# Patient Record
Sex: MALE | Race: WHITE | NOT HISPANIC OR LATINO | Employment: UNEMPLOYED | URBAN - METROPOLITAN AREA
[De-identification: names, ages, dates, MRNs, and addresses within clinical notes are randomized per-mention and may not be internally consistent; named-entity substitution may affect disease eponyms.]

---

## 2017-03-02 ENCOUNTER — ALLSCRIPTS OFFICE VISIT (OUTPATIENT)
Dept: OTHER | Facility: OTHER | Age: 32
End: 2017-03-02

## 2017-03-02 DIAGNOSIS — A09 INFECTIOUS GASTROENTERITIS AND COLITIS: ICD-10-CM

## 2017-06-06 ENCOUNTER — ALLSCRIPTS OFFICE VISIT (OUTPATIENT)
Dept: OTHER | Facility: OTHER | Age: 32
End: 2017-06-06

## 2018-01-13 VITALS
SYSTOLIC BLOOD PRESSURE: 128 MMHG | BODY MASS INDEX: 34.67 KG/M2 | TEMPERATURE: 97.4 F | HEIGHT: 72 IN | RESPIRATION RATE: 16 BRPM | HEART RATE: 97 BPM | DIASTOLIC BLOOD PRESSURE: 80 MMHG | WEIGHT: 256 LBS

## 2018-01-14 VITALS
BODY MASS INDEX: 34.95 KG/M2 | HEIGHT: 72 IN | OXYGEN SATURATION: 97 % | SYSTOLIC BLOOD PRESSURE: 132 MMHG | WEIGHT: 258 LBS | HEART RATE: 96 BPM | DIASTOLIC BLOOD PRESSURE: 84 MMHG | RESPIRATION RATE: 16 BRPM | TEMPERATURE: 97.2 F

## 2018-01-18 NOTE — MISCELLANEOUS
Message  Return to work or school:   Ambar Magana is under my professional care  He was seen in my office on 01/25/2016        excuse due to illness        Signatures   Electronically signed by : Argenis Stern, [facility]; Jan 25 2016 11:00AM EST                       (Author)

## 2018-01-20 ENCOUNTER — OFFICE VISIT (OUTPATIENT)
Dept: URGENT CARE | Age: 33
End: 2018-01-20
Payer: COMMERCIAL

## 2018-01-20 PROCEDURE — 99283 EMERGENCY DEPT VISIT LOW MDM: CPT | Performed by: FAMILY MEDICINE

## 2018-01-20 PROCEDURE — G0382 LEV 3 HOSP TYPE B ED VISIT: HCPCS | Performed by: FAMILY MEDICINE

## 2018-01-22 NOTE — PROGRESS NOTES
Assessment   1  History of acute bronchitis (V12 69) (Z87 09)   2  Acute bronchitis (466 0) (J20 9)    Plan   Acute bronchitis    · Azithromycin 250 MG Oral Tablet; TAKE 2 TABLETS ON DAY 1 THEN TAKE 1    TABLET A DAY FOR 4 DAYS   · Medrol 4 MG Oral Tablet Therapy Pack (MethylPREDNISolone); Take as directed  Six pills today, 5 the next day, four the next day, three the next day,    two the next day, one the last day   · Ventolin  (90 Base) MCG/ACT Inhalation Aerosol Solution; INHALE 1 TO 2    PUFFS EVERY 4 TO 6 HOURS AS NEEDED   · Ipratropium-Albuterol 0 5-2 5 (3) MG/3ML Inhalation Solution; Gve breathing    treatment; To Be Done: [id number]    Discussion/Summary   Discussion Summary:    Today your symptoms are consistent with acute bronchitis  the antibiotic as directed with food inhaler every 4 hours as needed prednisonse as directed with food fluids at home can alternate tylenol/ibuprofen as needed for discomfort saline nasal spray as needed using a humidifier in your bedroom with your primary care physician for re-evaluation within 3-5 days  If cough persists, have chest x-ray performed at that time  you have worsening symptoms, shortness of breath, or any signs of distress please go to the nearest ER      Medication Side Effects Reviewed: Possible side effects of new medications were reviewed with the patient/guardian today  Understands and agrees with treatment plan: The treatment plan was reviewed with the patient/guardian  The patient/guardian understands and agrees with the treatment plan    Counseling Documentation With Imm: The patient was counseled regarding instructions for management  Follow Up Instructions: Follow Up with your Primary Care Provider in 3-5 days  If your symptoms worsen, go to the nearest [place] [address] Emergency Department  Chief Complaint   1   Cough  Chief Complaint Free Text Note Form: cough,congestion,wheezing x5 days      History of Present Illness   HPI: 72-year-old male presents for evaluation of wheezing and shortness of breath x5 days  Patient states that he is coming off at upper respiratory infection, and sore his children, have been giving back and forth 1 another  He denies any history of COPD  does have a rescue inhaler prescribed him, that has been to 2 times per day over the last 3 days  He is a current everyday smoker  Patient states had a lung nodule found on his left lung at some point, and is being monitored for it  He denies any hemoptysis  Denies fevers chills  He denies any abdominal pain, chest pain, nausea/vomiting/diarrhea  Hospital Based Practices Required Assessment:      Pain Assessment      the patient states they have pain  (on a scale of 0 to 10, the patient rates the pain at 1 )      Abuse And Domestic Violence Screen       Yes, the patient is safe at home  -- The patient states no one is hurting them  Depression And Suicide Screen  No, the patient has not had thoughts of hurting themself  No, the patient has not felt depressed in the past 7 days  Prefered Language is  [place]  Primary Language is  English  Review of Systems   Focused-Male:      Constitutional: as noted in HPI       ENT: as noted in HPI  Cardiovascular: as noted in HPI  Respiratory: as noted in HPI  ROS Reviewed:    ROS reviewed  Active Problems   1  Acute frontal sinusitis (461 1) (J01 10)   2  Infectious diarrhea (009 2) (A09)    Past Medical History   1  History of acute bronchitis (V12 69) (Z87 09)  Active Problems And Past Medical History Reviewed: The active problems and past medical history were reviewed and updated today  Family History   Mother    1  No pertinent family history  Family History Reviewed: The family history was reviewed and updated today  Social History    · Current every day smoker (305 1) (F17 200)   ·   Social History Reviewed: The social history was reviewed and updated today  The social history was reviewed and is unchanged  Surgical History   Surgical History Reviewed: The surgical history was reviewed and updated today  Current Meds    1  Ventolin  (90 Base) MCG/ACT Inhalation Aerosol Solution; Inhale 2 puffs every     4-6 hours as needed for wheeze; Therapy: [id number] to (Last [facility])  Requested for: [id number] Ordered  Medication List Reviewed: The medication list was reviewed and updated today  Allergies   1  Levaquin TABS    Vitals   Signs   Recorded: [id number] 01:49PM   Temperature: 97 7 F  Heart Rate: 104  Respiration: 18  Systolic: 063  Diastolic: 73  Height: 6 ft   Weight: 274 lb   BMI Calculated: 37 16  BSA Calculated: 2 44  O2 Saturation: 91    Physical Exam        Constitutional      General appearance: No acute distress, well appearing and well nourished  Eyes      Conjunctiva and lids: No swelling, erythema, or discharge  Pupils and irises: Equal, round and reactive to light  Ears, Nose, Mouth, and Throat      External inspection of ears and nose: Normal        Otoscopic examination: Tympanic membrance translucent with normal light reflex  Canals patent without erythema  Nasal mucosa, septum, and turbinates: Normal without edema or erythema  Oropharynx: Normal with no erythema, edema, exudate or lesions  Pulmonary      Respiratory effort: No increased work of breathing or signs of respiratory distress  Auscultation of lungs: Abnormal  -- Diffuse inspiratory and expiratory wheezing in all fields of the lungs  No rhonchi no rales  Cardiovascular      Auscultation of heart: Normal rate and rhythm, normal S1 and S2, without murmurs  Procedure        Treatment #1 included Albuterol 2 5 mg and Ipratropium 0 5 mg  After the first nebulizer treatment, examination at revealed an oxygen saturation of 94%  After treatment #1, the patient noted symptomatic improvement  Lung sounds were clear  Air exchange was improved        Signatures    Electronically signed by : MIA Miller; Jan 20 2018  2:39PM EST                       (Author)     Electronically signed by : HENNY Velasquez ; Jan 21 2018  3:47PM EST

## 2018-09-06 ENCOUNTER — OFFICE VISIT (OUTPATIENT)
Dept: FAMILY MEDICINE CLINIC | Facility: CLINIC | Age: 33
End: 2018-09-06
Payer: COMMERCIAL

## 2018-09-06 VITALS
DIASTOLIC BLOOD PRESSURE: 70 MMHG | BODY MASS INDEX: 36.98 KG/M2 | SYSTOLIC BLOOD PRESSURE: 130 MMHG | OXYGEN SATURATION: 98 % | RESPIRATION RATE: 16 BRPM | WEIGHT: 273 LBS | HEIGHT: 72 IN | HEART RATE: 93 BPM

## 2018-09-06 DIAGNOSIS — Z23 NEED FOR TDAP VACCINATION: Primary | ICD-10-CM

## 2018-09-06 DIAGNOSIS — L60.0 INGROWN NAIL OF GREAT TOE OF RIGHT FOOT: ICD-10-CM

## 2018-09-06 DIAGNOSIS — J45.20 MILD INTERMITTENT ASTHMA WITHOUT COMPLICATION: ICD-10-CM

## 2018-09-06 PROCEDURE — 90471 IMMUNIZATION ADMIN: CPT

## 2018-09-06 PROCEDURE — 90732 PPSV23 VACC 2 YRS+ SUBQ/IM: CPT

## 2018-09-06 PROCEDURE — 99214 OFFICE O/P EST MOD 30 MIN: CPT | Performed by: FAMILY MEDICINE

## 2018-09-06 PROCEDURE — 3008F BODY MASS INDEX DOCD: CPT | Performed by: FAMILY MEDICINE

## 2018-09-06 RX ORDER — ALBUTEROL SULFATE 90 UG/1
2 AEROSOL, METERED RESPIRATORY (INHALATION) EVERY 6 HOURS PRN
Qty: 1 INHALER | Refills: 0 | Status: ON HOLD
Start: 2018-09-06 | End: 2021-01-25 | Stop reason: SDUPTHER

## 2018-09-06 NOTE — ASSESSMENT & PLAN NOTE
Mild intermittent asthma, he does not need any refill he use Ventolin only as needed  Advised to come back for physical and needs labs, he says he will plan

## 2018-09-06 NOTE — PROGRESS NOTES
Assessment/Plan:    Problem List Items Addressed This Visit        Respiratory    Mild intermittent asthma without complication     Mild intermittent asthma, he does not need any refill he use Ventolin only as needed         Relevant Medications    albuterol (VENTOLIN HFA) 90 mcg/act inhaler    Other Relevant Orders    PNEUMOCOCCAL POLYSACCHARIDE VACCINE 23-VALENT =>3YO SQ IM       Musculoskeletal and Integument    Ingrown nail of great toe of right foot     There is no infection he has ingrown toenail in the right big toe, he will see podiatrist         Relevant Orders    Ambulatory referral to Podiatry      Other Visit Diagnoses     Need for Tdap vaccination    -  Primary          Chief Complaint   Patient presents with    Nail Problem     ingrown nail       Subjective:   Patient ID: Blanca Ferreira is a 35 y o  male  He says he has problem with the ingrown toenails for very long time and sometimes it gives him discomfort there is no redness swelling or discharge mostly says in the right big toe and slightly in the left big toe  Now he wants to see podiatrist so that can be taken care  He is a smoker he is trying to cut down smoking now only 2-3 cigarettes per day  And he is asthmatic which is mild intermittent and he use Ventolin rarely  Last use was about 3-4 months ago  Review of Systems   Constitutional: Negative for activity change, appetite change, chills, diaphoresis, fatigue, fever and unexpected weight change  HENT: Negative for congestion, dental problem, ear discharge, ear pain, facial swelling, hearing loss, mouth sores, nosebleeds, postnasal drip, rhinorrhea, sinus pain, sinus pressure, sneezing, sore throat, trouble swallowing and voice change  Eyes: Negative for photophobia, pain, discharge, redness and itching  Respiratory: Negative for cough, chest tightness, shortness of breath and wheezing  Cardiovascular: Negative for chest pain, palpitations and leg swelling  Gastrointestinal: Negative for abdominal distention, abdominal pain, blood in stool, constipation, diarrhea and nausea  Endocrine: Negative for cold intolerance, heat intolerance, polydipsia, polyphagia and polyuria  Genitourinary: Negative for dysuria, flank pain, frequency, hematuria and urgency  Musculoskeletal: Negative for arthralgias, back pain, myalgias and neck pain  Skin: Negative for color change, pallor, rash and wound  Right big toe nail ingrown   Allergic/Immunologic: Negative for environmental allergies and food allergies  Neurological: Negative for dizziness, weakness, light-headedness, numbness and headaches  Hematological: Negative for adenopathy  Does not bruise/bleed easily  Psychiatric/Behavioral: Negative for behavioral problems, sleep disturbance and suicidal ideas  The patient is not nervous/anxious  Objective:  Physical Exam   Constitutional: He is oriented to person, place, and time  He appears well-developed and well-nourished  HENT:   Head: Normocephalic and atraumatic  Nose: Nose normal    Mouth/Throat: Oropharynx is clear and moist  No oropharyngeal exudate  Eyes: Conjunctivae and EOM are normal  Right eye exhibits no discharge  Left eye exhibits no discharge  No scleral icterus  Neck: Normal range of motion  Neck supple  No tracheal deviation present  No thyromegaly present  Cardiovascular: Normal rate, regular rhythm and normal heart sounds  No murmur heard  Pulmonary/Chest: Effort normal and breath sounds normal  No respiratory distress  He has no wheezes  He has no rales  Occasional wheeze on the left   Abdominal: Soft  Bowel sounds are normal  He exhibits no distension and no mass  There is no tenderness  There is no rebound  Musculoskeletal: Normal range of motion  He exhibits no edema  Lymphadenopathy:     He has no cervical adenopathy  Neurological: He is alert and oriented to person, place, and time  He has normal reflexes   No cranial nerve deficit  Skin: Skin is warm  No rash noted  No erythema  No pallor  Ingrown toe nail on rt big toe   Psychiatric: He has a normal mood and affect  His behavior is normal  Judgment and thought content normal    Nursing note and vitals reviewed           Past Surgical History:   Procedure Laterality Date    LYMPH NODE BIOPSY         Family History   Problem Relation Age of Onset    No Known Problems Mother     Diabetes Father     Prostate cancer Father     Hypertension Father          Current Outpatient Prescriptions:     albuterol (VENTOLIN HFA) 90 mcg/act inhaler, Inhale 2 puffs every 6 (six) hours as needed for wheezing, Disp: 1 Inhaler, Rfl: 0    Allergies   Allergen Reactions    Levofloxacin        Vitals:    09/06/18 0805   BP: 130/70   Pulse: 93   Resp: 16   SpO2: 98%   Weight: 124 kg (273 lb)   Height: 6' (1 829 m)

## 2019-05-06 ENCOUNTER — OFFICE VISIT (OUTPATIENT)
Dept: FAMILY MEDICINE CLINIC | Facility: CLINIC | Age: 34
End: 2019-05-06
Payer: COMMERCIAL

## 2019-05-06 VITALS
HEIGHT: 72 IN | OXYGEN SATURATION: 98 % | WEIGHT: 305.6 LBS | SYSTOLIC BLOOD PRESSURE: 148 MMHG | RESPIRATION RATE: 18 BRPM | BODY MASS INDEX: 41.39 KG/M2 | HEART RATE: 82 BPM | DIASTOLIC BLOOD PRESSURE: 90 MMHG

## 2019-05-06 DIAGNOSIS — J45.20 MILD INTERMITTENT ASTHMA WITHOUT COMPLICATION: ICD-10-CM

## 2019-05-06 DIAGNOSIS — E66.01 CLASS 3 SEVERE OBESITY DUE TO EXCESS CALORIES WITH SERIOUS COMORBIDITY AND BODY MASS INDEX (BMI) OF 40.0 TO 44.9 IN ADULT (HCC): ICD-10-CM

## 2019-05-06 DIAGNOSIS — I10 ESSENTIAL HYPERTENSION: Primary | ICD-10-CM

## 2019-05-06 PROBLEM — R91.8 MASS OF UPPER LOBE OF LEFT LUNG: Status: ACTIVE | Noted: 2019-05-06

## 2019-05-06 PROCEDURE — 3008F BODY MASS INDEX DOCD: CPT | Performed by: PHYSICIAN ASSISTANT

## 2019-05-06 PROCEDURE — 99214 OFFICE O/P EST MOD 30 MIN: CPT | Performed by: PHYSICIAN ASSISTANT

## 2019-05-06 RX ORDER — HYDROCHLOROTHIAZIDE 12.5 MG/1
12.5 TABLET ORAL DAILY
Qty: 30 TABLET | Refills: 0 | Status: SHIPPED | OUTPATIENT
Start: 2019-05-06 | End: 2021-01-25 | Stop reason: HOSPADM

## 2019-05-30 DIAGNOSIS — I10 ESSENTIAL HYPERTENSION: ICD-10-CM

## 2019-05-31 RX ORDER — HYDROCHLOROTHIAZIDE 12.5 MG/1
TABLET ORAL
Qty: 30 TABLET | Refills: 0 | OUTPATIENT
Start: 2019-05-31

## 2020-07-14 ENCOUNTER — OFFICE VISIT (OUTPATIENT)
Dept: URGENT CARE | Facility: MEDICAL CENTER | Age: 35
End: 2020-07-14
Payer: COMMERCIAL

## 2020-07-14 VITALS — TEMPERATURE: 97.7 F | OXYGEN SATURATION: 94 % | HEART RATE: 90 BPM

## 2020-07-14 DIAGNOSIS — Z11.59 SPECIAL SCREENING EXAMINATION FOR UNSPECIFIED VIRAL DISEASE: Primary | ICD-10-CM

## 2020-07-14 PROCEDURE — G0381 LEV 2 HOSP TYPE B ED VISIT: HCPCS | Performed by: PHYSICIAN ASSISTANT

## 2020-07-14 PROCEDURE — U0003 INFECTIOUS AGENT DETECTION BY NUCLEIC ACID (DNA OR RNA); SEVERE ACUTE RESPIRATORY SYNDROME CORONAVIRUS 2 (SARS-COV-2) (CORONAVIRUS DISEASE [COVID-19]), AMPLIFIED PROBE TECHNIQUE, MAKING USE OF HIGH THROUGHPUT TECHNOLOGIES AS DESCRIBED BY CMS-2020-01-R: HCPCS | Performed by: PHYSICIAN ASSISTANT

## 2020-07-14 NOTE — PROGRESS NOTES
[facility]        NAME: Mayra Summers is a 28 y o  male  : 1985    MRN: [id number]  DATE: 2020  TIME: 12:25 PM    Assessment and Plan   Special screening examination for unspecified viral disease [Z11 59]  1  Special screening examination for unspecified viral disease  MISC COVID-19 TEST- Office Collection     COVID-19 swab performed for employer    Patient Instructions       Follow up with PCP in 3-5 days  Proceed to  ER if symptoms worsen  Chief Complaint   No chief complaint on file  History of Present Illness       Patient is a 66-year-old male who presents today for COVID-19 swabbing for his employer  He denies any symptoms  Review of Systems   Review of Systems   Constitutional: Negative for chills and fever  HENT: Negative for sore throat  Respiratory: Negative for cough and shortness of breath  Cardiovascular: Negative for chest pain  Musculoskeletal: Negative for myalgias           Current Medications       Current Outpatient Medications:     albuterol (VENTOLIN HFA) 90 mcg/act inhaler, Inhale 2 puffs every 6 (six) hours as needed for wheezing, Disp: 1 Inhaler, Rfl: 0    hydrochlorothiazide (HYDRODIURIL) 12 5 mg tablet, Take 1 tablet (12 5 mg total) by mouth daily, Disp: 30 tablet, Rfl: 0    Current Allergies     Allergies as of 2020 - Reviewed 2019   Allergen Reaction Noted    Levofloxacin Other (See Comments) 2016            The following portions of the patient's history were reviewed and updated as appropriate: allergies, current medications, past family history, past medical history, past social history, past surgical history and problem list      Past Medical History:   Diagnosis Date    Allergic     Asthma     Essential hypertension 2019    Obesity     Substance abuse ([facility] [address] )        Past Surgical History:   Procedure Laterality Date    LYMPH NODE BIOPSY         Family History   Problem Relation Age of Onset    No Known Problems Mother     Diabetes Father     Prostate cancer Father     Hypertension Father          Medications have been verified  Objective   Pulse 90   Temp 97 7 °F (36 5 °C) (Temporal)   SpO2 94%        Physical Exam     Physical Exam   Constitutional: He appears well-developed and well-nourished  No distress  HENT:   Head: Normocephalic and atraumatic  Nose: Nose normal    Mouth/Throat: Oropharynx is clear and moist    Eyes: Pupils are equal, round, and reactive to light  Conjunctivae are normal    Cardiovascular: Normal rate and regular rhythm  Pulmonary/Chest: Effort normal    Skin: Skin is warm and dry

## 2020-07-18 LAB — SARS-COV-2 RNA SPEC QL NAA+PROBE: NOT DETECTED

## 2020-07-19 ENCOUNTER — TELEPHONE (OUTPATIENT)
Dept: URGENT CARE | Facility: MEDICAL CENTER | Age: 35
End: 2020-07-19

## 2020-07-19 NOTE — TELEPHONE ENCOUNTER
Called patient to discuss COVID-19 results  Informed him they were negative  He was appreciative of the call

## 2020-09-24 ENCOUNTER — TELEMEDICINE (OUTPATIENT)
Dept: FAMILY MEDICINE CLINIC | Facility: CLINIC | Age: 35
End: 2020-09-24
Payer: COMMERCIAL

## 2020-09-24 DIAGNOSIS — R11.0 NAUSEA: Primary | ICD-10-CM

## 2020-09-24 DIAGNOSIS — Z11.59 ENCOUNTER FOR SCREENING FOR OTHER VIRAL DISEASES: ICD-10-CM

## 2020-09-24 PROCEDURE — U0003 INFECTIOUS AGENT DETECTION BY NUCLEIC ACID (DNA OR RNA); SEVERE ACUTE RESPIRATORY SYNDROME CORONAVIRUS 2 (SARS-COV-2) (CORONAVIRUS DISEASE [COVID-19]), AMPLIFIED PROBE TECHNIQUE, MAKING USE OF HIGH THROUGHPUT TECHNOLOGIES AS DESCRIBED BY CMS-2020-01-R: HCPCS | Performed by: PHYSICIAN ASSISTANT

## 2020-09-24 PROCEDURE — 99214 OFFICE O/P EST MOD 30 MIN: CPT | Performed by: PHYSICIAN ASSISTANT

## 2020-09-24 RX ORDER — ONDANSETRON 4 MG/1
4 TABLET, ORALLY DISINTEGRATING ORAL EVERY 6 HOURS PRN
Qty: 20 TABLET | Refills: 0 | Status: SHIPPED | OUTPATIENT
Start: 2020-09-24 | End: 2021-01-25 | Stop reason: HOSPADM

## 2020-09-24 NOTE — PROGRESS NOTES
COVID-19 Virtual Visit     Assessment/Plan:    Problem List Items Addressed This Visit     None      Visit Diagnoses     Nausea    -  Primary  Possible gastroenteritis  Directed patient to continue staying well hydrated  Ordered Zofran    Relevant Medications    ondansetron (ZOFRAN-ODT) 4 mg disintegrating tablet    Encounter for screening for other viral diseases        Relevant Orders    Novel Coronavirus (COVID-19), PCR [facility] - Collected at [facility] or [facility]        This virtual check-in was done via [employer] and patient was informed that this is not a secure, HIPAA-complaint platform  He agrees to proceed     Disposition:      I referred Jacinto Ewing to one of our centralized sites for a COVID-19 swab    I spent 5 minutes directly with the patient during this visit    Encounter provider Amarilys Leonard PA-C    Provider located at [place] [address]  [phone]    Recent Visits  No visits were found meeting these conditions  Showing recent visits within past 7 days and meeting all other requirements     Today's Visits  Date Type Provider Dept   09/24/20 Telemedicine Amarilys Leonard PA-C [facility]   Showing today's visits and meeting all other requirements     Future Appointments  No visits were found meeting these conditions  Showing future appointments within next 150 days and meeting all other requirements        Patient agrees to participate in a virtual check in via telephone or video visit instead of presenting to the office to address urgent/immediate medical needs  Patient is aware this is a billable service  After connecting through [id number], the patient was identified by name and date of birth  Miah Ramírez was informed that this was a telemedicine visit and that the exam was being conducted confidentially over secure lines  My office door was closed  No one else was in the room    Miah Ramírez acknowledged consent and understanding of privacy and security of the telemedicine visit  I informed the patient that I have reviewed his record in Epic and presented the opportunity for him to ask any questions regarding the visit today  The patient agreed to participate  Subjective  Luis Fuchs is a 28 y o  male who is concerned about COVID-19  Fatigue, vomiting, temp of fever of 99 5 for 3 days  rhinorrhea     He has been able to continue eating and drinking    He reports chills, fatigue and vomiting  He has not experienced cough, shortness of breath, anorexia, myalgias, anosmia and diarrhea He has not had contact with a person who is under investigation for or who is positive for COVID-19 within the last 14 days  He has not been hospitalized recently for fever and/or lower respiratory symptoms  Past Medical History:   Diagnosis Date    Allergic     Asthma     Essential hypertension 5/6/2019    Obesity     Substance abuse ([facility] [address] )        Past Surgical History:   Procedure Laterality Date    LYMPH NODE BIOPSY         Current Outpatient Medications   Medication Sig Dispense Refill    albuterol (VENTOLIN HFA) 90 mcg/act inhaler Inhale 2 puffs every 6 (six) hours as needed for wheezing 1 Inhaler 0    hydrochlorothiazide (HYDRODIURIL) 12 5 mg tablet Take 1 tablet (12 5 mg total) by mouth daily 30 tablet 0    ondansetron (ZOFRAN-ODT) 4 mg disintegrating tablet Take 1 tablet (4 mg total) by mouth every 6 (six) hours as needed for nausea or vomiting 20 tablet 0     No current facility-administered medications for this visit  Allergies   Allergen Reactions    Levofloxacin Other (See Comments)     BLACKED OUT       Review of Systems   Constitutional: Positive for fatigue  Negative for activity change, fever and unexpected weight change  HENT: Positive for rhinorrhea  Negative for sore throat  Respiratory: Negative for cough, shortness of breath and wheezing      Cardiovascular: Negative for chest pain, palpitations and leg swelling  Gastrointestinal: Positive for nausea and vomiting  Negative for constipation and diarrhea  Musculoskeletal: Negative for back pain, neck pain and neck stiffness  Skin: Negative for rash  Video Exam    There were no vitals filed for this visit  Jacinto Ewing appears alert, no distress, cooperative  Physical Exam     VIRTUAL VISIT DISCLAIMER    Miah Ramírez acknowledges that he has consented to an online visit or consultation  He understands that the online visit is based solely on information provided by him, and that, in the absence of a face-to-face physical evaluation by the physician, the diagnosis he receives is both limited and provisional in terms of accuracy and completeness  This is not intended to replace a full medical face-to-face evaluation by the physician  Miah Ramírez understands and accepts these terms

## 2020-09-25 LAB — SARS-COV-2 RNA SPEC QL NAA+PROBE: NOT DETECTED

## 2020-09-28 ENCOUNTER — TELEMEDICINE (OUTPATIENT)
Dept: FAMILY MEDICINE CLINIC | Facility: CLINIC | Age: 35
End: 2020-09-28
Payer: COMMERCIAL

## 2020-09-28 ENCOUNTER — APPOINTMENT (OUTPATIENT)
Dept: LAB | Facility: CLINIC | Age: 35
End: 2020-09-28
Payer: COMMERCIAL

## 2020-09-28 DIAGNOSIS — F10.10 ALCOHOL ABUSE: ICD-10-CM

## 2020-09-28 DIAGNOSIS — R11.2 NAUSEA AND VOMITING, INTRACTABILITY OF VOMITING NOT SPECIFIED, UNSPECIFIED VOMITING TYPE: ICD-10-CM

## 2020-09-28 DIAGNOSIS — Z11.4 SCREENING FOR HIV (HUMAN IMMUNODEFICIENCY VIRUS): ICD-10-CM

## 2020-09-28 DIAGNOSIS — Z11.59 NEED FOR HEPATITIS C SCREENING TEST: Primary | ICD-10-CM

## 2020-09-28 DIAGNOSIS — R79.89 ABNORMAL LFTS: ICD-10-CM

## 2020-09-28 DIAGNOSIS — Z11.59 NEED FOR HEPATITIS C SCREENING TEST: ICD-10-CM

## 2020-09-28 LAB
ALBUMIN SERPL BCP-MCNC: 4.8 G/DL (ref 3.5–5)
ALP SERPL-CCNC: 178 U/L (ref 46–116)
ALT SERPL W P-5'-P-CCNC: 206 U/L (ref 12–78)
ANION GAP SERPL CALCULATED.3IONS-SCNC: 7 MMOL/L (ref 4–13)
AST SERPL W P-5'-P-CCNC: 402 U/L (ref 5–45)
BASOPHILS # BLD AUTO: 0.03 THOUSANDS/ΜL (ref 0–0.1)
BASOPHILS NFR BLD AUTO: 0 % (ref 0–1)
BILIRUB SERPL-MCNC: 2.19 MG/DL (ref 0.2–1)
BUN SERPL-MCNC: 6 MG/DL (ref 5–25)
CALCIUM SERPL-MCNC: 10.2 MG/DL (ref 8.3–10.1)
CHLORIDE SERPL-SCNC: 92 MMOL/L (ref 100–108)
CHOLEST SERPL-MCNC: 176 MG/DL (ref 50–200)
CO2 SERPL-SCNC: 36 MMOL/L (ref 21–32)
CREAT SERPL-MCNC: 0.72 MG/DL (ref 0.6–1.3)
EOSINOPHIL # BLD AUTO: 0.02 THOUSAND/ΜL (ref 0–0.61)
EOSINOPHIL NFR BLD AUTO: 0 % (ref 0–6)
ERYTHROCYTE [DISTWIDTH] IN BLOOD BY AUTOMATED COUNT: 13.9 % (ref 11.6–15.1)
GFR SERPL CREATININE-BSD FRML MDRD: 121 ML/MIN/1.73SQ M
GLUCOSE P FAST SERPL-MCNC: 125 MG/DL (ref 65–99)
HCT VFR BLD AUTO: 48.6 % (ref 36.5–49.3)
HCV AB SER QL: NORMAL
HDLC SERPL-MCNC: 53 MG/DL
HGB BLD-MCNC: 16.3 G/DL (ref 12–17)
IMM GRANULOCYTES # BLD AUTO: 0.02 THOUSAND/UL (ref 0–0.2)
IMM GRANULOCYTES NFR BLD AUTO: 0 % (ref 0–2)
LDLC SERPL CALC-MCNC: 85 MG/DL (ref 0–100)
LYMPHOCYTES # BLD AUTO: 1.22 THOUSANDS/ΜL (ref 0.6–4.47)
LYMPHOCYTES NFR BLD AUTO: 16 % (ref 14–44)
MCH RBC QN AUTO: 34.6 PG (ref 26.8–34.3)
MCHC RBC AUTO-ENTMCNC: 33.5 G/DL (ref 31.4–37.4)
MCV RBC AUTO: 103 FL (ref 82–98)
MONOCYTES # BLD AUTO: 0.9 THOUSAND/ΜL (ref 0.17–1.22)
MONOCYTES NFR BLD AUTO: 12 % (ref 4–12)
NEUTROPHILS # BLD AUTO: 5.51 THOUSANDS/ΜL (ref 1.85–7.62)
NEUTS SEG NFR BLD AUTO: 72 % (ref 43–75)
NRBC BLD AUTO-RTO: 0 /100 WBCS
PLATELET # BLD AUTO: 201 THOUSANDS/UL (ref 149–390)
PMV BLD AUTO: 9.6 FL (ref 8.9–12.7)
POTASSIUM SERPL-SCNC: 3.3 MMOL/L (ref 3.5–5.3)
PROT SERPL-MCNC: 9.5 G/DL (ref 6.4–8.2)
RBC # BLD AUTO: 4.71 MILLION/UL (ref 3.88–5.62)
SODIUM SERPL-SCNC: 135 MMOL/L (ref 136–145)
TRIGL SERPL-MCNC: 188 MG/DL
WBC # BLD AUTO: 7.7 THOUSAND/UL (ref 4.31–10.16)

## 2020-09-28 PROCEDURE — 83550 IRON BINDING TEST: CPT

## 2020-09-28 PROCEDURE — 83036 HEMOGLOBIN GLYCOSYLATED A1C: CPT

## 2020-09-28 PROCEDURE — 80053 COMPREHEN METABOLIC PANEL: CPT

## 2020-09-28 PROCEDURE — 87340 HEPATITIS B SURFACE AG IA: CPT

## 2020-09-28 PROCEDURE — 36415 COLL VENOUS BLD VENIPUNCTURE: CPT

## 2020-09-28 PROCEDURE — 3725F SCREEN DEPRESSION PERFORMED: CPT | Performed by: PHYSICIAN ASSISTANT

## 2020-09-28 PROCEDURE — 99214 OFFICE O/P EST MOD 30 MIN: CPT | Performed by: PHYSICIAN ASSISTANT

## 2020-09-28 PROCEDURE — 86803 HEPATITIS C AB TEST: CPT

## 2020-09-28 PROCEDURE — 80061 LIPID PANEL: CPT

## 2020-09-28 PROCEDURE — 85025 COMPLETE CBC W/AUTO DIFF WBC: CPT

## 2020-09-28 PROCEDURE — 82728 ASSAY OF FERRITIN: CPT

## 2020-09-28 PROCEDURE — 82977 ASSAY OF GGT: CPT

## 2020-09-28 PROCEDURE — 87389 HIV-1 AG W/HIV-1&-2 AB AG IA: CPT

## 2020-09-28 PROCEDURE — 83540 ASSAY OF IRON: CPT

## 2020-09-28 RX ORDER — BUPRENORPHINE AND NALOXONE 8; 2 MG/1; MG/1
FILM, SOLUBLE BUCCAL; SUBLINGUAL
Status: ON HOLD | COMMUNITY
Start: 2020-09-14 | End: 2021-05-19

## 2020-09-28 RX ORDER — ZOLPIDEM TARTRATE 10 MG/1
10 TABLET ORAL
COMMUNITY
Start: 2020-09-03 | End: 2021-01-25 | Stop reason: HOSPADM

## 2020-09-29 DIAGNOSIS — R79.89 ABNORMAL LFTS: Primary | ICD-10-CM

## 2020-09-29 LAB
EST. AVERAGE GLUCOSE BLD GHB EST-MCNC: 134 MG/DL
FERRITIN SERPL-MCNC: 817 NG/ML (ref 8–388)
GGT SERPL-CCNC: 2072 U/L (ref 5–85)
HBA1C MFR BLD: 6.3 %
HBV SURFACE AG SER QL: NORMAL
IRON SATN MFR SERPL: 49 %
IRON SERPL-MCNC: 186 UG/DL (ref 65–175)
TIBC SERPL-MCNC: 381 UG/DL (ref 250–450)

## 2020-09-30 LAB — HIV 1+2 AB+HIV1 P24 AG SERPL QL IA: NORMAL

## 2021-01-19 ENCOUNTER — HOSPITAL ENCOUNTER (INPATIENT)
Facility: HOSPITAL | Age: 36
LOS: 4 days | Discharge: HOME | DRG: 432 | End: 2021-01-25
Attending: EMERGENCY MEDICINE | Admitting: INTERNAL MEDICINE

## 2021-01-19 ENCOUNTER — APPOINTMENT (OUTPATIENT)
Dept: RADIOLOGY | Facility: HOSPITAL | Age: 36
DRG: 432 | End: 2021-01-19

## 2021-01-19 DIAGNOSIS — J45.20 MILD INTERMITTENT ASTHMA WITHOUT COMPLICATION: ICD-10-CM

## 2021-01-19 DIAGNOSIS — F10.929 ALCOHOL INTOXICATION (HCC): ICD-10-CM

## 2021-01-19 DIAGNOSIS — F10.10 ALCOHOL ABUSE: ICD-10-CM

## 2021-01-19 DIAGNOSIS — L03.115 BILATERAL LOWER LEG CELLULITIS: Primary | ICD-10-CM

## 2021-01-19 DIAGNOSIS — R60.0 BILATERAL EDEMA OF LOWER EXTREMITY: ICD-10-CM

## 2021-01-19 DIAGNOSIS — D64.9 ANEMIA: ICD-10-CM

## 2021-01-19 DIAGNOSIS — R60.1 ANASARCA: ICD-10-CM

## 2021-01-19 DIAGNOSIS — Z78.9 UNABLE TO CARE FOR SELF: ICD-10-CM

## 2021-01-19 DIAGNOSIS — S81.801A LEG WOUND, RIGHT, INITIAL ENCOUNTER: ICD-10-CM

## 2021-01-19 DIAGNOSIS — L03.119 CELLULITIS OF LOWER EXTREMITY, UNSPECIFIED LATERALITY: ICD-10-CM

## 2021-01-19 DIAGNOSIS — L03.116 BILATERAL LOWER LEG CELLULITIS: Primary | ICD-10-CM

## 2021-01-19 DIAGNOSIS — F32.A DEPRESSION: ICD-10-CM

## 2021-01-19 DIAGNOSIS — K85.20 ALCOHOLIC PANCREATITIS: ICD-10-CM

## 2021-01-19 DIAGNOSIS — F10.239 ALCOHOL WITHDRAWAL (HCC): ICD-10-CM

## 2021-01-19 DIAGNOSIS — K74.60 CIRRHOSIS (HCC): ICD-10-CM

## 2021-01-19 PROBLEM — J96.01 ACUTE RESPIRATORY FAILURE WITH HYPOXIA (HCC): Status: ACTIVE | Noted: 2021-01-19

## 2021-01-19 PROBLEM — R74.8 ELEVATED LIVER ENZYMES: Status: ACTIVE | Noted: 2021-01-19

## 2021-01-19 PROBLEM — E66.9 CLASS 1 OBESITY IN ADULT: Status: ACTIVE | Noted: 2019-05-06

## 2021-01-19 PROBLEM — F17.200 TOBACCO DEPENDENCE: Status: ACTIVE | Noted: 2021-01-19

## 2021-01-19 PROBLEM — F19.10 SUBSTANCE ABUSE (HCC): Status: ACTIVE | Noted: 2021-01-19

## 2021-01-19 PROBLEM — D53.9 MACROCYTIC ANEMIA: Status: ACTIVE | Noted: 2021-01-19

## 2021-01-19 PROBLEM — D69.6 THROMBOCYTOPENIA (HCC): Status: ACTIVE | Noted: 2021-01-19

## 2021-01-19 LAB
ALBUMIN SERPL BCP-MCNC: 3 G/DL (ref 3.4–4.8)
ALP SERPL-CCNC: 370.1 U/L (ref 10–129)
ALT SERPL W P-5'-P-CCNC: 17 U/L (ref 5–63)
ANION GAP SERPL CALCULATED.3IONS-SCNC: 9 MMOL/L (ref 4–13)
AST SERPL W P-5'-P-CCNC: 174 U/L (ref 15–41)
BASE EXCESS BLDA CALC-SCNC: 5 MMOL/L (ref -2–3)
BASOPHILS # BLD AUTO: 0.02 THOUSANDS/ΜL (ref 0–0.1)
BASOPHILS NFR BLD AUTO: 0 % (ref 0–1)
BILIRUB SERPL-MCNC: 3.26 MG/DL (ref 0.3–1.2)
BUN SERPL-MCNC: 5 MG/DL (ref 6–20)
CALCIUM ALBUM COR SERPL-MCNC: 8.9 MG/DL (ref 8.3–10.1)
CALCIUM SERPL-MCNC: 8.1 MG/DL (ref 8.4–10.2)
CHLORIDE SERPL-SCNC: 96 MMOL/L (ref 96–108)
CO2 SERPL-SCNC: 30 MMOL/L (ref 22–33)
CREAT SERPL-MCNC: 0.34 MG/DL (ref 0.5–1.2)
EOSINOPHIL # BLD AUTO: 0.06 THOUSAND/ΜL (ref 0–0.61)
EOSINOPHIL NFR BLD AUTO: 1 % (ref 0–6)
ERYTHROCYTE [DISTWIDTH] IN BLOOD BY AUTOMATED COUNT: 22.2 % (ref 11.6–15.1)
ETHANOL SERPL-MCNC: 400.2 MG/DL
FIO2 GAS DIL.REBREATH: 37 L
FLUAV RNA RESP QL NAA+PROBE: NEGATIVE
FLUBV RNA RESP QL NAA+PROBE: NEGATIVE
GFR SERPL CREATININE-BSD FRML MDRD: 165 ML/MIN/1.73SQ M
GLUCOSE SERPL-MCNC: 106 MG/DL (ref 65–140)
GLUCOSE SERPL-MCNC: 94 MG/DL (ref 65–140)
HCO3 BLDA-SCNC: 29.9 MMOL/L (ref 24–30)
HCT VFR BLD AUTO: 24.7 % (ref 36.5–49.3)
HCT VFR BLD CALC: 26 % (ref 36.5–49.3)
HGB BLD-MCNC: 8.2 G/DL (ref 12–17)
HGB BLDA-MCNC: 8.8 G/DL (ref 12–17)
IMM GRANULOCYTES # BLD AUTO: 0.03 THOUSAND/UL (ref 0–0.2)
IMM GRANULOCYTES NFR BLD AUTO: 1 % (ref 0–2)
LYMPHOCYTES # BLD AUTO: 1.15 THOUSANDS/ΜL (ref 0.6–4.47)
LYMPHOCYTES NFR BLD AUTO: 22 % (ref 14–44)
MAGNESIUM SERPL-MCNC: 1.9 MG/DL (ref 1.6–2.6)
MCH RBC QN AUTO: 35.3 PG (ref 26.8–34.3)
MCHC RBC AUTO-ENTMCNC: 33.2 G/DL (ref 31.4–37.4)
MCV RBC AUTO: 107 FL (ref 82–98)
MONOCYTES # BLD AUTO: 0.67 THOUSAND/ΜL (ref 0.17–1.22)
MONOCYTES NFR BLD AUTO: 13 % (ref 4–12)
NEUTROPHILS # BLD AUTO: 3.39 THOUSANDS/ΜL (ref 1.85–7.62)
NEUTS SEG NFR BLD AUTO: 63 % (ref 43–75)
PCO2 BLD: 31 MMOL/L (ref 21–32)
PCO2 BLD: 47 MM HG (ref 42–50)
PCO2 BLD: 79 MM HG
PCO2 BLDA: 45.7 MM HG
PH BLD: 7.41 [PH] (ref 7.3–7.4)
PH BLD: 7.42 [PH]
PHOSPHATE SERPL-MCNC: 3.5 MG/DL (ref 2.5–5)
PLATELET # BLD AUTO: 100 THOUSANDS/UL (ref 149–390)
PMV BLD AUTO: 9 FL (ref 8.9–12.7)
PO2 BLD: 82 MM HG (ref 35–45)
POTASSIUM BLD-SCNC: 3.7 MMOL/L (ref 3.5–5.3)
POTASSIUM SERPL-SCNC: 3.6 MMOL/L (ref 3.5–5)
PROT SERPL-MCNC: 7 G/DL (ref 6.4–8.3)
RBC # BLD AUTO: 2.32 MILLION/UL (ref 3.88–5.62)
RSV RNA RESP QL NAA+PROBE: NEGATIVE
SAO2 % BLD FROM PO2: 96 % (ref 60–85)
SARS-COV-2 RNA RESP QL NAA+PROBE: NEGATIVE
SODIUM BLD-SCNC: 138 MMOL/L (ref 136–145)
SODIUM SERPL-SCNC: 135 MMOL/L (ref 133–145)
SPECIMEN SOURCE: ABNORMAL
TSH SERPL DL<=0.05 MIU/L-ACNC: 3.48 UIU/ML (ref 0.34–5.6)
WBC # BLD AUTO: 5.32 THOUSAND/UL (ref 4.31–10.16)

## 2021-01-19 PROCEDURE — 0241U HB NFCT DS VIR RESP RNA 4 TRGT: CPT | Performed by: PHYSICIAN ASSISTANT

## 2021-01-19 PROCEDURE — 83735 ASSAY OF MAGNESIUM: CPT | Performed by: INTERNAL MEDICINE

## 2021-01-19 PROCEDURE — 84295 ASSAY OF SERUM SODIUM: CPT

## 2021-01-19 PROCEDURE — 99285 EMERGENCY DEPT VISIT HI MDM: CPT | Performed by: PHYSICIAN ASSISTANT

## 2021-01-19 PROCEDURE — 85610 PROTHROMBIN TIME: CPT | Performed by: INTERNAL MEDICINE

## 2021-01-19 PROCEDURE — 85379 FIBRIN DEGRADATION QUANT: CPT | Performed by: INTERNAL MEDICINE

## 2021-01-19 PROCEDURE — 84100 ASSAY OF PHOSPHORUS: CPT | Performed by: INTERNAL MEDICINE

## 2021-01-19 PROCEDURE — 84132 ASSAY OF SERUM POTASSIUM: CPT

## 2021-01-19 PROCEDURE — 82077 ASSAY SPEC XCP UR&BREATH IA: CPT | Performed by: PHYSICIAN ASSISTANT

## 2021-01-19 PROCEDURE — 71045 X-RAY EXAM CHEST 1 VIEW: CPT

## 2021-01-19 PROCEDURE — 96365 THER/PROPH/DIAG IV INF INIT: CPT

## 2021-01-19 PROCEDURE — 82803 BLOOD GASES ANY COMBINATION: CPT

## 2021-01-19 PROCEDURE — 85014 HEMATOCRIT: CPT

## 2021-01-19 PROCEDURE — 85025 COMPLETE CBC W/AUTO DIFF WBC: CPT | Performed by: PHYSICIAN ASSISTANT

## 2021-01-19 PROCEDURE — 87040 BLOOD CULTURE FOR BACTERIA: CPT | Performed by: INTERNAL MEDICINE

## 2021-01-19 PROCEDURE — 80053 COMPREHEN METABOLIC PANEL: CPT | Performed by: PHYSICIAN ASSISTANT

## 2021-01-19 PROCEDURE — 36600 WITHDRAWAL OF ARTERIAL BLOOD: CPT

## 2021-01-19 PROCEDURE — 99284 EMERGENCY DEPT VISIT MOD MDM: CPT

## 2021-01-19 PROCEDURE — 84443 ASSAY THYROID STIM HORMONE: CPT | Performed by: INTERNAL MEDICINE

## 2021-01-19 PROCEDURE — 82947 ASSAY GLUCOSE BLOOD QUANT: CPT

## 2021-01-19 PROCEDURE — 12001 RPR S/N/AX/GEN/TRNK 2.5CM/<: CPT | Performed by: PHYSICIAN ASSISTANT

## 2021-01-19 PROCEDURE — 82248 BILIRUBIN DIRECT: CPT | Performed by: INTERNAL MEDICINE

## 2021-01-19 PROCEDURE — 36415 COLL VENOUS BLD VENIPUNCTURE: CPT | Performed by: PHYSICIAN ASSISTANT

## 2021-01-19 PROCEDURE — 94762 N-INVAS EAR/PLS OXIMTRY CONT: CPT

## 2021-01-19 PROCEDURE — 85049 AUTOMATED PLATELET COUNT: CPT | Performed by: INTERNAL MEDICINE

## 2021-01-19 RX ORDER — FOLIC ACID 1 MG/1
1 TABLET ORAL DAILY
Status: DISCONTINUED | OUTPATIENT
Start: 2021-01-19 | End: 2021-01-25 | Stop reason: HOSPADM

## 2021-01-19 RX ORDER — CEFAZOLIN SODIUM 2 G/50ML
2000 SOLUTION INTRAVENOUS ONCE
Status: COMPLETED | OUTPATIENT
Start: 2021-01-19 | End: 2021-01-19

## 2021-01-19 RX ORDER — BUPRENORPHINE AND NALOXONE 8; 2 MG/1; MG/1
2 FILM, SOLUBLE BUCCAL; SUBLINGUAL DAILY
Status: DISCONTINUED | OUTPATIENT
Start: 2021-01-20 | End: 2021-01-19

## 2021-01-19 RX ORDER — BUPRENORPHINE AND NALOXONE 8; 2 MG/1; MG/1
2 FILM, SOLUBLE BUCCAL; SUBLINGUAL DAILY
Status: DISCONTINUED | OUTPATIENT
Start: 2021-01-20 | End: 2021-01-20

## 2021-01-19 RX ORDER — SODIUM CHLORIDE 9 MG/ML
100 INJECTION, SOLUTION INTRAVENOUS CONTINUOUS
Status: DISCONTINUED | OUTPATIENT
Start: 2021-01-19 | End: 2021-01-22

## 2021-01-19 RX ORDER — ZOLPIDEM TARTRATE 5 MG/1
10 TABLET ORAL
Status: DISCONTINUED | OUTPATIENT
Start: 2021-01-19 | End: 2021-01-19

## 2021-01-19 RX ORDER — HYDROCHLOROTHIAZIDE 12.5 MG/1
12.5 TABLET ORAL DAILY
Status: DISCONTINUED | OUTPATIENT
Start: 2021-01-20 | End: 2021-01-19

## 2021-01-19 RX ORDER — NICOTINE 21 MG/24HR
1 PATCH, TRANSDERMAL 24 HOURS TRANSDERMAL DAILY
Status: DISCONTINUED | OUTPATIENT
Start: 2021-01-19 | End: 2021-01-20

## 2021-01-19 RX ORDER — ALBUTEROL SULFATE 2.5 MG/3ML
2.5 SOLUTION RESPIRATORY (INHALATION) EVERY 6 HOURS PRN
Status: DISCONTINUED | OUTPATIENT
Start: 2021-01-19 | End: 2021-01-25 | Stop reason: HOSPADM

## 2021-01-19 RX ORDER — CEFAZOLIN SODIUM 2 G/50ML
2000 SOLUTION INTRAVENOUS EVERY 8 HOURS
Status: DISCONTINUED | OUTPATIENT
Start: 2021-01-20 | End: 2021-01-20

## 2021-01-19 RX ORDER — NICOTINE 21 MG/24HR
1 PATCH, TRANSDERMAL 24 HOURS TRANSDERMAL DAILY
Status: DISCONTINUED | OUTPATIENT
Start: 2021-01-20 | End: 2021-01-19

## 2021-01-19 RX ORDER — THIAMINE MONONITRATE (VIT B1) 100 MG
100 TABLET ORAL DAILY
Status: DISCONTINUED | OUTPATIENT
Start: 2021-01-19 | End: 2021-01-25 | Stop reason: HOSPADM

## 2021-01-19 RX ORDER — BUPRENORPHINE AND NALOXONE 8; 2 MG/1; MG/1
2 FILM, SOLUBLE BUCCAL; SUBLINGUAL DAILY
Status: DISCONTINUED | OUTPATIENT
Start: 2021-01-19 | End: 2021-01-19

## 2021-01-19 RX ADMIN — NICOTINE 1 PATCH: 21 PATCH, EXTENDED RELEASE TRANSDERMAL at 23:48

## 2021-01-19 RX ADMIN — THIAMINE HCL TAB 100 MG 100 MG: 100 TAB at 23:34

## 2021-01-19 RX ADMIN — CEFAZOLIN SODIUM 2000 MG: 2 SOLUTION INTRAVENOUS at 19:09

## 2021-01-19 RX ADMIN — SODIUM CHLORIDE 75 ML/HR: 0.9 INJECTION, SOLUTION INTRAVENOUS at 23:34

## 2021-01-19 RX ADMIN — SODIUM CHLORIDE 1000 ML: 0.9 INJECTION, SOLUTION INTRAVENOUS at 19:09

## 2021-01-19 RX ADMIN — FOLIC ACID 1 MG: 1 TABLET ORAL at 23:34

## 2021-01-19 NOTE — ED PROVIDER NOTES
History  Chief Complaint   Patient presents with    Leg Injury     right lower leg and foot with wound x 2 months, lives with parents, parents noticed blood pooling by right foot, pt arrived to ed via ems with gauze wrapping around right foot and soiled left foot sock     Pt with Past Medical History: Asthma, HTN, Obesity, Substance abuse, PTSD, alcohol abuse, depression, no pertinent PSH, states his parents live with them, mom called 911 tonight bc parents noticed pool of blood by patients R foot, not with decreased activity, hasn't showered, not taking care of himself  Pt admits to multiple life stressors: losing his job, had some drug abuse issues, etoh abuse, admits to drinking "4 shots tonight", found his girlfriend OD on drugs next to him when he awoke one morning, having custody issues with ex-wife and his son; and states has been feeling worsening depression, not caring to take care of himself, not showering  Pt denies SI/HI  PT with dried blood, serous fluid on B/L LE which appear edematous, odorous, dried skin with bleeding wound to right skin  Prior to Admission Medications   Prescriptions Last Dose Informant Patient Reported? Taking?    albuterol (VENTOLIN HFA) 90 mcg/act inhaler   No No   Sig: Inhale 2 puffs every 6 (six) hours as needed for wheezing   buprenorphine-naloxone (SUBOXONE) 8-2 mg   Yes No   Sig: SUBLINGUAL TWO FILMS SUBLINGUALLY DAILY   hydrochlorothiazide (HYDRODIURIL) 12 5 mg tablet   No No   Sig: Take 1 tablet (12 5 mg total) by mouth daily   ondansetron (ZOFRAN-ODT) 4 mg disintegrating tablet   No No   Sig: Take 1 tablet (4 mg total) by mouth every 6 (six) hours as needed for nausea or vomiting   zolpidem (AMBIEN) 10 mg tablet   Yes No   Sig: Take 10 mg by mouth daily at bedtime      Facility-Administered Medications: None       Past Medical History:   Diagnosis Date    Allergic     Asthma     Essential hypertension 5/6/2019    Obesity     Substance abuse ([facility] [address] ) Past Surgical History:   Procedure Laterality Date    LYMPH NODE BIOPSY         Family History   Problem Relation Age of Onset    No Known Problems Mother     Diabetes Father     Prostate cancer Father     Hypertension Father      I have reviewed and agree with the history as documented  E-Cigarette/Vaping     E-Cigarette/Vaping Substances     Social History     Tobacco Use    Smoking status: Current Every Day Smoker    Smokeless tobacco: Never Used    Tobacco comment: 2/2019; PATIENT VAPES   Substance Use Topics    Alcohol use: Yes     Alcohol/week: 12 0 standard drinks     Types: 12 Cans of beer per week     Frequency: Monthly or less     Drinks per session: 1 or 2    Drug use: Never       Review of Systems   Constitutional: Positive for activity change and chills  Negative for fever  HENT: Negative for congestion, ear pain, hearing loss, mouth sores and sore throat  Eyes: Negative for visual disturbance  Respiratory: Negative for cough and shortness of breath  Cardiovascular: Positive for leg swelling  Negative for chest pain  Gastrointestinal: Negative for abdominal pain, nausea and vomiting  Genitourinary: Negative for dysuria, flank pain, frequency, penile swelling and scrotal swelling  Musculoskeletal: Negative for arthralgias and myalgias  Skin: Positive for rash and wound  Negative for color change and pallor  Neurological: Positive for weakness  Negative for dizziness and speech difficulty  Psychiatric/Behavioral: Positive for behavioral problems, dysphoric mood and sleep disturbance  Negative for agitation and suicidal ideas  All other systems reviewed and are negative  Physical Exam  Physical Exam  Vitals signs and nursing note reviewed  Constitutional:       General: He is in acute distress  Appearance: He is well-developed  He is obese  He is ill-appearing  Comments:  And kept with stain jeans of dry blood in urine   HENT:      Head: Normocephalic and atraumatic  Right Ear: External ear normal       Left Ear: External ear normal       Nose: Nose normal       Mouth/Throat:      Mouth: Mucous membranes are dry  Pharynx: Oropharynx is clear  Eyes:      Conjunctiva/sclera: Conjunctivae normal    Neck:      Musculoskeletal: Normal range of motion  Cardiovascular:      Rate and Rhythm: Normal rate and regular rhythm  Pulmonary:      Effort: Pulmonary effort is normal  No respiratory distress  Breath sounds: Normal breath sounds  Abdominal:      General: Bowel sounds are normal       Palpations: Abdomen is soft  Comments: Obese   Musculoskeletal: Normal range of motion  Right lower leg: Edema present  Left lower leg: Edema present  Comments: Dried blood, serous fluid, urine, dried skin; good distal pulses   Lymphadenopathy:      Cervical: No cervical adenopathy  Skin:     General: Skin is warm and dry  Comments: + 1cm ulcerative, lesions with bleeding vessel noted to mid, proximal shin   Neurological:      General: No focal deficit present  Mental Status: He is alert and oriented to person, place, and time  Motor: Weakness present     Psychiatric:         Behavior: Behavior normal       Comments: Depressed, apologetic, smells of alcohol         Vital Signs  ED Triage Vitals   Temperature Pulse Respirations Blood Pressure SpO2   01/19/21 1804 01/19/21 1804 01/19/21 1804 01/19/21 2011 01/19/21 1804   97 7 °F (36 5 °C) 88 20 115/55 99 %      Temp Source Heart Rate Source Patient Position - Orthostatic VS BP Location FiO2 (%)   01/19/21 1804 01/19/21 1804 01/19/21 2011 01/19/21 2011 --   Oral Monitor Lying Right arm       Pain Score       --                  Vitals:    01/19/21 1804 01/19/21 2011   BP:  115/55   Pulse: 88 89   Patient Position - Orthostatic VS:  Lying         Visual Acuity      ED Medications  Medications   sodium chloride 0 9 % bolus 1,000 mL (1,000 mL Intravenous New Bag 1/19/21 1909)   ceFAZolin (ANCEF) IVPB (premix in dextrose) 2,000 mg 50 mL (0 mg Intravenous Stopped 1/19/21 2005)       Diagnostic Studies  Results Reviewed     Procedure Component Value Units Date/Time    COVID19, Influenza A/B, RSV PCR, [facility] [[id number]]  (Normal) Collected: 01/19/21 1919    Lab Status: Final result Specimen: Nares from Nasopharyngeal Swab Updated: 01/19/21 2005     SARS-CoV-2 Negative     INFLUENZA A PCR Negative     INFLUENZA B PCR Negative     RSV PCR Negative    Narrative: This test has been authorized by FDA under an EUA (Emergency Use Assay) for use by authorized laboratories  Clinical caution and judgement should be used with the interpretation of these results with consideration of the clinical impression and other laboratory testing  Testing reported as "Positive" or "Negative" has been proven to be accurate according to standard laboratory validation requirements  All testing is performed with control materials showing appropriate reactivity at standard intervals      Comprehensive metabolic panel [[id number]]  (Abnormal) Collected: 01/19/21 1906    Lab Status: Final result Specimen: Blood from Arm, Left Updated: 01/19/21 1931     Sodium 135 mmol/L      Potassium 3 6 mmol/L      Chloride 96 mmol/L      CO2 30 mmol/L      ANION GAP 9 mmol/L      BUN 5 mg/dL      Creatinine 0 34 mg/dL      Glucose 106 mg/dL      Calcium 8 1 mg/dL      Corrected Calcium 8 9 mg/dL       U/L      ALT 17 U/L      Alkaline Phosphatase 370 1 U/L      Total Protein 7 0 g/dL      Albumin 3 0 g/dL      Total Bilirubin 3 26 mg/dL      eGFR 165 ml/min/1 73sq m     Narrative:      [place] guidelines for Chronic Kidney Disease (CKD):     Stage 1 with normal or high GFR (GFR > 90 mL/min/1 73 square meters)    Stage 2 Mild CKD (GFR = 60-89 mL/min/1 73 square meters)    Stage 3A Moderate CKD (GFR = 45-59 mL/min/1 73 square meters)    Stage 3B Moderate CKD (GFR = 30-44 mL/min/1 73 square meters)    Stage 4 Severe CKD (GFR = 15-29 mL/min/1 73 square meters)    Stage 5 End Stage CKD (GFR <15 mL/min/1 73 square meters)  Note: GFR calculation is accurate only with a steady state creatinine    Ethanol [[id number]]  (Abnormal) Collected: 01/19/21 1906    Lab Status: Final result Specimen: Blood from Arm, Left Updated: 01/19/21 1930     Ethanol Lvl 400 2 mg/dL     CBC and differential [[id number]]  (Abnormal) Collected: 01/19/21 1906    Lab Status: Final result Specimen: Blood from Arm, Left Updated: 01/19/21 1915     WBC 5 32 Thousand/uL      RBC 2 32 Million/uL      Hemoglobin 8 2 g/dL      Hematocrit 24 7 %       fL      MCH 35 3 pg      MCHC 33 2 g/dL      RDW 22 2 %      MPV 9 0 fL      Platelets 893 Thousands/uL      Neutrophils Relative 63 %      Immat GRANS % 1 %      Lymphocytes Relative 22 %      Monocytes Relative 13 %      Eosinophils Relative 1 %      Basophils Relative 0 %      Neutrophils Absolute 3 39 Thousands/µL      Immature Grans Absolute 0 03 Thousand/uL      Lymphocytes Absolute 1 15 Thousands/µL      Monocytes Absolute 0 67 Thousand/µL      Eosinophils Absolute 0 06 Thousand/µL      Basophils Absolute 0 02 Thousands/µL     Rapid drug screen, urine [[id number]]     Lab Status: No result Specimen: Urine     UA w Reflex to Microscopic w Reflex to Culture [[id number]]     Lab Status: No result Specimen: Urine                  No orders to display              Procedures  Laceration repair    Date/Time: 1/19/2021 6:34 PM  Performed by: Chad Soliman PA-C  Authorized by: Chad Soliman PA-C   Consent: Verbal consent obtained    Risks and benefits: risks, benefits and alternatives were discussed  Consent given by: patient  Patient understanding: patient states understanding of the procedure being performed  Patient identity confirmed: verbally with patient  Time out: Immediately prior to procedure a "time out" was called to verify the correct patient, procedure, equipment, support staff and site/side marked as required  Body area: lower extremity  Location details: right lower leg  Laceration length: 1 cm  Foreign bodies: no foreign bodies  Tendon involvement: none  Nerve involvement: none  Vascular damage: no  Anesthesia: local infiltration    Anesthesia:  Local Anesthetic: lidocaine 1% without epinephrine  Anesthetic total: 1 mL    Sedation:  Patient sedated: no      Wound Dehiscence:  Superficial Wound Dehiscence: simple closure      Procedure Details:  Preparation: Patient was prepped and draped in the usual sterile fashion  Irrigation solution: saline  Irrigation method: syringe  Amount of cleaning: standard  Debridement: none  Wound skin closure material used: 4-0 vicryl  Number of sutures: 2  Technique: simple (adriel cross sutures)  Approximation: close  Approximation difficulty: simple  Dressing: surgifoam, bulky gauze dressing placed  Patient tolerance: patient tolerated the procedure well with no immediate complications  Foreign body: dried blood and skin               ED Course                                           MDM    Disposition  Final diagnoses:   Bilateral lower leg cellulitis   Leg wound, right, initial encounter   Alcohol intoxication ([facility] [address] )   Anemia   Depression     Time reflects when diagnosis was documented in both MDM as applicable and the Disposition within this note     Time User Action Codes Description Comment    1/19/2021  7:48 PM Rico Calloway [W04 [id number],  A33 488] Bilateral lower leg cellulitis     1/19/2021  7:48 PM Ceil Jesus Add [I13 097Q] Leg wound, right, initial encounter     1/19/2021  7:48 PM Ceil Jesus Add [F10 929] Alcohol intoxication ([facility] [address] )     1/19/2021  7:49 PM Jose F Calloway [D64 9] Anemia     1/19/2021  7:49 PM Ceil Jesus Add [F32 9] Depression       ED Disposition     ED Disposition Condition Date/Time Comment    Admit Stable Tue Jan 19, 2021  7:52 PM Case was discussed with resident for Dr Gretta Templeton and the patient's admission status was agreed to be Admission Status: observation status to the service of Dr Dr Coty Rosa   Follow-up Information    None         Patient's Medications   Discharge Prescriptions    No medications on file     No discharge procedures on file      PDMP Review       Value Time User    PDMP Reviewed  Yes 9/28/2020  8:47 AM Carlos Escobar PA-C          ED Provider  Electronically Signed by           Milton Lanier PA-C  01/19/21 2033

## 2021-01-20 ENCOUNTER — APPOINTMENT (OUTPATIENT)
Dept: VASCULAR ULTRASOUND | Facility: HOSPITAL | Age: 36
DRG: 432 | End: 2021-01-20

## 2021-01-20 ENCOUNTER — APPOINTMENT (OUTPATIENT)
Dept: CT IMAGING | Facility: HOSPITAL | Age: 36
DRG: 432 | End: 2021-01-20

## 2021-01-20 ENCOUNTER — APPOINTMENT (OUTPATIENT)
Dept: ULTRASOUND IMAGING | Facility: HOSPITAL | Age: 36
DRG: 432 | End: 2021-01-20

## 2021-01-20 PROBLEM — F10.239 ALCOHOL WITHDRAWAL (HCC): Status: ACTIVE | Noted: 2021-01-20

## 2021-01-20 PROBLEM — K85.20 ALCOHOLIC PANCREATITIS: Status: ACTIVE | Noted: 2021-01-20

## 2021-01-20 PROBLEM — F10.939 ALCOHOL WITHDRAWAL (HCC): Status: ACTIVE | Noted: 2021-01-20

## 2021-01-20 PROBLEM — I71.60 THORACOABDOMINAL AORTIC ANEURYSM: Status: ACTIVE | Noted: 2021-01-20

## 2021-01-20 PROBLEM — I71.6 THORACOABDOMINAL AORTIC ANEURYSM (HCC): Status: ACTIVE | Noted: 2021-01-20

## 2021-01-20 LAB
ALBUMIN SERPL BCP-MCNC: 3.1 G/DL (ref 3.4–4.8)
ALP SERPL-CCNC: 394.1 U/L (ref 10–129)
ALT SERPL W P-5'-P-CCNC: 18 U/L (ref 5–63)
AMMONIA PLAS-SCNC: 125.34 UMOL/L
AMORPH URATE CRY URNS QL MICRO: NORMAL /HPF
AMPHETAMINES SERPL QL SCN: NEGATIVE
ANION GAP SERPL CALCULATED.3IONS-SCNC: 10 MMOL/L (ref 4–13)
AST SERPL W P-5'-P-CCNC: 186 U/L (ref 15–41)
BACTERIA UR QL AUTO: NORMAL /HPF
BARBITURATES UR QL: NEGATIVE
BASOPHILS # BLD AUTO: 0.02 THOUSANDS/ΜL (ref 0–0.1)
BASOPHILS NFR BLD AUTO: 0 % (ref 0–1)
BENZODIAZ UR QL: NEGATIVE
BILIRUB DIRECT SERPL-MCNC: 1.97 MG/DL (ref 0–0.3)
BILIRUB SERPL-MCNC: 3.89 MG/DL (ref 0.3–1.2)
BILIRUB UR QL STRIP: ABNORMAL
BUN SERPL-MCNC: 5 MG/DL (ref 6–20)
CALCIUM ALBUM COR SERPL-MCNC: 8.9 MG/DL (ref 8.3–10.1)
CALCIUM SERPL-MCNC: 8.2 MG/DL (ref 8.4–10.2)
CHLORIDE SERPL-SCNC: 96 MMOL/L (ref 96–108)
CLARITY UR: ABNORMAL
CO2 SERPL-SCNC: 29 MMOL/L (ref 22–33)
COCAINE UR QL: NEGATIVE
COLOR UR: ABNORMAL
CREAT SERPL-MCNC: 0.41 MG/DL (ref 0.5–1.2)
D DIMER PPP FEU-MCNC: 8.49 MG/L FEU (ref 0.19–0.49)
EOSINOPHIL # BLD AUTO: 0.03 THOUSAND/ΜL (ref 0–0.61)
EOSINOPHIL NFR BLD AUTO: 1 % (ref 0–6)
ERYTHROCYTE [DISTWIDTH] IN BLOOD BY AUTOMATED COUNT: 22.4 % (ref 11.6–15.1)
GFR SERPL CREATININE-BSD FRML MDRD: 152 ML/MIN/1.73SQ M
GLUCOSE P FAST SERPL-MCNC: 117 MG/DL (ref 70–105)
GLUCOSE SERPL-MCNC: 117 MG/DL (ref 65–140)
GLUCOSE UR STRIP-MCNC: ABNORMAL MG/DL
HCT VFR BLD AUTO: 25.5 % (ref 36.5–49.3)
HGB BLD-MCNC: 8.1 G/DL (ref 12–17)
HGB UR QL STRIP.AUTO: ABNORMAL
IMM GRANULOCYTES # BLD AUTO: 0.02 THOUSAND/UL (ref 0–0.2)
IMM GRANULOCYTES NFR BLD AUTO: 0 % (ref 0–2)
INR PPP: 1.25 (ref 0.9–1.1)
INR PPP: 1.25 (ref 0.9–1.1)
KETONES UR STRIP-MCNC: ABNORMAL MG/DL
LEUKOCYTE ESTERASE UR QL STRIP: NEGATIVE
LYMPHOCYTES # BLD AUTO: 0.82 THOUSANDS/ΜL (ref 0.6–4.47)
LYMPHOCYTES NFR BLD AUTO: 17 % (ref 14–44)
MCH RBC QN AUTO: 34.2 PG (ref 26.8–34.3)
MCHC RBC AUTO-ENTMCNC: 31.8 G/DL (ref 31.4–37.4)
MCV RBC AUTO: 108 FL (ref 82–98)
METHADONE UR QL: NEGATIVE
MONOCYTES # BLD AUTO: 0.53 THOUSAND/ΜL (ref 0.17–1.22)
MONOCYTES NFR BLD AUTO: 11 % (ref 4–12)
NEUTROPHILS # BLD AUTO: 3.33 THOUSANDS/ΜL (ref 1.85–7.62)
NEUTS SEG NFR BLD AUTO: 71 % (ref 43–75)
NITRITE UR QL STRIP: POSITIVE
NON-SQ EPI CELLS URNS QL MICRO: NORMAL /HPF
OPIATES UR QL SCN: NEGATIVE
OXYCODONE+OXYMORPHONE UR QL SCN: NEGATIVE
PCP UR QL: NEGATIVE
PH UR STRIP.AUTO: 6.5 [PH]
PLATELET # BLD AUTO: 93 THOUSANDS/UL (ref 149–390)
PLATELET # BLD AUTO: 96 THOUSANDS/UL (ref 149–390)
PMV BLD AUTO: 8.6 FL (ref 8.9–12.7)
PMV BLD AUTO: 9 FL (ref 8.9–12.7)
POTASSIUM SERPL-SCNC: 3.8 MMOL/L (ref 3.5–5)
PROT SERPL-MCNC: 7.2 G/DL (ref 6.4–8.3)
PROT UR STRIP-MCNC: ABNORMAL MG/DL
PROTHROMBIN TIME: 14 SECONDS (ref 9.5–12.1)
PROTHROMBIN TIME: 14 SECONDS (ref 9.5–12.1)
RBC # BLD AUTO: 2.37 MILLION/UL (ref 3.88–5.62)
RBC #/AREA URNS AUTO: NORMAL /HPF
SODIUM SERPL-SCNC: 135 MMOL/L (ref 133–145)
SP GR UR STRIP.AUTO: 1.02 (ref 1–1.03)
THC UR QL: NEGATIVE
UROBILINOGEN UR QL STRIP.AUTO: >=8 E.U./DL
WBC # BLD AUTO: 4.75 THOUSAND/UL (ref 4.31–10.16)
WBC #/AREA URNS AUTO: NORMAL /HPF

## 2021-01-20 PROCEDURE — 93970 EXTREMITY STUDY: CPT

## 2021-01-20 PROCEDURE — 85025 COMPLETE CBC W/AUTO DIFF WBC: CPT | Performed by: INTERNAL MEDICINE

## 2021-01-20 PROCEDURE — 97163 PT EVAL HIGH COMPLEX 45 MIN: CPT

## 2021-01-20 PROCEDURE — 94762 N-INVAS EAR/PLS OXIMTRY CONT: CPT

## 2021-01-20 PROCEDURE — 99225 PR SBSQ OBSERVATION CARE/DAY 25 MINUTES: CPT | Performed by: PHYSICIAN ASSISTANT

## 2021-01-20 PROCEDURE — 80053 COMPREHEN METABOLIC PANEL: CPT | Performed by: INTERNAL MEDICINE

## 2021-01-20 PROCEDURE — 85610 PROTHROMBIN TIME: CPT | Performed by: INTERNAL MEDICINE

## 2021-01-20 PROCEDURE — 93970 EXTREMITY STUDY: CPT | Performed by: SURGERY

## 2021-01-20 PROCEDURE — 81001 URINALYSIS AUTO W/SCOPE: CPT | Performed by: INTERNAL MEDICINE

## 2021-01-20 PROCEDURE — 76700 US EXAM ABDOM COMPLETE: CPT

## 2021-01-20 PROCEDURE — 90471 IMMUNIZATION ADMIN: CPT | Performed by: INTERNAL MEDICINE

## 2021-01-20 PROCEDURE — 82140 ASSAY OF AMMONIA: CPT | Performed by: PHYSICIAN ASSISTANT

## 2021-01-20 PROCEDURE — 80307 DRUG TEST PRSMV CHEM ANLYZR: CPT | Performed by: INTERNAL MEDICINE

## 2021-01-20 PROCEDURE — G1004 CDSM NDSC: HCPCS

## 2021-01-20 PROCEDURE — 99449 NTRPROF PH1/NTRNET/EHR 31/>: CPT | Performed by: EMERGENCY MEDICINE

## 2021-01-20 PROCEDURE — 71275 CT ANGIOGRAPHY CHEST: CPT

## 2021-01-20 PROCEDURE — 90686 IIV4 VACC NO PRSV 0.5 ML IM: CPT | Performed by: INTERNAL MEDICINE

## 2021-01-20 RX ORDER — ALPRAZOLAM 0.5 MG/1
0.5 TABLET ORAL ONCE
Status: COMPLETED | OUTPATIENT
Start: 2021-01-20 | End: 2021-01-20

## 2021-01-20 RX ORDER — PHENOBARBITAL SODIUM 65 MG/ML
65 INJECTION INTRAMUSCULAR ONCE
Status: COMPLETED | OUTPATIENT
Start: 2021-01-20 | End: 2021-01-20

## 2021-01-20 RX ORDER — CEFTRIAXONE 1 G/50ML
1000 INJECTION, SOLUTION INTRAVENOUS EVERY 24 HOURS
Status: DISCONTINUED | OUTPATIENT
Start: 2021-01-20 | End: 2021-01-25 | Stop reason: HOSPADM

## 2021-01-20 RX ORDER — NICOTINE 21 MG/24HR
21 PATCH, TRANSDERMAL 24 HOURS TRANSDERMAL DAILY
Status: DISCONTINUED | OUTPATIENT
Start: 2021-01-20 | End: 2021-01-25 | Stop reason: HOSPADM

## 2021-01-20 RX ORDER — BUPRENORPHINE AND NALOXONE 8; 2 MG/1; MG/1
1 FILM, SOLUBLE BUCCAL; SUBLINGUAL 2 TIMES DAILY
Status: DISCONTINUED | OUTPATIENT
Start: 2021-01-20 | End: 2021-01-25 | Stop reason: HOSPADM

## 2021-01-20 RX ORDER — ESCITALOPRAM OXALATE 10 MG/1
10 TABLET ORAL DAILY
Status: DISCONTINUED | OUTPATIENT
Start: 2021-01-20 | End: 2021-01-25 | Stop reason: HOSPADM

## 2021-01-20 RX ORDER — HEPARIN SODIUM 10000 [USP'U]/100ML
3-30 INJECTION, SOLUTION INTRAVENOUS
Status: DISCONTINUED | OUTPATIENT
Start: 2021-01-20 | End: 2021-01-20

## 2021-01-20 RX ORDER — LACTULOSE 20 G/30ML
20 SOLUTION ORAL 2 TIMES DAILY
Status: DISCONTINUED | OUTPATIENT
Start: 2021-01-20 | End: 2021-01-25 | Stop reason: HOSPADM

## 2021-01-20 RX ADMIN — ESCITALOPRAM OXALATE 10 MG: 10 TABLET ORAL at 12:01

## 2021-01-20 RX ADMIN — Medication 1 TABLET: at 09:15

## 2021-01-20 RX ADMIN — BUPRENORPHINE HYDROCHLORIDE, NALOXONE HYDROCHLORIDE 1 FILM: 8; 2 FILM, SOLUBLE BUCCAL; SUBLINGUAL at 09:08

## 2021-01-20 RX ADMIN — NICOTINE 1 PATCH: 21 PATCH, EXTENDED RELEASE TRANSDERMAL at 09:27

## 2021-01-20 RX ADMIN — CEFTRIAXONE 1000 MG: 1 INJECTION, SOLUTION INTRAVENOUS at 09:16

## 2021-01-20 RX ADMIN — PHENOBARBITAL SODIUM 65 MG: 65 INJECTION INTRAMUSCULAR; INTRAVENOUS at 17:15

## 2021-01-20 RX ADMIN — Medication 21 MG: at 20:45

## 2021-01-20 RX ADMIN — INFLUENZA VIRUS VACCINE 0.5 ML: 15; 15; 15; 15 SUSPENSION INTRAMUSCULAR at 09:28

## 2021-01-20 RX ADMIN — PHENOBARBITAL SODIUM 65 MG: 65 INJECTION INTRAMUSCULAR; INTRAVENOUS at 23:12

## 2021-01-20 RX ADMIN — BUPRENORPHINE HYDROCHLORIDE, NALOXONE HYDROCHLORIDE 1 FILM: 8; 2 FILM, SOLUBLE BUCCAL; SUBLINGUAL at 13:51

## 2021-01-20 RX ADMIN — IOHEXOL 100 ML: 350 INJECTION, SOLUTION INTRAVENOUS at 05:25

## 2021-01-20 RX ADMIN — LACTULOSE 20 G: 20 SOLUTION ORAL at 20:37

## 2021-01-20 RX ADMIN — PHENOBARBITAL SODIUM 65 MG: 65 INJECTION INTRAMUSCULAR; INTRAVENOUS at 14:38

## 2021-01-20 RX ADMIN — THIAMINE HCL TAB 100 MG 100 MG: 100 TAB at 09:15

## 2021-01-20 RX ADMIN — FOLIC ACID 1 MG: 1 TABLET ORAL at 09:15

## 2021-01-20 RX ADMIN — ALPRAZOLAM 0.5 MG: 0.5 TABLET ORAL at 07:21

## 2021-01-20 NOTE — H&P
[address] 1985, 28 y o  male MRN: [id number]    Unit/Bed#: -01 Encounter: [id number]    Primary Care Provider: Julius Ortiz PA-C   Date and time admitted to hospital: 1/19/2021  5:57 PM        * Lower extremity cellulitis  Assessment & Plan  · History of right lower leg and foot wound for the past 2 months, with oozing of serous discharge  · DS admission the parents noticed blood pooling by the right foot from the wound and decided to called 911  · On exam wound has been dressed with gauze, no overlying discharge noted, bilateral lower extremity swelling, erythema, oozing of serous drainage, leal yellow crust seen with poor nail hygiene  · Patient denies any tenderness, distal pulses intact bilaterally    PLAN :   · Place in observation   · S/p cefazolin in the ED, shall continue the same as patient comes from home, is not septic and has no risk factors for MRSA   · Wound care consult - input appreciated   · Pt will benefit from compression stockings after resolution of acute episode of cellulitis   · Venous Doppler b/l to r/o VTE   · Podiatry consult as patient has chronic skin changes in b/l LE, extremely dry skin, and poor nail hygiene   · RLE wound s/p laceration in the ED       Unable to care for self  Assessment & Plan  · As per history obtained in the ED, patient has been unable to care for himself, lives with his parents-has not showered, appears unkempt  · Patient has a history of depression, PTSD, anxiety, has multiple stressors including death of his girlfriend due to drug overdose 2 weeks ago, losing his job, custody issues with ex-wife etc   · Patient follows a psychiatrist as an outpatient-is not on any psychiatric medications  · Denies any suicidal / homicidal ideation     PLAN :   · Case management consult - input appreciated   · Psychiatry consult - input appreciated     Alcohol abuse  Assessment & Plan  · Patient has a history of alcohol abuse, reportedly drank 4 shots of vodka prior to presentation to the ED  · Patient reports that his drinking has increased since the death of his girlfriend 2 weeks ago  · Blood alcohol level - 400 2   · Patient has other evidence of chronic alcohol abuse including abnormal LFTs in a pattern consistent with alcohol abuse, mild thrombocytopenia, macrocytic anemia       PLAN :   · CIWA protocol   · Thiamine, multivitamins, folate   · Patient  is coherent and is alert and oriented x 3  · Order bedside swallow eval and regular diet, if passed   · Psychiatry consult     Substance abuse ([facility] [address] )  Assessment & Plan  · H/o substance abuse - reports taking   · Urine drug screen ordered -- pending    Tobacco dependence  Assessment & Plan  · Pt smokes 1 ppd, is on suboxone 8-2 mg 2 films SL daily at home   · PDMP reviewed    · Continue suboxone while inpatient   · Nicotine replacement patch     Elevated liver enzymes  Assessment & Plan  · LFT shows : AST of 174, ALT of 17, elevated ALP of 370   · Hyperbilirubinemia : T  Bilirubin - 3 6   · Patient denies nausea, vomiting, abdominal pain, no scleral icterus on exam   · Was worked up previously with Hep C antibody - negative, HbsAg - normal; elevated GGT   · Pt was advised to get liver ultrasound done which has not been performed yet and if abnormalities persist, to consider GI consult   · Trend LFT in am labs       Macrocytic anemia  Assessment & Plan  · Hemoglobin - 8 2, only prior value available is from 09/2020 - where It was 16 3   · Macrocytic - MCV - 100   · Iron profile done in 09/2020 - ferritin - 817 (elevated), iron - 186 (elevated), normal TIBC and % saturation is > 45 %    Etiology of anemia can be underlying vitamin B 12/ folate deficiency due to chronic alcohol use disorder vs alcohol-induced macrocytosis     PLAN :   · Follow up for further evaluation as an outpatient   · Iron profile is concerning for iron overload ex: hemochromatosis -- needs follow up as an outpatient   · Follow CBC in am Labs     Thrombocytopenia ([address] )  Assessment & Plan  · Likely secondary to chronic alcohol use disorder, and underlying liver dysfunction   · Hold pharmacological anticoagulation given H/o bleeding from wound   · Continue to monitor Plt count in am labs     Class 1 obesity in adult  Assessment & Plan  · Weight loss encouraged     Essential hypertension  Assessment & Plan  · H/o HTN on hydrochlorothiazide, but patient reports not taking it anymore   · BP stable on admission     Mild intermittent asthma without complication  Assessment & Plan  · Denies cough, SOB   · Has been rarely using albuterol inhaler at home, is not on any other maintenance treatment   · Continue albuterol nebs PRN     Acute respiratory failure with hypoxia (HCC)  Assessment & Plan  · Patient desaturating to mid-70s on room, improved to 93% on 4 L oxygen via nasal cannula  · VBG : acceptable, D-dimer elevated at 8 49   · Ordered CTA chest but patient was noncompliant and claustrophoic resulting in a non diagnostic study   · Will empirically initiate therapeutic anticoagulation -- closely monitor plt count and for RLE wound bleeding   · Patient also reportedly has a history of "exposure to an agent" which resulted in 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 which could contribute to hypoventilation  VTE Prophylaxis: on hold due to bleeding from Right Lower Extremity wound  / sequential compression device   Code Status: FULL CODE, as confirmed with the patient     POLST: POLST is not applicable to this patient  Discussion with family: Attempted to call the patient's father on the number available on Epic -- incorrect number  Anticipated Length of Stay:  Patient will be admitted on an Observation basis with an anticipated length of stay of 2 midnights  Justification for Hospital Stay: lower extremity cellulitis, unable to care for self, alcohol abuse     Total Time for Visit, including Counseling / Coordination of Care: 30 minutes    Greater than 50% of this total time spent on direct patient counseling and coordination of care  Chief Complaint:  Right lower extremity wound and inability to care for self     History of Present Illness: Nicolás Álvarez is a 28 y o  male with a past medical history of asthma, hypertension, obesity, substance abuse, history of alcohol abuse, PTSD, depression who presents to the emergency department after the patient's mom called 911 because she noticed a pool of blood along the patient's right foot  The patient has a history of right lower leg and foot wound for the past 2 months, which has not been cared for  History obtained in the ED also notes that the patient has not showered, has not been taking care of himself  History of multiple life stressors including losing his job, alcohol abuse, admits to drinking 4 shots on the night of admission, found his girlfriend dead after overdosing on drugs next to him when he awoke 1 morning, has custody issues with ex-wife and his son, reports feeling worsening depression  He reports that he has been drinking "more than usual" since his girlfriend's death  He denied any cough, SOB, chest pain, nausea, vomiting, abdominal pain, urinary complaints  He reports swelling of his lower extremities bilaterally which has been chronic, and he has not been seen by wound care prior to presentation  Chart review notes that the pt was on HCTZ, but denies taking it  He denies any suicidal or homicidal ideation  In the ED,  Initial vitals-afebrile/ HR - 88 /blood pressure-115/55/ saturating at 95 % on 3 L oxygen via nasal cannula   Labs : Macro septic anemia, hemoglobin-8 2, mild thrombocytopenia-platelet count 040, elevated blood alcohol level of 400 2, elevated liver enzymes-AST-174, ALT-17, ALT-370 (likely consistent with alcohol abuse)  Hypoalbuminemia-3 0, hyperbilirubinemia -3 26 (total bilirubin)      COVID-negative  In the ED patient was given 2 g IV cefazolin and 1 L normal saline bolus, urine drug screen and urinalysis are pending  Review of Systems:    Review of Systems   Constitutional: Negative for chills, diaphoresis and fever  HENT: Negative for rhinorrhea and sore throat  Respiratory: Negative for cough and shortness of breath  Cardiovascular: Positive for leg swelling  Negative for chest pain and palpitations  Gastrointestinal: Negative for abdominal pain, diarrhea, nausea and vomiting  Genitourinary: Negative for difficulty urinating  Skin: Positive for wound  Negative for rash  Neurological: Negative for dizziness, weakness, light-headedness and numbness  Psychiatric/Behavioral: Positive for behavioral problems  Negative for self-injury and suicidal ideas  Past Medical and Surgical History:     Past Medical History:   Diagnosis Date    Allergic     Asthma     Essential hypertension 5/6/2019    Obesity     Substance abuse ([facility] [address] )        Past Surgical History:   Procedure Laterality Date    LYMPH NODE BIOPSY         Meds/Allergies:    Prior to Admission medications    Medication Sig Start Date End Date Taking? Authorizing Provider   albuterol (VENTOLIN HFA) 90 mcg/act inhaler Inhale 2 puffs every 6 (six) hours as needed for wheezing 9/6/18   Daniel Head MD   buprenorphine-naloxone (SUBOXONE) 8-2 mg SUBLINGUAL TWO FILMS SUBLINGUALLY DAILY 9/14/20   Historical Provider, MD   hydrochlorothiazide (HYDRODIURIL) 12 5 mg tablet Take 1 tablet (12 5 mg total) by mouth daily 5/6/19   Carlos Escobar PA-C   ondansetron (ZOFRAN-ODT) 4 mg disintegrating tablet Take 1 tablet (4 mg total) by mouth every 6 (six) hours as needed for nausea or vomiting 9/24/20   Carlos Escobar PA-C   zolpidem (AMBIEN) 10 mg tablet Take 10 mg by mouth daily at bedtime 9/3/20   Historical Provider, MD COE have reviewed home medications with patient personally  Allergies:    Allergies   Allergen Reactions    Levofloxacin Other (See Comments)     BLACKED OUT Social History:     Marital Status: /Civil Union     Substance Use History:   Social History     Substance and Sexual Activity   Alcohol Use Yes    Alcohol/week: 12 0 standard drinks    Types: 12 Cans of beer per week    Frequency: Monthly or less    Drinks per session: 1 or 2     Social History     Tobacco Use   Smoking Status Current Every Day Smoker    Packs/day: 1 00   Smokeless Tobacco Never Used   Tobacco Comment    2/2019; PATIENT VAPES     Social History     Substance and Sexual Activity   Drug Use Never       Family History:    Family History   Problem Relation Age of Onset    No Known Problems Mother     Diabetes Father     Prostate cancer Father     Hypertension Father        Physical Exam:     Vitals:   Blood Pressure: 110/58 (01/20/21 0000)  Pulse: 101 (01/20/21 0000)  Temperature: 97 8 °F (36 6 °C) (01/20/21 0000)  Temp Source: Tympanic (01/20/21 0000)  Respirations: 18 (01/20/21 0000)  Height: 6' 1" (185 4 cm) (01/19/21 1804)  Weight - Scale: 118 kg (260 lb) (01/19/21 1804)  SpO2: 93 % (01/20/21 0219)    Physical Exam  Vitals signs and nursing note reviewed  Constitutional:       General: He is not in acute distress  Appearance: He is well-developed  He is obese  HENT:      Head: Normocephalic and atraumatic  Mouth/Throat:      Mouth: Mucous membranes are moist    Eyes:      General: No scleral icterus  Right eye: No discharge  Left eye: No discharge  Conjunctiva/sclera: Conjunctivae normal    Neck:      Musculoskeletal: Neck supple  Cardiovascular:      Rate and Rhythm: Normal rate and regular rhythm  Heart sounds: Normal heart sounds  No murmur  Pulmonary:      Effort: Pulmonary effort is normal  No respiratory distress  Breath sounds: Normal breath sounds  Abdominal:      Palpations: Abdomen is soft  Tenderness: There is no abdominal tenderness     Musculoskeletal:      Right lower leg: Edema (right lower extremity wound seen draped in gauze, no overlying drainage / bleeding noted ) present  Left lower leg: Edema present  Skin:     General: Skin is warm and dry  Capillary Refill: Capillary refill takes less than 2 seconds  Neurological:      General: No focal deficit present  Mental Status: He is alert and oriented to person, place, and time  Psychiatric:         Mood and Affect: Mood normal              Additional Data:     Lab Results: I have personally reviewed pertinent films in PACS    Results from last 7 days   Lab Units 01/19/21 2323 01/19/21 2320 01/19/21  1906   WBC Thousand/uL  --   --  5 32   HEMOGLOBIN g/dL  --   --  8 2*   I STAT HEMOGLOBIN g/dl 8 8*  --   --    HEMATOCRIT %  --   --  24 7*   HEMATOCRIT, ISTAT % 26*  --   --    PLATELETS Thousands/uL  --  93* 100*   NEUTROS PCT %  --   --  63   LYMPHS PCT %  --   --  22   MONOS PCT %  --   --  13*   EOS PCT %  --   --  1     Results from last 7 days   Lab Units 01/19/21 2323 01/19/21  1906   SODIUM mmol/L  --  135   POTASSIUM mmol/L  --  3 6   CHLORIDE mmol/L  --  96   CO2 mmol/L  --  30   CO2, I-STAT mmol/L 31  --    BUN mg/dL  --  5*   CREATININE mg/dL  --  0 34*   ANION GAP mmol/L  --  9   CALCIUM mg/dL  --  8 1*   ALBUMIN g/dL  --  3 0*   TOTAL BILIRUBIN mg/dL  --  3 26*   ALK PHOS U/L  --  370 1*   ALT U/L  --  17   AST U/L  --  174*   GLUCOSE RANDOM mg/dL  --  106                       Imaging: I have personally reviewed pertinent films in PACS    VAS lower limb venous duplex study, complete bilateral    (Results Pending)   XR chest portable    (Results Pending)   CTA chest pe study    (Results Pending)         Allscripts / Epic Records Reviewed: Yes     ** Please Note: This note has been constructed using a voice recognition system   **

## 2021-01-20 NOTE — ASSESSMENT & PLAN NOTE
· H/o HTN on hydrochlorothiazide, but patient reports not taking it anymore   · BP stable on admission

## 2021-01-20 NOTE — ASSESSMENT & PLAN NOTE
· Patient has a history of alcohol abuse, reportedly drank 4 shots of vodka prior to presentation to the ED  · Patient reports that his drinking has increased since the death of his girlfriend 2 weeks ago  · Blood alcohol level - 400 2   · Patient has other evidence of chronic alcohol abuse including abnormal LFTs in a pattern consistent with alcohol abuse, mild thrombocytopenia, macrocytic anemia       PLAN :   · CIWA protocol   · Thiamine, multivitamins, folate   · Patient  is coherent and is alert and oriented x 3  · Order bedside swallow eval and regular diet, if passed   · Psychiatry consult

## 2021-01-20 NOTE — ASSESSMENT & PLAN NOTE
· As per history obtained in the ED, patient has been unable to care for himself, lives with his parents-has not showered, appears unkempt  · Patient has a history of depression, PTSD, anxiety, has multiple stressors including death of his girlfriend due to drug overdose 2 weeks ago, losing his job, custody issues with ex-wife etc   · Patient follows a psychiatrist as an outpatient-is not on any psychiatric medications  · Denies any suicidal / homicidal ideation     PLAN :   · Case management consult - input appreciated   · Psychiatry consult - input appreciated

## 2021-01-20 NOTE — ASSESSMENT & PLAN NOTE
· Denies cough, SOB   · Has been rarely using albuterol inhaler at home, is not on any other maintenance treatment   · Continue albuterol nebs PRN

## 2021-01-20 NOTE — PHYSICAL THERAPY NOTE
PHYSICAL THERAPY EVALUATION  TIME: 8614-7614    NAME:  Марина Middleton  DATE: 01/20/21    AGE:   28 y o  Mrn:   [id number]  Length Of Stay: 0    ADMIT DX:  Alcohol intoxication ([facility] [address] ) [F10 929]  Depression [F32 9]  Anemia [D64 9]  Visit for wound check [Z51 89]  Bilateral lower leg cellulitis [X58 882, Q20 939]  Leg wound, right, initial encounter [S81 801A]  Unable to care for self [Z78 9]    Past Medical History:   Diagnosis Date    Allergic     Asthma     Essential hypertension 5/6/2019    Obesity     Substance abuse ([facility] [address] )      Past Surgical History:   Procedure Laterality Date    LYMPH NODE BIOPSY         Performed at least 2 patient identifiers during session: Name and ID bracelet        01/20/21 1005   PT Last Visit   PT Visit Date 01/20/21   Note Type   Note type Evaluation   Pain Assessment   Pain Assessment Tool 0-10   Pain Score No Pain   Effect of Pain on Daily Activities n/a   Hospital Pain Intervention(s) Ambulation/increased activity;Repositioned   Multiple Pain Sites No   Home Living   Type of [address] One level;Stairs to enter with rails  (2 RADHA with HR, then FFSU )   Home Equipment Other (Comment)  (none)   Prior Function   Level of [place] Independent with ADLs and functional mobility   Lives With Other (Comment)  (mother & father)   Nae Cabrera Help From [facility] [address] in the last 6 months 1 to 4  (pt reports 2 falls, he attributes to alcohol intoxication)   Vocational Unemployed   Comments Pt reports living at home with his parents, single level living wiht 2 RADHA  Pt ambulates with no AD  Owns no DME  Reports physically being independent with all ADLs however recently has not been caring for self due to stress  Restrictions/Precautions   Weight Bearing Precautions Per Order No   Other Precautions Impulsive;Multiple lines;Telemetry;Aspiration; Seizure  (+CIWA)   General   Additional Pertinent History Pt admitted under observation 1/19/2021 with R LE open wound x2 months  Dx: LE cellulitis  Family/Caregiver Present No   Cognition   Overall Cognitive Status WFL   Arousal/Participation Alert   Orientation Level Oriented to person;Oriented to place;Oriented to situation   Memory Within functional limits   Following Commands Follows multistep commands without difficulty   RUE Assessment   RUE Assessment WFL   LUE Assessment   LUE Assessment WFL   RLE Assessment   RLE Assessment WFL  (+ edema, errythema, weeping )   LLE Assessment   LLE Assessment WFL  (+ edema, errythema, weeping )   Coordination   Movements are Fluid and Coordinated 0   Coordination and Movement Description B UE tremors noted at rest and decreased with mobility, pt attributes to withdrawal     Sensation [facility]   Light Touch   RLE Light Touch Grossly intact   LLE Light Touch Grossly intact   Bed Mobility   Additional Comments Bed mobility not completed as pt presents standing in room and was left seated EOB    Transfers   Sit to Stand 7  Independent   Stand to Sit 7  Independent   Stand pivot 7  Independent   Additional Comments Pt slightly impulsive with all transfers, dec awareness of connection to IV lines  Ambulation/Elevation   Gait pattern Decreased foot clearance; Wide NENITA   Gait Assistance 6  Modified independent  (impulsive)   Additional items Assist x 1   Assistive Device None   Distance 200ft x1   Stair Management Assistance Not tested  (pt denies concerns about 2 RADHA his home )   Balance   Static Sitting Good   Dynamic Sitting Good   Static Standing Good   Dynamic Standing Good   Ambulatory Good   Endurance Deficit   Endurance Deficit No   Activity Tolerance   Activity Tolerance Patient tolerated treatment well   Medical Staff Made Aware Spoke with [address] with BARBARA Matta   Assessment   Prognosis Good   Problem List Impaired judgement;Decreased safety awareness; Obesity; Decreased skin integrity   Assessment Pt seen for PT evaluation, cleared by RN April Ramires, activity orders of "up and OOB as tolerated"  PT consult received for mobility assessment and discharge needs  Pt admitted 1/19/2021 with R LE wound x2 months, dx Lower extremity cellulitis  Comorbidities affecting pt's fnxl performance include: anxiety, asthma, depression, falls, hypertension, obesity, PTSD, substance and alcohol abuse  Personal factors affecting pt at time of PT IE include: lives in 1 story house, stairs to enter home, positive fall history, anxiety, impulsivity, depression, compliance and limited insight into impairments  PTA, pt was independent with functional mobility without assistive device, independent with ADLS, independent with IADLS, living with his parents in a single level home with 2 steps to enter, ambulating household distance, ambulating community distances and home with family assist  The AM-PAC objective tool in combination with use of the Barthel Index outcome tool were used to assist in determining pt safety with mobility/ADLs and appropriate dispo recommendations, see below for scores  Pt is at risk of falls d/t impulsivity, history of previous falls, impaired balance, impaired insight/safety awareness, acuity of medical illness, ongoing medical treatment of primary diagnosis and abnormal lab values  Pt's clinical presentation is currently evolving seen in pt's presentation of changing level of pain and increased fall risk  This PT IE requires high complexity clinical decision making, based on multiple medical/physiological/fnxl/social factors which affect pt's performance w/ evaluation & therapist judgement for disposition recommendations  Pt demonstrates independent with functional mobility over level surfaces, denies need for stair negotiation, remains mildly impulsive with mobility  Does not require further skilled PT services in the acute care setting   Based on pt presentation & impairments, pt would most appropriately benefit from return to home with family support upon d/c     Goals   Patient Goals "to get back home today really "   PT Treatment Day 0   Recommendation   PT Discharge Recommendation Return to previous environment with social support  (home with family care )   Equipment Recommended   (none)   PT - OK to Discharge Yes  (once medically cleared )   AM-PAC Basic Mobility Inpatient   Turning in Bed Without Bedrails 4   Lying on Back to Sitting on Edge of Flat Bed 4   Moving Bed to Chair 4   Standing Up From Chair 4   Walk in Room 4   Climb 3-5 Stairs 4   Basic Mobility Inpatient Raw Score 24   Basic Mobility Standardized Score 57 68   Modified Jami Scale   Modified Poth Scale 1   Barthel Index   Feeding 10   Bathing 5   Grooming Score 5   Dressing Score 10   Bladder Score 10   Bowels Score 10   Toilet Use Score 10   Transfers (Bed/Chair) Score 15   Mobility (Level Surface) Score 15   Stairs Score 10   Barthel Index Score 100       Olga Montes De Oca, PT, DPT   1/20/2021

## 2021-01-20 NOTE — ASSESSMENT & PLAN NOTE
· Patient D-dimer elevated on admission at 8 49  · CTA chest (1/20/2021): No evidence of pulmonary embolus  Mild and diffuse interstitial prominence, suggesting mild pulmonary vascular congestion  · Patient was on 4L of oxygen yesterday; however today patient is improving with an O2 is 94% on RA  · Continue monitoring oxygen levels

## 2021-01-20 NOTE — ASSESSMENT & PLAN NOTE
· Patient has a history of alcohol abuse and admits to drinking 1/2 liter of vodka per day  · Patients blood alcohol level on admission was 400 2  · Patient was seen by psychiatry and they started him on Lexapro 10mg for depression  · Offered patient rehab services; however patient declined at this time    · Continue folic acid, thiamine, and centrum multivitamin

## 2021-01-20 NOTE — ASSESSMENT & PLAN NOTE
[place]... · Patient has a history of HTN but admits to no longer taking HCTZ  · Patients BP stable at this time

## 2021-01-20 NOTE — ASSESSMENT & PLAN NOTE
· Patient legs still appear edematous and erythematous  · Continue Ancef and Rocephin IV to treat infection   · VAS results (1/20/2021): No evidence of acute or chronic deep vein thrombosis bilaterally  No evidence of superficial thrombophlebitis noted bilaterally

## 2021-01-20 NOTE — ASSESSMENT & PLAN NOTE
· Patients hemoglobin on 1/20/2021 is 8 1 and his MCV is 108  Patients last hemoglobin was recorded on 09/2020 and was 16 3  · Iron profile was completed on 09/2020: ferritin (817), iron (186), normal TIBC and saturation was 45%  · Etiology of anemia could be due to chronic alcohol abuse  Order B12 and folate levels

## 2021-01-20 NOTE — PROGRESS NOTES
Progress Note - Kamlesh Shaw 1985, 28 y o  male MRN: [id number]  Unit/Bed#: -01 Encounter: [id number]  Primary Care Provider: Alfonso Hardwick PA-C   Date and time admitted to hospital: 1/19/2021  5:57 PM    * Lower extremity cellulitis  Assessment & Plan  · Patient legs still appear edematous and erythematous  · Continue Ancef and Rocephin IV to treat infection   · VAS results (1/20/2021): No evidence of acute or chronic deep vein thrombosis bilaterally  No evidence of superficial thrombophlebitis noted bilaterally  Alcohol withdrawal ([facility] [address] )  Assessment & Plan  · Patients last drink was yesterday  He admits to a history of drinking 1/2 liter of vodka per day  · On exam patient was experiencing tremors and some anxiety due to his alcohol withdrawal   · Patients last CIWA was 7   · Toxicology was consulted and they recommended discontinuing CIWA protocol and starting patient on phenobarbital for his withdrawal symptoms  · Ordered phenobarbital 65mg IV for one dose  Patient can have up to 5 doses total  by 30 minute intervals until symptoms subside  Alcoholic pancreatitis  Assessment & Plan  · CTA chest (1/20/2021): Mild abnormal appearance of the pancrease, most compatible with acute pancreatitis  · Patient is currently fluid overloaded; therefore, hold off on any additional IV fluids  · Ordered lipase level     Thoracoabdominal aortic aneurysm [facility])  Assessment & Plan  · CTA chest (1/20/2021): 4 4cm fusiform thoracoabdominal aortic aneurysm that arises from the descending thoracic aorta and extends into the abdomen  · Patient was told to follow up with CT surgery as an outpatient for regular US monitoring  Acute respiratory failure with hypoxia [facility])  Assessment & Plan  · Patient D-dimer elevated on admission at 8 49  · CTA chest (1/20/2021): No evidence of pulmonary embolus  Mild and diffuse interstitial prominence, suggesting mild pulmonary vascular congestion    · Patient was on 4L of oxygen yesterday; however today patient is improving with an O2 is 94% on RA  · Continue monitoring oxygen levels  Thrombocytopenia ([address] )  Assessment & Plan  · Likely due to chronic alcohol abuse and underlying liver dysfunction  · Order AM labs to monitor platelet counts    Macrocytic anemia  Assessment & Plan  · Patients hemoglobin on 1/20/2021 is 8 1 and his MCV is 108  Patients last hemoglobin was recorded on 09/2020 and was 16 3  · Iron profile was completed on 09/2020: ferritin (817), iron (186), normal TIBC and saturation was 45%  · Etiology of anemia could be due to chronic alcohol abuse  Order B12 and folate levels  Elevated liver enzymes  Assessment & Plan  · LFT 1/20/2021: AST of 186, ALT of 18, ALP of 394  · Hyperbilirubinemia: Total bilirubin 3 89  · CTA chest (1/20/2021): Hepatomegaly, splenomegaly and ascites consistent with cirrhosis  · Ultrasound of the abdomen was ordered to evaluate if patient requires paracentesis due to distended abdomen on exam   · Trend LFTs with AM labs    Tobacco dependence  Assessment & Plan  · Patient is a 1ppd smoker  · Continue NICODERM patch as replacement  · Nicotine gum ordered for additional replacement as requested by patient  Substance abuse [facility])  Assessment & Plan  · Patient has a history of substance abuse  States that he last used one year ago  · Patients urine drug screen was negative  · Patient was taking suboxone prior to admission; therefore, it was ordered for his inpatient stay  Alcohol abuse  Assessment & Plan  · Patient has a history of alcohol abuse and admits to drinking 1/2 liter of vodka per day  · Patients blood alcohol level on admission was 400 2  · Patient was seen by psychiatry and they started him on Lexapro 10mg for depression  · Offered patient rehab services; however patient declined at this time    · Continue folic acid, thiamine, and centrum multivitamin    Class 1 obesity in adult  Assessment & Plan  · Weight loss encouraged    Essential hypertension  Assessment & Plan  · Patient has a history of HTN but admits to no longer taking HCTZ  · Patients BP stable at this time  Mild intermittent asthma without complication  Assessment & Plan  · Patient denies cough or SOB  · Give albuterol nebs PRN    VTE Pharmacologic Prophylaxis:   Pharmacologic: Heparin  Mechanical: Mechanical VTE prophylaxis in place  Patient Centered Rounds: I have performed bedside rounds with nursing staff today  Discussions with Specialists or Other Care Team Provider: Toxicology  Education and Discussions with Family / Patient: All patient questions were answered to the best of my ability  Time Spent for Care: 45 minutes  More than 50% of total time spent on counseling and coordination of care as described above  Current Length of Stay: 0 day(s)  Current Patient Status: Observation   Certification Statement: The patient will continue to require additional inpatient hospital stay due to alcohol withdrawal and bilateral LE cellulitis     Discharge Plan: Patient is not medically stable for discharge at this time  Patient will be monitored on phenobarbital for alcohol withdrawal  Patient still requires IV antibiotics for his bilateral LE cellulitis  Code Status: Level 1 - Full Code    Subjective:   Patient is drowsy on exam this morning  Patient admits to feeling anxious and is experiencing hand tremors  Patient was agreeable to being put on phenobarbital to help with his alcohol withdrawal symptoms  Patient admits to feeling better now that he was started back on his suboxone treatment  Patient denies any nausea, vomiting, diarrhea  Patient denies any pain or discomfort in his legs   Patient continues to questions when he can leave the hospital     Objective:   Vitals:   Temp (24hrs), Av 8 °F (36 6 °C), Min:97 7 °F (36 5 °C), Max:97 9 °F (36 6 °C)    Temp:  [97 7 °F (36 5 °C)-97 9 °F (36 6 °C)] 97 9 °F (36 6 °C)  HR: [] 91  Resp:  [18-20] 18  BP: (110-116)/(55-71) 114/66  SpO2:  [92 %-99 %] 94 %  Body mass index is 34 3 kg/m²  Input and Output Summary (last 24 hours):     No intake or output data in the 24 hours ending 01/20/21 1509    Physical Exam:     Physical Exam  Vitals signs reviewed  HENT:      Head: Normocephalic and atraumatic  Eyes:      General: Scleral icterus present  Cardiovascular:      Rate and Rhythm: Normal rate and regular rhythm  Heart sounds: No murmur  Pulmonary:      Breath sounds: Normal breath sounds  No wheezing or rales  Chest:      Chest wall: No tenderness  Abdominal:      General: Bowel sounds are normal  There is distension  Palpations: Abdomen is rigid  Tenderness: There is no abdominal tenderness  Musculoskeletal: Normal range of motion  Right lower leg: Edema present  Left lower leg: Edema present  Skin:     General: Skin is warm and dry  Findings: Abrasion (Right lower leg) and erythema (Bilateral LE extremities) present  No rash  Neurological:      Mental Status: He is oriented to person, place, and time  Psychiatric:         Mood and Affect: Mood is anxious  Affect is blunt  Additional Data:   Labs:  Results from last 7 days   Lab Units 01/20/21  0634   WBC Thousand/uL 4 75   HEMOGLOBIN g/dL 8 1*   HEMATOCRIT % 25 5*   PLATELETS Thousands/uL 96*   NEUTROS PCT % 71   LYMPHS PCT % 17   MONOS PCT % 11   EOS PCT % 1     Results from last 7 days   Lab Units 01/20/21  0634 01/19/21  2323   POTASSIUM mmol/L 3 8  --    CHLORIDE mmol/L 96  --    CO2 mmol/L 29  --    CO2, I-STAT mmol/L  --  31   BUN mg/dL 5*  --    CREATININE mg/dL 0 41*  --    CALCIUM mg/dL 8 2*  --    ALK PHOS U/L 394 1*  --    ALT U/L 18  --    AST U/L 186*  --    GLUCOSE, ISTAT mg/dl  --  94     Results from last 7 days   Lab Units 01/20/21  0634   INR  1 25*       * I Have Reviewed All Lab Data Listed Above  * Additional Pertinent Lab Tests Reviewed:  All Labs Within Last 24 Hours Reviewed    Imaging:    Imaging Reports Reviewed Today Include:   VAS (1/20/2021): RIGHT LOWER LIMB:  No evidence of acute or chronic deep vein thrombosis  No evidence of superficial thrombophlebitis noted  Doppler evaluation shows a normal response to augmentation maneuvers  Popliteal arterial Doppler waveforms are biphasic/hyperemic  LEFT LOWER LIMB:  No evidence of acute or chronic deep vein thrombosis  No evidence of superficial thrombophlebitis noted  Doppler evaluation shows a normal response to augmentation maneuvers  Popliteal arterial Doppler waveforms are biphasic/hyperemic  CTA chest (1/20/2021): 1   Suboptimal examination due to phase of contrast material as well as respiratory motion artifact  2   No evidence of pulmonary emboli in the 1st and 2nd order branches  Tertiary branches obscured by respiratory motion  3   Mild and diffuse interstitial prominence, suggesting mild pulmonary vascular congestion  4   Mild abnormal appearance of the pancreas, most compatible with acute pancreatitis  5   Hepatomegaly, splenomegaly and ascites, consistent with cirrhosis  Assessment of the portal vein cannot be made due to phase of contrast administration  6   4 4 cm fusiform thoracoabdominal aortic aneurysm  The aneurysm arises from the descending thoracic aorta, extending into the abdomen  7   Healing fracture of the proximal sternum        Cultures:   Blood Culture:   Lab Results   Component Value Date    BLOODCX Received in Microbiology Lab  Culture in Progress  01/19/2021    BLOODCX Received in Microbiology Lab  Culture in Progress   01/19/2021     Urine Culture: No results found for: URINECX  Sputum Culture: No components found for: SPUTUMCX  Wound Culture: No results found for: WOUNDCULT    Last 24 Hours Medication List:   Current Facility-Administered Medications   Medication Dose Route Frequency Provider Last Rate    albuterol  2 5 mg Nebulization Q6H PRN Pranathi Justyna Sequeira MD      buprenorphine-naloxone  1 Film Sublingual BID Ankit Ramirez PA-C      cefTRIAXone  1,000 mg Intravenous Q24H Jay Colon MD 1,000 mg (01/20/21 0916)    escitalopram  10 mg Oral Daily Nissa Marinelli MD      folic acid  1 mg Oral Daily Jay Colon MD      multivitamin-minerals  1 tablet Oral Daily Jay Colon MD      nicotine  1 patch Transdermal Daily Jay Colon MD      nicotine polacrilex  2 mg Oral Q2H PRN Ankit Ramirez PA-C      sodium chloride  100 mL/hr Intravenous Continuous Jay Colon  mL/hr (01/20/21 0533)    thiamine  100 mg Oral Daily Jay Colon MD          Today, Patient Was Seen By: Ankit Ramirez PA-C    ** Please Note: Dragon 360 Dictation voice to text software may have been used in the creation of this document   **

## 2021-01-20 NOTE — PLAN OF CARE
Problem: RESPIRATORY - ADULT  Goal: Achieves optimal ventilation and oxygenation  Description: INTERVENTIONS:  - Assess for changes in respiratory status  - Assess for changes in mentation and behavior  - Position to facilitate oxygenation and minimize respiratory effort  - Oxygen administered by appropriate delivery if ordered  - Initiate smoking cessation education as indicated  - Assess the need for suctioning and aspirate as needed  - Assess and instruct to report SOB or any respiratory difficulty  - Respiratory Therapy support as indicated  Outcome: Progressing     Problem: SKIN/TISSUE INTEGRITY - ADULT  Goal: Skin integrity remains intact  Description: INTERVENTIONS  - Identify patients at risk for skin breakdown  - Assess and monitor skin integrity  - Assess and monitor nutrition and hydration status  - Monitor labs (i e  albumin)  - Assess for incontinence   - Turn and reposition patient  - Assist with mobility/ambulation  - Relieve pressure over bony prominences  - Avoid friction and shearing  - Provide appropriate hygiene as needed including keeping skin clean and dry  - Evaluate need for skin moisturizer/barrier cream  - Collaborate with interdisciplinary team (i e  Nutrition, Rehabilitation, etc )   - Patient/family teaching  Outcome: Progressing     Problem: SKIN/TISSUE INTEGRITY - ADULT  Goal: Incision(s), wounds(s) or drain site(s) healing without S/S of infection  Description: INTERVENTIONS  - Assess and document risk factors for skin impairment   - Assess and document dressing, incision, wound bed, drain sites and surrounding tissue  - Consider nutrition services referral as needed  - Oral mucous membranes remain intact  Outcome: Progressing     Problem: MUSCULOSKELETAL - ADULT  Goal: Maintain or return mobility to safest level of function  Description: INTERVENTIONS:  - Assess patient's ability to carry out ADLs; assess patient's baseline for ADL function and identify physical deficits which impact ability to perform ADLs (bathing, care of mouth/teeth, toileting, grooming, dressing, etc )  - Assess/evaluate cause of self-care deficits   - Assess range of motion  - Assess patient's mobility  - Assess patient's need for assistive devices and provide as appropriate  - Encourage maximum independence but intervene and supervise when necessary  - Involve family in performance of ADLs  - Assess for home care needs following discharge   - Consider OT consult to assist with ADL evaluation and planning for discharge  - Provide patient education as appropriate  Outcome: Progressing

## 2021-01-20 NOTE — ASSESSMENT & PLAN NOTE
· CTA chest (1/20/2021): 4 4cm fusiform thoracoabdominal aortic aneurysm that arises from the descending thoracic aorta and extends into the abdomen  · Patient was told to follow up with CT surgery as an outpatient for regular US monitoring

## 2021-01-20 NOTE — ASSESSMENT & PLAN NOTE
· Hemoglobin - 8 2, only prior value available is from 09/2020 - where It was 16 3   · Macrocytic - MCV - 100   · Iron profile done in 09/2020 - ferritin - 817 (elevated), iron - 186 (elevated), normal TIBC and % saturation is > 45 %    Etiology of anemia can be underlying vitamin B 12/ folate deficiency due to chronic alcohol use disorder vs alcohol-induced macrocytosis     PLAN :   · Follow up for further evaluation as an outpatient   · Iron profile is concerning for iron overload ex: hemochromatosis -- needs follow up as an outpatient   · Follow CBC in am Labs

## 2021-01-20 NOTE — UTILIZATION REVIEW
Initial Clinical Review    Admission: Date/Time/Statement:   Admission Orders (From admission, onward)     Ordered        01/19/21 1953  Place in Observation  Once                Orders Placed This Encounter   Procedures    Place in Observation     Standing Status:   Standing     Number of Occurrences:   1     Order Specific Question:   Level of Care     Answer:   Med Surg [16]     ED Arrival Information     Expected Arrival Acuity Means of Arrival Escorted By Service Admission Type    - 1/19/2021 17:57 Urgent Ambulance [facility] Emergency Squ General Medicine Urgent    Arrival Complaint    Right leg wound     Chief Complaint   Patient presents with    Leg Injury     right lower leg and foot with wound x 2 months, lives with parents, parents noticed blood pooling by right foot, pt arrived to ed via ems with gauze wrapping around right foot and soiled left foot sock     HPI:  28year old male with PMHx Obesity, HTN not on current medication, asthma, alcohol and substance abuse, PTSD, depression  Presented via EMS to [facility] ED on 1/19/21 2nd2 month history of non-healing right foot wound  - now bleeding and inability to care for self     tt showered, has not been taking care of himself - not showering, etc  Also worsening depression with multiple recent life stressors including losing his job, alcohol abuse/ admits to drinking more than usual 4 shots on the night of admission, found his girlfriend dead next to him after overdosing on drugs, has custody issues with ex-wife and his son    In ED - Temp 97 7  HR 88  /55   SpO2 95 - 99 % with  3 L NC O2     RLE Exam  + 1cm ulcerative, lesions with bleeding vessel noted to mid, proximal shin    Labs : COVID neg   Hmglb  8 2  *alcohol 400 2   LFT's - AST-174, ALT-17, ALT-370   T Bili 3 26   UA +UTI   UDS negative   CT Chest +pulm vascular congestion, acute pancreatitis, cirrhosis, 4 4 cm thoracic abdominal aneurysm, healing sternal fracture  ED Tx: IVF NSS x 1 L, IV cefazolin    Placed in Observation 1/19/21 at 1953 2nd Lower extremity cellulitis, Alcohol Intoxication, resp failure with hypoxia - O2 prn, IVF, IV Rocephin, CIWA-Ar protocol     Assessment/Plan  * Lower extremity cellulitis  Assessment & Plan  · History of right lower leg and foot wound for the past 2 months, with oozing of serous discharge  · DS admission the parents noticed blood pooling by the right foot from the wound and decided to called 911  · On exam wound has been dressed with gauze, no overlying discharge noted, bilateral lower extremity swelling, erythema, oozing of serous drainage, leal yellow crust seen with poor nail hygiene  · Patient denies any tenderness, distal pulses intact bilaterally     PLAN :   · Place in observation   · S/p cefazolin in the ED, shall continue the same as patient comes from home, is not septic and has no risk factors for MRSA   · Wound care consult - input appreciated   · Pt will benefit from compression stockings after resolution of acute episode of cellulitis   · Venous Doppler b/l to r/o VTE   · Podiatry consult as patient has chronic skin changes in b/l LE, extremely dry skin, and poor nail hygiene   · RLE wound s/p laceration in the ED       Unable to care for self  Assessment & Plan  · As per history obtained in the ED, patient has been unable to care for himself, lives with his parents-has not showered, appears unkempt  · Patient has a history of depression, PTSD, anxiety, has multiple stressors including death of his girlfriend due to drug overdose 2 weeks ago, losing his job, custody issues with ex-wife etc   · Patient follows a psychiatrist as an outpatient-is not on any psychiatric medications  · Denies any suicidal / homicidal ideation      PLAN :   · Case management consult - input appreciated   · Psychiatry consult - input appreciated      Alcohol abuse  Assessment & Plan  · Patient has a history of alcohol abuse, reportedly drank 4 shots of eduarddka prior to presentation to the ED  · Patient reports that his drinking has increased since the death of his girlfriend 2 weeks ago  · Blood alcohol level - 400 2   · Patient has other evidence of chronic alcohol abuse including abnormal LFTs in a pattern consistent with alcohol abuse, mild thrombocytopenia, macrocytic anemia       PLAN :   · CIWA protocol   · Thiamine, multivitamins, folate   · Patient  is coherent and is alert and oriented x 3  · Order bedside swallow eval and regular diet, if passed   · Psychiatry consult       Acute respiratory failure with hypoxia ([address] )  Assessment & Plan  · Patient desaturating to mid-70s on room, improved to 93% on 4 L oxygen via nasal cannula  · VBG : acceptable, D-dimer elevated at 8 49   · Ordered CTA chest but patient was noncompliant and claustrophoic resulting in a non diagnostic study   · Will empirically initiate therapeutic anticoagulation -- closely monitor plt count and for RLE wound bleeding   · Patient also reportedly has a history of "exposure to an agent" which resulted in 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 which could contribute to hypoventilation        VTE Prophylaxis: on hold due to bleeding from Right Lower Extremity wound  / sequential compression device      Anticipated Length of Stay:  Patient will be admitted on an Observation basis with an anticipated length of stay of 2 midnights     Justification for Hospital Stay: lower extremity cellulitis, unable to care for self, alcohol abuse     ED Triage Vitals   Temperature Pulse Respirations Blood Pressure SpO2   01/19/21 1804 01/19/21 1804 01/19/21 1804 01/19/21 2011 01/19/21 1804   97 7 °F (36 5 °C) 88 20 115/55 99 %      Temp Source Heart Rate Source Patient Position - Orthostatic VS BP Location FiO2 (%)   01/19/21 1804 01/19/21 1804 01/19/21 2011 01/19/21 2011 --   Oral Monitor Lying Right arm       Wt Readings from Last 1 Encounters:   01/19/21 118 kg (260 lb)     Additional Vital Signs:   01/20/21 0700 97 9 °F (36 6 °C)  91  18  114/66  94 %  --  --  --  None (Room air)  Sitting   01/20/21 0400  97 8 °F (36 6 °C)  99  18  116/71  92 %  --  --  4 L  NC  Sitting   01/20/21 0219  --  --  --  --  93 %  36  --  4 L/min  Nasal cannula  --   01/20/21 0000  97 8 °F (36 6 °C)  101  18  110/58  --  --  --  --  --  Lying   01/19/21 2222  --  --  --  --  96 %  36  --  4 L/min  Nasal cannula  --   01/19/21 2121  --  --  --  --  97 %  --  4 L/min  --  Nasal cannula       CIWA- Ar Score  Row Name  01/20/21   0700  01/20/21   0400  01/20/21   0000  01/19/21 2011 01/19/21   1804   Vital Signs   Temp  97 9 °F (36 6  °C)  97 8 °F (36 6  °C)  97 8 °F (36 6  °C)  97 8 °F (36 6  °C)  97 7 °F (36 5  °C)   Temp src  Tympanic  Tympanic  Tympanic  Tympanic  Oral   Pulse  91  99  101  89  88   Heart Rate Source  --  Monitor  Monitor  Monitor  Monitor   Resp  18  18  18  18  20   BP  114/66  116/71  110/58  115/55  --   MAP (mmHg)  --  --  --  --  --   Girls Systolic BP Percentile  --  --  --  --  --   Girls Diastolic BP Percentile  --  --  --  --  --   Boys Systolic BP Percentile  --  --  --  --  --   Boys Diastolic BP Percentile  --  --  --  --  --   BP Location  Left arm  Left arm  Right arm  Right arm  --   BP Method  Automatic  Automatic  Automatic  Automatic  --   Patient Position - Orthostatic VS  Sitting  Sitting  Lying  Lying  --   Sys BP %ile  --  --  --  --  --   Pitts BP %ile  --  --  --  --  --   CIWA-Ar   Nausea and Vomiting  --  0  0  0  --   Tactile Disturbances  --  0  0  0  --   Tremor  --  3  0  0  --   Auditory Disturbances  --  0  0  0  --   Paroxysmal Sweats  --  0  0  0  --   Visual Disturbances  --  0  0  0  --   Anxiety  --  4  0  1  --   Headache, Fullness in Head  --  0  0  0  --   Agitation  --  0  0  0  --   Orientation and Clouding of Sensorium  --  0  0  0  --   CIWA-Ar Total  --  7  0  1  --     Pertinent Labs/Diagnostic Test Results:   Results from last 7 days   Lab Units 01/19/21 1919   SARS-COV-2 Negative     Results from last 7 days   Lab Units 01/20/21  0634 01/19/21  2323 01/19/21  2320 01/19/21  1906   WBC Thousand/uL 4 75  --   --  5 32   HEMOGLOBIN g/dL 8 1*  --   --  8 2*   I STAT HEMOGLOBIN g/dl  --  8 8*  --   --    HEMATOCRIT % 25 5*  --   --  24 7*   HEMATOCRIT, ISTAT %  --  26*  --   --    PLATELETS Thousands/uL 96*  --  93* 100*   NEUTROS ABS Thousands/µL 3 33  --   --  3 39       Results from last 7 days   Lab Units 01/20/21  0634 01/19/21  2323 01/19/21  1906   SODIUM mmol/L 135  --  135   POTASSIUM mmol/L 3 8  --  3 6   CHLORIDE mmol/L 96  --  96   CO2 mmol/L 29  --  30   CO2, I-STAT mmol/L  --  31  --    ANION GAP mmol/L 10  --  9   BUN mg/dL 5*  --  5*   CREATININE mg/dL 0 41*  --  0 34*   EGFR ml/min/1 73sq m 152  --  165   CALCIUM mg/dL 8 2*  --  8 1*   MAGNESIUM mg/dL  --   --  1 9   PHOSPHORUS mg/dL  --   --  3 5     Results from last 7 days   Lab Units 01/20/21  0634 01/19/21  1906   AST U/L 186* 174*   ALT U/L 18 17   ALK PHOS U/L 394 1* 370 1*   TOTAL PROTEIN g/dL 7 2 7 0   ALBUMIN g/dL 3 1* 3 0*   TOTAL BILIRUBIN mg/dL 3 89* 3 26*   BILIRUBIN DIRECT mg/dL  --  1 97*       Results from last 7 days   Lab Units 01/20/21  0634 01/19/21  1906   GLUCOSE RANDOM mg/dL 117 106     Results from last 7 days   Lab Units 01/19/21  2323   PH, REEMA I-STAT  7 411*   PCO2, REEMA ISTAT mm HG 47 0   PO2, REEMA ISTAT mm HG 82 0*   HCO3, REEMA ISTAT mmol/L 29 9   I STAT BASE EXC mmol/L 5*   I STAT O2 SAT % 96*     Results from last 7 days   Lab Units 01/19/21  2320   D-DIMER QUANTITATIVE mg/L FEU 8 49*     Results from last 7 days   Lab Units 01/20/21  0634 01/19/21  2320   PROTIME seconds 14 0* 14 0*   INR  1 25* 1 25*     Results from last 7 days   Lab Units 01/19/21  1906   TSH 3RD GENERATON uIU/mL 3 478     Results from last 7 days   Lab Units 01/20/21   CLARITY UA  Cloudy*   COLOR UA  Beena*   SPEC GRAV UA  1 025   PH UA  6 5   GLUCOSE UA mg/dl Trace*   KETONES UA mg/dl Trace*   BLOOD UA  Trace-Intact* PROTEIN UA mg/dl 1+*   NITRITE UA  Positive*   BILIRUBIN UA  3+*   UROBILINOGEN UA E U /dl >=8 0*   LEUKOCYTES UA  Negative   WBC UA /hpf None Seen   RBC UA /hpf None Seen   BACTERIA UA /hpf None Seen   EPITHELIAL CELLS WET PREP /hpf Occasional     Results from last 7 days   Lab Units 01/19/21  1919   INFLUENZA A PCR  Negative   INFLUENZA B PCR  Negative   RSV PCR  Negative       Results from last 7 days   Lab Units 01/20/21   AMPH/METH  Negative   BARBITURATE UR  Negative   BENZODIAZEPINE UR  Negative   COCAINE UR  Negative   METHADONE URINE  Negative   OPIATE UR  Negative   PCP UR  Negative   THC UR  Negative     Results from last 7 days   Lab Units 01/19/21  1906   ETHANOL LVL mg/dL 400 2*     CTA - CHEST WITH IV CONTRAST - PULMONARY ANGIOGRAM   1   Suboptimal examination due to phase of contrast material as well as respiratory motion artifact  2   No evidence of pulmonary emboli in the 1st and 2nd order branches   Tertiary branches obscured by respiratory motion  3   Mild and diffuse interstitial prominence, suggesting mild pulmonary vascular congestion  4   Mild abnormal appearance of the pancreas, most compatible with acute pancreatitis  5   Hepatomegaly, splenomegaly and ascites, consistent with cirrhosis   Assessment of the portal vein cannot be made due to phase of contrast administration  6   4 4 cm fusiform thoracoabdominal aortic aneurysm   The aneurysm arises from the descending thoracic aorta, extending into the abdomen  7   Healing fracture of the proximal sternum       ED Treatment:   Medication Administration from 01/19/2021 1757 to 01/19/2021 2049       Date/Time Order Dose Route Action     01/19/2021 1909 sodium chloride 0 9 % bolus 1,000 mL 1,000 mL Intravenous New Bag     01/19/2021 2005 ceFAZolin (ANCEF) IVPB (premix in dextrose) 2,000 mg 50 mL 0 mg Intravenous Stopped     01/19/2021 1909 ceFAZolin (ANCEF) IVPB (premix in dextrose) 2,000 mg 50 mL 2,000 mg Intravenous New Bag Past Medical History:   Diagnosis Date    Allergic     Asthma     Essential hypertension 5/6/2019    Obesity     [facility]      Present on Admission:   Essential hypertension   Mild intermittent asthma without complication    Admitting Diagnosis: Alcohol intoxication ([facility] [address] ) [F10 929]  Depression [F32 9]  Anemia [D64 9]  Visit for wound check [Z51 89]  Bilateral lower leg cellulitis [L03 116, Q41 587]  Leg wound, right, initial encounter [S81 801A]  Unable to care for self [Z78 9]    Age/Sex: 28 y o  male    Admission Orders:  VS q4hrs  CIWA-Ar Score q4hrs  Nasal O2 at 4L/min - Titrate SpO2 > 92 %  Continuous Pulse Oximetry  Up + OOB as tolerated  RLE wound care  Diet Regular: House  Daily weight  I+O q shift  PT + OT Evals    Scheduled Medications:  buprenorphine-naloxone, 2 Film, Sublingual, Daily  IV cefTRIAXone, 1,000 mg, Intravenous, F87R  folic acid, 1 mg, Oral, Daily  influenza vaccine, 0 5 mL, Intramuscular, Once  multivitamin-minerals, 1 tablet, Oral, Daily  nicotine, 1 patch, Transdermal, Daily  thiamine, 100 mg, Oral, Daily    Continuous IV Infusions:  IVF sodium chloride, 100 mL/hr, Intravenous, Continuous    PRN Meds:  albuterol, 2 5 mg, Nebulization, Q6H PRN    IP CONSULT TO PSYCHIATRY  IP CONSULT TO CASE MANAGEMENT  IP CONSULT TO PODIATRY  [facility]    Network Utilization Review Department  ATTENTION: Please call with any questions or concerns to [phone] and carefully listen to the prompts so that you are directed to the right person  All voicemails are confidential   Carmen Yoon all requests for admission clinical reviews, approved or denied determinations and any other requests to dedicated fax number below belonging to the campus where the patient is receiving treatment   List of dedicated fax numbers for the Facilities:  [address] DENIALS (Administrative/Medical Necessity) [phone]   [address] [place] [address] (Maternity/NICU/Pediatrics) 261 [facility] [address] [phone]   Sabra Jones 5087 (  [address] "Cynthia[address] 103) [address] [place] [address] [address] [phone]   [place] [id number] [phone]

## 2021-01-20 NOTE — ASSESSMENT & PLAN NOTE
· Likely due to chronic alcohol abuse and underlying liver dysfunction  · Order AM labs to monitor platelet counts

## 2021-01-20 NOTE — ASSESSMENT & PLAN NOTE
· Pt smokes 1 ppd, is on suboxone 8-2 mg 2 films SL daily at home   · PDMP reviewed    · Continue suboxone while inpatient   · Nicotine replacement patch

## 2021-01-20 NOTE — CONSULTS
Consultation - Kamlesh Shaw 28 y o  male MRN: [id number]    Unit/Bed#: -01 Encounter: [id number]      Identifying Data:  28years old white male is admitted at [facility] on January 19, 2021 after mom called 911 she found that patient was bleeding from the right foot patient was admitted for the same  Psychiatric consultation is asked for depression insomnia drugs and alcohol abuse patient examined spoke with the nurse history physical medications labs reviewed and noted discussed with the medical attending patient's alcohol level was 400 2 and drug screen was negative  Patient is a poor historian but he was able to give out the basic background information  Patient admitted drinking alcohol heavily but he has no DUI and no rehabs in the past he denies any treatment for alcohol in the past patient used to abuse opioid in the past but no IV drug abuse and currently he is on Suboxone since 1 year before that he was on methadone for 7 years and currently he denies abusing any opioid  Patient admits being depressed and he has trouble in sleeping anxious and he is aware of his substance abuse problem  Social history patient lives with the family he smokes cigarettes and drinks alcohol on a daily basis as described above patient denies abusing any drugs at this time but he has a history of abusing opioid but no IV drug abuse also he has some experience of abusing cocaine but his drug of choice was opioid currently he is on Suboxone patient has 2 years of college education and he was working until month ago but he got laid off  Patient is recommended for psychiatric follow-up go to [place] [id number] and NA meetings and stop drinking alcohol but patient refused recommended treatments at this time he wants to go home  His planning to join the school    Patient reports that he is a  he was in [employer] for 9 years    Diagnosis depression insomnia drugs and alcohol abuse asthma hypertension obesity history of lymph node biopsy    Allergy Levaquin    Chief Complaints:  Alcohol abuse insomnia and depression    Family History:  Patient denies  Family History   Problem Relation Age of Onset    No Known Problems Mother     Diabetes Father     Prostate cancer Father     Hypertension Father        Legal History:  Patient reports that he was arrested at age 16 for cocaine possession and he was on probation for 1 year but not in longterm  Mental Status Exam:  28years old white male is alert awake oriented x3 overweight affect flat withdrawn looks anxious at times speech slow and low in tone depressed anxious trouble in sleeping  Memory intact  Patient denies auditory or visual hallucinations  Patient denies suicidal or homicidal ideation  No paranoia no delusion elucidated  Poor concentration  Insight and judgments are adequate  History of Present Illness     HPI: Abraham Carson is a 28y o  year old male who presents with right foot bleeding    Consults      Historical Information   Past Psychiatric History:  Patient gives a long history of alcohol abuse and drug abuse as stated above he denies IV drug abuse he had no DUI and he never been in the rehab he is not under any kind of drug alcohol treatment and he is still drinking alcohol but he denies abusing drugs at this time he is on Suboxone  Patient also admits being depressed and anxious but he denies seen a psychiatrist no psychiatric admissions no suicide attempts in the past and he was arrested and he was on probation for 1 year for cocaine possession at age 16  Currently patient is not under psychiatric care he is getting his medication Ambien from his family physician    Past Medical History:   Diagnosis Date    Allergic     Asthma     Essential hypertension 5/6/2019    Obesity     Substance abuse ([facility] [address] )      Past Surgical History:   Procedure Laterality Date    LYMPH NODE BIOPSY       Social History   Social History     Substance and Sexual Activity   Alcohol Use Yes    Alcohol/week: 12 0 standard drinks    Types: 12 Cans of beer per week    Frequency: Monthly or less    Drinks per session: 1 or 2     Social History     Substance and Sexual Activity   Drug Use Never     Social History     Tobacco Use   Smoking Status Current Every Day Smoker    Packs/day: 1 00   Smokeless Tobacco Never Used   Tobacco Comment    2/2019; PATIENT VAPES       Meds/Allergies   current meds:   Current Facility-Administered Medications   Medication Dose Route Frequency    albuterol inhalation solution 2 5 mg  2 5 mg Nebulization Q6H PRN    buprenorphine-naloxone (SUBOXONE) 8-2 mg per SL film 2 Film  2 Film Sublingual Daily    cefTRIAXone (ROCEPHIN) IVPB (premix in dextrose) 1,000 mg 50 mL  1,000 mg Intravenous D20Q    folic acid (FOLVITE) tablet 1 mg  1 mg Oral Daily    multivitamin-minerals (CENTRUM) tablet 1 tablet  1 tablet Oral Daily    nicotine (NICODERM CQ) 21 mg/24 hr TD 24 hr patch 1 patch  1 patch Transdermal Daily    sodium chloride 0 9 % infusion  100 mL/hr Intravenous Continuous    thiamine (VITAMIN B1) tablet 100 mg  100 mg Oral Daily    and PTA meds:    Medications Prior to Admission   Medication    albuterol (VENTOLIN HFA) 90 mcg/act inhaler    buprenorphine-naloxone (SUBOXONE) 8-2 mg    hydrochlorothiazide (HYDRODIURIL) 12 5 mg tablet    ondansetron (ZOFRAN-ODT) 4 mg disintegrating tablet    zolpidem (AMBIEN) 10 mg tablet     Allergies   Allergen Reactions    Levofloxacin Other (See Comments)     BLACKED OUT       Objective   Vitals: Blood pressure 114/66, pulse 91, temperature 97 9 °F (36 6 °C), temperature source Tympanic, resp  rate 18, height 6' 1" (1 854 m), weight 118 kg (260 lb), SpO2 94 %        Routine Lab Results:   Admission on 01/19/2021   Component Date Value Ref Range Status    WBC 01/19/2021 5 32  4 31 - 10 16 Thousand/uL Final    RBC 01/19/2021 2 32* 3 88 - 5 62 Million/uL Final    Hemoglobin 01/19/2021 8 2* 12 0 - 17 0 g/dL Final    Hematocrit 01/19/2021 24 7* 36 5 - 49 3 % Final    MCV 01/19/2021 107* 82 - 98 fL Final    MCH 01/19/2021 35 3* 26 8 - 34 3 pg Final    MCHC 01/19/2021 33 2  31 4 - 37 4 g/dL Final    RDW 01/19/2021 22 2* 11 6 - 15 1 % Final    MPV 01/19/2021 9 0  8 9 - 12 7 fL Final    Platelets 06/26/4423 100* 149 - 390 Thousands/uL Final    Neutrophils Relative 01/19/2021 63  43 - 75 % Final    Immat GRANS % 01/19/2021 1  0 - 2 % Final    Lymphocytes Relative 01/19/2021 22  14 - 44 % Final    Monocytes Relative 01/19/2021 13* 4 - 12 % Final    Eosinophils Relative 01/19/2021 1  0 - 6 % Final    Basophils Relative 01/19/2021 0  0 - 1 % Final    Neutrophils Absolute 01/19/2021 3 39  1 85 - 7 62 Thousands/µL Final    Immature Grans Absolute 01/19/2021 0 03  0 00 - 0 20 Thousand/uL Final    Lymphocytes Absolute 01/19/2021 1 15  0 60 - 4 47 Thousands/µL Final    Monocytes Absolute 01/19/2021 0 67  0 17 - 1 22 Thousand/µL Final    Eosinophils Absolute 01/19/2021 0 06  0 00 - 0 61 Thousand/µL Final    Basophils Absolute 01/19/2021 0 02  0 00 - 0 10 Thousands/µL Final    Sodium 01/19/2021 135  133 - 145 mmol/L Final    Potassium 01/19/2021 3 6  3 5 - 5 0 mmol/L Final    Chloride 01/19/2021 96  96 - 108 mmol/L Final    CO2 01/19/2021 30  22 - 33 mmol/L Final    ANION GAP 01/19/2021 9  4 - 13 mmol/L Final    BUN 01/19/2021 5* 6 - 20 mg/dL Final    Creatinine 01/19/2021 0 34* 0 50 - 1 20 mg/dL Final    Standardized to IDMS reference method    Glucose 01/19/2021 106  65 - 140 mg/dL Final    If the patient is fasting, the ADA then defines impaired fasting glucose as > 100 mg/dL and diabetes as > or equal to 123 mg/dL  Specimen collection should occur prior to Sulfasalazine administration due to the potential for falsely depressed results  Specimen collection should occur prior to Sulfapyridine administration due to the potential for falsely elevated results      Calcium 01/19/2021 8 1* 8 4 - 10 2 mg/dL Final    Corrected Calcium 01/19/2021 8 9  8 3 - 10 1 mg/dL Final    AST 01/19/2021 174* 15 - 41 U/L Final    Specimen collection should occur prior to Sulfasalazine administration due to the potential for falsely depressed results   ALT 01/19/2021 17  5 - 63 U/L Final    Specimen collection should occur prior to Sulfasalazine administration due to the potential for falsely depressed results       Alkaline Phosphatase 01/19/2021 370 1* 10 - 129 U/L Final    Total Protein 01/19/2021 7 0  6 4 - 8 3 g/dL Final    Albumin 01/19/2021 3 0* 3 4 - 4 8 g/dL Final    Total Bilirubin 01/19/2021 3 26* 0 30 - 1 20 mg/dL Final    eGFR 01/19/2021 165  ml/min/1 73sq m Final    Color, UA 01/20/2021 Beena* Yellow Final    Clarity, UA 01/20/2021 Cloudy* Clear Final    Specific Gravity, UA 01/20/2021 1 025  1 001 - 1 030 Final    pH, UA 01/20/2021 6 5  5 0, 5 5, 6 0, 6 5, 7 0, 7 5, 8 0 Final    Leukocytes, UA 01/20/2021 Negative  Negative Final    Nitrite, UA 01/20/2021 Positive* Negative Final    Protein, UA 01/20/2021 1+* Negative, Interference- unable to analyze mg/dl Final    Glucose, UA 01/20/2021 Trace* Negative mg/dl Final    Ketones, UA 01/20/2021 Trace* Negative mg/dl Final    Urobilinogen, UA 01/20/2021 >=8 0* 0 2, 1 0 E U /dl E U /dl Final    Bilirubin, UA 01/20/2021 3+* Negative Final    Blood, UA 01/20/2021 Trace-Intact* Negative Final    Ethanol Lvl 01/19/2021 400 2* <10 mg/dL Final    Amph/Meth UR 01/20/2021 Negative  Negative Final    Barbiturate Ur 01/20/2021 Negative  Negative Final    Benzodiazepine Urine 01/20/2021 Negative  Negative Final    Cocaine Urine 01/20/2021 Negative  Negative Final    Methadone Urine 01/20/2021 Negative  Negative Final    Opiate Urine 01/20/2021 Negative  Negative Final    PCP Ur 01/20/2021 Negative  Negative Final    THC Urine 01/20/2021 Negative  Negative Final    Oxycodone Urine 01/20/2021 Negative  Negative Final    SARS-CoV-2 01/19/2021 Negative Negative Final    INFLUENZA A PCR 01/19/2021 Negative  Negative Final    INFLUENZA B PCR 01/19/2021 Negative  Negative Final    RSV PCR 01/19/2021 Negative  Negative Final    Magnesium 01/19/2021 1 9  1 6 - 2 6 mg/dL Final    Phosphorus 01/19/2021 3 5  2 5 - 5 0 mg/dL Final    TSH 3RD GENERATON 01/19/2021 3 478  0 340 - 5 600 uIU/mL Final    Platelets 65/40/5318 93* 149 - 390 Thousands/uL Final    MPV 01/19/2021 9 0  8 9 - 12 7 fL Final    D-Dimer, Quant  01/19/2021 8 49* 0 19 - 0 49 mg/L FEU Final    Reference and upper limits to exclude DVT and PE are the same  Do not use to exclude if clinical symptoms are present      ph, Bin ISTAT 01/19/2021 7 411* 7 300 - 7 400 Final    pH, i-STAT Temp Corrected 01/19/2021 7 421   Final    pCO2, Bin i-STAT 01/19/2021 47 0  42 0 - 50 0 mm HG Final    pCO2, i-STAT TC 01/19/2021 45 7  mm HG Final    pO2, Bin i-STAT 01/19/2021 82 0* 35 0 - 45 0 mm HG Final    pO2, i-STAT TC 01/19/2021 79  mm HG Final    BE, i-STAT 01/19/2021 5* -2 - 3 mmol/L Final    HCO3, Bin i-STAT 01/19/2021 29 9  24 0 - 30 0 mmol/L Final    CO2, i-STAT 01/19/2021 31  21 - 32 mmol/L Final    O2 Sat, i-STAT 01/19/2021 96* 60 - 85 % Final    SODIUM, I-STAT 01/19/2021 138  136 - 145 mmol/l Final    Potassium, i-STAT 01/19/2021 3 7  3 5 - 5 3 mmol/L Final    Hct, i-STAT 01/19/2021 26* 36 5 - 49 3 % Final    Hgb, i-STAT 01/19/2021 8 8* 12 0 - 17 0 g/dl Final    Glucose, i-STAT 01/19/2021 94  65 - 140 mg/dl Final    POC FIO2 01/19/2021 37  L Final    Specimen Type 01/19/2021 VENOUS   Final    Bilirubin, Direct 01/19/2021 1 97* 0 00 - 0 30 mg/dL Final    Sodium 01/20/2021 135  133 - 145 mmol/L Final    Potassium 01/20/2021 3 8  3 5 - 5 0 mmol/L Final    Chloride 01/20/2021 96  96 - 108 mmol/L Final    CO2 01/20/2021 29  22 - 33 mmol/L Final    ANION GAP 01/20/2021 10  4 - 13 mmol/L Final    BUN 01/20/2021 5* 6 - 20 mg/dL Final    Creatinine 01/20/2021 0 41* 0 50 - 1 20 mg/dL Final Standardized to IDMS reference method    Glucose 01/20/2021 117  65 - 140 mg/dL Final    If the patient is fasting, the ADA then defines impaired fasting glucose as > 100 mg/dL and diabetes as > or equal to 123 mg/dL  Specimen collection should occur prior to Sulfasalazine administration due to the potential for falsely depressed results  Specimen collection should occur prior to Sulfapyridine administration due to the potential for falsely elevated results   Glucose, Fasting 01/20/2021 117* 70 - 105 mg/dL Final    Specimen collection should occur prior to Sulfasalazine administration due to the potential for falsely depressed results  Specimen collection should occur prior to Sulfapyridine administration due to the potential for falsely elevated results   Calcium 01/20/2021 8 2* 8 4 - 10 2 mg/dL Final    Corrected Calcium 01/20/2021 8 9  8 3 - 10 1 mg/dL Final    AST 01/20/2021 186* 15 - 41 U/L Final    Specimen collection should occur prior to Sulfasalazine administration due to the potential for falsely depressed results   ALT 01/20/2021 18  5 - 63 U/L Final    Specimen collection should occur prior to Sulfasalazine administration due to the potential for falsely depressed results       Alkaline Phosphatase 01/20/2021 394 1* 10 - 129 U/L Final    Total Protein 01/20/2021 7 2  6 4 - 8 3 g/dL Final    Albumin 01/20/2021 3 1* 3 4 - 4 8 g/dL Final    Total Bilirubin 01/20/2021 3 89* 0 30 - 1 20 mg/dL Final    eGFR 01/20/2021 152  ml/min/1 73sq m Final    WBC 01/20/2021 4 75  4 31 - 10 16 Thousand/uL Final    RBC 01/20/2021 2 37* 3 88 - 5 62 Million/uL Final    Hemoglobin 01/20/2021 8 1* 12 0 - 17 0 g/dL Final    Hematocrit 01/20/2021 25 5* 36 5 - 49 3 % Final    MCV 01/20/2021 108* 82 - 98 fL Final    MCH 01/20/2021 34 2  26 8 - 34 3 pg Final    MCHC 01/20/2021 31 8  31 4 - 37 4 g/dL Final    RDW 01/20/2021 22 4* 11 6 - 15 1 % Final    MPV 01/20/2021 8 6* 8 9 - 12 7 fL Final    Platelets 01/20/2021 96* 149 - 390 Thousands/uL Final    previous plt <100 no smear review needed    Neutrophils Relative 01/20/2021 71  43 - 75 % Final    Immat GRANS % 01/20/2021 0  0 - 2 % Final    Lymphocytes Relative 01/20/2021 17  14 - 44 % Final    Monocytes Relative 01/20/2021 11  4 - 12 % Final    Eosinophils Relative 01/20/2021 1  0 - 6 % Final    Basophils Relative 01/20/2021 0  0 - 1 % Final    Neutrophils Absolute 01/20/2021 3 33  1 85 - 7 62 Thousands/µL Final    Immature Grans Absolute 01/20/2021 0 02  0 00 - 0 20 Thousand/uL Final    Lymphocytes Absolute 01/20/2021 0 82  0 60 - 4 47 Thousands/µL Final    Monocytes Absolute 01/20/2021 0 53  0 17 - 1 22 Thousand/µL Final    Eosinophils Absolute 01/20/2021 0 03  0 00 - 0 61 Thousand/µL Final    Basophils Absolute 01/20/2021 0 02  0 00 - 0 10 Thousands/µL Final    Protime 01/20/2021 14 0* 9 5 - 12 1 seconds Final    INR 01/20/2021 1 25* 0 90 - 1 10 Final    RBC, UA 01/20/2021 None Seen  None Seen, 0-1, 1-2, 2-4, 0-5 /hpf Final    WBC, UA 01/20/2021 None Seen  None Seen, 0-1, 1-2, 0-5, 2-4 /hpf Final    Epithelial Cells 01/20/2021 Occasional  None Seen, Occasional /hpf Final    Bacteria, UA 01/20/2021 None Seen  None Seen, Occasional /hpf Final    AMORPH URATES 01/20/2021 Innumerable  /hpf Final    Protime 01/19/2021 14 0* 9 5 - 12 1 seconds Final    INR 01/19/2021 1 25* 0 90 - 1 10 Final         Diagnosis:  Major depression recurrent  Anxiety  Insomnia  Alcohol abuse  History of drug abuse  Noncompliance of the recommended treatment    Plan:  Recommended inpatient alcohol drug rehab but patient refused  Patient is recommended for outpatient psychiatric follow-up go to [place] [id number] and NA meetings and alcohol drug counseling do not abuse drugs or alcohol anymore after the discharge  Patient seems resistive to my recommendation  Lexapro 10 mg daily  Psychotherapy  Discussed with the medical attending and resident physician and the nurse    I will follow up during the hospital stay        Rushie Boast, MD

## 2021-01-20 NOTE — ASSESSMENT & PLAN NOTE
· History of right lower leg and foot wound for the past 2 months, with oozing of serous discharge  · DS admission the parents noticed blood pooling by the right foot from the wound and decided to called 911  · On exam wound has been dressed with gauze, no overlying discharge noted, bilateral lower extremity swelling, erythema, oozing of serous drainage, leal yellow crust seen with poor nail hygiene  · Patient denies any tenderness, distal pulses intact bilaterally    PLAN :   · Place in observation   · S/p cefazolin in the ED, shall continue the same as patient comes from home, is not septic and has no risk factors for MRSA   · Wound care consult - input appreciated   · Pt will benefit from compression stockings after resolution of acute episode of cellulitis   · Venous Doppler b/l to r/o VTE   · Podiatry consult as patient has chronic skin changes in b/l LE, extremely dry skin, and poor nail hygiene   · RLE wound s/p laceration in the ED

## 2021-01-20 NOTE — QUICK NOTE
CTA was ordered as the patient is hypoxic and saturating at 93% on 4 L oxygen on nasal cannula  D-dimer was elevated at 8 49  Radiologist from the [address] [place] [address] called and reported that the study is nondiagnostic mainly because the patient was claustrophobic and non compliant with instructions  Given that he was given contrast, would empirically initiate therapeutic anticoagulation due to the suspicion of PE  Patient can be re-evaluated with CTA after adequate hydration and sedation (if necessary)  Venous Duplex has already been ordered  Shall repeat D-dimer in AM      Also, as reported by the nurse, patient notes that he had some "exposure" in which led to 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

## 2021-01-20 NOTE — ASSESSMENT & PLAN NOTE
· Patients last drink was yesterday  He admits to a history of drinking 1/2 liter of vodka per day  · On exam patient was experiencing tremors and some anxiety due to his alcohol withdrawal   · Patients last CIWA was 7   · Toxicology was consulted and they recommended discontinuing CIWA protocol and starting patient on phenobarbital for his withdrawal symptoms  · Ordered phenobarbital 65mg IV for one dose  Patient can have up to 5 doses total  by 30 minute intervals until symptoms subside

## 2021-01-20 NOTE — ASSESSMENT & PLAN NOTE
· Patient desaturating to mid-70s on room, improved to 93% on 4 L oxygen via nasal cannula  · VBG : acceptable, D-dimer elevated at 8 49   · Ordered CTA chest but patient was noncompliant and claustrophoic resulting in a non diagnostic study   · Will empirically initiate therapeutic anticoagulation -- closely monitor plt count and for RLE wound bleeding   · Patient also reportedly has a history of "exposure to an agent" which resulted in 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 which could contribute to hypoventilation

## 2021-01-20 NOTE — ASSESSMENT & PLAN NOTE
· Patient has a history of substance abuse  States that he last used one year ago  · Patients urine drug screen was negative  · Patient was taking suboxone prior to admission; therefore, it was ordered for his inpatient stay

## 2021-01-20 NOTE — ASSESSMENT & PLAN NOTE
· CTA chest (1/20/2021): Mild abnormal appearance of the pancrease, most compatible with acute pancreatitis  · Patient is currently fluid overloaded; therefore, hold off on any additional IV fluids    · Ordered lipase level

## 2021-01-20 NOTE — ASSESSMENT & PLAN NOTE
· LFT 1/20/2021: AST of 186, ALT of 18, ALP of 394  · Hyperbilirubinemia: Total bilirubin 3 89  · CTA chest (1/20/2021): Hepatomegaly, splenomegaly and ascites consistent with cirrhosis     · Ultrasound of the abdomen was ordered to evaluate if patient requires paracentesis due to distended abdomen on exam   · Trend LFTs with AM labs

## 2021-01-20 NOTE — ASSESSMENT & PLAN NOTE
· Likely secondary to chronic alcohol use disorder, and underlying liver dysfunction   · Hold pharmacological anticoagulation given H/o bleeding from wound   · Continue to monitor Plt count in am labs

## 2021-01-20 NOTE — ASSESSMENT & PLAN NOTE
· LFT shows : AST of 174, ALT of 17, elevated ALP of 370   · Hyperbilirubinemia : T  Bilirubin - 3 6   · Patient denies nausea, vomiting, abdominal pain, no scleral icterus on exam   · Was worked up previously with Hep C antibody - negative, HbsAg - normal; elevated GGT   · Pt was advised to get liver ultrasound done which has not been performed yet and if abnormalities persist, to consider GI consult   · Trend LFT in am labs

## 2021-01-20 NOTE — ASSESSMENT & PLAN NOTE
· Patient is a 1ppd smoker  · Continue NICODERM patch as replacement  · Nicotine gum ordered for additional replacement as requested by patient

## 2021-01-21 PROBLEM — R93.89 ABNORMAL CT SCAN: Status: ACTIVE | Noted: 2021-01-20

## 2021-01-21 PROBLEM — J96.01 ACUTE RESPIRATORY FAILURE WITH HYPOXIA (HCC): Status: RESOLVED | Noted: 2021-01-19 | Resolved: 2021-01-21

## 2021-01-21 PROBLEM — R60.1 ANASARCA: Status: ACTIVE | Noted: 2021-01-21

## 2021-01-21 PROBLEM — K72.90 DECOMPENSATED HEPATIC CIRRHOSIS (HCC): Status: ACTIVE | Noted: 2021-01-19

## 2021-01-21 PROBLEM — Z78.9 UNABLE TO CARE FOR SELF: Status: RESOLVED | Noted: 2021-01-19 | Resolved: 2021-01-21

## 2021-01-21 PROBLEM — K74.60 DECOMPENSATED HEPATIC CIRRHOSIS (HCC): Status: ACTIVE | Noted: 2021-01-19

## 2021-01-21 LAB
AFP-TM SERPL-MCNC: 4.5 NG/ML (ref 0.5–8)
ALBUMIN SERPL BCP-MCNC: 3 G/DL (ref 3.4–4.8)
ALP SERPL-CCNC: 380.3 U/L (ref 10–129)
ALT SERPL W P-5'-P-CCNC: 17 U/L (ref 5–63)
AMMONIA PLAS-SCNC: 112.29 UMOL/L
ANION GAP SERPL CALCULATED.3IONS-SCNC: 9 MMOL/L (ref 4–13)
APAP SERPL-MCNC: <10 UG/ML (ref 10–20)
AST SERPL W P-5'-P-CCNC: 154 U/L (ref 15–41)
BASOPHILS # BLD AUTO: 0.01 THOUSANDS/ΜL (ref 0–0.1)
BASOPHILS NFR BLD AUTO: 0 % (ref 0–1)
BILIRUB DIRECT SERPL-MCNC: 3.18 MG/DL (ref 0–0.3)
BILIRUB SERPL-MCNC: 5.9 MG/DL (ref 0.3–1.2)
BUN SERPL-MCNC: 6 MG/DL (ref 6–20)
CALCIUM SERPL-MCNC: 8.4 MG/DL (ref 8.4–10.2)
CHLORIDE SERPL-SCNC: 95 MMOL/L (ref 96–108)
CO2 SERPL-SCNC: 29 MMOL/L (ref 22–33)
CREAT SERPL-MCNC: 0.43 MG/DL (ref 0.5–1.2)
EOSINOPHIL # BLD AUTO: 0.02 THOUSAND/ΜL (ref 0–0.61)
EOSINOPHIL NFR BLD AUTO: 1 % (ref 0–6)
ERYTHROCYTE [DISTWIDTH] IN BLOOD BY AUTOMATED COUNT: 22 % (ref 11.6–15.1)
FERRITIN SERPL-MCNC: 753 NG/ML (ref 8–388)
FOLATE SERPL-MCNC: 2.5 NG/ML (ref 3.1–17.5)
GFR SERPL CREATININE-BSD FRML MDRD: 149 ML/MIN/1.73SQ M
GLUCOSE P FAST SERPL-MCNC: 91 MG/DL (ref 70–105)
GLUCOSE SERPL-MCNC: 91 MG/DL (ref 65–140)
HCT VFR BLD AUTO: 23.5 % (ref 36.5–49.3)
HGB BLD-MCNC: 7.6 G/DL (ref 12–17)
IMM GRANULOCYTES # BLD AUTO: 0.02 THOUSAND/UL (ref 0–0.2)
IMM GRANULOCYTES NFR BLD AUTO: 1 % (ref 0–2)
INR PPP: 1.5 (ref 0.9–1.1)
IRON SATN MFR SERPL: 71 %
IRON SERPL-MCNC: 125 UG/DL (ref 65–175)
LIPASE SERPL-CCNC: 49 U/L (ref 13–60)
LYMPHOCYTES # BLD AUTO: 0.72 THOUSANDS/ΜL (ref 0.6–4.47)
LYMPHOCYTES NFR BLD AUTO: 19 % (ref 14–44)
MCH RBC QN AUTO: 34.9 PG (ref 26.8–34.3)
MCHC RBC AUTO-ENTMCNC: 32.3 G/DL (ref 31.4–37.4)
MCV RBC AUTO: 108 FL (ref 82–98)
MONOCYTES # BLD AUTO: 0.61 THOUSAND/ΜL (ref 0.17–1.22)
MONOCYTES NFR BLD AUTO: 16 % (ref 4–12)
NEUTROPHILS # BLD AUTO: 2.46 THOUSANDS/ΜL (ref 1.85–7.62)
NEUTS SEG NFR BLD AUTO: 63 % (ref 43–75)
PLATELET # BLD AUTO: 99 THOUSANDS/UL (ref 149–390)
PMV BLD AUTO: 9.4 FL (ref 8.9–12.7)
POTASSIUM SERPL-SCNC: 3.7 MMOL/L (ref 3.5–5)
PROT SERPL-MCNC: 7.2 G/DL (ref 6.4–8.3)
PROTHROMBIN TIME: 16.6 SECONDS (ref 9.5–12.1)
RBC # BLD AUTO: 2.18 MILLION/UL (ref 3.88–5.62)
SODIUM SERPL-SCNC: 133 MMOL/L (ref 133–145)
TIBC SERPL-MCNC: 175 UG/DL (ref 250–450)
VIT B12 SERPL-MCNC: 871 PG/ML (ref 100–900)
WBC # BLD AUTO: 3.84 THOUSAND/UL (ref 4.31–10.16)

## 2021-01-21 PROCEDURE — 99233 SBSQ HOSP IP/OBS HIGH 50: CPT | Performed by: PHYSICIAN ASSISTANT

## 2021-01-21 PROCEDURE — 80076 HEPATIC FUNCTION PANEL: CPT | Performed by: PHYSICIAN ASSISTANT

## 2021-01-21 PROCEDURE — 83540 ASSAY OF IRON: CPT | Performed by: NURSE PRACTITIONER

## 2021-01-21 PROCEDURE — 82140 ASSAY OF AMMONIA: CPT | Performed by: INTERNAL MEDICINE

## 2021-01-21 PROCEDURE — 83690 ASSAY OF LIPASE: CPT | Performed by: PHYSICIAN ASSISTANT

## 2021-01-21 PROCEDURE — 94664 DEMO&/EVAL PT USE INHALER: CPT

## 2021-01-21 PROCEDURE — 85025 COMPLETE CBC W/AUTO DIFF WBC: CPT | Performed by: PHYSICIAN ASSISTANT

## 2021-01-21 PROCEDURE — 94640 AIRWAY INHALATION TREATMENT: CPT

## 2021-01-21 PROCEDURE — 82746 ASSAY OF FOLIC ACID SERUM: CPT | Performed by: PHYSICIAN ASSISTANT

## 2021-01-21 PROCEDURE — 83550 IRON BINDING TEST: CPT | Performed by: NURSE PRACTITIONER

## 2021-01-21 PROCEDURE — 82607 VITAMIN B-12: CPT | Performed by: PHYSICIAN ASSISTANT

## 2021-01-21 PROCEDURE — 82728 ASSAY OF FERRITIN: CPT | Performed by: NURSE PRACTITIONER

## 2021-01-21 PROCEDURE — 86803 HEPATITIS C AB TEST: CPT | Performed by: NURSE PRACTITIONER

## 2021-01-21 PROCEDURE — 86038 ANTINUCLEAR ANTIBODIES: CPT | Performed by: NURSE PRACTITIONER

## 2021-01-21 PROCEDURE — 82105 ALPHA-FETOPROTEIN SERUM: CPT | Performed by: NURSE PRACTITIONER

## 2021-01-21 PROCEDURE — 80048 BASIC METABOLIC PNL TOTAL CA: CPT | Performed by: PHYSICIAN ASSISTANT

## 2021-01-21 PROCEDURE — 86235 NUCLEAR ANTIGEN ANTIBODY: CPT | Performed by: NURSE PRACTITIONER

## 2021-01-21 PROCEDURE — 80143 DRUG ASSAY ACETAMINOPHEN: CPT | Performed by: NURSE PRACTITIONER

## 2021-01-21 PROCEDURE — 87340 HEPATITIS B SURFACE AG IA: CPT | Performed by: NURSE PRACTITIONER

## 2021-01-21 PROCEDURE — 99255 IP/OBS CONSLTJ NEW/EST HI 80: CPT | Performed by: INTERNAL MEDICINE

## 2021-01-21 PROCEDURE — 85610 PROTHROMBIN TIME: CPT | Performed by: NURSE PRACTITIONER

## 2021-01-21 PROCEDURE — 86256 FLUORESCENT ANTIBODY TITER: CPT | Performed by: NURSE PRACTITIONER

## 2021-01-21 PROCEDURE — 94760 N-INVAS EAR/PLS OXIMETRY 1: CPT

## 2021-01-21 RX ORDER — FUROSEMIDE 10 MG/ML
40 INJECTION INTRAMUSCULAR; INTRAVENOUS DAILY
Status: DISCONTINUED | OUTPATIENT
Start: 2021-01-21 | End: 2021-01-24

## 2021-01-21 RX ORDER — SPIRONOLACTONE 25 MG/1
50 TABLET ORAL DAILY
Status: DISCONTINUED | OUTPATIENT
Start: 2021-01-21 | End: 2021-01-22

## 2021-01-21 RX ORDER — PHENOBARBITAL SODIUM 65 MG/ML
65 INJECTION INTRAMUSCULAR ONCE
Status: COMPLETED | OUTPATIENT
Start: 2021-01-21 | End: 2021-01-21

## 2021-01-21 RX ADMIN — BUPRENORPHINE HYDROCHLORIDE, NALOXONE HYDROCHLORIDE 1 FILM: 8; 2 FILM, SOLUBLE BUCCAL; SUBLINGUAL at 08:19

## 2021-01-21 RX ADMIN — NICOTINE POLACRILEX 2 MG: 2 GUM, CHEWING ORAL at 08:21

## 2021-01-21 RX ADMIN — NICOTINE POLACRILEX 2 MG: 2 GUM, CHEWING ORAL at 11:48

## 2021-01-21 RX ADMIN — BUPRENORPHINE HYDROCHLORIDE, NALOXONE HYDROCHLORIDE 1 FILM: 8; 2 FILM, SOLUBLE BUCCAL; SUBLINGUAL at 11:43

## 2021-01-21 RX ADMIN — LACTULOSE 20 G: 20 SOLUTION ORAL at 08:19

## 2021-01-21 RX ADMIN — PHENOBARBITAL SODIUM 65 MG: 65 INJECTION INTRAMUSCULAR; INTRAVENOUS at 08:21

## 2021-01-21 RX ADMIN — NICOTINE POLACRILEX 2 MG: 2 GUM, CHEWING ORAL at 17:53

## 2021-01-21 RX ADMIN — NICOTINE POLACRILEX 2 MG: 2 GUM, CHEWING ORAL at 22:15

## 2021-01-21 RX ADMIN — CEFTRIAXONE 1000 MG: 1 INJECTION, SOLUTION INTRAVENOUS at 04:45

## 2021-01-21 RX ADMIN — Medication 1 TABLET: at 08:21

## 2021-01-21 RX ADMIN — SPIRONOLACTONE 50 MG: 25 TABLET, FILM COATED ORAL at 11:43

## 2021-01-21 RX ADMIN — ESCITALOPRAM OXALATE 10 MG: 10 TABLET ORAL at 08:21

## 2021-01-21 RX ADMIN — ALBUTEROL SULFATE 2.5 MG: 2.5 SOLUTION RESPIRATORY (INHALATION) at 01:09

## 2021-01-21 RX ADMIN — FUROSEMIDE 40 MG: 10 INJECTION, SOLUTION INTRAMUSCULAR; INTRAVENOUS at 11:43

## 2021-01-21 RX ADMIN — LACTULOSE 20 G: 20 SOLUTION ORAL at 17:53

## 2021-01-21 RX ADMIN — THIAMINE HCL TAB 100 MG 100 MG: 100 TAB at 08:21

## 2021-01-21 RX ADMIN — SODIUM CHLORIDE 100 ML/HR: 0.9 INJECTION, SOLUTION INTRAVENOUS at 04:45

## 2021-01-21 RX ADMIN — Medication 21 MG: at 08:20

## 2021-01-21 RX ADMIN — FOLIC ACID 1 MG: 1 TABLET ORAL at 08:21

## 2021-01-21 NOTE — PROGRESS NOTES
Progress Note - Brittney Congress 1985, 28 y o  male MRN: [id number]  Unit/Bed#: -01 Encounter: [id number]  Primary Care Provider: Lisa Simons PA-C   Date and time admitted to hospital: 1/19/2021  5:57 PM    * Lower extremity cellulitis  Assessment & Plan  · Patient legs remain edematous and erythematous on 1/21/2021  · Continue Rocephin IV to treat infection       Alcohol withdrawal ([facility] [address] )  Assessment & Plan  · Patients last drink was on 1/19/2021  He admits to a history of drinking 1/2 liter of vodka per day  · Patient was seen by toxicology and they recommended giving patient phenobarbital 65mg IV up to 5 doses Q 30 minute checks  Patient can be placed back on CIWA protocol after phenobarbital has ceased  Once patient is asymptomatic for 8 hours, he can be cleared from a withdrawal standpoint  · Patient can be placed back on CIWA protocol  Anasarca  Assessment & Plan  · Patient is currently fluid overloaded due to his liver disease  · US abdomen 1/21/2021:  Small amount of ascites  Gallbladder wall thickening without stones or sonographic Desouza sign  Enlarged echogenic liver is most likely related to steatosis  The spleen is borderline enlarged  · Patient requires diuresis  Ordered 40mg IV Lasix QD  Abnormal CT scan  Assessment & Plan  · CTA chest (1/20/2021): Mild abnormal appearance of the pancrease, most compatible with acute pancreatitis  · Patient remains stable at this time give lack of symptoms and normal lipase of 49 on 1/21/2021  Thoracoabdominal aortic aneurysm [facility])  Assessment & Plan  · CTA chest (1/20/2021): 4 4cm fusiform thoracoabdominal aortic aneurysm that arises from the descending thoracic aorta and extends into the abdomen  · Informed patient of this incidental finding yesterday and explained that he will need to follow up with CT surgery as an outpatient for regular US monitoring      Acute respiratory failure with hypoxia (HCC)  Assessment & Plan  · Patient was put on 4L O2 on admission  Patient was doing well yesterday on RA and remains stable today with O2 sat of 94% on RA  · Continue monitoring oxygen levels      Thrombocytopenia (HCC)  Assessment & Plan  · Likely due to chronic alcohol abuse and underlying liver dysfunction  · Platelet counts on 1/54/8022 are 99  · Order AM labs to continue monitoring    Macrocytic anemia  Assessment & Plan  · Patients hemoglobin on 1/21/2021 is 7 6, which is down from 8 1 yesterday  Patients HCT is 23 5  · Patients MCV on 1/21/2021 is 108  · Consider transfusing if hemoglobin drops below 7 or if patient becomes symptomatic  · B12 and folate labs pending    Elevated liver enzymes  Assessment & Plan  · LFT 1/21/2021: AST of 154, ALT of 17, ALP of 380 3  Patients LFTs are trending in the right direction  Continue to monitor in AM labs  · Hyperbilirubinemia: Total bilirubin 5 90 on 1/21/2021, which is up from 3 89 yesterday  · CTA chest (1/20/2021): Hepatomegaly, splenomegaly and ascites consistent with cirrhosis  · GI was consulted and they ordered a hepatitis panel, autoimmune serologies (AMA, MONTSE, ASMA), iron panel to rule out other etiologies of cirrhosis  Tobacco dependence  Assessment & Plan  · Patient is a 1ppd smoker  · Continue NICODERM patch and nicotine gum as replacement      Substance abuse ([facility] [address] )  Assessment & Plan  · Patient has a history of substance abuse  States that he last used one year ago  · Continue suboxone as prescribed  Alcohol abuse  Assessment & Plan  · Patient has a history of alcohol abuse and admits to drinking 1/2 liter of vodka per day  · Continue Lexapro 10mg for depression per psychiatry  · Patient currently declines going to rehab; however, after speaking with his sister I would encourage patient to consider going to an inpatient rehab facility     · Continue folic acid, thiamine, and centrum multivitamin    Class 1 obesity in adult  Assessment & Plan  · Weight loss encouraged    Essential hypertension  Assessment & Plan  · Patient has a history of HTN but admits to no longer taking HCTZ  · Patients BP stable at 115/68 on 2021  Mild intermittent asthma without complication  Assessment & Plan  · Patient denies cough or SOB  · Give albuterol nebs PRN    VTE Pharmacologic Prophylaxis:   Pharmacologic: Enoxaparin (Lovenox)  Mechanical: Mechanical VTE prophylaxis in place  Patient Centered Rounds: I have performed bedside rounds with nursing staff today  Discussions with Specialists or Other Care Team Provider: No  Education and Discussions with Family / Patient: Called patients sister Venkatesh Cartwright with an update and answered all questions to the best of my ability  Time Spent for Care: 45 minutes  More than 50% of total time spent on counseling and coordination of care as described above  Current Length of Stay: 0 day(s)  Current Patient Status: Inpatient   Certification Statement: The patient will continue to require additional inpatient hospital stay due to bilateral LE cellulitis, acute alcohol withdrawal, ascites    Discharge Plan: Patient is not medically stable for discharge at this time  Patient is fluid overloaded due to his liver dysfunction and requires diuresis  Patients bilateral LE cellulitis still requires IV antibiotics  Code Status: Level 1 - Full Code    Subjective:   Patient is awake and alert on exam today  Admits to feeling anxious this morning, but states that he just received his Lexapro and hopes that it will help  Patient states that his hand tremors are still present but have improved since treatment with the phenobarbital  Admits that his appetite is good and he is not experiencing any abdominal pain, nausea, or vomiting      Objective:   Vitals:   Temp (24hrs), Av 9 °F (37 2 °C), Min:98 1 °F (36 7 °C), Max:100 1 °F (37 8 °C)    Temp:  [98 1 °F (36 7 °C)-100 1 °F (37 8 °C)] 98 1 °F (36 7 °C)  HR:  [89-99] 89  Resp:  [18-20] 20  BP: (101-121)/(58-68) 115/68  SpO2:  [93 %-96 %] 94 %  Body mass index is 34 3 kg/m²  Input and Output Summary (last 24 hours):     No intake or output data in the 24 hours ending 01/21/21 1035    Physical Exam:     Physical Exam  Vitals signs reviewed  HENT:      Head: Normocephalic and atraumatic  Eyes:      General: No scleral icterus  Cardiovascular:      Rate and Rhythm: Normal rate and regular rhythm  Heart sounds: No murmur  Pulmonary:      Breath sounds: Normal breath sounds  No wheezing or rales  Chest:      Chest wall: No tenderness  Abdominal:      General: Bowel sounds are normal  There is distension  Palpations: Abdomen is rigid  Tenderness: There is no abdominal tenderness  Musculoskeletal: Normal range of motion  Skin:     General: Skin is warm and dry  Findings: No rash  Comments: Erythema and edema of bilateral LE   Psychiatric:         Mood and Affect: Mood is anxious  Additional Data:   Labs:  Results from last 7 days   Lab Units 01/21/21  0633   WBC Thousand/uL 3 84*   HEMOGLOBIN g/dL 7 6*   HEMATOCRIT % 23 5*   PLATELETS Thousands/uL 99*   NEUTROS PCT % 63   LYMPHS PCT % 19   MONOS PCT % 16*   EOS PCT % 1     Results from last 7 days   Lab Units 01/21/21  0633  01/19/21  2323   POTASSIUM mmol/L 3 7   < >  --    CHLORIDE mmol/L 95*   < >  --    CO2 mmol/L 29   < >  --    CO2, I-STAT mmol/L  --   --  31   BUN mg/dL 6   < >  --    CREATININE mg/dL 0 43*   < >  --    CALCIUM mg/dL 8 4   < >  --    ALK PHOS U/L 380 3*   < >  --    ALT U/L 17   < >  --    AST U/L 154*   < >  --    GLUCOSE, ISTAT mg/dl  --   --  94    < > = values in this interval not displayed  Results from last 7 days   Lab Units 01/20/21  0634   INR  1 25*       * I Have Reviewed All Lab Data Listed Above  * Additional Pertinent Lab Tests Reviewed:  All Labs Within Last 24 Hours Reviewed    Imaging:    Imaging Reports Reviewed Today Include:   US abdomen (1/21/2021):    Small amount of ascites      Gallbladder wall thickening without stones or sonographic Desouza sign      Enlarged echogenic liver is most likely related to steatosis      The spleen is borderline enlarged  Cultures:   Blood Culture:   Lab Results   Component Value Date    BLOODCX Received in Microbiology Lab  Culture in Progress  01/19/2021    BLOODCX Received in Microbiology Lab  Culture in Progress  01/19/2021     Urine Culture: No results found for: URINECX  Sputum Culture: No components found for: SPUTUMCX  Wound Culture: No results found for: WOUNDCULT    Last 24 Hours Medication List:   Current Facility-Administered Medications   Medication Dose Route Frequency Provider Last Rate    albuterol  2 5 mg Nebulization Q6H PRN Ro Martinez MD      buprenorphine-naloxone  1 Film Sublingual BID Jax Corrales PA-C      cefTRIAXone  1,000 mg Intravenous Q24H Ro Martinez MD 1,000 mg (01/21/21 0445)    enoxaparin  40 mg Subcutaneous Q24H [address] Cristhian Hunt MD      escitalopram  10 mg Oral Daily Martin Chino MD      folic acid  1 mg Oral Daily Ro Martinez MD      furosemide  40 mg Intravenous Daily Jax Corrales PA-C      lactulose  20 g Oral BID Cristhian Hunt MD      multivitamin-minerals  1 tablet Oral Daily Ro Martinez MD      nicotine  21 mg Transdermal Daily Elva Bhatti MD      nicotine polacrilex  2 mg Oral Q2H PRN Jax Corrales PA-C      sodium chloride  100 mL/hr Intravenous Continuous Ro Martinez  mL/hr (01/21/21 0445)    thiamine  100 mg Oral Daily Ro Martinez MD          Today, Patient Was Seen By: Jax Corrales PA-C    ** Please Note: Dragon 360 Dictation voice to text software may have been used in the creation of this document   **

## 2021-01-21 NOTE — ASSESSMENT & PLAN NOTE
· LFT 1/21/2021: AST of 154, ALT of 17, ALP of 380 3  Patients LFTs are trending in the right direction  Continue to monitor in AM labs  · Hyperbilirubinemia: Total bilirubin 5 90 on 1/21/2021, which is up from 3 89 yesterday  · CTA chest (1/20/2021): Hepatomegaly, splenomegaly and ascites consistent with cirrhosis  · GI was consulted and they ordered a hepatitis panel, autoimmune serologies (AMA, MONTSE, ASMA), iron panel to rule out other etiologies of cirrhosis

## 2021-01-21 NOTE — ASSESSMENT & PLAN NOTE
· Patients last drink was on 1/19/2021  He admits to a history of drinking 1/2 liter of vodka per day  · Patient was seen by toxicology and they recommended giving patient phenobarbital 65mg IV up to 5 doses Q 30 minute checks  Patient can be placed back on CIWA protocol after phenobarbital has ceased  Once patient is asymptomatic for 8 hours, he can be cleared from a withdrawal standpoint  · Patient can be placed back on CIWA protocol

## 2021-01-21 NOTE — ASSESSMENT & PLAN NOTE
· CTA chest (1/20/2021): Mild abnormal appearance of the pancrease, most compatible with acute pancreatitis  · Patient remains stable at this time give lack of symptoms and normal lipase of 49 on 1/21/2021

## 2021-01-21 NOTE — ASSESSMENT & PLAN NOTE
· Patient has a history of substance abuse  States that he last used one year ago  · Continue suboxone as prescribed

## 2021-01-21 NOTE — CONSULTS
INTERPROFESSIONAL (PHONE) [facility] 1980 Toxicology  Leroyrickey Mcdaniels 28 y o  male MRN: [id number]  Unit/Bed#: -01 Encounter: [id number]      Reason for Consult / Principal Problem: ETOH withdrawal  Inpatient consult to Toxicology  Consult performed by: Robby Mujica MD  Consult ordered by: Danii Nolasco PA-C        01/20/21      ASSESSMENT:  1  ETOH withdrawal  2  Elevated transaminases    RECOMMENDATIONS:    At the time of my evaluation, the patient had a SEWS score of 5  Overall, the patient's symptoms were mild  We suspended CIWA and recommended giving 65 mg IV phenobarbital to help control his symptoms with Q 30 minutes checks  Up to additional 4 doses of 65 IV phenobarb may be administered within this time frame to control the patient's symptoms  Phenobarbital has a long half-life and self tapers, which makes work well for controlling it patient's alcohol withdrawal   The patient can be placed on CIWA protocol after phenobarbital has been ceased, however patient is generally do not require additional benzodiazepines after phenobarbital has been given  After an 8 hour asymptomatic period after receiving phenobarbital, the patient can be cleared from a withdrawal standpoint  For further questions, please contact the medical  on call via [employer] Text or throughl the [facility] or Patient [employer]  Please see additional teaching note below:    Ethanol Withdrawal  Department of Medical Toxicology  [address]    Updated June 2019    Ethanol withdrawal can be precipitated by sudden discontinuation of chronic ethanol use  Symptoms of ethanol withdrawal syndrome include tremor, anxiety, headache, palpitations, insomnia, nausea and vomiting, diaphoresis, tachycardia and hypertension  In severe cases, seizures may also occur   Seizures are usually brief and generalized, and often occur within 6-12 hours after cessation of ethanol use  Each time someone goes through ethanol withdrawal, it is generally worse secondary to the Kindling Effect  Sympathetic nervous system overactivity may progress to delirium tremens  Delirium tremens is a life-threatening condition that is characterized by tachycardia, diaphoresis, hyperthermia, delirium and hallucinations  It usually occurs about 48-72 hours after discontinuation of heavy ethanol use  If not treated appropriately, significant morbidity and mortality can be associated  The pathophysiology in ethanol dependence is associated with downregulation of GABAa receptors and upregulation of NMDA receptors  This is secondary to ethanol's continuous JESSY agonism and [address] antagonism  When ethanol use is discontinued, this resulting state leads to the hyperexcitation associated with the withdrawal syndrome  Treatment is primarily targeted at decreasing the excitatory state with sedatives, such as benzodiazepines and phenobarbital   Adjunctive treatments may include dexmedetomidine or ketamine  Propofol may also be utilized in cases where the airway is protected  Other complications to consider in the setting of ethanol dependence include hypoglycemia, alcoholic ketoacidosis, Wernicke's Encephalopahty and Wernicke-Korsakoff Syndrome  It is important to routinely provide nutrition, hydration and often thiamine  Treatment regimen utilized by Department of Medical Toxicology involves application of the Severity of Ethanol Withdrawal Scale: PHENOBARBITAL FOR ALCOHOL WITHDRAWAL:    1) MILD SEWS SCORE (1-6)  a  Administer diazepam 10 mg PO X1 (unless unable to take PO)  b  Administer phenobarbital 65 mg IV x1 and then another 65 mg IV q60 min PRN (max 5 doses total)  * Document SEWS with each dose and/or minimum of q2h  HOLD any further phenobarbital dosing for RASS -4 or -5    2) MODERATE SEWS SCORE (7-12)  a  Administer diazepam 10 mg PO X1 (unless unable to take PO)  b   Administer phenobarbital 260 mg IV x1 dose  c  Administer phenobarbital 130 mg IV q30 min PRN (max 5 doses)  * Document SEWS with each dose and/or minimum of q1h  HOLD any further phenobarbital dosing for RASS -4 or -5    3) SEVERE SEWS SCORE (13 or higher)  a  Administer diazepam 10 mg PO X1 (unless unable to take PO)  b  If this is the INITIAL SEWS SCORE administer phenobarbital 650 mg IVPB over 15 minutes x1  If this is NOT INITIAL SEWS SCORE administer phenobarbital 260 mg IV, and then another 260 mg IV q15 min PRN (max 3 doses total)  c  Administer phenobarbital 130 mg IV q30 min PRN (max 5 doses)  * Document SEWS with each dose and/or minimum of q30 min  HOLD any further phenobarbital for RASS -4 or -5  Hx and PE limited by the dynamics of a phone consultation  I have not personally interviewed or evaluated the patient, but only advised based on the information provided to me  Primary provider is responsible for all clinical decisions  Pertinent history, physical exam and clinical findings and course discussed: Sameera Benton is a 28y o  year old male who was admitted for leg cellulitis, found to be intoxicated and also with alcohol dependency  This afternoon,  patient developed anxiety and tremors  BAL initially was 400 2  Vital signs within normal limits  Toxicology was consulted for alcohol withdrawal     Review of systems and physical exam not performed by me      Historical Information   Past Medical History:   Diagnosis Date    Allergic     Asthma     Essential hypertension 5/6/2019    Obesity     Substance abuse ([facility] [address] )      Past Surgical History:   Procedure Laterality Date    LYMPH NODE BIOPSY       Social History   Social History     Substance and Sexual Activity   Alcohol Use Yes    Alcohol/week: 12 0 standard drinks    Types: 12 Cans of beer per week    Frequency: Monthly or less    Drinks per session: 1 or 2     Social History     Substance and Sexual Activity   Drug Use Never Social History     Tobacco Use   Smoking Status Current Every Day Smoker    Packs/day: 1 00   Smokeless Tobacco Never Used   Tobacco Comment    2/2019; PATIENT VAPES     Family History   Problem Relation Age of Onset    No Known Problems Mother     Diabetes Father     Prostate cancer Father     Hypertension Father         Prior to Admission medications    Medication Sig Start Date End Date Taking?  Authorizing Provider   albuterol (VENTOLIN HFA) 90 mcg/act inhaler Inhale 2 puffs every 6 (six) hours as needed for wheezing 9/6/18   Jennifer Elias MD   buprenorphine-naloxone (SUBOXONE) 8-2 mg SUBLINGUAL TWO FILMS SUBLINGUALLY DAILY 9/14/20   Historical Provider, MD   hydrochlorothiazide (HYDRODIURIL) 12 5 mg tablet Take 1 tablet (12 5 mg total) by mouth daily 5/6/19   Mati Norman PA-C   ondansetron (ZOFRAN-ODT) 4 mg disintegrating tablet Take 1 tablet (4 mg total) by mouth every 6 (six) hours as needed for nausea or vomiting 9/24/20   Mati Norman PA-C   zolpidem (AMBIEN) 10 mg tablet Take 10 mg by mouth daily at bedtime 9/3/20   Historical Provider, MD       Current Facility-Administered Medications   Medication Dose Route Frequency    albuterol inhalation solution 2 5 mg  2 5 mg Nebulization Q6H PRN    buprenorphine-naloxone (SUBOXONE) 8-2 mg per SL film 1 Film  1 Film Sublingual BID    cefTRIAXone (ROCEPHIN) IVPB (premix in dextrose) 1,000 mg 50 mL  1,000 mg Intravenous Q24H    [START ON 1/21/2021] enoxaparin (LOVENOX) subcutaneous injection 40 mg  40 mg Subcutaneous Q24H [address]    escitalopram (LEXAPRO) tablet 10 mg  10 mg Oral Daily    folic acid (FOLVITE) tablet 1 mg  1 mg Oral Daily    lactulose oral solution 20 g  20 g Oral BID    multivitamin-minerals (CENTRUM) tablet 1 tablet  1 tablet Oral Daily    nicotine (NICODERM CQ) 21 mg/24 hr TD 24 hr patch 21 mg  21 mg Transdermal Daily    nicotine polacrilex (NICORETTE) gum 2 mg  2 mg Oral Q2H PRN    sodium chloride 0 9 % infusion  100 mL/hr Intravenous Continuous    thiamine (VITAMIN B1) tablet 100 mg  100 mg Oral Daily       Allergies   Allergen Reactions    Levofloxacin Other (See Comments)     BLACKED OUT       Objective     No intake or output data in the 24 hours ending 01/20/21 2330    Invasive Devices:   Peripheral IV 01/19/21 Left Antecubital (Active)   Site Assessment Clean; Intact;Dry 01/19/21 2100   Dressing Type Transparent 01/19/21 2100   Line Status Blood return noted 01/19/21 2100   Dressing Status Clean 01/19/21 2100   Dressing Intervention Dressing changed 01/19/21 2100       Vitals   Vitals:    01/20/21 0400 01/20/21 0700 01/20/21 1500 01/20/21 2100   BP: 116/71 114/66 117/58 117/64   TempSrc: Tympanic Tympanic Tympanic Tympanic   Pulse: 99 91 98 99   Resp: 18 18 18 18   Patient Position - Orthostatic VS: Sitting Sitting Sitting    Temp: 97 8 °F (36 6 °C) 97 9 °F (36 6 °C) 98 2 °F (36 8 °C) 99 3 °F (37 4 °C)         EKG, Pathology, and/or Other Studies: I have personally reviewed pertinent reports  Lab Results: I have personally reviewed pertinent reports        Labs:    Results from last 7 days   Lab Units 01/20/21  0634   WBC Thousand/uL 4 75   HEMOGLOBIN g/dL 8 1*   HEMATOCRIT % 25 5*   PLATELETS Thousands/uL 96*   NEUTROS PCT % 71   LYMPHS PCT % 17   MONOS PCT % 11      Results from last 7 days   Lab Units 01/20/21  0634 01/19/21  2323 01/19/21  1906   SODIUM mmol/L 135  --  135   POTASSIUM mmol/L 3 8  --  3 6   CHLORIDE mmol/L 96  --  96   CO2 mmol/L 29  --  30   CO2, I-STAT mmol/L  --  31  --    BUN mg/dL 5*  --  5*   CREATININE mg/dL 0 41*  --  0 34*   CALCIUM mg/dL 8 2*  --  8 1*   ALK PHOS U/L 394 1*  --  370 1*   ALT U/L 18  --  17   AST U/L 186*  --  174*   GLUCOSE, ISTAT mg/dl  --  94  --    MAGNESIUM mg/dL  --   --  1 9   PHOSPHORUS mg/dL  --   --  3 5      Results from last 7 days   Lab Units 01/20/21  0634 01/19/21  2320   INR  1 25* 1 25*         No results found for: TROPONINI      Results from last 7 days Lab Units 01/19/21  1906   ETHANOL LVL mg/dL 400 2*     Invalid input(s): EXTPREGUR      Imaging Studies: I have personally reviewed pertinent reports  Counseling / Coordination of Care  Total time spent today 45 minutes  This was a phone consultation

## 2021-01-21 NOTE — OCCUPATIONAL THERAPY NOTE
OT EVALUATION       01/21/21 0807   Note Type   Note type Screen   Assessment   Assessment Pt is independent, no skilled OT needs at this time      Licensure   [place] License Number  [place] [address] OTR/L [id number]

## 2021-01-21 NOTE — WOUND OSTOMY CARE
Consult Note - Wound   Kamlesh Shaw 28 y o  male MRN: [id number]  Unit/Bed#: -[facility] Encounter: [id number]      History and Present Illness: Patient is seen for wound care consult today   The patient is a 28year old male admitted with lower leg cellulitis   Patient reports they have been swollen and then opened up   He does not go to a wound center and suggested to follow after discharge at the [facility] wound center for long term management   Assessment Findings:   1  Right lower leg with scattered partial thickness wounds on the medial aspect of the lower leg with maceration of the taylor wound related to the drainage   Moderate drainage   Serous tan yellow   2  Left lower leg partial thickness circumferential wound   Maceration of the taylor wound related to the drainage   Wound bed is pink and drainage is moderate serous tan yellow     Dicussed the plan of care with the MD and RN     Plan:   Right and left lower leg wounds cleanse with soap and water then pat dry apply adaptic then apply maxorb ag then top with a ABD then lauren wrap change every other day         Vitals: Blood pressure 118/71, pulse 86, temperature 98 2 °F (36 8 °C), temperature source Tympanic, resp  rate 16, height 6' 1" (1 854 m), weight 118 kg (260 lb), SpO2 94 %  ,Body mass index is 34 3 kg/m²  Wound 01/19/21 Cellulitis Leg Right; Anterior (Active)   Wound Image   01/21/21 1407   Wound Description Fragile;Pink; White 01/21/21 1407   Taylor-wound Assessment Fragile; Hyperpigmented; Maceration 01/21/21 1407   Wound Length (cm) 15 cm 01/21/21 1407   Wound Width (cm) 12 cm 01/21/21 1407   Wound Surface Area (cm^2) 180 cm^2 01/21/21 1407   Drainage Amount Moderate 01/21/21 1407   Drainage Description Serous; Tan 01/21/21 1407   Non-staged Wound Description Partial thickness 01/21/21 1407   Treatments Cleansed;Site care;Elevated 01/21/21 1407   Dressing ABD;Calcium Alginate with Silver;Vaseline gauze 01/21/21 1407   Dressing Changed Changed 01/21/21 1407   Patient Tolerance Tolerated well 01/21/21 1407       Wound 01/21/21 Pretibial Left (Active)   Wound Image     01/21/21 1409   Wound Description Fragile;Pink;Swelling; White 01/21/21 1409   Taylor-wound Assessment Edema; Erythema;Fragile 01/21/21 1409   Wound Length (cm) 15 cm 01/21/21 1409   Wound Width (cm) 25 cm 01/21/21 1409   Wound Surface Area (cm^2) 375 cm^2 01/21/21 1409   Drainage Amount Moderate 01/21/21 1409   Drainage Description Serosanguineous; Serous; Tan 01/21/21 1409   Non-staged Wound Description Partial thickness 01/21/21 1409   Treatments Cleansed;Site care 01/21/21 1409   Dressing ABD;Calcium Alginate with Silver;Vaseline gauze 01/21/21 1409   Dressing Changed Changed 01/21/21 1409   Patient Tolerance Tolerated well 01/21/21 1409         Wound care will follow weekly call or tiger text with questions or concerns     Umesh Bobo RN BSN CWOCN

## 2021-01-21 NOTE — UTILIZATION REVIEW
Continued Stay Review    Date: 1/21/2021                        Current Patient Class: OBS [email or web address] UPGRADED TO INPT [email or web address] D/T BILATERAL LE CELLULITIS, ACUTE ALCOHOL WITHDRAWAL, AND ASCITES  Current Level of Care: [facility]    01/21/21 4830  Inpatient Admission Once     Question Answer Comment   Level of Care Med Surg    Estimated length of stay More than 2 Midnights    Certification I certify that inpatient services are medically necessary for this patient for a duration of greater than two midnights  See H&P and MD Progress Notes for additional information about the patient's course of treatment  01/21/21 0993       HPI:35 y o  male initially admitted OBS on [email or web address] upgraded to [email or web address] D/T  non-healing right foot wound and inability to care for self  Assessment/Plan: PMHx Obesity, HTN not on current medication, asthma, alcohol and substance abuse, PTSD, depression  not been taking care of himself - not showering, WORSENING DEPRESSION  multiple recent life stressors including losing his job, alcohol abuse/ admits to drinking more than usual 4 shots on the night of admission, found his girlfriend dead next to him after overdosing on drugs, has custody issues with ex-wife and his son  IV antibiotic, IVF  CIWA      1/20 Behavioral Health: Major depression recurrent  Anxiety  Insomnia  Alcohol abuse  History of drug abuse  Noncompliance of the recommended treatment  Recommended inpatient alcohol drug rehab but patient refused  recommended for outpatient psychiatric follow-up go to [facility]  and NA meetings and alcohol drug counseling  Lexapro  Psychotherapy  1/20 Med toxicology:ETOH withdrawal Elevated transaminases  SEWS score 5  IV phenobarbital     1/21:continue to require additional inpatient hospital stay due to bilateral LE cellulitis, acute alcohol withdrawal, ascites      Pertinent Labs/Diagnostic Results:   Results from last 7 days   Lab Units 01/19/21  1919   SARS-COV-2  Negative Results from last 7 days   Lab Units 01/21/21  0633 01/20/21  0634 01/19/21 2323 01/19/21 2320 01/19/21  1906   WBC Thousand/uL 3 84* 4 75  --   --  5 32   HEMOGLOBIN g/dL 7 6* 8 1*  --   --  8 2*   I STAT HEMOGLOBIN g/dl  --   --  8 8*  --   --    HEMATOCRIT % 23 5* 25 5*  --   --  24 7*   HEMATOCRIT, ISTAT %  --   --  26*  --   --    PLATELETS Thousands/uL 99* 96*  --  93* 100*   NEUTROS ABS Thousands/µL 2 46 3 33  --   --  3 39         Results from last 7 days   Lab Units 01/21/21  0633 01/20/21  0634 01/19/21 2323 01/19/21  1906   SODIUM mmol/L 133 135  --  135   POTASSIUM mmol/L 3 7 3 8  --  3 6   CHLORIDE mmol/L 95* 96  --  96   CO2 mmol/L 29 29  --  30   CO2, I-STAT mmol/L  --   --  31  --    ANION GAP mmol/L 9 10  --  9   BUN mg/dL 6 5*  --  5*   CREATININE mg/dL 0 43* 0 41*  --  0 34*   EGFR ml/min/1 73sq m 149 152  --  165   CALCIUM mg/dL 8 4 8 2*  --  8 1*   MAGNESIUM mg/dL  --   --   --  1 9   PHOSPHORUS mg/dL  --   --   --  3 5     Results from last 7 days   Lab Units 01/21/21  0633 01/20/21  1345 01/20/21  0634 01/19/21  1906   AST U/L 154*  --  186* 174*   ALT U/L 17  --  18 17   ALK PHOS U/L 380 3*  --  394 1* 370 1*   TOTAL PROTEIN g/dL 7 2  --  7 2 7 0   ALBUMIN g/dL 3 0*  --  3 1* 3 0*   TOTAL BILIRUBIN mg/dL 5 90*  --  3 89* 3 26*   BILIRUBIN DIRECT mg/dL 3 18*  --   --  1 97*   AMMONIA umol/L 112 29* 125 34*  --   --          Results from last 7 days   Lab Units 01/21/21  0633 01/20/21  0634 01/19/21  1906   GLUCOSE RANDOM mg/dL 91 117 106       Results from last 7 days   Lab Units 01/19/21  2323   PH, REEMA I-STAT  7 411*   PCO2, REEMA ISTAT mm HG 47 0   PO2, REEMA ISTAT mm HG 82 0*   HCO3, REEMA ISTAT mmol/L 29 9   I STAT BASE EXC mmol/L 5*   I STAT O2 SAT % 96*       Results from last 7 days   Lab Units 01/19/21  2320   D-DIMER QUANTITATIVE mg/L FEU 8 49*     Results from last 7 days   Lab Units 01/20/21  0634 01/19/21  2320   PROTIME seconds 14 0* 14 0*   INR  1 25* 1 25*     Results from last 7 days   Lab Units 01/19/21  1906   TSH 3RD GENERATON uIU/mL 3 478       Results from last 7 days   Lab Units 01/21/21  0633   LIPASE u/L 49       Results from last 7 days   Lab Units 01/20/21   CLARITY UA  Cloudy*   COLOR UA  Beena*   SPEC GRAV UA  1 025   PH UA  6 5   GLUCOSE UA mg/dl Trace*   KETONES UA mg/dl Trace*   BLOOD UA  Trace-Intact*   PROTEIN UA mg/dl 1+*   NITRITE UA  Positive*   BILIRUBIN UA  3+*   UROBILINOGEN UA E U /dl >=8 0*   LEUKOCYTES UA  Negative   WBC UA /hpf None Seen   RBC UA /hpf None Seen   BACTERIA UA /hpf None Seen   EPITHELIAL CELLS WET PREP /hpf Occasional     Results from last 7 days   Lab Units 01/19/21  1919   INFLUENZA A PCR  Negative   INFLUENZA B PCR  Negative   RSV PCR  Negative         Results from last 7 days   Lab Units 01/20/21   AMPH/METH  Negative   BARBITURATE UR  Negative   BENZODIAZEPINE UR  Negative   COCAINE UR  Negative   METHADONE URINE  Negative   OPIATE UR  Negative   PCP UR  Negative   THC UR  Negative     Results from last 7 days   Lab Units 01/19/21  1906   ETHANOL LVL mg/dL 400 2*       Results from last 7 days   Lab Units 01/19/21  2320   BLOOD CULTURE  Received in Microbiology Lab  Culture in Progress  Received in Microbiology Lab  Culture in Progress  1/20 VAS Bilat lower limb venous duplex:  RIGHT LOWER LIMB:  No evidence of acute or chronic deep vein thrombosis  No evidence of superficial thrombophlebitis noted  Doppler evaluation shows a normal response to augmentation maneuvers  Popliteal arterial Doppler waveforms are biphasic/hyperemic  LEFT LOWER LIMB:  No evidence of acute or chronic deep vein thrombosis  No evidence of superficial thrombophlebitis noted  Doppler evaluation shows a normal response to augmentation maneuvers  Popliteal arterial Doppler waveforms are biphasic/hyperemic  1/20 CTA chest:  1  Suboptimal examination due to phase of contrast material as well as respiratory motion artifact    2   No evidence of pulmonary emboli in the 1st and 2nd order branches  Tertiary branches obscured by respiratory motion  3   Mild and diffuse interstitial prominence, suggesting mild pulmonary vascular congestion  4   Mild abnormal appearance of the pancreas, most compatible with acute pancreatitis  5   Hepatomegaly, splenomegaly and ascites, consistent with cirrhosis  Assessment of the portal vein cannot be made due to phase of contrast administration  6   4 4 cm fusiform thoracoabdominal aortic aneurysm  The aneurysm arises from the descending thoracic aorta, extending into the abdomen    7   Healing fracture of the proximal sternum      1/20 CXR:No acute cardiopulmonary disease          Vital Signs:   01/21/21 0736  98 1 °F (36 7 °C)  89  20  115/68  94 %  --  --  --  --  Sitting   01/21/21 0452  100 1 °F (37 8 °C)  98  18  121/61  94 %  --  --  --  None (Room air)  --   01/21/21 0109  --  --  --  --  96 %  --  --  --  None (Room air)  --   01/21/21 0014  98 6 °F (37 °C)  99  18  101/62  --  --  --  --  --  --   01/20/21 2100  99 3 °F (37 4 °C)  99  18  117/64  94 %  --  --  --  None (Room air)  --   01/20/21 2058  --  --  --  --  95 %  --  --  --  None (Room air)  --   01/20/21 1500  98 2 °F (36 8 °C)  98  18  117/58  93 %  --  --  --  None (Room air)  Sitting   01/20/21 0700  97 9 °F (36 6 °C)  91  18  114/66  94 %  --  --  --  None (Room air)  Sitting   01/20/21 0437  --  --  --  --  --  36  --  4 L/min  Nasal cannula  --   01/20/21 0400  97 8 °F (36 6 °C)  99  18  116/71  92 %  --  --  --  None (Room air)  Sitting   01/20/21 0219  --  --  --  --  93 %  36  --  4 L/min  Nasal cannula  --   01/20/21 0000  97 8 °F (36 6 °C)  101  18  110/58  --  --  --  --  --  Lying   01/19/21 2222  --  --  --  --  96 %  36  --  4 L/min  Nasal cannula       CIWA:  1/19:1    1/20:0,7,10          Medications:   Scheduled Medications:  buprenorphine-naloxone, 1 Film, Sublingual, BID  cefTRIAXone, 1,000 mg, Intravenous, Q24H  enoxaparin, 40 mg, Subcutaneous, Q24H RICHARDSON  escitalopram, 10 mg, Oral, Daily  folic acid, 1 mg, Oral, Daily  lactulose, 20 g, Oral, BID  multivitamin-minerals, 1 tablet, Oral, Daily  nicotine, 21 mg, Transdermal, Daily  thiamine, 100 mg, Oral, Daily      Continuous IV Infusions:  sodium chloride, 100 mL/hr, Intravenous, Continuous      PRN Meds:  albuterol, 2 5 mg, Nebulization, Q6H PRN 1/21 x 1  nicotine polacrilex, 2 mg, Oral, Q2H PRN 1/21 x 1    Cardiac na 2 gm diet  Wound care      Discharge Plan: D    Network Utilization Review Department  ATTENTION: Please call with any questions or concerns to [phone] and carefully listen to the prompts so that you are directed to the right person  All voicemails are confidential   Corie Ingreinier all requests for admission clinical reviews, approved or denied determinations and any other requests to dedicated fax number below belonging to the campus where the patient is receiving treatment   List of dedicated fax numbers for the Facilities:  [address] DENIALS (Administrative/Medical Necessity) [phone]   [address] (Maternity/NICU/Pediatrics) [phone] [address] [place] [address] Dr Jason Jones [phone] (  Bolivar Thompson "Cynthia" 103) [address] [place] [address] [address] Mar[address] [place] [address] [phone]

## 2021-01-21 NOTE — ASSESSMENT & PLAN NOTE
· CTA chest (1/20/2021): 4 4cm fusiform thoracoabdominal aortic aneurysm that arises from the descending thoracic aorta and extends into the abdomen  · Informed patient of this incidental finding yesterday and explained that he will need to follow up with CT surgery as an outpatient for regular US monitoring

## 2021-01-21 NOTE — CONSULTS
Consultation - [facility]enter[facility]   Leroy Mcdaniels 28 y o  male MRN: [id number]  Unit/Bed#: -01 Encounter: [id number]        Inpatient consult to gastroenterology  Consult performed by: MARILEE Cramer  Consult ordered by: Morales Culver MD          Reason for Consult / Principal Problem:   Alcohol Intoxication, Alcoholic Pancreatitis, Cirrhosis    ASSESSMENT AND PLAN:    1  Decompensated cirrhosis with ascites and hepatic encephalopathy  Etiology likely alcohol ,AST>ALT  Pt also with evidence of synthetic liver dysfunction, PT/INR elevated and ammonia 125 34  Pt does not appear encephalopathic today  He is alert and answering questions appropriately  MELD 15, 6% estimated 3 month mortality  Child Leblanc Class C  Maddrey's discriminant function 12 on admission, now 26 6  · Check hepatitis panel, autoimmune serologies; AMA, MONTSE, ASMA, Iron panel to rule out other etiologies of cirrhosis  · Monitor MELD labs  Will check baseline AFP  · Continue Lactulose 20g BID, titrate as needed to maintain 2-3 bowel movements daily  · Continue 2gm sodium diet, pt will likely require diuretics on discharge  He is currently on IV Lasix 40mg daily  Will add Aldactone 50mg daily  · Avoid hepatotoxic medications  Patient needs complete alcohol cessation and this was discussed with him in detail and he was seen by psych for underlying depression/anxiety  · Continue [facility] alcohol withdrawal protocol per primary team  · Educated patient on the complications of advanced liver disease including death  · Pt will need close outpatient GI follow up and EGD for variceal screening  2  Pancreatitis, as seen on CTA imaging  Pt may have had inflammation in recent past  Lipase is normal  Pt having no pain  · Etiology likely alcohol, check RUQ US to rule out obstructive cause  · Okay for diet  · Alcohol cessation     3   Macrocytic anemia   · B12, folate pending  · No evidence of GI  blood loss  · Monitor  · Continue folic acid and thiamine supplementation    4  Thrombocytopenia, likely secondary to #1  · No evidence of active bleeding  · Monitor    Thank you for the consultation  Case discussed with Dr Sarah Ellis     ______________________________________________________________________    HPI:  Carmen Gruber is a 51-year-old male with past medical history of hypertension, obesity, substance abuse on suboxone, alcohol abuse, PTSD, and depression who presented to the hospital after his mother called 911 because she noticed a pool of blood along the patient's right foot  Patient has a history of RLE wound x2 months that has not been cared for  In the ED, WBC normal and he was afebrile, blood cultures and wound culture were done  UDS negative  Ethanol level was noted to be significantly elevated 400 2, and he was noted to have a macrocytic anemia, , HGB 8 2, thrombocytopenia with platelet count of 391, and had elevated LFTs; , Alkaline Phosphatase 370 1, total bilirubin 3 26, direct 1 97  PT 14, INR 1 25  Ammonia 125 34  Patient was in the [facility] and he has a history of multiple life stressors including losing his job, alcohol abuse, admits to drinking 4 shots on the night of admission, found his girlfriend dead after overdosing on drugs next to him when he awoke 1 morning, has custody issues with ex-wife and his son, reports feeling worsening depression  He reported that he has been drinking "more than usual" since his girlfriend's death  He is currently being treated for cellulitis RLE and alcohol withdrawal with IV phenobarbital  He was seen by psych and toxicology and will be evaluated by podiatry as well  He was noted to have elevated D-dimer, COVID-19 negative, and lower extremity Doppler and CTA chest were done which were negative for DVT and PE   However, CTA did show sequelae of advanced liver disease, moderate amount of ascites, and mild hyperemia surrounding the pancreas, suggesting acute pancreatitis  Lipase was normal   Abdominal US is pending  Pt has a history of alcohol abuse x 10 years off and on  He has been drinking heavily, about 1/2 bottle of vodka a day recently because he was laid off due to [employer]  Denies previous history of liver disease  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  He denies any blood transfusions  He has 2 tattoos that he reports were done in a sterile environment  He states he went to rehab for opioid use about 12 years ago, is now on suboxone  Has never been to rehab for alcohol  He currently lives with his parents who he says is a good support system  [facility]  He is visibly anxious, he wants to leave the hospital as soon as possible  Denies any dysphagia, heartburn, abdominal pain, nausea/vomiting, confusion, mental fogginess, poor appetite, melena, hematochezia, or rectal bleeding  He has noticed increased edema in his legs and abdominal bloating over the last few weeks  He also reports some shortness of breath  Denies any chest pain or palpitations  REVIEW OF SYSTEMS:    CONSTITUTIONAL: Denies any fever, chills, rigors, and weight loss  HEENT: No earache or tinnitus  Denies hearing loss or visual disturbances  CARDIOVASCULAR: No chest pain or palpitations  RESPIRATORY: Denies any cough, hemoptysis, + dyspnea on exertion  GASTROINTESTINAL: As noted in the History of Present Illness  GENITOURINARY: No problems with urination  Denies any hematuria or dysuria  NEUROLOGIC: No dizziness or vertigo, denies headaches  MUSCULOSKELETAL: Denies any muscle or joint pain  SKIN: Denies skin rashes or itching  ENDOCRINE: Denies excessive thirst  Denies intolerance to heat or cold  PSYCHOSOCIAL:+depression/anxiety  Denies any recent memory loss         Historical Information   Past Medical History:   Diagnosis Date    Allergic     Asthma     Essential hypertension 5/6/2019    Obesity     Substance abuse [facility])      Past Surgical History:   Procedure Laterality Date    LYMPH NODE BIOPSY       Social History   Social History     Substance and Sexual Activity   Alcohol Use Yes    Alcohol/week: 12 0 standard drinks    Types: 12 Cans of beer per week    Frequency: Monthly or less    Drinks per session: 1 or 2     Social History     Substance and Sexual Activity   Drug Use Never     Social History     Tobacco Use   Smoking Status Current Every Day Smoker    Packs/day: 1 00   Smokeless Tobacco Never Used   Tobacco Comment    2/2019; PATIENT VAPES     Family History   Problem Relation Age of Onset    No Known Problems Mother     Diabetes Father     Prostate cancer Father     Hypertension Father        Meds/Allergies     Medications Prior to Admission   Medication    albuterol (VENTOLIN HFA) 90 mcg/act inhaler    buprenorphine-naloxone (SUBOXONE) 8-2 mg    hydrochlorothiazide (HYDRODIURIL) 12 5 mg tablet    ondansetron (ZOFRAN-ODT) 4 mg disintegrating tablet    zolpidem (AMBIEN) 10 mg tablet     Current Facility-Administered Medications   Medication Dose Route Frequency    albuterol inhalation solution 2 5 mg  2 5 mg Nebulization Q6H PRN    buprenorphine-naloxone (SUBOXONE) 8-2 mg per SL film 1 Film  1 Film Sublingual BID    cefTRIAXone (ROCEPHIN) IVPB (premix in dextrose) 1,000 mg 50 mL  1,000 mg Intravenous Q24H    enoxaparin (LOVENOX) subcutaneous injection 40 mg  40 mg Subcutaneous Q24H RIHCARDSON    escitalopram (LEXAPRO) tablet 10 mg  10 mg Oral Daily    folic acid (FOLVITE) tablet 1 mg  1 mg Oral Daily    lactulose oral solution 20 g  20 g Oral BID    multivitamin-minerals (CENTRUM) tablet 1 tablet  1 tablet Oral Daily    nicotine (NICODERM CQ) 21 mg/24 hr TD 24 hr patch 21 mg  21 mg Transdermal Daily    nicotine polacrilex (NICORETTE) gum 2 mg  2 mg Oral Q2H PRN    sodium chloride 0 9 % infusion  100 mL/hr Intravenous Continuous    thiamine (VITAMIN B1) tablet 100 mg  100 mg Oral Daily Allergies   Allergen Reactions    Levofloxacin Other (See Comments)     BLACKED OUT           Objective     Blood pressure 115/68, pulse 89, temperature 98 1 °F (36 7 °C), temperature source Tympanic, resp  rate 20, height 6' 1" (1 854 m), weight 118 kg (260 lb), SpO2 94 %  Body mass index is 34 3 kg/m²  No intake or output data in the 24 hours ending 01/21/21 0847      PHYSICAL EXAM:      General Appearance:   Alert, cooperative, +anxiety   HEENT:   Normocephalic, atraumatic, +scleral icterus  Neck:  Supple, symmetrical, trachea midline   Lungs:   Clear &decreased bilaterally; no rales, rhonchi or wheezing; respirations unlabored    Heart[de-identified]   Regular rate and rhythm; no murmur, rub, or gallop     Abdomen:   Obese, round & distended, non-tender normal bowel sounds; no masses   Genitalia:   Deferred    Rectal:   Deferred    Extremities:  No cyanosis, clubbing, +2 BLE edema/erythema/weeping, Legs wrapped with gauze   Pulses:  2+ and symmetric all extremities    Skin:  No jaundice, rashes, or lesions    Lymph nodes:  No palpable cervical lymphadenopathy        Lab Results:   Admission on 01/19/2021   Component Date Value    WBC 01/19/2021 5 32     RBC 01/19/2021 2 32*    Hemoglobin 01/19/2021 8 2*    Hematocrit 01/19/2021 24 7*    MCV 01/19/2021 107*    MCH 01/19/2021 35 3*    MCHC 01/19/2021 33 2     RDW 01/19/2021 22 2*    MPV 01/19/2021 9 0     Platelets 63/61/3682 100*    Neutrophils Relative 01/19/2021 63     Immat GRANS % 01/19/2021 1     Lymphocytes Relative 01/19/2021 22     Monocytes Relative 01/19/2021 13*    Eosinophils Relative 01/19/2021 1     Basophils Relative 01/19/2021 0     Neutrophils Absolute 01/19/2021 3 39     Immature Grans Absolute 01/19/2021 0 03     Lymphocytes Absolute 01/19/2021 1 15     Monocytes Absolute 01/19/2021 0 67     Eosinophils Absolute 01/19/2021 0 06     Basophils Absolute 01/19/2021 0 02     Sodium 01/19/2021 135     Potassium 01/19/2021 3 6  Chloride 01/19/2021 96     CO2 01/19/2021 30     ANION GAP 01/19/2021 9     BUN 01/19/2021 5*    Creatinine 01/19/2021 0 34*    Glucose 01/19/2021 106     Calcium 01/19/2021 8 1*    Corrected Calcium 01/19/2021 8 9     AST 01/19/2021 174*    ALT 01/19/2021 17     Alkaline Phosphatase 01/19/2021 370 1*    Total Protein 01/19/2021 7 0     Albumin 01/19/2021 3 0*    Total Bilirubin 01/19/2021 3 26*    eGFR 01/19/2021 165     Color, UA 01/20/2021 Beena*    Clarity, UA 01/20/2021 Cloudy*    Specific Gravity, UA 01/20/2021 1 025     pH, UA 01/20/2021 6 5     Leukocytes, UA 01/20/2021 Negative     Nitrite, UA 01/20/2021 Positive*    Protein, UA 01/20/2021 1+*    Glucose, UA 01/20/2021 Trace*    Ketones, UA 01/20/2021 Trace*    Urobilinogen, UA 01/20/2021 >=8 0*    Bilirubin, UA 01/20/2021 3+*    Blood, UA 01/20/2021 Trace-Intact*    Ethanol Lvl 01/19/2021 400 2*    Amph/Meth UR 01/20/2021 Negative     Barbiturate Ur 01/20/2021 Negative     Benzodiazepine Urine 01/20/2021 Negative     Cocaine Urine 01/20/2021 Negative     Methadone Urine 01/20/2021 Negative     Opiate Urine 01/20/2021 Negative     PCP Ur 01/20/2021 Negative     THC Urine 01/20/2021 Negative     Oxycodone Urine 01/20/2021 Negative     SARS-CoV-2 01/19/2021 Negative     INFLUENZA A PCR 01/19/2021 Negative     INFLUENZA B PCR 01/19/2021 Negative     RSV PCR 01/19/2021 Negative     Magnesium 01/19/2021 1 9     Phosphorus 01/19/2021 3 5     TSH 3RD GENERATON 01/19/2021 3 478     Blood Culture 01/19/2021 Received in Microbiology Lab  Culture in Progress   Blood Culture 01/19/2021 Received in Microbiology Lab  Culture in Progress   Platelets 48/99/3261 93*    MPV 01/19/2021 9 0     D-Dimer, Quant  01/19/2021 8 49*    ph, Bin ISTAT 01/19/2021 7 411*    pH, i-STAT Temp Corrected 01/19/2021 7  421     pCO2, Bin i-STAT 01/19/2021 47 0     pCO2, i-STAT TC 01/19/2021 45 7     pO2, Bin i-STAT 01/19/2021 82 0*  pO2, i-STAT TC 01/19/2021 79     BE, i-STAT 01/19/2021 5*    HCO3, Bin i-STAT 01/19/2021 29 9     CO2, i-STAT 01/19/2021 31     O2 Sat, i-STAT 01/19/2021 96*    SODIUM, I-STAT 01/19/2021 138     Potassium, i-STAT 01/19/2021 3 7     Hct, i-STAT 01/19/2021 26*    Hgb, i-STAT 01/19/2021 8 8*    Glucose, i-STAT 01/19/2021 94     POC FIO2 01/19/2021 37     Specimen Type 01/19/2021 VENOUS     Bilirubin, Direct 01/19/2021 1 97*    Sodium 01/20/2021 135     Potassium 01/20/2021 3 8     Chloride 01/20/2021 96     CO2 01/20/2021 29     ANION GAP 01/20/2021 10     BUN 01/20/2021 5*    Creatinine 01/20/2021 0 41*    Glucose 01/20/2021 117     Glucose, Fasting 01/20/2021 117*    Calcium 01/20/2021 8 2*    Corrected Calcium 01/20/2021 8 9     AST 01/20/2021 186*    ALT 01/20/2021 18     Alkaline Phosphatase 01/20/2021 394 1*    Total Protein 01/20/2021 7 2     Albumin 01/20/2021 3 1*    Total Bilirubin 01/20/2021 3 89*    eGFR 01/20/2021 152     WBC 01/20/2021 4 75     RBC 01/20/2021 2 37*    Hemoglobin 01/20/2021 8 1*    Hematocrit 01/20/2021 25 5*    MCV 01/20/2021 108*    MCH 01/20/2021 34 2     MCHC 01/20/2021 31 8     RDW 01/20/2021 22 4*    MPV 01/20/2021 8 6*    Platelets 50/42/6167 96*    Neutrophils Relative 01/20/2021 71     Immat GRANS % 01/20/2021 0     Lymphocytes Relative 01/20/2021 17     Monocytes Relative 01/20/2021 11     Eosinophils Relative 01/20/2021 1     Basophils Relative 01/20/2021 0     Neutrophils Absolute 01/20/2021 3 33     Immature Grans Absolute 01/20/2021 0 02     Lymphocytes Absolute 01/20/2021 0 82     Monocytes Absolute 01/20/2021 0 53     Eosinophils Absolute 01/20/2021 0 03     Basophils Absolute 01/20/2021 0 02     Protime 01/20/2021 14 0*    INR 01/20/2021 1 25*    RBC, UA 01/20/2021 None Seen     WBC, UA 01/20/2021 None Seen     Epithelial Cells 01/20/2021 Occasional     Bacteria, UA 01/20/2021 None Seen     AMORPH URATES 01/20/2021 Innumerable     Protime 01/19/2021 14 0*    INR 01/19/2021 1 25*    Ammonia 01/20/2021 125 34*    WBC 01/21/2021 3 84*    RBC 01/21/2021 2 18*    Hemoglobin 01/21/2021 7 6*    Hematocrit 01/21/2021 23 5*    MCV 01/21/2021 108*    MCH 01/21/2021 34 9*    MCHC 01/21/2021 32 3     RDW 01/21/2021 22 0*    MPV 01/21/2021 9 4     Platelets 75/74/4568 99*    Neutrophils Relative 01/21/2021 63     Immat GRANS % 01/21/2021 1     Lymphocytes Relative 01/21/2021 19     Monocytes Relative 01/21/2021 16*    Eosinophils Relative 01/21/2021 1     Basophils Relative 01/21/2021 0     Neutrophils Absolute 01/21/2021 2 46     Immature Grans Absolute 01/21/2021 0 02     Lymphocytes Absolute 01/21/2021 0 72     Monocytes Absolute 01/21/2021 0 61     Eosinophils Absolute 01/21/2021 0 02     Basophils Absolute 01/21/2021 0 01     Sodium 01/21/2021 133     Potassium 01/21/2021 3 7     Chloride 01/21/2021 95*    CO2 01/21/2021 29     ANION GAP 01/21/2021 9     BUN 01/21/2021 6     Creatinine 01/21/2021 0 43*    Glucose 01/21/2021 91     Glucose, Fasting 01/21/2021 91     Calcium 01/21/2021 8 4     eGFR 01/21/2021 149     Total Bilirubin 01/21/2021 5 90*    Bilirubin, Direct 01/21/2021 3 18*    Alkaline Phosphatase 01/21/2021 380 3*    AST 01/21/2021 154*    ALT 01/21/2021 17     Total Protein 01/21/2021 7 2     Albumin 01/21/2021 3 0*    Lipase 01/21/2021 49     Ammonia 01/21/2021 112 29*       Imaging Studies: I have personally reviewed pertinent imaging studies

## 2021-01-21 NOTE — DISCHARGE INSTR - OTHER ORDERS
Plan:   Right and left lower leg wounds cleanse with soap and water then pat dry apply adaptic then apply maxorb ag then top with a ABD then lauren wrap change every other day    Upon discharge follow up at the [address] [place] [address] wound center [phone] option 1

## 2021-01-21 NOTE — ASSESSMENT & PLAN NOTE
· Patient is currently fluid overloaded due to his liver disease  · US abdomen 1/21/2021:  Small amount of ascites  Gallbladder wall thickening without stones or sonographic Desouza sign  Enlarged echogenic liver is most likely related to steatosis  The spleen is borderline enlarged  · Patient requires diuresis  Ordered 40mg IV Lasix QD

## 2021-01-21 NOTE — ASSESSMENT & PLAN NOTE
· Patients hemoglobin on 1/21/2021 is 7 6, which is down from 8 1 yesterday  Patients HCT is 23 5  · Patients MCV on 1/21/2021 is 108  · Consider transfusing if hemoglobin drops below 7 or if patient becomes symptomatic    · B12 and folate labs pending

## 2021-01-21 NOTE — ASSESSMENT & PLAN NOTE
· Likely due to chronic alcohol abuse and underlying liver dysfunction  · Platelet counts on 0/33/8344 are 99    · Order AM labs to continue monitoring

## 2021-01-21 NOTE — ASSESSMENT & PLAN NOTE
· Patient has a history of alcohol abuse and admits to drinking 1/2 liter of vodka per day  · Continue Lexapro 10mg for depression per psychiatry  · Patient currently declines going to rehab; however, after speaking with his sister I would encourage patient to consider going to an inpatient rehab facility     · Continue folic acid, thiamine, and centrum multivitamin

## 2021-01-21 NOTE — PROGRESS NOTES
Patient examined spoke with the nurse patient is alert awake cooperative resident physician reports that he is doing all right no behavior problem reported or noted  Patient is taking Lexapro patient is encouraged to continue antidepressant medication and follow-up with the psychiatrist and therapist patient is resistive for psychiatric follow-up but he agreed that he will not drink or do any drugs patient is also resistive to go for outpatient alcohol drug counseling and AA or NA meetings patient is able to understand the consequences of relapsing it will be detrimental to his health  Patient reports that he is planning to go to school and not looking for a job and he will not drink or do any drugs after the discharge I showed my reservation about his prognosis without any outpatient help  Nurse reports that he is doing all right  No behavior problem  No psychosis no shakes no alcohol withdrawal noted patient offers no new complaints  Therapy is done with good response  I will follow up

## 2021-01-21 NOTE — ASSESSMENT & PLAN NOTE
· Patient was put on 4L O2 on admission  Patient was doing well yesterday on RA and remains stable today with O2 sat of 94% on RA    · Continue monitoring oxygen levels

## 2021-01-21 NOTE — PLAN OF CARE
Problem: RESPIRATORY - ADULT  Goal: Achieves optimal ventilation and oxygenation  Description: INTERVENTIONS:  - Assess for changes in respiratory status  - Assess for changes in mentation and behavior  - Position to facilitate oxygenation and minimize respiratory effort  - Oxygen administered by appropriate delivery if ordered  - Initiate smoking cessation education as indicated  - Encourage broncho-pulmonary hygiene including cough, deep breathe, Incentive Spirometry  - Assess the need for suctioning and aspirate as needed  - Assess and instruct to report SOB or any respiratory difficulty  - Respiratory Therapy support as indicated  Outcome: Progressing     Problem: SKIN/TISSUE INTEGRITY - ADULT  Goal: Skin integrity remains intact  Description: INTERVENTIONS  - Identify patients at risk for skin breakdown  - Assess and monitor skin integrity  - Assess and monitor nutrition and hydration status  - Monitor labs (i e  albumin)  - Assess for incontinence   - Turn and reposition patient  - Assist with mobility/ambulation  - Relieve pressure over bony prominences  - Avoid friction and shearing  - Provide appropriate hygiene as needed including keeping skin clean and dry  - Evaluate need for skin moisturizer/barrier cream  - Collaborate with interdisciplinary team (i e  Nutrition, Rehabilitation, etc )   - Patient/family teaching  Outcome: Progressing  Goal: Incision(s), wounds(s) or drain site(s) healing without S/S of infection  Description: INTERVENTIONS  - Assess and document risk factors for skin impairment   - Assess and document dressing, incision, wound bed, drain sites and surrounding tissue  - Consider nutrition services referral as needed  - Oral mucous membranes remain intact  - Provide patient/ family education  Outcome: Progressing  Goal: Oral mucous membranes remain intact  Description: INTERVENTIONS  - Assess oral mucosa and hygiene practices  - Implement preventative oral hygiene regimen  - Implement oral medicated treatments as ordered  - Initiate Nutrition services referral as needed  Outcome: Progressing     Problem: MUSCULOSKELETAL - ADULT  Goal: Maintain or return mobility to safest level of function  Description: INTERVENTIONS:  - Assess patient's ability to carry out ADLs; assess patient's baseline for ADL function and identify physical deficits which impact ability to perform ADLs (bathing, care of mouth/teeth, toileting, grooming, dressing, etc )  - Assess/evaluate cause of self-care deficits   - Assess range of motion  - Assess patient's mobility  - Assess patient's need for assistive devices and provide as appropriate  - Encourage maximum independence but intervene and supervise when necessary  - Involve family in performance of ADLs  - Assess for home care needs following discharge   - Consider OT consult to assist with ADL evaluation and planning for discharge  - Provide patient education as appropriate  Outcome: Progressing

## 2021-01-21 NOTE — ASSESSMENT & PLAN NOTE
· Patient legs remain edematous and erythematous on 1/21/2021    · Continue Rocephin IV to treat infection

## 2021-01-21 NOTE — ASSESSMENT & PLAN NOTE
· Patient has a history of HTN but admits to no longer taking HCTZ  · Patients BP stable at 115/68 on 1/21/2021

## 2021-01-22 PROBLEM — E87.1 HYPONATREMIA: Status: ACTIVE | Noted: 2021-01-22

## 2021-01-22 LAB
ACTIN IGG SERPL-ACNC: 32 UNITS (ref 0–19)
ALBUMIN SERPL BCP-MCNC: 3.1 G/DL (ref 3.4–4.8)
ALP SERPL-CCNC: 345.6 U/L (ref 10–129)
ALT SERPL W P-5'-P-CCNC: 17 U/L (ref 5–63)
ANION GAP SERPL CALCULATED.3IONS-SCNC: 8 MMOL/L (ref 4–13)
AST SERPL W P-5'-P-CCNC: 129 U/L (ref 15–41)
BASOPHILS # BLD AUTO: 0.02 THOUSANDS/ΜL (ref 0–0.1)
BASOPHILS NFR BLD AUTO: 0 % (ref 0–1)
BILIRUB DIRECT SERPL-MCNC: 3.49 MG/DL (ref 0–0.3)
BILIRUB SERPL-MCNC: 6.16 MG/DL (ref 0.3–1.2)
BUN SERPL-MCNC: 8 MG/DL (ref 6–20)
CALCIUM SERPL-MCNC: 8.6 MG/DL (ref 8.4–10.2)
CHLORIDE SERPL-SCNC: 93 MMOL/L (ref 96–108)
CO2 SERPL-SCNC: 29 MMOL/L (ref 22–33)
CREAT SERPL-MCNC: 0.43 MG/DL (ref 0.5–1.2)
EOSINOPHIL # BLD AUTO: 0.06 THOUSAND/ΜL (ref 0–0.61)
EOSINOPHIL NFR BLD AUTO: 1 % (ref 0–6)
ERYTHROCYTE [DISTWIDTH] IN BLOOD BY AUTOMATED COUNT: 20.8 % (ref 11.6–15.1)
GFR SERPL CREATININE-BSD FRML MDRD: 149 ML/MIN/1.73SQ M
GLUCOSE SERPL-MCNC: 82 MG/DL (ref 65–140)
HBV SURFACE AG SER QL: NORMAL
HCT VFR BLD AUTO: 23.3 % (ref 36.5–49.3)
HCV AB SER QL: NORMAL
HGB BLD-MCNC: 7.7 G/DL (ref 12–17)
IMM GRANULOCYTES # BLD AUTO: 0.02 THOUSAND/UL (ref 0–0.2)
IMM GRANULOCYTES NFR BLD AUTO: 0 % (ref 0–2)
INR PPP: 1.61 (ref 0.9–1.1)
LYMPHOCYTES # BLD AUTO: 0.82 THOUSANDS/ΜL (ref 0.6–4.47)
LYMPHOCYTES NFR BLD AUTO: 18 % (ref 14–44)
MCH RBC QN AUTO: 35.6 PG (ref 26.8–34.3)
MCHC RBC AUTO-ENTMCNC: 33 G/DL (ref 31.4–37.4)
MCV RBC AUTO: 108 FL (ref 82–98)
MITOCHONDRIA M2 IGG SER-ACNC: <20 UNITS (ref 0–20)
MONOCYTES # BLD AUTO: 0.68 THOUSAND/ΜL (ref 0.17–1.22)
MONOCYTES NFR BLD AUTO: 15 % (ref 4–12)
NEUTROPHILS # BLD AUTO: 3.08 THOUSANDS/ΜL (ref 1.85–7.62)
NEUTS SEG NFR BLD AUTO: 66 % (ref 43–75)
PLATELET # BLD AUTO: 121 THOUSANDS/UL (ref 149–390)
PMV BLD AUTO: 9.4 FL (ref 8.9–12.7)
POTASSIUM SERPL-SCNC: 3.6 MMOL/L (ref 3.5–5)
PROT SERPL-MCNC: 7.2 G/DL (ref 6.4–8.3)
PROTHROMBIN TIME: 17.8 SECONDS (ref 9.5–12.1)
RBC # BLD AUTO: 2.16 MILLION/UL (ref 3.88–5.62)
RYE IGE QN: NEGATIVE
SODIUM SERPL-SCNC: 130 MMOL/L (ref 133–145)
WBC # BLD AUTO: 4.68 THOUSAND/UL (ref 4.31–10.16)

## 2021-01-22 PROCEDURE — 85025 COMPLETE CBC W/AUTO DIFF WBC: CPT | Performed by: PHYSICIAN ASSISTANT

## 2021-01-22 PROCEDURE — 99232 SBSQ HOSP IP/OBS MODERATE 35: CPT | Performed by: NURSE PRACTITIONER

## 2021-01-22 PROCEDURE — 80076 HEPATIC FUNCTION PANEL: CPT | Performed by: PHYSICIAN ASSISTANT

## 2021-01-22 PROCEDURE — 80048 BASIC METABOLIC PNL TOTAL CA: CPT | Performed by: PHYSICIAN ASSISTANT

## 2021-01-22 PROCEDURE — 85610 PROTHROMBIN TIME: CPT | Performed by: NURSE PRACTITIONER

## 2021-01-22 PROCEDURE — 99232 SBSQ HOSP IP/OBS MODERATE 35: CPT | Performed by: GENERAL PRACTICE

## 2021-01-22 RX ORDER — SPIRONOLACTONE 25 MG/1
100 TABLET ORAL DAILY
Status: DISCONTINUED | OUTPATIENT
Start: 2021-01-23 | End: 2021-01-24

## 2021-01-22 RX ORDER — POTASSIUM CHLORIDE 20 MEQ/1
40 TABLET, EXTENDED RELEASE ORAL ONCE
Status: COMPLETED | OUTPATIENT
Start: 2021-01-22 | End: 2021-01-22

## 2021-01-22 RX ADMIN — Medication 1 TABLET: at 08:37

## 2021-01-22 RX ADMIN — FOLIC ACID 1 MG: 1 TABLET ORAL at 08:37

## 2021-01-22 RX ADMIN — SPIRONOLACTONE 50 MG: 25 TABLET, FILM COATED ORAL at 08:36

## 2021-01-22 RX ADMIN — THIAMINE HCL TAB 100 MG 100 MG: 100 TAB at 08:37

## 2021-01-22 RX ADMIN — NICOTINE POLACRILEX 2 MG: 2 GUM, CHEWING ORAL at 22:42

## 2021-01-22 RX ADMIN — BUPRENORPHINE HYDROCHLORIDE, NALOXONE HYDROCHLORIDE 1 FILM: 8; 2 FILM, SOLUBLE BUCCAL; SUBLINGUAL at 08:36

## 2021-01-22 RX ADMIN — Medication 21 MG: at 08:36

## 2021-01-22 RX ADMIN — ENOXAPARIN SODIUM 40 MG: 40 INJECTION SUBCUTANEOUS at 08:35

## 2021-01-22 RX ADMIN — LACTULOSE 20 G: 20 SOLUTION ORAL at 08:36

## 2021-01-22 RX ADMIN — POTASSIUM CHLORIDE 40 MEQ: 1500 TABLET, EXTENDED RELEASE ORAL at 18:28

## 2021-01-22 RX ADMIN — NICOTINE POLACRILEX 2 MG: 2 GUM, CHEWING ORAL at 01:02

## 2021-01-22 RX ADMIN — BUPRENORPHINE HYDROCHLORIDE, NALOXONE HYDROCHLORIDE 1 FILM: 8; 2 FILM, SOLUBLE BUCCAL; SUBLINGUAL at 14:09

## 2021-01-22 RX ADMIN — CEFTRIAXONE 1000 MG: 1 INJECTION, SOLUTION INTRAVENOUS at 06:32

## 2021-01-22 RX ADMIN — FUROSEMIDE 40 MG: 10 INJECTION, SOLUTION INTRAMUSCULAR; INTRAVENOUS at 08:36

## 2021-01-22 RX ADMIN — NICOTINE POLACRILEX 2 MG: 2 GUM, CHEWING ORAL at 18:28

## 2021-01-22 RX ADMIN — ESCITALOPRAM OXALATE 10 MG: 10 TABLET ORAL at 08:37

## 2021-01-22 RX ADMIN — NICOTINE POLACRILEX 2 MG: 2 GUM, CHEWING ORAL at 13:57

## 2021-01-22 NOTE — ASSESSMENT & PLAN NOTE
· Patients last drink was on 1/19/2021  He admits to a history of drinking 1/2 liter of vodka per day  · Discussed case with Toxicology  Patient received several doses of IV phenobarbital 65mg  Patient appears to be much improved from a withdrawal standpoint  · Patient would benefit from alcohol rehab, either in-patient or out-patient  Discussed this with her sister 1/21  She will discuss this further with the patient and their family to try and encourage him as he currently is not interested  · As phenobarb given, no need for CIWA    Phenobarb long acting

## 2021-01-22 NOTE — PROGRESS NOTES
Progress Note - Samanta Fu 1985, 28 y o  male MRN: [id number]    Unit/Bed#: -01 Encounter: [id number]    Primary Care Provider: Rj Perdue PA-C   Date and time admitted to hospital: 1/19/2021  5:57 PM        * Lower extremity cellulitis  Assessment & Plan  · Erythema of bilateral lower extremities likely secondary to severe edema  Possible infection but thought less likely  · Will continue IV antibiotics for now  · Can complete 5 days of Rocephin which will cover SBP  This hospital dose not have IR so cannot have para  Para unlikely to be accurate as pt had Rocephin    Decompensated hepatic cirrhosis (HCC)  Assessment & Plan  · With associated ascites, splenomegaly, elevated INR, anemia, thrombocytopenia and hyperammonemia likely related to alcohol abuse  · CTA chest (1/20/2021): Hepatomegaly, splenomegaly and ascites consistent with cirrhosis  · Started on lactulose to maintain 2-3 BMs per day given elevated ammonia level  Patient's mental status is improving  · GI was consulted and they ordered a hepatitis panel, autoimmune serologies (AMA, MONTSE, ASMA), iron panel to rule out other etiologies of cirrhosis  · Hepatitis panel: neg  · MONTSE: neg  · Iron panel (iron: 125, ferritin: 753, 71% sat, TIBC: 175)  · AFP: 4 5      Hyponatremia  Assessment & Plan  Add 1 5L FR  C/w Aldactone and Lasix  Give Samina Jakes for borderline hypoK    Anasarca  Assessment & Plan  · Patient is currently fluid overloaded due to his liver disease  · CT chest was suggestive of moderate ascites  · US abdomen 1/21/2021:  Small amount of ascites  Gallbladder wall thickening without stones or sonographic Desouza sign  Enlarged echogenic liver is most likely related to steatosis  The spleen is borderline enlarged  · C/w IV Lasix 40 mg daily (started 01/21/2021)  · Aldactone being uptitrated  · Low Na 1 5L FR diet  · Mild ascites noted on ultrasound, although limited study    Hold off on para as unlikely to be of benefit    Abnormal CT scan  Assessment & Plan  · CTA chest (1/20/2021): Mild abnormal appearance of the pancrease, most compatible with acute pancreatitis  · Currently, patient denies any symptoms suggestive of acute pancreatitis and lipase is normal     Thoracoabdominal aortic aneurysm [facility])  Assessment & Plan  · CTA chest (1/20/2021): 4 4cm fusiform thoracoabdominal aortic aneurysm that arises from the descending thoracic aorta and extends into the abdomen  · Informed patient of this incidental finding and explained that he will need to follow up with CT surgery as an outpatient for regular US monitoring  Will place ambulatory referral to CT surgery at discharge  Alcohol withdrawal ([facility] [address] )  Assessment & Plan  · Patients last drink was on 1/19/2021  He admits to a history of drinking 1/2 liter of vodka per day  · Discussed case with Toxicology  Patient received several doses of IV phenobarbital 65mg  Patient appears to be much improved from a withdrawal standpoint  · Patient would benefit from alcohol rehab, either in-patient or out-patient  Discussed this with her sister 1/21  She will discuss this further with the patient and their family to try and encourage him as he currently is not interested  · As phenobarb given, no need for CIWA  Phenobarb long acting    Thrombocytopenia (HCC)  Assessment & Plan  · Likely due to chronic alcohol abuse and underlying liver dysfunction  · Platelet counts on 2/81/0577 are 99  Currently, no evidence of bleeding  · Platelets improving    Macrocytic anemia  Assessment & Plan  · Patient's hemoglobin slowly trending downward  Likely multifactorial including anemia of chronic disease (liver dz) as well as acute blood loss anemia secondary to leg wound  · Consider transfusing if hemoglobin drops below 7 or if patient becomes symptomatic  · Vitamin B12 normal @ 879  · Folic acid is low @ 2 5  Continue folic acid supplement      Tobacco dependence  Assessment & Plan  · Patient is a 1ppd smoker  · Continue nicotine patch and nicotine gum  Substance abuse [facility])  Assessment & Plan  · Patient has a history of substance abuse  States that he last used one year ago  · Continue suboxone as prescribed  Dosing confirmed via PDMP  · Continue Suboxone 8 mg @ 8AM and 12 noon  Alcohol abuse  Assessment & Plan  · Patient has a history of alcohol abuse and admits to drinking 1/2 liter of vodka per day  · Continue Lexapro 10mg for depression per psychiatry  · Continue folic acid, thiamine, and centrum multivitamin  · Strongly encourage patient to attend rehab  Discussed with patient's sister 1/21  She will also encourage patient to participate in rehab  Class 1 obesity in adult  Assessment & Plan  · Weight loss encouraged    Essential hypertension  Assessment & Plan  · Patient has a history of HTN but admits to no longer taking HCTZ  · BPs are acceptable for now  Monitor closely while receiving IV Lasix  Mild intermittent asthma without complication  Assessment & Plan  · No acute exacerbation  · Continue PRN albuterol  VTE Pharmacologic Prophylaxis:   Pharmacologic: Enoxaparin (Lovenox)  Mechanical VTE Prophylaxis in Place: Yes    Patient Centered Rounds: I have performed bedside rounds with nursing staff today  Discussions with Specialists or Other Care Team Provider: no    Education and Discussions with Family / Patient: Pt  Declined call to family    Time Spent for Care: 30 minutes  More than 50% of total time spent on counseling and coordination of care as described above  Current Length of Stay: 1 day(s)    Current Patient Status: Inpatient   Certification Statement: The patient will continue to require additional inpatient hospital stay due to need for IV Lasix and Rocephin    Discharge Plan: Possibly Sunday or Monday    Hopefully will consider rehab    Code Status: Level 1 - Full Code      Subjective:   No acute complaints    Objective:     Vitals: Temp (24hrs), Av 7 °F (36 5 °C), Min:97 1 °F (36 2 °C), Max:98 2 °F (36 8 °C)    Temp:  [97 1 °F (36 2 °C)-98 2 °F (36 8 °C)] 97 1 °F (36 2 °C)  HR:  [92-98] 96  Resp:  [16-18] 18  BP: (109-132)/(68-72) 109/68  SpO2:  [94 %] 94 %  Body mass index is 34 3 kg/m²  Input and Output Summary (last 24 hours):     No intake or output data in the 24 hours ending 21 1642    Physical Exam:     Physical Exam  HENT:      Head: Normocephalic and atraumatic  Nose: Nose normal       Mouth/Throat:      Mouth: Mucous membranes are moist    Eyes:      Extraocular Movements: Extraocular movements intact  Conjunctiva/sclera: Conjunctivae normal    Neck:      Musculoskeletal: Normal range of motion and neck supple  Cardiovascular:      Rate and Rhythm: Normal rate and regular rhythm  Pulmonary:      Effort: Pulmonary effort is normal       Breath sounds: Normal breath sounds  No wheezing or rales  Abdominal:      General: Bowel sounds are normal  There is distension  Tenderness: There is no abdominal tenderness  Musculoskeletal: Normal range of motion  Right lower leg: Edema present  Left lower leg: Edema present  Comments: Dressing LEs   Skin:     Comments: Dressing LEs c/d/i   Neurological:      Mental Status: He is alert and oriented to person, place, and time           Additional Data:     Labs:    Results from last 7 days   Lab Units 21  0623   WBC Thousand/uL 4 68   HEMOGLOBIN g/dL 7 7*   HEMATOCRIT % 23 3*   PLATELETS Thousands/uL 121*   NEUTROS PCT % 66   LYMPHS PCT % 18   MONOS PCT % 15*   EOS PCT % 1     Results from last 7 days   Lab Units 21  0623  21  2323   POTASSIUM mmol/L 3 6   < >  --    CHLORIDE mmol/L 93*   < >  --    CO2 mmol/L 29   < >  --    CO2, I-STAT mmol/L  --   --  31   BUN mg/dL 8   < >  --    CREATININE mg/dL 0 43*   < >  --    CALCIUM mg/dL 8 6   < >  --    ALK PHOS U/L 345 6*   < >  --    ALT U/L 17   < >  --    AST U/L 129*   < >  -- GLUCOSE, ISTAT mg/dl  --   --  94    < > = values in this interval not displayed  Results from last 7 days   Lab Units 01/22/21  1018   INR  1 61*       * I Have Reviewed All Lab Data Listed Above  * Additional Pertinent Lab Tests Reviewed: Ale Carty Admission Reviewed        Recent Cultures (last 7 days):     Results from last 7 days   Lab Units 01/19/21  2320   BLOOD CULTURE  No Growth at 48 hrs  No Growth at 48 hrs  Last 24 Hours Medication List:   Current Facility-Administered Medications   Medication Dose Route Frequency Provider Last Rate    albuterol  2 5 mg Nebulization Q6H PRN Alannah Bruce MD      buprenorphine-naloxone  1 Film Sublingual BID REGI Carson-C      cefTRIAXone  1,000 mg Intravenous Q24H Alannah Bruce MD 1,000 mg (01/22/21 3615)    enoxaparin  40 mg Subcutaneous Q24H [facility] Blanca Almazan MD      escitalopram  10 mg Oral Daily Awais Sal MD      folic acid  1 mg Oral Daily Alannah Bruce MD      furosemide  40 mg Intravenous Daily REGI Carson-C      lactulose  20 g Oral BID Blanca Almazan MD      multivitamin-minerals  1 tablet Oral Daily Alannah Bruce MD      nicotine  21 mg Transdermal Daily Gina Landrum MD      nicotine polacrilex  2 mg Oral Q2H PRN REGI Carson-C      potassium chloride  40 mEq Oral Once Irving Mills DO      [START ON 1/23/2021] spironolactone  100 mg Oral Daily [employer], CRNP      thiamine  100 mg Oral Daily Alannah Bruce MD          Today, Patient Was Seen By: Irving Mills DO    ** Please Note: Dictation voice to text software may have been used in the creation of this document   **

## 2021-01-22 NOTE — PROGRESS NOTES
Patient examined spoke with the nurse patient is alert awake cooperative medical treatment is in progress patient is fully aware of his drinking problem and drug problem he smokes pot and drinks alcohol heavily he is able to understand the problem and consequences of chronic alcoholism and drug abuse  Patient also agreed for outpatient psychiatric help continue antidepressant medication go to [place] [id number] meeting and alcohol drug counseling  Nurse reports that he is doing all right  No alcohol withdrawal no shakes noted  It seems to me that there is no need for Librium or Ativan at this time since patient is not going through the withdrawal   Discussed with the medical attending and resident physician spoke with the nurse  Patient is cooperative and pleasant he offers no new complaints  Therapy done with good response  I will follow up  Gustavo Snow

## 2021-01-22 NOTE — ASSESSMENT & PLAN NOTE
· Patient has a history of HTN but admits to no longer taking HCTZ  · BPs are acceptable for now  Monitor closely while receiving IV Lasix

## 2021-01-22 NOTE — ASSESSMENT & PLAN NOTE
· CTA chest (1/20/2021): Mild abnormal appearance of the pancrease, most compatible with acute pancreatitis     · Currently, patient denies any symptoms suggestive of acute pancreatitis and lipase is normal

## 2021-01-22 NOTE — ASSESSMENT & PLAN NOTE
· CTA chest (1/20/2021): 4 4cm fusiform thoracoabdominal aortic aneurysm that arises from the descending thoracic aorta and extends into the abdomen  · Informed patient of this incidental finding and explained that he will need to follow up with CT surgery as an outpatient for regular US monitoring  Will place ambulatory referral to CT surgery at discharge

## 2021-01-22 NOTE — ASSESSMENT & PLAN NOTE
· Patient has a history of alcohol abuse and admits to drinking 1/2 liter of vodka per day  · Continue Lexapro 10mg for depression per psychiatry  · Continue folic acid, thiamine, and centrum multivitamin  · Strongly encourage patient to attend rehab  Discussed with patient's sister 1/21  She will also encourage patient to participate in rehab

## 2021-01-22 NOTE — ASSESSMENT & PLAN NOTE
· Patient's hemoglobin slowly trending downward  Likely multifactorial including anemia of chronic disease (liver dz) as well as acute blood loss anemia secondary to leg wound  · Consider transfusing if hemoglobin drops below 7 or if patient becomes symptomatic  · Vitamin B12 normal @ 724  · Folic acid is low @ 2 5  Continue folic acid supplement

## 2021-01-22 NOTE — ASSESSMENT & PLAN NOTE
· Likely due to chronic alcohol abuse and underlying liver dysfunction  · Platelet counts on 1/48/7127 are 99  Currently, no evidence of bleeding     · Platelets improving

## 2021-01-22 NOTE — ASSESSMENT & PLAN NOTE
· Patient has a history of substance abuse  States that he last used one year ago  · Continue suboxone as prescribed  Dosing confirmed via PDMP  · Continue Suboxone 8 mg @ 8AM and 12 noon

## 2021-01-22 NOTE — ASSESSMENT & PLAN NOTE
· With associated ascites, splenomegaly, elevated INR, anemia, thrombocytopenia and hyperammonemia likely related to alcohol abuse  · CTA chest (1/20/2021): Hepatomegaly, splenomegaly and ascites consistent with cirrhosis  · Started on lactulose to maintain 2-3 BMs per day given elevated ammonia level  Patient's mental status is improving  · GI was consulted and they ordered a hepatitis panel, autoimmune serologies (AMA, MONTSE, ASMA), iron panel to rule out other etiologies of cirrhosis    · Hepatitis panel: neg  · MONTSE: neg  · Iron panel (iron: 125, ferritin: 753, 71% sat, TIBC: 175)  · AFP: 4 5

## 2021-01-22 NOTE — ASSESSMENT & PLAN NOTE
· Patient is currently fluid overloaded due to his liver disease  · CT chest was suggestive of moderate ascites  · US abdomen 1/21/2021:  Small amount of ascites  Gallbladder wall thickening without stones or sonographic Desouza sign  Enlarged echogenic liver is most likely related to steatosis  The spleen is borderline enlarged  · C/w IV Lasix 40 mg daily (started 01/21/2021)  · Aldactone being uptitrated  · Low Na 1 5L FR diet  · Mild ascites noted on ultrasound, although limited study    Hold off on para as unlikely to be of benefit

## 2021-01-22 NOTE — ASSESSMENT & PLAN NOTE
· Erythema of bilateral lower extremities likely secondary to severe edema  Possible infection but thought less likely  · Will continue IV antibiotics for now  · Can complete 5 days of Rocephin which will cover SBP  This hospital dose not have IR so cannot have para    Para unlikely to be accurate as pt had Rocephin

## 2021-01-22 NOTE — PROGRESS NOTES
Progress Note- Authur Killer 28 y o  male MRN: [id number]    Unit/Bed#: -01 Encounter: [id number]      Assessment and Plan:    1  Decompensated cirrhosis with anasarca, hepatic encephalopathy, and worsening synthetic liver dysfunction as evidenced by elevated PT/INR  MELD 15, 6% estimated 3 month mortality  Child Leblanc Class C  Etiology of cirrhosis likely alcohol  Decompensation may be secondary to underlying infection as pt is currently being treated for cellulitis, but cannot rule out SBP  Unfortunately, this campus does not have IR diagnostic paracentesis capabilities  He is currently on Ceftriaxone which would cover SBP  Pt may also have decompensation due to an underlying alcoholic hepatitis, ETOH level was 400 on admission & he admits to drinking heavily since he lost his job  Total bilirubin previously 2 19 in September, now 6  16    · Juan's discriminant function 32 4  Spoke to Dr Campos, will hold off on treatment & observe closely for now  If signs of worsening decompensation, may need transfer to another facility  However, pt not a likely candidate for liver transplant at this point due to continued alcohol use  · AFP WNL  No evidence of liver mass on US  · Hepatitis panel, autoimmune serologies; AMA, MNOTSE, ASMA are pending to  rule out other etiologies of cirrhosis  Iron panel not indicative of hemochromatosis  · Continue Lactulose 20g BID, titrate as needed to maintain 2-3 bowel movements daily for HE  · Continue 2gm sodium diet, pt will likely require diuretics on discharge  He is currently on IV Lasix 40mg daily  Added 50mg Aldactone daily yesterday and he is tolerating it well  Will  increase to 100mg today  Pt should be discharged on Lasix 40 daily, and Aldactone 100 mg daily  Monitor electrolytes and renal function  · Avoid hepatotoxic medications   Patient needs complete alcohol cessation and this was discussed with him in detail and he was seen by psych for underlying depression/anxiety  · Continue WA alcohol withdrawal protocol per primary team/toxicology  · Monitor MELD labs-CBC, CMP, PT/INR  · Educated patient on the complications of advanced liver disease including death  · Pt will need close outpatient GI follow up and EGD for variceal screening       2  Pancreatitis, as seen on CTA imaging  Pt may have had inflammation in recent past, etiology likely alcohol  Lipase is normal  Pt having no pain  No evidence of clinical pancreatitis currently  · Okay for diet  · Alcohol cessation   · US done showing gallbladder wall thickening and small amount of pericholecystic/perihepatic fluid likely secondary to portal htn and hypoalbuminemia  There is no choledolithiasis seen       3  Macrocytic anemia   · B12  WNL, folate low at 2 5  · No evidence of GI  blood loss  Pt will require EGD in the future for variceal screening  · Monitor  · Continue folic acid and thiamine supplementation     4  Thrombocytopenia, likely secondary to #1  · No evidence of active bleeding  · Monitor    ______________________________________________________________________    Subjective:     Pt seen sitting on the edge of his bed  He reports he is doing well this morning  He had 3-4 bowel movements yesterday, no bloody or black discoloration  He is urinating frequently due to his medications  Denies any lightheadedness or dizziness  BP has remained stable  Still has slight tremor to BL hands and he reports his coordination is a little off due to his lower extremity edema  Appears less anxious today  Still with edematous, lower extremities with weeping  Denies any heartburn, nausea/vomiting, abdominal pain, confusion  He is still agreeable to complete alcohol cessation      Medication Administration - last 24 hours from 01/21/2021 0848 to 01/22/2021 0848       Date/Time Order Dose Route Action Action by     01/22/2021 0837 thiamine (VITAMIN B1) tablet 100 mg 100 mg Oral Given Frank Campos RN     01/22/2021 4370 folic acid (FOLVITE) tablet 1 mg 1 mg Oral Given Anthony Muro RN     01/22/2021 9650 multivitamin-minerals (CENTRUM) tablet 1 tablet 1 tablet Oral Given Anthony Muro RN     01/22/2021 2175 cefTRIAXone (ROCEPHIN) IVPB (premix in dextrose) 1,000 mg 50 mL 1,000 mg Intravenous [facility] BAR[facility]A     01/22/2021 1609 escitalopram (LEXAPRO) tablet 10 mg 10 mg Oral Given Anthony Muro RN     01/22/2021 0836 buprenorphine-naloxone (SUBOXONE) 8-2 mg per SL film 1 Film 1 Film Sublingual Given Anthony Muro RN     01/21/2021 1143 buprenorphine-naloxone (SUBOXONE) 8-2 mg per SL film 1 Film 1 Film Sublingual Given Joey Adler RN     01/22/2021 0102 nicotine polacrilex (NICORETTE) gum 2 mg 2 mg Oral Given Lesly Venereza, BARBARA     01/21/2021 2215 nicotine polacrilex (NICORETTE) gum 2 mg 2 mg Oral Given Lesly Sahu, BARBARA     01/21/2021 1753 nicotine polacrilex (NICORETTE) gum 2 mg 2 mg Oral Given Joey Adler RN     01/21/2021 1148 nicotine polacrilex (NICORETTE) gum 2 mg 2 mg Oral Given Joey Adler RN     01/22/2021 0835 enoxaparin (LOVENOX) subcutaneous injection 40 mg 40 mg Subcutaneous Given Anthony Muro RN     01/22/2021 0836 lactulose oral solution 20 g 20 g Oral Given Anthony Muro RN     01/21/2021 1753 lactulose oral solution 20 g 20 g Oral Given Joey Adler RN     01/22/2021 3468 nicotine (NICODERM CQ) 21 mg/24 hr TD 24 hr patch 21 mg 21 mg Transdermal Medication Applied Anthony Muro RN     01/22/2021 0836 furosemide (LASIX) injection 40 mg 40 mg Intravenous Given Anthony Muro RN     01/21/2021 1143 furosemide (LASIX) injection 40 mg 40 mg Intravenous Given Joey Adler RN     01/22/2021 5782 spironolactone (ALDACTONE) tablet 50 mg 50 mg Oral Given Anthony Muro RN     01/21/2021 1143 spironolactone (ALDACTONE) tablet 50 mg 50 mg Oral Given Joey Adler RN          Objective:     Vitals: Blood pressure 115/72, pulse 92, temperature 98 2 °F (36 8 °C), temperature source Tympanic, resp  rate 16, height 6' 1" (1 854 m), weight 118 kg (260 lb), SpO2 94 %  ,Body mass index is 34 3 kg/m²  Intake/Output Summary (Last 24 hours) at 1/22/2021 0848  Last data filed at 1/21/2021 1300  Gross per 24 hour   Intake --   Output 800 ml   Net -800 ml       Physical Exam:   General Appearance: Awake and alert, in no acute distress  Abdomen: Obese, round, firm, non-tende; bowel sounds normal; no masses or no organomegaly  Lower extremity edema +2, wrapped with gauze    Invasive Devices     Peripheral Intravenous Line            Peripheral IV 01/19/21 Left Antecubital 2 days                Lab Results:  No results displayed because visit has over 200 results  Imaging Studies: I have personally reviewed pertinent imaging studies

## 2021-01-23 LAB
ABO GROUP BLD: NORMAL
ALBUMIN SERPL BCP-MCNC: 2.9 G/DL (ref 3.4–4.8)
ALP SERPL-CCNC: 291.4 U/L (ref 10–129)
ALT SERPL W P-5'-P-CCNC: 16 U/L (ref 5–63)
ANION GAP SERPL CALCULATED.3IONS-SCNC: 9 MMOL/L (ref 4–13)
AST SERPL W P-5'-P-CCNC: 120 U/L (ref 15–41)
BILIRUB SERPL-MCNC: 5.63 MG/DL (ref 0.3–1.2)
BLD GP AB SCN SERPL QL: NEGATIVE
BUN SERPL-MCNC: 10 MG/DL (ref 6–20)
CALCIUM ALBUM COR SERPL-MCNC: 9.2 MG/DL (ref 8.3–10.1)
CALCIUM SERPL-MCNC: 8.3 MG/DL (ref 8.4–10.2)
CHLORIDE SERPL-SCNC: 93 MMOL/L (ref 96–108)
CO2 SERPL-SCNC: 28 MMOL/L (ref 22–33)
CREAT SERPL-MCNC: 0.47 MG/DL (ref 0.5–1.2)
ERYTHROCYTE [DISTWIDTH] IN BLOOD BY AUTOMATED COUNT: 20.5 % (ref 11.6–15.1)
GFR SERPL CREATININE-BSD FRML MDRD: 144 ML/MIN/1.73SQ M
GLUCOSE SERPL-MCNC: 75 MG/DL (ref 65–140)
HCT VFR BLD AUTO: 21.6 % (ref 36.5–49.3)
HGB BLD-MCNC: 7 G/DL (ref 12–17)
INR PPP: 1.55 (ref 0.9–1.1)
MAGNESIUM SERPL-MCNC: 1.6 MG/DL (ref 1.6–2.6)
MCH RBC QN AUTO: 35.4 PG (ref 26.8–34.3)
MCHC RBC AUTO-ENTMCNC: 32.4 G/DL (ref 31.4–37.4)
MCV RBC AUTO: 109 FL (ref 82–98)
PLATELET # BLD AUTO: 145 THOUSANDS/UL (ref 149–390)
PMV BLD AUTO: 9.5 FL (ref 8.9–12.7)
POTASSIUM SERPL-SCNC: 3.3 MMOL/L (ref 3.5–5)
PROT SERPL-MCNC: 6.5 G/DL (ref 6.4–8.3)
PROTHROMBIN TIME: 17.2 SECONDS (ref 9.5–12.1)
RBC # BLD AUTO: 1.98 MILLION/UL (ref 3.88–5.62)
RH BLD: POSITIVE
SODIUM SERPL-SCNC: 130 MMOL/L (ref 133–145)
SPECIMEN EXPIRATION DATE: NORMAL
WBC # BLD AUTO: 4.9 THOUSAND/UL (ref 4.31–10.16)

## 2021-01-23 PROCEDURE — P9016 RBC LEUKOCYTES REDUCED: HCPCS

## 2021-01-23 PROCEDURE — 85610 PROTHROMBIN TIME: CPT | Performed by: NURSE PRACTITIONER

## 2021-01-23 PROCEDURE — 86901 BLOOD TYPING SEROLOGIC RH(D): CPT | Performed by: FAMILY MEDICINE

## 2021-01-23 PROCEDURE — 86850 RBC ANTIBODY SCREEN: CPT | Performed by: FAMILY MEDICINE

## 2021-01-23 PROCEDURE — 86920 COMPATIBILITY TEST SPIN: CPT

## 2021-01-23 PROCEDURE — 85027 COMPLETE CBC AUTOMATED: CPT | Performed by: NURSE PRACTITIONER

## 2021-01-23 PROCEDURE — 30233N1 TRANSFUSION OF NONAUTOLOGOUS RED BLOOD CELLS INTO PERIPHERAL VEIN, PERCUTANEOUS APPROACH: ICD-10-PCS | Performed by: FAMILY MEDICINE

## 2021-01-23 PROCEDURE — 86900 BLOOD TYPING SEROLOGIC ABO: CPT | Performed by: FAMILY MEDICINE

## 2021-01-23 PROCEDURE — 83735 ASSAY OF MAGNESIUM: CPT | Performed by: GENERAL PRACTICE

## 2021-01-23 PROCEDURE — 80053 COMPREHEN METABOLIC PANEL: CPT | Performed by: NURSE PRACTITIONER

## 2021-01-23 PROCEDURE — 99233 SBSQ HOSP IP/OBS HIGH 50: CPT | Performed by: FAMILY MEDICINE

## 2021-01-23 RX ORDER — LORAZEPAM 2 MG/ML
2 INJECTION INTRAMUSCULAR ONCE
Status: COMPLETED | OUTPATIENT
Start: 2021-01-23 | End: 2021-01-23

## 2021-01-23 RX ORDER — HALOPERIDOL 5 MG/ML
5 INJECTION INTRAMUSCULAR ONCE
Status: COMPLETED | OUTPATIENT
Start: 2021-01-23 | End: 2021-01-23

## 2021-01-23 RX ADMIN — NICOTINE POLACRILEX 2 MG: 2 GUM, CHEWING ORAL at 00:57

## 2021-01-23 RX ADMIN — BUPRENORPHINE HYDROCHLORIDE, NALOXONE HYDROCHLORIDE 1 FILM: 8; 2 FILM, SOLUBLE BUCCAL; SUBLINGUAL at 11:18

## 2021-01-23 RX ADMIN — LACTULOSE 20 G: 20 SOLUTION ORAL at 17:58

## 2021-01-23 RX ADMIN — Medication 1 TABLET: at 11:18

## 2021-01-23 RX ADMIN — CEFTRIAXONE 1000 MG: 1 INJECTION, SOLUTION INTRAVENOUS at 07:03

## 2021-01-23 RX ADMIN — FOLIC ACID 1 MG: 1 TABLET ORAL at 11:18

## 2021-01-23 RX ADMIN — NICOTINE POLACRILEX 2 MG: 2 GUM, CHEWING ORAL at 17:58

## 2021-01-23 RX ADMIN — SPIRONOLACTONE 100 MG: 25 TABLET, FILM COATED ORAL at 11:17

## 2021-01-23 RX ADMIN — Medication 21 MG: at 11:26

## 2021-01-23 RX ADMIN — THIAMINE HCL TAB 100 MG 100 MG: 100 TAB at 11:17

## 2021-01-23 RX ADMIN — ENOXAPARIN SODIUM 40 MG: 40 INJECTION SUBCUTANEOUS at 11:17

## 2021-01-23 RX ADMIN — LORAZEPAM 2 MG: 2 INJECTION INTRAMUSCULAR; INTRAVENOUS at 03:23

## 2021-01-23 RX ADMIN — LACTULOSE 20 G: 20 SOLUTION ORAL at 11:17

## 2021-01-23 RX ADMIN — HALOPERIDOL LACTATE 5 MG: 5 INJECTION, SOLUTION INTRAMUSCULAR at 04:30

## 2021-01-23 RX ADMIN — BUPRENORPHINE HYDROCHLORIDE, NALOXONE HYDROCHLORIDE 1 FILM: 8; 2 FILM, SOLUBLE BUCCAL; SUBLINGUAL at 17:57

## 2021-01-23 RX ADMIN — ESCITALOPRAM OXALATE 10 MG: 10 TABLET ORAL at 11:19

## 2021-01-23 NOTE — PROGRESS NOTES
Progress Note - [facility] 1985, 28 y o  male MRN: [id number]    Unit/Bed#: -01 Encounter: [id number]    Primary Care Provider: Candy Perez PA-C   Date and time admitted to hospital: 1/19/2021  5:57 PM        * Lower extremity cellulitis  Assessment & Plan  · Erythema of bilateral lower extremities likely secondary to severe edema  Possible infection but thought less likely  · Continue with Rocephin, plan to complete 7 total days antibiotics    Macrocytic anemia  Assessment & Plan  · Patient's hemoglobin slowly trending downward  Likely multifactorial including anemia of chronic disease (liver dz) as well as acute blood loss anemia secondary to leg wound  · Consider transfusing if hemoglobin drops below 7 or if patient becomes symptomatic  · Vitamin B12 normal @ 660  · Folic acid is low @ 2 5  Continue folic acid supplement  · Transfuse 1 unit PRBCs, check stool for occult blood 01/23/2020    Decompensated hepatic cirrhosis (HCC)  Assessment & Plan  · With associated ascites, splenomegaly, elevated INR, anemia, thrombocytopenia and hyperammonemia likely related to alcohol abuse  · CTA chest (1/20/2021): Hepatomegaly, splenomegaly and ascites consistent with cirrhosis  · Started on lactulose to maintain 2-3 BMs per day given elevated ammonia level  Patient's mental status is improving  · GI was consulted and they ordered a hepatitis panel, autoimmune serologies (AMA, MOTNSE, ASMA), iron panel to rule out other etiologies of cirrhosis  · Hepatitis panel: neg  · MONTSE: neg  · Iron panel (iron: 125, ferritin: 753, 71% sat, TIBC: 175)  · AFP: 4 5      Essential hypertension  Assessment & Plan  · Patient has a history of HTN but admits to no longer taking HCTZ  · BPs are acceptable for now  Monitor closely while receiving IV Lasix          Progress Note - [facility] 28 y o  male MRN: [id number]    Unit/Bed#: -01 Encounter: [id number]        Subjective:   Patient seen and examined at bedside, he is sleeping comfortably, offers no acute complaints    Objective:     Vitals:   Vitals:    01/23/21 0746   BP: (!) 117/40   Pulse: 104   Resp:    Temp: 98 8 °F (37 1 °C)   SpO2: 90%     Body mass index is 34 3 kg/m²  No intake or output data in the 24 hours ending 01/23/21 0853    Physical Exam:   BP (!) 117/40 (BP Location: Right arm)   Pulse 104   Temp 98 8 °F (37 1 °C) (Tympanic)   Resp 18   Ht 6' 1" (1 854 m)   Wt 118 kg (260 lb)   SpO2 90%   BMI 34 30 kg/m²   General appearance: alert and oriented, in no acute distress  Head: Normocephalic, without obvious abnormality, atraumatic  Lungs:  Diminished bilaterally  Heart: regular rate and rhythm, S1, S2 normal, no murmur, click, rub or gallop  Abdomen:  Soft, distended, no rebound or guarding or tenderness  Extremities:  Right lower extremity wound noted, it appears improving, moderate edema  Pulses: 2+ and symmetric  Neurologic: Grossly normal     Invasive Devices     Peripheral Intravenous Line            Peripheral IV 01/19/21 Left Antecubital 3 days                Results from last 7 days   Lab Units 01/23/21  0645 01/22/21  0623 01/21/21  0633   WBC Thousand/uL 4 90 4 68 3 84*   HEMOGLOBIN g/dL 7 0* 7 7* 7 6*   HEMATOCRIT % 21 6* 23 3* 23 5*   PLATELETS Thousands/uL 145* 121* 99*       Results from last 7 days   Lab Units 01/23/21  0645 01/22/21  0623 01/21/21  0633  01/19/21  2323   POTASSIUM mmol/L 3 3* 3 6 3 7   < >  --    CHLORIDE mmol/L 93* 93* 95*   < >  --    CO2 mmol/L 28 29 29   < >  --    CO2, I-STAT mmol/L  --   --   --   --  31   BUN mg/dL 10 8 6   < >  --    CREATININE mg/dL 0 47* 0 43* 0 43*   < >  --    CALCIUM mg/dL 8 3* 8 6 8 4   < >  --    ALK PHOS U/L 291 4* 345 6* 380 3*   < >  --    ALT U/L 16 17 17   < >  --    AST U/L 120* 129* 154*   < >  --    GLUCOSE, ISTAT mg/dl  --   --   --   --  94    < > = values in this interval not displayed         Medication Administration - last 24 hours from 01/22/2021 0853 to 01/23/2021 0853       Date/Time Order Dose Route Action Action by     01/23/2021 0703 cefTRIAXone (ROCEPHIN) IVPB (premix in dextrose) 1,000 mg 50 mL 1,000 mg Intravenous [address] Caity Cowan, BARBARA     01/22/2021 1409 buprenorphine-naloxone (SUBOXONE) 8-2 mg per SL film 1 Film 1 Film Sublingual Given Spencer Feliciano RN     01/23/2021 0057 nicotine polacrilex (NICORETTE) gum 2 mg 2 mg Oral Given Caity Cowan, BARBARA     01/22/2021 2242 nicotine polacrilex (NICORETTE) gum 2 mg 2 mg Oral Given Caity Cowan RN     01/22/2021 1828 nicotine polacrilex (NICORETTE) gum 2 mg 2 mg Oral Given Spencer Feliciano RN     01/22/2021 1357 nicotine polacrilex (NICORETTE) gum 2 mg 2 mg Oral Given Spencer Feliciano RN     01/22/2021 1958 lactulose oral solution 20 g 20 g Oral Not Given Caity Cowan, BARBARA     01/22/2021 1828 potassium chloride (K-DUR,KLOR-CON) CR tablet 40 mEq 40 mEq Oral Given Spencer Feliciano RN     01/23/2021 0323 LORazepam (ATIVAN) injection 2 mg 2 mg Intravenous Given Caity Cowan RN     01/23/2021 0430 haloperidol lactate (HALDOL) injection 5 mg 5 mg Intramuscular Given Caity Cowan RN            Lab, Imaging and other studies: I have personally reviewed pertinent reports      VTE Pharmacologic Prophylaxis:  Lovenox  VTE Mechanical Prophylaxis: sequential compression device     Jo-Ann Juarez MD  1/23/2021,8:53 AM

## 2021-01-23 NOTE — PROGRESS NOTES
Patient examined spoke with the nurse patient is alert awake cooperative resting in the bed he was anxious restless probably he was going through alcohol withdrawal he needed to give and Ativan  Currently patient is resting and he is sleepy but he is able to communicate his feelings well he feels all right no new complaints  Patient is suffering from substance abuse alcoholism and drug abuse he is suffering from depression with medical problems  Patient denies auditory or visual hallucinations  Patient denies suicidal or homicidal ideation  Nurse reports that he is doing all right  No behavior problem  No side effects of Lexapro noted  Therapy done with good response  Patient is not in distress  Patient is not shaky at this time  I will follow up

## 2021-01-23 NOTE — ASSESSMENT & PLAN NOTE
· Patient's hemoglobin slowly trending downward  Likely multifactorial including anemia of chronic disease (liver dz) as well as acute blood loss anemia secondary to leg wound  · Consider transfusing if hemoglobin drops below 7 or if patient becomes symptomatic  · Vitamin B12 normal @ 360  · Folic acid is low @ 2 5  Continue folic acid supplement    · Transfuse 1 unit PRBCs, check stool for occult blood 01/23/2020

## 2021-01-23 NOTE — NURSING NOTE
At 3111 patient found by PCA and RN on the floor  No loss of consciousness, per patient did not hit his head  Patient was becoming increasenly more confused prior to fall and ativan one time was given almost two hours prior  Non compliant with fall precautions and would constantly get up out of the bed  Medical resident [facility] aware  Will continue to monitor patient   Vitals WNL

## 2021-01-23 NOTE — ASSESSMENT & PLAN NOTE
· Erythema of bilateral lower extremities likely secondary to severe edema  Possible infection but thought less likely    · Continue with Rocephin, plan to complete 7 total days antibiotics

## 2021-01-24 LAB
ABO GROUP BLD BPU: NORMAL
ALBUMIN SERPL BCP-MCNC: 3 G/DL (ref 3.4–4.8)
ALP SERPL-CCNC: 301.2 U/L (ref 10–129)
ALT SERPL W P-5'-P-CCNC: 19 U/L (ref 5–63)
ANION GAP SERPL CALCULATED.3IONS-SCNC: 8 MMOL/L (ref 4–13)
AST SERPL W P-5'-P-CCNC: 134 U/L (ref 15–41)
BASOPHILS # BLD AUTO: 0.01 THOUSANDS/ΜL (ref 0–0.1)
BASOPHILS NFR BLD AUTO: 0 % (ref 0–1)
BILIRUB SERPL-MCNC: 5.35 MG/DL (ref 0.3–1.2)
BPU ID: NORMAL
BUN SERPL-MCNC: 10 MG/DL (ref 6–20)
CALCIUM ALBUM COR SERPL-MCNC: 9.4 MG/DL (ref 8.3–10.1)
CALCIUM SERPL-MCNC: 8.6 MG/DL (ref 8.4–10.2)
CHLORIDE SERPL-SCNC: 93 MMOL/L (ref 96–108)
CO2 SERPL-SCNC: 29 MMOL/L (ref 22–33)
CREAT SERPL-MCNC: 0.47 MG/DL (ref 0.5–1.2)
CROSSMATCH: NORMAL
EOSINOPHIL # BLD AUTO: 0.08 THOUSAND/ΜL (ref 0–0.61)
EOSINOPHIL NFR BLD AUTO: 2 % (ref 0–6)
ERYTHROCYTE [DISTWIDTH] IN BLOOD BY AUTOMATED COUNT: 22.8 % (ref 11.6–15.1)
GFR SERPL CREATININE-BSD FRML MDRD: 144 ML/MIN/1.73SQ M
GLUCOSE SERPL-MCNC: 86 MG/DL (ref 65–140)
HCT VFR BLD AUTO: 24.2 % (ref 36.5–49.3)
HGB BLD-MCNC: 7.9 G/DL (ref 12–17)
IMM GRANULOCYTES # BLD AUTO: 0.02 THOUSAND/UL (ref 0–0.2)
IMM GRANULOCYTES NFR BLD AUTO: 0 % (ref 0–2)
LYMPHOCYTES # BLD AUTO: 0.9 THOUSANDS/ΜL (ref 0.6–4.47)
LYMPHOCYTES NFR BLD AUTO: 19 % (ref 14–44)
MCH RBC QN AUTO: 35.1 PG (ref 26.8–34.3)
MCHC RBC AUTO-ENTMCNC: 32.6 G/DL (ref 31.4–37.4)
MCV RBC AUTO: 108 FL (ref 82–98)
MONOCYTES # BLD AUTO: 0.93 THOUSAND/ΜL (ref 0.17–1.22)
MONOCYTES NFR BLD AUTO: 20 % (ref 4–12)
NEUTROPHILS # BLD AUTO: 2.76 THOUSANDS/ΜL (ref 1.85–7.62)
NEUTS SEG NFR BLD AUTO: 59 % (ref 43–75)
PLATELET # BLD AUTO: 167 THOUSANDS/UL (ref 149–390)
PMV BLD AUTO: 8.9 FL (ref 8.9–12.7)
POTASSIUM SERPL-SCNC: 3.4 MMOL/L (ref 3.5–5)
PROT SERPL-MCNC: 7 G/DL (ref 6.4–8.3)
RBC # BLD AUTO: 2.25 MILLION/UL (ref 3.88–5.62)
SODIUM SERPL-SCNC: 130 MMOL/L (ref 133–145)
UNIT DISPENSE STATUS: NORMAL
UNIT PRODUCT CODE: NORMAL
UNIT RH: NORMAL
WBC # BLD AUTO: 4.7 THOUSAND/UL (ref 4.31–10.16)

## 2021-01-24 PROCEDURE — 99232 SBSQ HOSP IP/OBS MODERATE 35: CPT | Performed by: PHYSICIAN ASSISTANT

## 2021-01-24 PROCEDURE — 80053 COMPREHEN METABOLIC PANEL: CPT | Performed by: FAMILY MEDICINE

## 2021-01-24 PROCEDURE — 0HQKXZZ REPAIR RIGHT LOWER LEG SKIN, EXTERNAL APPROACH: ICD-10-PCS | Performed by: EMERGENCY MEDICINE

## 2021-01-24 PROCEDURE — 85025 COMPLETE CBC W/AUTO DIFF WBC: CPT | Performed by: FAMILY MEDICINE

## 2021-01-24 RX ORDER — LORAZEPAM 0.5 MG/1
0.5 TABLET ORAL EVERY 8 HOURS PRN
Status: DISCONTINUED | OUTPATIENT
Start: 2021-01-24 | End: 2021-01-25 | Stop reason: HOSPADM

## 2021-01-24 RX ORDER — SPIRONOLACTONE 25 MG/1
50 TABLET ORAL DAILY
Status: DISCONTINUED | OUTPATIENT
Start: 2021-01-25 | End: 2021-01-25 | Stop reason: HOSPADM

## 2021-01-24 RX ORDER — POTASSIUM CHLORIDE 20 MEQ/1
20 TABLET, EXTENDED RELEASE ORAL 2 TIMES DAILY
Status: COMPLETED | OUTPATIENT
Start: 2021-01-24 | End: 2021-01-24

## 2021-01-24 RX ORDER — FUROSEMIDE 40 MG/1
40 TABLET ORAL DAILY
Status: DISCONTINUED | OUTPATIENT
Start: 2021-01-24 | End: 2021-01-25 | Stop reason: HOSPADM

## 2021-01-24 RX ADMIN — NICOTINE POLACRILEX 2 MG: 2 GUM, CHEWING ORAL at 23:45

## 2021-01-24 RX ADMIN — FOLIC ACID 1 MG: 1 TABLET ORAL at 08:15

## 2021-01-24 RX ADMIN — LACTULOSE 20 G: 20 SOLUTION ORAL at 08:14

## 2021-01-24 RX ADMIN — BUPRENORPHINE HYDROCHLORIDE, NALOXONE HYDROCHLORIDE 1 FILM: 8; 2 FILM, SOLUBLE BUCCAL; SUBLINGUAL at 13:24

## 2021-01-24 RX ADMIN — THIAMINE HCL TAB 100 MG 100 MG: 100 TAB at 08:15

## 2021-01-24 RX ADMIN — POTASSIUM CHLORIDE 20 MEQ: 1500 TABLET, EXTENDED RELEASE ORAL at 13:24

## 2021-01-24 RX ADMIN — POTASSIUM CHLORIDE 20 MEQ: 1500 TABLET, EXTENDED RELEASE ORAL at 17:25

## 2021-01-24 RX ADMIN — ENOXAPARIN SODIUM 40 MG: 40 INJECTION SUBCUTANEOUS at 08:15

## 2021-01-24 RX ADMIN — NICOTINE POLACRILEX 2 MG: 2 GUM, CHEWING ORAL at 04:52

## 2021-01-24 RX ADMIN — NICOTINE POLACRILEX 2 MG: 2 GUM, CHEWING ORAL at 21:26

## 2021-01-24 RX ADMIN — NICOTINE POLACRILEX 2 MG: 2 GUM, CHEWING ORAL at 16:32

## 2021-01-24 RX ADMIN — NICOTINE POLACRILEX 2 MG: 2 GUM, CHEWING ORAL at 01:55

## 2021-01-24 RX ADMIN — Medication 21 MG: at 08:16

## 2021-01-24 RX ADMIN — NICOTINE POLACRILEX 2 MG: 2 GUM, CHEWING ORAL at 13:24

## 2021-01-24 RX ADMIN — CEFTRIAXONE 1000 MG: 1 INJECTION, SOLUTION INTRAVENOUS at 04:52

## 2021-01-24 RX ADMIN — NICOTINE POLACRILEX 2 MG: 2 GUM, CHEWING ORAL at 07:03

## 2021-01-24 RX ADMIN — ESCITALOPRAM OXALATE 10 MG: 10 TABLET ORAL at 08:15

## 2021-01-24 RX ADMIN — BUPRENORPHINE HYDROCHLORIDE, NALOXONE HYDROCHLORIDE 1 FILM: 8; 2 FILM, SOLUBLE BUCCAL; SUBLINGUAL at 08:24

## 2021-01-24 RX ADMIN — NICOTINE POLACRILEX 2 MG: 2 GUM, CHEWING ORAL at 19:33

## 2021-01-24 RX ADMIN — LACTULOSE 20 G: 20 SOLUTION ORAL at 17:25

## 2021-01-24 RX ADMIN — FUROSEMIDE 40 MG: 40 TABLET ORAL at 17:25

## 2021-01-24 RX ADMIN — Medication 1 TABLET: at 08:15

## 2021-01-24 NOTE — PROGRESS NOTES
Patient examined spoke with the nurse patient is alert awake cooperative he reports that he was out of it yesterday he went through a little withdrawal and he needed the stat dose of Ativan he looks little better but still little jittery inside he is aware that he probably gone through the alcohol withdrawal and he is fully aware that he has a drinking problem and he is not going to drink anymore  Patient is encouraged to consider outpatient alcohol drug counseling AA meeting and psychiatric follow-up after the discharge  Patient is seems resistive to recommendation but he agreed that he is not going to drink alcohol anymore  Nurse reports that he is doing all right no behavior problem  Patient is able to express his feelings well he is not confused no shakes noted  Patient is depressed  No psychosis no hallucinations  Patient denies feeling suicidal   No side effects of Lexapro noted  I will order Ativan 0 5 mg p o  Q 8 hours p r n  For anxiety alcohol withdrawal   Discussed with the nurse  Continue follow-up

## 2021-01-24 NOTE — ASSESSMENT & PLAN NOTE
Currently on 1 5L FR  C/w Aldactone and Lasix  Give additional dose of potassium supplement for mild hypokalemia

## 2021-01-24 NOTE — ASSESSMENT & PLAN NOTE
· Patient has a history of alcohol abuse and admits to drinking 1/2 liter of vodka per day  · Continue Lexapro 10mg for depression per psychiatry  · Continue folic acid, thiamine, and centrum multivitamin  · Patient is refusing inpatient drug and alcohol rehab  · Strongly encourage patient to attend outpatient rehab  Discussed with patient's sister 1/21  She will also encourage patient to participate in rehab

## 2021-01-24 NOTE — ASSESSMENT & PLAN NOTE
· Patient has a history of HTN but admits to no longer taking HCTZ  · BPs are running low - continue to monitor  · IV lasix held the past 2 days based on hold parameters  · Will decrease dose of spironolactone to 50mg daily and will discontinue IV lasix, attempt to transition to PO lasix 40mg daily

## 2021-01-24 NOTE — ASSESSMENT & PLAN NOTE
· Erythema of bilateral lower extremities likely secondary to severe edema  Possible infection but thought less likely  · Continue with IV Rocephin for now day 6/7, plan to complete 7 total days antibiotics  · Will need specific wound care recommendations from podiatry prior to discharge  Question need for home nursing for dressing changes?

## 2021-01-24 NOTE — ASSESSMENT & PLAN NOTE
· Patients last drink was on 1/19/2021  He admits to a history of drinking 1/2 liter of vodka per day  · Discussed case with Toxicology  Patient received several doses of IV phenobarbital 65mg  Patient appears to be much improved from a withdrawal standpoint  · Patient would benefit from alcohol rehab, either in-patient or out-patient  Discussed this with her sister 1/21  She will discuss this further with the patient and their family to try and encourage him as he currently is not interested  · As phenobarb given, no need for CIWA  Phenobarb long acting  · P o   Ativan 0 5 mg is available every 8 hours PRN

## 2021-01-24 NOTE — ASSESSMENT & PLAN NOTE
· Patient is currently fluid overloaded due to his liver disease  · CT chest was suggestive of moderate ascites  · US abdomen 1/21/2021:  Small amount of ascites  Gallbladder wall thickening without stones or sonographic Desouza sign  Enlarged echogenic liver is most likely related to steatosis  The spleen is borderline enlarged  · IV Lasix 40 mg daily (started 01/21/2021) has not been given the past 2 mornings due to meeting low blood pressure parameters  Will change to PO lasix 40mg daily in preparation for discharge  · Aldactone was being uptitrated, however, due to ongoing low BP, will decrease back to 50mg daily  · Low Na 1 5L FR diet  · Mild ascites noted on ultrasound, although limited study    Hold off on paracentesis as unlikely to be of benefit

## 2021-01-24 NOTE — ASSESSMENT & PLAN NOTE
· Likely multifactorial including anemia of chronic disease (liver dz) as well as acute blood loss anemia secondary to leg wound  · Vitamin B12 normal @ 386  · Folic acid is low @ 2 5  Continue folic acid supplement  · Transfused 1 unit PRBCs on 1/23/21  Consider additional transfusion if hemoglobin drops below 7 or if patient becomes symptomatic    · check stool for occult blood

## 2021-01-24 NOTE — ASSESSMENT & PLAN NOTE
· Likely due to chronic alcohol abuse and underlying liver dysfunction  · Platelet counts on 7/62/6820 are 99  Currently, no evidence of bleeding     · Platelets improving

## 2021-01-24 NOTE — ASSESSMENT & PLAN NOTE
· With associated ascites, splenomegaly, elevated INR, anemia, thrombocytopenia and hyperammonemia likely related to alcohol abuse  · CTA chest (1/20/2021): Hepatomegaly, splenomegaly and ascites consistent with cirrhosis  · Started on lactulose to maintain 2-3 BMs per day given elevated ammonia level  Patient's mental status is improving  · GI was consulted and they ordered a hepatitis panel, autoimmune serologies (AMA, MONTSE, ASMA), iron panel to rule out other etiologies of cirrhosis  · Hepatitis panel: neg  · MONTSE: neg  · Iron panel (iron: 125, ferritin: 753, 71% sat, TIBC: 175)  · AFP: 4 5    Recommended spironolactone, lasix for discharge  Dosing adjusted based on hypotension this morning

## 2021-01-24 NOTE — PROGRESS NOTES
Progress Note - Nicolás [facility]lvarez 1985, [address] y o  male MRN: [id number]    Unit/Bed#: -01 Encounter: [id number]    Primary Care Provider: Beth Huntley PA-C   Date and time admitted to hospital: 1/19/2021  5:57 PM        Decompensated hepatic cirrhosis ([facility] )  Assessment & Plan  · With associated ascites, splenomegaly, elevated INR, anemia, thrombocytopenia and hyperammonemia likely related to alcohol abuse  · CTA chest (1/20/2021): Hepatomegaly, splenomegaly and ascites consistent with cirrhosis  · Started on lactulose to maintain 2-3 BMs per day given elevated ammonia level  Patient's mental status is improving  · GI was consulted and they ordered a hepatitis panel, autoimmune serologies (AMA, MONTSE, ASMA), iron panel to rule out other etiologies of cirrhosis  · Hepatitis panel: neg  · MONTSE: neg  · Iron panel (iron: 125, ferritin: 753, 71% sat, TIBC: 175)  · AFP: 4 5    Recommended spironolactone, lasix for discharge  Dosing adjusted based on hypotension this morning  Anasarca  Assessment & Plan  · Patient is currently fluid overloaded due to his liver disease  · CT chest was suggestive of moderate ascites  · US abdomen 1/21/2021:  Small amount of ascites  Gallbladder wall thickening without stones or sonographic Desouza sign  Enlarged echogenic liver is most likely related to steatosis  The spleen is borderline enlarged  · IV Lasix 40 mg daily (started 01/21/2021) has not been given the past 2 mornings due to meeting low blood pressure parameters  Will change to PO lasix 40mg daily in preparation for discharge  · Aldactone was being uptitrated, however, due to ongoing low BP, will decrease back to 50mg daily  · Low Na 1 5L FR diet  · Mild ascites noted on ultrasound, although limited study  Hold off on paracentesis as unlikely to be of benefit    Alcohol withdrawal ([facility] )  Assessment & Plan  · Patients last drink was on 1/19/2021   He admits to a history of drinking 1/2 liter of vodka per day   · Discussed case with Toxicology  Patient received several doses of IV phenobarbital 65mg  Patient appears to be much improved from a withdrawal standpoint  · Patient would benefit from alcohol rehab, either in-patient or out-patient  Discussed this with her sister 1/21  She will discuss this further with the patient and their family to try and encourage him as he currently is not interested  · As phenobarb given, no need for CIWA  Phenobarb long acting  · P o  Ativan 0 5 mg is available every 8 hours PRN    Macrocytic anemia  Assessment & Plan  · Likely multifactorial including anemia of chronic disease (liver dz) as well as acute blood loss anemia secondary to leg wound  · Vitamin B12 normal @ 877  · Folic acid is low @ 2 5  Continue folic acid supplement  · Transfused 1 unit PRBCs on 1/23/21  Consider additional transfusion if hemoglobin drops below 7 or if patient becomes symptomatic  · check stool for occult blood     * Lower extremity cellulitis  Assessment & Plan  · Erythema of bilateral lower extremities likely secondary to severe edema  Possible infection but thought less likely  · Continue with IV Rocephin for now day 6/7, plan to complete 7 total days antibiotics  · Will need specific wound care recommendations from podiatry prior to discharge  Question need for home nursing for dressing changes? Hyponatremia  Assessment & Plan  Currently on 1 5L FR  C/w Aldactone and Lasix  Give additional dose of potassium supplement for mild hypokalemia    Abnormal CT scan  Assessment & Plan  · CTA chest (1/20/2021): Mild abnormal appearance of the pancrease, most compatible with acute pancreatitis     · Currently, patient denies any symptoms suggestive of acute pancreatitis and lipase is normal     Thoracoabdominal aortic aneurysm [facility])  Assessment & Plan  · CTA chest (1/20/2021): 4 4cm fusiform thoracoabdominal aortic aneurysm that arises from the descending thoracic aorta and extends into the abdomen  · Informed patient of this incidental finding and explained that he will need to follow up with CT surgery as an outpatient for regular US monitoring  Will place ambulatory referral to CT surgery at discharge  Thrombocytopenia ([address] )  Assessment & Plan  · Likely due to chronic alcohol abuse and underlying liver dysfunction  · Platelet counts on 5/29/1284 are 99  Currently, no evidence of bleeding  · Platelets improving    Tobacco dependence  Assessment & Plan  · Patient is a 1ppd smoker  · Continue nicotine patch and nicotine gum  Substance abuse [facility])  Assessment & Plan  · Patient has a history of substance abuse  States that he last used one year ago  · Continue suboxone as prescribed  Dosing confirmed via PDMP  · Continue Suboxone 8 mg @ 8AM and 12 noon  Alcohol abuse  Assessment & Plan  · Patient has a history of alcohol abuse and admits to drinking 1/2 liter of vodka per day  · Continue Lexapro 10mg for depression per psychiatry  · Continue folic acid, thiamine, and centrum multivitamin  · Patient is refusing inpatient drug and alcohol rehab  · Strongly encourage patient to attend outpatient rehab  Discussed with patient's sister 1/21  She will also encourage patient to participate in rehab  Class 1 obesity in adult  Assessment & Plan  · Weight loss encouraged    Essential hypertension  Assessment & Plan  · Patient has a history of HTN but admits to no longer taking HCTZ  · BPs are running low - continue to monitor  · IV lasix held the past 2 days based on hold parameters  · Will decrease dose of spironolactone to 50mg daily and will discontinue IV lasix, attempt to transition to PO lasix 40mg daily  Mild intermittent asthma without complication  Assessment & Plan  · No acute exacerbation  · Continue PRN albuterol          VTE Pharmacologic Prophylaxis:   Pharmacologic: contraindicated due to anemia  Mechanical VTE Prophylaxis in Place: No        Time Spent for Care: 30 minutes  More than 50% of total time spent on counseling and coordination of care as described above  Current Length of Stay: 3 day(s)    Current Patient Status: Inpatient   Certification Statement: The patient will continue to require additional inpatient hospital stay due to need for close monitoring of labs and blood pressure, GI, podiatry evaluation  Discharge Plan / Estimated Discharge Date: TBD - possibly tomorrow if labs stable and BP improves  Code Status: Level 1 - Full Code      Subjective:   Patient is feeling tired and weak today, but ambulating around the room without difficulty  Notes not taking full dose of lactulose due to fear of excessive diarrhea / urgency  Objective:     Vitals:   Temp (24hrs), Av °F (36 7 °C), Min:97 9 °F (36 6 °C), Max:98 2 °F (36 8 °C)    Temp:  [97 9 °F (36 6 °C)-98 2 °F (36 8 °C)] 97 9 °F (36 6 °C)  HR:  [90-99] 99  Resp:  [18-20] 20  BP: ()/(46-75) 101/61  SpO2:  [91 %-97 %] 96 %  Body mass index is 34 3 kg/m²  Input and Output Summary (last 24 hours): Intake/Output Summary (Last 24 hours) at 2021 1430  Last data filed at 2021 2304  Gross per 24 hour   Intake 535 ml   Output --   Net 535 ml       Physical Exam:     Physical Exam  Vitals signs and nursing note reviewed  Constitutional:       General: He is not in acute distress  Appearance: He is ill-appearing (chronically ill appearing )  HENT:      Head: Normocephalic  Mouth/Throat:      Mouth: Mucous membranes are dry  Cardiovascular:      Rate and Rhythm: Normal rate and regular rhythm  Heart sounds: No murmur  Pulmonary:      Effort: Pulmonary effort is normal       Breath sounds: Normal breath sounds  No wheezing  Abdominal:      General: Abdomen is flat  There is no distension  Palpations: Abdomen is soft  Tenderness: There is no abdominal tenderness        Comments: ascities and diastasis rectii   Musculoskeletal:      Right lower leg: Edema (pitting and non pitting edema present  LE wrapped with dressing showing some signs of serous drainage ) present  Left lower leg: Edema present  Skin:     General: Skin is warm and dry  Neurological:      General: No focal deficit present  Mental Status: He is alert and oriented to person, place, and time  Psychiatric:         Mood and Affect: Mood normal            Additional Data:   Labs:    Results from last 7 days   Lab Units 01/24/21  0459   WBC Thousand/uL 4 70   HEMOGLOBIN g/dL 7 9*   HEMATOCRIT % 24 2*   PLATELETS Thousands/uL 167   NEUTROS PCT % 59   LYMPHS PCT % 19   MONOS PCT % 20*   EOS PCT % 2     Results from last 7 days   Lab Units 01/24/21  0459  01/19/21  2323   POTASSIUM mmol/L 3 4*   < >  --    CHLORIDE mmol/L 93*   < >  --    CO2 mmol/L 29   < >  --    CO2, I-STAT mmol/L  --   --  31   BUN mg/dL 10   < >  --    CREATININE mg/dL 0 47*   < >  --    CALCIUM mg/dL 8 6   < >  --    ALK PHOS U/L 301 2*   < >  --    ALT U/L 19   < >  --    AST U/L 134*   < >  --    GLUCOSE, ISTAT mg/dl  --   --  94    < > = values in this interval not displayed  Results from last 7 days   Lab Units 01/23/21  0645   INR  1 55*       * I Have Reviewed All Lab Data Listed Above  * Additional Pertinent Lab Tests Reviewed: All Labs Within Last 24 Hours Reviewed    Imaging:  Imaging Reports Reviewed Today Include: no new imaging  Recent Cultures (last 7 days):     Results from last 7 days   Lab Units 01/19/21  2320   BLOOD CULTURE  No Growth After 4 Days  No Growth After 4 Days         Last 24 Hours Medication List:   Current Facility-Administered Medications   Medication Dose Route Frequency Provider Last Rate    albuterol  2 5 mg Nebulization Q6H PRN Lizandro Osorio MD      buprenorphine-naloxone  1 Film Sublingual BID Sandra Howard PA-C      cefTRIAXone  1,000 mg Intravenous Q24H Lizandro Osorio MD 1,000 mg (01/24/21 0452)    enoxaparin  40 mg Subcutaneous Q24H [facility] Haley Randhawa MD      escitalopram  10 mg Oral Daily Moses Brock MD      folic acid  1 mg Oral Daily Gay Gonzalez MD      furosemide  40 mg Intravenous Daily Geetha Almazan PA-C      lactulose  20 g Oral BID Haley Randhawa MD      LORazepam  0 5 mg Oral Q8H PRN Moses Brock MD      multivitamin-minerals  1 tablet Oral Daily Gay Gonzalez MD      nicotine  21 mg Transdermal Daily Idalmis Garcia MD      nicotine polacrilex  2 mg Oral Q2H PRN Geetha Almazan PA-C      potassium chloride  20 mEq Oral BID Irineo Estrada PA-C      spironolactone  100 mg Oral Daily [facility] MARILEE Siddiqui      thiamine  100 mg Oral Daily Gay Gonzalez MD          Today, Patient Was Seen By: Irineo Estrada PA-C    ** Please Note: Dragon 360 Dictation voice to text software may have been used in the creation of this document   **

## 2021-01-25 ENCOUNTER — DOCUMENTATION (OUTPATIENT)
Dept: CARDIOLOGY UNIT | Facility: HOSPITAL | Age: 36
End: 2021-01-25

## 2021-01-25 VITALS
BODY MASS INDEX: 36.52 KG/M2 | DIASTOLIC BLOOD PRESSURE: 72 MMHG | OXYGEN SATURATION: 95 % | RESPIRATION RATE: 20 BRPM | HEIGHT: 73 IN | TEMPERATURE: 97.7 F | SYSTOLIC BLOOD PRESSURE: 104 MMHG | HEART RATE: 93 BPM | WEIGHT: 275.57 LBS

## 2021-01-25 LAB
ALBUMIN SERPL BCP-MCNC: 3.1 G/DL (ref 3.4–4.8)
ALP SERPL-CCNC: 323.3 U/L (ref 10–129)
ALT SERPL W P-5'-P-CCNC: 25 U/L (ref 5–63)
AMMONIA PLAS-SCNC: 94.3 UMOL/L
ANION GAP SERPL CALCULATED.3IONS-SCNC: 8 MMOL/L (ref 4–13)
AST SERPL W P-5'-P-CCNC: 159 U/L (ref 15–41)
BACTERIA BLD CULT: NORMAL
BACTERIA BLD CULT: NORMAL
BASOPHILS # BLD AUTO: 0.03 THOUSANDS/ΜL (ref 0–0.1)
BASOPHILS NFR BLD AUTO: 1 % (ref 0–1)
BILIRUB SERPL-MCNC: 5.63 MG/DL (ref 0.3–1.2)
BUN SERPL-MCNC: 9 MG/DL (ref 6–20)
CALCIUM ALBUM COR SERPL-MCNC: 9.4 MG/DL (ref 8.3–10.1)
CALCIUM SERPL-MCNC: 8.7 MG/DL (ref 8.4–10.2)
CHLORIDE SERPL-SCNC: 94 MMOL/L (ref 96–108)
CO2 SERPL-SCNC: 29 MMOL/L (ref 22–33)
CREAT SERPL-MCNC: 0.5 MG/DL (ref 0.5–1.2)
EOSINOPHIL # BLD AUTO: 0.06 THOUSAND/ΜL (ref 0–0.61)
EOSINOPHIL NFR BLD AUTO: 1 % (ref 0–6)
ERYTHROCYTE [DISTWIDTH] IN BLOOD BY AUTOMATED COUNT: 22.2 % (ref 11.6–15.1)
GFR SERPL CREATININE-BSD FRML MDRD: 140 ML/MIN/1.73SQ M
GLUCOSE SERPL-MCNC: 84 MG/DL (ref 65–140)
HCT VFR BLD AUTO: 25.3 % (ref 36.5–49.3)
HEMOCCULT STL QL IA: NEGATIVE
HGB BLD-MCNC: 8.2 G/DL (ref 12–17)
IMM GRANULOCYTES # BLD AUTO: 0.01 THOUSAND/UL (ref 0–0.2)
IMM GRANULOCYTES NFR BLD AUTO: 0 % (ref 0–2)
LYMPHOCYTES # BLD AUTO: 0.91 THOUSANDS/ΜL (ref 0.6–4.47)
LYMPHOCYTES NFR BLD AUTO: 19 % (ref 14–44)
MCH RBC QN AUTO: 35 PG (ref 26.8–34.3)
MCHC RBC AUTO-ENTMCNC: 32.4 G/DL (ref 31.4–37.4)
MCV RBC AUTO: 108 FL (ref 82–98)
MONOCYTES # BLD AUTO: 1 THOUSAND/ΜL (ref 0.17–1.22)
MONOCYTES NFR BLD AUTO: 21 % (ref 4–12)
NEUTROPHILS # BLD AUTO: 2.83 THOUSANDS/ΜL (ref 1.85–7.62)
NEUTS SEG NFR BLD AUTO: 58 % (ref 43–75)
PLATELET # BLD AUTO: 195 THOUSANDS/UL (ref 149–390)
PMV BLD AUTO: 8.8 FL (ref 8.9–12.7)
POTASSIUM SERPL-SCNC: 3.6 MMOL/L (ref 3.5–5)
PROT SERPL-MCNC: 7.3 G/DL (ref 6.4–8.3)
RBC # BLD AUTO: 2.34 MILLION/UL (ref 3.88–5.62)
SODIUM SERPL-SCNC: 131 MMOL/L (ref 133–145)
WBC # BLD AUTO: 4.84 THOUSAND/UL (ref 4.31–10.16)

## 2021-01-25 PROCEDURE — 85025 COMPLETE CBC W/AUTO DIFF WBC: CPT | Performed by: PHYSICIAN ASSISTANT

## 2021-01-25 PROCEDURE — 80053 COMPREHEN METABOLIC PANEL: CPT | Performed by: PHYSICIAN ASSISTANT

## 2021-01-25 PROCEDURE — 99239 HOSP IP/OBS DSCHRG MGMT >30: CPT | Performed by: INTERNAL MEDICINE

## 2021-01-25 PROCEDURE — 82140 ASSAY OF AMMONIA: CPT | Performed by: NURSE PRACTITIONER

## 2021-01-25 PROCEDURE — 99232 SBSQ HOSP IP/OBS MODERATE 35: CPT | Performed by: INTERNAL MEDICINE

## 2021-01-25 PROCEDURE — 82784 ASSAY IGA/IGD/IGG/IGM EACH: CPT | Performed by: NURSE PRACTITIONER

## 2021-01-25 PROCEDURE — G0328 FECAL BLOOD SCRN IMMUNOASSAY: HCPCS | Performed by: INTERNAL MEDICINE

## 2021-01-25 RX ORDER — ALBUTEROL SULFATE 90 UG/1
2 AEROSOL, METERED RESPIRATORY (INHALATION) EVERY 6 HOURS PRN
Qty: 1 INHALER | Refills: 0 | Status: ON HOLD | OUTPATIENT
Start: 2021-01-25

## 2021-01-25 RX ORDER — LANOLIN ALCOHOL/MO/W.PET/CERES
100 CREAM (GRAM) TOPICAL DAILY
Qty: 30 TABLET | Refills: 0 | Status: SHIPPED | OUTPATIENT
Start: 2021-01-26 | End: 2021-05-18

## 2021-01-25 RX ORDER — LACTULOSE 20 G/30ML
20 SOLUTION ORAL 2 TIMES DAILY
Qty: 1800 ML | Refills: 1 | Status: SHIPPED | OUTPATIENT
Start: 2021-01-25 | End: 2021-05-18

## 2021-01-25 RX ORDER — ESCITALOPRAM OXALATE 10 MG/1
10 TABLET ORAL DAILY
Qty: 30 TABLET | Refills: 1 | Status: ON HOLD | OUTPATIENT
Start: 2021-01-26 | End: 2021-05-26 | Stop reason: SDUPTHER

## 2021-01-25 RX ORDER — FOLIC ACID 1 MG/1
1 TABLET ORAL DAILY
Qty: 30 TABLET | Refills: 1 | Status: SHIPPED | OUTPATIENT
Start: 2021-01-26 | End: 2022-08-02

## 2021-01-25 RX ORDER — SPIRONOLACTONE 50 MG/1
50 TABLET, FILM COATED ORAL DAILY
Qty: 30 TABLET | Refills: 1 | Status: SHIPPED | OUTPATIENT
Start: 2021-01-26 | End: 2021-05-18

## 2021-01-25 RX ORDER — FUROSEMIDE 40 MG/1
40 TABLET ORAL DAILY
Qty: 30 TABLET | Refills: 0 | Status: SHIPPED | OUTPATIENT
Start: 2021-01-26 | End: 2021-06-04 | Stop reason: SDUPTHER

## 2021-01-25 RX ADMIN — NICOTINE POLACRILEX 2 MG: 2 GUM, CHEWING ORAL at 01:37

## 2021-01-25 RX ADMIN — ESCITALOPRAM OXALATE 10 MG: 10 TABLET ORAL at 08:19

## 2021-01-25 RX ADMIN — NICOTINE POLACRILEX 2 MG: 2 GUM, CHEWING ORAL at 08:19

## 2021-01-25 RX ADMIN — FUROSEMIDE 40 MG: 40 TABLET ORAL at 08:20

## 2021-01-25 RX ADMIN — NICOTINE POLACRILEX 2 MG: 2 GUM, CHEWING ORAL at 10:05

## 2021-01-25 RX ADMIN — FOLIC ACID 1 MG: 1 TABLET ORAL at 08:19

## 2021-01-25 RX ADMIN — THIAMINE HCL TAB 100 MG 100 MG: 100 TAB at 08:20

## 2021-01-25 RX ADMIN — ENOXAPARIN SODIUM 40 MG: 40 INJECTION SUBCUTANEOUS at 08:20

## 2021-01-25 RX ADMIN — BUPRENORPHINE HYDROCHLORIDE, NALOXONE HYDROCHLORIDE 1 FILM: 8; 2 FILM, SOLUBLE BUCCAL; SUBLINGUAL at 08:20

## 2021-01-25 RX ADMIN — NICOTINE POLACRILEX 2 MG: 2 GUM, CHEWING ORAL at 04:17

## 2021-01-25 RX ADMIN — LACTULOSE 20 G: 20 SOLUTION ORAL at 08:19

## 2021-01-25 RX ADMIN — CEFTRIAXONE 1000 MG: 1 INJECTION, SOLUTION INTRAVENOUS at 05:27

## 2021-01-25 RX ADMIN — BUPRENORPHINE HYDROCHLORIDE, NALOXONE HYDROCHLORIDE 1 FILM: 8; 2 FILM, SOLUBLE BUCCAL; SUBLINGUAL at 12:58

## 2021-01-25 RX ADMIN — Medication 1 TABLET: at 08:20

## 2021-01-25 RX ADMIN — SPIRONOLACTONE 50 MG: 25 TABLET, FILM COATED ORAL at 08:20

## 2021-01-25 RX ADMIN — Medication 21 MG: at 08:19

## 2021-01-25 NOTE — ASSESSMENT & PLAN NOTE
· With associated ascites, splenomegaly, elevated INR, anemia, thrombocytopenia and hyperammonemia likely related to alcohol abuse  · CTA chest (1/20/2021): Hepatomegaly, splenomegaly and ascites consistent with cirrhosis  · Started on lactulose to maintain 2-3 BMs per day given elevated ammonia level  Patient's mental status is improving  · GI was consulted and they ordered a hepatitis panel, autoimmune serologies (AMA, MONTSE, ASMA), iron panel to rule out other etiologies of cirrhosis    · Hepatitis panel: neg  · MONTSE: neg  · Iron panel (iron: 125, ferritin: 753, 71% sat, TIBC: 175)  · AFP: 4 5    Recommended spironolactone, lasix for discharge
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
## 2021-01-25 NOTE — ASSESSMENT & PLAN NOTE
· Erythema of bilateral lower extremities likely secondary to severe edema  Possible infection but thought less likely  · Continue with IV Rocephin for now day 7/7, plan to complete 7 total days antibiotics     Outpatient wound care follow-up

## 2021-01-25 NOTE — DISCHARGE INSTRUCTIONS
Cirrhosis   WHAT YOU NEED TO KNOW:   Cirrhosis is long-term scarring of the liver  The liver makes enzymes and bile that help digest food and gives your body energy  It also removes harmful material from your body, such as alcohol and other chemicals  Cirrhosis is caused by repeated damage to your liver over time  Scar tissue starts to replace healthy liver tissue  The scar tissue prevents the liver from working properly  DISCHARGE INSTRUCTIONS:   Seek care immediately if:   · You have pain during a bowel movement and it is black or contains blood  · You have a fast heart rate and fast breathing  · You are dizzy or confused  · You have severe pain in your abdomen  · You have trouble breathing  · Your vomit looks like it has coffee grinds or blood in it  Contact your healthcare provider if:   · You have a fever  · You have red or itchy skin  · You are in pain and feel weak  · You have questions or concerns about your condition or care  Medicines: You may need any of the following:  · Antiviral medicine  may be needed if your cirrhosis is caused by hepatitis  Antiviral medicine may prevent or decrease swelling and damage to your liver  · Blood pressure medicine  is used to treat high blood pressure in the portal vein (the vein that goes to your liver)  · Diuretics  decrease extra fluid that collects in a part of your body, such as your legs and abdomen  Diuretics can also decrease your blood pressure  You will urinate more often when you take this medicine  · Antibiotics  help prevent or treat a bacterial infection  · Take your medicine as directed  Contact your healthcare provider if you think your medicine is not helping or if you have side effects  Tell him or her if you are allergic to any medicine  Keep a list of the medicines, vitamins, and herbs you take  Include the amounts, and when and why you take them   Bring the list or the pill bottles to follow-up visits  Carry your medicine list with you in case of an emergency  Do not drink alcohol:  Alcohol will cause more damage to your liver  Manage cirrhosis:   · Do not smoke  Nicotine and other chemicals in cigarettes and cigars can cause blood vessel and lung damage  Ask your healthcare provider for information if you currently smoke and need help to quit  E-cigarettes or smokeless tobacco still contain nicotine  Talk to your healthcare provider before you use these products  · Eat a variety of healthy foods  Healthy foods include fruits, vegetables, whole-grain breads, low-fat dairy products, beans, lean meat, and fish  Ask if you need to be on a special diet  · Reach or maintain a healthy weight  You may develop fatty liver disease if you are overweight  Ask your healthcare provider for a healthy weight for you  He can help you create a safe weight loss plan if you are overweight  · Limit sodium (salt)  You may need to decrease the amount of sodium you eat if you have swelling caused by fluid buildup  Sodium is found in table salt and salty foods such as canned foods, frozen foods, and potato chips  · Drink liquids as directed  Ask how much liquid to drink each day and which liquids are best for you  For most people, good liquids to drink are water, juice, and milk  Liquids can help your liver work better  · Ask about vaccines  You may have a hard time fighting infection because of cirrhosis  Vaccines help protect you against viruses that can cause diseases such as the flu or hepatitis  Viral hepatitis is caused by a virus that leads to inflammation of the liver  You may need a hepatitis A or B vaccine  You may also need a pneumonia vaccine  Always get a flu vaccine each year as soon as it becomes available  · Ask about medicines  Some medicines can harm your liver  Acetaminophen is an example  Talk to your healthcare provider about all your medicines   Do not take any over-the-counter medicine or herbal supplements until your healthcare provider says it is okay  Follow up with your healthcare provider as directed:  Write down your questions so you remember to ask them during your visits  © Copyright [address] Information is for End User's use only and may not be sold, redistributed or otherwise used for commercial purposes  All illustrations and images included in CareNotes® are the copyrighted property of [employer]  or [facility] [employer]asc[employer]  The above information is an  only  It is not intended as medical advice for individual conditions or treatments  Talk to your doctor, nurse or pharmacist before following any medical regimen to see if it is safe and effective for you

## 2021-01-25 NOTE — ASSESSMENT & PLAN NOTE
· Patients last drink was on 1/19/2021  He admits to a history of drinking 1/2 liter of vodka per day  · Discussed case with Toxicology  Patient received several doses of IV phenobarbital 65mg  Patient appears to be much improved from a withdrawal standpoint  · Patient would benefit from alcohol rehab, either in-patient or out-patient  Discussed this with her sister 1/21  She will discuss this further with the patient and their family to try and encourage him as he currently is not interested  · As phenobarb given, no need for CIWA  Phenobarb long acting  · P o  Ativan 0 5 mg is available every 8 hours PRN    Currently doing much better, no signs of withdrawal, alert awake and oriented and ambulating in the hallway without any difficulty

## 2021-01-25 NOTE — ASSESSMENT & PLAN NOTE
· Patient has a history of HTN but admits to no longer taking HCTZ  · BPs are running low - continue to monitor     Oral Lasix and Aldactone, blood pressure better

## 2021-01-25 NOTE — PROGRESS NOTES
Patient examined spoke with the nurse patient is alert awake cooperative and pleasant he is able to communicate his feelings well and admits having a problem with the drinking alcohol  He reports that he is planning to go back to [address] and also his brother was an alcoholic who is sober since 20 years and he can use his support and talk to him even though he does not live locally but he can talk to him on the phone patient is encouraged to go for psychiatric follow-up patient seems to be doing better no shakes and he slept okay  Medical treatment is in progress  Nurse reports that he is doing all right  No side effects of Lexapro noted  Patient offers no new complaints  No psychosis no hallucination patient denies feeling suicidal   Patient is fully aware that if he relapse with the drinking his prognosis will be poor it affects him physically and mentally  I will continue medications and therapy patient seems to be motivated with the recommended treatment for the outpatient treatment patient is fully aware that if he does not follow the recommendation and relapse with the drinking he will be more depressed and affect him physically 2  I will follow up

## 2021-01-25 NOTE — ASSESSMENT & PLAN NOTE
· Likely due to chronic alcohol abuse and underlying liver dysfunction  · Platelet counts on 3/78/4708 are 99  Currently, no evidence of bleeding     · Platelets improving

## 2021-01-25 NOTE — ASSESSMENT & PLAN NOTE
· Patient has a history of alcohol abuse and admits to drinking 1/2 liter of vodka per day  · Continue Lexapro 10mg for depression per psychiatry  · Continue folic acid, thiamine, and centrum multivitamin  · Patient is refusing inpatient drug and alcohol rehab  · Strongly encourage patient to attend outpatient rehab

## 2021-01-25 NOTE — CASE MANAGEMENT
BASIM met with pt to discuss discharge plan  Pt states he earns approx  $3600/m in unemployment and [employer] pension  He confirmed he can afford the OOP cost of his medications  SW advised pt to go to [facility] for cheapest prices and provided [employer]  Pt states he used to see Apryl Gagnon PA-C for primary care, but stopped last September after he lost his job  Pt now plans on going to the [facility] for primary care  Pt confirmed that he drives and can go to the [facility] in [facility] if the [facility] is still closed  Pt also plans on going to the [address] [place] [address] as well  Pt's sister to provide transportation home

## 2021-01-25 NOTE — DISCHARGE INSTR - AVS FIRST PAGE
Follow up with [facility]  Fluid restriction 1200 cc per day  Follow up with GI regarding liver

## 2021-01-25 NOTE — DISCHARGE SUMMARY
Discharge- Caremn Powers 1985, 28 y o  male MRN: [id number]    Unit/Bed#: -01 Encounter: [id number]    Primary Care Provider: Vitor Lechuga PA-C   Date and time admitted to hospital: 1/19/2021  5:57 PM        * Lower extremity cellulitis  Assessment & Plan  · Erythema of bilateral lower extremities likely secondary to severe edema  Possible infection but thought less likely  · Continue with IV Rocephin for now day 7/7, plan to complete 7 total days antibiotics  Outpatient wound care follow-up    Decompensated hepatic cirrhosis ([facility] )  Assessment & Plan  · With associated ascites, splenomegaly, elevated INR, anemia, thrombocytopenia and hyperammonemia likely related to alcohol abuse  · CTA chest (1/20/2021): Hepatomegaly, splenomegaly and ascites consistent with cirrhosis  · Started on lactulose to maintain 2-3 BMs per day given elevated ammonia level  Patient's mental status is improving  · GI was consulted and they ordered a hepatitis panel, autoimmune serologies (AMA, MONTSE, ASMA), iron panel to rule out other etiologies of cirrhosis  · Hepatitis panel: neg  · MONTSE: neg  · Iron panel (iron: 125, ferritin: 753, 71% sat, TIBC: 175)  · AFP: 4 5    Recommended spironolactone, lasix for discharge      Hyponatremia  Assessment & Plan    C/w Aldactone and Lasix  Give additional dose of potassium supplement for mild hypokalemia    Abnormal CT scan  Assessment & Plan  · CTA chest (1/20/2021): Mild abnormal appearance of the pancrease, most compatible with acute pancreatitis  · Currently, patient denies any symptoms suggestive of acute pancreatitis and lipase is normal     Alcohol withdrawal ([facility] )  Assessment & Plan  · Patients last drink was on 1/19/2021  He admits to a history of drinking 1/2 liter of vodka per day  · Discussed case with Toxicology  Patient received several doses of IV phenobarbital 65mg  Patient appears to be much improved from a withdrawal standpoint     · Patient would benefit from alcohol rehab, either in-patient or out-patient  Discussed this with her sister 1/21  She will discuss this further with the patient and their family to try and encourage him as he currently is not interested  · As phenobarb given, no need for CIWA  Phenobarb long acting  · P o  Ativan 0 5 mg is available every 8 hours PRN    Currently doing much better, no signs of withdrawal, alert awake and oriented and ambulating in the hallway without any difficulty  Thrombocytopenia ([address] )  Assessment & Plan  · Likely due to chronic alcohol abuse and underlying liver dysfunction  · Platelet counts on 0/18/6660 are 99  Currently, no evidence of bleeding  · Platelets improving    Tobacco dependence  Assessment & Plan  · Patient is a 1ppd smoker  · Continue nicotine patch and nicotine gum  Substance abuse [facility])  Assessment & Plan  · Patient has a history of substance abuse  States that he last used one year ago  · Continue suboxone as prescribed  Dosing confirmed via PDMP  · Continue Suboxone 8 mg @ 8AM and 12 noon  Alcohol abuse  Assessment & Plan  · Patient has a history of alcohol abuse and admits to drinking 1/2 liter of vodka per day  · Continue Lexapro 10mg for depression per psychiatry  · Continue folic acid, thiamine, and centrum multivitamin  · Patient is refusing inpatient drug and alcohol rehab  · Strongly encourage patient to attend outpatient rehab  Class 1 obesity in adult  Assessment & Plan  · Weight loss encouraged    Essential hypertension  Assessment & Plan  · Patient has a history of HTN but admits to no longer taking HCTZ  · BPs are running low - continue to monitor  Oral Lasix and Aldactone, blood pressure better    Mild intermittent asthma without complication  Assessment & Plan  · No acute exacerbation  · Continue PRN albuterol             General appearance:  Patient not in acute distress  Eyes:  Pupils equal reacting to light, icterus present  ENT:  Moist oral mucous membranes  CVS:  S1-S2 heard, regular rate and rhythm, generalized anasarca  Chest:  Bilateral air entry present, clear to auscultation  Abdomen:  Soft, nontender, bowel sounds present  CNS:  No focal neurological deficits  Genitourinary: deferred  Skin:  Bilateral lower extremity cellulitis improving  psychiatric:  No psychosis  Musculoskeletal:  No joint deformities      Resolved Problems  Date Reviewed: 1/25/2021          Resolved    Unable to care for self 1/21/2021     Resolved by  Shai Guzman PA-C    Alcohol intoxication ([facility] ) 1/19/2021     Resolved by  Dinora Ngo MD    Acute respiratory failure with hypoxia ([facility] ) 1/21/2021     Resolved by  Shai Guzman PA-C          Admission Date:   Admission Orders (From admission, onward)     Ordered        01/21/21 0924  Inpatient Admission  Once         01/19/21 1953  Place in Observation  Once                     Admitting Diagnosis: Alcohol intoxication ([facility] ) [F10 929]  Depression [F32 9]  Anemia [D64 9]  Visit for wound check [Z51 89]  Bilateral lower leg cellulitis [L03 116, T56 782]  Leg wound, right, initial encounter [S81 801A]  Unable to care for self [Z78 9]    HPI: Carmen Gruber is a 28 y o  male with a past medical history of asthma, hypertension, obesity, substance abuse, history of alcohol abuse, PTSD, depression who presents to the emergency department after the patient's mom called 911 because she noticed a pool of blood along the patient's right foot  The patient has a history of right lower leg and foot wound for the past 2 months, which has not been cared for    History obtained in the ED also notes that the patient has not showered, has not been taking care of himself      History of multiple life stressors including losing his job, alcohol abuse, admits to drinking 4 shots on the night of admission, found his girlfriend dead after overdosing on drugs next to him when he awoke 1 morning, has custody issues with ex-wife and his son, reports feeling worsening depression  He reports that he has been drinking "more than usual" since his girlfriend's death       He denied any cough, SOB, chest pain, nausea, vomiting, abdominal pain, urinary complaints  He reports swelling of his lower extremities bilaterally which has been chronic, and he has not been seen by wound care prior to presentation  Chart review notes that the pt was on HCTZ, but denies taking it  He denies any suicidal or homicidal ideation       In the ED,  Initial vitals-afebrile/ HR - 88 /blood pressure-115/55/ saturating at 95 % on 3 L oxygen via nasal cannula   Labs : Macro septic anemia, hemoglobin-8 2, mild thrombocytopenia-platelet count 383, elevated blood alcohol level of 400 2, elevated liver enzymes-AST-174, ALT-17, ALT-370 (likely consistent with alcohol abuse)  Hypoalbuminemia-3 0, hyperbilirubinemia -3 26 (total bilirubin)      COVID-negative  In the ED patient was given 2 g IV cefazolin and 1 L normal saline bolus, urine drug screen and urinalysis are pending  Procedures Performed:   Orders Placed This Encounter   Procedures    Laceration repair       Summary of Hospital Course:  Patient was admitted for bilateral lower extremity cellulitis, exacerbation of cirrhosis with fluid overload, received some Lasix, antibiotics for bilateral lower extremity cellulitis, seen by Gastroenterology as well  Patient's symptoms improved, he was also having alcohol abuse and withdrawal, counseled patient  Patient completed course of antibiotics  Recommended outpatient wound care follow-up for his bilateral lower extremity cellulitis  Currently he is afebrile, erythema has improved significantly  Patient does not want to pursue inpatient alcohol rehab, he would like to pursue this as an outpatient  Counseled, explained risks and benefits      Significant Findings, Care, Treatment and Services Provided:  As above    Complications:  None    Condition at Discharge: good Discharge instructions/Information to patient and family:   See after visit summary for information provided to patient and family  Provisions for Follow-Up Care:  See after visit summary for information related to follow-up care and any pertinent home health orders  PCP: Will Cameron PA-C    Disposition: Home    Planned Readmission:  No  Discharge Statement   I spent 35 minutes discharging the patient  This time was spent on the day of discharge  I had direct contact with the patient on the day of discharge  Additional documentation is required if more than 30 minutes were spent on discharge  Discharge Medications:  See after visit summary for reconciled discharge medications provided to patient and family

## 2021-01-26 LAB — IGG SERPL-MCNC: 2040 MG/DL (ref 700–1600)

## 2021-01-29 ENCOUNTER — TELEPHONE (OUTPATIENT)
Dept: FAMILY MEDICINE CLINIC | Facility: CLINIC | Age: 36
End: 2021-01-29

## 2021-01-29 NOTE — TELEPHONE ENCOUNTER
Left message on mobile phone to call the office to schedule TCM appointment   [facility] 01/19/2021-01/25/2021 Lower extremity cellulitis

## 2021-01-29 NOTE — TELEPHONE ENCOUNTER
Left message for patients emergency contact, sister Hanny Welch to call the office  I have no Contact numbers for this patient  Unable to call him  Please send call back to me  01/19/2021-01/25/2021 [address]                                                                                                       Lower extremity cellulitis  Principal danae cleaning

## 2021-02-03 ENCOUNTER — TRANSITIONAL CARE MANAGEMENT (OUTPATIENT)
Dept: FAMILY MEDICINE CLINIC | Facility: CLINIC | Age: 36
End: 2021-02-03

## 2021-05-18 ENCOUNTER — HOSPITAL ENCOUNTER (INPATIENT)
Facility: HOSPITAL | Age: 36
LOS: 8 days | Discharge: HOME | DRG: 383 | End: 2021-05-26
Attending: EMERGENCY MEDICINE | Admitting: FAMILY MEDICINE
Payer: SUBSIDIZED

## 2021-05-18 ENCOUNTER — APPOINTMENT (EMERGENCY)
Dept: RADIOLOGY | Facility: HOSPITAL | Age: 36
DRG: 383 | End: 2021-05-18
Payer: SUBSIDIZED

## 2021-05-18 DIAGNOSIS — E87.1 HYPONATREMIA: ICD-10-CM

## 2021-05-18 DIAGNOSIS — F32.A DEPRESSION, UNSPECIFIED DEPRESSION TYPE: ICD-10-CM

## 2021-05-18 DIAGNOSIS — R60.0 LEG EDEMA: ICD-10-CM

## 2021-05-18 DIAGNOSIS — F10.10 ALCOHOL ABUSE: ICD-10-CM

## 2021-05-18 DIAGNOSIS — R01.1 HEART MURMUR: ICD-10-CM

## 2021-05-18 DIAGNOSIS — L03.90 CELLULITIS: Primary | ICD-10-CM

## 2021-05-18 DIAGNOSIS — D64.9 ANEMIA, UNSPECIFIED TYPE: ICD-10-CM

## 2021-05-18 DIAGNOSIS — F41.9 ANXIETY: ICD-10-CM

## 2021-05-18 DIAGNOSIS — F32.A DEPRESSION: ICD-10-CM

## 2021-05-18 DIAGNOSIS — N39.0 UTI (URINARY TRACT INFECTION): ICD-10-CM

## 2021-05-18 DIAGNOSIS — R04.0 EPISTAXIS: ICD-10-CM

## 2021-05-18 DIAGNOSIS — F17.200 TOBACCO DEPENDENCE: ICD-10-CM

## 2021-05-18 LAB
ALBUMIN SERPL BCP-MCNC: 2.1 G/DL (ref 3.5–5)
ALP SERPL-CCNC: 145 U/L (ref 46–116)
ALT SERPL W P-5'-P-CCNC: 32 U/L (ref 12–78)
AMMONIA PLAS-SCNC: 35 UMOL/L (ref 11–35)
AMPHETAMINES SERPL QL SCN: NEGATIVE
ANION GAP SERPL CALCULATED.3IONS-SCNC: 2 MMOL/L (ref 4–13)
APAP SERPL-MCNC: <2 UG/ML (ref 10–20)
APTT PPP: 53 SECONDS (ref 23–37)
AST SERPL W P-5'-P-CCNC: 80 U/L (ref 5–45)
BACTERIA UR QL AUTO: NORMAL /HPF
BARBITURATES UR QL: NEGATIVE
BASOPHILS # BLD AUTO: 0.07 THOUSANDS/ΜL (ref 0–0.1)
BASOPHILS NFR BLD AUTO: 1 % (ref 0–1)
BENZODIAZ UR QL: NEGATIVE
BILIRUB SERPL-MCNC: 2.68 MG/DL (ref 0.2–1)
BILIRUB UR QL STRIP: ABNORMAL
BUN SERPL-MCNC: 8 MG/DL (ref 5–25)
CALCIUM ALBUM COR SERPL-MCNC: 9.1 MG/DL (ref 8.3–10.1)
CALCIUM SERPL-MCNC: 7.6 MG/DL (ref 8.3–10.1)
CHLORIDE SERPL-SCNC: 96 MMOL/L (ref 100–108)
CLARITY UR: CLEAR
CO2 SERPL-SCNC: 28 MMOL/L (ref 21–32)
COCAINE UR QL: NEGATIVE
COLOR UR: ABNORMAL
CREAT SERPL-MCNC: 0.48 MG/DL (ref 0.6–1.3)
EOSINOPHIL # BLD AUTO: 0.02 THOUSAND/ΜL (ref 0–0.61)
EOSINOPHIL NFR BLD AUTO: 0 % (ref 0–6)
ERYTHROCYTE [DISTWIDTH] IN BLOOD BY AUTOMATED COUNT: 18.8 % (ref 11.6–15.1)
ETHANOL SERPL-MCNC: 462 MG/DL (ref 0–3)
GFR SERPL CREATININE-BSD FRML MDRD: 142 ML/MIN/1.73SQ M
GLUCOSE SERPL-MCNC: 119 MG/DL (ref 65–140)
GLUCOSE UR STRIP-MCNC: NEGATIVE MG/DL
HCT VFR BLD AUTO: 23.1 % (ref 36.5–49.3)
HGB BLD-MCNC: 7.4 G/DL (ref 12–17)
HGB UR QL STRIP.AUTO: ABNORMAL
IMM GRANULOCYTES # BLD AUTO: 0.13 THOUSAND/UL (ref 0–0.2)
IMM GRANULOCYTES NFR BLD AUTO: 2 % (ref 0–2)
INR PPP: 1.53 (ref 0.84–1.19)
KETONES UR STRIP-MCNC: NEGATIVE MG/DL
LACTATE SERPL-SCNC: 3 MMOL/L (ref 0.5–2)
LACTATE SERPL-SCNC: 3 MMOL/L (ref 0.5–2)
LEUKOCYTE ESTERASE UR QL STRIP: NEGATIVE
LYMPHOCYTES # BLD AUTO: 1.9 THOUSANDS/ΜL (ref 0.6–4.47)
LYMPHOCYTES NFR BLD AUTO: 28 % (ref 14–44)
MAGNESIUM SERPL-MCNC: 1.8 MG/DL (ref 1.6–2.6)
MCH RBC QN AUTO: 29.4 PG (ref 26.8–34.3)
MCHC RBC AUTO-ENTMCNC: 32 G/DL (ref 31.4–37.4)
MCV RBC AUTO: 92 FL (ref 82–98)
METHADONE UR QL: NEGATIVE
MONOCYTES # BLD AUTO: 0.63 THOUSAND/ΜL (ref 0.17–1.22)
MONOCYTES NFR BLD AUTO: 9 % (ref 4–12)
NEUTROPHILS # BLD AUTO: 4.02 THOUSANDS/ΜL (ref 1.85–7.62)
NEUTS SEG NFR BLD AUTO: 60 % (ref 43–75)
NITRITE UR QL STRIP: POSITIVE
NON-SQ EPI CELLS URNS QL MICRO: NORMAL /HPF
NRBC BLD AUTO-RTO: 0 /100 WBCS
NT-PROBNP SERPL-MCNC: 339 PG/ML
OPIATES UR QL SCN: NEGATIVE
OXYCODONE+OXYMORPHONE UR QL SCN: NEGATIVE
PCP UR QL: NEGATIVE
PH UR STRIP.AUTO: 6 [PH]
PHOSPHATE SERPL-MCNC: 3.7 MG/DL (ref 2.7–4.5)
PLATELET # BLD AUTO: 118 THOUSANDS/UL (ref 149–390)
PMV BLD AUTO: 8 FL (ref 8.9–12.7)
POTASSIUM SERPL-SCNC: 3.8 MMOL/L (ref 3.5–5.3)
PROT SERPL-MCNC: 7.6 G/DL (ref 6.4–8.2)
PROT UR STRIP-MCNC: ABNORMAL MG/DL
PROTHROMBIN TIME: 18.2 SECONDS (ref 11.6–14.5)
RBC # BLD AUTO: 2.52 MILLION/UL (ref 3.88–5.62)
RBC #/AREA URNS AUTO: NORMAL /HPF
SALICYLATES SERPL-MCNC: <3 MG/DL (ref 3–20)
SODIUM SERPL-SCNC: 126 MMOL/L (ref 136–145)
SP GR UR STRIP.AUTO: >=1.03 (ref 1–1.03)
THC UR QL: NEGATIVE
TROPONIN I SERPL-MCNC: 0.02 NG/ML
UROBILINOGEN UR QL STRIP.AUTO: >=8 E.U./DL
WBC # BLD AUTO: 6.77 THOUSAND/UL (ref 4.31–10.16)
WBC #/AREA URNS AUTO: NORMAL /HPF

## 2021-05-18 PROCEDURE — 84484 ASSAY OF TROPONIN QUANT: CPT | Performed by: EMERGENCY MEDICINE

## 2021-05-18 PROCEDURE — 80179 DRUG ASSAY SALICYLATE: CPT | Performed by: EMERGENCY MEDICINE

## 2021-05-18 PROCEDURE — 82140 ASSAY OF AMMONIA: CPT | Performed by: EMERGENCY MEDICINE

## 2021-05-18 PROCEDURE — 83735 ASSAY OF MAGNESIUM: CPT | Performed by: EMERGENCY MEDICINE

## 2021-05-18 PROCEDURE — 99285 EMERGENCY DEPT VISIT HI MDM: CPT

## 2021-05-18 PROCEDURE — 85025 COMPLETE CBC W/AUTO DIFF WBC: CPT | Performed by: EMERGENCY MEDICINE

## 2021-05-18 PROCEDURE — 93005 ELECTROCARDIOGRAM TRACING: CPT

## 2021-05-18 PROCEDURE — 87040 BLOOD CULTURE FOR BACTERIA: CPT | Performed by: EMERGENCY MEDICINE

## 2021-05-18 PROCEDURE — 83880 ASSAY OF NATRIURETIC PEPTIDE: CPT | Performed by: EMERGENCY MEDICINE

## 2021-05-18 PROCEDURE — 96361 HYDRATE IV INFUSION ADD-ON: CPT

## 2021-05-18 PROCEDURE — 80053 COMPREHEN METABOLIC PANEL: CPT | Performed by: EMERGENCY MEDICINE

## 2021-05-18 PROCEDURE — 71045 X-RAY EXAM CHEST 1 VIEW: CPT

## 2021-05-18 PROCEDURE — 87086 URINE CULTURE/COLONY COUNT: CPT | Performed by: EMERGENCY MEDICINE

## 2021-05-18 PROCEDURE — 80143 DRUG ASSAY ACETAMINOPHEN: CPT | Performed by: EMERGENCY MEDICINE

## 2021-05-18 PROCEDURE — 36415 COLL VENOUS BLD VENIPUNCTURE: CPT | Performed by: EMERGENCY MEDICINE

## 2021-05-18 PROCEDURE — 99285 EMERGENCY DEPT VISIT HI MDM: CPT | Performed by: EMERGENCY MEDICINE

## 2021-05-18 PROCEDURE — 81001 URINALYSIS AUTO W/SCOPE: CPT | Performed by: EMERGENCY MEDICINE

## 2021-05-18 PROCEDURE — 85730 THROMBOPLASTIN TIME PARTIAL: CPT | Performed by: EMERGENCY MEDICINE

## 2021-05-18 PROCEDURE — 84100 ASSAY OF PHOSPHORUS: CPT | Performed by: EMERGENCY MEDICINE

## 2021-05-18 PROCEDURE — 96365 THER/PROPH/DIAG IV INF INIT: CPT

## 2021-05-18 PROCEDURE — 80307 DRUG TEST PRSMV CHEM ANLYZR: CPT | Performed by: PHYSICIAN ASSISTANT

## 2021-05-18 PROCEDURE — 83605 ASSAY OF LACTIC ACID: CPT | Performed by: EMERGENCY MEDICINE

## 2021-05-18 PROCEDURE — 85610 PROTHROMBIN TIME: CPT | Performed by: EMERGENCY MEDICINE

## 2021-05-18 PROCEDURE — 82077 ASSAY SPEC XCP UR&BREATH IA: CPT | Performed by: EMERGENCY MEDICINE

## 2021-05-18 PROCEDURE — 99222 1ST HOSP IP/OBS MODERATE 55: CPT | Performed by: PHYSICIAN ASSISTANT

## 2021-05-18 PROCEDURE — NC001 PR NO CHARGE: Performed by: PHYSICIAN ASSISTANT

## 2021-05-18 RX ORDER — CALCIUM CARBONATE 200(500)MG
1000 TABLET,CHEWABLE ORAL DAILY PRN
Status: DISCONTINUED | OUTPATIENT
Start: 2021-05-18 | End: 2021-05-26 | Stop reason: HOSPADM

## 2021-05-18 RX ORDER — SODIUM CHLORIDE 9 MG/ML
50 INJECTION, SOLUTION INTRAVENOUS CONTINUOUS
Status: DISCONTINUED | OUTPATIENT
Start: 2021-05-18 | End: 2021-05-19

## 2021-05-18 RX ORDER — CEFTRIAXONE 1 G/50ML
1000 INJECTION, SOLUTION INTRAVENOUS EVERY 24 HOURS
Status: DISCONTINUED | OUTPATIENT
Start: 2021-05-19 | End: 2021-05-19

## 2021-05-18 RX ORDER — POLYETHYLENE GLYCOL 3350 17 G/17G
17 POWDER, FOR SOLUTION ORAL DAILY PRN
Status: DISCONTINUED | OUTPATIENT
Start: 2021-05-18 | End: 2021-05-26 | Stop reason: HOSPADM

## 2021-05-18 RX ORDER — ONDANSETRON 2 MG/ML
4 INJECTION INTRAMUSCULAR; INTRAVENOUS EVERY 6 HOURS PRN
Status: DISCONTINUED | OUTPATIENT
Start: 2021-05-18 | End: 2021-05-26 | Stop reason: HOSPADM

## 2021-05-18 RX ORDER — FUROSEMIDE 40 MG/1
40 TABLET ORAL DAILY
Status: DISCONTINUED | OUTPATIENT
Start: 2021-05-19 | End: 2021-05-19

## 2021-05-18 RX ORDER — KETOROLAC TROMETHAMINE 30 MG/ML
15 INJECTION, SOLUTION INTRAMUSCULAR; INTRAVENOUS EVERY 6 HOURS PRN
Status: DISCONTINUED | OUTPATIENT
Start: 2021-05-18 | End: 2021-05-19

## 2021-05-18 RX ORDER — NICOTINE 21 MG/24HR
14 PATCH, TRANSDERMAL 24 HOURS TRANSDERMAL ONCE
Status: COMPLETED | OUTPATIENT
Start: 2021-05-18 | End: 2021-05-19

## 2021-05-18 RX ORDER — CEFTRIAXONE 1 G/50ML
1000 INJECTION, SOLUTION INTRAVENOUS ONCE
Status: COMPLETED | OUTPATIENT
Start: 2021-05-18 | End: 2021-05-18

## 2021-05-18 RX ORDER — LORAZEPAM 1 MG/1
2 TABLET ORAL ONCE
Status: COMPLETED | OUTPATIENT
Start: 2021-05-18 | End: 2021-05-18

## 2021-05-18 RX ADMIN — SODIUM CHLORIDE 50 ML/HR: 0.9 INJECTION, SOLUTION INTRAVENOUS at 22:15

## 2021-05-18 RX ADMIN — LORAZEPAM 2 MG: 1 TABLET ORAL at 22:18

## 2021-05-18 RX ADMIN — CEFTRIAXONE 1000 MG: 1 INJECTION, SOLUTION INTRAVENOUS at 19:11

## 2021-05-18 RX ADMIN — NICOTINE 14 MG: 14 PATCH, EXTENDED RELEASE TRANSDERMAL at 20:01

## 2021-05-18 RX ADMIN — SODIUM CHLORIDE 500 ML: 0.9 INJECTION, SOLUTION INTRAVENOUS at 22:21

## 2021-05-18 RX ADMIN — KETOROLAC TROMETHAMINE 15 MG: 30 INJECTION, SOLUTION INTRAMUSCULAR at 22:02

## 2021-05-18 RX ADMIN — SODIUM CHLORIDE 1000 ML: 0.9 INJECTION, SOLUTION INTRAVENOUS at 17:43

## 2021-05-18 RX ADMIN — SODIUM CHLORIDE 1000 ML: 0.9 INJECTION, SOLUTION INTRAVENOUS at 18:35

## 2021-05-18 RX ADMIN — SODIUM CHLORIDE 50 ML/HR: 0.9 INJECTION, SOLUTION INTRAVENOUS at 22:14

## 2021-05-18 NOTE — CONSULTS
Progress Note - Triage Assessment   Orvel Lab 39 y o  male MRN: [id number]    Time Called: 9452  Date Called: 05/18/21  Room#: ED 5   Time Evaluated: 1920  Person requesting evaluation: Dr Prasad Chávez to ED to evaluate patient for possible admission to Critical Care Service, chart reviewed and patient evaluated  Case discussed with Critical Care attending Dr Chantal Morris and after review it was felt the patient is appropriate for admission to a general medical floor  Recommendations discussed with ED attending Dr Moon Bey, he will discuss with SLIM for admission  Triage Assessment:      38 yo PMH alcohol abuse - recently cut back, opioid use on suboxone, tobacco use, chronic hyponatremia and worsening lower extremity edema over past 5-6 months now with wounds  Lives with parents today, found by mother covered in urine on couch and feces on lower extremities  Ethanol level 462, awake, alert and oriented currently  Denies history of withdrawal or seizures in past  Recommend CIWA protocol  Started on Ceftriaxone for possible cellulitis  Notable cardiac murmur on exam, recommend ECHO for further evaluation         Patient appropriate to be admitted to med-surg level of care  If any questions or concerns please call the critical care team at ext [phone]

## 2021-05-18 NOTE — ED PROVIDER NOTES
History  Chief Complaint   Patient presents with    Cellulitis     Arrives BLS  Per EMT patient lives with parents and was found sitting in a sofa type chair in his body fluids  Patient's pants wet with urine, very malodorous  Lower legs with extreme edema, dry and areas sloughing and bleeding   Patient states he has cut back on his drinking and smoking and is on suboxone  68-year-old male presents the ED with complaints that are multiple  Patient states that he lives with his parents was found sitting on the so for his pants were soaked wet with urine for a over wrist   States his lower legs have been extremely swollen and edematous since the beginning of the year  His mother found that they were covered with stool and has not clean her showered for quite some time so started to describe his legs which cause him to bleed  States he has cut back on his drinking and smoking no other complaints  Patient has extensive medical history including drug abuse for which she is on Suboxone also psychiatric illness which he states he is very depressed right now  As well as lung mass on the left side which was documented in his chart  History provided by:  Patient   used: No        Prior to Admission Medications   Prescriptions Last Dose Informant Patient Reported? Taking?    albuterol (Ventolin HFA) 90 mcg/act inhaler More than a month at Unknown time  No No   Sig: Inhale 2 puffs every 6 (six) hours as needed for wheezing   buprenorphine-naloxone (SUBOXONE) 8-2 mg 5/18/2021 at Unknown time  Yes Yes   Sig: SUBLINGUAL TWO FILMS SUBLINGUALLY DAILY   escitalopram (LEXAPRO) 10 mg tablet Not Taking at Unknown time  No No   Sig: Take 1 tablet (10 mg total) by mouth daily   Patient not taking: Reported on 2/21/8178   folic acid (FOLVITE) 1 mg tablet Past Week at Unknown time  No Yes   Sig: Take 1 tablet (1 mg total) by mouth daily   furosemide (LASIX) 40 mg tablet Past Week at Unknown time  No Yes Sig: Take 1 tablet (40 mg total) by mouth daily      Facility-Administered Medications: None       Past Medical History:   Diagnosis Date    Allergic     Anemia     Asthma     Essential hypertension 5/6/2019    Obesity     [facility]ate dependence [facility] [address] [facility]     [facility]stance abuse ([facility] )        Past Surgical History:   Procedure Laterality Date    LYMPH NODE BIOPSY         Family History   Problem Relation Age of Onset    No Known Problems Mother     Diabetes Father     Prostate cancer Father     Hypertension Father      I have reviewed and agree with the history as documented  E-Cigarette/Vaping    E-Cigarette Use Current Some Day User      E-Cigarette/Vaping Substances     Social History     Tobacco Use    Smoking status: Current Every Day Smoker     Packs/day: 1 00    Smokeless tobacco: Never Used    Tobacco comment: 2/2019; PATIENT VAPES   Substance Use Topics    Alcohol use: Yes     Alcohol/week: 12 0 standard drinks     Types: 12 Cans of beer per week     Frequency: 4 or more times a week     Drinks per session: 1 or 2    Drug use: Not Currently     Types: Heroin     Comment: on suboxone for opiate abuse; used heroin 1 week ago       Review of Systems   Constitutional: Negative for activity change, chills, diaphoresis and fever  HENT: Negative for congestion, ear pain, nosebleeds, sore throat, trouble swallowing and voice change  Eyes: Negative for pain, discharge and redness  Respiratory: Positive for cough  Negative for apnea, choking, shortness of breath, wheezing and stridor  Cardiovascular: Positive for leg swelling  Negative for chest pain and palpitations  Gastrointestinal: Positive for nausea  Negative for abdominal distention, abdominal pain, constipation, diarrhea and vomiting  Endocrine: Negative for polydipsia  Genitourinary: Positive for dysuria and frequency  Negative for difficulty urinating, flank pain, hematuria and urgency  Musculoskeletal: Positive for gait problem  Negative for back pain, joint swelling, myalgias, neck pain and neck stiffness  Skin: Negative for pallor and rash  Neurological: Negative for dizziness, tremors, syncope, speech difficulty, weakness, numbness and headaches  Hematological: Negative for adenopathy  Psychiatric/Behavioral: Negative for confusion, hallucinations, self-injury and suicidal ideas  The patient is not nervous/anxious  Physical Exam  Physical Exam  Vitals signs and nursing note reviewed  Constitutional:       General: He is not in acute distress  Appearance: He is well-developed  He is not diaphoretic  HENT:      Head: Normocephalic and atraumatic  Right Ear: External ear normal       Left Ear: External ear normal       Nose: Nose normal    Eyes:      Conjunctiva/sclera: Conjunctivae normal       Pupils: Pupils are equal, round, and reactive to light  Neck:      Musculoskeletal: Normal range of motion and neck supple  Cardiovascular:      Rate and Rhythm: Regular rhythm  Tachycardia present  Heart sounds: Murmur present  Pulmonary:      Effort: Pulmonary effort is normal       Breath sounds: Normal breath sounds  Abdominal:      General: Bowel sounds are normal       Palpations: Abdomen is soft  Musculoskeletal: Normal range of motion  Skin:     General: Skin is warm and dry  Neurological:      Mental Status: He is alert and oriented to person, place, and time  Deep Tendon Reflexes: Reflexes are normal and symmetric  Psychiatric:         Behavior: Behavior is cooperative           Vital Signs  ED Triage Vitals   Temperature Pulse Respirations Blood Pressure SpO2   05/18/21 1707 05/18/21 1707 05/18/21 1707 05/18/21 1707 05/18/21 1707   (!) 96 9 °F (36 1 °C) (!) 114 18 135/60 95 %      Temp Source Heart Rate Source Patient Position - Orthostatic VS BP Location FiO2 (%)   05/18/21 1707 05/18/21 1707 05/18/21 1707 05/18/21 1707 --   Tympanic Monitor Sitting Right arm       Pain Score 05/18/21 1733       Worst Possible Pain           Vitals:    05/18/21 1707 05/18/21 1733 05/18/21 1836 05/18/21 1915   BP: 135/60 123/61 112/67 153/74   Pulse: (!) 114 105 104 102   Patient Position - Orthostatic VS: Sitting Sitting Sitting          Visual Acuity      ED Medications  Medications   nicotine (NICODERM CQ) 14 mg/24hr TD 24 hr patch 14 mg (has no administration in time range)   sodium chloride 0 9 % bolus 1,000 mL (0 mL Intravenous Stopped 5/18/21 1858)   cefTRIAXone (ROCEPHIN) IVPB (premix in dextrose) 1,000 mg 50 mL (0 mg Intravenous Stopped 5/18/21 1941)   sodium chloride 0 9 % bolus 1,000 mL (0 mL Intravenous Stopped 5/18/21 1859)       Diagnostic Studies  Results Reviewed     Procedure Component Value Units Date/Time    Lactic acid 2 Hours [[id number]] Collected: 05/18/21 1940    Lab Status:  In process Specimen: Blood from Arm, Right Updated: 05/18/21 1947    NT-BNP PRO [[id number]]  (Abnormal) Collected: 05/18/21 1746    Lab Status: Final result Specimen: Blood from Arm, Left Updated: 05/18/21 1843     NT-proBNP 339 pg/mL     Comprehensive metabolic panel [[id number]]  (Abnormal) Collected: 05/18/21 1746    Lab Status: Final result Specimen: Blood from Arm, Left Updated: 05/18/21 1843     Sodium 126 mmol/L      Potassium 3 8 mmol/L      Chloride 96 mmol/L      CO2 28 mmol/L      ANION GAP 2 mmol/L      BUN 8 mg/dL      Creatinine 0 48 mg/dL      Glucose 119 mg/dL      Calcium 7 6 mg/dL      Corrected Calcium 9 1 mg/dL      AST 80 U/L      ALT 32 U/L      Alkaline Phosphatase 145 U/L      Total Protein 7 6 g/dL      Albumin 2 1 g/dL      Total Bilirubin 2 68 mg/dL      eGFR 142 ml/min/1 73sq m     Narrative:      Luis guidelines for Chronic Kidney Disease (CKD):     Stage 1 with normal or high GFR (GFR > 90 mL/min/1 73 square meters)    Stage 2 Mild CKD (GFR = 60-89 mL/min/1 73 square meters)    Stage 3A Moderate CKD (GFR = 45-59 mL/min/1 73 square meters)    Stage 3B Moderate CKD (GFR = 30-44 mL/min/1 73 square meters)    Stage 4 Severe CKD (GFR = 15-29 mL/min/1 73 square meters)    Stage 5 End Stage CKD (GFR <15 mL/min/1 73 square meters)  Note: GFR calculation is accurate only with a steady state creatinine    Salicylate level [[id number]]  (Abnormal) Collected: 05/18/21 1746    Lab Status: Final result Specimen: Blood from Arm, Left Updated: 79/46/40 6295     Salicylate Lvl <3 mg/dL     Acetaminophen level-If concentration is detectable, please discuss with medical  on call   [[id number]]  (Abnormal) Collected: 05/18/21 1746    Lab Status: Final result Specimen: Blood from Arm, Left Updated: 05/18/21 1843     Acetaminophen Level <2 ug/mL     Phosphorus [[id number]]  (Normal) Collected: 05/18/21 1746    Lab Status: Final result Specimen: Blood from Arm, Left Updated: 05/18/21 1843     Phosphorus 3 7 mg/dL     Magnesium [[id number]]  (Normal) Collected: 05/18/21 1746    Lab Status: Final result Specimen: Blood from Arm, Left Updated: 05/18/21 1843     Magnesium 1 8 mg/dL     Urine Microscopic [[id number]]  (Normal) Collected: 05/18/21 1752    Lab Status: Final result Specimen: Urine, Clean Catch Updated: 05/18/21 1816     RBC, UA 0-1 /hpf      WBC, UA 1-2 /hpf      Epithelial Cells Occasional /hpf      Bacteria, UA Occasional /hpf     Ethanol [[id number]]  (Abnormal) Collected: 05/18/21 1729    Lab Status: Final result Specimen: Blood from Arm, Right Updated: 05/18/21 1811     Ethanol Lvl 462 mg/dL     UA (URINE) with reflex to Scope [[id number]]  (Abnormal) Collected: 05/18/21 1752    Lab Status: Final result Specimen: Urine, Clean Catch Updated: 05/18/21 1805     Color, UA Orange     Clarity, UA Clear     Specific Gravity, UA >=1 030     pH, UA 6 0     Leukocytes, UA Negative     Nitrite, UA Positive     Protein, UA 30 (1+) mg/dl      Glucose, UA Negative mg/dl      Ketones, UA Negative mg/dl      Urobilinogen, UA >=8 0 E U /dl      Bilirubin, UA Interference- unable to analyze     Blood, UA Trace-lysed    Lactic acid [[id number]]  (Abnormal) Collected: 05/18/21 1729    Lab Status: Final result Specimen: Blood from Arm, Right Updated: 05/18/21 1802     LACTIC ACID 3 0 mmol/L     Narrative:      Result may be elevated if tourniquet was used during collection  Urine culture [[id number]] Collected: 05/18/21 1753    Lab Status: In process Specimen: Urine, Clean Catch Updated: 05/18/21 1802    Troponin I [[id number]]  (Normal) Collected: 05/18/21 1731    Lab Status: Final result Specimen: Blood from Arm, Right Updated: 05/18/21 1757     Troponin I 0 02 ng/mL     Blood culture #2 [[id number]] Collected: 05/18/21 1746    Lab Status: In process Specimen: Blood from Arm, Left Updated: 05/18/21 1752    Ammonia [[id number]]  (Normal) Collected: 05/18/21 1729    Lab Status: Final result Specimen: Blood from Arm, Right Updated: 05/18/21 1751     Ammonia 35 umol/L     APTT [[id number]]  (Abnormal) Collected: 05/18/21 1729    Lab Status: Final result Specimen: Blood from Arm, Right Updated: 05/18/21 1750     PTT 53 seconds     Protime-INR [[id number]]  (Abnormal) Collected: 05/18/21 1729    Lab Status: Final result Specimen: Blood from Arm, Right Updated: 05/18/21 1750     Protime 18 2 seconds      INR 1 53    Blood culture #1 [[id number]] Collected: 05/18/21 1729    Lab Status:  In process Specimen: Blood from Arm, Left Updated: 05/18/21 1738    CBC and differential [[id number]]  (Abnormal) Collected: 05/18/21 1729    Lab Status: Final result Specimen: Blood from Arm, Right Updated: 05/18/21 1738     WBC 6 77 Thousand/uL      RBC 2 52 Million/uL      Hemoglobin 7 4 g/dL      Hematocrit 23 1 %      MCV 92 fL      MCH 29 4 pg      MCHC 32 0 g/dL      RDW 18 8 %      MPV 8 0 fL      Platelets 030 Thousands/uL      nRBC 0 /100 WBCs      Neutrophils Relative 60 %      Immat GRANS % 2 %      Lymphocytes Relative 28 %      Monocytes Relative 9 %      Eosinophils Relative 0 %      Basophils Relative 1 % Neutrophils Absolute 4 02 Thousands/µL      Immature Grans Absolute 0 13 Thousand/uL      Lymphocytes Absolute 1 90 Thousands/µL      Monocytes Absolute 0 63 Thousand/µL      Eosinophils Absolute 0 02 Thousand/µL      Basophils Absolute 0 07 Thousands/µL                  XR chest 1 view portable    (Results Pending)              Procedures  Procedures         ED Course                         Initial Sepsis Screening     Row Name 05/18/21 1832                Is the patient's history suggestive of a new or worsening infection? (!) Yes (Proceed)  -IFEANYI        Suspected source of infection  urinary tract infection;soft tissue  -IFEANYI        Are two or more of the following signs & symptoms of infection both present and new to the patient? No  -IFEANYI        Indicate SIRS criteria  Tachycardia > 90 bpm  -IFEANYI        If the answer is yes to both questions, suspicion of sepsis is present  --        If severe sepsis is present AND tissue hypoperfusion perists in the hour after fluid resuscitation or lactate > 4, the patient meets criteria for SEPTIC SHOCK  --        Are any of the following organ dysfunction criteria present within 6 hours of suspected infection and SIRS criteria that are NOT considered to be chronic conditions? (!) Yes  -IFEANYI        Organ dysfunction  Lactate > 2 0 mmol/L;INR > 1 5 or aPTT > 60 secs  -IFEANYI        Date of presentation of severe sepsis  --        Time of presentation of severe sepsis  --        Tissue hypoperfusion persists in the hour after crystalloid fluid administration, evidenced, by either:  --        Was hypotension present within one hour of the conclusion of crystalloid fluid administration?   No  -IFEANYI        Date of presentation of septic shock  --        Time of presentation of septic shock  --          User Key  (r) = Recorded By, (t) = Taken By, (c) = Cosigned By    [address] Name Provider Type    [facility] Michelle Gaytan DO Physician                        MDM    Disposition  Final diagnoses: Cellulitis   Hyponatremia   UTI (urinary tract infection)   Heart murmur   Alcohol abuse     Time reflects when diagnosis was documented in both MDM as applicable and the Disposition within this note     Time User Action Codes Description Comment    5/18/2021  7:49 PM Lavaun Shad Add [L03 90] Cellulitis     5/18/2021  7:49 PM Rupert Giovanny E Add [E87 1] Hyponatremia     5/18/2021  7:49 PM Rupert Giovanny E Add [N39 0] UTI (urinary tract infection)     5/18/2021  7:50 PM Lavaun Shad Add [R01 1] Heart murmur     5/18/2021  7:50 PM Lavaun Shad Add [F10 10] Alcohol abuse       ED Disposition     ED Disposition Condition Date/Time Comment    Admit Stable Tue May 18, 2021  7:49 PM Case was discussed with Neelam Parker and the patient's admission status was agreed to be Admission Status: inpatient status to the service of Dr Corey Meier   Follow-up Information    None         Patient's Medications   Discharge Prescriptions    No medications on file     No discharge procedures on file      PDMP Review       Value Time User    PDMP Reviewed  Yes 9/28/2020  8:47 AM Janelle Sy PA-C          ED Provider  Electronically Signed by           Isaac Chaudhry DO  05/18/21 2000

## 2021-05-19 ENCOUNTER — APPOINTMENT (INPATIENT)
Dept: RADIOLOGY | Facility: HOSPITAL | Age: 36
DRG: 383 | End: 2021-05-19
Payer: SUBSIDIZED

## 2021-05-19 ENCOUNTER — APPOINTMENT (INPATIENT)
Dept: NON INVASIVE DIAGNOSTICS | Facility: HOSPITAL | Age: 36
DRG: 383 | End: 2021-05-19
Payer: SUBSIDIZED

## 2021-05-19 PROBLEM — R82.90 ABNORMAL URINALYSIS: Status: ACTIVE | Noted: 2021-05-18

## 2021-05-19 PROBLEM — E87.2 LACTIC ACIDOSIS: Status: ACTIVE | Noted: 2021-05-19

## 2021-05-19 PROBLEM — E87.20 LACTIC ACIDOSIS: Status: ACTIVE | Noted: 2021-05-19

## 2021-05-19 PROBLEM — R00.0 TACHYCARDIA: Status: ACTIVE | Noted: 2021-05-19

## 2021-05-19 LAB
ABO GROUP BLD: NORMAL
ALBUMIN SERPL BCP-MCNC: 1.9 G/DL (ref 3.5–5)
ALP SERPL-CCNC: 135 U/L (ref 46–116)
ALT SERPL W P-5'-P-CCNC: 31 U/L (ref 12–78)
ANION GAP SERPL CALCULATED.3IONS-SCNC: 8 MMOL/L (ref 4–13)
ANION GAP SERPL CALCULATED.3IONS-SCNC: 9 MMOL/L (ref 4–13)
ANION GAP SERPL CALCULATED.3IONS-SCNC: 9 MMOL/L (ref 4–13)
AST SERPL W P-5'-P-CCNC: 79 U/L (ref 5–45)
BACTERIA UR CULT: NORMAL
BASOPHILS # BLD AUTO: 0.07 THOUSANDS/ΜL (ref 0–0.1)
BASOPHILS NFR BLD AUTO: 1 % (ref 0–1)
BILIRUB SERPL-MCNC: 2.33 MG/DL (ref 0.2–1)
BLD GP AB SCN SERPL QL: NEGATIVE
BUN SERPL-MCNC: 10 MG/DL (ref 5–25)
BUN SERPL-MCNC: 8 MG/DL (ref 5–25)
BUN SERPL-MCNC: 8 MG/DL (ref 5–25)
CALCIUM ALBUM COR SERPL-MCNC: 9.5 MG/DL (ref 8.3–10.1)
CALCIUM SERPL-MCNC: 7.4 MG/DL (ref 8.3–10.1)
CALCIUM SERPL-MCNC: 7.8 MG/DL (ref 8.3–10.1)
CALCIUM SERPL-MCNC: 8 MG/DL (ref 8.3–10.1)
CHLORIDE SERPL-SCNC: 97 MMOL/L (ref 100–108)
CHLORIDE SERPL-SCNC: 98 MMOL/L (ref 100–108)
CHLORIDE SERPL-SCNC: 99 MMOL/L (ref 100–108)
CO2 SERPL-SCNC: 26 MMOL/L (ref 21–32)
CO2 SERPL-SCNC: 27 MMOL/L (ref 21–32)
CO2 SERPL-SCNC: 27 MMOL/L (ref 21–32)
CREAT SERPL-MCNC: 0.44 MG/DL (ref 0.6–1.3)
CREAT SERPL-MCNC: 0.51 MG/DL (ref 0.6–1.3)
CREAT SERPL-MCNC: 0.6 MG/DL (ref 0.6–1.3)
EOSINOPHIL # BLD AUTO: 0.06 THOUSAND/ΜL (ref 0–0.61)
EOSINOPHIL NFR BLD AUTO: 1 % (ref 0–6)
ERYTHROCYTE [DISTWIDTH] IN BLOOD BY AUTOMATED COUNT: 18.9 % (ref 11.6–15.1)
FERRITIN SERPL-MCNC: 77 NG/ML (ref 8–388)
FOLATE SERPL-MCNC: 4.5 NG/ML (ref 3.1–17.5)
GFR SERPL CREATININE-BSD FRML MDRD: 129 ML/MIN/1.73SQ M
GFR SERPL CREATININE-BSD FRML MDRD: 138 ML/MIN/1.73SQ M
GFR SERPL CREATININE-BSD FRML MDRD: 147 ML/MIN/1.73SQ M
GLUCOSE SERPL-MCNC: 118 MG/DL (ref 65–140)
GLUCOSE SERPL-MCNC: 123 MG/DL (ref 65–140)
GLUCOSE SERPL-MCNC: 129 MG/DL (ref 65–140)
HCT VFR BLD AUTO: 20.3 % (ref 36.5–49.3)
HCT VFR BLD AUTO: 22 % (ref 36.5–49.3)
HGB BLD-MCNC: 6.5 G/DL (ref 12–17)
HGB BLD-MCNC: 7 G/DL (ref 12–17)
IMM GRANULOCYTES # BLD AUTO: 0.05 THOUSAND/UL (ref 0–0.2)
IMM GRANULOCYTES NFR BLD AUTO: 1 % (ref 0–2)
IRON SATN MFR SERPL: 11 %
IRON SERPL-MCNC: 25 UG/DL (ref 65–175)
LACTATE SERPL-SCNC: 2.4 MMOL/L (ref 0.5–2)
LACTATE SERPL-SCNC: 2.9 MMOL/L (ref 0.5–2)
LYMPHOCYTES # BLD AUTO: 1.4 THOUSANDS/ΜL (ref 0.6–4.47)
LYMPHOCYTES NFR BLD AUTO: 24 % (ref 14–44)
MAGNESIUM SERPL-MCNC: 1.6 MG/DL (ref 1.6–2.6)
MCH RBC QN AUTO: 29 PG (ref 26.8–34.3)
MCHC RBC AUTO-ENTMCNC: 31.4 G/DL (ref 31.4–37.4)
MCV RBC AUTO: 92 FL (ref 82–98)
MONOCYTES # BLD AUTO: 0.51 THOUSAND/ΜL (ref 0.17–1.22)
MONOCYTES NFR BLD AUTO: 9 % (ref 4–12)
NEUTROPHILS # BLD AUTO: 3.85 THOUSANDS/ΜL (ref 1.85–7.62)
NEUTS SEG NFR BLD AUTO: 64 % (ref 43–75)
NRBC BLD AUTO-RTO: 0 /100 WBCS
PLATELET # BLD AUTO: 95 THOUSANDS/UL (ref 149–390)
PMV BLD AUTO: 7.9 FL (ref 8.9–12.7)
POTASSIUM SERPL-SCNC: 3.5 MMOL/L (ref 3.5–5.3)
POTASSIUM SERPL-SCNC: 3.5 MMOL/L (ref 3.5–5.3)
POTASSIUM SERPL-SCNC: 3.7 MMOL/L (ref 3.5–5.3)
PROT SERPL-MCNC: 7.5 G/DL (ref 6.4–8.2)
RBC # BLD AUTO: 2.24 MILLION/UL (ref 3.88–5.62)
RH BLD: POSITIVE
SODIUM SERPL-SCNC: 132 MMOL/L (ref 136–145)
SODIUM SERPL-SCNC: 134 MMOL/L (ref 136–145)
SODIUM SERPL-SCNC: 134 MMOL/L (ref 136–145)
SPECIMEN EXPIRATION DATE: NORMAL
TIBC SERPL-MCNC: 226 UG/DL (ref 250–450)
VIT B12 SERPL-MCNC: 1482 PG/ML (ref 100–900)
WBC # BLD AUTO: 5.94 THOUSAND/UL (ref 4.31–10.16)

## 2021-05-19 PROCEDURE — 85025 COMPLETE CBC W/AUTO DIFF WBC: CPT | Performed by: PHYSICIAN ASSISTANT

## 2021-05-19 PROCEDURE — 93306 TTE W/DOPPLER COMPLETE: CPT

## 2021-05-19 PROCEDURE — 83605 ASSAY OF LACTIC ACID: CPT | Performed by: PHYSICIAN ASSISTANT

## 2021-05-19 PROCEDURE — 82746 ASSAY OF FOLIC ACID SERUM: CPT | Performed by: PHYSICIAN ASSISTANT

## 2021-05-19 PROCEDURE — 86901 BLOOD TYPING SEROLOGIC RH(D): CPT | Performed by: INTERNAL MEDICINE

## 2021-05-19 PROCEDURE — 80053 COMPREHEN METABOLIC PANEL: CPT | Performed by: PHYSICIAN ASSISTANT

## 2021-05-19 PROCEDURE — P9016 RBC LEUKOCYTES REDUCED: HCPCS

## 2021-05-19 PROCEDURE — 99254 IP/OBS CNSLTJ NEW/EST MOD 60: CPT | Performed by: INTERNAL MEDICINE

## 2021-05-19 PROCEDURE — 99253 IP/OBS CNSLTJ NEW/EST LOW 45: CPT | Performed by: OTOLARYNGOLOGY

## 2021-05-19 PROCEDURE — 83540 ASSAY OF IRON: CPT | Performed by: PHYSICIAN ASSISTANT

## 2021-05-19 PROCEDURE — 30233N0 TRANSFUSION OF AUTOLOGOUS RED BLOOD CELLS INTO PERIPHERAL VEIN, PERCUTANEOUS APPROACH: ICD-10-PCS | Performed by: INTERNAL MEDICINE

## 2021-05-19 PROCEDURE — 80048 BASIC METABOLIC PNL TOTAL CA: CPT | Performed by: PHYSICIAN ASSISTANT

## 2021-05-19 PROCEDURE — 86900 BLOOD TYPING SEROLOGIC ABO: CPT | Performed by: INTERNAL MEDICINE

## 2021-05-19 PROCEDURE — 86850 RBC ANTIBODY SCREEN: CPT | Performed by: INTERNAL MEDICINE

## 2021-05-19 PROCEDURE — 85014 HEMATOCRIT: CPT | Performed by: INTERNAL MEDICINE

## 2021-05-19 PROCEDURE — 85018 HEMOGLOBIN: CPT | Performed by: INTERNAL MEDICINE

## 2021-05-19 PROCEDURE — 80048 BASIC METABOLIC PNL TOTAL CA: CPT | Performed by: INTERNAL MEDICINE

## 2021-05-19 PROCEDURE — 99233 SBSQ HOSP IP/OBS HIGH 50: CPT | Performed by: INTERNAL MEDICINE

## 2021-05-19 PROCEDURE — 82728 ASSAY OF FERRITIN: CPT | Performed by: PHYSICIAN ASSISTANT

## 2021-05-19 PROCEDURE — 83550 IRON BINDING TEST: CPT | Performed by: PHYSICIAN ASSISTANT

## 2021-05-19 PROCEDURE — 93306 TTE W/DOPPLER COMPLETE: CPT | Performed by: INTERNAL MEDICINE

## 2021-05-19 PROCEDURE — 76882 US LMTD JT/FCL EVL NVASC XTR: CPT

## 2021-05-19 PROCEDURE — 99222 1ST HOSP IP/OBS MODERATE 55: CPT | Performed by: PODIATRIST

## 2021-05-19 PROCEDURE — 86920 COMPATIBILITY TEST SPIN: CPT

## 2021-05-19 PROCEDURE — 83735 ASSAY OF MAGNESIUM: CPT | Performed by: PHYSICIAN ASSISTANT

## 2021-05-19 PROCEDURE — 82607 VITAMIN B-12: CPT | Performed by: PHYSICIAN ASSISTANT

## 2021-05-19 RX ORDER — AMMONIUM LACTATE 12 G/100G
LOTION TOPICAL 2 TIMES DAILY
Status: DISCONTINUED | OUTPATIENT
Start: 2021-05-19 | End: 2021-05-26 | Stop reason: HOSPADM

## 2021-05-19 RX ORDER — DIPHENHYDRAMINE HCL 25 MG
25 TABLET ORAL ONCE
Status: COMPLETED | OUTPATIENT
Start: 2021-05-19 | End: 2021-05-19

## 2021-05-19 RX ORDER — BUPRENORPHINE HYDROCHLORIDE AND NALOXONE HYDROCHLORIDE DIHYDRATE 8; 2 MG/1; MG/1
2 TABLET SUBLINGUAL DAILY
Status: ON HOLD | COMMUNITY

## 2021-05-19 RX ORDER — POTASSIUM CHLORIDE 20 MEQ/1
40 TABLET, EXTENDED RELEASE ORAL ONCE
Status: COMPLETED | OUTPATIENT
Start: 2021-05-19 | End: 2021-05-19

## 2021-05-19 RX ORDER — OXYMETAZOLINE HYDROCHLORIDE 0.05 G/100ML
2 SPRAY NASAL EVERY 12 HOURS SCHEDULED
Status: COMPLETED | OUTPATIENT
Start: 2021-05-19 | End: 2021-05-21

## 2021-05-19 RX ORDER — ALBUMIN (HUMAN) 12.5 G/50ML
12.5 SOLUTION INTRAVENOUS ONCE
Status: COMPLETED | OUTPATIENT
Start: 2021-05-19 | End: 2021-05-19

## 2021-05-19 RX ORDER — ALBUMIN (HUMAN) 12.5 G/50ML
12.5 SOLUTION INTRAVENOUS EVERY 8 HOURS
Status: DISCONTINUED | OUTPATIENT
Start: 2021-05-19 | End: 2021-05-19

## 2021-05-19 RX ORDER — CLOTRIMAZOLE 1 %
CREAM (GRAM) TOPICAL 2 TIMES DAILY
Status: COMPLETED | OUTPATIENT
Start: 2021-05-19 | End: 2021-05-25

## 2021-05-19 RX ORDER — DIPHENHYDRAMINE HCL 25 MG
12.5 TABLET ORAL ONCE
Status: COMPLETED | OUTPATIENT
Start: 2021-05-19 | End: 2021-05-19

## 2021-05-19 RX ORDER — LORAZEPAM 1 MG/1
2 TABLET ORAL ONCE
Status: COMPLETED | OUTPATIENT
Start: 2021-05-19 | End: 2021-05-19

## 2021-05-19 RX ORDER — ACETAMINOPHEN 325 MG/1
650 TABLET ORAL ONCE
Status: COMPLETED | OUTPATIENT
Start: 2021-05-19 | End: 2021-05-19

## 2021-05-19 RX ORDER — NICOTINE 21 MG/24HR
14 PATCH, TRANSDERMAL 24 HOURS TRANSDERMAL DAILY
Status: DISCONTINUED | OUTPATIENT
Start: 2021-05-19 | End: 2021-05-26 | Stop reason: HOSPADM

## 2021-05-19 RX ORDER — LORAZEPAM 2 MG/ML
2 INJECTION INTRAMUSCULAR ONCE
Status: COMPLETED | OUTPATIENT
Start: 2021-05-19 | End: 2021-05-19

## 2021-05-19 RX ORDER — ECHINACEA PURPUREA EXTRACT 125 MG
1 TABLET ORAL
Status: DISCONTINUED | OUTPATIENT
Start: 2021-05-19 | End: 2021-05-26 | Stop reason: HOSPADM

## 2021-05-19 RX ORDER — PANTOPRAZOLE SODIUM 40 MG/1
40 TABLET, DELAYED RELEASE ORAL
Status: DISCONTINUED | OUTPATIENT
Start: 2021-05-19 | End: 2021-05-26 | Stop reason: HOSPADM

## 2021-05-19 RX ORDER — SODIUM CHLORIDE 450 MG/100ML
100 INJECTION, SOLUTION INTRAVENOUS CONTINUOUS
Status: DISCONTINUED | OUTPATIENT
Start: 2021-05-19 | End: 2021-05-19

## 2021-05-19 RX ORDER — BUPRENORPHINE AND NALOXONE 8; 2 MG/1; MG/1
16 FILM, SOLUBLE BUCCAL; SUBLINGUAL DAILY
Status: DISCONTINUED | OUTPATIENT
Start: 2021-05-19 | End: 2021-05-26 | Stop reason: HOSPADM

## 2021-05-19 RX ADMIN — POTASSIUM CHLORIDE 40 MEQ: 1500 TABLET, EXTENDED RELEASE ORAL at 08:58

## 2021-05-19 RX ADMIN — LORAZEPAM 2 MG: 2 INJECTION INTRAMUSCULAR; INTRAVENOUS at 13:29

## 2021-05-19 RX ADMIN — OXYMETAZOLINE HYDROCHLORIDE 2 SPRAY: 0.05 SPRAY NASAL at 13:25

## 2021-05-19 RX ADMIN — DIPHENHYDRAMINE HYDROCHLORIDE 25 MG: 25 TABLET ORAL at 22:31

## 2021-05-19 RX ADMIN — NICOTINE 14 MG: 14 PATCH, EXTENDED RELEASE TRANSDERMAL at 11:21

## 2021-05-19 RX ADMIN — CEFEPIME HYDROCHLORIDE 2000 MG: 2 INJECTION, POWDER, FOR SOLUTION INTRAVENOUS at 11:03

## 2021-05-19 RX ADMIN — VANCOMYCIN HYDROCHLORIDE 1250 MG: 10 INJECTION, POWDER, LYOPHILIZED, FOR SOLUTION INTRAVENOUS at 18:15

## 2021-05-19 RX ADMIN — KETOROLAC TROMETHAMINE 15 MG: 30 INJECTION, SOLUTION INTRAMUSCULAR at 08:58

## 2021-05-19 RX ADMIN — Medication: at 18:20

## 2021-05-19 RX ADMIN — CLOTRIMAZOLE: 10 CREAM TOPICAL at 21:53

## 2021-05-19 RX ADMIN — ACETAMINOPHEN 650 MG: 325 TABLET, FILM COATED ORAL at 22:31

## 2021-05-19 RX ADMIN — SODIUM CHLORIDE 500 ML: 0.9 INJECTION, SOLUTION INTRAVENOUS at 01:33

## 2021-05-19 RX ADMIN — LORAZEPAM 2 MG: 1 TABLET ORAL at 04:42

## 2021-05-19 RX ADMIN — ALBUMIN (HUMAN) 12.5 G: 0.25 INJECTION, SOLUTION INTRAVENOUS at 08:59

## 2021-05-19 RX ADMIN — BUPRENORPHINE AND NALOXONE 16 MG: 8; 2 FILM BUCCAL; SUBLINGUAL at 11:20

## 2021-05-19 RX ADMIN — OXYMETAZOLINE HYDROCHLORIDE 2 SPRAY: 0.05 SPRAY NASAL at 21:52

## 2021-05-19 RX ADMIN — SODIUM CHLORIDE 75 ML/HR: 0.45 INJECTION, SOLUTION INTRAVENOUS at 09:03

## 2021-05-19 RX ADMIN — VANCOMYCIN HYDROCHLORIDE 1250 MG: 10 INJECTION, POWDER, LYOPHILIZED, FOR SOLUTION INTRAVENOUS at 11:32

## 2021-05-19 RX ADMIN — ACETAMINOPHEN 650 MG: 325 TABLET, FILM COATED ORAL at 12:45

## 2021-05-19 RX ADMIN — LORAZEPAM 2 MG: 1 TABLET ORAL at 21:51

## 2021-05-19 RX ADMIN — PANTOPRAZOLE SODIUM 40 MG: 40 TABLET, DELAYED RELEASE ORAL at 11:21

## 2021-05-19 RX ADMIN — DIPHENHYDRAMINE HYDROCHLORIDE 12.5 MG: 25 TABLET ORAL at 12:44

## 2021-05-19 RX ADMIN — CEFEPIME HYDROCHLORIDE 2000 MG: 2 INJECTION, POWDER, FOR SOLUTION INTRAVENOUS at 17:24

## 2021-05-19 RX ADMIN — FOLIC ACID: 5 INJECTION, SOLUTION INTRAMUSCULAR; INTRAVENOUS; SUBCUTANEOUS at 09:03

## 2021-05-19 NOTE — ASSESSMENT & PLAN NOTE
As noted on exam, no previous documentation noted of a cardiac murmur  EKG-sinus tachycardia,   Denies history of IVDU  2D echo-poor visualization but no definitive significant valvular disease  EF 55-60%    Moderate pulmonary hypertension  · Blood culture likely contaminant

## 2021-05-19 NOTE — PHYSICAL THERAPY NOTE
PT evaluation attempted but held as pt getting a blood transfusion  Will re-attempt 5/20/21      Shyann Person PT  [id number]     05/19/21 1300   Note Type   Note type Evaluation   Cancel Reasons Medical status

## 2021-05-19 NOTE — ASSESSMENT & PLAN NOTE
Hx of ETOH abuse, in the past 3-4 weeks has cut down from 1-2 bottles of vodka/day to 2-3 shots per day  ETOH 462 on admission with last drink at approxiamtely 3-4pm according to the patient  He wishes to stop drinking and would like resources to help   No Hx of w/d seizures/DT    - Maintain CIWA protocol  - Encourage ETOH cessation   - No longer taking Lexapro, patient states that he would like to speak to psychiatry about the same   - Folic acid, thiamine, multivitamin

## 2021-05-19 NOTE — ASSESSMENT & PLAN NOTE
As noted on exam, no previous documentation noted of a cardiac murmur  Given ongoing LE edema, echo in AM  Cardiology consult appreciated

## 2021-05-19 NOTE — ASSESSMENT & PLAN NOTE
Chronic, Na 126 on admission, typically 130-131  In the setting of ETOH abuse  Improved with IV NS indicating prerenal component  Sodium remains mildly low normal at 132  ·  fluid restriction  · Patient was initially on Lasix and albumin  · Sodium level continues to remain stable

## 2021-05-19 NOTE — CONSULTS
Consultation - Tha Olsen 39 y o  male MRN: [id number]  Unit/Bed#: [address] [place] [address] Encounter: [id number]        Assessment:  Recurrent epistaxis:   We discussed the nature of ongoing epistaxis  Pressure and oxymetazoline appears to have addressed the likely bleeding site at this time  For any repeat bleeding spray oxymetazoline onto tissue, cotton ball, or nasal tampon and place into the side where bleeding is coming from  Pinch and hold the nostrils completely closed for 10 minutes without releasing pressure  If bleeding continues repeat 1 time  If bleeding persists after second attempt please contact [facility] on call emergency department for packing  Thrombocytopenia: Consider transfusing platelets if under 50 and actively bleeding or under 25 and asymptomatic  Hepatic cirrhosis: likely contributing to his anticoagulation status    History of Present Illness   Physician Requesting Consult: Amberly Arce MD  Reason for Consult / Principal Problem: epistaxis  HPI: Arabella Jauregui is a 39y o  year old male who presents with history of cirrhosis and thrombocytopenia  He has noted several brief  The epistaxis mostly from the right side over the past few weeks  No previous nasal evaluation or surgery  No recent bleeding events that have caused transfusion or admission  His most recent platelet count was 95  Has very low albumin at 1 9 though total protein is normal    Review of systems:  10 Point ROS was performed and negative except as above or otherwise noted in the medical record      Historical Information   Past Medical History:   Diagnosis Date    Allergic     Anemia     Asthma     Depression 5/18/2021    Essential hypertension 5/6/2019    Obesity     [facility]piate dependence [facility] [address] [facility]     Substance abuse ([facility] [address] )      Past Surgical History:   Procedure Laterality Date    LYMPH NODE BIOPSY       Social History   Social History     Substance and Sexual Activity   Alcohol Use Yes    Alcohol/week: 12 0 standard drinks    Types: 12 Cans of beer per week    Frequency: 4 or more times a week    Drinks per session: 1 or 2    Binge frequency: Monthly     Social History     Substance and Sexual Activity   Drug Use Not Currently    Types: Heroin    Comment: on suboxone for opiate abuse; used heroin 1 week ago     Social History     Tobacco Use   Smoking Status Current Every Day Smoker    Packs/day: 1 00   Smokeless Tobacco Never Used   Tobacco Comment    2/2019; PATIENT VAPES     Family History:   Family History   Problem Relation Age of Onset    No Known Problems Mother     Diabetes Father     Prostate cancer Father     Hypertension Father        Meds/Allergies   all current active meds have been reviewed, current meds:   Current Facility-Administered Medications   Medication Dose Route Frequency    ammonium lactate (LAC-HYDRIN) 12 % lotion   Topical BID    buprenorphine-naloxone (Suboxone) film 16 mg  16 mg Sublingual Daily    calcium carbonate (TUMS) chewable tablet 1,000 mg  1,000 mg Oral Daily PRN    cefepime (MAXIPIME) 2,000 mg in dextrose 5 % 50 mL IVPB  2,000 mg Intravenous Q8H    clotrimazole (LOTRIMIN) 1 % cream   Topical BID    folic acid 1 mg, thiamine (VITAMIN B1) 100 mg in sodium chloride 0 9 % 100 mL IV piggyback   Intravenous Daily    nicotine (NICODERM CQ) 14 mg/24hr TD 24 hr patch 14 mg  14 mg Transdermal Once    nicotine (NICODERM CQ) 14 mg/24hr TD 24 hr patch 14 mg  14 mg Transdermal Daily    ondansetron (ZOFRAN) injection 4 mg  4 mg Intravenous Q6H PRN    oxymetazoline (AFRIN) 0 05 % nasal spray 2 spray  2 spray Each Nare Q12H RICHARDSON    pantoprazole (PROTONIX) EC tablet 40 mg  40 mg Oral Early Morning    polyethylene glycol (MIRALAX) packet 17 g  17 g Oral Daily PRN    sodium chloride (OCEAN) 0 65 % nasal spray 1 spray  1 spray Each Nare Q1H PRN    vancomycin (VANCOCIN) 1,250 mg in sodium chloride 0 9 % 250 mL IVPB  15 mg/kg (Adjusted) Intravenous Q8H    and PTA meds:   Prior to Admission Medications   Prescriptions Last Dose Informant Patient Reported? Taking? albuterol (Ventolin HFA) 90 mcg/act inhaler More than a month at Unknown time  No No   Sig: Inhale 2 puffs every 6 (six) hours as needed for wheezing   buprenorphine-naloxone (SUBOXONE) 8-2 mg per SL tablet   Yes Yes   Sig: Place 2 tablets under the tongue daily Verified from [facility],   Precribed by Dr Dmitri Han, Last Prescirbed 5/17/2021   escitalopram (LEXAPRO) 10 mg tablet Not Taking at Unknown time  No No   Sig: Take 1 tablet (10 mg total) by mouth daily   Patient not taking: Reported on 2/08/8433   folic acid (FOLVITE) 1 mg tablet Past Week at Unknown time  No Yes   Sig: Take 1 tablet (1 mg total) by mouth daily   furosemide (LASIX) 40 mg tablet Past Week at Unknown time  No Yes   Sig: Take 1 tablet (40 mg total) by mouth daily      Facility-Administered Medications: None       Allergies   Allergen Reactions    Levofloxacin Other (See Comments)     BLACKED OUT       Objective     Vitals:    05/19/21 1907   BP: 103/57   Pulse: (!) 111   Resp: 19   Temp: 98 7 °F (37 1 °C)   SpO2: 95%         Physical Exam   Constitutional: Oriented to person, place, and time  Well-developed and well-nourished, no apparent distress, non-toxic appearance  Cooperative, able to hear and answer questions without difficulty  Voice: Normal voice quality  Head: Normocephalic, atraumatic  No scars, masses or lesions  Face: Symmetric, no edema, no sinus tenderness  Eyes: Vision grossly intact, extra-ocular movement intact  Ears: External ears normal   No post-auricular erythema or tenderness  Nose:  Mild crusting in the anterior nasal cavity and crusted blood in the posterior oropharynx without active bleeding or clot  Oral cavity: Dentition intact  Mucosa moist, lips without lesions or masses  Tongue mobile, floor of mouth soft and flat  Hard palate intact  No masses or lesions  Oropharynx: Uvula is midline, soft palate intact without lesion or mass  Oropharyngeal inlet without obstruction  Tonsils unremarkable  Posterior pharyngeal wall clear  No masses or lesions  Salivary glands:  Parotid glands and submandibular glands symmetric, no enlargement or tenderness  Neck: Normal laryngeal elevation with swallow  Trachea midline  No masses or lesions  No palpable adenopathy  Thyroid: Without tenderness or palpable nodules  Pulmonary/Chest: Normal effort and rate  No respiratory distress  No stertor or stridor  Musculoskeletal: Normal range of motion  Neurological: Cranial nerves 2-12 intact  Skin: Skin is warm and dry  Psychiatric: Normal mood and affect  Scant blood in the anterior nasal cavity without active bleeding anteriorly or posteriorly  Intake/Output Summary (Last 24 hours) at 5/19/2021 1915  Last data filed at 5/19/2021 1545  Gross per 24 hour   Intake 3648 ml   Output 150 ml   Net 3498 ml       Invasive Devices     Peripheral Intravenous Line            Peripheral IV 05/18/21 Right Antecubital 1 day    Peripheral IV 05/19/21 Left;Ventral (anterior) Forearm less than 1 day                Lab Results:   I have personally reviewed pertinent lab results  , CBC:   Lab Results   Component Value Date    WBC 5 94 05/19/2021    HGB 7 0 (L) 05/19/2021    HCT 22 0 (L) 05/19/2021    MCV 92 05/19/2021    PLT 95 (L) 05/19/2021    MCH 29 0 05/19/2021    MCHC 31 4 05/19/2021    RDW 18 9 (H) 05/19/2021    MPV 7 9 (L) 05/19/2021    NRBC 0 05/19/2021   , CMP:   Lab Results   Component Value Date    SODIUM 134 (L) 05/19/2021    K 3 7 05/19/2021    CL 99 (L) 05/19/2021    CO2 26 05/19/2021    BUN 10 05/19/2021    CREATININE 0 51 (L) 05/19/2021    CALCIUM 8 0 (L) 05/19/2021    AST 79 (H) 05/19/2021    ALT 31 05/19/2021    ALKPHOS 135 (H) 05/19/2021    EGFR 138 05/19/2021   , Coags: No results found for: PT, PTT, INR  Imaging Studies: I have personally reviewed pertinent reports  EKG, Pathology, and Other Studies: I have personally reviewed pertinent reports        Code Status: Level 1 - Full Code  Advance Directive and Living Will:      Power of :    POLST:      None

## 2021-05-19 NOTE — PROGRESS NOTES
Vancomycin Assessment    Gurjit Fleming is a 39 y o  male who is currently receiving vancomycin Vancomycin 1250 mg IV q8h for skin-soft tissue infection     Relevant clinical data and objective history reviewed:  Creatinine   Date Value Ref Range Status   05/19/2021 0 60 0 60 - 1 30 mg/dL Final     Comment:     Standardized to IDMS reference method   05/18/2021 0 48 (L) 0 60 - 1 30 mg/dL Final     Comment:     Standardized to IDMS reference method   01/25/2021 0 50 0 50 - 1 20 mg/dL Final     Comment:     Standardized to IDMS reference method     BP 96/57   Pulse (!) 119   Temp 99 1 °F (37 3 °C)   Resp 20   Ht 6' 1" (1 854 m)   Wt 106 kg (233 lb 11 oz)   SpO2 92%   BMI 30 83 kg/m²   I/O last 3 completed shifts: In: 5298 [P O :240; I V :2008; IV Piggyback:3050]  Out: 150 [Urine:150]  Lab Results   Component Value Date/Time    BUN 8 05/19/2021 04:02 AM    WBC 6 77 05/18/2021 05:29 PM    HGB 7 4 (L) 05/18/2021 05:29 PM    HCT 23 1 (L) 05/18/2021 05:29 PM    MCV 92 05/18/2021 05:29 PM     (L) 05/18/2021 05:29 PM     Temp Readings from Last 3 Encounters:   05/19/21 99 1 °F (37 3 °C)   01/25/21 97 7 °F (36 5 °C) (Tympanic)   07/14/20 97 7 °F (36 5 °C) (Temporal)     Vancomycin Days of Therapy: 1  Assessment/Plan  The patient is currently on vancomycin utilizing scheduled dosing based on adjusted body weight (due to obesity)  Baseline risks associated with therapy include: concomitant nephrotoxic medications  The patient is currently receiving Vancomycin 1250 mg IV q8h and is clinically appropriate and dose will be continued  Pharmacy will also follow closely for s/sx of nephrotoxicity, infusion reactions, and appropriateness of therapy  BMP and CBC will be ordered per protocol  Plan for trough as patient approaches steady state, prior to the 4th  dose at approximately 1000 on 05/20/2021  Due to infection severity, will target a trough of 15-20 (appropriate for most indications)     Pharmacy will continue to follow the patients culture results and clinical progress daily      Pedro Mayorga, Pharmacist

## 2021-05-19 NOTE — PROGRESS NOTES
[address] Internal Medicine Progress Note  Patient: Dk Cheek 39 y o  male   MRN: [id number]  PCP: Melchor Larson PA-C  Unit/Bed#: [address] [place] [address] Encounter: [id number]  Date Of Visit: 05/19/21    Problem List:    Principal Problem:    Cellulitis  Active Problems:    Alcohol abuse    Substance abuse ([facility] )    Hepatic cirrhosis (HCC)    Anemia    Hyponatremia    Cardiac murmur    Lactic acidosis    Tachycardia    Essential hypertension    Tobacco dependence    Thrombocytopenia ([facility] )    Thoracoabdominal aortic aneurysm (HCC)    Abnormal urinalysis    Depression      Assessment & Plan:    * Cellulitis  Assessment & Plan  Patient was severe lower extremity edema with erythema, history of the same and treated inpatient with IV Abx 2 times in 2021      received ceftriaxone in ED for suspected bilateral extremity cellulitis as well as UTI  Afebrile, normal white count on presentation  Resume reveals bilateral lower extremity cellulitis, rule out deeper infection  · Will transition to vancomycin and cefepime pending culture results  · Follow-up blood culture  · Ultrasound the left lower extremity to evaluate collection/abscess  · Venous Doppler bilateral lower extremity to rule out DVT  · Wound Care and Podiatry evaluation    Tachycardia  Assessment & Plan  EKG-sinus tachycardia, multifactorial  Setting of infection, dehydration/intravascular depletion, withdrawal  Telemetry- sinus tachycardia  · Continue IV fluid, will also add albumin  · Monitor H&H and transfuse as needed  · Continue antibiotics, follow-up blood culture  · Continue CIWA protocol, Suboxone resumed    Lactic acidosis  Assessment & Plan  Persistent despite aggressive hydration likely secondary to hepatic cirrhosis and alcohol intoxication  · Will not treat further  · Monitor hemodynamics  · Continue thiamine    Cardiac murmur  Assessment & Plan  As noted on exam, no previous documentation noted of a cardiac murmur  EKG-sinus tachycardia, QTC 12  Denies history of IVDU  · Follow-up 2D echo  · Follow-up blood culture    Hyponatremia  Assessment & Plan  Chronic, Na 126 on admission, typically 130-131  In the setting of ETOH abuse  Improved with IV NS indicating prerenal component  Sodium corrected to 134  · Will change to hypotonic saline to avoid rapid correction  · Monitor BMP  · Hold diuretics for now      Anemia  Assessment & Plan  Hx of anemia, in the past documented as likely multifactorial 2/2 anemia of chronic liver disease, alcohol abuse and acute blood loss in January 2/2 leg wound  Hg 7 4, lower than baseline on presentation  Denies any overt bleeding  - No evidence of acute blood loss  - Continue to trend and transfuse as needed for Hg < 7  - Continue folic acid supplement  - Check stool for occult blood   - follow-up iron studies, T61, folic acid    Hepatic cirrhosis (HCC)  Assessment & Plan  Hx of hepatic cirrhosis likely 2/2 ETOH use  - thrombocytopenia, elevated T bili, appears stable compared to past  - Recent hepatitis panel in January negative   - MELD-Na score 24 on admission  - previously on lactulose, but patient states he is no longer taking this  - ammonia level 35 on admission, INR 1 5  -continue to monitor  -alcohol abstinence    Substance abuse [facility])  Assessment & Plan  Patient reports Hx of multi-substance abuse, patient takes suboxone   Retracted negative  Patient denies any history of IVDU  - confirmed suboxone dosing with patient's pharmacy  - resume Suboxone    Alcohol abuse  Assessment & Plan  Hx of ETOH abuse, in the past 3-4 weeks has cut down from 1-2 bottles of vodka/day to 2-3 shots per day  ETOH 462 on admission with last drink at approxiamtely 3-4pm the day of admission according to the patient  He wishes to stop drinking and would like resources to help   No Hx of w/d seizures/DT    - Maintain CIWA protocol  - Encourage ETOH cessation   - Folic acid, thiamine, multivitamin       Depression  Assessment & Plan  Hx of depression, patient was previously on Lexapro but is no longer taking this  Discussed regarding psychiatric evaluation, he wants to defer at present    Abnormal urinalysis  Assessment & Plan  UA abnormal, patient denies any urinary symptoms at present  - follow-up urine culture    Thoracoabdominal aortic aneurysm [facility])  Assessment & Plan  Noted on previous CTA chest from Jan 2021  - Has not since had f/u with CT surgery for regular outpatient monitoring, discussed with patient the importance of regular f/u the time of admission    Thrombocytopenia ([address] )  Assessment & Plan  In the setting of ETOH abuse and hepatic cirrhosis   Continue to monitor, currently no evidence of bleeding   Hold AC given anemia and thrombocytopenia in underlying liver dysfunction, if stool occult negative and no other clear source of acute blood loss, consider resuming if platelets and Hg stabilize    Tobacco dependence  Assessment & Plan  Continues to smoke daily, requested nicotine patch  Smoking cessation discussed, continue to encourage cessation     Essential hypertension  Assessment & Plan  Has a history of HTN but admits that he is no longer taking his home medications, but on Lasix and Aldactone  Blood pressure is soft, will hold diuretics  Monitor  Consider midodrine  Addendum 10:30 a m  Patient was noted to have epistaxis from left nostril after he picked his nose  Remained persistently despite pressure and dressing  Will order Afrin and nasal saline spray  Consult ENT if persistent  VTE Pharmacologic Prophylaxis:   Pharmacologic: Pharmacologic VTE Prophylaxis contraindicated due to Severe anemia under evaluation will resume if no evidence of active bleed  Mechanical VTE Prophylaxis in Place: No    Patient Centered Rounds: I have performed bedside rounds with nursing staff today      Discussions with Specialists or Other Care Team Provider:  Pharmacy    Education and Discussions with Family / Patient:  Discussed with patient's sister at length over the phone updated regarding current medical treatment plan and progress    Time Spent for Care: 45 minutes  More than 50% of total time spent on counseling and coordination of care as described above  Current Length of Stay: 1 day(s)    Current Patient Status: Inpatient   Certification Statement: The patient will continue to require additional inpatient hospital stay due to Cellulitis requiring IV antibiotics    Discharge Plan:  Pending at present    Code Status: Level 1 - Full Code      Subjective:   HPI reviewed with patient, brought in by family after he was found sitting in recliner , covered in urine and stool    Reports that he has been having lower extremity swelling or weight since January with recurrent lower extremity infection  Most recently he had increasing pain on the left leg and foot started about a week ago, he reports that he had a tele visit with PCP and was advised to come to ER at that time     Reports that has history of significant alcohol abuse but currently trying to cut down, noted to alcohol level of 462 on presentation  Also history of opiate dependence currently on Suboxone  History of anxiety/depression currently not on medication  Reports history of lung mass, continues to smoke about 1 pack a day    Denies history of heart murmur, denies IVDU    Denies any chest pain, shortness of breath, nausea vomiting abdominal pain, denies any hematemesis or melena, reports bowel movement with the brown tan formed stool  Denies any dysuria    Remains afebrile but tachycardic blood pressure remains soft  Complains of lower extremity swelling get left leg pain    Currently he is requesting his Suboxone as he feels like he is withdrawing from opiates      Objective:     Vitals:   Temp (24hrs), Av 6 °F (36 4 °C), Min:96 4 °F (35 8 °C), Max:99 4 °F (37 4 °C)    Temp:  [96 4 °F (35 8 °C)-99 4 °F (37 4 °C)] 99 1 °F (37 3 °C)  HR:  [102-120] 119  Resp:  [14-20] 20  BP: ()/(51-74) 96/57  SpO2:  [86 %-95 %] 92 % on room air  Body mass index is 30 83 kg/m²  Input and Output Summary (last 24 hours): Intake/Output Summary (Last 24 hours) at 5/19/2021 0930  Last data filed at 5/19/2021 0201  Gross per 24 hour   Intake 5298 ml   Output 150 ml   Net 5148 ml       Physical Exam:     Physical Exam  Constitutional:       General: He is not in acute distress  Appearance: He is obese  He is ill-appearing  Comments: Unkempt   HENT:      Head: Normocephalic and atraumatic  Mouth/Throat:      Mouth: Mucous membranes are dry  Eyes:      Pupils: Pupils are equal, round, and reactive to light  Neck:      Musculoskeletal: Neck supple  Cardiovascular:      Rate and Rhythm: Tachycardia present  Pulmonary:      Effort: Pulmonary effort is normal  No respiratory distress  Breath sounds: No wheezing, rhonchi or rales  Comments: Diminished bilaterally with faint wheezing  Chest:      Chest wall: No tenderness  Abdominal:      General: Bowel sounds are normal  There is no distension  Palpations: Abdomen is soft  Tenderness: There is no abdominal tenderness  There is no guarding or rebound  Musculoskeletal:      Right lower leg: Edema present  Left lower leg: Edema present  Comments: Bilateral lower extremity chronic lymphedema with high dry flaky skin  Associated erythema tenderness noted, area of tenderness induration posteriorly over left calf   Skin:     General: Skin is warm and dry  Coloration: Skin is pale  Findings: No rash  Neurological:      General: No focal deficit present  Mental Status: He is alert and oriented to person, place, and time  Mental status is at baseline  GCS: GCS eye subscore is 4  GCS verbal subscore is 5  GCS motor subscore is 6  Cranial Nerves: No cranial nerve deficit  Motor: Tremor present  Psychiatric:         Mood and Affect: Affect is blunt           Cognition and Memory: Cognition normal          Additional Data:     Labs:    Results from last 7 days   Lab Units 05/18/21  1729   WBC Thousand/uL 6 77   HEMOGLOBIN g/dL 7 4*   HEMATOCRIT % 23 1*   PLATELETS Thousands/uL 118*   NEUTROS PCT % 60   LYMPHS PCT % 28   MONOS PCT % 9   EOS PCT % 0     Results from last 7 days   Lab Units 05/19/21  0402 05/18/21  1746   POTASSIUM mmol/L 3 5 3 8   CHLORIDE mmol/L 98* 96*   CO2 mmol/L 27 28   BUN mg/dL 8 8   CREATININE mg/dL 0 60 0 48*   CALCIUM mg/dL 7 4* 7 6*   ALK PHOS U/L  --  145*   ALT U/L  --  32   AST U/L  --  80*     Results from last 7 days   Lab Units 05/18/21  1729   INR  1 53*       * I Have Reviewed All Lab Data Listed Above  * Additional Pertinent Lab Tests Reviewed: All Labs Within Last 24 Hours Reviewed      Imaging:  Imaging Reports Reviewed Today Include:  Chest x-ray  Recent Cultures (last 7 days):     Results from last 7 days   Lab Units 05/18/21  1746 05/18/21  1729   BLOOD CULTURE  Received in Microbiology Lab  Culture in Progress  Received in Microbiology Lab  Culture in Progress         Last 24 Hours Medication List:   Current Facility-Administered Medications   Medication Dose Route Frequency Provider Last Rate    albumin human  12 5 g Intravenous Q8H Carolyn Pimentel MD      buprenorphine-naloxone  16 mg Sublingual Daily Carolyn Pimentel MD      calcium carbonate  1,000 mg Oral Daily PRN Kennedy Boyer PA-C      cefepime  2,000 mg Intravenous Q8H Carolyn Pimentel MD      folic acid 1 mg, thiamine 100 mg in 0 9% sodium chloride 100 mL IVPB   Intravenous Daily Kennedy Boyer PA-C 200 mL/hr at 05/19/21 1476    nicotine  14 mg Transdermal Once Pasmelissa Drew DO      nicotine  14 mg Transdermal Daily Carolyn Pimentel MD      ondansetron  4 mg Intravenous Q6H PRN Kennedy Boyer PA-C      pantoprazole  40 mg Oral Early Morning Carolyn Pimentel MD      polyethylene glycol  17 g Oral Daily PRN Kennedy Boyer PA-C      sodium chloride  100 mL/hr Intravenous Continuous Amberly Arce MD 75 mL/hr (05/19/21 0903)    vancomycin  15 mg/kg (Adjusted) Intravenous Nidhi Trujillo MD            Today, Patient Was Seen By: Amberly Arce MD    ** Please Note: "This note has been constructed using a voice recognition system  Therefore there may be syntax, spelling, and/or grammatical errors   Please call if you have any questions  "**

## 2021-05-19 NOTE — ASSESSMENT & PLAN NOTE
Hx of depression, patient was previously on Lexapro but is no longer taking this  Seen by Psychiatry during hospitalization  Input appreciated  · Started on Lexapro, increase to 20 mg after 7 days  · Recommended long-term alcohol rehab, discussed with patient but declines inpatient rehab    Agrees to follow-up as outpatient

## 2021-05-19 NOTE — ASSESSMENT & PLAN NOTE
Hx of depression, patient was previously on Lexapro but is no longer taking this  Patient states he would like to speak to psych about restarting medications   Consult psych when medically cleared

## 2021-05-19 NOTE — ASSESSMENT & PLAN NOTE
Noted on previous CTA chest from Jan 2021  - Has not since had f/u with CT surgery for regular outpatient monitoring, discussed with patient the importance of regular f/u the time of admission

## 2021-05-19 NOTE — ASSESSMENT & PLAN NOTE
Persistent despite aggressive hydration likely secondary to hepatic cirrhosis and alcohol intoxication  · Will not treat further  · Monitor hemodynamics-appears to be improving  · Continue thiamine

## 2021-05-19 NOTE — ASSESSMENT & PLAN NOTE
Chronic, Na 126 on admission, typically 130-131  In the setting of ETOH abuse  No longer taking Lasix/aldactone   Patient would benefit from gentle IVF at this time, reassess Na with repeat BMP tonight and adjust as needed

## 2021-05-19 NOTE — CONSULTS
Consult - Podiatry   Cris Cortés 39 y o  male MRN: [id number]  Unit/Bed#: [address] [place] [address] Encounter: [id number]    Assessment/Plan     Assessment:  Stasis dermatitis bilateral with secondary cellulitis  Superficial wounds of legs  Chronic edema  Poor hygiene  Plan:  Continue antibiosis  Start wound care as per wound care nurse  Recommend full workup for chronic edema via vascular surgery  Rule out DVT  Probable deep venous insufficiency  Control edema  Patient will need long-term care including workup treatment at the [address] [place] [address] Road  No need to follow at this time  History of Present Illness     HPI:  Cris Cortés is a 39 y o  male who presents with bilateral leg swelling  Patient is poor historian  He is seen at bedside but appears intoxicated  Carlito Preston PLEASE COMMENT ON THE FOLLOWING IN THE HPI: how long the wound has been present, significant co-morbidities, presence of peripheral arterial disease    Consults  Review of Systems   Constitutional: Negative  HENT: Negative  Eyes: Negative  Respiratory: Negative  Cardiovascular: Negative  Gastrointestinal: Negative  Musculoskeletal:  Pes planus bilateral   Skin:  Patient demonstrates decreased turgor of skin of feet and legs  This is secondary to lichenification  This is secondary to venous insufficiency/chronic edema  Patient has multiple excoriations and superficial wounds  He has cellulitis of the proximal aspect of the legs  Rule out DVT  Probable deep venous insufficiency  Neurological: Negative          Historical Information   Past Medical History:   Diagnosis Date    Allergic     Anemia     Asthma     Depression 5/18/2021    Essential hypertension 5/6/2019    Obesity     Opiate dependence ([facility] [facility] )     Substance abuse ([facility] [address] )      Past Surgical History:   Procedure Laterality Date    LYMPH NODE BIOPSY       Social History   Social History     Substance and Sexual Activity   Alcohol Use Yes    Alcohol/week: 12 0 standard drinks    Types: 12 Cans of beer per week    Frequency: 4 or more times a week    Drinks per session: 1 or 2    Binge frequency: Monthly     Social History     Substance and Sexual Activity   Drug Use Not Currently    Types: Heroin    Comment: on suboxone for opiate abuse; used heroin 1 week ago     Social History     Tobacco Use   Smoking Status Current Every Day Smoker    Packs/day: 1 00   Smokeless Tobacco Never Used   Tobacco Comment    2/2019; PATIENT VAPES     Family History:   Family History   Problem Relation Age of Onset    No Known Problems Mother     Diabetes Father     Prostate cancer Father     Hypertension Father        Meds/Allergies   Medications Prior to Admission   Medication    buprenorphine-naloxone (SUBOXONE) 8-2 mg per SL tablet    folic acid (FOLVITE) 1 mg tablet    furosemide (LASIX) 40 mg tablet    albuterol (Ventolin HFA) 90 mcg/act inhaler    escitalopram (LEXAPRO) 10 mg tablet     Allergies   Allergen Reactions    Levofloxacin Other (See Comments)     BLACKED OUT       Objective   First Vitals:   Blood Pressure: 135/60 (05/18/21 1707)  Pulse: (!) 114 (05/18/21 1707)  Temperature: (!) 96 9 °F (36 1 °C) (05/18/21 1707)  Temp Source: Tympanic (05/18/21 1707)  Respirations: 18 (05/18/21 1707)  Height: 6' 1" (185 4 cm) (05/18/21 2134)  Weight - Scale: 106 kg (233 lb 14 5 oz) (05/18/21 1707)  SpO2: 95 % (05/18/21 1707)    Current Vitals:   Blood Pressure: 103/56 (05/19/21 1609)  Pulse: (!) 118 (05/19/21 1609)  Temperature: 99 3 °F (37 4 °C) (05/19/21 1609)  Temp Source: Oral (05/19/21 1609)  Respirations: 20 (05/19/21 1609)  Height: 6' 1" (185 4 cm) (05/18/21 2134)  Weight - Scale: 106 kg (233 lb 11 oz) (05/18/21 2134)  SpO2: (!) 88 % (05/19/21 1609)        /56 (BP Location: Left arm)   Pulse (!) 118   Temp 99 3 °F (37 4 °C) (Oral)   Resp 20   Ht 6' 1" (1 854 m)   Wt 106 kg (233 lb 11 oz)   SpO2 (!) 88%   BMI 30 83 kg/m²     General Appearance: Alert, cooperative, no distress   Head:    Normocephalic, without obvious abnormality, atraumatic   Eyes:    PERRL, conjunctiva/corneas clear, EOM's intact            Nose:   Moist mucous membranes, no drainage or sinus tenderness   Throat:   No tenderness, no exudates   Neck:   Supple, symmetrical, trachea midline, no JVD   Back:     Symmetric, no CVA tenderness   Lungs:     Clear to auscultation bilaterally, respirations unlabored   Chest wall:    No tenderness or deformity   Heart:    Regular rate and rhythm, S1 and S2 normal, no murmur, rub   or gallop   Abdomen:     Soft, non-tender, bowel sounds active all four quadrants,     no masses, no organomegaly           Extremities:   Present bilateral   Profound edema noted bilateral    Vascular:   Nonpalpable pulses due to profound edema/lymphedema  Positive digital hair  2 2nd capillary fill time  Derm:   Wound measurements:  Numerous superficial wounds of legs  Presence of purulence:  Serous exudate noted from all wounds of bilateral leg  Probe to bone:  Not applicable   Wound located under metatarsal:  Not applicable          Ortho:        Neurologic:   CNII-XII intact   Normal strength, sensation and reflexes       throughout           Lab Results:   Admission on 05/18/2021   Component Date Value    WBC 05/18/2021 6 77     RBC 05/18/2021 2 52*    Hemoglobin 05/18/2021 7 4*    Hematocrit 05/18/2021 23 1*    MCV 05/18/2021 92     MCH 05/18/2021 29 4     MCHC 05/18/2021 32 0     RDW 05/18/2021 18 8*    MPV 05/18/2021 8 0*    Platelets 68/86/6572 118*    nRBC 05/18/2021 0     Neutrophils Relative 05/18/2021 60     Immat GRANS % 05/18/2021 2     Lymphocytes Relative 05/18/2021 28     Monocytes Relative 05/18/2021 9     Eosinophils Relative 05/18/2021 0     Basophils Relative 05/18/2021 1     Neutrophils Absolute 05/18/2021 4 02     Immature Grans Absolute 05/18/2021 0 13     Lymphocytes Absolute 05/18/2021 1 90     Monocytes Absolute 05/18/2021 0 63     Eosinophils Absolute 05/18/2021 0 02     Basophils Absolute 05/18/2021 0 07     Protime 05/18/2021 18 2*    INR 05/18/2021 1 53*    PTT 05/18/2021 53*    Sodium 05/18/2021 126*    Potassium 05/18/2021 3 8     Chloride 05/18/2021 96*    CO2 05/18/2021 28     ANION GAP 05/18/2021 2*    BUN 05/18/2021 8     Creatinine 05/18/2021 0 48*    Glucose 05/18/2021 119     Calcium 05/18/2021 7 6*    Corrected Calcium 05/18/2021 9 1     AST 05/18/2021 80*    ALT 05/18/2021 32     Alkaline Phosphatase 05/18/2021 145*    Total Protein 05/18/2021 7 6     Albumin 05/18/2021 2 1*    Total Bilirubin 05/18/2021 2 68*    eGFR 05/18/2021 142     Ammonia 05/18/2021 35     Phosphorus 05/18/2021 3 7     Magnesium 05/18/2021 1 8     LACTIC ACID 05/18/2021 3 0*    Troponin I 05/18/2021 0 02     NT-proBNP 05/18/2021 339*    Color, UA 05/18/2021 Orange     Clarity, UA 05/18/2021 Clear     Specific Gravity, UA 05/18/2021 >=1 030     pH, UA 05/18/2021 6 0     Leukocytes, UA 05/18/2021 Negative     Nitrite, UA 05/18/2021 Positive*    Protein, UA 05/18/2021 30 (1+)*    Glucose, UA 05/18/2021 Negative     Ketones, UA 05/18/2021 Negative     Urobilinogen, UA 05/18/2021 >=8 0*    Bilirubin, UA 05/18/2021 Interference- unable to analyze*    Blood, UA 05/18/2021 Trace-lysed*    Urine Culture 05/18/2021 No Growth <1000 cfu/mL     Gram Stain Result 05/18/2021 Gram positive cocci in clusters*    Blood Culture 05/18/2021 Received in Microbiology Lab  Culture in Progress       Ethanol Lvl 76/13/5209 710*    Salicylate Lvl 10/02/2327 <3*    Acetaminophen Level 05/18/2021 <2*    LACTIC ACID 05/18/2021 3 0*    RBC, UA 05/18/2021 0-1     WBC, UA 05/18/2021 1-2     Epithelial Cells 05/18/2021 Occasional     Bacteria, UA 05/18/2021 Occasional     Amph/Meth UR 05/18/2021 Negative     Barbiturate Ur 05/18/2021 Negative     Benzodiazepine Urine 05/18/2021 Negative     Cocaine Urine 05/18/2021 Negative     Methadone Urine 05/18/2021 Negative     Opiate Urine 05/18/2021 Negative     PCP Ur 05/18/2021 Negative     THC Urine 05/18/2021 Negative     Oxycodone Urine 05/18/2021 Negative     LACTIC ACID 05/19/2021 2 4*    LACTIC ACID 05/19/2021 2 9*    Sodium 05/19/2021 134*    Potassium 05/19/2021 3 5     Chloride 05/19/2021 98*    CO2 05/19/2021 27     ANION GAP 05/19/2021 9     BUN 05/19/2021 8     Creatinine 05/19/2021 0 60     Glucose 05/19/2021 129     Calcium 05/19/2021 7 4*    eGFR 05/19/2021 129     Sodium 05/19/2021 132*    Potassium 05/19/2021 3 5     Chloride 05/19/2021 97*    CO2 05/19/2021 27     ANION GAP 05/19/2021 8     BUN 05/19/2021 8     Creatinine 05/19/2021 0 44*    Glucose 05/19/2021 118     Calcium 05/19/2021 7 8*    Corrected Calcium 05/19/2021 9 5     AST 05/19/2021 79*    ALT 05/19/2021 31     Alkaline Phosphatase 05/19/2021 135*    Total Protein 05/19/2021 7 5     Albumin 05/19/2021 1 9*    Total Bilirubin 05/19/2021 2 33*    eGFR 05/19/2021 147     Magnesium 05/19/2021 1 6     WBC 05/19/2021 5 94     RBC 05/19/2021 2 24*    Hemoglobin 05/19/2021 6 5*    Hematocrit 05/19/2021 20 3*    MCV 05/19/2021 92     MCH 05/19/2021 29 0     MCHC 05/19/2021 31 4     RDW 05/19/2021 18 9*    MPV 05/19/2021 7 9*    Platelets 03/12/1699 95*    nRBC 05/19/2021 0     Neutrophils Relative 05/19/2021 64     Immat GRANS % 05/19/2021 1     Lymphocytes Relative 05/19/2021 24     Monocytes Relative 05/19/2021 9     Eosinophils Relative 05/19/2021 1     Basophils Relative 05/19/2021 1     Neutrophils Absolute 05/19/2021 3 85     Immature Grans Absolute 05/19/2021 0 05     Lymphocytes Absolute 05/19/2021 1 40     Monocytes Absolute 05/19/2021 0 51     Eosinophils Absolute 05/19/2021 0 06     Basophils Absolute 05/19/2021 0 07     Vitamin B-12 05/19/2021 1,482*    Folate 05/19/2021 4 5     Iron Saturation 05/19/2021 11     TIBC 05/19/2021 226*    Iron 05/19/2021 25*    Ferritin 05/19/2021 77     ABO Grouping 05/19/2021 A     Rh Factor 05/19/2021 Positive     Antibody Screen 05/19/2021 Negative     Specimen Expiration Date 05/19/2021 13181894     Unit Product Code 05/19/2021 [id number]     Unit Number 05/19/2021 [id number]     Unit ABO 05/19/2021 A     Unit RH 05/19/2021 POS     Crossmatch 05/19/2021 Compatible     Unit Dispense Status 05/19/2021 Issued     Hemoglobin 05/19/2021 7 0*    Hematocrit 05/19/2021 22 0*    Sodium 05/19/2021 134*    Potassium 05/19/2021 3 7     Chloride 05/19/2021 99*    CO2 05/19/2021 26     ANION GAP 05/19/2021 9     BUN 05/19/2021 10     Creatinine 05/19/2021 0 51*    Glucose 05/19/2021 123     Calcium 05/19/2021 8 0*    eGFR 05/19/2021 138      Imaging: I have personally reviewed pertinent films in PACS  EKG, Pathology, and Other Studies: I have personally reviewed pertinent reports        Code Status: Level 1 - Full Code  Advance Directive and Living Will:      Power of :    POLST:

## 2021-05-19 NOTE — ASSESSMENT & PLAN NOTE
Has a history of HTN but admits that he is no longer taking his home medications, but on Lasix and Aldactone  Blood pressure was running on the soft side but has improved    · Patient was on Lasix with albumin initially  · Monitor BMP and electrolytes

## 2021-05-19 NOTE — PLAN OF CARE
Problem: Potential for Falls  Goal: Patient will remain free of falls  Description: INTERVENTIONS:  - Assess patient frequently for physical needs  -  Identify cognitive and physical deficits and behaviors that affect risk of falls    -  [place] fall precautions as indicated by assessment   - Educate patient/family on patient safety including physical limitations  - Instruct patient to call for assistance with activity based on assessment  - Modify environment to reduce risk of injury  - Consider OT/PT consult to assist with strengthening/mobility  Outcome: Progressing     Problem: Prexisting or High Potential for Compromised Skin Integrity  Goal: Skin integrity is maintained or improved  Description: INTERVENTIONS:  - Identify patients at risk for skin breakdown  - Assess and monitor skin integrity  - Assess and monitor nutrition and hydration status  - Monitor labs   - Assess for incontinence   - Turn and reposition patient  - Assist with mobility/ambulation  - Relieve pressure over bony prominences  - Avoid friction and shearing  - Provide appropriate hygiene as needed including keeping skin clean and dry  - Evaluate need for skin moisturizer/barrier cream  - Collaborate with interdisciplinary team   - Patient/family teaching  - Consider wound care consult   Outcome: Progressing     Problem: INFECTION - ADULT  Goal: Absence or prevention of progression during hospitalization  Description: INTERVENTIONS:  - Assess and monitor for signs and symptoms of infection  - Monitor lab/diagnostic results  - Monitor all insertion sites, i e  indwelling lines, tubes, and drains  - Monitor endotracheal if appropriate and nasal secretions for changes in amount and color  - [place] appropriate cooling/warming therapies per order  - Administer medications as ordered  - Instruct and encourage patient and family to use good hand hygiene technique  - Identify and instruct in appropriate isolation precautions for identified infection/condition  Outcome: Progressing  Goal: Absence of fever/infection during neutropenic period  Description: INTERVENTIONS:  - Monitor WBC    Outcome: Progressing     Problem: DISCHARGE PLANNING  Goal: Discharge to home or other facility with appropriate resources  Description: INTERVENTIONS:  - Identify barriers to discharge w/patient and caregiver  - Arrange for needed discharge resources and transportation as appropriate  - Identify discharge learning needs (meds, wound care, etc )  - Arrange for interpretive services to assist at discharge as needed  - Refer to Case Management Department for coordinating discharge planning if the patient needs post-hospital services based on physician/advanced practitioner order or complex needs related to functional status, cognitive ability, or social support system  Outcome: Progressing

## 2021-05-19 NOTE — ASSESSMENT & PLAN NOTE
Patient reports Hx of multi-substance abuse, patient takes suboxone   - confirm suboxone dosing, PDMP reviewed but doesn't appear to be up to date as previous notes state dosing was able to be confirmed from [address]; need to confirm in AM  - UDS pending

## 2021-05-19 NOTE — CONSULTS
Consult Note - Wound   [place] 39 y o  male MRN: [id number]  Unit/Bed#: [address] [place] [address] Encounter: [id number]      Assessment:   Patient currently has a nose bleed  Patient says he has not followed up with medical treatment due to lack of insurance  Wound care nurse consult for bilateral lower leg lymphedema  Left lower leg posterior aspect oozing scant yellow purulent drainage  Xerosis present on bilateral lower legs/feet  Reddened bilateral lower legs-particularly below the knees but does not extend all the way down the lower legs  Superficial numerous openings in lower extremities  Treatment Provided:  Bernabe Cannon, student nurse, cleansed bilateral lower legs with diluted chlorhexadine  Allowed to air dry on  When available, staff RN to apply Lac Hydrin lotion to bilateral lower legs, top and bottom of feet- but not between the toes  Wound/Skin Care Plan:   1  On a daily basis, cleanse bilateral lower legs/feet/between the toes with diluted chlorhexadine  Allow to air dry  Then apply Lac Hydrin lotion to bilateral lower legs/feet but not between the toes once a day  2  Wound care nurse follow-up  3  Ideally, patient will be able to follow up at [facility]  Patient also can follow up at [address]  Discussed with patient; Bernabe Cannon, student nurse  Unable to locate BARBARA [facility]

## 2021-05-19 NOTE — ASSESSMENT & PLAN NOTE
Hx of anemia, in the past documented as likely multifactorial 2/2 anemia of chronic liver disease, alcohol abuse and acute blood loss in January 2/2 leg wound  Hg 7 4, lower than baseline on presentation  Denies any overt GI bleeding    Noted to episode of epistaxis  Status post 2 PRBC transfusion on 05/19  Iron sat 11%, B12, folate normal  Hemoglobin slightly lower but overall stable  - No further evidence of acute blood loss  - Continue to trend and transfuse as needed for Hg < 7  - Continue folic acid supplement, consider iron supplementation on discharge

## 2021-05-19 NOTE — ASSESSMENT & PLAN NOTE
EKG-sinus tachycardia, multifactorial  Setting of infection, dehydration/intravascular depletion, withdrawal  Telemetry- sinus tachycardia  Cardiology input appreciated-appears to be improving  · Monitor H&H and transfuse as needed  · Continue antibiotics as above  · Continue CIWA protocol, Suboxone resumed

## 2021-05-19 NOTE — ASSESSMENT & PLAN NOTE
In the setting of ETOH abuse and hepatic cirrhosis   Continue to monitor, currently no evidence of bleeding   Hold AC given anemia and thrombocytopenia in underlying liver dysfunction, if stool occult negative and no other clear source of acute blood loss, consider resuming if platelets and Hg stabilize

## 2021-05-19 NOTE — ASSESSMENT & PLAN NOTE
UA abnormal, patient denies any urinary symptoms at present  - Continue ceftriaxone  - Monitor I/Os  - urine culture, blood cultures pending

## 2021-05-19 NOTE — ASSESSMENT & PLAN NOTE
Noted on previous CTA chest from Jan 2021  - Has not since had f/u with CT surgery for regular outpatient monitoring, discuss with patient the importance of regular f/u

## 2021-05-19 NOTE — ASSESSMENT & PLAN NOTE
Patient was severe lower extremity edema with erythema, history of the same and treated inpatient with IV Abx 2 times in 2021     - received ceftriaxone in ED for suspected bilateral extremity cellulitis as well as UTI, continue ABx  - wound care consult appreciated  - continue daily wound care  - podiatry consult also appreciated as patient has chronic skin changes in addition to above findings

## 2021-05-19 NOTE — CONSULTS
Consultation - Cardiology   Callie Del Rio 39 y o  male MRN: [id number]  Unit/Bed#: [address] [place] [address] Encounter: [id number]    Assessment/Plan     Assessment:  1  Cellulitis with wounds of both lower extremity concerning for possible sepsis  2  Murmur  3  Polysubstance abuse including alcohol   4  Sinus tachycardia most likely reactive to infection  5  Anemia    Plan:  Patient has been admitted to the hospitalist service  1  Will obtain 2D echocardiogram to evaluate for murmur  2  Continue IV antibiotics and other therapy for primary team  3  Further orders as patient condition and testing warranted  History of Present Illness   Physician Requesting Consult: Azeem Martinez MD  Reason for Consult / Principal Problem:  Murmur      HPI: Callie Del Rio is a 39y o  year old male who presented to the emergency room after he was found by his family sitting on the sofa, covered in excrement  Patient also is noted to have chronic lower extremity edema which he has been battling with for some time and has had multiple hospital admissions  Patient has a history for PTSD, alcohol abuse for which he can drink 1-2 bottles of vodka per day, alcoholic cirrhosis, anemia, polysubstance abuse for which he now takes Suboxone, depression and the chronic lower extremity edema/lymphedema  Consult is for evaluation murmur    Inpatient consult to Cardiology  Consult performed by: MARILEE Alexander  Consult ordered by: Kimberly Harrell PA-C          Review of Systems   Constitutional: Positive for activity change and fatigue  HENT: Negative  Negative for congestion, facial swelling, postnasal drip, sinus pressure and trouble swallowing  Eyes: Negative  Negative for photophobia and visual disturbance  Respiratory: Negative for chest tightness and shortness of breath  Cardiovascular: Positive for leg swelling  Negative for chest pain and palpitations  Gastrointestinal: Positive for abdominal distention   Negative for diarrhea, nausea and rectal pain  Endocrine: Negative  Negative for polydipsia, polyphagia and polyuria  Genitourinary: Negative  Negative for difficulty urinating  Musculoskeletal: Positive for gait problem  Skin: Positive for wound  Neurological: Negative for dizziness, syncope, speech difficulty, weakness and light-headedness  Hematological: Negative  Psychiatric/Behavioral: Negative          Historical Information   Past Medical History:   Diagnosis Date    Allergic     Anemia     Asthma     Depression 5/18/2021    Essential hypertension 5/6/2019    Obesity     [facility]ate dependence ([facility] )     Substance abuse ([facility] )      Past Surgical History:   Procedure Laterality Date    LYMPH NODE BIOPSY       Social History     Substance and Sexual Activity   Alcohol Use Yes    Alcohol/week: 12 0 standard drinks    Types: 12 Cans of beer per week    Frequency: 4 or more times a week    Drinks per session: 1 or 2    Binge frequency: Monthly     Social History     Substance and Sexual Activity   Drug Use Not Currently    Types: Heroin    Comment: on suboxone for opiate abuse; used heroin 1 week ago     E-Cigarette/Vaping    E-Cigarette Use Current Some Day User      E-Cigarette/Vaping Substances     Social History     Tobacco Use   Smoking Status Current Every Day Smoker    Packs/day: 1 00   Smokeless Tobacco Never Used   Tobacco Comment    2/2019; PATIENT VAPES     Family History:   Family History   Problem Relation Age of Onset    No Known Problems Mother     Diabetes Father     Prostate cancer Father     Hypertension Father        Meds/Allergies   all current active meds have been reviewed, current meds:   Current Facility-Administered Medications   Medication Dose Route Frequency    albumin human (FLEXBUMIN) 25 % injection 12 5 g  12 5 g Intravenous Q8H    buprenorphine-naloxone (Suboxone) film 16 mg  16 mg Sublingual Daily    calcium carbonate (TUMS) chewable tablet 1,000 mg  1,000 mg Oral Daily PRN    cefepime (MAXIPIME) 2,000 mg in dextrose 5 % 50 mL IVPB  2,000 mg Intravenous U7F    folic acid 1 mg, thiamine (VITAMIN B1) 100 mg in sodium chloride 0 9 % 100 mL IV piggyback   Intravenous Daily    nicotine (NICODERM CQ) 14 mg/24hr TD 24 hr patch 14 mg  14 mg Transdermal Once    nicotine (NICODERM CQ) 14 mg/24hr TD 24 hr patch 14 mg  14 mg Transdermal Daily    ondansetron (ZOFRAN) injection 4 mg  4 mg Intravenous Q6H PRN    pantoprazole (PROTONIX) EC tablet 40 mg  40 mg Oral Early Morning    polyethylene glycol (MIRALAX) packet 17 g  17 g Oral Daily PRN    sodium chloride infusion 0 45 %  100 mL/hr Intravenous Continuous    vancomycin (VANCOCIN) 1,250 mg in sodium chloride 0 9 % 250 mL IVPB  15 mg/kg (Adjusted) Intravenous Q8H    and PTA meds:   Prior to Admission Medications   Prescriptions Last Dose Informant Patient Reported? Taking? albuterol (Ventolin HFA) 90 mcg/act inhaler More than a month at Unknown time  No No   Sig: Inhale 2 puffs every 6 (six) hours as needed for wheezing   buprenorphine-naloxone (SUBOXONE) 8-2 mg per SL tablet   Yes Yes   Sig: Place 2 tablets under the tongue daily Verified from [facility], [phone]  Precribed by Dr Eugenio Browning, Last Prescirbed 5/17/2021   escitalopram (LEXAPRO) 10 mg tablet Not Taking at Unknown time  No No   Sig: Take 1 tablet (10 mg total) by mouth daily   Patient not taking: Reported on 1/44/3226   folic acid (FOLVITE) 1 mg tablet Past Week at Unknown time  No Yes   Sig: Take 1 tablet (1 mg total) by mouth daily   furosemide (LASIX) 40 mg tablet Past Week at Unknown time  No Yes   Sig: Take 1 tablet (40 mg total) by mouth daily      Facility-Administered Medications: None     Allergies   Allergen Reactions    Levofloxacin Other (See Comments)     BLACKED OUT       Objective   Vitals: Blood pressure 96/57, pulse (!) 119, temperature 99 1 °F (37 3 °C), resp   rate 20, height 6' 1" (1 854 m), weight 106 kg (233 lb 11 oz), SpO2 92 %  Orthostatic Blood Pressures      Most Recent Value   Blood Pressure  96/57 filed at 2021 9806   Patient Position - Orthostatic VS  Lying filed at 2021 2313            Intake/Output Summary (Last 24 hours) at 2021 0927  Last data filed at 2021 0201  Gross per 24 hour   Intake 5298 ml   Output 150 ml   Net 5148 ml       Invasive Devices     Peripheral Intravenous Line            Peripheral IV 21 Right Antecubital less than 1 day                Physical Exam  Vitals signs and nursing note reviewed  Constitutional:       Appearance: He is obese  He is ill-appearing  HENT:      Head: Normocephalic  Right Ear: External ear normal       Left Ear: External ear normal       Nose: Nose normal    Eyes:      General: No scleral icterus  Right eye: No discharge  Left eye: No discharge  Conjunctiva/sclera: Conjunctivae normal    Neck:      Musculoskeletal: Normal range of motion and neck supple  Cardiovascular:      Rate and Rhythm: Regular rhythm  Tachycardia present  Pulses: Normal pulses  Heart sounds: Murmur present  Comments: Rubbing murmur heard at left upper sternal border  Pulmonary:      Effort: Pulmonary effort is normal       Breath sounds: Normal breath sounds  Musculoskeletal:      Right lower le+ Edema present  Left lower le+ Edema present  Comments: Brawny lower extremity edema was wounds appears to be chronic venous stasis with lymphedema   Skin:     Capillary Refill: Capillary refill takes less than 2 seconds  Findings: Erythema and rash present  Comments: Chronic venous changes both lower extremities   Neurological:      Mental Status: He is alert  Psychiatric:         Attention and Perception: Attention normal          Speech: Speech normal          Behavior: Behavior is cooperative  Comments: Child-like         Lab Results:   I have personally reviewed pertinent lab results      CBC with diff:   Results from last 7 days   Lab Units 05/18/21  1729   WBC Thousand/uL 6 77   RBC Million/uL 2 52*   HEMOGLOBIN g/dL 7 4*   HEMATOCRIT % 23 1*   MCV fL 92   MCH pg 29 4   MCHC g/dL 32 0   RDW % 18 8*   MPV fL 8 0*   PLATELETS Thousands/uL 118*     CMP:   Results from last 7 days   Lab Units 05/19/21  0402 05/18/21  1746   SODIUM mmol/L 134* 126*   POTASSIUM mmol/L 3 5 3 8   CHLORIDE mmol/L 98* 96*   CO2 mmol/L 27 28   BUN mg/dL 8 8   CREATININE mg/dL 0 60 0 48*   CALCIUM mg/dL 7 4* 7 6*   AST U/L  --  80*   ALT U/L  --  32   ALK PHOS U/L  --  145*   EGFR ml/min/1 73sq m 129 142     Troponin:   0   Lab Value Date/Time    TROPONINI 0 02 05/18/2021 1731     BNP:   Results from last 7 days   Lab Units 05/19/21  0402   POTASSIUM mmol/L 3 5   CHLORIDE mmol/L 98*   CO2 mmol/L 27   BUN mg/dL 8   CREATININE mg/dL 0 60   CALCIUM mg/dL 7 4*   EGFR ml/min/1 73sq m 129     Coags:   Results from last 7 days   Lab Units 05/18/21  1729   PTT seconds 53*   INR  1 53*     Magnesium:   Results from last 7 days   Lab Units 05/18/21  1746   MAGNESIUM mg/dL 1 8     Imaging: I have personally reviewed pertinent reports      EKG:  Sinus tachycardia  VTE Prophylaxis: Venodyne contraindicated due to Lower extremity wounds    Code Status: Level 1 - Full Code  Advance Directive and Living Will:      Power of :    POLST:      Que [place], [address]  Cardiology

## 2021-05-19 NOTE — H&P
M[facility] 45  H&P- Merissa Escalona 1985, 39 y o  male MRN: [id number]  Unit/Bed#: [address] [place] [address] Encounter: [id number]  Primary Care Provider: Carrie Ruiz PA-C   Date and time admitted to hospital: 5/18/2021  5:06 PM    * Cellulitis  Assessment & Plan  Patient was severe lower extremity edema with erythema, history of the same and treated inpatient with IV Abx 2 times in 2021     - received ceftriaxone in ED for suspected bilateral extremity cellulitis as well as UTI, continue ABx  - wound care consult appreciated  - continue daily wound care  - podiatry consult also appreciated as patient has chronic skin changes in addition to above findings    Alcohol abuse  Assessment & Plan  Hx of ETOH abuse, in the past 3-4 weeks has cut down from 1-2 bottles of vodka/day to 2-3 shots per day  ETOH 462 on admission with last drink at approxiamtely 3-4pm according to the patient  He wishes to stop drinking and would like resources to help   No Hx of w/d seizures/DT    - Maintain CIWA protocol  - Encourage ETOH cessation   - No longer taking Lexapro, patient states that he would like to speak to psychiatry about the same   - Folic acid, thiamine, multivitamin       Hepatic cirrhosis ([facility] )  Assessment & Plan  Hx of hepatic cirrhosis likely 2/2 ETOH use  - thrombocytopenia, elevated T bili  - Recent hepatitis panel in January negative   - Consider GI consult  - MELD-Na score 24 on admission  - previously on lactulose, but patient states he is no longer taking this  - ammonia level 35 on admission    Hyponatremia  Assessment & Plan  Chronic, Na 126 on admission, typically 130-131  In the setting of ETOH abuse  No longer taking Lasix/aldactone   Patient would benefit from gentle IVF at this time, reassess Na with repeat BMP tonight and adjust as needed       Anemia  Assessment & Plan  Hx of anemia, in the past documented as likely multifactorial 2/2 anemia of chronic liver disease and acute blood loss in January 2/2 leg wound     - Today Hg 7 4, lower than baseline   - No evidence of acute blood loss  - Continue to trend and transfuse as needed for Hg < 7  - Continue folic acid supplement  - Check stool for occult blood   - Repeat iron studies, G40, folic acid    Thrombocytopenia (HCC)  Assessment & Plan  In the setting of ETOH abuse and hepatic cirrhosis   Continue to monitor, currently no evidence of bleeding   Hold AC given anemia and thrombocytopenia in underlying liver dysfunction, if stool occult negative and no other clear source of acute blood loss, consider resuming if platelets and Hg stabilize    Substance abuse ([facility] )  Assessment & Plan  Patient reports Hx of multi-substance abuse, patient takes suboxone   - confirm suboxone dosing, PDMP reviewed but doesn't appear to be up to date as previous notes state dosing was able to be confirmed from [address]; need to confirm in AM  - UDS pending    UTI (urinary tract infection)  Assessment & Plan  UA abnormal, patient denies any urinary symptoms at present  - Continue ceftriaxone  - Monitor I/Os  - urine culture, blood cultures pending     Cardiac murmur  Assessment & Plan  As noted on exam, no previous documentation noted of a cardiac murmur  Given ongoing LE edema, echo in AM  Cardiology consult appreciated     Depression  Assessment & Plan  Hx of depression, patient was previously on Lexapro but is no longer taking this  Patient states he would like to speak to psych about restarting medications   Consult psych when medically cleared    Thoracoabdominal aortic aneurysm [facility])  Assessment & Plan  Noted on previous CTA chest from Jan 2021  - Has not since had f/u with CT surgery for regular outpatient monitoring, discuss with patient the importance of regular f/u    Tobacco dependence  Assessment & Plan  Continues to smoke daily, requested nicotine patch  Smoking cessation discussed, continue to encourage cessation     Essential hypertension  Assessment & Plan  Has a history of HTN but admits that he is no longer taking his home medications, previously on Lasix and Aldactone  BP acceptable here, continue to monitor    VTE Prophylaxis: Pharmacologic VTE Prophylaxis contraindicated due to anemia  / reason for no mechanical VTE prophylaxis b/l LE edema/wounds   Code Status: Level 1  POLST: POLST is not applicable to this patient  Discussion with family: No, offered but patient declined     Anticipated Length of Stay:  Patient will be admitted on an Inpatient basis with an anticipated length of stay of  > 2 midnights  Justification for Hospital Stay: cellulitis, ETOH/substance abuse, UTI, hyponatremia    Total Time for Visit, including Counseling / Coordination of Care: 45 minutes  Greater than 50% of this total time spent on direct patient counseling and coordination of care  Chief Complaint:   LE edema, wounds    History of Present Illness: Brenton Garcia is a 39 y o  male who presents with PMH of HTN, EtOH abuse, substance abuse, asthma, depression and PTSD  He presented to the ED today via EMS after his mother found him alone at home sitting in a recliner covered in urine  He has had lower extremity edema since the beginning of the year with significant skin changes and his mother found that they were also covered in stool and not cleaned  She tried helping him clean his legs today and scrubbed the skin, which cause the legs to bleed  He states that he has had pain in both legs and is been more difficult to get around the house lately  He states that his legs are more red today after they were cleaned than they were yesterday, and they are always this swollen   He denies any fevers, chills, chest pain, SOB  He continues to drink, but states he has been cutting down significantly  In the past 3-4 weeks he cut down from drinking 1-2 full bottles of vodka per day to now drinking 2-3 shots per day  His last drink was approximately 3-4 p m  today    He states that he wishes to stop drinking entirely, but he states it has been very difficult and feels depressed lately  He has had significant changes in his life over the past year between the death of his girlfriend and job loss, he was initially drinking more to cope  Since cutting down, he feels more confident that he can cut out drinking altogether, but lately he states he has mostly been drinking to help ease the pain of his legs  He is a  and was previously receiving care through the [place]  He states that lately he has not been taking most of his medications, and cannot tell me will which, if any, he has recently taken  He takes Suboxone for history of drug abuse  Review of Systems:    Review of Systems   Constitutional: Positive for fatigue  Negative for chills and fever  HENT: Negative for congestion, rhinorrhea and sore throat  Eyes: Negative for visual disturbance  Respiratory: Negative for cough, shortness of breath and wheezing  Cardiovascular: Positive for leg swelling  Negative for chest pain and palpitations  Gastrointestinal: Negative for abdominal distention, abdominal pain, blood in stool, diarrhea, nausea and vomiting  Genitourinary: Negative for dysuria, frequency, hematuria and urgency  Musculoskeletal: Positive for arthralgias  Negative for gait problem and myalgias  Skin: Positive for color change, rash and wound  Neurological: Negative for dizziness, seizures, weakness, light-headedness, numbness and headaches  Psychiatric/Behavioral: Positive for dysphoric mood  Negative for hallucinations, self-injury and suicidal ideas  The patient is nervous/anxious           Past Medical and Surgical History:     Past Medical History:   Diagnosis Date    Allergic     Anemia     Asthma     Depression 5/18/2021    Essential hypertension 5/6/2019    Obesity     [facility]     [facility]        Past Surgical History:   Procedure Laterality Date    LYMPH NODE BIOPSY         Meds/Allergies:    Prior to Admission medications    Medication Sig Start Date End Date Taking? Authorizing Provider   buprenorphine-naloxone (SUBOXONE) 8-2 mg SUBLINGUAL TWO FILMS SUBLINGUALLY DAILY 9/14/20  Yes Historical Provider, MD   folic acid (FOLVITE) 1 mg tablet Take 1 tablet (1 mg total) by mouth daily 1/26/21  Yes Sunny Alexander MD   furosemide (LASIX) 40 mg tablet Take 1 tablet (40 mg total) by mouth daily 1/26/21  Yes Sunny Alexander MD   albuterol (Ventolin HFA) 90 mcg/act inhaler Inhale 2 puffs every 6 (six) hours as needed for wheezing 1/25/21   Sunny Alexander MD   escitalopram (LEXAPRO) 10 mg tablet Take 1 tablet (10 mg total) by mouth daily  Patient not taking: Reported on 5/18/2021 1/26/21   Sunny Alexander MD   lactulose 20 g/30 mL Take 30 mL (20 g total) by mouth 2 (two) times a day 1/25/21 5/18/21  Sunny Alexander MD   spironolactone (ALDACTONE) 50 mg tablet Take 1 tablet (50 mg total) by mouth daily 1/26/21 5/18/21  Sunny Alexander MD   thiamine 100 MG tablet Take 1 tablet (100 mg total) by mouth daily 1/26/21 5/18/21  Sunny Alexander MD       Allergies:    Allergies   Allergen Reactions    Levofloxacin Other (See Comments)     BLACKED OUT       Social History:    Social History     Substance and Sexual Activity   Alcohol Use Yes    Alcohol/week: 12 0 standard drinks    Types: 12 Cans of beer per week    Frequency: 4 or more times a week    Drinks per session: 1 or 2     Social History     Tobacco Use   Smoking Status Current Every Day Smoker    Packs/day: 1 00   Smokeless Tobacco Never Used   Tobacco Comment    2/2019; PATIENT VAPES     Social History     Substance and Sexual Activity   Drug Use Not Currently    Types: Heroin    Comment: on suboxone for opiate abuse; used heroin 1 week ago       Family History:    Family History   Problem Relation Age of Onset    No Known Problems Mother     Diabetes Father     Prostate cancer Father     Hypertension Father        Physical Exam:     Vitals:   Blood Pressure: 108/57 (05/18/21 2120)  Pulse: (!) 107 (05/18/21 2120)  Temperature: (!) 96 9 °F (36 1 °C) (05/18/21 1707)  Temp Source: Tympanic (05/18/21 1707)  Respirations: 20 (05/18/21 2120)  Weight - Scale: 106 kg (233 lb 14 5 oz) (05/18/21 1707)  SpO2: 92 % (05/18/21 2120)    Physical Exam  Vitals signs reviewed  Constitutional:       General: He is not in acute distress  Appearance: He is well-developed  He is not ill-appearing  Comments: Appears intoxicated, AAOX3, pleasant, communicative and cooperative with H+P   HENT:      Head: Normocephalic and atraumatic  Cardiovascular:      Rate and Rhythm: Regular rhythm  Tachycardia present  Heart sounds: Murmur present  No gallop  Pulmonary:      Effort: Pulmonary effort is normal  No respiratory distress  Breath sounds: Normal breath sounds  No wheezing, rhonchi or rales  Abdominal:      General: Bowel sounds are normal  There is no distension  Palpations: Abdomen is soft  Tenderness: There is no abdominal tenderness  There is no guarding  Musculoskeletal:         General: Tenderness present  Right lower leg: Edema present  Left lower leg: Edema present  Skin:     General: Skin is warm and dry  Coloration: Skin is not jaundiced  Findings: Erythema present  Comments: Significant bilateral LE edema and chronic skin changes with dry skin, areas of superficial abrasions with weeping/bleeding, TTP   RLE = LLE     Neurological:      Mental Status: He is alert and oriented to person, place, and time  Psychiatric:         Mood and Affect: Mood normal          Behavior: Behavior normal            Additional Data:     Lab Results: I have personally reviewed pertinent reports        Results from last 7 days   Lab Units 05/18/21  1729   WBC Thousand/uL 6 77   HEMOGLOBIN g/dL 7 4*   HEMATOCRIT % 23 1*   PLATELETS Thousands/uL 118*   NEUTROS PCT % 60   LYMPHS PCT % 28   MONOS PCT % 9   EOS PCT % 0     Results from last 7 days   Lab Units 05/18/21  1746   SODIUM mmol/L 126*   POTASSIUM mmol/L 3 8   CHLORIDE mmol/L 96*   CO2 mmol/L 28   BUN mg/dL 8   CREATININE mg/dL 0 48*   ANION GAP mmol/L 2*   CALCIUM mg/dL 7 6*   ALBUMIN g/dL 2 1*   TOTAL BILIRUBIN mg/dL 2 68*   ALK PHOS U/L 145*   ALT U/L 32   AST U/L 80*   GLUCOSE RANDOM mg/dL 119     Results from last 7 days   Lab Units 05/18/21  1729   INR  1 53*             Results from last 7 days   Lab Units 05/18/21  1940 05/18/21  1729   LACTIC ACID mmol/L 3 0* 3 0*       Imaging: I have personally reviewed pertinent reports  XR chest 1 view portable    (Results Pending)       EKG, Pathology, and Other Studies Reviewed on Admission:   · EKG: sinus tachycardia, 106 bpm    Allscripts / Epic Records Reviewed: Yes     ** Please Note: This note has been constructed using a voice recognition system   **

## 2021-05-19 NOTE — ASSESSMENT & PLAN NOTE
Hx of hepatic cirrhosis likely 2/2 ETOH use  - Recent hepatitis panel in January negative   - MELD-Na score 24 on admission  - ammonia level 35 on admission, INR 1 5  Bilirubin stable  -continue to monitor LFT  -continue lactulose  Patient initially was in albumin with Lasix  Patient started on Aldactone  -alcohol abstinence

## 2021-05-19 NOTE — ASSESSMENT & PLAN NOTE
In the setting of ETOH abuse and hepatic cirrhosis   Continue to monitor, currently no evidence of bleeding   Appears to be improving  Restarted DVT prophylaxis

## 2021-05-19 NOTE — ASSESSMENT & PLAN NOTE
Hx of hepatic cirrhosis likely 2/2 ETOH use  - thrombocytopenia, elevated T bili  - Recent hepatitis panel in January negative   - Consider GI consult  - MELD-Na score 24 on admission  - previously on lactulose, but patient states he is no longer taking this  - ammonia level 35 on admission

## 2021-05-19 NOTE — ASSESSMENT & PLAN NOTE
Continues to smoke daily, requested nicotine patch  Smoking cessation discussed, continue to encourage cessation

## 2021-05-19 NOTE — ASSESSMENT & PLAN NOTE
Hx of ETOH abuse, in the past 3-4 weeks has cut down from 1-2 bottles of vodka/day to 2-3 shots per day  ETOH 462 on admission with last drink at approxiamtely 3-4pm the day of admission according to the patient  He wishes to stop drinking and would like resources to help  No Hx of w/d seizures/DT  Improving   - Maintain CIWA protocol  - Encourage ETOH cessation   - Folic acid, thiamine, multivitamin   -alcohol rehab discussed but patient declines  Agrees to follow-up extensively as outpatient

## 2021-05-19 NOTE — UTILIZATION REVIEW
Initial Clinical Review    Admission: Date/Time/Statement:   Admission Orders (From admission, onward)     Ordered        05/18/21 1951  Inpatient Admission  Once                   Orders Placed This Encounter   Procedures    Inpatient Admission     Standing Status:   Standing     Number of Occurrences:   1     Order Specific Question:   Level of Care     Answer:   Med Surg [16]     Order Specific Question:   Estimated length of stay     Answer:   More than 2 Midnights     Order Specific Question:   Certification     Answer:   I certify that inpatient services are medically necessary for this patient for a duration of greater than two midnights  See H&P and MD Progress Notes for additional information about the patient's course of treatment  ED Arrival Information     Expected Arrival Acuity Means of Arrival Escorted By Service Admission Type    - 5/18/2021 16:55 [facility] [place] Hospitalist Urgent    Arrival Complaint    bleeding        Chief Complaint   Patient presents with    Cellulitis     Arrives BLS  Per EMT patient lives with parents and was found sitting in a sofa type chair in his body fluids  Patient's pants wet with urine, very malodorous  Lower legs with extreme edema, dry and areas sloughing and bleeding   Patient states he has cut back on his drinking and smoking and is on suboxone  Initial Presentation:   39 y o  male who presents with PMH of HTN, EtOH abuse, substance abuse, asthma, depression and PTSD  He presented to the ED today via EMS after his mother found him alone at home sitting in a recliner covered in urine  He has had lower extremity edema since the beginning of the year with significant skin changes and his mother found that they were also covered in stool and not cleaned  She tried helping him clean his legs today and scrubbed the skin, which cause the legs to bleed  He is a , death of girlfriend and loss of job this past year   States cut back on drinking from drinking 1-2 full bottles of vodka to drinking 2-3 shots qd  He takes Suboxone for hx drug abuse  In ED he is intoxicated, tachycardia ,heart murmur   B/l lower extremity edema ,erythema Significant bilateral LE edema and chronic skin changes with dry skin, areas of superficial abrasions with weeping/bleeding, TTP   RLE = LLE   bnp 339,Na 126 cl 96 cr 0 48 ,ca 7 6, alk phos 145, alb 2 1 t bili 2 68 ,Ethanol 462, Lactic acid 3 0, h/h 7 4/23 1, plt 118   Admitted Inpatient Cellulitis b/l lower extremities/UTI  conitnue iv ceftriaxone   Alcohol abuse CIWA  Monitor s/s withdrawal  Hepatic cirrhosis MELD 24previously on lactulose, but patient states he is no longer taking this  ammonia level 35 on admission  Hyponatremia IVF ,repeat labs  Anemia continue trend transfuse if hgb <7    Date:5/19     Day 2:   Changed abx t o IV Vanco and Cefepime   ED Triage Vitals   Temperature Pulse Respirations Blood Pressure SpO2   05/18/21 1707 05/18/21 1707 05/18/21 1707 05/18/21 1707 05/18/21 1707   (!) 96 9 °F (36 1 °C) (!) 114 18 135/60 95 %      Temp Source Heart Rate Source Patient Position - Orthostatic VS BP Location FiO2 (%)   05/18/21 1707 05/18/21 1707 05/18/21 1707 05/18/21 1707 --   Tympanic Monitor Sitting Right arm       Pain Score       05/18/21 1733       Worst Possible Pain          Wt Readings from Last 1 Encounters:   05/18/21 106 kg (233 lb 11 oz)     Additional Vital Signs:   /19/21 07:41:57  99 1 °F (37 3 °C)  120Abnormal   20  97/54  68  92 %  --  --  --   05/19/21 04:01:33  99 4 °F (37 4 °C)  120Abnormal   20  103/64  77  94 %  --  --  --   05/19/21 01:07:21  --  115Abnormal   18  97/51  66  92 %  --  --  --   05/19/21 0100  --  103  --  97/51  --  --  --  --  --   05/18/21 23:13:20  96 4 °F (35 8 °C)Abnormal   106Abnormal   --  108/59  75  86 %Abnormal   --  --  Lying   05/18/21 23:11:40  96 4 °F (35 8 °C)Abnormal   --  18  108/59  75  --  --  --  --   05/18/21 2133  --  --  --  --  --  92 %  2 L/min  Nasal cannula  --   05/18/21 2130  --  --  --  --  --  --  2 L/min  Nasal cannula  --   05/18/21 21:20:05  --  107Abnormal   20  108/57  74  92 %  --  --  --   05/18/21 1915  --  102  14  153/74  104  93 %             Pertinent Labs/Diagnostic Test Results:   cxr   No acute cardiopulmonary disease     Results from last 7 days   Lab Units 05/19/21  0845 05/18/21  1729   WBC Thousand/uL 5 94 6 77   HEMOGLOBIN g/dL 6 5* 7 4*   HEMATOCRIT % 20 3* 23 1*   PLATELETS Thousands/uL 95* 118*   NEUTROS ABS Thousands/µL 3 85 4 02         Results from last 7 days   Lab Units 05/19/21  0845 05/19/21  0402 05/18/21  1746   SODIUM mmol/L 132* 134* 126*   POTASSIUM mmol/L 3 5 3 5 3 8   CHLORIDE mmol/L 97* 98* 96*   CO2 mmol/L 27 27 28   ANION GAP mmol/L 8 9 2*   BUN mg/dL 8 8 8   CREATININE mg/dL 0 44* 0 60 0 48*   EGFR ml/min/1 73sq m 147 129 142   CALCIUM mg/dL 7 8* 7 4* 7 6*   MAGNESIUM mg/dL 1 6  --  1 8   PHOSPHORUS mg/dL  --   --  3 7     Results from last 7 days   Lab Units 05/19/21  0845 05/18/21  1746 05/18/21  1729   AST U/L 79* 80*  --    ALT U/L 31 32  --    ALK PHOS U/L 135* 145*  --    TOTAL PROTEIN g/dL 7 5 7 6  --    ALBUMIN g/dL 1 9* 2 1*  --    TOTAL BILIRUBIN mg/dL 2 33* 2 68*  --    AMMONIA umol/L  --   --  35         Results from last 7 days   Lab Units 05/19/21  0845 05/19/21  0402 05/18/21  1746   GLUCOSE RANDOM mg/dL 118 129 119             No results found for: BETA-HYDROXYBUTYRATE                   Results from last 7 days   Lab Units 05/18/21  1731   TROPONIN I ng/mL 0 02         Results from last 7 days   Lab Units 05/18/21  1729   PROTIME seconds 18 2*   INR  1 53*   PTT seconds 53*             Results from last 7 days   Lab Units 05/19/21  0402 05/19/21  0046 05/18/21  1940 05/18/21  1729   LACTIC ACID mmol/L 2 9* 2 4* 3 0* 3 0*             Results from last 7 days   Lab Units 05/18/21  1746   NT-PRO BNP pg/mL 339*                         Results from last 7 days   Lab Units 05/18/21  1752 CLARITY UA  Clear   COLOR UA  Orange   SPEC GRAV UA  >=1 030   PH UA  6 0   GLUCOSE UA mg/dl Negative   KETONES UA mg/dl Negative   BLOOD UA  Trace-lysed*   PROTEIN UA mg/dl 30 (1+)*   NITRITE UA  Positive*   BILIRUBIN UA  Interference- unable to analyze*   UROBILINOGEN UA E U /dl >=8 0*   LEUKOCYTES UA  Negative   WBC UA /hpf 1-2   RBC UA /hpf 0-1   BACTERIA UA /hpf Occasional   EPITHELIAL CELLS WET PREP /hpf Occasional             Results from last 7 days   Lab Units 05/18/21  1752   AMPH/METH  Negative   BARBITURATE UR  Negative   BENZODIAZEPINE UR  Negative   COCAINE UR  Negative   METHADONE URINE  Negative   OPIATE UR  Negative   PCP UR  Negative   THC UR  Negative     Results from last 7 days   Lab Units 05/18/21  1746 05/18/21  1729   ETHANOL LVL mg/dL  --  462*   ACETAMINOPHEN LVL ug/mL <2*  --    SALICYLATE LVL mg/dL <3*  --                  Results from last 7 days   Lab Units 05/18/21  1746 05/18/21  1729   BLOOD CULTURE  Received in Microbiology Lab  Culture in Progress  Received in Microbiology Lab  Culture in Progress                 ED Treatment:   Medication Administration from 05/18/2021 1655 to 05/18/2021 2110       Date/Time Order Dose Route Action Action by Comments     05/18/2021 1858 sodium chloride 0 9 % bolus 1,000 mL 0 mL Intravenous Stopped Isabel Toledo RN      05/18/2021 1743 sodium chloride 0 9 % bolus 1,000 mL 1,000 mL Intravenous New Bag [facility]      05/18/2021 1941 cefTRIAXone (ROCEPHIN) IVPB (premix in dextrose) 1,000 mg 50 mL 0 mg Intravenous Stopped Edis Mcintyre RN      05/18/2021 1911 cefTRIAXone (ROCEPHIN) IVPB (premix in dextrose) 1,000 mg 50 mL 1,000 mg Intravenous New Bag Daniella Hightower RN      05/18/2021 1859 sodium chloride 0 9 % bolus 1,000 mL 0 mL Intravenous Stopped Isabel Toledo RN      05/18/2021 1835 sodium chloride 0 9 % bolus 1,000 mL 1,000 mL Intravenous New Bag Isabel Toledo [facility]      05/18/2021 2001 nicotine (Duwaine Pool) 14 mg/24hr TD 24 hr patch 14 mg 14 mg Transdermal Medication Applied Rene White RN         Past Medical History:   Diagnosis Date    Allergic     Anemia     Asthma     Depression 5/18/2021    Essential hypertension 5/6/2019    Obesity     Opiate dependence ([facility] )     Substance abuse ([facility] )      Present on Admission:   Cellulitis   Alcohol abuse   Substance abuse ([facility] )   Essential hypertension   Thrombocytopenia (HCC)   Hyponatremia   Thoracoabdominal aortic aneurysm (HCC)   Tobacco dependence   Anemia   Hepatic cirrhosis (HCC)   Abnormal urinalysis   Cardiac murmur   Depression      Admitting Diagnosis: Alcohol abuse [F10 10]  Cellulitis [L03 90]  Heart murmur [R01 1]  Hyponatremia [E87 1]  UTI (urinary tract infection) [N39 0]  Leg edema [R60 0]  Age/Sex: 39 y o  male  Admission Orders:  gmf  Oxygen 2l  vanco trough   Us extrmity soft tissue  ciwa   Vit b12 iron panel folate  Iron sat %  Stool occult blood  urinec cx  Scheduled Medications:  albumin human, 12 5 g, Intravenous, Q8H  buprenorphine-naloxone, 16 mg, Sublingual, Daily  cefepime, 2,000 mg, Intravenous, L2Z  folic acid 1 mg, thiamine 100 mg in 0 9% sodium chloride 100 mL IVPB, , Intravenous, Daily  nicotine, 14 mg, Transdermal, Once  nicotine, 14 mg, Transdermal, Daily  pantoprazole, 40 mg, Oral, Early Morning  vancomycin, 15 mg/kg (Adjusted), Intravenous, Q8H      Continuous IV Infusions:  sodium chloride, 100 mL/hr, Intravenous, Continuous      PRN Meds:  calcium carbonate, 1,000 mg, Oral, Daily PRN  ondansetron, 4 mg, Intravenous, Q6H PRN  polyethylene glycol, 17 g, Oral, Daily PRN        IP CONSULT TO MEDICAL CRITICAL CARE  IP CONSULT TO CASE MANAGEMENT  IP CONSULT TO CARDIOLOGY  IP CONSULT TO PODIATRY  IP CONSULT TO PHARMACY    Network Utilization Review Department  ATTENTION: Please call with any questions or concerns to [phone] and carefully listen to the prompts so that you are directed to the right person   All voicemails are confidential   [facility] all requests for admission clinical reviews, approved or denied determinations and any other requests to dedicated fax number below belonging to the campus where the patient is receiving treatment   List of dedicated fax numbers for the Facilities:  [address] DENIALS (Administrative/Medical Necessity) [phone]   [address] (Maternity/NICU/Pediatrics) [phone]   [address] [place] [address] [facility] [address] [id number] [address] Lisette Marquez 1481 [address] [facility] [phone]

## 2021-05-19 NOTE — PLAN OF CARE
Problem: Potential for Falls  Goal: Patient will remain free of falls  Description: INTERVENTIONS:  - Assess patient frequently for physical needs  -  Identify cognitive and physical deficits and behaviors that affect risk of falls    -  [place] fall precautions as indicated by assessment   - Educate patient/family on patient safety including physical limitations  - Instruct patient to call for assistance with activity based on assessment  - Modify environment to reduce risk of injury  - Consider OT/PT consult to assist with strengthening/mobility  Outcome: Progressing

## 2021-05-19 NOTE — ASSESSMENT & PLAN NOTE
Hx of anemia, in the past documented as likely multifactorial 2/2 anemia of chronic liver disease and acute blood loss in January 2/2 leg wound     - Today Hg 7 4, lower than baseline   - No evidence of acute blood loss  - Continue to trend and transfuse as needed for Hg < 7  - Continue folic acid supplement  - Check stool for occult blood   - Repeat iron studies, E79, folic acid

## 2021-05-19 NOTE — ASSESSMENT & PLAN NOTE
Patient reports Hx of multi-substance abuse, patient takes suboxone   Denies history of IV drug abuse  - confirmed suboxone dosing with patient's pharmacy  - continue Suboxone

## 2021-05-19 NOTE — ASSESSMENT & PLAN NOTE
Patient was severe lower extremity edema with erythema, history of the same and treated inpatient with IV Abx 2 times in 2021  received ceftriaxone in ED for suspected bilateral extremity cellulitis as well as UTI  Afebrile, normal white count on presentation  On exam, bilateral lower extremity cellulitis  Ultrasound of the left lower extremity with evidence of cellulitis no evidence of deeper collection  Appears to be slowly improving now afebrile tachycardia is improving  · Continue ceftriaxone  Possible transition to p o  Antibiotics in a m  · MRSA surveillance culture-negative  Discontinued vancomycin  · Wound cultures growing stenotrophomonas and achromobacter  · Blood culture-1/2 coagulase-negative Staph  Likely contaminant    · Venous Doppler bilateral lower extremity -no evidence of DVT  · Follow wound care recommendations

## 2021-05-19 NOTE — ASSESSMENT & PLAN NOTE
Has a history of HTN but admits that he is no longer taking his home medications, previously on Lasix and Aldactone  BP acceptable here, continue to monitor

## 2021-05-20 ENCOUNTER — APPOINTMENT (INPATIENT)
Dept: RADIOLOGY | Facility: HOSPITAL | Age: 36
DRG: 383 | End: 2021-05-20
Attending: INTERNAL MEDICINE
Payer: SUBSIDIZED

## 2021-05-20 LAB
ABO GROUP BLD BPU: NORMAL
ABO GROUP BLD BPU: NORMAL
ANION GAP SERPL CALCULATED.3IONS-SCNC: 5 MMOL/L (ref 4–13)
BPU ID: NORMAL
BPU ID: NORMAL
BUN SERPL-MCNC: 10 MG/DL (ref 5–25)
CALCIUM SERPL-MCNC: 8.5 MG/DL (ref 8.3–10.1)
CHLORIDE SERPL-SCNC: 100 MMOL/L (ref 100–108)
CO2 SERPL-SCNC: 27 MMOL/L (ref 21–32)
CREAT SERPL-MCNC: 0.52 MG/DL (ref 0.6–1.3)
CROSSMATCH: NORMAL
CROSSMATCH: NORMAL
ERYTHROCYTE [DISTWIDTH] IN BLOOD BY AUTOMATED COUNT: 18.6 % (ref 11.6–15.1)
GFR SERPL CREATININE-BSD FRML MDRD: 137 ML/MIN/1.73SQ M
GLUCOSE SERPL-MCNC: 85 MG/DL (ref 65–140)
HCT VFR BLD AUTO: 24.1 % (ref 36.5–49.3)
HGB BLD-MCNC: 7.6 G/DL (ref 12–17)
MAGNESIUM SERPL-MCNC: 1.4 MG/DL (ref 1.6–2.6)
MCH RBC QN AUTO: 29.1 PG (ref 26.8–34.3)
MCHC RBC AUTO-ENTMCNC: 31.5 G/DL (ref 31.4–37.4)
MCV RBC AUTO: 92 FL (ref 82–98)
PLATELET # BLD AUTO: 87 THOUSANDS/UL (ref 149–390)
PMV BLD AUTO: 8.7 FL (ref 8.9–12.7)
POTASSIUM SERPL-SCNC: 3.8 MMOL/L (ref 3.5–5.3)
RBC # BLD AUTO: 2.61 MILLION/UL (ref 3.88–5.62)
SODIUM SERPL-SCNC: 132 MMOL/L (ref 136–145)
UNIT DISPENSE STATUS: NORMAL
UNIT DISPENSE STATUS: NORMAL
UNIT PRODUCT CODE: NORMAL
UNIT PRODUCT CODE: NORMAL
UNIT RH: NORMAL
UNIT RH: NORMAL
VANCOMYCIN TROUGH SERPL-MCNC: 11.1 UG/ML (ref 10–20)
WBC # BLD AUTO: 5.25 THOUSAND/UL (ref 4.31–10.16)

## 2021-05-20 PROCEDURE — 97163 PT EVAL HIGH COMPLEX 45 MIN: CPT

## 2021-05-20 PROCEDURE — 99232 SBSQ HOSP IP/OBS MODERATE 35: CPT | Performed by: INTERNAL MEDICINE

## 2021-05-20 PROCEDURE — 85027 COMPLETE CBC AUTOMATED: CPT | Performed by: NURSE PRACTITIONER

## 2021-05-20 PROCEDURE — 97167 OT EVAL HIGH COMPLEX 60 MIN: CPT

## 2021-05-20 PROCEDURE — 80202 ASSAY OF VANCOMYCIN: CPT | Performed by: INTERNAL MEDICINE

## 2021-05-20 PROCEDURE — 83735 ASSAY OF MAGNESIUM: CPT | Performed by: NURSE PRACTITIONER

## 2021-05-20 PROCEDURE — 97530 THERAPEUTIC ACTIVITIES: CPT

## 2021-05-20 PROCEDURE — 80048 BASIC METABOLIC PNL TOTAL CA: CPT | Performed by: NURSE PRACTITIONER

## 2021-05-20 PROCEDURE — 93970 EXTREMITY STUDY: CPT

## 2021-05-20 PROCEDURE — 94762 N-INVAS EAR/PLS OXIMTRY CONT: CPT

## 2021-05-20 PROCEDURE — 93970 EXTREMITY STUDY: CPT | Performed by: SURGERY

## 2021-05-20 RX ORDER — MAGNESIUM SULFATE HEPTAHYDRATE 40 MG/ML
2 INJECTION, SOLUTION INTRAVENOUS ONCE
Status: COMPLETED | OUTPATIENT
Start: 2021-05-20 | End: 2021-05-20

## 2021-05-20 RX ORDER — LANOLIN ALCOHOL/MO/W.PET/CERES
6 CREAM (GRAM) TOPICAL
Status: DISCONTINUED | OUTPATIENT
Start: 2021-05-20 | End: 2021-05-26 | Stop reason: HOSPADM

## 2021-05-20 RX ORDER — LORAZEPAM 1 MG/1
2 TABLET ORAL ONCE
Status: COMPLETED | OUTPATIENT
Start: 2021-05-20 | End: 2021-05-20

## 2021-05-20 RX ADMIN — BUPRENORPHINE AND NALOXONE 16 MG: 8; 2 FILM BUCCAL; SUBLINGUAL at 09:10

## 2021-05-20 RX ADMIN — VANCOMYCIN HYDROCHLORIDE 1250 MG: 10 INJECTION, POWDER, LYOPHILIZED, FOR SOLUTION INTRAVENOUS at 12:10

## 2021-05-20 RX ADMIN — CEFEPIME HYDROCHLORIDE 2000 MG: 2 INJECTION, POWDER, FOR SOLUTION INTRAVENOUS at 01:13

## 2021-05-20 RX ADMIN — Medication: at 17:43

## 2021-05-20 RX ADMIN — MAGNESIUM SULFATE HEPTAHYDRATE 2 G: 40 INJECTION, SOLUTION INTRAVENOUS at 09:17

## 2021-05-20 RX ADMIN — Medication: at 08:15

## 2021-05-20 RX ADMIN — CEFEPIME HYDROCHLORIDE 2000 MG: 2 INJECTION, POWDER, FOR SOLUTION INTRAVENOUS at 09:18

## 2021-05-20 RX ADMIN — OXYMETAZOLINE HYDROCHLORIDE 2 SPRAY: 0.05 SPRAY NASAL at 20:20

## 2021-05-20 RX ADMIN — PANTOPRAZOLE SODIUM 40 MG: 40 TABLET, DELAYED RELEASE ORAL at 06:21

## 2021-05-20 RX ADMIN — VANCOMYCIN HYDROCHLORIDE 1250 MG: 10 INJECTION, POWDER, LYOPHILIZED, FOR SOLUTION INTRAVENOUS at 02:30

## 2021-05-20 RX ADMIN — CLOTRIMAZOLE: 10 CREAM TOPICAL at 08:16

## 2021-05-20 RX ADMIN — LORAZEPAM 2 MG: 1 TABLET ORAL at 07:07

## 2021-05-20 RX ADMIN — CEFEPIME HYDROCHLORIDE 2000 MG: 2 INJECTION, POWDER, FOR SOLUTION INTRAVENOUS at 17:44

## 2021-05-20 RX ADMIN — FOLIC ACID: 5 INJECTION, SOLUTION INTRAMUSCULAR; INTRAVENOUS; SUBCUTANEOUS at 08:23

## 2021-05-20 RX ADMIN — CLOTRIMAZOLE: 10 CREAM TOPICAL at 17:43

## 2021-05-20 RX ADMIN — NICOTINE 14 MG: 14 PATCH, EXTENDED RELEASE TRANSDERMAL at 08:15

## 2021-05-20 RX ADMIN — Medication 6 MG: at 22:51

## 2021-05-20 RX ADMIN — OXYMETAZOLINE HYDROCHLORIDE 2 SPRAY: 0.05 SPRAY NASAL at 08:15

## 2021-05-20 RX ADMIN — VANCOMYCIN HYDROCHLORIDE 1750 MG: 10 INJECTION, POWDER, LYOPHILIZED, FOR SOLUTION INTRAVENOUS at 20:15

## 2021-05-20 NOTE — PHYSICAL THERAPY NOTE
PT EVALUATION       05/20/21 0901   Note Type   Note type Evaluation   Pain Assessment   Pain Assessment Tool Pain Assessment not indicated - pt denies pain   Home Living   Type of [address] One level  (5 RADHA)   Home Equipment   (none)   Prior Function   Level of [place] Independent with ADLs and functional mobility   Lives With   (parents)   Vocational Unemployed   Restrictions/Precautions   Other Precautions Bed Alarm; Chair Alarm;Pain; Fall Risk  (LE draining wounds)   General   Additional Pertinent History Pt admitted with cellulitis LEs  Pt has a history of ETOH abuse  Pt was found on his couch covered in urine and feces  Pt now with significant LE edema and wounds  Pt is also s/p blood transfusion for anemia  Cognition   Overall Cognitive Status WFL   RLE Assessment   RLE Assessment   (foot ROM limited by edema, MMT hip/knee 3+/5, ankle 2/5)   LLE Assessment   LLE Assessment   (foot ROM limited by edema, MMT hip/knee 3+/5, ankle 2/5)   Transfers   Sit to Stand 4  Minimal assistance   Stand to Sit 4  Minimal assistance   Stand pivot 4  Minimal assistance   Additional items   (with walker)   Ambulation/Elevation   Gait pattern Wide NENITA  (small step length)   Gait Assistance 4  Minimal assist   Assistive Device Rolling walker   Distance 5 feet   Balance   Static Sitting Fair +   Dynamic Sitting Fair -   Static Standing Fair   Dynamic Standing Poor   Ambulatory Poor   Activity Tolerance   Activity Tolerance Patient limited by fatigue;Patient tolerated treatment well   Assessment   Prognosis Good   Problem List Decreased strength;Decreased endurance; Impaired balance;Decreased mobility; Decreased range of motion;Obesity; Decreased skin integrity   Assessment Patient seen for Physical Therapy evaluation  Patient admitted with Cellulitis  Comorbidities affecting patient's physical performance include: htn, ETOH abuse, substance abuse, depression , anemia    Personal factors affecting patient at time of initial evaluation include: stairs to enter home, inability to ambulate household distances, inability to navigate level surfaces without external assistance, depression and inability to perform ADLS  Prior to admission, patient was independent with functional mobility without assistive device and independent with ADLS  Please find objective findings from Physical Therapy assessment regarding body systems outlined above with impairments and limitations including weakness, decreased ROM, impaired balance, gait deviations, decreased activity tolerance, decreased functional mobility tolerance, fall risk and decreased skin integrity  The Barthel Index was used as a functional outcome tool presenting with a score of 50 today indicating marked limitations of functional mobility and ADLS  Patient's clinical presentation is currently unstable/unpredictable as seen in patient's presentation of increased fall risk, new onset of impairment of functional mobility, decreased endurance and new onset of weakness  Pt would benefit from continued Physical Therapy treatment to address deficits as defined above and maximize level of functional mobility  As demonstrated by objective findings, the assigned level of complexity for this evaluation is high  The patient's [facility] Basic Mobility Inpatient Short Form Raw Score is 15, Standardized Score is 36 97  A standardized score less than 42 9 suggests the patient may benefit from discharge to post-acute rehabilitation services, however anticipate pt will be able to go home with family support and home PT     Goals   Patient Goals "get better"   STG Expiration Date 05/27/21   Short Term Goal #1 improve bed mobility to independent, improve transfers bed to chair to independent , pt will ambulate with a walker 50 feet with supervision, patient will go up and down 5 steps with minimal assist   LTG Expiration Date 06/03/21   Long Term Goal #1 Independent ambulation indoor level surfaces with least restrictive device 100 ft, independent up and down 5 steps the patient can enter and exit his home, improve standing dynamic balance to at least fair plus to decrease fall risk   Plan   Treatment/Interventions ADL retraining;Functional transfer training;LE strengthening/ROM; Elevations; Therapeutic exercise; Endurance training;Gait training;Bed mobility; Equipment eval/education;Patient/family training   PT Frequency 5x/wk   Recommendation   PT Discharge Recommendation Home with home health rehabilitation   Romaine gardner   Change/add to [employer]? No   AM-PAC Basic Mobility Inpatient   Turning in Bed Without Bedrails 3   Lying on Back to Sitting on Edge of Flat Bed 3   Moving Bed to Chair 3   Standing Up From Chair 3   Walk in Room 2   Climb 3-5 Stairs 1   Basic Mobility Inpatient Raw Score 15   Basic Mobility Standardized Score 36 97   Barthel Index   Feeding 10   Bathing 0   Grooming Score 0   Dressing Score 5   Bladder Score 10   Bowels Score 10   Toilet Use Score 5   Transfers (Bed/Chair) Score 10   Mobility (Level Surface) Score 0   Stairs Score 0   Barthel Index Score 48   Licensure   [place] License Number  Dione Titus PT [id number]       Time KH:1609  Time Out:0900  Total Time: 15      S:  "It feels good to get up "  O:  Min assist to ambulate in the room x 15 feet with several changes in direction with a rolling walker  Pt stood with UE assist on walker x 5 minutes with supervision  Pt had one balance loss requiring PT assist  Pt left OOB in chair with LEs elevated  A:  Pt tolerated initial activity well     P:  Continue PT  Memory Lisbeth, PT

## 2021-05-20 NOTE — CASE MANAGEMENT
LOS: 1 DAY  UNPLANNED RA RISK SCORE: 22  RA: NO  BUNDLE: NO    CM met with patient and discussed dc planning, available services and the role of CM  Patient lives with his mother and father They live in a one floor apartment  There are 5 steps to enter the home and then it is all one floor  He is independent of all ADL's and ambulates without assistance  He has a Cpap machine but does not use it  He has no other DME  Patient has never had any VNA or home PT in the past  He has never had any [facility] stays for rehab either  PT is recommending home PT but patient does not have any insurance  Patient lost his insurance in October and is on Disability with the [facility] patients VA card and e-mailed it to Lisa Sanders in [place] to check if it would have any benefits  Patient states he has a h/o PTSD and anxiety and is not following with psychiatry and is not on any medication  Patient states he does have a h/o drug and ETOH problems  He states he was taking Opioids years ago but has not taken anything in 3 years or more  Patient states he has not had a drink for 2 months  CM discussed D&A rehab/services  Patient declines IP information  CM spoke with Yolande Kumar in Crisis and he will f/u and provide OP information  Patient does not have any health insurance  CM spoke with Lisa Sanders in Finance and he already spoke with patient  He does not qualify for MA with his income  Ruslan Simon is working on qualifying him for [facility]  Confirmed with patient he needs to provide all paper work that Ruslan Simon requests  He verbally states understanding  Patient does not have a POA or a LW and he is not interested in any information at this time  Patient requests his father Sharita Valle be designated as the one to make any medical decisions if he were unable to do so himself  CM offered to call patients family and he declines  Patient will have a ride home with his father Ruslan Simon at [employer]  CM to follow

## 2021-05-20 NOTE — PROGRESS NOTES
Vancomycin Assessment    Balaji Sharp is a 39 y o  male who is currently receiving vancomycin 1250 mg IV q8h for skin-soft tissue infection     Relevant clinical data and objective history reviewed:  Creatinine   Date Value Ref Range Status   05/20/2021 0 52 (L) 0 60 - 1 30 mg/dL Final     Comment:     Standardized to IDMS reference method   05/19/2021 0 51 (L) 0 60 - 1 30 mg/dL Final     Comment:     Standardized to IDMS reference method   05/19/2021 0 44 (L) 0 60 - 1 30 mg/dL Final     Comment:     Standardized to IDMS reference method     /63 (BP Location: Left arm)   Pulse (!) 107   Temp 98 1 °F (36 7 °C) (Oral)   Resp 18   Ht 6' 1" (1 854 m)   Wt 106 kg (233 lb 11 oz)   SpO2 97%   BMI 30 83 kg/m²   I/O last 3 completed shifts: In: 4907 [P O :240; I V :2008; Blood:700; IV Piggyback:1050]  Out: 150 [Urine:150]  Lab Results   Component Value Date/Time    BUN 10 05/20/2021 06:28 AM    WBC 5 25 05/20/2021 06:28 AM    HGB 7 6 (L) 05/20/2021 06:28 AM    HCT 24 1 (L) 05/20/2021 06:28 AM    MCV 92 05/20/2021 06:28 AM    PLT 87 (L) 05/20/2021 06:28 AM     Temp Readings from Last 3 Encounters:   05/20/21 98 1 °F (36 7 °C) (Oral)   01/25/21 97 7 °F (36 5 °C) (Tympanic)   07/14/20 97 7 °F (36 5 °C) (Temporal)     Vancomycin Days of Therapy: 2    Assessment/Plan  The patient is currently on vancomycin utilizing scheduled dosing  Baseline risks associated with therapy include: concomitant nephrotoxic medications  The patient is receiving 1250 mg IV q8h with the most recent vancomycin level being at steady-state and sub-therapeutic(11 1) based on a goal of 15-20 (appropriate for most indications) ; therefore, after clinical evaluation will be changed to 1750 mg IV q8h   Pharmacy will continue to follow closely for s/sx of nephrotoxicity, infusion reactions, and appropriateness of therapy  BMP and CBC will be ordered per protocol    Plan for trough as patient approaches steady state, prior to the 4th dose at approximately 1900 on 5/21/21  Pharmacy will continue to follow the patients culture results and clinical progress daily      Vick Mahoney, Pharmacist

## 2021-05-20 NOTE — PROGRESS NOTES
[facility] Progress Note  Patient: Leanor Mcburney 39 y o  male   MRN: [id number]  PCP: Moreno Talley PA-C  Unit/Bed#: [address] [place] [address] Encounter: [id number]  Date Of Visit: 05/20/21    Problem List:    Principal Problem:    Cellulitis  Active Problems:    Alcohol abuse    Substance abuse ([facility] )    Hepatic cirrhosis (HCC)    Anemia    Hyponatremia    Cardiac murmur    Lactic acidosis    Tachycardia    Essential hypertension    Tobacco dependence    Thrombocytopenia ([facility] )    Thoracoabdominal aortic aneurysm ([place] [id number] )    Depression      Assessment & Plan:    * Cellulitis  Assessment & Plan  Patient was severe lower extremity edema with erythema, history of the same and treated inpatient with IV Abx 2 times in 2021  received ceftriaxone in ED for suspected bilateral extremity cellulitis as well as UTI  Afebrile, normal white count on presentation  On exam, bilateral lower extremity cellulitis  Ultrasound of the left lower extremity with evidence of cellulitis no evidence of deeper collection  Reveals afebrile  Normal white count    Appears to be improving clinically  · Continue vancomycin and cefepime pending culture results  · Follow-up blood culture  · Venous Doppler bilateral lower extremity -no evidence of DVT  · Follow wound care recommendations    Tachycardia  Assessment & Plan  EKG-sinus tachycardia, multifactorial  Setting of infection, dehydration/intravascular depletion, withdrawal  Telemetry- sinus tachycardia  Cardiology input appreciated  · Monitor H&H and transfuse as needed  · Continue antibiotics, follow-up blood culture  · Continue CIWA protocol, Suboxone resumed    Lactic acidosis  Assessment & Plan  Persistent despite aggressive hydration likely secondary to hepatic cirrhosis and alcohol intoxication  · Will not treat further  · Monitor hemodynamics-appears to be improving  · Continue thiamine    Cardiac murmur  Assessment & Plan  As noted on exam, no previous documentation noted of a cardiac murmur  EKG-sinus tachycardia,   Denies history of IVDU  2D echo-poor visualization but no definitive significant valvular disease  EF 55-60%  Moderate pulmonary hypertension  · Follow-up blood culture    Hyponatremia  Assessment & Plan  Chronic, Na 126 on admission, typically 130-131  In the setting of ETOH abuse  Improved with IV NS indicating prerenal component  Sodium remains mildly low normal at 132  · Discontinue IV fluid, fluid restriction  · Monitor BMP  · Hold diuretics for now      Anemia  Assessment & Plan  Hx of anemia, in the past documented as likely multifactorial 2/2 anemia of chronic liver disease, alcohol abuse and acute blood loss in January 2/2 leg wound  Hg 7 4, lower than baseline on presentation  Denies any overt GI bleeding  Noted to episode of epistaxis  Status post 2 PRBC transfusion on 05/19  Iron sat 11%, B12, folate normal  - No further evidence of acute blood loss  - Continue to trend and transfuse as needed for Hg < 7  - Continue folic acid supplement, consider iron supplementation  - Check stool for occult blood    Hepatic cirrhosis (HCC)  Assessment & Plan  Hx of hepatic cirrhosis likely 2/2 ETOH use  - thrombocytopenia, elevated T bili, appears stable compared to past  - Recent hepatitis panel in January negative   - MELD-Na score 24 on admission  - previously on lactulose, but patient states he is no longer taking this  - ammonia level 35 on admission, INR 1 5  -continue to monitor  -alcohol abstinence    Substance abuse [facility])  Assessment & Plan  Patient reports Hx of multi-substance abuse, patient takes suboxone   Retracted negative  Patient denies any history of IVDU  - confirmed suboxone dosing with patient's pharmacy  - resume Suboxone    Alcohol abuse  Assessment & Plan  Hx of ETOH abuse, in the past 3-4 weeks has cut down from 1-2 bottles of vodka/day to 2-3 shots per day   ETOH 462 on admission with last drink at approxiamtely 3-4pm the day of admission according to the patient  He wishes to stop drinking and would like resources to help  No Hx of w/d seizures/DT     Still tremulous, tachycardic  - Maintain CIWA protocol  - Encourage ETOH cessation   - Folic acid, thiamine, multivitamin       Depression  Assessment & Plan  Hx of depression, patient was previously on Lexapro but is no longer taking this  Discussed regarding psychiatric evaluation, he wants to defer at present    Thoracoabdominal aortic aneurysm [facility])  Assessment & Plan  Noted on previous CTA chest from Jan 2021  - Has not since had f/u with CT surgery for regular outpatient monitoring, discussed with patient the importance of regular f/u the time of admission    Thrombocytopenia ([facility] )  Assessment & Plan  In the setting of ETOH abuse and hepatic cirrhosis   Continue to monitor, currently no evidence of bleeding   Hold AC given anemia and thrombocytopenia in underlying liver dysfunction, if stool occult negative and no other clear source of acute blood loss, consider resuming if platelets and Hg stabilize    Tobacco dependence  Assessment & Plan  Continues to smoke daily, requested nicotine patch  Smoking cessation discussed, continue to encourage cessation     Essential hypertension  Assessment & Plan  Has a history of HTN but admits that he is no longer taking his home medications, but on Lasix and Aldactone  Blood pressure is soft but overall improving  , will hold diuretics  Monitor  Consider midodrine    Abnormal urinalysis-resolved as of 5/21/2021  Assessment & Plan  UA abnormal, patient denies any urinary symptoms at present  - urine culture negative    Epistaxis-resolved as of 5/21/2021  Assessment & Plan  Noted on 05/19  Now resolved  ENT input appreciated      VTE Pharmacologic Prophylaxis:   Pharmacologic: Pharmacologic VTE Prophylaxis contraindicated due to Severe anemia under evaluation will resume if no evidence of active bleed  Mechanical VTE Prophylaxis in Place: No    Patient Centered Rounds: I have performed bedside rounds with nursing staff today  Discussions with Specialists or Other Care Team Provider:  Yes    Education and Discussions with Family / Patient:  Discussed with patient's sister at length over the phone    Time Spent for Care: 45 minutes  More than 50% of total time spent on counseling and coordination of care as described above  Current Length of Stay: 2 day(s)    Current Patient Status: Inpatient   Certification Statement: The patient will continue to require additional inpatient hospital stay due to Cellulitis requiring IV antibiotics    Discharge Plan:  Pending at present    Code Status: Level 1 - Full Code      Subjective:   Episode of low-grade fever overnight   Noted to be sitting in bed  Reports that leg discomfort is better  Denies chest pain or shortness of breath   No further epistaxis      Objective:     Vitals:   Temp (24hrs), Av 2 °F (37 3 °C), Min:98 °F (36 7 °C), Max:100 7 °F (38 2 °C)    Temp:  [98 °F (36 7 °C)-100 7 °F (38 2 °C)] 98 °F (36 7 °C)  HR:  [107-130] 128  Resp:  [17-20] 18  BP: ()/(51-67) 112/60  SpO2:  [88 %-97 %] 92 % on room air  Body mass index is 30 83 kg/m²  Input and Output Summary (last 24 hours): Intake/Output Summary (Last 24 hours) at 2021 1516  Last data filed at 2021 1229  Gross per 24 hour   Intake 700 ml   Output 550 ml   Net 150 ml       Physical Exam:     Physical Exam  Constitutional:       General: He is not in acute distress  Appearance: He is obese  Comments: Chronically ill, unkempt   HENT:      Head: Normocephalic and atraumatic  Neck:      Musculoskeletal: Neck supple  Cardiovascular:      Rate and Rhythm: Normal rate  Pulmonary:      Effort: Pulmonary effort is normal  No respiratory distress  Breath sounds: No wheezing, rhonchi or rales  Comments: Diminished bilaterally  Chest:      Chest wall: No tenderness     Abdominal:      General: Bowel sounds are normal  There is no distension  Palpations: Abdomen is soft  Tenderness: There is no abdominal tenderness  There is no guarding or rebound  Musculoskeletal:      Right lower leg: Edema present  Left lower leg: Edema present  Comments: Bilateral lower extremity chronic lymphedema with high dry flaky skin  Associated erythema tenderness appears to be improving  Also area of tenderness induration posteriorly over left calf appears better   Skin:     General: Skin is warm and dry  Findings: No rash  Neurological:      Mental Status: He is alert  Mental status is at baseline  GCS: GCS eye subscore is 4  GCS verbal subscore is 5  GCS motor subscore is 6  Cranial Nerves: No cranial nerve deficit  Motor: Tremor present  Psychiatric:         Behavior: Behavior is cooperative  Additional Data:     Labs:    Results from last 7 days   Lab Units 05/20/21 0628  05/19/21  0845   WBC Thousand/uL 5 25  --  5 94   HEMOGLOBIN g/dL 7 6*   < > 6 5*   HEMATOCRIT % 24 1*   < > 20 3*   PLATELETS Thousands/uL 87*  --  95*   NEUTROS PCT %  --   --  64   LYMPHS PCT %  --   --  24   MONOS PCT %  --   --  9   EOS PCT %  --   --  1    < > = values in this interval not displayed  Results from last 7 days   Lab Units 05/20/21 0628  05/19/21  0845   POTASSIUM mmol/L 3 8   < > 3 5   CHLORIDE mmol/L 100   < > 97*   CO2 mmol/L 27   < > 27   BUN mg/dL 10   < > 8   CREATININE mg/dL 0 52*   < > 0 44*   CALCIUM mg/dL 8 5   < > 7 8*   ALK PHOS U/L  --   --  135*   ALT U/L  --   --  31   AST U/L  --   --  79*    < > = values in this interval not displayed  Results from last 7 days   Lab Units 05/18/21  1729   INR  1 53*       * I Have Reviewed All Lab Data Listed Above  * Additional Pertinent Lab Tests Reviewed:  All Labs Within Last 24 Hours Reviewed      Imaging:  Imaging Reports Reviewed Today Include:  Chest x-ray  Recent Cultures (last 7 days):     Results from last 7 days   Lab Units 05/18/21  8744 05/18/21  1746 05/18/21  1729   BLOOD CULTURE   --  No Growth at 24 hrs   --    GRAM STAIN RESULT   --   --  Gram positive cocci in clusters*   URINE CULTURE  No Growth <1000 cfu/mL  --   --        Last 24 Hours Medication List:   Current Facility-Administered Medications   Medication Dose Route Frequency Provider Last Rate    ammonium lactate   Topical BID Vaughn Meng MD      buprenorphine-naloxone  16 mg Sublingual Daily Vaughn Meng MD      calcium carbonate  1,000 mg Oral Daily PRN Maci Garcia PA-C      cefepime  2,000 mg Intravenous Q8H Vaughn Meng MD 2,000 mg (05/20/21 0941)    clotrimazole   Topical BID Pasquale Luciano DPM      folic acid 1 mg, thiamine 100 mg in 0 9% sodium chloride 100 mL IVPB   Intravenous Daily Maci Garcia PA-C 200 mL/hr at 05/19/21 0903    nicotine  14 mg Transdermal Daily Vaughn Meng MD      ondansetron  4 mg Intravenous Q6H PRN Maci Garcia PA-C      oxymetazoline  2 spray Each Nare Q12H [facility] Vaughn Meng MD      pantoprazole  40 mg Oral Early Morning Vaughn Meng MD      polyethylene glycol  17 g Oral Daily PRN Maci Garcia PA-C      sodium chloride  1 spray Each Nare Q1H PRN Vaughn Meng MD      vancomycin  1,750 mg Intravenous Adrian Parker MD            Today, Patient Was Seen By: Vaughn Meng MD    ** Please Note: "This note has been constructed using a voice recognition system  Therefore there may be syntax, spelling, and/or grammatical errors   Please call if you have any questions  "**

## 2021-05-20 NOTE — PLAN OF CARE
Problem: Potential for Falls  Goal: Patient will remain free of falls  Description: INTERVENTIONS:  - Assess patient frequently for physical needs  -  Identify cognitive and physical deficits and behaviors that affect risk of falls    -  [place] fall precautions as indicated by assessment   - Educate patient/family on patient safety including physical limitations  - Instruct patient to call for assistance with activity based on assessment  - Modify environment to reduce risk of injury  - Consider OT/PT consult to assist with strengthening/mobility  Outcome: Progressing     Problem: Prexisting or High Potential for Compromised Skin Integrity  Goal: Skin integrity is maintained or improved  Description: INTERVENTIONS:  - Identify patients at risk for skin breakdown  - Assess and monitor skin integrity  - Assess and monitor nutrition and hydration status  - Monitor labs   - Assess for incontinence   - Turn and reposition patient  - Assist with mobility/ambulation  - Relieve pressure over bony prominences  - Avoid friction and shearing  - Provide appropriate hygiene as needed including keeping skin clean and dry  - Evaluate need for skin moisturizer/barrier cream  - Collaborate with interdisciplinary team   - Patient/family teaching  - Consider wound care consult   Outcome: Progressing     Problem: INFECTION - ADULT  Goal: Absence or prevention of progression during hospitalization  Description: INTERVENTIONS:  - Assess and monitor for signs and symptoms of infection  - Monitor lab/diagnostic results  - Monitor all insertion sites, i e  indwelling lines, tubes, and drains  - Monitor endotracheal if appropriate and nasal secretions for changes in amount and color  - [place] appropriate cooling/warming therapies per order  - Administer medications as ordered  - Instruct and encourage patient and family to use good hand hygiene technique  - Identify and instruct in appropriate isolation precautions for identified infection/condition  Outcome: Progressing  Goal: Absence of fever/infection during neutropenic period  Description: INTERVENTIONS:  - Monitor WBC    Outcome: Progressing     Problem: DISCHARGE PLANNING  Goal: Discharge to home or other facility with appropriate resources  Description: INTERVENTIONS:  - Identify barriers to discharge w/patient and caregiver  - Arrange for needed discharge resources and transportation as appropriate  - Identify discharge learning needs (meds, wound care, etc )  - Arrange for interpretive services to assist at discharge as needed  - Refer to Case Management Department for coordinating discharge planning if the patient needs post-hospital services based on physician/advanced practitioner order or complex needs related to functional status, cognitive ability, or social support system  Outcome: Progressing

## 2021-05-20 NOTE — PROGRESS NOTES
Progress Note - Cardiology   [facility]     Addie Joshua 39 y o  male MRN: [id number]  : 1985  Unit/Bed#: [address] [place] [address] Encounter: [id number]    Assessment and Plan:   1  Cellulitis with wounds of both lower extremity with chronic lymphedema:  Continue IV antibiotics per primary team   Wound care per wound nurse  Agree with wound nurse consult that patient could benefit with follow-up with lymphedema clinic in the outpatient setting  2  Moderate pulmonary hypertension:  Concern for component of obstructive sleep apnea  Would recommend sleep study as outpatient  3  Moderate tricuspid valve regurgitation    4  Sinus tachycardia:  Most likely reactive to sepsis  Slowly improving  5  Chronic anemia:  Hemoglobin was 7 on admission, patient had 1 unit packed red blood cells yesterday and hemoglobin elevated to 7 6  Managed per primary team    6  Hypo magnesium:  Secondary to alcohol abuse  IV magnesium replacement ordered, continue monitor labs  7  Polysubstance abuse including alcohol     Subjective / Objective:   Patient seen and examined  Was agitated overnight, unable to do overnight pulse oximetry due to confusion per respiratory staff  Echocardiogram performed yesterday demonstrated EF of 55-60% with normal diastolic filling  There was mild-to-moderate tricuspid valve regurgitation with moderate pulmonary hypertension with peak PA pressure 59 mm Hg    Vitals: Blood pressure 110/63, pulse (!) 107, temperature 99 °F (37 2 °C), resp  rate 17, height 6' 1" (1 854 m), weight 106 kg (233 lb 11 oz), SpO2 97 %  Vitals:    21 1707 21 2134   Weight: 106 kg (233 lb 14 5 oz) 106 kg (233 lb 11 oz)     Body mass index is 30 83 kg/m²    BP Readings from Last 3 Encounters:   21 110/63   21 104/72   19 148/90     Orthostatic Blood Pressures      Most Recent Value   Blood Pressure  110/63 filed at 2021 0801   Patient Position - Orthostatic VS Sitting filed at 05/19/2021 1609        I/O       05/18 0701 - 05/19 0700 05/19 0701 - 05/20 0700 05/20 0701 - 05/21 0700    P  O  240      I V  (mL/kg) 2008 (18 9)      Blood  700     IV Piggyback 3050      Total Intake(mL/kg) 5298 (50) 700 (6 6)     Urine (mL/kg/hr) 150      Total Output 150      Net +5148 +700                Invasive Devices     Peripheral Intravenous Line            Peripheral IV 05/18/21 Right Antecubital 1 day    Peripheral IV 05/19/21 Left;Ventral (anterior) Forearm less than 1 day                  Intake/Output Summary (Last 24 hours) at 5/20/2021 0817  Last data filed at 5/20/2021 0200  Gross per 24 hour   Intake 700 ml   Output --   Net 700 ml         Physical Exam:   Physical Exam  Vitals signs and nursing note reviewed  Constitutional:       Appearance: Normal appearance  He is well-developed  He is obese  He is ill-appearing  HENT:      Head: Normocephalic  Right Ear: External ear normal       Left Ear: External ear normal       Nose:      Comments: Epitaxis yesterday, seen by ENT  Eyes:      General: No scleral icterus  Right eye: No discharge  Left eye: No discharge  Neck:      Musculoskeletal: Normal range of motion and neck supple  Thyroid: No thyromegaly  Cardiovascular:      Rate and Rhythm: Regular rhythm  Tachycardia present  Pulses: Normal pulses  Heart sounds: Murmur present  Pulmonary:      Effort: Pulmonary effort is normal  No accessory muscle usage or respiratory distress  Breath sounds: Examination of the right-lower field reveals decreased breath sounds  Examination of the left-lower field reveals decreased breath sounds  Decreased breath sounds present  Abdominal:      General: Bowel sounds are normal  There is distension  Palpations: Abdomen is soft  Musculoskeletal:      Right lower leg: Edema present  Left lower leg: Edema present        Comments: Chronic venous stasis changes with component of lymphedema Skin:     General: Skin is warm and dry  Capillary Refill: Capillary refill takes less than 2 seconds  Findings: Rash present  Comments: Lower extremity wounds   Neurological:      Mental Status: He is alert  Psychiatric:         Mood and Affect: Mood is anxious                     Medications/ Allergies:     Current Facility-Administered Medications   Medication Dose Route Frequency Provider Last Rate    ammonium lactate   Topical BID Azeem Martinez MD      buprenorphine-naloxone  16 mg Sublingual Daily Azeem Martinez MD      calcium carbonate  1,000 mg Oral Daily PRN Kimberly Harrell PA-C      cefepime  2,000 mg Intravenous Q8H Azeem Martinez MD 2,000 mg (05/20/21 0113)    clotrimazole   Topical BID Verdis Freddy, DPM      folic acid 1 mg, thiamine 100 mg in 0 9% sodium chloride 100 mL IVPB   Intravenous Daily Kimberly Harrell PA-C 200 mL/hr at 05/19/21 0903    nicotine  14 mg Transdermal Daily Azeem Martinez MD      ondansetron  4 mg Intravenous Q6H PRN Kimberly Harrell PA-C      oxymetazoline  2 spray Each Nare Q12H [address] Azeem Martinez MD      pantoprazole  40 mg Oral Early Morning Azeem Martinez MD      polyethylene glycol  17 g Oral Daily PRN Kimberly Harrell PA-C      sodium chloride  1 spray Each Nare Q1H PRN Azeem Martinez MD      vancomycin  15 mg/kg (Adjusted) Intravenous Q8H Azeem Martinez MD 1,250 mg (05/20/21 0230)     calcium carbonate, 1,000 mg, Daily PRN  ondansetron, 4 mg, Q6H PRN  polyethylene glycol, 17 g, Daily PRN  sodium chloride, 1 spray, Q1H PRN      Allergies   Allergen Reactions    Levofloxacin Other (See Comments)     BLACKED OUT       VTE Pharmacologic Prophylaxis:   Venodyne contraindicated due to Wounds to lower extremity    Labs:   Troponins:  Results from last 7 days   Lab Units 05/18/21  1731   TROPONIN I ng/mL 0 02       CBC with diff:  Results from last 7 days   Lab Units 05/20/21  0628 05/19/21  1737 05/19/21  0845 05/18/21  1729   WBC Thousand/uL 5 25  --  5 94 6 77   HEMOGLOBIN g/dL 7 6* 7 0* 6 5* 7 4*   HEMATOCRIT % 24 1* 22 0* 20 3* 23 1*   MCV fL 92  --  92 92   PLATELETS Thousands/uL 87*  --  95* 118*   MCH pg 29 1  --  29 0 29 4   MCHC g/dL 31 5  --  31 4 32 0   RDW % 18 6*  --  18 9* 18 8*   MPV fL 8 7*  --  7 9* 8 0*   NRBC AUTO /100 WBCs  --   --  0 0       CMP:  Results from last 7 days   Lab Units 05/20/21  0628 05/19/21  1737 05/19/21  0845 05/19/21  0402 05/18/21  1746   SODIUM mmol/L 132* 134* 132* 134* 126*   POTASSIUM mmol/L 3 8 3 7 3 5 3 5 3 8   CHLORIDE mmol/L 100 99* 97* 98* 96*   CO2 mmol/L 27 26 27 27 28   ANION GAP mmol/L 5 9 8 9 2*   BUN mg/dL 10 10 8 8 8   CREATININE mg/dL 0 52* 0 51* 0 44* 0 60 0 48*   CALCIUM mg/dL 8 5 8 0* 7 8* 7 4* 7 6*   AST U/L  --   --  79*  --  80*   ALT U/L  --   --  31  --  32   ALK PHOS U/L  --   --  135*  --  145*   TOTAL PROTEIN g/dL  --   --  7 5  --  7 6   ALBUMIN g/dL  --   --  1 9*  --  2 1*   TOTAL BILIRUBIN mg/dL  --   --  2 33*  --  2 68*   EGFR ml/min/1 73sq m 137 138 147 129 142     Magnesium:  Results from last 7 days   Lab Units 05/20/21  0628 05/19/21  0845 05/18/21  1746   MAGNESIUM mg/dL 1 4* 1 6 1 8     Coags:  Results from last 7 days   Lab Units 05/18/21  1729   PTT seconds 53*   INR  1 53*     NT-proBNP:   Recent Labs     05/18/21  1746   NTBNP 339*        Imaging & Testing   I have personally reviewed pertinent reports  Xr Chest 1 View Portable    Result Date: 5/19/2021  Narrative: CHEST INDICATION:   leg swelling/heart murmur  COMPARISON:  Chest x-ray dated January 19, 2021  EXAM PERFORMED/VIEWS:  XR CHEST PORTABLE FINDINGS: Heart shadow is enlarged but unchanged from prior exam  The lungs are clear  No pneumothorax or pleural effusion  Osseous structures appear within normal limits for patient age  Impression: No acute cardiopulmonary disease   Workstation performed: [id number]     Us Extremity Soft Tissue    Result Date: 5/19/2021  Narrative: SUPERFICIAL SOFT TISSUE ULTRASOUND INDICATION:   Left lower extremity cellulitis, induration and tenderness posteriorly in calf region  evaluate for collection  COMPARISON:  None TECHNIQUE:   Real-time ultrasound of the left posterior calf subcutaneous soft tissues was performed with a linear transducer with both volumetric sweeps and still imaging techniques  Impression: FINDINGS/IMPRESSION:  Diffuse subcutaneous soft tissue edema in the left posterior calf with hyperemia and skin thickening in keeping with provided clinical history of cellulitis  No focal fluid collection identified to suggest abscess  Workstation performed: [id number]        Cardiac testing:   Results for orders placed during the hospital encounter of 21   Echo complete with contrast if indicated    Narrative [address]  [address] [address]  [phone]    Transthoracic Echocardiogram  2D, M-mode, Doppler, and Color Doppler    Study date:  19-May-2021    Patient: Sajan Cee  MR number: [id number]  Account number: [de-identified]  : 1985  Age: 39 years  Gender: Male  Status: Inpatient  Location: Bedside  Height: 73 in  Weight: 232 5 lb  BP: 97/ 57 mmHg    Indications: Murmur    Diagnoses: 785 2 - CARDIAC MURMURS NEC    Sonographer:  TREMAYNE Moralez  Primary Physician:  Trey Winters PA-C  Referring Physician:  Luís Lewis PA-C  Group:  [facility]  Interpreting Physician:  Addis Ramirez MD    SUMMARY    LEFT VENTRICLE:  Left ventricular systolic function appears preserved with ejection fraction of about 55-60%  No definite regional wall motion abnormality seen although it cannot be absolutely excluded on the basis of this study  Normal diastolic filling pattern    RIGHT VENTRICLE:  Right ventricle free wall was not well visualized    Right ventricle is possibly mildly dilated  Normal right ventricular systolic function with TAPSE of 2 9 cm    MITRAL VALVE:  Mildly increased thickness of anterior mitral valve leaflet with no obvious vegetation  There is trace mitral regurgitation  No evidence of mitral stenosis    AORTIC VALVE:  Aortic valve was not well visualized  There is no significant aortic stenosis or regurgitation    TRICUSPID VALVE:  There is mild to moderate tricuspid regurgitation  There is moderate pulmonary hypertension with peak PA pressure of 59 mm Hg    AORTA:  Normal aortic root size    IVC, HEPATIC VEINS:  IVC is dilated and shows decreased respiratory variation    HISTORY: PRIOR HISTORY: HTN, Cellulitis, Substance Abuse,Cirrhosis, Aortic Aneurysm, Asthma, Obesity    PROCEDURE: The procedure was performed at the bedside  This was a routine study  Suboptimal study because of poor acoustic windows and patient being tachycardic throughout the study The transthoracic approach was used  The study included  complete 2D imaging, M-mode, complete spectral Doppler, and color Doppler  The heart rate was 124 bpm, at the start of the study  Echocardiographic views were limited due to restricted patient mobility, poor patient compliance, and poor  acoustic window availability  This was a technically difficult study  LEFT VENTRICLE: Left ventricular systolic function appears preserved with ejection fraction of about 55-60%  No definite regional wall motion abnormality seen although it cannot be absolutely excluded on the basis of this study  Normal diastolic filling pattern    RIGHT VENTRICLE: Right ventricle free wall was not well visualized    Right ventricle is possibly mildly dilated  Normal right ventricular systolic function with TAPSE of 2 9 cm    MITRAL VALVE: Mildly increased thickness of anterior mitral valve leaflet with no obvious vegetation  There is trace mitral regurgitation  No evidence of mitral stenosis    AORTIC VALVE: Aortic valve was not well visualized  There is no significant aortic stenosis or regurgitation    TRICUSPID VALVE: There is mild to moderate tricuspid regurgitation  There is moderate pulmonary hypertension with peak PA pressure of 59 mm Hg    PULMONIC VALVE: No significant pulmonary regurgitation seen    PERICARDIUM: No pericardial effusion seen    AORTA: Normal aortic root size    SYSTEMIC VEINS: IVC: IVC is dilated and shows decreased respiratory variation    SYSTEM MEASUREMENT TABLES    2D  EF (Teich): 48 95 %  %FS: 25 26 %  Ao Diam: 3 42 cm  EDV(Teich): 195 16 ml  ESV(Teich): 99 63 ml  HR_2Ch_Q: 124 15 bpm  HR_4Ch_Q: 122 79 bpm  IVSd: 0 72 cm  LA Area: 27 66 cm2  LA Diam: 5 01 cm  LVCO_2Ch_Q: 13 77 L/min  LVCO_4Ch_Q: 14 33 L/min  LVCO_BiP_Q: 14 05 L/min  LVEF_2Ch_Q: 53 44 %  LVEF_4Ch_Q: 55 31 %  LVEF_BiP_Q: 56 29 %  LVIDd: 6 22 cm  LVIDs: 4 65 cm  LVLd_2Ch_Q: 10 18 cm  LVLd_4Ch_Q: 9 45 cm  LVLs_2Ch_Q: 8 17 cm  LVLs_4Ch_Q: 8 13 cm  LVPWd: 0 91 cm  LVSV_2Ch_Q: 110 91 ml  LVSV_4Ch_Q: 116 68 ml  LVSV_BiP_Q: 124 07 ml  LVVED_2Ch_Q: 207 53 ml  LVVED_4Ch_Q: 210 95 ml  LVVED_BiP_Q: 220 41 ml  LVVES_2Ch_Q: 96 62 ml  LVVES_4Ch_Q: 94 26 ml  LVVES_BiP_Q: 96 34 ml  RA Area: 23 46 cm2  RVIDd: 4 8 cm  SV (Teich): 95 53 ml    CW  TR Vmax: 3 31 m/s  TR maxP 84 mmHg    PW  MV E/A Ratio: 1 03  E' Sept: 0 15 m/s  E/E' Sept: 10 02  MV A Long: 1 48 m/s  MV Dec [place]: 8 54 m/s2  MV DecT: 178 88 ms  MV E Long: 1 53 m/s  MV PHT: 51 87 ms  MVA By PHT: 4 24 cm2    [facility] Accredited Echocardiography Laboratory    Prepared and electronically signed by    Marlon Rose MD  Signed 19-May-2021 13:26:78         Stephanie Neighbor        "This note has been constructed using a voice recognition system  Therefore there may be syntax, spelling, and/or grammatical errors   Please call if you have any questions  "

## 2021-05-20 NOTE — OCCUPATIONAL THERAPY NOTE
OT EVALUATION       05/20/21 1403   Note Type   Note type Evaluation   Restrictions/Precautions   Other Precautions Chair Alarm; Bed Alarm; Fall Risk  (BLE wounds )   Pain Assessment   Pain Assessment Tool Pain Assessment not indicated - pt denies pain   Home Living   Type of [address] One level  (3 steps to enter + 5 steps once inside building to apartment)   Bathroom Shower/Tub Tub/shower unit   Bathroom Toilet Standard   Bathroom Equipment Shower chair   Prior Function   Level of [place] Independent with ADLs and functional mobility   Lives With Family  (parents )   ADL Assistance Independent   IADLs Independent   Vocational Unemployed   ADL   Eating Assistance 5  Supervision/Setup   Grooming Assistance 5  Supervision/Setup   UB Bathing Assistance 4  Minimal Assistance   LB [address] 3  Moderate Assistance    [address] 4  Minimal Assistance    [facility] [address] 3  Moderate [address]  3  Moderate Assistance   Bed Mobility   Supine to Sit 5  Supervision   Transfers   Sit to Stand 4  Minimal assistance   Additional items Verbal cues   Stand to Sit 4  Minimal assistance   Additional items Verbal cues   Functional Mobility   Functional Mobility 4  Minimal assistance   Additional Comments 20 feet   Additional items Rolling walker   Balance   Static Sitting Fair +   Dynamic Sitting Fair   Static Standing Fair -   Dynamic Standing Poor   Activity Tolerance   Activity Tolerance Patient limited by fatigue   Nurse Made Aware yes, Ángel AVILA Assessment   RUE Assessment WFL   LUE Assessment   LUE Assessment WFL   Cognition   Overall Cognitive Status WFL   Arousal/Participation Cooperative   Attention Within functional limits   Orientation Level Oriented X4   Following Commands Follows multistep commands with increased time or repetition   Comments pt lethrargic at start of session but improved, pt reports he takes a while to wake up    Assessment Limitation Decreased ADL status; Decreased UE strength;Decreased Safe judgement during ADL;Decreased endurance;Decreased self-care trans;Decreased high-level ADLs  (decreased balance and mobility )   Prognosis Good   Assessment Patient evaluated by Occupational Therapy  Patient admitted with Cellulitis  The patients occupational profile, medical and therapy history includes a extensive additional review of physical, cognitive, or psychosocial history related to current functional performance  Comorbidities affecting functional mobility and ADLS include:substance abuse, anemia, asthma, depression and hypertension  Prior to admission, patient was independent with functional mobility without assistive device, independent with ADLS and independent with IADLS  The evaluation identifies the following performance deficits: weakness, impaired balance, decreased endurance, increased fall risk, new onset of impairment of functional mobility, decreased ADLS, decreased IADLS, decreased activity tolerance, decreased safety awareness, impaired judgement and decreased strength, that result in activity limitations and/or participation restrictions  This evaluation requires clinical decision making of high complexity, because the patient presents with comorbidites that affect occupational performance and required significant modification of tasks or assistance with consideration of multiple treatment options  The Barthel Index was used as a functional outcome tool presenting with a score of 50, indicating marked limitations of functional mobility and ADLS  The patient's raw score on the -PAC Daily Activity inpatient short form is 16, standardized score is 35 96, less than 39 4   Patients at this level are likely to benefit from DC to post-acute rehabilitation services, however pt has a supportive family and as his wounds improve on his legs it will increase his independence with ADLS and pending progress pt can return home with family with home health rehabilitation  Please refer to the recommendation of the Occupational Therapist for safe DC planning  Patient will benefit from skilled Occupational Therapy services to address above deficits and facilitate a safe return to prior level of function  Goals   Patient Goals get better, go home    STG Time Frame   (1-7 days)   Short Term Goal  Goals established to promote patient goal of get better and go home:  Patient will increase standing tolerance to 3 minutes during ADL task to decrease assistance level and decrease fall risk; Patient will increase bed mobility to independent in preparation for ADLS and transfers; Patient will increase functional mobility to and from bathroom with rolling walker with supervision to increase performance with ADLS and to use a toilet; Patient will tolerate 10 minutes of UE ROM/strengthening to increase general activity tolerance and performance in ADLS/IADLS; Patient will improve functional activity tolerance to 10 minutes of sustained functional tasks to increase participation in basic self-care and decrease assistance level;  Patient will increase dynamic sitting balance to fair+ to improve the ability to sit at edge of bed or on a chair for ADLS;  Patient will increase dynamic standing balance to poor+ to improve postural stability and decrease fall risk during standing ADLS and transfers       LTG Time Frame   (8-14 days)   Long Term Goal Goals established to promote patient goal of get better and go home:  Patient will increase standing tolerance to 6 minutes during ADL task to decrease assistance level and decrease fall risk; Patient will increase functional mobility to and from bathroom with rolling walker independently to increase performance with ADLS and to use a toilet; Patient will tolerate 20 minutes of UE ROM/strengthening to increase general activity tolerance and performance in ADLS/IADLS; Patient will improve functional activity tolerance to 20 minutes of sustained functional tasks to increase participation in basic self-care and decrease assistance level;  Patient will increase dynamic sitting balance to good to improve the ability to sit at edge of bed or on a chair for ADLS;  Patient will increase dynamic standing balance to fair- to improve postural stability and decrease fall risk during standing ADLS and transfers  Pt will score >/= 19/24 on AM-PAC Daily Activity Inpatient scale to promote safe independence with ADLs and functional mobility; Pt will score >/= 75/100 on Barthel Index in order to decrease caregiver assistance needed and increase ability to perform ADLs and functional mobility  Functional Transfer Goals   Pt Will Perform All Functional Transfers   (STG supervision LTG independent )   ADL Goals   Pt Will Perform Grooming Standing at sink  (STG supervision LTG independent )   Pt Will Perform Bathing   (STG min assist LTG supervision )   Pt Will Perform UE Dressing   (STG supervision LTG independent )   Pt Will Perform LE Dressing   (STG min assist LTG supervision )   Pt Will Perform Toileting   (STG supervision LTG independent )   Plan   Treatment Interventions ADL retraining;Functional transfer training;UE strengthening/ROM; Endurance training;Patient/family training;Equipment evaluation/education; Activityengagement; Compensatory technique education   Goal Expiration Date 06/03/21   OT Frequency 3-5x/wk   Recommendation   OT Discharge Recommendation Home with home health rehabilitation   Equipment Recommended   (rolling walker)   AM-PAC Daily Activity Inpatient   Lower Body Dressing 2   Bathing 2   Toileting 2   Upper Body Dressing 3   Grooming 3   Eating 4   Daily Activity Raw Score 16   Daily Activity Standardized Score (Calc for Raw Score >=11) 35 96   AM-PAC Applied Cognition Inpatient   Following a Speech/Presentation 4   Understanding Ordinary Conversation 4   Taking Medications 4   Remembering Where Things Are Placed or Put Away 4   Remembering List of 4-5 Errands 4   Taking Care of Complicated Tasks 4   Applied Cognition Raw Score 24   Applied Cognition Standardized Score 62 21   Barthel Index   Feeding 10   Bathing 0   Grooming Score 0   Dressing Score 5   Bladder Score 10   Bowels Score 10   Toilet Use Score 5   Transfers (Bed/Chair) Score 10   Mobility (Level Surface) Score 0   Stairs Score 0   Barthel Index Score 50   Licensure   [place] License Number  Laurence Brown Jorge Carroll 87 OTR/L [id number]

## 2021-05-21 PROBLEM — R04.0 EPISTAXIS: Status: RESOLVED | Noted: 2021-05-21 | Resolved: 2021-05-21

## 2021-05-21 PROBLEM — R82.90 ABNORMAL URINALYSIS: Status: RESOLVED | Noted: 2021-05-18 | Resolved: 2021-05-21

## 2021-05-21 PROBLEM — R04.0 EPISTAXIS: Status: ACTIVE | Noted: 2021-05-21

## 2021-05-21 LAB
ALBUMIN SERPL BCP-MCNC: 2.1 G/DL (ref 3.5–5)
ALP SERPL-CCNC: 141 U/L (ref 46–116)
ALT SERPL W P-5'-P-CCNC: 24 U/L (ref 12–78)
AMMONIA PLAS-SCNC: 40 UMOL/L (ref 11–35)
ANION GAP SERPL CALCULATED.3IONS-SCNC: 5 MMOL/L (ref 4–13)
AST SERPL W P-5'-P-CCNC: 74 U/L (ref 5–45)
BACTERIA BLD CULT: ABNORMAL
BILIRUB SERPL-MCNC: 4.62 MG/DL (ref 0.2–1)
BUN SERPL-MCNC: 8 MG/DL (ref 5–25)
CALCIUM ALBUM COR SERPL-MCNC: 10.2 MG/DL (ref 8.3–10.1)
CALCIUM SERPL-MCNC: 8.7 MG/DL (ref 8.3–10.1)
CHLORIDE SERPL-SCNC: 99 MMOL/L (ref 100–108)
CO2 SERPL-SCNC: 28 MMOL/L (ref 21–32)
CREAT SERPL-MCNC: 0.55 MG/DL (ref 0.6–1.3)
ERYTHROCYTE [DISTWIDTH] IN BLOOD BY AUTOMATED COUNT: 18.6 % (ref 11.6–15.1)
GFR SERPL CREATININE-BSD FRML MDRD: 134 ML/MIN/1.73SQ M
GLUCOSE SERPL-MCNC: 96 MG/DL (ref 65–140)
GRAM STN SPEC: ABNORMAL
HCT VFR BLD AUTO: 25.6 % (ref 36.5–49.3)
HGB BLD-MCNC: 7.9 G/DL (ref 12–17)
MAGNESIUM SERPL-MCNC: 1.7 MG/DL (ref 1.6–2.6)
MCH RBC QN AUTO: 28.9 PG (ref 26.8–34.3)
MCHC RBC AUTO-ENTMCNC: 30.9 G/DL (ref 31.4–37.4)
MCV RBC AUTO: 94 FL (ref 82–98)
PHOSPHATE SERPL-MCNC: 3.1 MG/DL (ref 2.7–4.5)
PLATELET # BLD AUTO: 85 THOUSANDS/UL (ref 149–390)
PMV BLD AUTO: 9.3 FL (ref 8.9–12.7)
POTASSIUM SERPL-SCNC: 3.8 MMOL/L (ref 3.5–5.3)
PROT SERPL-MCNC: 7.9 G/DL (ref 6.4–8.2)
RBC # BLD AUTO: 2.73 MILLION/UL (ref 3.88–5.62)
SODIUM SERPL-SCNC: 132 MMOL/L (ref 136–145)
VANCOMYCIN TROUGH SERPL-MCNC: 21.6 UG/ML (ref 10–20)
WBC # BLD AUTO: 6.85 THOUSAND/UL (ref 4.31–10.16)

## 2021-05-21 PROCEDURE — 87081 CULTURE SCREEN ONLY: CPT | Performed by: INTERNAL MEDICINE

## 2021-05-21 PROCEDURE — 97530 THERAPEUTIC ACTIVITIES: CPT

## 2021-05-21 PROCEDURE — 80202 ASSAY OF VANCOMYCIN: CPT | Performed by: INTERNAL MEDICINE

## 2021-05-21 PROCEDURE — 99232 SBSQ HOSP IP/OBS MODERATE 35: CPT | Performed by: INTERNAL MEDICINE

## 2021-05-21 PROCEDURE — 85027 COMPLETE CBC AUTOMATED: CPT | Performed by: INTERNAL MEDICINE

## 2021-05-21 PROCEDURE — 84100 ASSAY OF PHOSPHORUS: CPT | Performed by: INTERNAL MEDICINE

## 2021-05-21 PROCEDURE — 82140 ASSAY OF AMMONIA: CPT | Performed by: INTERNAL MEDICINE

## 2021-05-21 PROCEDURE — 80053 COMPREHEN METABOLIC PANEL: CPT | Performed by: INTERNAL MEDICINE

## 2021-05-21 PROCEDURE — 83735 ASSAY OF MAGNESIUM: CPT | Performed by: INTERNAL MEDICINE

## 2021-05-21 RX ORDER — LANOLIN ALCOHOL/MO/W.PET/CERES
100 CREAM (GRAM) TOPICAL DAILY
Status: DISCONTINUED | OUTPATIENT
Start: 2021-05-21 | End: 2021-05-26 | Stop reason: HOSPADM

## 2021-05-21 RX ORDER — LACTULOSE 20 G/30ML
20 SOLUTION ORAL 2 TIMES DAILY
Status: DISCONTINUED | OUTPATIENT
Start: 2021-05-22 | End: 2021-05-24

## 2021-05-21 RX ORDER — FOLIC ACID 1 MG/1
1 TABLET ORAL DAILY
Status: DISCONTINUED | OUTPATIENT
Start: 2021-05-21 | End: 2021-05-26 | Stop reason: HOSPADM

## 2021-05-21 RX ORDER — LORAZEPAM 1 MG/1
2 TABLET ORAL ONCE
Status: COMPLETED | OUTPATIENT
Start: 2021-05-21 | End: 2021-05-21

## 2021-05-21 RX ORDER — CEFAZOLIN SODIUM 2 G/50ML
2000 SOLUTION INTRAVENOUS EVERY 8 HOURS
Status: DISCONTINUED | OUTPATIENT
Start: 2021-05-22 | End: 2021-05-23

## 2021-05-21 RX ADMIN — LORAZEPAM 2 MG: 1 TABLET ORAL at 14:43

## 2021-05-21 RX ADMIN — Medication: at 17:03

## 2021-05-21 RX ADMIN — CEFEPIME HYDROCHLORIDE 2000 MG: 2 INJECTION, POWDER, FOR SOLUTION INTRAVENOUS at 01:31

## 2021-05-21 RX ADMIN — OXYMETAZOLINE HYDROCHLORIDE 2 SPRAY: 0.05 SPRAY NASAL at 21:42

## 2021-05-21 RX ADMIN — VANCOMYCIN HYDROCHLORIDE 1750 MG: 10 INJECTION, POWDER, LYOPHILIZED, FOR SOLUTION INTRAVENOUS at 03:52

## 2021-05-21 RX ADMIN — CLOTRIMAZOLE: 10 CREAM TOPICAL at 08:09

## 2021-05-21 RX ADMIN — LORAZEPAM 2 MG: 1 TABLET ORAL at 08:09

## 2021-05-21 RX ADMIN — CEFEPIME HYDROCHLORIDE 2000 MG: 2 INJECTION, POWDER, FOR SOLUTION INTRAVENOUS at 17:05

## 2021-05-21 RX ADMIN — B-COMPLEX W/ C & FOLIC ACID TAB 1 TABLET: TAB at 08:09

## 2021-05-21 RX ADMIN — CEFEPIME HYDROCHLORIDE 2000 MG: 2 INJECTION, POWDER, FOR SOLUTION INTRAVENOUS at 09:59

## 2021-05-21 RX ADMIN — VANCOMYCIN HYDROCHLORIDE 1750 MG: 10 INJECTION, POWDER, LYOPHILIZED, FOR SOLUTION INTRAVENOUS at 13:01

## 2021-05-21 RX ADMIN — PANTOPRAZOLE SODIUM 40 MG: 40 TABLET, DELAYED RELEASE ORAL at 05:52

## 2021-05-21 RX ADMIN — VANCOMYCIN HYDROCHLORIDE 1250 MG: 10 INJECTION, POWDER, LYOPHILIZED, FOR SOLUTION INTRAVENOUS at 21:48

## 2021-05-21 RX ADMIN — BUPRENORPHINE AND NALOXONE 16 MG: 8; 2 FILM BUCCAL; SUBLINGUAL at 08:11

## 2021-05-21 RX ADMIN — FOLIC ACID 1 MG: 1 TABLET ORAL at 08:09

## 2021-05-21 RX ADMIN — NICOTINE 14 MG: 14 PATCH, EXTENDED RELEASE TRANSDERMAL at 08:08

## 2021-05-21 RX ADMIN — Medication: at 08:09

## 2021-05-21 RX ADMIN — OXYMETAZOLINE HYDROCHLORIDE 2 SPRAY: 0.05 SPRAY NASAL at 08:06

## 2021-05-21 RX ADMIN — THIAMINE HCL TAB 100 MG 100 MG: 100 TAB at 08:09

## 2021-05-21 RX ADMIN — CLOTRIMAZOLE: 10 CREAM TOPICAL at 17:03

## 2021-05-21 RX ADMIN — Medication 6 MG: at 21:44

## 2021-05-21 NOTE — PHYSICAL THERAPY NOTE
PT TREATMENT     05/21/21 8735   Note Type   Note Type Treatment   Pain Assessment   Pain Assessment Tool Pain Assessment not indicated - pt denies pain   Restrictions/Precautions   Other Precautions Fall Risk  (LE draining wounds)   General   Chart Reviewed Yes   Cognition   Overall Cognitive Status WFL   Subjective   Subjective "doing better"   Transfers   Sit to Stand 4  Minimal assistance   Stand to Sit 4  Minimal assistance   Stand pivot 4  Minimal assistance   Additional items   (with walker)   Ambulation/Elevation   Gait Assistance 4  Minimal assist   Assistive Device Rolling walker   Distance 75 feet, 50 feet   Balance   Static Sitting Fair +   Dynamic Sitting Fair   Static Standing Fair   Dynamic Standing Fair -   Ambulatory Fair -   Activity Tolerance   Activity Tolerance Patient tolerated treatment well;Patient limited by fatigue   Assessment   Prognosis Good   Problem List Decreased strength;Decreased endurance; Impaired balance;Decreased mobility;Obesity; Decreased skin integrity   Assessment Pt demonstrates improving endurance, balance, strength and function  Encouraged pt to sit OOB however pt prefers to sit at the edge of the bed  Encouraged pt to ambulate with nursing staff and walker this weekend  The patient's AM-PAC Basic Mobility Inpatient Short Form Raw Score is 17, Standardized Score is 39 67  A standardized score less than 42 9 suggests the patient may benefit from discharge to post-acute rehabilitation services, however anticipate pt will be able to go home with family support as pt continues to improve daily  Plan   Treatment/Interventions ADL retraining;Functional transfer training;LE strengthening/ROM; Elevations; Therapeutic exercise; Endurance training;Gait training;Bed mobility; Equipment eval/education;Patient/family training   Progress Progressing toward goals   PT Frequency 5x/wk   Recommendation   PT Discharge Recommendation Home with home health rehabilitation   Equipment Recommended Rojean [employer] Package Recommended Wheeled walker   Change/add to [employer]?  No   AM-PAC Basic Mobility Inpatient   Turning in Bed Without Bedrails 3   Lying on Back to Sitting on Edge of Flat Bed 3   Moving Bed to Chair 3   Standing Up From Chair 3   Walk in Room 3   Climb 3-5 Stairs 2   Basic Mobility Inpatient Raw Score 17   Basic Mobility Standardized Score 61 69   Licensure   [place] License Number  [facility] [id number]

## 2021-05-21 NOTE — PLAN OF CARE
Problem: Potential for Falls  Goal: Patient will remain free of falls  Description: INTERVENTIONS:  - Assess patient frequently for physical needs  -  Identify cognitive and physical deficits and behaviors that affect risk of falls    -  [place] fall precautions as indicated by assessment   - Educate patient/family on patient safety including physical limitations  - Instruct patient to call for assistance with activity based on assessment  - Modify environment to reduce risk of injury  - Consider OT/PT consult to assist with strengthening/mobility  Outcome: Progressing     Problem: Prexisting or High Potential for Compromised Skin Integrity  Goal: Skin integrity is maintained or improved  Description: INTERVENTIONS:  - Identify patients at risk for skin breakdown  - Assess and monitor skin integrity  - Assess and monitor nutrition and hydration status  - Monitor labs   - Assess for incontinence   - Turn and reposition patient  - Assist with mobility/ambulation  - Relieve pressure over bony prominences  - Avoid friction and shearing  - Provide appropriate hygiene as needed including keeping skin clean and dry  - Evaluate need for skin moisturizer/barrier cream  - Collaborate with interdisciplinary team   - Patient/family teaching  - Consider wound care consult   Outcome: Progressing     Problem: INFECTION - ADULT  Goal: Absence or prevention of progression during hospitalization  Description: INTERVENTIONS:  - Assess and monitor for signs and symptoms of infection  - Monitor lab/diagnostic results  - Monitor all insertion sites, i e  indwelling lines, tubes, and drains  - Monitor endotracheal if appropriate and nasal secretions for changes in amount and color  - [place] appropriate cooling/warming therapies per order  - Administer medications as ordered  - Instruct and encourage patient and family to use good hand hygiene technique  - Identify and instruct in appropriate isolation precautions for identified infection/condition  Outcome: Progressing  Goal: Absence of fever/infection during neutropenic period  Description: INTERVENTIONS:  - Monitor WBC    Outcome: Progressing     Problem: DISCHARGE PLANNING  Goal: Discharge to home or other facility with appropriate resources  Description: INTERVENTIONS:  - Identify barriers to discharge w/patient and caregiver  - Arrange for needed discharge resources and transportation as appropriate  - Identify discharge learning needs (meds, wound care, etc )  - Arrange for interpretive services to assist at discharge as needed  - Refer to Case Management Department for coordinating discharge planning if the patient needs post-hospital services based on physician/advanced practitioner order or complex needs related to functional status, cognitive ability, or social support system  Outcome: Progressing

## 2021-05-22 LAB
ALBUMIN SERPL BCP-MCNC: 2 G/DL (ref 3.5–5)
ALP SERPL-CCNC: 139 U/L (ref 46–116)
ALT SERPL W P-5'-P-CCNC: 21 U/L (ref 12–78)
ANION GAP SERPL CALCULATED.3IONS-SCNC: 9 MMOL/L (ref 4–13)
AST SERPL W P-5'-P-CCNC: 61 U/L (ref 5–45)
BASOPHILS # BLD AUTO: 0.04 THOUSANDS/ΜL (ref 0–0.1)
BASOPHILS NFR BLD AUTO: 1 % (ref 0–1)
BILIRUB SERPL-MCNC: 3.23 MG/DL (ref 0.2–1)
BUN SERPL-MCNC: 9 MG/DL (ref 5–25)
CALCIUM ALBUM COR SERPL-MCNC: 10 MG/DL (ref 8.3–10.1)
CALCIUM SERPL-MCNC: 8.4 MG/DL (ref 8.3–10.1)
CHLORIDE SERPL-SCNC: 99 MMOL/L (ref 100–108)
CO2 SERPL-SCNC: 24 MMOL/L (ref 21–32)
CREAT SERPL-MCNC: 0.66 MG/DL (ref 0.6–1.3)
EOSINOPHIL # BLD AUTO: 0.12 THOUSAND/ΜL (ref 0–0.61)
EOSINOPHIL NFR BLD AUTO: 2 % (ref 0–6)
ERYTHROCYTE [DISTWIDTH] IN BLOOD BY AUTOMATED COUNT: 18.7 % (ref 11.6–15.1)
GFR SERPL CREATININE-BSD FRML MDRD: 124 ML/MIN/1.73SQ M
GLUCOSE SERPL-MCNC: 107 MG/DL (ref 65–140)
HCT VFR BLD AUTO: 26.5 % (ref 36.5–49.3)
HGB BLD-MCNC: 8.3 G/DL (ref 12–17)
IMM GRANULOCYTES # BLD AUTO: 0.03 THOUSAND/UL (ref 0–0.2)
IMM GRANULOCYTES NFR BLD AUTO: 0 % (ref 0–2)
LYMPHOCYTES # BLD AUTO: 1.09 THOUSANDS/ΜL (ref 0.6–4.47)
LYMPHOCYTES NFR BLD AUTO: 15 % (ref 14–44)
MCH RBC QN AUTO: 29.6 PG (ref 26.8–34.3)
MCHC RBC AUTO-ENTMCNC: 31.3 G/DL (ref 31.4–37.4)
MCV RBC AUTO: 95 FL (ref 82–98)
MONOCYTES # BLD AUTO: 0.53 THOUSAND/ΜL (ref 0.17–1.22)
MONOCYTES NFR BLD AUTO: 8 % (ref 4–12)
NEUTROPHILS # BLD AUTO: 5.27 THOUSANDS/ΜL (ref 1.85–7.62)
NEUTS SEG NFR BLD AUTO: 74 % (ref 43–75)
NRBC BLD AUTO-RTO: 0 /100 WBCS
PLATELET # BLD AUTO: 99 THOUSANDS/UL (ref 149–390)
PMV BLD AUTO: 8.9 FL (ref 8.9–12.7)
POTASSIUM SERPL-SCNC: 3.5 MMOL/L (ref 3.5–5.3)
PROT SERPL-MCNC: 7.8 G/DL (ref 6.4–8.2)
RBC # BLD AUTO: 2.8 MILLION/UL (ref 3.88–5.62)
SODIUM SERPL-SCNC: 132 MMOL/L (ref 136–145)
WBC # BLD AUTO: 7.08 THOUSAND/UL (ref 4.31–10.16)

## 2021-05-22 PROCEDURE — 87186 SC STD MICRODIL/AGAR DIL: CPT | Performed by: INTERNAL MEDICINE

## 2021-05-22 PROCEDURE — 87205 SMEAR GRAM STAIN: CPT | Performed by: INTERNAL MEDICINE

## 2021-05-22 PROCEDURE — 80053 COMPREHEN METABOLIC PANEL: CPT | Performed by: INTERNAL MEDICINE

## 2021-05-22 PROCEDURE — 85025 COMPLETE CBC W/AUTO DIFF WBC: CPT | Performed by: INTERNAL MEDICINE

## 2021-05-22 PROCEDURE — 99253 IP/OBS CNSLTJ NEW/EST LOW 45: CPT | Performed by: PSYCHIATRY & NEUROLOGY

## 2021-05-22 PROCEDURE — 99232 SBSQ HOSP IP/OBS MODERATE 35: CPT | Performed by: INTERNAL MEDICINE

## 2021-05-22 PROCEDURE — 87070 CULTURE OTHR SPECIMN AEROBIC: CPT | Performed by: INTERNAL MEDICINE

## 2021-05-22 PROCEDURE — 87077 CULTURE AEROBIC IDENTIFY: CPT | Performed by: INTERNAL MEDICINE

## 2021-05-22 RX ORDER — ESCITALOPRAM OXALATE 10 MG/1
10 TABLET ORAL DAILY
Status: DISCONTINUED | OUTPATIENT
Start: 2021-05-23 | End: 2021-05-26 | Stop reason: HOSPADM

## 2021-05-22 RX ORDER — POTASSIUM CHLORIDE 20 MEQ/1
20 TABLET, EXTENDED RELEASE ORAL 2 TIMES DAILY
Status: COMPLETED | OUTPATIENT
Start: 2021-05-22 | End: 2021-05-22

## 2021-05-22 RX ORDER — ALBUMIN (HUMAN) 12.5 G/50ML
12.5 SOLUTION INTRAVENOUS
Status: DISCONTINUED | OUTPATIENT
Start: 2021-05-22 | End: 2021-05-24

## 2021-05-22 RX ORDER — FUROSEMIDE 10 MG/ML
20 INJECTION INTRAMUSCULAR; INTRAVENOUS
Status: DISCONTINUED | OUTPATIENT
Start: 2021-05-22 | End: 2021-05-24

## 2021-05-22 RX ADMIN — Medication: at 08:59

## 2021-05-22 RX ADMIN — VANCOMYCIN HYDROCHLORIDE 1250 MG: 10 INJECTION, POWDER, LYOPHILIZED, FOR SOLUTION INTRAVENOUS at 04:59

## 2021-05-22 RX ADMIN — LACTULOSE 20 G: 10 SOLUTION ORAL at 17:06

## 2021-05-22 RX ADMIN — CLOTRIMAZOLE: 10 CREAM TOPICAL at 08:59

## 2021-05-22 RX ADMIN — PANTOPRAZOLE SODIUM 40 MG: 40 TABLET, DELAYED RELEASE ORAL at 05:00

## 2021-05-22 RX ADMIN — CLOTRIMAZOLE: 10 CREAM TOPICAL at 17:06

## 2021-05-22 RX ADMIN — CEFAZOLIN SODIUM 2000 MG: 2 SOLUTION INTRAVENOUS at 00:23

## 2021-05-22 RX ADMIN — CEFAZOLIN SODIUM 2000 MG: 2 SOLUTION INTRAVENOUS at 09:00

## 2021-05-22 RX ADMIN — B-COMPLEX W/ C & FOLIC ACID TAB 1 TABLET: TAB at 08:59

## 2021-05-22 RX ADMIN — LACTULOSE 20 G: 10 SOLUTION ORAL at 09:00

## 2021-05-22 RX ADMIN — NICOTINE 14 MG: 14 PATCH, EXTENDED RELEASE TRANSDERMAL at 08:59

## 2021-05-22 RX ADMIN — THIAMINE HCL TAB 100 MG 100 MG: 100 TAB at 08:59

## 2021-05-22 RX ADMIN — VANCOMYCIN HYDROCHLORIDE 1250 MG: 10 INJECTION, POWDER, LYOPHILIZED, FOR SOLUTION INTRAVENOUS at 20:41

## 2021-05-22 RX ADMIN — ALBUMIN (HUMAN) 12.5 G: 0.25 INJECTION, SOLUTION INTRAVENOUS at 12:54

## 2021-05-22 RX ADMIN — CEFAZOLIN SODIUM 2000 MG: 2 SOLUTION INTRAVENOUS at 17:06

## 2021-05-22 RX ADMIN — Medication 6 MG: at 21:50

## 2021-05-22 RX ADMIN — Medication: at 17:06

## 2021-05-22 RX ADMIN — POTASSIUM CHLORIDE 20 MEQ: 1500 TABLET, EXTENDED RELEASE ORAL at 12:55

## 2021-05-22 RX ADMIN — BUPRENORPHINE AND NALOXONE 16 MG: 8; 2 FILM BUCCAL; SUBLINGUAL at 08:59

## 2021-05-22 RX ADMIN — FOLIC ACID 1 MG: 1 TABLET ORAL at 08:59

## 2021-05-22 RX ADMIN — POTASSIUM CHLORIDE 20 MEQ: 1500 TABLET, EXTENDED RELEASE ORAL at 17:06

## 2021-05-22 RX ADMIN — FUROSEMIDE 20 MG: 10 INJECTION, SOLUTION INTRAMUSCULAR; INTRAVENOUS at 12:55

## 2021-05-22 RX ADMIN — VANCOMYCIN HYDROCHLORIDE 1250 MG: 10 INJECTION, POWDER, LYOPHILIZED, FOR SOLUTION INTRAVENOUS at 12:58

## 2021-05-22 NOTE — PROGRESS NOTES
Vancomycin Assessment    Jannette Ludwig is a 39 y o  male who is currently receiving vancomycin 1250 mg IV q8h for skin-soft tissue infection     Relevant clinical data and objective history reviewed:  Creatinine   Date Value Ref Range Status   05/21/2021 0 55 (L) 0 60 - 1 30 mg/dL Final     Comment:     Specimen Icteric; Results May be Affected   05/20/2021 0 52 (L) 0 60 - 1 30 mg/dL Final     Comment:     Standardized to IDMS reference method   05/19/2021 0 51 (L) 0 60 - 1 30 mg/dL Final     Comment:     Standardized to IDMS reference method     /59   Pulse (!) 109   Temp 99 5 °F (37 5 °C)   Resp 18   Ht 6' 1" (1 854 m)   Wt 113 kg (249 lb 1 9 oz)   SpO2 99%   BMI 32 87 kg/m²   I/O last 3 completed shifts:  In: -   Out: 950 [Urine:950]  Lab Results   Component Value Date/Time    BUN 8 05/21/2021 07:02 AM    WBC 6 85 05/21/2021 07:02 AM    HGB 7 9 (L) 05/21/2021 07:02 AM    HCT 25 6 (L) 05/21/2021 07:02 AM    MCV 94 05/21/2021 07:02 AM    PLT 85 (L) 05/21/2021 07:02 AM     Temp Readings from Last 3 Encounters:   05/21/21 99 5 °F (37 5 °C)   01/25/21 97 7 °F (36 5 °C) (Tympanic)   07/14/20 97 7 °F (36 5 °C) (Temporal)     Vancomycin Days of Therapy: 3    Assessment/Plan  The patient is currently on vancomycin utilizing scheduled dosing  Baseline risks associated with therapy include: concomitant nephrotoxic medications  The patient is receiving 1250 mg IV q8h with the most recent vancomycin level being not at steady-state and supratherapeutic based on a goal of 15-20 (appropriate for most indications) ; therefore, after clinical evaluation will be changed to 1750 mg IV q8h   Pharmacy will continue to follow closely for s/sx of nephrotoxicity, infusion reactions, and appropriateness of therapy  BMP and CBC will be ordered per protocol  Plan for trough as patient approaches steady state, prior to the 4th  dose at approximately 2000 on 5/22/21   Pharmacy will continue to follow the patients culture results and clinical progress daily      Vini Simms, Pharmacist

## 2021-05-22 NOTE — PROGRESS NOTES
[facility] Internal Medicine Progress Note  Patient: Izabela Champagne 39 y o  male   MRN: [id number]  PCP: Gurdeep Rivera PA-C  Unit/Bed#: [address] [place] [address] Encounter: [id number]  Date Of Visit: 05/22/21    Problem List:    Principal Problem:    Cellulitis  Active Problems:    Alcohol abuse    Substance abuse ([facility] )    Hepatic cirrhosis (HCC)    Anemia    Hyponatremia    Cardiac murmur    Lactic acidosis    Tachycardia    Essential hypertension    Tobacco dependence    Thrombocytopenia ([facility] )    Thoracoabdominal aortic aneurysm ([facility] )    Depression      Assessment & Plan:    * Cellulitis  Assessment & Plan  Patient was severe lower extremity edema with erythema, history of the same and treated inpatient with IV Abx 2 times in 2021  received ceftriaxone in ED for suspected bilateral extremity cellulitis as well as UTI  Afebrile, normal white count on presentation  On exam, bilateral lower extremity cellulitis  Ultrasound of the left lower extremity with evidence of cellulitis no evidence of deeper collection  Noted to have low-grade intermittent fever  Normal white count  Overall surrounding erythema is improving still with increase drainage noted from left leg posteriorly  · Continue vancomycin, and cefazolin  · MRSA surveillance culture  · Obtain wound culture  · Blood culture-1/2 coagulase-negative Staph  Likely contaminant    · Venous Doppler bilateral lower extremity -no evidence of DVT  · Follow wound care recommendations    Tachycardia  Assessment & Plan  EKG-sinus tachycardia, multifactorial  Setting of infection, dehydration/intravascular depletion, withdrawal  Telemetry- sinus tachycardia  Cardiology input appreciated  · Monitor H&H and transfuse as needed  · Continue antibiotics, follow-up blood culture  · Continue CIWA protocol, Suboxone resumed    Lactic acidosis  Assessment & Plan  Persistent despite aggressive hydration likely secondary to hepatic cirrhosis and alcohol intoxication  · Will not treat further  · Monitor hemodynamics-appears to be improving  · Continue thiamine    Cardiac murmur  Assessment & Plan  As noted on exam, no previous documentation noted of a cardiac murmur  EKG-sinus tachycardia,   Denies history of IVDU  2D echo-poor visualization but no definitive significant valvular disease  EF 55-60%  Moderate pulmonary hypertension  · Blood culture likely contaminant    Hyponatremia  Assessment & Plan  Chronic, Na 126 on admission, typically 130-131  In the setting of ETOH abuse  Improved with IV NS indicating prerenal component  Sodium remains mildly low normal at 132  ·  fluid restriction  · Monitor BMP  · Resuming Lasix with albumin      Anemia  Assessment & Plan  Hx of anemia, in the past documented as likely multifactorial 2/2 anemia of chronic liver disease, alcohol abuse and acute blood loss in January 2/2 leg wound  Hg 7 4, lower than baseline on presentation  Denies any overt GI bleeding  Noted to episode of epistaxis  Status post 2 PRBC transfusion on 05/19  Iron sat 11%, B12, folate normal  Hemoglobin remains stable  - No further evidence of acute blood loss  - Continue to trend and transfuse as needed for Hg < 7  - Continue folic acid supplement, consider iron supplementation on discharge    Hepatic cirrhosis (HCC)  Assessment & Plan  Hx of hepatic cirrhosis likely 2/2 ETOH use  - thrombocytopenia, elevated T bili, appears stable compared to past  - Recent hepatitis panel in January negative   - MELD-Na score 24 on admission  - ammonia level 35 on admission, INR 1 5  Bilirubin appears better today  -continue to monitor LFT  -continue lactulose  -initiate albumin with Lasix  -alcohol abstinence    Substance abuse ([address] )  Assessment & Plan  Patient reports Hx of multi-substance abuse, patient takes suboxone   Retracted negative    Patient denies any history of IVDU  - confirmed suboxone dosing with patient's pharmacy  - continue Suboxone    Alcohol abuse  Assessment & Plan  Hx of ETOH abuse, in the past 3-4 weeks has cut down from 1-2 bottles of vodka/day to 2-3 shots per day  ETOH 462 on admission with last drink at approxiamtely 3-4pm the day of admission according to the patient  He wishes to stop drinking and would like resources to help  No Hx of w/d seizures/DT     Still tremulous, tachycardic but overall CIWA score is improving  - Maintain CIWA protocol  - Encourage ETOH cessation   - Folic acid, thiamine, multivitamin       Depression  Assessment & Plan  Hx of depression, patient was previously on Lexapro but is no longer taking this  Reports that his feeling least present anxious, now requesting psychiatric evaluation  Agreeable to resume Lexapro      Thoracoabdominal aortic aneurysm [facility])  Assessment & Plan  Noted on previous CTA chest from Jan 2021  - Has not since had f/u with CT surgery for regular outpatient monitoring, discussed with patient the importance of regular f/u the time of admission    Thrombocytopenia ([address] )  Assessment & Plan  In the setting of ETOH abuse and hepatic cirrhosis   Continue to monitor, currently no evidence of bleeding   Appears to be improving  Resume DVT prophylaxis if continues to improve    Tobacco dependence  Assessment & Plan  Continues to smoke daily, requested nicotine patch  Smoking cessation discussed, continue to encourage cessation     Essential hypertension  Assessment & Plan  Has a history of HTN but admits that he is no longer taking his home medications, but on Lasix and Aldactone  Blood pressure is soft but overall improving  Resume albumin with Lasix  Monitor  Consider midodrine    Abnormal urinalysis-resolved as of 5/21/2021  Assessment & Plan  UA abnormal, patient denies any urinary symptoms at present  - urine culture negative    Epistaxis-resolved as of 5/21/2021  Assessment & Plan  Noted on 05/19  Now resolved  ENT input appreciated      VTE Pharmacologic Prophylaxis:   Pharmacologic: Pharmacologic VTE Prophylaxis contraindicated due to Thrombocytopenia   Mechanical VTE Prophylaxis in Place: No    Patient Centered Rounds: I have performed bedside rounds with nursing staff today  Discussions with Specialists or Other Care Team Provider:  Yes    Education and Discussions with Family / Patient:  Discussed with patient's sister at length over the phone yesterday    Time Spent for Care: 45 minutes  More than 50% of total time spent on counseling and coordination of care as described above  Current Length of Stay: 4 day(s)    Current Patient Status: Inpatient   Certification Statement: The patient will continue to require additional inpatient hospital stay due to Cellulitis requiring IV antibiotics    Discharge Plan:  Pending at present    Code Status: Level 1 - Full Code      Subjective:   Remains with intermittent low-grade fever  Tachycardia appears to be improving  Tolerating diet well  Feels anxious at times    Denies chest pain or shortness of breath    Objective:     Vitals:   Temp (24hrs), Av 6 °F (37 6 °C), Min:98 9 °F (37 2 °C), Max:100 3 °F (37 9 °C)    Temp:  [98 9 °F (37 2 °C)-100 3 °F (37 9 °C)] 98 9 °F (37 2 °C)  HR:  [109-121] 109  Resp:  [18-23] 23  BP: (108-134)/(59-69) 134/69  SpO2:  [96 %-100 %] 100 % on room air  Body mass index is 32 87 kg/m²  Input and Output Summary (last 24 hours): Intake/Output Summary (Last 24 hours) at 2021 1213  Last data filed at 2021 0401  Gross per 24 hour   Intake --   Output 450 ml   Net -450 ml       Physical Exam:     Physical Exam  Constitutional:       General: He is not in acute distress  Comments: Chronically ill, disheveled   HENT:      Head: Normocephalic and atraumatic  Cardiovascular:      Rate and Rhythm: Tachycardia present  Pulmonary:      Effort: Pulmonary effort is normal  No respiratory distress  Breath sounds: No wheezing, rhonchi or rales  Comments: Diminished at bases  Chest:      Chest wall: No tenderness  Abdominal:      General: Bowel sounds are normal  There is no distension  Palpations: Abdomen is soft  Tenderness: There is no abdominal tenderness  There is no guarding or rebound  Musculoskeletal:      Right lower leg: Edema present  Left lower leg: Edema present  Comments: Bilateral lower extremity chronic lymphedema with  dry flaky skin  Associated erythema tenderness appears to be improving  Also area of tenderness induration posteriorly over left calf continues to improve but remains with persistent mucopurulent drainage  Worsening of lower extremity edema extending up to thigh   Skin:     General: Skin is warm and dry  Neurological:      Mental Status: He is alert  Mental status is at baseline  Cranial Nerves: No cranial nerve deficit  Comments: Tremulous   Psychiatric:      Comments: Anxious         Additional Data:     Labs:    Results from last 7 days   Lab Units 05/22/21  0617   WBC Thousand/uL 7 08   HEMOGLOBIN g/dL 8 3*   HEMATOCRIT % 26 5*   PLATELETS Thousands/uL 99*   NEUTROS PCT % 74   LYMPHS PCT % 15   MONOS PCT % 8   EOS PCT % 2     Results from last 7 days   Lab Units 05/22/21  0617   POTASSIUM mmol/L 3 5   CHLORIDE mmol/L 99*   CO2 mmol/L 24   BUN mg/dL 9   CREATININE mg/dL 0 66   CALCIUM mg/dL 8 4   ALK PHOS U/L 139*   ALT U/L 21   AST U/L 61*     Results from last 7 days   Lab Units 05/18/21  1729   INR  1 53*       * I Have Reviewed All Lab Data Listed Above  * Additional Pertinent Lab Tests Reviewed: All Labs Within Last 24 Hours Reviewed      Imaging:  Imaging Reports Reviewed Today Include:  Chest x-ray  Recent Cultures (last 7 days):     Results from last 7 days   Lab Units 05/18/21  1753 05/18/21  1746 05/18/21  1729   BLOOD CULTURE   --  No Growth at 72 hrs   Staphylococcus coagulase negative*   GRAM STAIN RESULT   --   --  Gram positive cocci in clusters*   URINE CULTURE  No Growth <1000 cfu/mL  --   --        Last 24 Hours Medication List: Current Facility-Administered Medications   Medication Dose Route Frequency Provider Last Rate    albumin human  12 5 g Intravenous BID (diuretic) Sofiya Bowman MD      And    furosemide  20 mg Intravenous BID (diuretic) Sofiya Bowman MD      ammonium lactate   Topical BID Sofiya Bowman MD      buprenorphine-naloxone  16 mg Sublingual Daily Sofiya Bowman MD      calcium carbonate  1,000 mg Oral Daily PRN Cade Lea PA-C      cefazolin  2,000 mg Intravenous Q8H Sofiya Bowman MD 2,000 mg (05/22/21 0900)    clotrimazole   Topical BID Sofiya Bowman MD      folic acid  1 mg Oral Daily Sofiya Bowman MD      lactulose  20 g Oral BID Sofiya Bowman MD      melatonin  6 mg Oral HS MARILEE Pelletier      multivitamin stress formula  1 tablet Oral Daily Sofiya Bowman MD      nicotine  14 mg Transdermal Daily Sofiya Bowman MD      ondansetron  4 mg Intravenous Q6H PRN Cade Lea PA-C      pantoprazole  40 mg Oral Early Morning Sofiya Bowman MD      polyethylene glycol  17 g Oral Daily PRN Cade Lea PA-C      potassium chloride  20 mEq Oral BID Sofiya Bowman MD      sodium chloride  1 spray Each Nare Q1H PRN Sofiya Bowman MD      thiamine  100 mg Oral Daily Sofiya Bowman MD      vancomycin  12 5 mg/kg (Adjusted) Intravenous Q8H Sofiya Bowman MD 1,250 mg (05/22/21 0459)          Today, Patient Was Seen By: Sofiya Bowman MD    ** Please Note: "This note has been constructed using a voice recognition system  Therefore there may be syntax, spelling, and/or grammatical errors   Please call if you have any questions  "**

## 2021-05-22 NOTE — UTILIZATION REVIEW
Continued Stay Review    Date:  5/22/21                         Current Patient Class: inpatient    Current Level of Care:acute     Assessment/Plan:   Reports improvement leg pain ,tolerating diet still anxious at time requiring lorazepam deferred psych eval   Hepatic cirrhosis bilirubin noted to be uptrending start lactulose alcohol abstinence  Ultrasound of the left lower extremity with evidence of cellulitis no evidence of deeper collection  Reveals afebrile  Normal white count  Appears to be improving clinically  · Continue vancomycin, will deescalate to cefazolin  · MRSA surveillance culture  · Blood culture-1/2 coagulase-negative Staph  Likely contaminant    · Venous Doppler bilateral lower extremity -no evidence of DVT  · Follow wound care recommendations  · Started on IV Lasix 20mg bid along with IV albumin  Bid  · Wound care cx and gs  Vital Signs:   05/22/21 15:18:51  99 2 °F (37 3 °C)  109Abnormal   18  121/71  88  100 %  --  --  --   05/22/21 08:39:19  98 9 °F (37 2 °C)  109Abnormal   --  134/69  91  100 %  --  --  --   05/22/21 0800  --  --  --  --  --  --  2 L/min           Pertinent Labs/Diagnostic Results:       Results from last 7 days   Lab Units 05/22/21  0617 05/21/21  0702 05/20/21  0628 05/19/21  1737 05/19/21  0845 05/18/21  1729   WBC Thousand/uL 7 08 6 85 5 25  --  5 94 6 77   HEMOGLOBIN g/dL 8 3* 7 9* 7 6* 7 0* 6 5* 7 4*   HEMATOCRIT % 26 5* 25 6* 24 1* 22 0* 20 3* 23 1*   PLATELETS Thousands/uL 99* 85* 87*  --  95* 118*   NEUTROS ABS Thousands/µL 5 27  --   --   --  3 85 4 02     Results from last 7 days   Lab Units 05/22/21  0617 05/21/21  0702 05/20/21  0628 05/19/21  1737 05/19/21  0845  05/18/21  1746   SODIUM mmol/L 132* 132* 132* 134* 132*   < > 126*   POTASSIUM mmol/L 3 5 3 8 3 8 3 7 3 5   < > 3 8   CHLORIDE mmol/L 99* 99* 100 99* 97*   < > 96*   CO2 mmol/L 24 28 27 26 27   < > 28   ANION GAP mmol/L 9 5 5 9 8   < > 2*   BUN mg/dL 9 8 10 10 8   < > 8   CREATININE mg/dL 0 66 0 55* 0 52* 0 51* 0 44*   < > 0 48*   EGFR ml/min/1 73sq m 124 134 137 138 147   < > 142   CALCIUM mg/dL 8 4 8 7 8 5 8 0* 7 8*   < > 7 6*   MAGNESIUM mg/dL  --  1 7 1 4*  --  1 6  --  1 8   PHOSPHORUS mg/dL  --  3 1  --   --   --   --  3 7    < > = values in this interval not displayed       Results from last 7 days   Lab Units 05/22/21  0617 05/21/21  0702 05/19/21  0845 05/18/21  1746 05/18/21  1729   AST U/L 61* 74* 79* 80*  --    ALT U/L 21 24 31 32  --    ALK PHOS U/L 139* 141* 135* 145*  --    TOTAL PROTEIN g/dL 7 8 7 9 7 5 7 6  --    ALBUMIN g/dL 2 0* 2 1* 1 9* 2 1*  --    TOTAL BILIRUBIN mg/dL 3 23* 4 62* 2 33* 2 68*  --    AMMONIA umol/L  --  40*  --   --  35     Results from last 7 days   Lab Units 05/22/21  0617 05/21/21  0702 05/20/21  0628 05/19/21  1737 05/19/21  0845 05/19/21  0402 05/18/21  1746   GLUCOSE RANDOM mg/dL 107 96 85 123 118 129 119     Results from last 7 days   Lab Units 05/18/21  1731   TROPONIN I ng/mL 0 02     Results from last 7 days   Lab Units 05/18/21  1729   PROTIME seconds 18 2*   INR  1 53*   PTT seconds 53*     Results from last 7 days   Lab Units 05/19/21  0402 05/19/21  0046 05/18/21  1940 05/18/21  1729   LACTIC ACID mmol/L 2 9* 2 4* 3 0* 3 0*     Results from last 7 days   Lab Units 05/18/21  1746   NT-PRO BNP pg/mL 339*     Results from last 7 days   Lab Units 05/19/21  0845   FERRITIN ng/mL 77     Results from last 7 days   Lab Units 05/18/21  1752   CLARITY UA  Clear   COLOR UA  Orange   SPEC GRAV UA  >=1 030   PH UA  6 0   GLUCOSE UA mg/dl Negative   KETONES UA mg/dl Negative   BLOOD UA  Trace-lysed*   PROTEIN UA mg/dl 30 (1+)*   NITRITE UA  Positive*   BILIRUBIN UA  Interference- unable to analyze*   UROBILINOGEN UA E U /dl >=8 0*   LEUKOCYTES UA  Negative   WBC UA /hpf 1-2   RBC UA /hpf 0-1   BACTERIA UA /hpf Occasional   EPITHELIAL CELLS WET PREP /hpf Occasional     Results from last 7 days   Lab Units 05/18/21  1752   AMPH/METH  Negative   BARBITURATE UR  Negative BENZODIAZEPINE UR  Negative   COCAINE UR  Negative   METHADONE URINE  Negative   OPIATE UR  Negative   PCP UR  Negative   THC UR  Negative     Results from last 7 days   Lab Units 05/18/21  1746 05/18/21  1729   ETHANOL LVL mg/dL  --  462*   ACETAMINOPHEN LVL ug/mL <2*  --    SALICYLATE LVL mg/dL <3*  --      Results from last 7 days   Lab Units 05/18/21  1753 05/18/21  1746 05/18/21  1729   BLOOD CULTURE   --  No Growth at 72 hrs  Staphylococcus coagulase negative*   GRAM STAIN RESULT   --   --  Gram positive cocci in clusters*   URINE CULTURE  No Growth <1000 cfu/mL  --   --        Medications:   Scheduled Medications:  albumin human, 12 5 g, Intravenous, BID (diuretic)    And  furosemide, 20 mg, Intravenous, BID (diuretic)  ammonium lactate, , Topical, BID  buprenorphine-naloxone, 16 mg, Sublingual, Daily  cefazolin, 2,000 mg, Intravenous, Q8H  clotrimazole, , Topical, BID  folic acid, 1 mg, Oral, Daily  lactulose, 20 g, Oral, BID  melatonin, 6 mg, Oral, HS  multivitamin stress formula, 1 tablet, Oral, Daily  nicotine, 14 mg, Transdermal, Daily  pantoprazole, 40 mg, Oral, Early Morning  potassium chloride, 20 mEq, Oral, BID  thiamine, 100 mg, Oral, Daily  vancomycin, 12 5 mg/kg (Adjusted), Intravenous, Q8H      Continuous IV Infusions:     PRN Meds:  calcium carbonate, 1,000 mg, Oral, Daily PRN  ondansetron, 4 mg, Intravenous, Q6H PRN  polyethylene glycol, 17 g, Oral, Daily PRN  sodium chloride, 1 spray, Each Nare, Q1H PRN        Discharge Plan: d    Network Utilization Review Department  ATTENTION: Please call with any questions or concerns to [phone] and carefully listen to the prompts so that you are directed to the right person  All voicemails are confidential   [place] all requests for admission clinical reviews, approved or denied determinations and any other requests to dedicated fax number below belonging to the campus where the patient is receiving treatment   List of dedicated fax numbers for the Facilities:  FACILITY NAME UR FAX NUMBER   ADMISSION DENIALS (Administrative/Medical Necessity) [phone]   [address] (Maternity/NICU/Pediatrics) [facility],7Th Floor [facility] [address] [place] [address] Dr Jason Jones [phone] [address] Lisette Ray 1481 [address] [facility] [address] [facility] [phone]

## 2021-05-22 NOTE — PROGRESS NOTES
[facility] Progress Note  Patient: Crow Mccarty 39 y o  male   MRN: [id number]  PCP: Mello Elaine PA-C  Unit/Bed#: [address] [place] [address] Encounter: [id number]  Date Of Visit: 05/21/21    Problem List:    Principal Problem:    Cellulitis  Active Problems:    Alcohol abuse    Substance abuse ([facility] )    Hepatic cirrhosis (HCC)    Anemia    Hyponatremia    Cardiac murmur    Lactic acidosis    Tachycardia    Essential hypertension    Tobacco dependence    Thrombocytopenia ([facility] )    Thoracoabdominal aortic aneurysm ([facility] )    Depression      Assessment & Plan:    * Cellulitis  Assessment & Plan  Patient was severe lower extremity edema with erythema, history of the same and treated inpatient with IV Abx 2 times in 2021  received ceftriaxone in ED for suspected bilateral extremity cellulitis as well as UTI  Afebrile, normal white count on presentation  On exam, bilateral lower extremity cellulitis  Ultrasound of the left lower extremity with evidence of cellulitis no evidence of deeper collection  Reveals afebrile  Normal white count  Appears to be improving clinically  · Continue vancomycin, will deescalate to cefazolin  · MRSA surveillance culture  · Blood culture-1/2 coagulase-negative Staph  Likely contaminant    · Venous Doppler bilateral lower extremity -no evidence of DVT  · Follow wound care recommendations    Tachycardia  Assessment & Plan  EKG-sinus tachycardia, multifactorial  Setting of infection, dehydration/intravascular depletion, withdrawal  Telemetry- sinus tachycardia  Cardiology input appreciated  · Monitor H&H and transfuse as needed  · Continue antibiotics, follow-up blood culture  · Continue CIWA protocol, Suboxone resumed    Lactic acidosis  Assessment & Plan  Persistent despite aggressive hydration likely secondary to hepatic cirrhosis and alcohol intoxication  · Will not treat further  · Monitor hemodynamics-appears to be improving  · Continue thiamine    Cardiac murmur  Assessment & Plan  As noted on exam, no previous documentation noted of a cardiac murmur  EKG-sinus tachycardia,   Denies history of IVDU  2D echo-poor visualization but no definitive significant valvular disease  EF 55-60%  Moderate pulmonary hypertension  · Blood culture likely contaminant    Hyponatremia  Assessment & Plan  Chronic, Na 126 on admission, typically 130-131  In the setting of ETOH abuse  Improved with IV NS indicating prerenal component  Sodium remains mildly low normal at 132  · Discontinue IV fluid, fluid restriction  · Monitor BMP  · Hold diuretics for now      Anemia  Assessment & Plan  Hx of anemia, in the past documented as likely multifactorial 2/2 anemia of chronic liver disease, alcohol abuse and acute blood loss in January 2/2 leg wound  Hg 7 4, lower than baseline on presentation  Denies any overt GI bleeding  Noted to episode of epistaxis  Status post 2 PRBC transfusion on 05/19  Iron sat 11%, B12, folate normal  Hemoglobin remains stable  - No further evidence of acute blood loss  - Continue to trend and transfuse as needed for Hg < 7  - Continue folic acid supplement, consider iron supplementation on discharge  - Check stool for occult blood    Hepatic cirrhosis ([facility] )  Assessment & Plan  Hx of hepatic cirrhosis likely 2/2 ETOH use  - thrombocytopenia, elevated T bili, appears stable compared to past  - Recent hepatitis panel in January negative   - MELD-Na score 24 on admission  - ammonia level 35 on admission, INR 1 5  Bilirubin noted to be uptrending  -continue to monitor LFT  -will start lactulose  -alcohol abstinence    Substance abuse [facility])  Assessment & Plan  Patient reports Hx of multi-substance abuse, patient takes suboxone   Retracted negative    Patient denies any history of IVDU  - confirmed suboxone dosing with patient's pharmacy  - continue Suboxone    Alcohol abuse  Assessment & Plan  Hx of ETOH abuse, in the past 3-4 weeks has cut down from 1-2 bottles of vodka/day to 2-3 shots per day  ETOH 462 on admission with last drink at approxiamtely 3-4pm the day of admission according to the patient  He wishes to stop drinking and would like resources to help  No Hx of w/d seizures/DT     Still tremulous, tachycardic  - Maintain [facility] protocol  - Encourage ETOH cessation   - Folic acid, thiamine, multivitamin       Depression  Assessment & Plan  Hx of depression, patient was previously on Lexapro but is no longer taking this  Discussed regarding psychiatric evaluation, he wants to defer at present    Thoracoabdominal aortic aneurysm [facility])  Assessment & Plan  Noted on previous CTA chest from Jan 2021  - Has not since had f/u with CT surgery for regular outpatient monitoring, discussed with patient the importance of regular f/u the time of admission    Thrombocytopenia ([address] )  Assessment & Plan  In the setting of ETOH abuse and hepatic cirrhosis   Continue to monitor, currently no evidence of bleeding   Hold AC given anemia and thrombocytopenia in underlying liver dysfunction, if stool occult negative and no other clear source of acute blood loss, consider resuming if platelets and Hg stabilize    Tobacco dependence  Assessment & Plan  Continues to smoke daily, requested nicotine patch  Smoking cessation discussed, continue to encourage cessation     Essential hypertension  Assessment & Plan  Has a history of HTN but admits that he is no longer taking his home medications, but on Lasix and Aldactone  Blood pressure is soft but overall improving  , will hold diuretics  Monitor  Consider midodrine    Abnormal urinalysis-resolved as of 5/21/2021  Assessment & Plan  UA abnormal, patient denies any urinary symptoms at present  - urine culture negative    Epistaxis-resolved as of 5/21/2021  Assessment & Plan  Noted on 05/19  Now resolved  ENT input appreciated      VTE Pharmacologic Prophylaxis:   Pharmacologic: Pharmacologic VTE Prophylaxis contraindicated due to Thrombocytopenia Mechanical VTE Prophylaxis in Place: No    Patient Centered Rounds: I have performed bedside rounds with nursing staff today  Discussions with Specialists or Other Care Team Provider:  Yes    Education and Discussions with Family / Patient:  Discussed with patient's sister at length over the phone    Time Spent for Care: 45 minutes  More than 50% of total time spent on counseling and coordination of care as described above  Current Length of Stay: 3 day(s)    Current Patient Status: Inpatient   Certification Statement: The patient will continue to require additional inpatient hospital stay due to Cellulitis requiring IV antibiotics    Discharge Plan:  Pending at present    Code Status: Level 1 - Full Code      Subjective:   Reports that leg pain is improving  Tolerating diet well  Denies any chest pain shortness of breath or abdominal pain  No further epistaxis  Still anxious at times requiring lorazepam    Objective:     Vitals:   Temp (24hrs), Av 5 °F (37 5 °C), Min:98 9 °F (37 2 °C), Max:100 3 °F (37 9 °C)    Temp:  [98 9 °F (37 2 °C)-100 3 °F (37 9 °C)] 100 3 °F (37 9 °C)  HR:  [107-121] 121  Resp:  [18-23] 23  BP: (108-126)/(59-68) 108/65  SpO2:  [96 %-100 %] 96 % on room air  Body mass index is 32 87 kg/m²  Input and Output Summary (last 24 hours): Intake/Output Summary (Last 24 hours) at 2021 2244  Last data filed at 2021 0401  Gross per 24 hour   Intake --   Output 300 ml   Net -300 ml       Physical Exam:     Physical Exam  Constitutional:       General: He is not in acute distress  Appearance: He is obese  Comments: Chronically ill   HENT:      Head: Normocephalic and atraumatic  Cardiovascular:      Rate and Rhythm: Normal rate  Pulmonary:      Effort: Pulmonary effort is normal  No respiratory distress  Breath sounds: No wheezing, rhonchi or rales  Comments: Diminished but clear bilaterally  Chest:      Chest wall: No tenderness     Abdominal: General: Bowel sounds are normal  There is no distension  Palpations: Abdomen is soft  Tenderness: There is no abdominal tenderness  There is no guarding or rebound  Musculoskeletal:      Right lower leg: Edema present  Left lower leg: Edema present  Comments: Bilateral lower extremity chronic lymphedema with  dry flaky skin  Associated erythema tenderness appears to be improving  Also area of tenderness induration posteriorly over left calf continues to improve   Skin:     General: Skin is warm and dry  Findings: No rash  Neurological:      Mental Status: He is alert  Mental status is at baseline  GCS: GCS eye subscore is 4  GCS verbal subscore is 5  GCS motor subscore is 6  Cranial Nerves: No cranial nerve deficit  Comments: Tremulous         Additional Data:     Labs:    Results from last 7 days   Lab Units 05/21/21  0702  05/19/21  0845   WBC Thousand/uL 6 85   < > 5 94   HEMOGLOBIN g/dL 7 9*   < > 6 5*   HEMATOCRIT % 25 6*   < > 20 3*   PLATELETS Thousands/uL 85*   < > 95*   NEUTROS PCT %  --   --  64   LYMPHS PCT %  --   --  24   MONOS PCT %  --   --  9   EOS PCT %  --   --  1    < > = values in this interval not displayed  Results from last 7 days   Lab Units 05/21/21  0702   POTASSIUM mmol/L 3 8   CHLORIDE mmol/L 99*   CO2 mmol/L 28   BUN mg/dL 8   CREATININE mg/dL 0 55*   CALCIUM mg/dL 8 7   ALK PHOS U/L 141*   ALT U/L 24   AST U/L 74*     Results from last 7 days   Lab Units 05/18/21  1729   INR  1 53*       * I Have Reviewed All Lab Data Listed Above  * Additional Pertinent Lab Tests Reviewed: All Labs Within Last 24 Hours Reviewed      Imaging:  Imaging Reports Reviewed Today Include:  Chest x-ray  Recent Cultures (last 7 days):     Results from last 7 days   Lab Units 05/18/21  1753 05/18/21  1746 05/18/21  1729   BLOOD CULTURE   --  No Growth at 48 hrs   Staphylococcus coagulase negative*   GRAM STAIN RESULT   --   --  Gram positive cocci in clusters* URINE CULTURE  No Growth <1000 cfu/mL  --   --        Last 24 Hours Medication List:   Current Facility-Administered Medications   Medication Dose Route Frequency Provider Last Rate    ammonium lactate   Topical BID Rashi White MD      buprenorphine-naloxone  16 mg Sublingual Daily Rashi White MD      calcium carbonate  1,000 mg Oral Daily PRN Chasity Prim, PA-C      [START ON 5/22/2021] cefazolin  2,000 mg Intravenous Q8H Rashi White MD      clotrimazole   Topical BID Laurie Gipson DPM      folic acid  1 mg Oral Daily Rashi White MD      melatonin  6 mg Oral HS MARILEE Pelletier      multivitamin stress formula  1 tablet Oral Daily Rashi White MD      nicotine  14 mg Transdermal Daily Rashi White MD      ondansetron  4 mg Intravenous Q6H PRN Chasity Prim, PA-C      pantoprazole  40 mg Oral Early Morning Rashi White MD      polyethylene glycol  17 g Oral Daily PRN Chasity Prim, PA-C      sodium chloride  1 spray Each Nare Q1H PRN Rashi White MD      thiamine  100 mg Oral Daily Rashi White MD      vancomycin  12 5 mg/kg (Adjusted) Intravenous Q8H Rashi White MD 1,250 mg (05/21/21 2148)          Today, Patient Was Seen By: Rashi White MD    ** Please Note: "This note has been constructed using a voice recognition system  Therefore there may be syntax, spelling, and/or grammatical errors   Please call if you have any questions  "**

## 2021-05-22 NOTE — PLAN OF CARE
Problem: Potential for Falls  Goal: Patient will remain free of falls  Description: INTERVENTIONS:  - Assess patient frequently for physical needs  -  Identify cognitive and physical deficits and behaviors that affect risk of falls    -  [place] fall precautions as indicated by assessment   - Educate patient/family on patient safety including physical limitations  - Instruct patient to call for assistance with activity based on assessment  - Modify environment to reduce risk of injury  - Consider OT/PT consult to assist with strengthening/mobility  Outcome: Progressing     Problem: Prexisting or High Potential for Compromised Skin Integrity  Goal: Skin integrity is maintained or improved  Description: INTERVENTIONS:  - Identify patients at risk for skin breakdown  - Assess and monitor skin integrity  - Assess and monitor nutrition and hydration status  - Monitor labs   - Assess for incontinence   - Turn and reposition patient  - Assist with mobility/ambulation  - Relieve pressure over bony prominences  - Avoid friction and shearing  - Provide appropriate hygiene as needed including keeping skin clean and dry  - Evaluate need for skin moisturizer/barrier cream  - Collaborate with interdisciplinary team   - Patient/family teaching  - Consider wound care consult   Outcome: Progressing     Problem: INFECTION - ADULT  Goal: Absence or prevention of progression during hospitalization  Description: INTERVENTIONS:  - Assess and monitor for signs and symptoms of infection  - Monitor lab/diagnostic results  - Monitor all insertion sites, i e  indwelling lines, tubes, and drains  - Monitor endotracheal if appropriate and nasal secretions for changes in amount and color  - [place] appropriate cooling/warming therapies per order  - Administer medications as ordered  - Instruct and encourage patient and family to use good hand hygiene technique  - Identify and instruct in appropriate isolation precautions for identified infection/condition  Outcome: Progressing  Goal: Absence of fever/infection during neutropenic period  Description: INTERVENTIONS:  - Monitor WBC    Outcome: Progressing     Problem: DISCHARGE PLANNING  Goal: Discharge to home or other facility with appropriate resources  Description: INTERVENTIONS:  - Identify barriers to discharge w/patient and caregiver  - Arrange for needed discharge resources and transportation as appropriate  - Identify discharge learning needs (meds, wound care, etc )  - Arrange for interpretive services to assist at discharge as needed  - Refer to Case Management Department for coordinating discharge planning if the patient needs post-hospital services based on physician/advanced practitioner order or complex needs related to functional status, cognitive ability, or social support system  Outcome: Progressing

## 2021-05-23 PROBLEM — E87.20 LACTIC ACIDOSIS: Status: RESOLVED | Noted: 2021-05-19 | Resolved: 2021-05-23

## 2021-05-23 PROBLEM — E87.2 LACTIC ACIDOSIS: Status: RESOLVED | Noted: 2021-05-19 | Resolved: 2021-05-23

## 2021-05-23 LAB
ALBUMIN SERPL BCP-MCNC: 2.2 G/DL (ref 3.5–5)
ALP SERPL-CCNC: 150 U/L (ref 46–116)
ALT SERPL W P-5'-P-CCNC: 25 U/L (ref 12–78)
ANION GAP SERPL CALCULATED.3IONS-SCNC: 6 MMOL/L (ref 4–13)
AST SERPL W P-5'-P-CCNC: 54 U/L (ref 5–45)
ATRIAL RATE: 106 BPM
BACTERIA BLD CULT: NORMAL
BILIRUB SERPL-MCNC: 2.87 MG/DL (ref 0.2–1)
BUN SERPL-MCNC: 8 MG/DL (ref 5–25)
CALCIUM ALBUM COR SERPL-MCNC: 9.8 MG/DL (ref 8.3–10.1)
CALCIUM SERPL-MCNC: 8.4 MG/DL (ref 8.3–10.1)
CHLORIDE SERPL-SCNC: 99 MMOL/L (ref 100–108)
CO2 SERPL-SCNC: 27 MMOL/L (ref 21–32)
CREAT SERPL-MCNC: 0.68 MG/DL (ref 0.6–1.3)
ERYTHROCYTE [DISTWIDTH] IN BLOOD BY AUTOMATED COUNT: 18.9 % (ref 11.6–15.1)
GFR SERPL CREATININE-BSD FRML MDRD: 123 ML/MIN/1.73SQ M
GLUCOSE SERPL-MCNC: 110 MG/DL (ref 65–140)
HCT VFR BLD AUTO: 25.9 % (ref 36.5–49.3)
HGB BLD-MCNC: 8.1 G/DL (ref 12–17)
MCH RBC QN AUTO: 29.9 PG (ref 26.8–34.3)
MCHC RBC AUTO-ENTMCNC: 31.3 G/DL (ref 31.4–37.4)
MCV RBC AUTO: 96 FL (ref 82–98)
MRSA NOSE QL CULT: NORMAL
P AXIS: 57 DEGREES
PLATELET # BLD AUTO: 129 THOUSANDS/UL (ref 149–390)
PMV BLD AUTO: 8.5 FL (ref 8.9–12.7)
POTASSIUM SERPL-SCNC: 3.4 MMOL/L (ref 3.5–5.3)
PR INTERVAL: 156 MS
PROT SERPL-MCNC: 8.1 G/DL (ref 6.4–8.2)
QRS AXIS: 15 DEGREES
QRSD INTERVAL: 90 MS
QT INTERVAL: 348 MS
QTC INTERVAL: 462 MS
RBC # BLD AUTO: 2.71 MILLION/UL (ref 3.88–5.62)
SODIUM SERPL-SCNC: 132 MMOL/L (ref 136–145)
T WAVE AXIS: 53 DEGREES
VANCOMYCIN TROUGH SERPL-MCNC: 16.2 UG/ML (ref 10–20)
VENTRICULAR RATE: 106 BPM
WBC # BLD AUTO: 7.11 THOUSAND/UL (ref 4.31–10.16)

## 2021-05-23 PROCEDURE — 93010 ELECTROCARDIOGRAM REPORT: CPT | Performed by: INTERNAL MEDICINE

## 2021-05-23 PROCEDURE — 80202 ASSAY OF VANCOMYCIN: CPT | Performed by: INTERNAL MEDICINE

## 2021-05-23 PROCEDURE — 85027 COMPLETE CBC AUTOMATED: CPT | Performed by: INTERNAL MEDICINE

## 2021-05-23 PROCEDURE — 80053 COMPREHEN METABOLIC PANEL: CPT | Performed by: INTERNAL MEDICINE

## 2021-05-23 PROCEDURE — 99213 OFFICE O/P EST LOW 20 MIN: CPT | Performed by: PSYCHIATRY & NEUROLOGY

## 2021-05-23 PROCEDURE — 99232 SBSQ HOSP IP/OBS MODERATE 35: CPT | Performed by: INTERNAL MEDICINE

## 2021-05-23 RX ORDER — CEFTRIAXONE 2 G/50ML
2000 INJECTION, SOLUTION INTRAVENOUS EVERY 24 HOURS
Status: DISCONTINUED | OUTPATIENT
Start: 2021-05-24 | End: 2021-05-26 | Stop reason: HOSPADM

## 2021-05-23 RX ORDER — POTASSIUM CHLORIDE 20 MEQ/1
20 TABLET, EXTENDED RELEASE ORAL 2 TIMES DAILY
Status: COMPLETED | OUTPATIENT
Start: 2021-05-23 | End: 2021-05-25

## 2021-05-23 RX ADMIN — POTASSIUM CHLORIDE 20 MEQ: 1500 TABLET, EXTENDED RELEASE ORAL at 09:40

## 2021-05-23 RX ADMIN — CEFAZOLIN SODIUM 2000 MG: 2 SOLUTION INTRAVENOUS at 00:23

## 2021-05-23 RX ADMIN — Medication: at 08:54

## 2021-05-23 RX ADMIN — VANCOMYCIN HYDROCHLORIDE 1250 MG: 10 INJECTION, POWDER, LYOPHILIZED, FOR SOLUTION INTRAVENOUS at 13:43

## 2021-05-23 RX ADMIN — POTASSIUM CHLORIDE 20 MEQ: 1500 TABLET, EXTENDED RELEASE ORAL at 17:50

## 2021-05-23 RX ADMIN — FUROSEMIDE 20 MG: 10 INJECTION, SOLUTION INTRAMUSCULAR; INTRAVENOUS at 09:40

## 2021-05-23 RX ADMIN — LACTULOSE 20 G: 10 SOLUTION ORAL at 17:50

## 2021-05-23 RX ADMIN — CEFTRIAXONE 2000 MG: 2 INJECTION, SOLUTION INTRAVENOUS at 23:43

## 2021-05-23 RX ADMIN — ESCITALOPRAM OXALATE 10 MG: 10 TABLET ORAL at 08:39

## 2021-05-23 RX ADMIN — FUROSEMIDE 20 MG: 10 INJECTION, SOLUTION INTRAMUSCULAR; INTRAVENOUS at 16:32

## 2021-05-23 RX ADMIN — LACTULOSE 20 G: 10 SOLUTION ORAL at 08:39

## 2021-05-23 RX ADMIN — ALBUMIN (HUMAN) 12.5 G: 0.25 INJECTION, SOLUTION INTRAVENOUS at 08:41

## 2021-05-23 RX ADMIN — CLOTRIMAZOLE: 10 CREAM TOPICAL at 17:50

## 2021-05-23 RX ADMIN — Medication 6 MG: at 21:15

## 2021-05-23 RX ADMIN — VANCOMYCIN HYDROCHLORIDE 1250 MG: 10 INJECTION, POWDER, LYOPHILIZED, FOR SOLUTION INTRAVENOUS at 04:20

## 2021-05-23 RX ADMIN — Medication: at 17:50

## 2021-05-23 RX ADMIN — CEFAZOLIN SODIUM 2000 MG: 2 SOLUTION INTRAVENOUS at 16:32

## 2021-05-23 RX ADMIN — CLOTRIMAZOLE: 10 CREAM TOPICAL at 09:42

## 2021-05-23 RX ADMIN — VANCOMYCIN HYDROCHLORIDE 1250 MG: 10 INJECTION, POWDER, LYOPHILIZED, FOR SOLUTION INTRAVENOUS at 21:15

## 2021-05-23 RX ADMIN — BUPRENORPHINE AND NALOXONE 16 MG: 8; 2 FILM BUCCAL; SUBLINGUAL at 08:41

## 2021-05-23 RX ADMIN — CEFAZOLIN SODIUM 2000 MG: 2 SOLUTION INTRAVENOUS at 08:55

## 2021-05-23 RX ADMIN — PANTOPRAZOLE SODIUM 40 MG: 40 TABLET, DELAYED RELEASE ORAL at 05:57

## 2021-05-23 RX ADMIN — THIAMINE HCL TAB 100 MG 100 MG: 100 TAB at 08:39

## 2021-05-23 RX ADMIN — NICOTINE 14 MG: 14 PATCH, EXTENDED RELEASE TRANSDERMAL at 08:37

## 2021-05-23 RX ADMIN — ALBUMIN (HUMAN) 12.5 G: 0.25 INJECTION, SOLUTION INTRAVENOUS at 16:11

## 2021-05-23 RX ADMIN — B-COMPLEX W/ C & FOLIC ACID TAB 1 TABLET: TAB at 08:39

## 2021-05-23 RX ADMIN — FOLIC ACID 1 MG: 1 TABLET ORAL at 08:39

## 2021-05-23 NOTE — PROGRESS NOTES
[facility] Internal Medicine Progress Note  Patient: Sajan Rogers 39 y o  male   MRN: [id number]  PCP: Ronita Severs, PA-C  Unit/Bed#: [address] [place] [address] Encounter: [id number]  Date Of Visit: 05/23/21    Problem List:    Principal Problem:    Cellulitis  Active Problems:    Alcohol abuse    Substance abuse ([facility] )    Hepatic cirrhosis (HCC)    Anemia    Hyponatremia    Cardiac murmur    Tachycardia    Essential hypertension    Tobacco dependence    Thrombocytopenia ([facility] )    Thoracoabdominal aortic aneurysm ([facility] )    Depression      Assessment & Plan:    * Cellulitis  Assessment & Plan  Patient was severe lower extremity edema with erythema, history of the same and treated inpatient with IV Abx 2 times in 2021  received ceftriaxone in ED for suspected bilateral extremity cellulitis as well as UTI  Afebrile, normal white count on presentation  On exam, bilateral lower extremity cellulitis  Ultrasound of the left lower extremity with evidence of cellulitis no evidence of deeper collection  Noted to have low-grade intermittent fever  Normal white count  Overall tenderness and warmth has improved but noted to have increased standing erythema approximately on left thigh after deescalation  · Continue vancomycin  · Change cefazolin to ceftriaxone  · MRSA surveillance culture  · Follow-up wound culture-currently growing Gram-negative  · Blood culture-1/2 coagulase-negative Staph  Likely contaminant    · Venous Doppler bilateral lower extremity -no evidence of DVT  · Follow wound care recommendations    Tachycardia  Assessment & Plan  EKG-sinus tachycardia, multifactorial  Setting of infection, dehydration/intravascular depletion, withdrawal  Telemetry- sinus tachycardia  Cardiology input appreciated  · Monitor H&H and transfuse as needed  · Continue antibiotics as above  · Continue CIWA protocol, Suboxone resumed    Cardiac murmur  Assessment & Plan  As noted on exam, no previous documentation noted of a cardiac murmur  EKG-sinus tachycardia,   Denies history of IVDU  2D echo-poor visualization but no definitive significant valvular disease  EF 55-60%  Moderate pulmonary hypertension  · Blood culture likely contaminant    Hyponatremia  Assessment & Plan  Chronic, Na 126 on admission, typically 130-131  In the setting of ETOH abuse  Improved with IV NS indicating prerenal component  Sodium remains mildly low normal at 132  ·  fluid restriction  · Monitor BMP  · Monitor on Lasix with albumin      Anemia  Assessment & Plan  Hx of anemia, in the past documented as likely multifactorial 2/2 anemia of chronic liver disease, alcohol abuse and acute blood loss in January 2/2 leg wound  Hg 7 4, lower than baseline on presentation  Denies any overt GI bleeding  Noted to episode of epistaxis  Status post 2 PRBC transfusion on 05/19  Iron sat 11%, B12, folate normal  Hemoglobin remains stable  - No further evidence of acute blood loss  - Continue to trend and transfuse as needed for Hg < 7  - Continue folic acid supplement, consider iron supplementation on discharge    Hepatic cirrhosis (HCC)  Assessment & Plan  Hx of hepatic cirrhosis likely 2/2 ETOH use  - Recent hepatitis panel in January negative   - MELD-Na score 24 on admission  - ammonia level 35 on admission, INR 1 5  Bilirubin improving  -continue to monitor LFT  -continue lactulose  -initiated albumin with Lasix  -alcohol abstinence    Substance abuse [facility])  Assessment & Plan  Patient reports Hx of multi-substance abuse, patient takes suboxone   Retracted negative  Patient denies any history of IVDU  - confirmed suboxone dosing with patient's pharmacy  - continue Suboxone    Alcohol abuse  Assessment & Plan  Hx of ETOH abuse, in the past 3-4 weeks has cut down from 1-2 bottles of vodka/day to 2-3 shots per day  ETOH 462 on admission with last drink at approxiamtely 3-4pm the day of admission according to the patient   He wishes to stop drinking and would like resources to help  No Hx of w/d seizures/DT  Improving   - Maintain CIWA protocol  - Encourage ETOH cessation   - Folic acid, thiamine, multivitamin   -alcohol rehab discussed but patient declines  Agrees to follow-up extensively as outpatient  Lactic acidosis-resolved as of 5/23/2021  Assessment & Plan  Persistent despite aggressive hydration likely secondary to hepatic cirrhosis and alcohol intoxication  · Will not treat further  · Monitor hemodynamics-appears to be improving  · Continue thiamine    Depression  Assessment & Plan  Hx of depression, patient was previously on Lexapro but is no longer taking this  Seen by Psychiatry during hospitalization  Input appreciated  · Started on Lexapro, increase to 20 mg after 7 days  · Recommended long-term alcohol rehab, discussed with patient but declines inpatient rehab    Agrees to follow-up as outpatient      Thoracoabdominal aortic aneurysm [facility])  Assessment & Plan  Noted on previous CTA chest from Jan 2021  - Has not since had f/u with CT surgery for regular outpatient monitoring, discussed with patient the importance of regular f/u the time of admission    Thrombocytopenia ([facility] )  Assessment & Plan  In the setting of ETOH abuse and hepatic cirrhosis   Continue to monitor, currently no evidence of bleeding   Appears to be improving  Resume DVT prophylaxis     Tobacco dependence  Assessment & Plan  Continues to smoke daily, requested nicotine patch  Smoking cessation discussed, continue to encourage cessation     Essential hypertension  Assessment & Plan  Has a history of HTN but admits that he is no longer taking his home medications, but on Lasix and Aldactone  Blood pressure is soft but overall improving  · Continue albumin with Lasix, supplemental potassium while on Lasix  · Monitor BMP and electrolytes  · Consider midodrine    Abnormal urinalysis-resolved as of 5/21/2021  Assessment & Plan  UA abnormal, patient denies any urinary symptoms at present  - urine culture negative    Epistaxis-resolved as of 2021  Assessment & Plan  Noted on   Now resolved  ENT input appreciated      VTE Pharmacologic Prophylaxis:   Pharmacologic: Pharmacologic VTE Prophylaxis contraindicated due to Thrombocytopenia   Mechanical VTE Prophylaxis in Place: No    Patient Centered Rounds: I have performed bedside rounds with nursing staff today  Discussions with Specialists or Other Care Team Provider:  Yes    Education and Discussions with Family / Patient:  Attempted to call patient's sister, unable to reach at present  Time Spent for Care: 45 minutes  More than 50% of total time spent on counseling and coordination of care as described above  Current Length of Stay: 5 day(s)    Current Patient Status: Inpatient   Certification Statement: The patient will continue to require additional inpatient hospital stay due to Cellulitis requiring IV antibiotics    Discharge Plan:  Pending at present    Code Status: Level 1 - Full Code      Subjective: Intermittent low-grade fever and tachycardia  Reports good urine output on Lasix  Denies leg pain  Left leg erythema somewhat extending proximally  Denies chest pain shortness of breath abdominal pain  Denies lower extremity pain        Objective:     Vitals:   Temp (24hrs), Av 2 °F (36 8 °C), Min:97 9 °F (36 6 °C), Max:99 °F (37 2 °C)    Temp:  [97 9 °F (36 6 °C)-99 °F (37 2 °C)] 97 9 °F (36 6 °C)  HR:  [106-114] 113  Resp:  [18-19] 19  BP: (106-135)/(58-88) 106/58  SpO2:  [98 %-100 %] 99 % on room air  Body mass index is 32 75 kg/m²  Input and Output Summary (last 24 hours): Intake/Output Summary (Last 24 hours) at 2021 1830  Last data filed at 2021 1749  Gross per 24 hour   Intake 10 ml   Output 1700 ml   Net -1690 ml       Physical Exam:     Physical Exam  Constitutional:       General: He is not in acute distress  Comments: Chronically ill   HENT:      Head: Normocephalic and atraumatic     Cardiovascular: Rate and Rhythm: Tachycardia present  Pulmonary:      Effort: Pulmonary effort is normal  No respiratory distress  Breath sounds: No wheezing, rhonchi or rales  Comments: Diminished bilaterally  Chest:      Chest wall: No tenderness  Abdominal:      General: Bowel sounds are normal  There is no distension  Palpations: Abdomen is soft  Tenderness: There is no abdominal tenderness  There is no guarding or rebound  Musculoskeletal:      Right lower leg: Edema present  Left lower leg: Edema present  Comments: Bilateral lower extremity chronic lymphedema with  dry flaky skin  Erythema appears to be extending proximally on left thigh  Also area of tenderness induration posteriorly over left calf continues to improve  Drainage is overall better   Worsening of lower extremity edema extending up to thigh   Skin:     General: Skin is warm and dry  Findings: No rash  Neurological:      Mental Status: He is alert  Mental status is at baseline  Cranial Nerves: No cranial nerve deficit  Additional Data:     Labs:    Results from last 7 days   Lab Units 05/23/21  0430 05/22/21  0617   WBC Thousand/uL 7 11 7 08   HEMOGLOBIN g/dL 8 1* 8 3*   HEMATOCRIT % 25 9* 26 5*   PLATELETS Thousands/uL 129* 99*   NEUTROS PCT %  --  74   LYMPHS PCT %  --  15   MONOS PCT %  --  8   EOS PCT %  --  2     Results from last 7 days   Lab Units 05/23/21  0430   POTASSIUM mmol/L 3 4*   CHLORIDE mmol/L 99*   CO2 mmol/L 27   BUN mg/dL 8   CREATININE mg/dL 0 68   CALCIUM mg/dL 8 4   ALK PHOS U/L 150*   ALT U/L 25   AST U/L 54*     Results from last 7 days   Lab Units 05/18/21  1729   INR  1 53*       * I Have Reviewed All Lab Data Listed Above  * Additional Pertinent Lab Tests Reviewed:  All Labs Within Last 24 Hours Reviewed      Imaging:  Imaging Reports Reviewed Today Include:  Chest x-ray  Recent Cultures (last 7 days):     Results from last 7 days   Lab Units 05/22/21  1250 05/18/21  0270 05/18/21  1746 05/18/21  1729   BLOOD CULTURE   --   --  No Growth After 4 Days  Staphylococcus coagulase negative*   GRAM STAIN RESULT  No Polys*  1+ Gram negative rods*  --   --  Gram positive cocci in clusters*   URINE CULTURE   --  No Growth <1000 cfu/mL  --   --    WOUND CULTURE  Culture too young- will reincubate  --   --   --        Last 24 Hours Medication List:   Current Facility-Administered Medications   Medication Dose Route Frequency Provider Last Rate    albumin human  12 5 g Intravenous BID (diuretic) Rashi White MD      And    furosemide  20 mg Intravenous BID (diuretic) Rashi White MD      ammonium lactate   Topical BID Rashi White MD      buprenorphine-naloxone  16 mg Sublingual Daily Rashi White MD      calcium carbonate  1,000 mg Oral Daily PRN Chasity Inessa PA-C      cefazolin  2,000 mg Intravenous Q8H Rashi White MD 2,000 mg (05/23/21 1632)    clotrimazole   Topical BID Rashi White MD      escitalopram  10 mg Oral Daily Rashi White MD      folic acid  1 mg Oral Daily Rashi White MD      lactulose  20 g Oral BID Rashi White MD      melatonin  6 mg Oral HS MARILEE Pelletier      multivitamin stress formula  1 tablet Oral Daily Rashi White MD      nicotine  14 mg Transdermal Daily Rashi White MD      ondansetron  4 mg Intravenous Q6H PRN Chasity Prim PA-C      pantoprazole  40 mg Oral Early Morning Rashi White MD      polyethylene glycol  17 g Oral Daily PRN Chasity Prim, PA-C      potassium chloride  20 mEq Oral BID Rashi White MD      sodium chloride  1 spray Each Nare Q1H PRN Rashi White MD      thiamine  100 mg Oral Daily Rashi White MD      vancomycin  12 5 mg/kg (Adjusted) Intravenous Angel Luis Henry MD 1,250 mg (05/23/21 1343)          Today, Patient Was Seen By: Rashi White MD    ** Please Note: "This note has been constructed using a voice recognition system  Therefore there may be syntax, spelling, and/or grammatical errors   Please call if you have any questions  "**

## 2021-05-23 NOTE — PROGRESS NOTES
Vancomycin IV Pharmacy-to-Dose Consultation    Brenton Garcia is a 39 y o  male who is currently receiving Vancomycin IV with management by the Pharmacy Consult service  Assessment/Plan:  The patient was reviewed  Renal function is stable and no signs or symptoms of nephrotoxicity and/or infusion reactions were documented in the chart  Based on todays assessment, continue current vancomycin (day # 5) dosing of 1250 mg IV q8h, with a plan for trough to be drawn at 1230 on 05/30/2021   We will continue to follow the patients culture results and clinical progress daily      Aaron Barahona, Pharmacist

## 2021-05-23 NOTE — PLAN OF CARE
Problem: Potential for Falls  Goal: Patient will remain free of falls  Description: INTERVENTIONS:  - Assess patient frequently for physical needs  -  Identify cognitive and physical deficits and behaviors that affect risk of falls    -  [place] fall precautions as indicated by assessment   - Educate patient/family on patient safety including physical limitations  - Instruct patient to call for assistance with activity based on assessment  - Modify environment to reduce risk of injury  - Consider OT/PT consult to assist with strengthening/mobility  Outcome: Progressing     Problem: Prexisting or High Potential for Compromised Skin Integrity  Goal: Skin integrity is maintained or improved  Description: INTERVENTIONS:  - Identify patients at risk for skin breakdown  - Assess and monitor skin integrity  - Assess and monitor nutrition and hydration status  - Monitor labs   - Assess for incontinence   - Turn and reposition patient  - Assist with mobility/ambulation  - Relieve pressure over bony prominences  - Avoid friction and shearing  - Provide appropriate hygiene as needed including keeping skin clean and dry  - Evaluate need for skin moisturizer/barrier cream  - Collaborate with interdisciplinary team   - Patient/family teaching  - Consider wound care consult   Outcome: Progressing     Problem: INFECTION - ADULT  Goal: Absence or prevention of progression during hospitalization  Description: INTERVENTIONS:  - Assess and monitor for signs and symptoms of infection  - Monitor lab/diagnostic results  - Monitor all insertion sites, i e  indwelling lines, tubes, and drains  - Monitor endotracheal if appropriate and nasal secretions for changes in amount and color  - [place] appropriate cooling/warming therapies per order  - Administer medications as ordered  - Instruct and encourage patient and family to use good hand hygiene technique  - Identify and instruct in appropriate isolation precautions for identified infection/condition  Outcome: Progressing  Goal: Absence of fever/infection during neutropenic period  Description: INTERVENTIONS:  - Monitor WBC    Outcome: Progressing     Problem: DISCHARGE PLANNING  Goal: Discharge to home or other facility with appropriate resources  Description: INTERVENTIONS:  - Identify barriers to discharge w/patient and caregiver  - Arrange for needed discharge resources and transportation as appropriate  - Identify discharge learning needs (meds, wound care, etc )  - Arrange for interpretive services to assist at discharge as needed  - Refer to Case Management Department for coordinating discharge planning if the patient needs post-hospital services based on physician/advanced practitioner order or complex needs related to functional status, cognitive ability, or social support system  Outcome: Progressing

## 2021-05-23 NOTE — TELEMEDICINE
TeleConsultation - [facility] 39 y o  male MRN: [id number]  Unit/Bed#: [address] [place] [address]- Encounter: [id number]      REQUIRED DOCUMENTATION:     1  This service was provided via Telemedicine  2  Provider located at [place], [place]  3  TeleMed provider: Colt Oneill MD   4  Identify all parties in room with patient during tele consult:  Patient alone  5  After connecting through televideo, patient was identified by name and date of birth and assistant checked wristband  Patient was then informed that this was a Telemedicine visit and that the exam was being conducted confidentially over secure lines  Patient acknowledged consent and understanding of privacy and security of the Telemedicine visit, and gave us permission to have the assistant stay in the room in order to assist with the history and to conduct the exam   I informed the patient that I have reviewed their record in Epic and presented the opportunity for them to ask any questions regarding the visit today  The patient agreed to participate  Reason for the consult   evaluation for depression    History of present illness: This is a 80-year-old  white who was admitted did to [facility] because he developed bilateral cellulitis in both extremities  While he was there it was quite clear the patient was drinking and had to be detox at this seemed  And while he is talking to his care provider he told his doctor that he is depressed and he wishes he would get on antidepressant to address his depression  His understanding is that the reason he drinks so much is because of his depression  Patient stated at this time his life is complicated he lives with his parents and he can see his son was 15years old because of his drinking at the same time he is unemployed    Patient was agreeable on been on antidepressant and actually very enthusiastic to that  Patient reported lack of drive lack of ambition hopelessness despair that of energy as signs that he believed made him believe that he is depressed  He was recommended to have Lexapro in the past but never really went ahead and took    Patient stated that he is determined to stop drinking and he will do whatever necessary to cure himself so he has a solid father to his 15year-old son and at the same time be able to take care of his own life as well    Family history:  Negative    Substance abuse history:  Patient reported that alcoholism has been with him since the age of 15 it became a serious problem he came back from his service and noted  Patient stated that he drinks vodka and he drinks up to 750 mL a day has been doing this in long stretches of time since his late 25s  Patient reported that he stop using opiates about 10 years ago but he used to snort heroin  Patient stated he is currently on Suboxone but used to be on methadone    Social history:  Patient is  and has was son with whom he has distant relationship but is supposed to have pressured custody  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  Patient was  currently  and lives with his  Patient served 10 years in the [employer] 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    Past psychiatric history:  Patient denied any previous suicidal attempts or admissions but reported on and off depression most of his life for which he was never serious to seek he denied any symptoms of psychosis or laurence      Meds/Allergies     Allergies   Allergen Reactions    Levofloxacin Other (See Comments)     BLACKED OUT       Objective   Vital signs in last 24 hours:  Temp:  [98 2 °F (36 8 °C)-99 2 °F (37 3 °C)] 98 2 °F (36 8 °C)  HR:  [106-109] 106  Resp:  [18] 18  BP: (119-135)/(69-88) 135/88      Intake/Output Summary (Last 24 hours) at 5/23/2021 0021  Last data filed at 5/22/2021 2345  Gross per 24 hour   Intake 10 ml   Output 1900 ml   Net -1890 ml     Mental status exam:  Patient is alert  Patient is cooperative  Patient was oriented to time place and people  Mood was depressed patient was very tearful and affect was very intense patient denied any suicidal or homicidal ideas  Seven judgment were fair   Patient showed no delusions or hallucinations   Speech was coherent a point showed no flight of ideas or circumstantiality     Diagnosis:   Major depressive disorder recurrent and severe   Generalized anxiety disorder   Alcohol dependence   Bilateral lower limb cellulitis    Recommendations:  I recommend for patient to start on Lexapro 10 mg in the morning to be increased after 1 week to 20 mg after that  I recommended for the patient to seek a long-term rehab for his alcoholism and I will be entertained that before leaving the facility  Counseling / Coordination of Care  Total floor / unit time spent today 44 minutes  Greater than 50% of total time was spent with the patient and / or family counseling and / or coordination of care   A description of the counseling / coordination of care: 22

## 2021-05-24 LAB
ALBUMIN SERPL BCP-MCNC: 2.2 G/DL (ref 3.5–5)
ALBUMIN SERPL BCP-MCNC: 2.3 G/DL (ref 3.5–5)
ALP SERPL-CCNC: 129 U/L (ref 46–116)
ALP SERPL-CCNC: 130 U/L (ref 46–116)
ALT SERPL W P-5'-P-CCNC: 14 U/L (ref 12–78)
ALT SERPL W P-5'-P-CCNC: 18 U/L (ref 12–78)
ANION GAP SERPL CALCULATED.3IONS-SCNC: 8 MMOL/L (ref 4–13)
ANION GAP SERPL CALCULATED.3IONS-SCNC: 8 MMOL/L (ref 4–13)
AST SERPL W P-5'-P-CCNC: 44 U/L (ref 5–45)
AST SERPL W P-5'-P-CCNC: 49 U/L (ref 5–45)
BASOPHILS # BLD AUTO: 0.03 THOUSANDS/ΜL (ref 0–0.1)
BASOPHILS # BLD AUTO: 0.05 THOUSANDS/ΜL (ref 0–0.1)
BASOPHILS NFR BLD AUTO: 1 % (ref 0–1)
BASOPHILS NFR BLD AUTO: 1 % (ref 0–1)
BILIRUB SERPL-MCNC: 2.85 MG/DL (ref 0.2–1)
BILIRUB SERPL-MCNC: 3.02 MG/DL (ref 0.2–1)
BUN SERPL-MCNC: 6 MG/DL (ref 5–25)
BUN SERPL-MCNC: 6 MG/DL (ref 5–25)
CALCIUM ALBUM COR SERPL-MCNC: 9.7 MG/DL (ref 8.3–10.1)
CALCIUM ALBUM COR SERPL-MCNC: 9.9 MG/DL (ref 8.3–10.1)
CALCIUM SERPL-MCNC: 8.3 MG/DL (ref 8.3–10.1)
CALCIUM SERPL-MCNC: 8.5 MG/DL (ref 8.3–10.1)
CHLORIDE SERPL-SCNC: 99 MMOL/L (ref 100–108)
CHLORIDE SERPL-SCNC: 99 MMOL/L (ref 100–108)
CO2 SERPL-SCNC: 25 MMOL/L (ref 21–32)
CO2 SERPL-SCNC: 26 MMOL/L (ref 21–32)
CREAT SERPL-MCNC: 0.6 MG/DL (ref 0.6–1.3)
CREAT SERPL-MCNC: 0.73 MG/DL (ref 0.6–1.3)
EOSINOPHIL # BLD AUTO: 0.09 THOUSAND/ΜL (ref 0–0.61)
EOSINOPHIL # BLD AUTO: 0.15 THOUSAND/ΜL (ref 0–0.61)
EOSINOPHIL NFR BLD AUTO: 2 % (ref 0–6)
EOSINOPHIL NFR BLD AUTO: 3 % (ref 0–6)
ERYTHROCYTE [DISTWIDTH] IN BLOOD BY AUTOMATED COUNT: 19 % (ref 11.6–15.1)
ERYTHROCYTE [DISTWIDTH] IN BLOOD BY AUTOMATED COUNT: 19 % (ref 11.6–15.1)
ETHANOL SERPL-MCNC: <3 MG/DL (ref 0–3)
GFR SERPL CREATININE-BSD FRML MDRD: 119 ML/MIN/1.73SQ M
GFR SERPL CREATININE-BSD FRML MDRD: 129 ML/MIN/1.73SQ M
GLUCOSE SERPL-MCNC: 103 MG/DL (ref 65–140)
GLUCOSE SERPL-MCNC: 103 MG/DL (ref 65–140)
GLUCOSE SERPL-MCNC: 27 MG/DL (ref 65–140)
GLUCOSE SERPL-MCNC: 36 MG/DL (ref 65–140)
GLUCOSE SERPL-MCNC: 66 MG/DL (ref 65–140)
GLUCOSE SERPL-MCNC: 94 MG/DL (ref 65–140)
GLUCOSE SERPL-MCNC: <20 MG/DL (ref 65–140)
HCT VFR BLD AUTO: 24 % (ref 36.5–49.3)
HCT VFR BLD AUTO: 24.4 % (ref 36.5–49.3)
HGB BLD-MCNC: 7.5 G/DL (ref 12–17)
HGB BLD-MCNC: 7.5 G/DL (ref 12–17)
IMM GRANULOCYTES # BLD AUTO: 0.02 THOUSAND/UL (ref 0–0.2)
IMM GRANULOCYTES # BLD AUTO: 0.04 THOUSAND/UL (ref 0–0.2)
IMM GRANULOCYTES NFR BLD AUTO: 0 % (ref 0–2)
IMM GRANULOCYTES NFR BLD AUTO: 1 % (ref 0–2)
LYMPHOCYTES # BLD AUTO: 0.72 THOUSANDS/ΜL (ref 0.6–4.47)
LYMPHOCYTES # BLD AUTO: 1.23 THOUSANDS/ΜL (ref 0.6–4.47)
LYMPHOCYTES NFR BLD AUTO: 13 % (ref 14–44)
LYMPHOCYTES NFR BLD AUTO: 21 % (ref 14–44)
MAGNESIUM SERPL-MCNC: 1.5 MG/DL (ref 1.6–2.6)
MAGNESIUM SERPL-MCNC: 1.6 MG/DL (ref 1.6–2.6)
MCH RBC QN AUTO: 29.2 PG (ref 26.8–34.3)
MCH RBC QN AUTO: 29.6 PG (ref 26.8–34.3)
MCHC RBC AUTO-ENTMCNC: 30.7 G/DL (ref 31.4–37.4)
MCHC RBC AUTO-ENTMCNC: 31.3 G/DL (ref 31.4–37.4)
MCV RBC AUTO: 95 FL (ref 82–98)
MCV RBC AUTO: 95 FL (ref 82–98)
MONOCYTES # BLD AUTO: 1.1 THOUSAND/ΜL (ref 0.17–1.22)
MONOCYTES # BLD AUTO: 1.59 THOUSAND/ΜL (ref 0.17–1.22)
MONOCYTES NFR BLD AUTO: 19 % (ref 4–12)
MONOCYTES NFR BLD AUTO: 29 % (ref 4–12)
NEUTROPHILS # BLD AUTO: 3.01 THOUSANDS/ΜL (ref 1.85–7.62)
NEUTROPHILS # BLD AUTO: 3.23 THOUSANDS/ΜL (ref 1.85–7.62)
NEUTS SEG NFR BLD AUTO: 54 % (ref 43–75)
NEUTS SEG NFR BLD AUTO: 56 % (ref 43–75)
NRBC BLD AUTO-RTO: 0 /100 WBCS
NRBC BLD AUTO-RTO: 0 /100 WBCS
PHOSPHATE SERPL-MCNC: 2 MG/DL (ref 2.7–4.5)
PHOSPHATE SERPL-MCNC: 2.9 MG/DL (ref 2.7–4.5)
PLATELET # BLD AUTO: 123 THOUSANDS/UL (ref 149–390)
PLATELET # BLD AUTO: 150 THOUSANDS/UL (ref 149–390)
PMV BLD AUTO: 7.8 FL (ref 8.9–12.7)
PMV BLD AUTO: 8.5 FL (ref 8.9–12.7)
POTASSIUM SERPL-SCNC: 2.7 MMOL/L (ref 3.5–5.3)
POTASSIUM SERPL-SCNC: 3.3 MMOL/L (ref 3.5–5.3)
PROT SERPL-MCNC: 7.8 G/DL (ref 6.4–8.2)
PROT SERPL-MCNC: 7.9 G/DL (ref 6.4–8.2)
RBC # BLD AUTO: 2.53 MILLION/UL (ref 3.88–5.62)
RBC # BLD AUTO: 2.57 MILLION/UL (ref 3.88–5.62)
SODIUM SERPL-SCNC: 132 MMOL/L (ref 136–145)
SODIUM SERPL-SCNC: 133 MMOL/L (ref 136–145)
WBC # BLD AUTO: 5.48 THOUSAND/UL (ref 4.31–10.16)
WBC # BLD AUTO: 5.78 THOUSAND/UL (ref 4.31–10.16)

## 2021-05-24 PROCEDURE — 82077 ASSAY SPEC XCP UR&BREATH IA: CPT | Performed by: INTERNAL MEDICINE

## 2021-05-24 PROCEDURE — 83735 ASSAY OF MAGNESIUM: CPT | Performed by: INTERNAL MEDICINE

## 2021-05-24 PROCEDURE — 99232 SBSQ HOSP IP/OBS MODERATE 35: CPT | Performed by: PHYSICIAN ASSISTANT

## 2021-05-24 PROCEDURE — 80053 COMPREHEN METABOLIC PANEL: CPT | Performed by: INTERNAL MEDICINE

## 2021-05-24 PROCEDURE — 82948 REAGENT STRIP/BLOOD GLUCOSE: CPT

## 2021-05-24 PROCEDURE — 85025 COMPLETE CBC W/AUTO DIFF WBC: CPT | Performed by: INTERNAL MEDICINE

## 2021-05-24 PROCEDURE — 87040 BLOOD CULTURE FOR BACTERIA: CPT | Performed by: INTERNAL MEDICINE

## 2021-05-24 PROCEDURE — 84100 ASSAY OF PHOSPHORUS: CPT | Performed by: INTERNAL MEDICINE

## 2021-05-24 PROCEDURE — 99233 SBSQ HOSP IP/OBS HIGH 50: CPT | Performed by: INTERNAL MEDICINE

## 2021-05-24 RX ORDER — POTASSIUM CHLORIDE 29.8 MG/ML
40 INJECTION INTRAVENOUS ONCE
Status: DISCONTINUED | OUTPATIENT
Start: 2021-05-24 | End: 2021-05-24

## 2021-05-24 RX ORDER — SPIRONOLACTONE 25 MG/1
50 TABLET ORAL DAILY
Status: DISCONTINUED | OUTPATIENT
Start: 2021-05-24 | End: 2021-05-26 | Stop reason: HOSPADM

## 2021-05-24 RX ORDER — LACTULOSE 20 G/30ML
20 SOLUTION ORAL 2 TIMES DAILY
Status: DISCONTINUED | OUTPATIENT
Start: 2021-05-25 | End: 2021-05-26 | Stop reason: HOSPADM

## 2021-05-24 RX ORDER — DEXTROSE MONOHYDRATE 25 G/50ML
50 INJECTION, SOLUTION INTRAVENOUS ONCE
Status: COMPLETED | OUTPATIENT
Start: 2021-05-24 | End: 2021-05-24

## 2021-05-24 RX ORDER — HEPARIN SODIUM 5000 [USP'U]/ML
5000 INJECTION, SOLUTION INTRAVENOUS; SUBCUTANEOUS EVERY 8 HOURS SCHEDULED
Status: DISCONTINUED | OUTPATIENT
Start: 2021-05-24 | End: 2021-05-26 | Stop reason: HOSPADM

## 2021-05-24 RX ORDER — POTASSIUM CHLORIDE 14.9 MG/ML
20 INJECTION INTRAVENOUS ONCE
Status: COMPLETED | OUTPATIENT
Start: 2021-05-24 | End: 2021-05-24

## 2021-05-24 RX ORDER — MAGNESIUM SULFATE HEPTAHYDRATE 40 MG/ML
2 INJECTION, SOLUTION INTRAVENOUS ONCE
Status: COMPLETED | OUTPATIENT
Start: 2021-05-24 | End: 2021-05-24

## 2021-05-24 RX ORDER — POTASSIUM CHLORIDE 14.9 MG/ML
20 INJECTION INTRAVENOUS ONCE
Status: COMPLETED | OUTPATIENT
Start: 2021-05-24 | End: 2021-05-25

## 2021-05-24 RX ADMIN — HEPARIN SODIUM 5000 UNITS: 5000 INJECTION INTRAVENOUS; SUBCUTANEOUS at 14:42

## 2021-05-24 RX ADMIN — ALBUMIN (HUMAN) 12.5 G: 0.25 INJECTION, SOLUTION INTRAVENOUS at 08:29

## 2021-05-24 RX ADMIN — FUROSEMIDE 20 MG: 10 INJECTION, SOLUTION INTRAMUSCULAR; INTRAVENOUS at 08:30

## 2021-05-24 RX ADMIN — POTASSIUM CHLORIDE 20 MEQ: 1500 TABLET, EXTENDED RELEASE ORAL at 08:27

## 2021-05-24 RX ADMIN — Medication: at 08:31

## 2021-05-24 RX ADMIN — HEPARIN SODIUM 5000 UNITS: 5000 INJECTION INTRAVENOUS; SUBCUTANEOUS at 21:28

## 2021-05-24 RX ADMIN — CLOTRIMAZOLE: 10 CREAM TOPICAL at 17:29

## 2021-05-24 RX ADMIN — HEPARIN SODIUM 5000 UNITS: 5000 INJECTION INTRAVENOUS; SUBCUTANEOUS at 08:46

## 2021-05-24 RX ADMIN — THIAMINE HCL TAB 100 MG 100 MG: 100 TAB at 08:27

## 2021-05-24 RX ADMIN — POTASSIUM CHLORIDE 20 MEQ: 14.9 INJECTION, SOLUTION INTRAVENOUS at 20:50

## 2021-05-24 RX ADMIN — CLOTRIMAZOLE: 10 CREAM TOPICAL at 08:29

## 2021-05-24 RX ADMIN — FUROSEMIDE 20 MG: 10 INJECTION, SOLUTION INTRAMUSCULAR; INTRAVENOUS at 15:45

## 2021-05-24 RX ADMIN — B-COMPLEX W/ C & FOLIC ACID TAB 1 TABLET: TAB at 08:27

## 2021-05-24 RX ADMIN — POTASSIUM CHLORIDE 20 MEQ: 1500 TABLET, EXTENDED RELEASE ORAL at 17:29

## 2021-05-24 RX ADMIN — DEXTROSE MONOHYDRATE 50 ML: 25 INJECTION, SOLUTION INTRAVENOUS at 18:55

## 2021-05-24 RX ADMIN — DEXTROSE MONOHYDRATE 50 ML: 500 INJECTION PARENTERAL at 20:45

## 2021-05-24 RX ADMIN — Medication 6 MG: at 22:23

## 2021-05-24 RX ADMIN — GLYCERIN 2 DROP: .002; .002; .01 SOLUTION/ DROPS OPHTHALMIC at 17:00

## 2021-05-24 RX ADMIN — FOLIC ACID 1 MG: 1 TABLET ORAL at 08:27

## 2021-05-24 RX ADMIN — SPIRONOLACTONE 50 MG: 25 TABLET, FILM COATED ORAL at 08:27

## 2021-05-24 RX ADMIN — MAGNESIUM SULFATE HEPTAHYDRATE 2 G: 40 INJECTION, SOLUTION INTRAVENOUS at 08:29

## 2021-05-24 RX ADMIN — ESCITALOPRAM OXALATE 10 MG: 10 TABLET ORAL at 08:28

## 2021-05-24 RX ADMIN — Medication: at 17:29

## 2021-05-24 RX ADMIN — POTASSIUM PHOSPHATE, MONOBASIC AND POTASSIUM PHOSPHATE, DIBASIC 12 MMOL: 224; 236 INJECTION, SOLUTION, CONCENTRATE INTRAVENOUS at 21:23

## 2021-05-24 RX ADMIN — POTASSIUM CHLORIDE 20 MEQ: 14.9 INJECTION, SOLUTION INTRAVENOUS at 22:46

## 2021-05-24 RX ADMIN — LACTULOSE 20 G: 10 SOLUTION ORAL at 08:27

## 2021-05-24 RX ADMIN — NICOTINE 14 MG: 14 PATCH, EXTENDED RELEASE TRANSDERMAL at 08:29

## 2021-05-24 RX ADMIN — BUPRENORPHINE AND NALOXONE 16 MG: 8; 2 FILM BUCCAL; SUBLINGUAL at 08:44

## 2021-05-24 RX ADMIN — ALBUMIN (HUMAN) 12.5 G: 0.25 INJECTION, SOLUTION INTRAVENOUS at 15:39

## 2021-05-24 RX ADMIN — PANTOPRAZOLE SODIUM 40 MG: 40 TABLET, DELAYED RELEASE ORAL at 06:16

## 2021-05-24 NOTE — PROGRESS NOTES
Around 1830  Deangelo Holbrook heard a sound like heavy breathing  Instantly observed the patient sitting on the bed in dangling position  The patient was moving both arms up and down , keep looking to the sides and was not cooperative  The patient did not answer any question appropriately  Asked for the help to assist the patient to the bed  Called the security first to prevent the fall and called the rapid response for further assistance  The patient ate 100% breakfast and lunch  The patient ambulated to the bathroom with walker twice and had 2 bowel movements earlier morning and afternoon time  VSS ,   the blood sugar less than 20  The patient did not eat dinner and he is not diabetic  The patient told the writer around [phone] that he will finish his dinner later  IV dextrose administered and BS 94  The patient A and O x 4   "I know you,  you are my nurse , I am sorry"   The labs were done as ordered  Assisted the patient with dinner , drank 3 juices and ate extra sandwich  Monitoring closely

## 2021-05-24 NOTE — PROGRESS NOTES
The patient's vancomycin therapy has been discontinued  Pharmacy will sign off now, thank you for this consult       Quiana BeeD

## 2021-05-24 NOTE — PROGRESS NOTES
[address] Internal Medicine Progress Note  Patient: Radha Weems 39 y o  male   MRN: [id number]  PCP: Timothy Randhawa PA-C  Unit/Bed#: [address] [place] [address] Encounter: [id number]  Date Of Visit: 05/24/21    Problem List:    Principal Problem:    Cellulitis  Active Problems:    Alcohol abuse    Substance abuse ([facility] )    Hepatic cirrhosis (HCC)    Anemia    Hyponatremia    Cardiac murmur    Tachycardia    Essential hypertension    Tobacco dependence    Thrombocytopenia ([facility] )    Thoracoabdominal aortic aneurysm ([facility] )    Depression      Assessment & Plan:    * Cellulitis  Assessment & Plan  Patient was severe lower extremity edema with erythema, history of the same and treated inpatient with IV Abx 2 times in 2021  received ceftriaxone in ED for suspected bilateral extremity cellulitis as well as UTI  Afebrile, normal white count on presentation  On exam, bilateral lower extremity cellulitis  Ultrasound of the left lower extremity with evidence of cellulitis no evidence of deeper collection  Appears to be slowly improving now afebrile tachycardia is improving  · Continue ceftriaxone  · MRSA surveillance culture-negative  Discontinue vancomycin  · Follow-up wound culture-currently growing Gram-negative  · Blood culture-1/2 coagulase-negative Staph  Likely contaminant    · Venous Doppler bilateral lower extremity -no evidence of DVT  · Follow wound care recommendations    Tachycardia  Assessment & Plan  EKG-sinus tachycardia, multifactorial  Setting of infection, dehydration/intravascular depletion, withdrawal  Telemetry- sinus tachycardia  Cardiology input appreciated-appears to be improving  · Monitor H&H and transfuse as needed  · Continue antibiotics as above  · Continue CIWA protocol, Suboxone resumed    Cardiac murmur  Assessment & Plan  As noted on exam, no previous documentation noted of a cardiac murmur  EKG-sinus tachycardia,   Denies history of IVDU  2D echo-poor visualization but no definitive significant valvular disease  EF 55-60%  Moderate pulmonary hypertension  · Blood culture likely contaminant    Hyponatremia  Assessment & Plan  Chronic, Na 126 on admission, typically 130-131  In the setting of ETOH abuse  Improved with IV NS indicating prerenal component  Sodium remains mildly low normal at 132  ·  fluid restriction  · Monitor BMP  · Monitor on Lasix with albumin      Anemia  Assessment & Plan  Hx of anemia, in the past documented as likely multifactorial 2/2 anemia of chronic liver disease, alcohol abuse and acute blood loss in January 2/2 leg wound  Hg 7 4, lower than baseline on presentation  Denies any overt GI bleeding  Noted to episode of epistaxis  Status post 2 PRBC transfusion on 05/19  Iron sat 11%, B12, folate normal  Hemoglobin marginally lower today but no evidence of overt bleeding  - No further evidence of acute blood loss  - Continue to trend and transfuse as needed for Hg < 7  - Continue folic acid supplement, consider iron supplementation on discharge    Hepatic cirrhosis (HCC)  Assessment & Plan  Hx of hepatic cirrhosis likely 2/2 ETOH use  - Recent hepatitis panel in January negative   - MELD-Na score 24 on admission  - ammonia level 35 on admission, INR 1 5  Bilirubin stable  -continue to monitor LFT  -continue lactulose  -initiated albumin with Lasix  -will start spironolactone  -alcohol abstinence    Substance abuse ([facility] )  Assessment & Plan  Patient reports Hx of multi-substance abuse, patient takes suboxone   Retracted negative  Patient denies any history of IVDU  - confirmed suboxone dosing with patient's pharmacy  - continue Suboxone    Alcohol abuse  Assessment & Plan  Hx of ETOH abuse, in the past 3-4 weeks has cut down from 1-2 bottles of vodka/day to 2-3 shots per day  ETOH 462 on admission with last drink at [facility] 3-4pm the day of admission according to the patient  He wishes to stop drinking and would like resources to help  No Hx of w/d seizures/DT  Improving   - Maintain [facility] protocol  - Encourage ETOH cessation   - Folic acid, thiamine, multivitamin   -alcohol rehab discussed but patient declines  Agrees to follow-up extensively as outpatient  Lactic acidosis-resolved as of 5/23/2021  Assessment & Plan  Persistent despite aggressive hydration likely secondary to hepatic cirrhosis and alcohol intoxication  · Will not treat further  · Monitor hemodynamics-appears to be improving  · Continue thiamine    Depression  Assessment & Plan  Hx of depression, patient was previously on Lexapro but is no longer taking this  Seen by Psychiatry during hospitalization  Input appreciated  · Started on Lexapro, increase to 20 mg after 7 days  · Recommended long-term alcohol rehab, discussed with patient but declines inpatient rehab    Agrees to follow-up as outpatient      Thoracoabdominal aortic aneurysm [facility])  Assessment & Plan  Noted on previous CTA chest from Jan 2021  - Has not since had f/u with CT surgery for regular outpatient monitoring, discussed with patient the importance of regular f/u the time of admission    Thrombocytopenia ([address] )  Assessment & Plan  In the setting of ETOH abuse and hepatic cirrhosis   Continue to monitor, currently no evidence of bleeding   Appears to be improving  Resume DVT prophylaxis     Tobacco dependence  Assessment & Plan  Continues to smoke daily, requested nicotine patch  Smoking cessation discussed, continue to encourage cessation     Essential hypertension  Assessment & Plan  Has a history of HTN but admits that he is no longer taking his home medications, but on Lasix and Aldactone  Blood pressure is soft but overall improving  · Continue albumin with Lasix, supplemental potassium while on Lasix  · Monitor BMP and electrolytes  · Consider midodrine    Abnormal urinalysis-resolved as of 5/21/2021  Assessment & Plan  UA abnormal, patient denies any urinary symptoms at present  - urine culture negative    Epistaxis-resolved as of 5/21/2021  Assessment & Plan  Noted on 05/19  Now resolved  ENT input appreciated    Hypo magnesemia-replete    Addendum 6:45 p m  Patient was suddenly noted to be agitated and combative  Controlled team followed by rapid response was called  Vital signs were stable  Blood sugar noted to be less than 20, patient improved back to baseline after receiving IV dextrose  Patient has been afebrile and lower extremity erythema is improving  No leukocytosis on morning labs and tachycardia is improving  No events during the day today as per nursing staff patient with good appetite consuming 100% of meal at breakfast and lunch time  Patient had no visitors today  Unclear etiology of hypoglycemia suspect related to hepatic cirrhosis  · Will place patient on blood glucose monitoring  · Hypoglycemia protocol  · Monitor p o  Intake  · Will get routine labs CBC plus diff, CMP  Check TSH and cortisol in a m  · Alcohol level, urine drug screen  · Will check blood cultures if hypoglycemia is persistent  · Hold IV furosemide with IV albumin for now  · SisterAriel was updated      VTE Pharmacologic Prophylaxis:   Pharmacologic: Pharmacologic VTE Prophylaxis contraindicated due to Thrombocytopenia   Mechanical VTE Prophylaxis in Place: No    Patient Centered Rounds: I have performed bedside rounds with nursing staff today  Discussions with Specialists or Other Care Team Provider:  Yes    Education and Discussions with Family / Patient:  Sister  Time Spent for Care: 45 minutes  More than 50% of total time spent on counseling and coordination of care as described above      Current Length of Stay: 6 day(s)    Current Patient Status: Inpatient   Certification Statement: The patient will continue to require additional inpatient hospital stay due to Cellulitis requiring IV antibiotics    Discharge Plan:  Pending at present    Code Status: Level 1 - Full Code      Subjective:   No remains afebrile, tachycardia is improving  Responding back with IV Lasix  Leg swelling is improving  Reports mild discomfort posteriorly under left leg  Tolerating diet well        Objective:     Vitals:   Temp (24hrs), Av 9 °F (36 6 °C), Min:97 8 °F (36 6 °C), Max:98 3 °F (36 8 °C)    Temp:  [97 8 °F (36 6 °C)-98 3 °F (36 8 °C)] 98 3 °F (36 8 °C)  HR:  [100-113] 100  Resp:  [18-19] 18  BP: (106-130)/(58-75) 112/61  SpO2:  [95 %-99 %] 98 % on room air  Body mass index is 32 75 kg/m²  Input and Output Summary (last 24 hours): Intake/Output Summary (Last 24 hours) at 2021 1308  Last data filed at 2021 0930  Gross per 24 hour   Intake 1000 ml   Output 2100 ml   Net -1100 ml       Physical Exam:   Physical Exam  Constitutional:       General: He is not in acute distress  Appearance: He is obese  Comments: Chronically ill, deconditioned   HENT:      Head: Normocephalic and atraumatic  Cardiovascular:      Rate and Rhythm: Tachycardia improving  Pulmonary:      Effort: Pulmonary effort is normal  No respiratory distress  Breath sounds: No wheezing, rhonchi or rales  Comments: Diminished at bases  Chest:      Chest wall: No tenderness  Abdominal:      General: Bowel sounds are normal  There is no distension  Palpations: Abdomen is soft  Tenderness: There is no abdominal tenderness  There is no guarding or rebound  Musculoskeletal:      Right lower leg: Edema present  Left lower leg: Edema present  Comments: Bilateral lower extremity chronic lymphedema with  dry flaky skin  Erythema appears to stable Also area of tenderness induration posteriorly over left calf continues to improve  Drainage is overall better  Lower extremity edema appears to be improving   Skin:     General: Skin is warm and dry  Findings: No rash  Neurological:      General: No focal deficit present  Mental Status: He is alert and oriented to person, place, and time  Mental status is at baseline  Cranial Nerves: No cranial nerve deficit  Additional Data:     Labs:    Results from last 7 days   Lab Units 05/24/21  0612   WBC Thousand/uL 5 78   HEMOGLOBIN g/dL 7 5*   HEMATOCRIT % 24 0*   PLATELETS Thousands/uL 150   NEUTROS PCT % 56   LYMPHS PCT % 21   MONOS PCT % 19*   EOS PCT % 3     Results from last 7 days   Lab Units 05/24/21  0612   POTASSIUM mmol/L 3 3*   CHLORIDE mmol/L 99*   CO2 mmol/L 25   BUN mg/dL 6   CREATININE mg/dL 0 60   CALCIUM mg/dL 8 3   ALK PHOS U/L 130*   ALT U/L 14   AST U/L 44     Results from last 7 days   Lab Units 05/18/21  1729   INR  1 53*       * I Have Reviewed All Lab Data Listed Above  * Additional Pertinent Lab Tests Reviewed: All Labs Within Last 24 Hours Reviewed      Imaging:  Imaging Reports Reviewed Today Include:  Chest x-ray  Recent Cultures (last 7 days):     Results from last 7 days   Lab Units 05/22/21  1250 05/18/21  1753 05/18/21  1746 05/18/21  1729   BLOOD CULTURE   --   --  No Growth After 5 Days   Staphylococcus coagulase negative*   GRAM STAIN RESULT  No Polys*  1+ Gram negative rods*  --   --  Gram positive cocci in clusters*   URINE CULTURE   --  No Growth <1000 cfu/mL  --   --    WOUND CULTURE  4+ Growth of Non lactose fermenting gram negative mariely*  4+ Growth of Oxidase Positive gram negative mariley*  --   --   --        Last 24 Hours Medication List:   Current Facility-Administered Medications   Medication Dose Route Frequency Provider Last Rate    albumin human  12 5 g Intravenous BID (diuretic) Maryjane Romeo MD      And    furosemide  20 mg Intravenous BID (diuretic) Maryjane Romeo MD      ammonium lactate   Topical BID Maryjane Romeo MD      buprenorphine-naloxone  16 mg Sublingual Daily Maryjane Romeo MD      calcium carbonate  1,000 mg Oral Daily PRN Blue Denton PA-C      cefTRIAXone  2,000 mg Intravenous Q24H Maryjane Romeo MD 2,000 mg (05/23/21 3911)    clotrimazole   Topical BID Maryjane Romeo MD      escitalopram  10 mg Oral Daily Neyda Murguia MD      folic acid  1 mg Oral Daily Neyda Murguia MD      heparin (porcine)  5,000 Units Subcutaneous [facility] Neyda Murguia MD      lactulose  20 g Oral BID Neyda Murguia MD      melatonin  6 mg Oral HS MARILEE Pelletier      multivitamin stress formula  1 tablet Oral Daily Neyda Murguia MD      nicotine  14 mg Transdermal Daily Neyda Murguia MD      ondansetron  4 mg Intravenous Q6H PRN David Deng PA-C      pantoprazole  40 mg Oral Early Morning Neyda Murguia MD      polyethylene glycol  17 g Oral Daily PRN David Deng PA-C      potassium chloride  20 mEq Oral BID Neyda Murguia MD      sodium chloride  1 spray Each Nare Q1H PRN Neyda Murguia MD      spironolactone  50 mg Oral Daily Neyda Murguia MD      thiamine  100 mg Oral Daily Neyda Murguia MD            Today, Patient Was Seen By: Neyda Murguia MD    ** Please Note: "This note has been constructed using a voice recognition system  Therefore there may be syntax, spelling, and/or grammatical errors   Please call if you have any questions  "**

## 2021-05-24 NOTE — RAPID RESPONSE
Rapid Response Note  Dk Cheek 39 y o  male MRN: [id number]  Unit/Bed#: [address] [place] [address] Encounter: [id number]    Rapid Response Notification(s):   Response called date/time:  5/24/2021 6:40 PM  Response team arrival date/time:  5/24/2021 6:42 PM  Response end date/time:  5/24/2021 6:57 PM  Rapid response location:  [facility] unit  Primary reason for rapid response call:  Acute change in neuro status    Rapid Response Intervention(s):   Airway:  None  Breathing:  Oxygen (NC already in place)  Circulation:  None  Fluids administered:  None  Medications administered:  D50       Background/Situation:   Dk Cheek is a 39 y o  male who acutely became agitated and a control team was called  Upon arrival a fingerstick revealed his BGL was <20, he was given 1amp of D50 with resolution of symptoms  He was alert and oriented to person/place/time with his only complaint about wanting to get out of bed  Dr Jyothi Bustos was at bedside and was comfortable continuing work up for etiology of hypoglycemia and caring for patient  Review of Systems   Psychiatric/Behavioral: Positive for agitation, behavioral problems and confusion  All other systems reviewed and are negative  Objective:   Vitals:    05/24/21 0003 05/24/21 0819 05/24/21 1000 05/24/21 1525   BP: 130/75 112/61  107/59   BP Location:  Left arm     Pulse: 102 105 100 101   Resp: 18 18  18   Temp: 97 8 °F (36 6 °C) 98 3 °F (36 8 °C)  97 7 °F (36 5 °C)   TempSrc:  Oral     SpO2: 97% 98%  99%   Weight:       Height:         Physical Exam  Constitutional:       Appearance: He is obese  He is ill-appearing  He is not diaphoretic  HENT:      Head: Normocephalic and atraumatic  Mouth/Throat:      Mouth: Mucous membranes are moist    Eyes:      Pupils: Pupils are equal, round, and reactive to light  Cardiovascular:      Rate and Rhythm: Normal rate and regular rhythm  Pulmonary:      Effort: Pulmonary effort is normal  No respiratory distress  Abdominal:      General: There is no distension  Palpations: Abdomen is soft  Tenderness: There is no abdominal tenderness  Musculoskeletal:      Right lower leg: Edema present  Left lower leg: Edema present  Skin:     Comments: Crusting healing lesions in b/l lower extremities    Neurological:      General: No focal deficit present  Mental Status: He is alert and oriented to person, place, and time  Comments: Moving all extremities vigorously          Assessment:   · Hypoglycemia    Plan:   · D50  · STAT labs  · [address] fingerstick's  · Eat dinner     Rapid Response Outcome:   Condition:  In stable condition  Progression:  Improving  Transfer:  Remain on floor    Family notified of transfer: n/a  Family member contacted: SLIM to contact family if patient agreeable, currently patient does not want family notified  Portions of the record may have been created with voice recognition software  Occasional wrong word or "sound a like" substitutions may have occurred due to the inherent limitations of voice recognition software  Read the chart carefully and recognize, using context, where substitutions have occurred      Parth Estrella PA-C

## 2021-05-25 PROBLEM — E16.2 HYPOGLYCEMIA: Status: ACTIVE | Noted: 2021-05-25

## 2021-05-25 LAB
ALBUMIN SERPL BCP-MCNC: 2.1 G/DL (ref 3.5–5)
ALP SERPL-CCNC: 118 U/L (ref 46–116)
ALT SERPL W P-5'-P-CCNC: 20 U/L (ref 12–78)
AMMONIA PLAS-SCNC: 46 UMOL/L (ref 11–35)
AMPHETAMINES SERPL QL SCN: NEGATIVE
ANION GAP SERPL CALCULATED.3IONS-SCNC: 7 MMOL/L (ref 4–13)
AST SERPL W P-5'-P-CCNC: 47 U/L (ref 5–45)
BACTERIA WND AEROBE CULT: ABNORMAL
BARBITURATES UR QL: NEGATIVE
BENZODIAZ UR QL: NEGATIVE
BILIRUB SERPL-MCNC: 2.83 MG/DL (ref 0.2–1)
BUN SERPL-MCNC: 7 MG/DL (ref 5–25)
CALCIUM ALBUM COR SERPL-MCNC: 10 MG/DL (ref 8.3–10.1)
CALCIUM SERPL-MCNC: 8.5 MG/DL (ref 8.3–10.1)
CHLORIDE SERPL-SCNC: 99 MMOL/L (ref 100–108)
CO2 SERPL-SCNC: 26 MMOL/L (ref 21–32)
COCAINE UR QL: NEGATIVE
CORTIS SERPL-MCNC: 7.1 UG/DL
CREAT SERPL-MCNC: 0.64 MG/DL (ref 0.6–1.3)
ERYTHROCYTE [DISTWIDTH] IN BLOOD BY AUTOMATED COUNT: 19 % (ref 11.6–15.1)
GFR SERPL CREATININE-BSD FRML MDRD: 126 ML/MIN/1.73SQ M
GLUCOSE SERPL-MCNC: 112 MG/DL (ref 65–140)
GLUCOSE SERPL-MCNC: 112 MG/DL (ref 65–140)
GLUCOSE SERPL-MCNC: 114 MG/DL (ref 65–140)
GLUCOSE SERPL-MCNC: 121 MG/DL (ref 65–140)
GLUCOSE SERPL-MCNC: 134 MG/DL (ref 65–140)
GLUCOSE SERPL-MCNC: 140 MG/DL (ref 65–140)
GLUCOSE SERPL-MCNC: 90 MG/DL (ref 65–140)
GRAM STN SPEC: ABNORMAL
GRAM STN SPEC: ABNORMAL
HCT VFR BLD AUTO: 23.8 % (ref 36.5–49.3)
HGB BLD-MCNC: 7.3 G/DL (ref 12–17)
MAGNESIUM SERPL-MCNC: 1.7 MG/DL (ref 1.6–2.6)
MCH RBC QN AUTO: 29.1 PG (ref 26.8–34.3)
MCHC RBC AUTO-ENTMCNC: 30.7 G/DL (ref 31.4–37.4)
MCV RBC AUTO: 95 FL (ref 82–98)
METHADONE UR QL: NEGATIVE
OPIATES UR QL SCN: NEGATIVE
OXYCODONE+OXYMORPHONE UR QL SCN: NEGATIVE
PCP UR QL: NEGATIVE
PHOSPHATE SERPL-MCNC: 3 MG/DL (ref 2.7–4.5)
PLATELET # BLD AUTO: 158 THOUSANDS/UL (ref 149–390)
PMV BLD AUTO: 8.3 FL (ref 8.9–12.7)
POTASSIUM SERPL-SCNC: 3.7 MMOL/L (ref 3.5–5.3)
PROT SERPL-MCNC: 7.4 G/DL (ref 6.4–8.2)
RBC # BLD AUTO: 2.51 MILLION/UL (ref 3.88–5.62)
SODIUM SERPL-SCNC: 132 MMOL/L (ref 136–145)
THC UR QL: NEGATIVE
TSH SERPL DL<=0.05 MIU/L-ACNC: 2.51 UIU/ML (ref 0.36–3.74)
WBC # BLD AUTO: 5.02 THOUSAND/UL (ref 4.31–10.16)

## 2021-05-25 PROCEDURE — 82533 TOTAL CORTISOL: CPT | Performed by: INTERNAL MEDICINE

## 2021-05-25 PROCEDURE — 80053 COMPREHEN METABOLIC PANEL: CPT | Performed by: INTERNAL MEDICINE

## 2021-05-25 PROCEDURE — 82948 REAGENT STRIP/BLOOD GLUCOSE: CPT

## 2021-05-25 PROCEDURE — 84443 ASSAY THYROID STIM HORMONE: CPT | Performed by: INTERNAL MEDICINE

## 2021-05-25 PROCEDURE — 80307 DRUG TEST PRSMV CHEM ANLYZR: CPT | Performed by: INTERNAL MEDICINE

## 2021-05-25 PROCEDURE — 83735 ASSAY OF MAGNESIUM: CPT | Performed by: INTERNAL MEDICINE

## 2021-05-25 PROCEDURE — 82140 ASSAY OF AMMONIA: CPT | Performed by: INTERNAL MEDICINE

## 2021-05-25 PROCEDURE — 99232 SBSQ HOSP IP/OBS MODERATE 35: CPT | Performed by: INTERNAL MEDICINE

## 2021-05-25 PROCEDURE — 85027 COMPLETE CBC AUTOMATED: CPT | Performed by: INTERNAL MEDICINE

## 2021-05-25 PROCEDURE — 84100 ASSAY OF PHOSPHORUS: CPT | Performed by: INTERNAL MEDICINE

## 2021-05-25 RX ADMIN — SPIRONOLACTONE 50 MG: 25 TABLET, FILM COATED ORAL at 09:54

## 2021-05-25 RX ADMIN — POTASSIUM CHLORIDE 20 MEQ: 1500 TABLET, EXTENDED RELEASE ORAL at 17:29

## 2021-05-25 RX ADMIN — HEPARIN SODIUM 5000 UNITS: 5000 INJECTION INTRAVENOUS; SUBCUTANEOUS at 06:56

## 2021-05-25 RX ADMIN — HEPARIN SODIUM 5000 UNITS: 5000 INJECTION INTRAVENOUS; SUBCUTANEOUS at 21:31

## 2021-05-25 RX ADMIN — B-COMPLEX W/ C & FOLIC ACID TAB 1 TABLET: TAB at 09:54

## 2021-05-25 RX ADMIN — CLOTRIMAZOLE: 10 CREAM TOPICAL at 09:51

## 2021-05-25 RX ADMIN — NICOTINE 14 MG: 14 PATCH, EXTENDED RELEASE TRANSDERMAL at 08:54

## 2021-05-25 RX ADMIN — LACTULOSE 20 G: 10 SOLUTION ORAL at 17:29

## 2021-05-25 RX ADMIN — Medication: at 09:51

## 2021-05-25 RX ADMIN — ESCITALOPRAM OXALATE 10 MG: 10 TABLET ORAL at 09:54

## 2021-05-25 RX ADMIN — POTASSIUM CHLORIDE 20 MEQ: 1500 TABLET, EXTENDED RELEASE ORAL at 09:54

## 2021-05-25 RX ADMIN — CEFTRIAXONE 2000 MG: 2 INJECTION, SOLUTION INTRAVENOUS at 01:27

## 2021-05-25 RX ADMIN — Medication: at 17:29

## 2021-05-25 RX ADMIN — FOLIC ACID 1 MG: 1 TABLET ORAL at 09:54

## 2021-05-25 RX ADMIN — HEPARIN SODIUM 5000 UNITS: 5000 INJECTION INTRAVENOUS; SUBCUTANEOUS at 14:44

## 2021-05-25 RX ADMIN — Medication 6 MG: at 21:31

## 2021-05-25 RX ADMIN — PANTOPRAZOLE SODIUM 40 MG: 40 TABLET, DELAYED RELEASE ORAL at 06:56

## 2021-05-25 RX ADMIN — THIAMINE HCL TAB 100 MG 100 MG: 100 TAB at 09:54

## 2021-05-25 RX ADMIN — BUPRENORPHINE AND NALOXONE 16 MG: 8; 2 FILM BUCCAL; SUBLINGUAL at 09:56

## 2021-05-25 NOTE — ASSESSMENT & PLAN NOTE
Patient had hypoglycemia with blood sugar of 20 with some change in mental status yesterday which has since resolved  Blood sugars have been stable today

## 2021-05-25 NOTE — PROGRESS NOTES
Mauricio 45  Progress Note - Nick Rockwell 1985, 39 y o  male MRN: [id number]  Unit/Bed#: [address] [place] [address] Encounter: [id number]  Primary Care Provider: Jeny Murray PA-C   Date and time admitted to hospital: 5/18/2021  5:06 PM    * Cellulitis  Assessment & Plan  Patient was severe lower extremity edema with erythema, history of the same and treated inpatient with IV Abx 2 times in 2021  received ceftriaxone in ED for suspected bilateral extremity cellulitis as well as UTI  Afebrile, normal white count on presentation  On exam, bilateral lower extremity cellulitis  Ultrasound of the left lower extremity with evidence of cellulitis no evidence of deeper collection  Appears to be slowly improving now afebrile tachycardia is improving  · Continue ceftriaxone  Possible transition to p o  Antibiotics in a m  · MRSA surveillance culture-negative  Discontinued vancomycin  · Wound cultures growing stenotrophomonas and achromobacter  · Blood culture-1/2 coagulase-negative Staph  Likely contaminant  · Venous Doppler bilateral lower extremity -no evidence of DVT  · Follow wound care recommendations    Anemia  Assessment & Plan  Hx of anemia, in the past documented as likely multifactorial 2/2 anemia of chronic liver disease, alcohol abuse and acute blood loss in January 2/2 leg wound  Hg 7 4, lower than baseline on presentation  Denies any overt GI bleeding    Noted to episode of epistaxis  Status post 2 PRBC transfusion on 05/19  Iron sat 11%, B12, folate normal  Hemoglobin slightly lower but overall stable  - No further evidence of acute blood loss  - Continue to trend and transfuse as needed for Hg < 7  - Continue folic acid supplement, consider iron supplementation on discharge    Hyponatremia  Assessment & Plan  Chronic, Na 126 on admission, typically 130-131  In the setting of ETOH abuse  Improved with IV NS indicating prerenal component  Sodium remains mildly low normal at 132  ·  fluid restriction  · Patient was initially on Lasix and albumin  · Sodium level continues to remain stable      Hypoglycemia  Assessment & Plan  Patient had hypoglycemia with blood sugar of 20 with some change in mental status yesterday which has since resolved  Blood sugars have been stable today    Tachycardia  Assessment & Plan  EKG-sinus tachycardia, multifactorial  Setting of infection, dehydration/intravascular depletion, withdrawal  Telemetry- sinus tachycardia  Cardiology input appreciated-appears to be improving  · Monitor H&H and transfuse as needed  · Continue antibiotics as above  · Continue CIWA protocol, Suboxone resumed    Depression  Assessment & Plan  Hx of depression, patient was previously on Lexapro but is no longer taking this  Seen by Psychiatry during hospitalization  Input appreciated  · Started on Lexapro, increase to 20 mg after 7 days  · Recommended long-term alcohol rehab, discussed with patient but declines inpatient rehab  Agrees to follow-up as outpatient      Cardiac murmur  Assessment & Plan  As noted on exam, no previous documentation noted of a cardiac murmur  EKG-sinus tachycardia,   Denies history of IVDU  2D echo-poor visualization but no definitive significant valvular disease  EF 55-60%    Moderate pulmonary hypertension  · Blood culture likely contaminant    Thoracoabdominal aortic aneurysm [facility])  Assessment & Plan  Noted on previous CTA chest from Jan 2021  - Has not since had f/u with CT surgery for regular outpatient monitoring, discussed with patient the importance of regular f/u the time of admission    Thrombocytopenia ([address] )  Assessment & Plan  In the setting of ETOH abuse and hepatic cirrhosis   Continue to monitor, currently no evidence of bleeding   Appears to be improving  Restarted DVT prophylaxis    Hepatic cirrhosis ([address] )  Assessment & Plan  Hx of hepatic cirrhosis likely 2/2 ETOH use  - Recent hepatitis panel in January negative   - MELD-Na score 24 on admission  - ammonia level 35 on admission, INR 1 5  Bilirubin stable  -continue to monitor LFT  -continue lactulose  Patient initially was in albumin with Lasix  Patient started on Aldactone  -alcohol abstinence    Tobacco dependence  Assessment & Plan  Continues to smoke daily, requested nicotine patch  Smoking cessation discussed, continue to encourage cessation     Substance abuse [facility])  Assessment & Plan  Patient reports Hx of multi-substance abuse, patient takes suboxone   Denies history of IV drug abuse  - confirmed suboxone dosing with patient's pharmacy  - continue Suboxone    Alcohol abuse  Assessment & Plan  Hx of ETOH abuse, in the past 3-4 weeks has cut down from 1-2 bottles of vodka/day to 2-3 shots per day  ETOH 462 on admission with last drink at approxiamtely 3-4pm the day of admission according to the patient  He wishes to stop drinking and would like resources to help  No Hx of w/d seizures/DT  Improving   - Maintain CIWA protocol  - Encourage ETOH cessation   - Folic acid, thiamine, multivitamin   -alcohol rehab discussed but patient declines  Agrees to follow-up extensively as outpatient  Essential hypertension  Assessment & Plan  Has a history of HTN but admits that he is no longer taking his home medications, but on Lasix and Aldactone  Blood pressure was running on the soft side but has improved    · Patient was on Lasix with albumin initially  · Monitor BMP and electrolytes      Epistaxis-resolved as of 5/21/2021  Assessment & Plan  Noted on 05/19  Now resolved  ENT input appreciated    Lactic acidosis-resolved as of 5/23/2021  Assessment & Plan  Persistent despite aggressive hydration likely secondary to hepatic cirrhosis and alcohol intoxication  · Will not treat further  · Monitor hemodynamics-appears to be improving  · Continue thiamine    Abnormal urinalysis-resolved as of 5/21/2021  Assessment & Plan  UA abnormal, patient denies any urinary symptoms at present  - urine culture negative      VTE Pharmacologic Prophylaxis:   Pharmacologic: Heparin  Mechanical VTE Prophylaxis in Place: No    Patient Centered Rounds: I have performed bedside rounds with nursing staff today  Discussions with Specialists or Other Care Team Provider: yes    Education and Discussions with Family / Patient: yes    Time Spent for Care: 45 minutes  More than 50% of total time spent on counseling and coordination of care as described above  Current Length of Stay: 7 day(s)    Current Patient Status: Inpatient   Certification Statement: The patient will continue to require additional inpatient hospital stay due to Cellulitis and anemia    Discharge Plan:  Home    Code Status: Level 1 - Full Code      Subjective:   Patient is feeling much better  Improved redness and swelling in the legs  Objective:     Vitals:   Temp (24hrs), Av 9 °F (36 6 °C), Min:97 3 °F (36 3 °C), Max:98 7 °F (37 1 °C)    Temp:  [97 3 °F (36 3 °C)-98 7 °F (37 1 °C)] 97 9 °F (36 6 °C)  HR:  [] 101  Resp:  [18-19] 19  BP: (104-145)/(54-94) 145/75  SpO2:  [94 %-100 %] 100 %  Body mass index is 32 75 kg/m²  Input and Output Summary (last 24 hours): Intake/Output Summary (Last 24 hours) at 2021  Last data filed at 2021 1800  Gross per 24 hour   Intake 1120 ml   Output 1350 ml   Net -230 ml       Physical Exam:     Physical Exam  Constitutional:       General: He is not in acute distress  HENT:      Head: Normocephalic and atraumatic  Eyes:      Conjunctiva/sclera: Conjunctivae normal       Pupils: Pupils are equal, round, and reactive to light  Neck:      Musculoskeletal: Normal range of motion and neck supple  Cardiovascular:      Rate and Rhythm: Normal rate and regular rhythm  Heart sounds: Murmur present  Pulmonary:      Effort: Pulmonary effort is normal  No respiratory distress  Breath sounds: No wheezing, rhonchi or rales  Chest:      Chest wall: No tenderness  Abdominal:      General: Bowel sounds are normal  There is no distension  Palpations: Abdomen is soft  Tenderness: There is no abdominal tenderness  There is no guarding or rebound  Musculoskeletal:      Right lower leg: Edema present  Left lower leg: Edema present  Skin:     General: Skin is warm and dry  Findings: No rash  Comments: Bilateral leg redness with mild warmth  Patient also noted to have scaly skin with some superficial wounds   Neurological:      Mental Status: He is alert  Cranial Nerves: No cranial nerve deficit  Additional Data:     Labs:    Results from last 7 days   Lab Units 05/25/21  0703 05/24/21  1942   WBC Thousand/uL 5 02 5 48   HEMOGLOBIN g/dL 7 3* 7 5*   HEMATOCRIT % 23 8* 24 4*   PLATELETS Thousands/uL 158 123*   NEUTROS PCT %  --  54   LYMPHS PCT %  --  13*   MONOS PCT %  --  29*   EOS PCT %  --  2     Results from last 7 days   Lab Units 05/25/21  0703   POTASSIUM mmol/L 3 7   CHLORIDE mmol/L 99*   CO2 mmol/L 26   BUN mg/dL 7   CREATININE mg/dL 0 64   CALCIUM mg/dL 8 5   ALK PHOS U/L 118*   ALT U/L 20   AST U/L 47*           * I Have Reviewed All Lab Data Listed Above  * Additional Pertinent Lab Tests Reviewed: All [facility] Admission Reviewed        Recent Cultures (last 7 days):     Results from last 7 days   Lab Units 05/24/21 2123 05/22/21  1250   BLOOD CULTURE  Received in Microbiology Lab  Culture in Progress  Received in Microbiology Lab  Culture in Progress    --    GRAM STAIN RESULT   --  No Polys*  1+ Gram negative rods*   WOUND CULTURE   --  4+ Growth of Stenotrophomonas maltophilia*  4+ Growth of Achromobacter xylosoxidans*  Few Colonies of        Last 24 Hours Medication List:   Current Facility-Administered Medications   Medication Dose Route Frequency Provider Last Rate    ammonium lactate   Topical BID Martha Wong MD      buprenorphine-naloxone  16 mg Sublingual Daily MD Praveena Villa calcium carbonate  1,000 mg Oral Daily PRN Rome Mike PA-C      cefTRIAXone  2,000 mg Intravenous Q24H Karine Bell MD 2,000 mg (05/25/21 0127)    escitalopram  10 mg Oral Daily Karine Bell MD      folic acid  1 mg Oral Daily Karine Bell MD      glycerin-hypromellose-  2 drop Both Eyes Q4H PRN Karine Bell MD      heparin (porcine)  5,000 Units Subcutaneous [facility] Karine Bell MD      lactulose  20 g Oral BID Karine Bell MD      melatonin  6 mg Oral HS MARILEE Pelletier      multivitamin stress formula  1 tablet Oral Daily Karine Bell MD      nicotine  14 mg Transdermal Daily Karine Bell MD      ondansetron  4 mg Intravenous Q6H PRN Rome Mike PA-C      pantoprazole  40 mg Oral Early Morning Karine Bell MD      polyethylene glycol  17 g Oral Daily PRN Rome Mike PA-C      sodium chloride  1 spray Each Nare Q1H PRN Karine Bell MD      spironolactone  50 mg Oral Daily Karine Bell MD      thiamine  100 mg Oral Daily Karine Bell MD          Today, Patient Was Seen By: Gaby Tucker MD    ** Please Note: Dictation voice to text software may have been used in the creation of this document   **

## 2021-05-25 NOTE — WOUND OSTOMY CARE
Progress Note - Wound   [place] 39 y o  male MRN: [id number]  Unit/Bed#: [address] [place] [address] Encounter: [id number]      Assessment:   Wound care nurse follow-up of lymphedema/ lower leg cellulitis/ Xerosis of lower extremities  Staff currently washing bilateral lower legs with diluted Hibiclens each day, allowing to air dry, and then applying LacHydrin to intact skin on lower legs and feet but not between the toes  Patient, staff, MD, and I all agree lower legs improving  Legs are more moisturized, xerosis improving  Still some redness  Scant serosang oozing from posterior left lower leg  Denuded area approx 1 5 x 1 x 0 1 cm  No other open, draining areas are observed  Wound/Skin Care Plan:   1  For denuded area on left lower leg, posterior aspect, apply small bordered foam dressing after area has been cleansed with diluted Hibiclens  Otherwise, continue same regimen for bilateral lower leg care  Discussed with Dinh Best RN; Dr Nader Viveros; and patient

## 2021-05-26 ENCOUNTER — TELEPHONE (OUTPATIENT)
Dept: FAMILY MEDICINE CLINIC | Facility: CLINIC | Age: 36
End: 2021-05-26

## 2021-05-26 ENCOUNTER — TRANSITIONAL CARE MANAGEMENT (OUTPATIENT)
Dept: FAMILY MEDICINE CLINIC | Facility: CLINIC | Age: 36
End: 2021-05-26

## 2021-05-26 VITALS
OXYGEN SATURATION: 95 % | SYSTOLIC BLOOD PRESSURE: 125 MMHG | TEMPERATURE: 97.7 F | RESPIRATION RATE: 18 BRPM | HEIGHT: 73 IN | HEART RATE: 128 BPM | DIASTOLIC BLOOD PRESSURE: 90 MMHG | BODY MASS INDEX: 32.9 KG/M2 | WEIGHT: 248.24 LBS

## 2021-05-26 LAB
ANION GAP SERPL CALCULATED.3IONS-SCNC: 6 MMOL/L (ref 4–13)
BUN SERPL-MCNC: 8 MG/DL (ref 5–25)
CALCIUM SERPL-MCNC: 8.6 MG/DL (ref 8.3–10.1)
CHLORIDE SERPL-SCNC: 101 MMOL/L (ref 100–108)
CO2 SERPL-SCNC: 27 MMOL/L (ref 21–32)
CREAT SERPL-MCNC: 0.6 MG/DL (ref 0.6–1.3)
ERYTHROCYTE [DISTWIDTH] IN BLOOD BY AUTOMATED COUNT: 19 % (ref 11.6–15.1)
GFR SERPL CREATININE-BSD FRML MDRD: 129 ML/MIN/1.73SQ M
GLUCOSE SERPL-MCNC: 102 MG/DL (ref 65–140)
HCT VFR BLD AUTO: 24.2 % (ref 36.5–49.3)
HGB BLD-MCNC: 7.4 G/DL (ref 12–17)
MCH RBC QN AUTO: 29.4 PG (ref 26.8–34.3)
MCHC RBC AUTO-ENTMCNC: 30.6 G/DL (ref 31.4–37.4)
MCV RBC AUTO: 96 FL (ref 82–98)
PLATELET # BLD AUTO: 193 THOUSANDS/UL (ref 149–390)
PMV BLD AUTO: 8.3 FL (ref 8.9–12.7)
POTASSIUM SERPL-SCNC: 3.9 MMOL/L (ref 3.5–5.3)
RBC # BLD AUTO: 2.52 MILLION/UL (ref 3.88–5.62)
SODIUM SERPL-SCNC: 134 MMOL/L (ref 136–145)
WBC # BLD AUTO: 4.21 THOUSAND/UL (ref 4.31–10.16)

## 2021-05-26 PROCEDURE — 97530 THERAPEUTIC ACTIVITIES: CPT

## 2021-05-26 PROCEDURE — 99239 HOSP IP/OBS DSCHRG MGMT >30: CPT | Performed by: INTERNAL MEDICINE

## 2021-05-26 PROCEDURE — 80048 BASIC METABOLIC PNL TOTAL CA: CPT | Performed by: INTERNAL MEDICINE

## 2021-05-26 PROCEDURE — 85027 COMPLETE CBC AUTOMATED: CPT | Performed by: INTERNAL MEDICINE

## 2021-05-26 RX ORDER — FERROUS SULFATE TAB EC 324 MG (65 MG FE EQUIVALENT) 324 (65 FE) MG
324 TABLET DELAYED RESPONSE ORAL
Qty: 60 TABLET | Refills: 0 | Status: SHIPPED | OUTPATIENT
Start: 2021-05-26 | End: 2022-08-02

## 2021-05-26 RX ORDER — SPIRONOLACTONE 50 MG/1
50 TABLET, FILM COATED ORAL DAILY
Qty: 30 TABLET | Refills: 0 | Status: SHIPPED | OUTPATIENT
Start: 2021-05-27 | End: 2021-07-07 | Stop reason: SDUPTHER

## 2021-05-26 RX ORDER — AMMONIUM LACTATE 12 G/100G
LOTION TOPICAL 2 TIMES DAILY
Qty: 400 G | Refills: 0 | Status: SHIPPED | OUTPATIENT
Start: 2021-05-26 | End: 2022-08-02

## 2021-05-26 RX ORDER — PANTOPRAZOLE SODIUM 40 MG/1
40 TABLET, DELAYED RELEASE ORAL
Qty: 30 TABLET | Refills: 0 | Status: SHIPPED | OUTPATIENT
Start: 2021-05-27 | End: 2021-06-24 | Stop reason: SDUPTHER

## 2021-05-26 RX ORDER — SULFAMETHOXAZOLE AND TRIMETHOPRIM 800; 160 MG/1; MG/1
1 TABLET ORAL EVERY 12 HOURS SCHEDULED
Qty: 12 TABLET | Refills: 0 | Status: SHIPPED | OUTPATIENT
Start: 2021-05-26 | End: 2021-06-01

## 2021-05-26 RX ORDER — NICOTINE 21 MG/24HR
1 PATCH, TRANSDERMAL 24 HOURS TRANSDERMAL DAILY
Qty: 28 PATCH | Refills: 0 | Status: ON HOLD | OUTPATIENT
Start: 2021-05-27

## 2021-05-26 RX ORDER — ESCITALOPRAM OXALATE 10 MG/1
10 TABLET ORAL DAILY
Qty: 30 TABLET | Refills: 0 | Status: SHIPPED | OUTPATIENT
Start: 2021-05-26 | End: 2021-06-01 | Stop reason: SDUPTHER

## 2021-05-26 RX ORDER — LACTULOSE 20 G/30ML
20 SOLUTION ORAL 2 TIMES DAILY
Qty: 300 ML | Refills: 0 | Status: SHIPPED | OUTPATIENT
Start: 2021-05-26 | End: 2021-07-29 | Stop reason: SDUPTHER

## 2021-05-26 RX ADMIN — PANTOPRAZOLE SODIUM 40 MG: 40 TABLET, DELAYED RELEASE ORAL at 05:57

## 2021-05-26 RX ADMIN — Medication 2 APPLICATION: at 09:15

## 2021-05-26 RX ADMIN — FOLIC ACID 1 MG: 1 TABLET ORAL at 09:13

## 2021-05-26 RX ADMIN — LACTULOSE 20 G: 10 SOLUTION ORAL at 09:12

## 2021-05-26 RX ADMIN — ESCITALOPRAM OXALATE 10 MG: 10 TABLET ORAL at 09:13

## 2021-05-26 RX ADMIN — THIAMINE HCL TAB 100 MG 100 MG: 100 TAB at 09:13

## 2021-05-26 RX ADMIN — B-COMPLEX W/ C & FOLIC ACID TAB 1 TABLET: TAB at 09:13

## 2021-05-26 RX ADMIN — CEFTRIAXONE 2000 MG: 2 INJECTION, SOLUTION INTRAVENOUS at 00:03

## 2021-05-26 RX ADMIN — BUPRENORPHINE AND NALOXONE 16 MG: 8; 2 FILM BUCCAL; SUBLINGUAL at 09:22

## 2021-05-26 RX ADMIN — SPIRONOLACTONE 50 MG: 25 TABLET, FILM COATED ORAL at 09:12

## 2021-05-26 RX ADMIN — NICOTINE 14 MG: 14 PATCH, EXTENDED RELEASE TRANSDERMAL at 09:15

## 2021-05-26 RX ADMIN — HEPARIN SODIUM 5000 UNITS: 5000 INJECTION INTRAVENOUS; SUBCUTANEOUS at 05:57

## 2021-05-26 NOTE — DISCHARGE SUMMARY
Mauricio 45  Discharge- Leslie Hodge 1985, 39 y o  male MRN: [id number]  Unit/Bed#: [address] [place] [address] Encounter: [id number]  Primary Care Provider: Mely Garcia PA-C   Date and time admitted to hospital: 5/18/2021  5:06 PM    * Cellulitis  Assessment & Plan  Patient was severe lower extremity edema with erythema, history of the same and treated inpatient with IV Abx 2 times in 2021  received ceftriaxone in ED for suspected bilateral extremity cellulitis as well as UTI  Afebrile, normal white count on presentation  On exam, bilateral lower extremity cellulitis  Ultrasound of the left lower extremity with evidence of cellulitis no evidence of deeper collection  Appears to be slowly improving now afebrile tachycardia is improving  · Patient was treated with IV ceftriaxone during inpatient stay and be discharged on Bactrim  · MRSA surveillance culture-negative  Discontinued vancomycin  · Wound cultures growing stenotrophomonas and achromobacter  · Blood culture-1/2 coagulase-negative Staph  Likely contaminant  · Venous Doppler bilateral lower extremity -no evidence of DVT  · Follow wound care recommendations    Anemia  Assessment & Plan  Hx of anemia, in the past documented as likely multifactorial 2/2 anemia of chronic liver disease, alcohol abuse and acute blood loss in January 2/2 leg wound  Hg 7 5, lower than baseline on presentation  Denies any overt GI bleeding    Noted to episode of epistaxis  Status post 2 PRBC transfusion on 05/19  Iron sat 11%, B12, folate normal  Hemoglobin slightly lower but overall stable  - No further evidence of acute blood loss  Continue iron supplementation upon discharge  Repeat CBC in 1 week    Hyponatremia  Assessment & Plan  Chronic, Na 126 on admission, typically 130-131-134  In the setting of ETOH abuse  Improved with IV NS indicating prerenal component  Sodium remains mildly low normal at 132  ·  fluid restriction to 1000 liters per day  · Patient was initially on Lasix and albumin  · Sodium level continues to remain stable      Hypoglycemia  Assessment & Plan  Patient had hypoglycemia with blood sugar of 20 with some change in mental status on May 24 which has since resolved  Blood sugars have been stable over the past 24 hours    Tachycardia  Assessment & Plan  EKG-sinus tachycardia, multifactorial  Setting of infection, dehydration/intravascular depletion, withdrawal  Telemetry- sinus tachycardia  Cardiology input appreciated-appears to be improving  · Monitor H&H and transfuse as needed  · Continue antibiotics as above  · Continue CIWA protocol, Suboxone resumed    Depression  Assessment & Plan  Hx of depression, patient was previously on Lexapro but is no longer taking this  Seen by Psychiatry during hospitalization  Input appreciated  · Continue Lexapro  · Recommended long-term alcohol rehab, discussed with patient but declines inpatient rehab  Agrees to follow-up as outpatient      Cardiac murmur  Assessment & Plan  As noted on exam, no previous documentation noted of a cardiac murmur  EKG-sinus tachycardia,   Denies history of IVDU  2D echo-poor visualization but no definitive significant valvular disease  EF 55-60%  Moderate pulmonary hypertension  · Blood culture likely contaminant    Thoracoabdominal aortic aneurysm [facility])  Assessment & Plan  Noted on previous CTA chest from Jan 2021  - Has not since had f/u with CT surgery for regular outpatient monitoring, discussed with patient the importance of regular f/u the time of admission    Thrombocytopenia ([address] )  Assessment & Plan  In the setting of ETOH abuse and hepatic cirrhosis   Platelet count has improved      Hepatic cirrhosis (HCC)  Assessment & Plan  Hx of hepatic cirrhosis likely 2/2 ETOH use  - Recent hepatitis panel in January negative   - MELD-Na score 24 on admission  - ammonia level 35 on admission, INR 1 5  Bilirubin stable  -continue to monitor LFT  -continue lactulose upon discharge  Patient initially was in albumin with Lasix  Continue Aldactone and Lasix  -alcohol abstinence  Outpatient follow-up with GI    Tobacco dependence  Assessment & Plan  Continues to smoke daily, requested nicotine patch  Smoking cessation discussed, continue to encourage cessation     Substance abuse [facility])  Assessment & Plan  Patient reports Hx of multi-substance abuse, patient takes suboxone   Denies history of IV drug abuse  - confirmed suboxone dosing with patient's pharmacy  - continue Suboxone    Alcohol abuse  Assessment & Plan  Hx of ETOH abuse, in the past 3-4 weeks has cut down from 1-2 bottles of vodka/day to 2-3 shots per day  ETOH 462 on admission with last drink at approxiamtely 3-4pm the day of admission according to the patient  He wishes to stop drinking and would like resources to help  No Hx of w/d seizures/DT  Improving   - Maintain CIWA protocol  - Encourage ETOH cessation   Patient was also given folic acid, thiamine multivitamin during the hospital stay  -alcohol rehab discussed but patient declines  Agrees to follow-up extensively as outpatient  Essential hypertension  Assessment & Plan  Has a history of HTN but admits that he is no longer taking his home medications, but on Lasix and Aldactone  Blood pressure was running on the soft side but has improved    · Continue Lasix and Aldactone upon discharge with follow-up BMP in 1 week        Discharging Physician / Practitioner: Drake Cordova MD  PCP: Alida Mead PA-C  Admission Date:   Admission Orders (From admission, onward)     Ordered        05/18/21 1951  Inpatient Admission  Once                   Discharge Date: 05/26/21    Resolved Problems  Date Reviewed: 5/26/2021          Resolved    Abnormal urinalysis 5/21/2021     Resolved by  Geovanni Schumacher MD    Lactic acidosis 5/23/2021     Resolved by  Geovanni Schumacher MD    Epistaxis 5/21/2021     Resolved by  Geovanni Schumacher MD          Consultations During Hospital Stay:  · Wound care       Outpatient Tests Requested:  · CBC, BMP and ammonia level in 1 week  Outpatient follow-up at [address] [place] [address], PCP and GI    Complications:  None    Reason for Admission:     Hospital Course: Dk Cheek is a 39 y o  male patient with past medical history hypertension alcohol abuse, substance abuse, asthma, depression, PTSD who originally presented to the hospital on 5/18/2021 due to unkempt conditions at home  Patient was covered covered in urine and stool  Patient has history of alcohol abuse  In the ED patient was noted to have bilateral lower extremity cellulitis with hyponatremia  Patient was started on IV ceftriaxone  Patient was also placed on CIWA protocol  Patient also received IV Lasix with albumin to help with lower extremity swelling  Patient was seen in consultation with wound care during the hospital stay  Patient's wound cultures were followed up and blood cultures remained negative  Patient continued remained to stable  Patient also noted to have some anemia without any evidence of active bleeding for which he received 2 units of PRBC with improved hemoglobin  Patient will be discharged home on p o  Duct in with outpatient follow-up  Please see above list of diagnoses and related plan for additional information  Condition at Discharge: stable     Discharge Day Visit / Exam:     Subjective:  Patient is feeling well  Anxious to go home  Vitals: Blood Pressure: 122/62 (05/26/21 0912)  Pulse: 95 (05/26/21 0912)  Temperature: 97 7 °F (36 5 °C) (05/26/21 0912)  Temp Source: Oral (05/25/21 1000)  Respirations: 18 (05/26/21 0912)  Height: 6' 1" (185 4 cm) (05/18/21 2134)  Weight - Scale: 113 kg (248 lb 3 8 oz) (05/23/21 0600)  SpO2: 96 % (05/26/21 0912)  Exam:   Physical Exam  Constitutional:       Appearance: Normal appearance  HENT:      Head: Normocephalic and atraumatic     Eyes:      Extraocular Movements: Extraocular movements intact  Pupils: Pupils are equal, round, and reactive to light  Neck:      Musculoskeletal: Normal range of motion and neck supple  Cardiovascular:      Rate and Rhythm: Normal rate and regular rhythm  Heart sounds: No murmur  No gallop  Pulmonary:      Effort: Pulmonary effort is normal       Breath sounds: Normal breath sounds  Abdominal:      General: Bowel sounds are normal       Palpations: Abdomen is soft  Tenderness: There is no abdominal tenderness  Musculoskeletal: Normal range of motion  General: No swelling or deformity  Right lower leg: Edema present  Left lower leg: Edema present  Skin:     General: Skin is warm and dry  Comments: Bilateral lower extremity superficial ulcers and dry skin  Posterior leg wound on the left leg with some drainage   Neurological:      General: No focal deficit present  Mental Status: He is alert  Discharge instructions/Information to patient and family:   See after visit summary for information provided to patient and family  Provisions for Follow-Up Care:  See after visit summary for information related to follow-up care and any pertinent home health orders  Disposition:     Home      Planned Readmission: No    Discharge Statement:  I spent 35 minutes discharging the patient  This time was spent on the day of discharge  I had direct contact with the patient on the day of discharge  Greater than 50% of the total time was spent examining patient, answering all patient questions, arranging and discussing plan of care with patient as well as directly providing post-discharge instructions  Additional time then spent on discharge activities  Discharge Medications:  See after visit summary for reconciled discharge medications provided to patient and family        ** Please Note: This note has been constructed using a voice recognition system **

## 2021-05-26 NOTE — PLAN OF CARE
Problem: Potential for Falls  Goal: Patient will remain free of falls  Description: INTERVENTIONS:  - Assess patient frequently for physical needs  -  Identify cognitive and physical deficits and behaviors that affect risk of falls    -  [place] fall precautions as indicated by assessment   - Educate patient/family on patient safety including physical limitations  - Instruct patient to call for assistance with activity based on assessment  - Modify environment to reduce risk of injury  - Consider OT/PT consult to assist with strengthening/mobility  5/26/2021 1400 by Lina Hurley RN  Outcome: Completed  5/26/2021 1359 by Lina Hurley RN  Outcome: Progressing     Problem: Prexisting or High Potential for Compromised Skin Integrity  Goal: Skin integrity is maintained or improved  Description: INTERVENTIONS:  - Identify patients at risk for skin breakdown  - Assess and monitor skin integrity  - Assess and monitor nutrition and hydration status  - Monitor labs   - Assess for incontinence   - Turn and reposition patient  - Assist with mobility/ambulation  - Relieve pressure over bony prominences  - Avoid friction and shearing  - Provide appropriate hygiene as needed including keeping skin clean and dry  - Evaluate need for skin moisturizer/barrier cream  - Collaborate with interdisciplinary team   - Patient/family teaching  - Consider wound care consult   5/26/2021 1400 by Lina Hurley RN  Outcome: Completed  5/26/2021 1359 by Lina Hurley RN  Outcome: Progressing     Problem: INFECTION - ADULT  Goal: Absence or prevention of progression during hospitalization  Description: INTERVENTIONS:  - Assess and monitor for signs and symptoms of infection  - Monitor lab/diagnostic results  - Monitor all insertion sites, i e  indwelling lines, tubes, and drains  - Monitor endotracheal if appropriate and nasal secretions for changes in amount and color  - [place] appropriate cooling/warming therapies per order  - Administer medications as ordered  - Instruct and encourage patient and family to use good hand hygiene technique  - Identify and instruct in appropriate isolation precautions for identified infection/condition  5/26/2021 1400 by Lina Garcia RN  Outcome: Completed  5/26/2021 1359 by Lina Garcia RN  Outcome: Progressing  Goal: Absence of fever/infection during neutropenic period  Description: INTERVENTIONS:  - Monitor WBC    5/26/2021 1400 by Lina Garcia RN  Outcome: Completed  5/26/2021 1359 by Lina Garcia RN  Outcome: Progressing     Problem: DISCHARGE PLANNING  Goal: Discharge to home or other facility with appropriate resources  Description: INTERVENTIONS:  - Identify barriers to discharge w/patient and caregiver  - Arrange for needed discharge resources and transportation as appropriate  - Identify discharge learning needs (meds, wound care, etc )  - Arrange for interpretive services to assist at discharge as needed  - Refer to Case Management Department for coordinating discharge planning if the patient needs post-hospital services based on physician/advanced practitioner order or complex needs related to functional status, cognitive ability, or social support system  5/26/2021 1400 by Lina Garcia RN  Outcome: Completed  5/26/2021 1359 by Lina Garcia RN  Outcome: Progressing

## 2021-05-26 NOTE — PLAN OF CARE
Problem: PHYSICAL THERAPY ADULT  Goal: Performs mobility at highest level of function for planned discharge setting  See evaluation for individualized goals  Description: Treatment/Interventions: ADL retraining, Functional transfer training, LE strengthening/ROM, Therapeutic exercise, Endurance training, Gait training, Bed mobility, Elevations  Equipment Recommended: (none)       See flowsheet documentation for full assessment, interventions and recommendations  Outcome: Adequate for Discharge  Note: Prognosis: Good  Problem List: Decreased strength, Decreased range of motion, Decreased endurance, Impaired balance, Decreased mobility, Obesity, Decreased skin integrity  Assessment: Pt demonstrates improving endurance, balance, quality of gait and function  Pt was able to ambulate in the halls today without a walker for the first time with a generally steady gait  Anticipate pt's mobility and function should continue to improve with continued progressive activity  PT Discharge Recommendation: Home with home health rehabilitation          See flowsheet documentation for full assessment

## 2021-05-26 NOTE — ASSESSMENT & PLAN NOTE
Hx of ETOH abuse, in the past 3-4 weeks has cut down from 1-2 bottles of vodka/day to 2-3 shots per day  ETOH 462 on admission with last drink at approxiamtely 3-4pm the day of admission according to the patient  He wishes to stop drinking and would like resources to help  No Hx of w/d seizures/DT  Improving   - Maintain CIWA protocol  - Encourage ETOH cessation   Patient was also given folic acid, thiamine multivitamin during the hospital stay  -alcohol rehab discussed but patient declines  Agrees to follow-up extensively as outpatient

## 2021-05-26 NOTE — PLAN OF CARE
Problem: Potential for Falls  Goal: Patient will remain free of falls  Description: INTERVENTIONS:  - Assess patient frequently for physical needs  -  Identify cognitive and physical deficits and behaviors that affect risk of falls    -  [place] fall precautions as indicated by assessment   - Educate patient/family on patient safety including physical limitations  - Instruct patient to call for assistance with activity based on assessment  - Modify environment to reduce risk of injury  - Consider OT/PT consult to assist with strengthening/mobility  Outcome: Progressing     Problem: Prexisting or High Potential for Compromised Skin Integrity  Goal: Skin integrity is maintained or improved  Description: INTERVENTIONS:  - Identify patients at risk for skin breakdown  - Assess and monitor skin integrity  - Assess and monitor nutrition and hydration status  - Monitor labs   - Assess for incontinence   - Turn and reposition patient  - Assist with mobility/ambulation  - Relieve pressure over bony prominences  - Avoid friction and shearing  - Provide appropriate hygiene as needed including keeping skin clean and dry  - Evaluate need for skin moisturizer/barrier cream  - Collaborate with interdisciplinary team   - Patient/family teaching  - Consider wound care consult   Outcome: Progressing     Problem: INFECTION - ADULT  Goal: Absence or prevention of progression during hospitalization  Description: INTERVENTIONS:  - Assess and monitor for signs and symptoms of infection  - Monitor lab/diagnostic results  - Monitor all insertion sites, i e  indwelling lines, tubes, and drains  - Monitor endotracheal if appropriate and nasal secretions for changes in amount and color  - [place] appropriate cooling/warming therapies per order  - Administer medications as ordered  - Instruct and encourage patient and family to use good hand hygiene technique  - Identify and instruct in appropriate isolation precautions for identified infection/condition  Outcome: Progressing  Goal: Absence of fever/infection during neutropenic period  Description: INTERVENTIONS:  - Monitor WBC    Outcome: Progressing     Problem: DISCHARGE PLANNING  Goal: Discharge to home or other facility with appropriate resources  Description: INTERVENTIONS:  - Identify barriers to discharge w/patient and caregiver  - Arrange for needed discharge resources and transportation as appropriate  - Identify discharge learning needs (meds, wound care, etc )  - Arrange for interpretive services to assist at discharge as needed  - Refer to Case Management Department for coordinating discharge planning if the patient needs post-hospital services based on physician/advanced practitioner order or complex needs related to functional status, cognitive ability, or social support system  Outcome: Progressing

## 2021-05-26 NOTE — ASSESSMENT & PLAN NOTE
Hx of depression, patient was previously on Lexapro but is no longer taking this  Seen by Psychiatry during hospitalization  Input appreciated  · Continue Lexapro  · Recommended long-term alcohol rehab, discussed with patient but declines inpatient rehab    Agrees to follow-up as outpatient

## 2021-05-26 NOTE — ASSESSMENT & PLAN NOTE
Patient had hypoglycemia with blood sugar of 20 with some change in mental status on May 24 which has since resolved  Blood sugars have been stable over the past 24 hours

## 2021-05-26 NOTE — TELEPHONE ENCOUNTER
Left message for patient to call office to schedule TCM with Olga Okeefe PA-C   Please schedule appointment then send phone call back to clinical to complete TCM encounter

## 2021-05-26 NOTE — ASSESSMENT & PLAN NOTE
Hx of anemia, in the past documented as likely multifactorial 2/2 anemia of chronic liver disease, alcohol abuse and acute blood loss in January 2/2 leg wound  Hg 7 5, lower than baseline on presentation  Denies any overt GI bleeding    Noted to episode of epistaxis  Status post 2 PRBC transfusion on 05/19  Iron sat 11%, B12, folate normal  Hemoglobin slightly lower but overall stable  - No further evidence of acute blood loss  Continue iron supplementation upon discharge  Repeat CBC in 1 week

## 2021-05-26 NOTE — NURSING NOTE
Discharge, medication and wound care instructions reviewed with patient prior to discharge  He understood the information

## 2021-05-26 NOTE — ASSESSMENT & PLAN NOTE
Chronic, Na 126 on admission, typically 130-131-134  In the setting of ETOH abuse  Improved with IV NS indicating prerenal component  Sodium remains mildly low normal at 132  ·  fluid restriction to 1000 liters per day  · Patient was initially on Lasix and albumin  · Sodium level continues to remain stable

## 2021-05-26 NOTE — ASSESSMENT & PLAN NOTE
Patient was severe lower extremity edema with erythema, history of the same and treated inpatient with IV Abx 2 times in 2021  received ceftriaxone in ED for suspected bilateral extremity cellulitis as well as UTI  Afebrile, normal white count on presentation  On exam, bilateral lower extremity cellulitis  Ultrasound of the left lower extremity with evidence of cellulitis no evidence of deeper collection  Appears to be slowly improving now afebrile tachycardia is improving  · Patient was treated with IV ceftriaxone during inpatient stay and be discharged on Bactrim  · MRSA surveillance culture-negative  Discontinued vancomycin  · Wound cultures growing stenotrophomonas and achromobacter  · Blood culture-1/2 coagulase-negative Staph  Likely contaminant    · Venous Doppler bilateral lower extremity -no evidence of DVT  · Follow wound care recommendations

## 2021-05-26 NOTE — ASSESSMENT & PLAN NOTE
Has a history of HTN but admits that he is no longer taking his home medications, but on Lasix and Aldactone  Blood pressure was running on the soft side but has improved    · Continue Lasix and Aldactone upon discharge with follow-up BMP in 1 week

## 2021-05-26 NOTE — PHYSICAL THERAPY NOTE
PT TREATMENT     05/26/21 1015   Note Type   Note Type Treatment   Pain Assessment   Pain Assessment Tool Pain Assessment not indicated - pt denies pain   Restrictions/Precautions   Other Precautions Fall Risk  (draining wound L LE)   General   Chart Reviewed Yes   Cognition   Overall Cognitive Status WFL   Subjective   Subjective "going home today"   Transfers   Sit to Stand 5  Supervision   Stand to Sit 5  Supervision   Stand pivot 5  Supervision   Ambulation/Elevation   Gait pattern Wide NENITA  (slow mireya)   Gait Assistance 5  Supervision   Assistive Device   (none)   Distance 2x150 feet;  Seated rest break in between walks   Stair Management Assistance 5  Supervision   Stair Management Technique Two rails   Number of Stairs 3   Balance   Static Sitting Fair +   Dynamic Sitting Fair +   Static Standing Fair +   Dynamic Standing Fair   Ambulatory Fair   Activity Tolerance   Activity Tolerance Patient tolerated treatment well   Assessment   Prognosis Good   Problem List Decreased strength;Decreased range of motion;Decreased endurance; Impaired balance;Decreased mobility;Obesity; Decreased skin integrity   Assessment Pt demonstrates improving endurance, balance, quality of gait and function  Pt was able to ambulate in the halls today without a walker for the first time with a generally steady gait  Anticipate pt's mobility and function should continue to improve with continued progressive activity  The patient's AM-PAC Basic Mobility Inpatient Short Form Raw Score is 23, Standardized Score is 50  88  A standardized score greater than 42 9 suggests the patient may benefit from discharge to home  Plan   Treatment/Interventions ADL retraining;Functional transfer training;LE strengthening/ROM; Therapeutic exercise; Endurance training;Gait training;Bed mobility;Elevations   Progress Progressing toward goals   PT Frequency 5x/wk   Recommendation   PT Discharge Recommendation Home with home health rehabilitation Equipment Recommended   (none)   AM-PAC Basic Mobility Inpatient   Turning in Bed Without Bedrails 4   Lying on Back to Sitting on Edge of Flat Bed 4   Moving Bed to Chair 4   Standing Up From Chair 4   Walk in Room 4   Climb 3-5 Stairs 3   Basic Mobility Inpatient Raw Score 23   Basic Mobility Standardized Score 88 56   Licensure   [place] License Number  Vearl Rough PT [id number]

## 2021-05-26 NOTE — CASE MANAGEMENT
Patient is for discharge home today  His sister will provide transport  CM discussed f/u with patient regarding PCP appointment and [address]  He states he has no preference on times and will have transport to all appointments  He states he is working on getting into the [facility] since he is unemployed  He states he has all the paper work for [facility] care and will complete it at home and f/u with Jazmine Parks  CM called the [facility] at [phone] and ext [phone] to set up aptmt but there was no answer at either number and messages were left  CM reached out to [facility] the wound care nurse  Awaiting cb  He is aware CM is waiting to hear back from [address] [facility] with appointment and will set up f/u with PCP

## 2021-05-26 NOTE — ASSESSMENT & PLAN NOTE
Hx of hepatic cirrhosis likely 2/2 ETOH use  - Recent hepatitis panel in January negative   - MELD-Na score 24 on admission  - ammonia level 35 on admission, INR 1 5  Bilirubin stable  -continue to monitor LFT  -continue lactulose upon discharge  Patient initially was in albumin with Lasix  Continue Aldactone and Lasix  -alcohol abstinence  Outpatient follow-up with GI

## 2021-05-27 NOTE — CASE MANAGEMENT
CM received a cb from Duane Rodriguez at [facility] and confirmed they received referral  She states that Kendrick Christensen at the [facility] already reached out to patient to schedule an appointment

## 2021-05-30 LAB
BACTERIA BLD CULT: NORMAL
BACTERIA BLD CULT: NORMAL

## 2021-06-01 ENCOUNTER — OFFICE VISIT (OUTPATIENT)
Dept: FAMILY MEDICINE CLINIC | Facility: CLINIC | Age: 36
End: 2021-06-01

## 2021-06-01 VITALS
OXYGEN SATURATION: 96 % | WEIGHT: 241 LBS | HEIGHT: 73 IN | DIASTOLIC BLOOD PRESSURE: 66 MMHG | RESPIRATION RATE: 14 BRPM | HEART RATE: 93 BPM | TEMPERATURE: 97.3 F | SYSTOLIC BLOOD PRESSURE: 110 MMHG | BODY MASS INDEX: 31.94 KG/M2

## 2021-06-01 DIAGNOSIS — L03.90 CELLULITIS: ICD-10-CM

## 2021-06-01 DIAGNOSIS — F19.10 SUBSTANCE ABUSE (HCC): ICD-10-CM

## 2021-06-01 DIAGNOSIS — I71.6 THORACOABDOMINAL AORTIC ANEURYSM (TAAA) WITHOUT RUPTURE (HCC): ICD-10-CM

## 2021-06-01 DIAGNOSIS — D64.9 ANEMIA, UNSPECIFIED TYPE: ICD-10-CM

## 2021-06-01 DIAGNOSIS — F17.200 TOBACCO DEPENDENCE: ICD-10-CM

## 2021-06-01 DIAGNOSIS — F10.230 ALCOHOL WITHDRAWAL SYNDROME WITHOUT COMPLICATION (HCC): Primary | ICD-10-CM

## 2021-06-01 DIAGNOSIS — F32.A DEPRESSION: ICD-10-CM

## 2021-06-01 DIAGNOSIS — K70.30 ALCOHOLIC CIRRHOSIS OF LIVER WITHOUT ASCITES (HCC): ICD-10-CM

## 2021-06-01 PROBLEM — K74.60 DECOMPENSATED HEPATIC CIRRHOSIS (HCC): Status: ACTIVE | Noted: 2021-02-25

## 2021-06-01 PROBLEM — K74.60 DECOMPENSATED HEPATIC CIRRHOSIS (HCC): Status: RESOLVED | Noted: 2021-02-25 | Resolved: 2021-06-01

## 2021-06-01 PROBLEM — K72.90 DECOMPENSATED HEPATIC CIRRHOSIS (HCC): Status: RESOLVED | Noted: 2021-02-25 | Resolved: 2021-06-01

## 2021-06-01 PROBLEM — K72.90 DECOMPENSATED HEPATIC CIRRHOSIS (HCC): Status: ACTIVE | Noted: 2021-02-25

## 2021-06-01 PROBLEM — J42 CHRONIC BRONCHITIS (HCC): Status: ACTIVE | Noted: 2021-06-01

## 2021-06-01 PROBLEM — F10.10 ALCOHOL ABUSE: Status: RESOLVED | Noted: 2021-01-19 | Resolved: 2021-06-01

## 2021-06-01 PROCEDURE — 99496 TRANSJ CARE MGMT HIGH F2F 7D: CPT | Performed by: PHYSICIAN ASSISTANT

## 2021-06-01 RX ORDER — ESCITALOPRAM OXALATE 20 MG/1
20 TABLET ORAL DAILY
Qty: 90 TABLET | Refills: 0 | Status: SHIPPED | OUTPATIENT
Start: 2021-06-01 | End: 2021-10-07 | Stop reason: SDUPTHER

## 2021-06-01 RX ORDER — SULFAMETHOXAZOLE AND TRIMETHOPRIM 800; 160 MG/1; MG/1
1 TABLET ORAL EVERY 12 HOURS SCHEDULED
Qty: 12 TABLET | Refills: 0 | Status: CANCELLED | OUTPATIENT
Start: 2021-06-01 | End: 2021-06-07

## 2021-06-01 NOTE — PROGRESS NOTES
Assessment/Plan:    Anemia  Likely 2nd to anemia of chronic liver disease, alcohol ause  Last HgB of 7 4 (5/26)  2 PRBC transfusion 5/19  - Repeat CBC  - Taking Iron and Vit C supplementation    Depression  Restarted on Lexapro 20 mg  - refilled    Alcohol withdrawal ([facility] )  Denies any current withdrawal symptoms  Participating in online AA  Declines Alcohol rehab program    Thoracoabdominal aortic aneurysm ([facility] )  "4 4 cm fusiform thoracoabdominal aortic aneurysm  The aneurysm arises from the descending thoracic aorta, extending into the abdomen " 1/2021  - Ordered repeat CTA watching for progression    Alcoholic cirrhosis of liver without ascites ([facility] )  Abstaining for 2 weeks  Currently on Lasix and Aldactone  Taking Lactulose  Directed to FU with GI    Directed to FU with Wound Management   Discussed may go to [place] if needing further workup and has coverage  FU in 2-4 weeks to review multiple complaints         TCM Call (since 5/1/2021)     Date and time call was made  5/26/2021  3:57 PM    Hospital care reviewed  Records reviewed    Patient was hospitialized at  [facility]        Date of Admission  05/18/21    Date of discharge  05/26/21    Diagnosis  cellulitis    Disposition  Home    Were the patients medications reviewed and updated  Yes    Current Symptoms  None      TCM Call (since 5/1/2021)     Post hospital issues  None    Should patient be enrolled in anticoag monitoring? No    Scheduled for follow up?   Yes    Did you obtain your prescribed medications  Yes    Do you need help managing your prescriptions or medications  No    Is transportation to your appointment needed  No    I have advised the patient to call PCP with any new or worsening symptoms  Dana montgomery [facility]  Family    The type of support provided  Financial    Do you have social support  Yes, as much as I need    Are you recieving any outpatient services  No    Are you recieving home care services  Yes    Types of home care services  Nurse visit    Are you using any community resources  No    Current waiver services  No    Have you fallen in the last 12 months  No    Interperter language line needed  No    Counseling  Patient    Counseling topics  Prognosis; Importance of RX compliance            Subjective:    Patient ID: Muriel Roe is a 39 y o  male  Pt is presenting today for TCM from hospitalization from 5/18-5/26 for leg cellulitis, anemia,   Living with his parents  Quit drinking 2 weeks ago  Currently taking Lexapro 20 mg and feels he is doing better since starting taking  [address] group for PTSD  Going to online AA meetings        No appointment scheduled for     Using clorhexadine wash and applying lotion on legs once daily    Directed to go to wound care  Is currently taking with Cheryle Fore to get on [place] care due to no insurance coverage  hyponatremia, hypoglycemia, tachycardia, depression, new cardiac murmur, thrombocytopenia, hepatic cirrhosis, alcohol abuse and HTN  The following portions of the patient's history were reviewed and updated as appropriate: allergies, current medications and problem list     Review of Systems   Constitutional: Positive for fatigue  Negative for activity change, fever and unexpected weight change  HENT: Negative for rhinorrhea and sore throat  Respiratory: Negative for cough, shortness of breath and wheezing  Cardiovascular: Positive for leg swelling  Negative for chest pain and palpitations  Gastrointestinal: Negative for constipation, diarrhea, nausea and vomiting  Musculoskeletal: Negative for back pain, neck pain and neck stiffness  Skin: Positive for wound (left leg)  Negative for rash  Psychiatric/Behavioral: Positive for dysphoric mood  The patient is nervous/anxious            Objective:  /66   Pulse 93   Temp (!) 97 3 °F (36 3 °C)   Resp 14   Ht 6' 1" (1 854 m)   Wt 109 kg (241 lb)   SpO2 96%   BMI 31 80 kg/m²      Physical Exam  Vitals signs reviewed  Constitutional:       General: He is not in acute distress  Appearance: He is well-developed  He is not diaphoretic  HENT:      Head: Normocephalic and atraumatic  Eyes:      Pupils: Pupils are equal, round, and reactive to light  Cardiovascular:      Rate and Rhythm: Normal rate and regular rhythm  Heart sounds: Normal heart sounds  No murmur  No friction rub  No gallop  Pulmonary:      Effort: Pulmonary effort is normal  No respiratory distress  Breath sounds: Normal breath sounds  No wheezing  Musculoskeletal:      Right lower leg: Edema (non-pitting with significant dry/thickened skin) present  Left lower leg: Edema present  Skin:     General: Skin is warm and dry  Neurological:      Mental Status: He is alert and oriented to person, place, and time  Psychiatric:         Behavior: Behavior normal          Thought Content: Thought content normal          BMI Counseling: Body mass index is 31 8 kg/m²  The BMI is above normal  Nutrition recommendations include decreasing portion sizes, encouraging healthy choices of fruits and vegetables, limiting drinks that contain sugar and reducing intake of cholesterol  Exercise recommendations include moderate physical activity 150 minutes/week  No pharmacotherapy was ordered

## 2021-06-01 NOTE — ASSESSMENT & PLAN NOTE
"4 4 cm fusiform thoracoabdominal aortic aneurysm    The aneurysm arises from the descending thoracic aorta, extending into the abdomen " 1/2021  - Ordered repeat CTA watching for progression

## 2021-06-01 NOTE — ASSESSMENT & PLAN NOTE
Likely 2nd to anemia of chronic liver disease, alcohol ause  Last HgB of 7 4 (5/26)  2 PRBC transfusion 5/19  - Repeat CBC  - Taking Iron and Vit C supplementation

## 2021-06-04 DIAGNOSIS — R60.1 ANASARCA: ICD-10-CM

## 2021-06-04 RX ORDER — FUROSEMIDE 40 MG/1
40 TABLET ORAL DAILY
Qty: 30 TABLET | Refills: 0 | Status: SHIPPED | OUTPATIENT
Start: 2021-06-04 | End: 2021-07-07 | Stop reason: SDUPTHER

## 2021-06-04 RX ORDER — FUROSEMIDE 40 MG/1
40 TABLET ORAL DAILY
Qty: 30 TABLET | Refills: 0 | Status: SHIPPED | OUTPATIENT
Start: 2021-06-04 | End: 2021-06-04 | Stop reason: SDUPTHER

## 2021-06-04 NOTE — TELEPHONE ENCOUNTER
----- Message from Sanju Vann sent at 6/4/2021  3:40 PM EDT -----  Regarding: Prescription Question  Contact: [phone]  Fazal Posada, can you refill lasix 40 mg? I have 3 left  Everything else is looking ok  I'm continuing to clean daily and take everything as prescribed      Thank you and have a good day  T J

## 2021-06-24 DIAGNOSIS — D64.9 ANEMIA, UNSPECIFIED TYPE: ICD-10-CM

## 2021-06-24 RX ORDER — PANTOPRAZOLE SODIUM 40 MG/1
40 TABLET, DELAYED RELEASE ORAL
Qty: 30 TABLET | Refills: 0 | Status: SHIPPED | OUTPATIENT
Start: 2021-06-24 | End: 2021-07-19

## 2021-07-07 DIAGNOSIS — K70.30 ALCOHOLIC CIRRHOSIS OF LIVER WITHOUT ASCITES (HCC): Primary | ICD-10-CM

## 2021-07-07 DIAGNOSIS — F10.10 ALCOHOL ABUSE: ICD-10-CM

## 2021-07-07 DIAGNOSIS — R60.1 ANASARCA: ICD-10-CM

## 2021-07-07 RX ORDER — FUROSEMIDE 40 MG/1
40 TABLET ORAL DAILY
Qty: 30 TABLET | Refills: 0 | Status: SHIPPED | OUTPATIENT
Start: 2021-07-07 | End: 2021-08-13 | Stop reason: SDUPTHER

## 2021-07-07 RX ORDER — SPIRONOLACTONE 50 MG/1
50 TABLET, FILM COATED ORAL DAILY
Qty: 30 TABLET | Refills: 0 | Status: SHIPPED | OUTPATIENT
Start: 2021-07-07 | End: 2021-08-13 | Stop reason: SDUPTHER

## 2021-07-07 NOTE — TELEPHONE ENCOUNTER
Refills sent  It looks like Lynda Gasca ordered lab work and wanted patient to follow-up at some point, if you could please remind patient of this plan that would be great   Thanks

## 2021-07-17 DIAGNOSIS — D64.9 ANEMIA, UNSPECIFIED TYPE: ICD-10-CM

## 2021-07-19 RX ORDER — PANTOPRAZOLE SODIUM 40 MG/1
40 TABLET, DELAYED RELEASE ORAL
Qty: 30 TABLET | Refills: 2 | Status: SHIPPED | OUTPATIENT
Start: 2021-07-19 | End: 2021-07-29 | Stop reason: SDUPTHER

## 2021-07-29 DIAGNOSIS — D64.9 ANEMIA, UNSPECIFIED TYPE: ICD-10-CM

## 2021-07-29 DIAGNOSIS — F10.10 ALCOHOL ABUSE: ICD-10-CM

## 2021-07-29 RX ORDER — LACTULOSE 20 G/30ML
20 SOLUTION ORAL 2 TIMES DAILY
Qty: 300 ML | Refills: 0 | Status: SHIPPED | OUTPATIENT
Start: 2021-07-29 | End: 2021-08-02

## 2021-07-29 RX ORDER — PANTOPRAZOLE SODIUM 40 MG/1
40 TABLET, DELAYED RELEASE ORAL
Qty: 30 TABLET | Refills: 2 | Status: SHIPPED | OUTPATIENT
Start: 2021-07-29 | End: 2022-08-02

## 2021-07-31 DIAGNOSIS — F10.10 ALCOHOL ABUSE: ICD-10-CM

## 2021-08-02 RX ORDER — LACTULOSE 10 G/15ML
SOLUTION ORAL
Qty: 1892 ML | Refills: 1 | Status: SHIPPED | OUTPATIENT
Start: 2021-08-02 | End: 2022-08-02

## 2021-08-17 ENCOUNTER — TELEPHONE (OUTPATIENT)
Dept: FAMILY MEDICINE CLINIC | Facility: CLINIC | Age: 36
End: 2021-08-17

## 2021-08-17 NOTE — TELEPHONE ENCOUNTER
----- Message from Tamika Young sent at 8/13/2021  1:03 PM EDT -----  Regarding: RE: Non-Urgent Medical Question  Contact: [phone]    By the way, there is not much redness just draining     ----- Message -----  From: Sami Palafox PA-C  Sent: 8/13/21 12:48 PM  To: Tamika Young  Subject: RE: Non-Urgent Medical Question    Thank you for sending the pictures which are very useful to understand how your feet look  How much redness do you see around the open wounds  I don't spot any on the pictures but it may be the lighting  When I saw you last June 1st, I was hoping to have you see wound care and see you after a few weeks  You had also talked about getting some care through the [place]  I am hoping     I am hoping to get your feet improved for you to start wearing shoes again but we will need to make sure and address any other health conditions which are leading to the leg swelling  What has the [place] has been doing? If you haven't seen them I would like to see you in the office and see how you are doing overall       ----- Message -----       From:Ramin Neal       Sent:8/13/2021 11:14 AM EDT         To:Julius Mcbride PA-C    Subject:Non-Urgent Medical Question      Good morning Slava Guaman  You said we can discuss another round of antibiotics for any open wounds  I think these are getting there    I been using topical antibiotics and cleaning regularly but now I think I need something stronger so I can wear shoes again and get around  I'm also almost out of Lasix and Aldactone  Let me know what you would like me to do  Thank you

## 2021-10-07 DIAGNOSIS — F32.A DEPRESSION: ICD-10-CM

## 2021-10-07 DIAGNOSIS — K70.30 ALCOHOLIC CIRRHOSIS OF LIVER WITHOUT ASCITES (HCC): ICD-10-CM

## 2021-10-07 DIAGNOSIS — R60.1 ANASARCA: ICD-10-CM

## 2021-10-07 RX ORDER — ESCITALOPRAM OXALATE 20 MG/1
20 TABLET ORAL DAILY
Qty: 90 TABLET | Refills: 0 | Status: SHIPPED | OUTPATIENT
Start: 2021-10-07 | End: 2022-08-02

## 2021-10-07 RX ORDER — FUROSEMIDE 40 MG/1
40 TABLET ORAL DAILY
Qty: 30 TABLET | Refills: 0 | Status: SHIPPED | OUTPATIENT
Start: 2021-10-07 | End: 2022-08-02

## 2022-06-19 ENCOUNTER — AMB VIDEO VISIT (OUTPATIENT)
Dept: OTHER | Facility: HOSPITAL | Age: 37
End: 2022-06-19

## 2022-06-20 NOTE — CARE ANYWHERE EVISITS
Visit Summary for XIAO AMADOR - Gender: Male - Date of Birth: [id number]  Date: [id number] - Duration: 3 minutes  Patient: Zandra AMADOR  Provider: Sage Springer    Patient Contact Information  Address  [address] [place] [address] [address]; [place]   [phone]    Visit Topics    Triage Questions   What is your current physical address in the event of a medical emergency? Answer []  Are you allergic to any medications? Answer []  Are you now or could you be pregnant? Answer []  Do you have any immune system compromise or chronic lung   disease? Answer []  Do you have any vulnerable family members in the home (infant, pregnant, cancer, elderly)? Answer []     Conversation Transcripts  [0A][0A] [Notification] You are connected with Thad Sarah, [facility] MAR[place]IE is located in [place]  [0A][Notification] XIAO AMADOR has shared health history  Sanket Sanchez  [0A][Notification] Thad Alvarez has added a   prescription  [0A]    Diagnosis  Adjustment disorder with anxiety    Procedures  Value: [id number] Code: CPT-4 UNLISTED E&M SERVICE    Medications Prescribed    Lexapro  Dose : 1 tablet  Route : oral  Frequency : every day  Until directed to stop  Refills : 2  Instructions to the Pharmacist : Substitutions allowed      Provider Notes  [0A][0A] [0A]We strongly encourage you to share the following record of today's visit with your primary care physician  [0A][0A][0A][0A]Contact phone number: [phone][4V][2F][9J][0S]LMID of Communication: video[0A][0A][0A][0A]HPI: This patient needs   a refill for his Lexapro  He is unable to secure an appointment with his primary care doctor [0A][0A][0A][0A][0A][0A]PMH: mood disorders[0A][0A]PSH: at home[0A][0A]Meds: Lexapro 10 mg[0A][0A]Allergies: none[0A][0A][0A][0A]Exam: [0A][0A]Gen: Alert,   normal mental status and interaction, no visible distress, non- toxic appearance  [0A][0A][0A][0A]Assessment: anxiety  depression[0A][0A][0A][0A]Plan: [0A][0A]1  I am sending you a prescription as described below  [0A][0A]2  Discussed precautions  [0A][0A][0A][0A]Follow up:[0A][0A]1  If there are any questions or problems with the prescription, call [phone] anytime for assistance  [0A][0A]2  Please re-connect for another online visit or see an in-person provider should your symptoms worsen   or persist   [0A][0A]3  Taking a probiotic (either in pill form or by eating yogurt that contains probiotics) while using antibiotics can help prevent some of the troublesome side effects that antibiotics can sometimes cause [0A][0A]4  Please print a   copy of this note and send it to your regular doctor, or take it to your next visit so it may be included in your medical record  [0A][0A][0A][0A]Patient voiced understanding and agrees to plan [0A][0A][0A][0A]Please see your PCP on an annual basis  [0A]    Electronically signed by: Broderick Torrez(NPI [id number])

## 2022-07-16 ENCOUNTER — HOSPITAL ENCOUNTER (INPATIENT)
Facility: HOSPITAL | Age: 37
LOS: 2 days | DRG: 432 | End: 2022-07-18
Attending: EMERGENCY MEDICINE | Admitting: STUDENT IN AN ORGANIZED HEALTH CARE EDUCATION/TRAINING PROGRAM

## 2022-07-16 DIAGNOSIS — D64.9 ANEMIA: Primary | ICD-10-CM

## 2022-07-16 DIAGNOSIS — R62.7 FAILURE TO THRIVE IN ADULT: ICD-10-CM

## 2022-07-16 DIAGNOSIS — K72.90 LIVER FAILURE (HCC): ICD-10-CM

## 2022-07-16 PROBLEM — R06.2 WHEEZING: Status: ACTIVE | Noted: 2022-07-16

## 2022-07-16 LAB
2HR DELTA HS TROPONIN: 0 NG/L
ABO GROUP BLD: NORMAL
ALBUMIN SERPL BCP-MCNC: 3.3 G/DL (ref 3.5–5)
ALP SERPL-CCNC: 221 U/L (ref 34–104)
ALT SERPL W P-5'-P-CCNC: 29 U/L (ref 7–52)
AMMONIA PLAS-SCNC: 98 UMOL/L (ref 18–72)
AMPHETAMINES SERPL QL SCN: NEGATIVE
ANION GAP SERPL CALCULATED.3IONS-SCNC: 11 MMOL/L (ref 4–13)
APTT PPP: 45 SECONDS (ref 23–37)
AST SERPL W P-5'-P-CCNC: 128 U/L (ref 13–39)
BARBITURATES UR QL: NEGATIVE
BASOPHILS # BLD AUTO: 0.02 THOUSANDS/ΜL (ref 0–0.1)
BASOPHILS NFR BLD AUTO: 0 % (ref 0–1)
BENZODIAZ UR QL: NEGATIVE
BILIRUB DIRECT SERPL-MCNC: 2.58 MG/DL (ref 0–0.2)
BILIRUB SERPL-MCNC: 4.61 MG/DL (ref 0.2–1)
BLD GP AB SCN SERPL QL: NEGATIVE
BUN SERPL-MCNC: 8 MG/DL (ref 5–25)
CALCIUM ALBUM COR SERPL-MCNC: 8.7 MG/DL (ref 8.3–10.1)
CALCIUM SERPL-MCNC: 8.1 MG/DL (ref 8.4–10.2)
CARDIAC TROPONIN I PNL SERPL HS: 6 NG/L
CARDIAC TROPONIN I PNL SERPL HS: 6 NG/L
CHLORIDE SERPL-SCNC: 94 MMOL/L (ref 96–108)
CK SERPL-CCNC: 40 U/L (ref 39–308)
CO2 SERPL-SCNC: 28 MMOL/L (ref 21–32)
COCAINE UR QL: NEGATIVE
CREAT SERPL-MCNC: 0.32 MG/DL (ref 0.6–1.3)
EOSINOPHIL # BLD AUTO: 0.01 THOUSAND/ΜL (ref 0–0.61)
EOSINOPHIL NFR BLD AUTO: 0 % (ref 0–6)
ERYTHROCYTE [DISTWIDTH] IN BLOOD BY AUTOMATED COUNT: 30.8 % (ref 11.6–15.1)
ETHANOL SERPL-MCNC: 448 MG/DL
GFR SERPL CREATININE-BSD FRML MDRD: 166 ML/MIN/1.73SQ M
GLUCOSE SERPL-MCNC: 118 MG/DL (ref 65–140)
HCT VFR BLD AUTO: 13.7 % (ref 36.5–49.3)
HEMOCCULT STL QL IA: NEGATIVE
HGB BLD-MCNC: 3.6 G/DL (ref 12–17)
IMM GRANULOCYTES # BLD AUTO: 0.12 THOUSAND/UL (ref 0–0.2)
IMM GRANULOCYTES NFR BLD AUTO: 2 % (ref 0–2)
INR PPP: 1.87 (ref 0.84–1.19)
LIPASE SERPL-CCNC: 50 U/L (ref 11–82)
LYMPHOCYTES # BLD AUTO: 1.42 THOUSANDS/ΜL (ref 0.6–4.47)
LYMPHOCYTES NFR BLD AUTO: 23 % (ref 14–44)
MAGNESIUM SERPL-MCNC: 2 MG/DL (ref 1.9–2.7)
MCH RBC QN AUTO: 16.7 PG (ref 26.8–34.3)
MCHC RBC AUTO-ENTMCNC: 26.3 G/DL (ref 31.4–37.4)
MCV RBC AUTO: 64 FL (ref 82–98)
METHADONE UR QL: NEGATIVE
MONOCYTES # BLD AUTO: 0.53 THOUSAND/ΜL (ref 0.17–1.22)
MONOCYTES NFR BLD AUTO: 9 % (ref 4–12)
NEUTROPHILS # BLD AUTO: 3.99 THOUSANDS/ΜL (ref 1.85–7.62)
NEUTS SEG NFR BLD AUTO: 66 % (ref 43–75)
NRBC BLD AUTO-RTO: 3 /100 WBCS
OPIATES UR QL SCN: NEGATIVE
OXYCODONE+OXYMORPHONE UR QL SCN: NEGATIVE
PCP UR QL: NEGATIVE
PHOSPHATE SERPL-MCNC: 2.6 MG/DL (ref 2.7–4.5)
PLATELET # BLD AUTO: 140 THOUSANDS/UL (ref 149–390)
PMV BLD AUTO: 8.5 FL (ref 8.9–12.7)
POTASSIUM SERPL-SCNC: 4 MMOL/L (ref 3.5–5.3)
PROT SERPL-MCNC: 7.6 G/DL (ref 6.4–8.4)
PROTHROMBIN TIME: 21.1 SECONDS (ref 11.6–14.5)
RBC # BLD AUTO: 2.15 MILLION/UL (ref 3.88–5.62)
RH BLD: POSITIVE
SODIUM SERPL-SCNC: 133 MMOL/L (ref 135–147)
SPECIMEN EXPIRATION DATE: NORMAL
THC UR QL: NEGATIVE
WBC # BLD AUTO: 6.09 THOUSAND/UL (ref 4.31–10.16)

## 2022-07-16 PROCEDURE — 86923 COMPATIBILITY TEST ELECTRIC: CPT

## 2022-07-16 PROCEDURE — 82140 ASSAY OF AMMONIA: CPT | Performed by: EMERGENCY MEDICINE

## 2022-07-16 PROCEDURE — 85730 THROMBOPLASTIN TIME PARTIAL: CPT | Performed by: EMERGENCY MEDICINE

## 2022-07-16 PROCEDURE — 36415 COLL VENOUS BLD VENIPUNCTURE: CPT | Performed by: EMERGENCY MEDICINE

## 2022-07-16 PROCEDURE — 86920 COMPATIBILITY TEST SPIN: CPT

## 2022-07-16 PROCEDURE — 86850 RBC ANTIBODY SCREEN: CPT | Performed by: EMERGENCY MEDICINE

## 2022-07-16 PROCEDURE — 82607 VITAMIN B-12: CPT | Performed by: STUDENT IN AN ORGANIZED HEALTH CARE EDUCATION/TRAINING PROGRAM

## 2022-07-16 PROCEDURE — 83550 IRON BINDING TEST: CPT | Performed by: STUDENT IN AN ORGANIZED HEALTH CARE EDUCATION/TRAINING PROGRAM

## 2022-07-16 PROCEDURE — 85610 PROTHROMBIN TIME: CPT | Performed by: EMERGENCY MEDICINE

## 2022-07-16 PROCEDURE — 80307 DRUG TEST PRSMV CHEM ANLYZR: CPT | Performed by: EMERGENCY MEDICINE

## 2022-07-16 PROCEDURE — 99291 CRITICAL CARE FIRST HOUR: CPT | Performed by: EMERGENCY MEDICINE

## 2022-07-16 PROCEDURE — P9016 RBC LEUKOCYTES REDUCED: HCPCS

## 2022-07-16 PROCEDURE — 83735 ASSAY OF MAGNESIUM: CPT | Performed by: EMERGENCY MEDICINE

## 2022-07-16 PROCEDURE — 86900 BLOOD TYPING SEROLOGIC ABO: CPT | Performed by: EMERGENCY MEDICINE

## 2022-07-16 PROCEDURE — 82746 ASSAY OF FOLIC ACID SERUM: CPT | Performed by: STUDENT IN AN ORGANIZED HEALTH CARE EDUCATION/TRAINING PROGRAM

## 2022-07-16 PROCEDURE — 82077 ASSAY SPEC XCP UR&BREATH IA: CPT | Performed by: EMERGENCY MEDICINE

## 2022-07-16 PROCEDURE — 93005 ELECTROCARDIOGRAM TRACING: CPT

## 2022-07-16 PROCEDURE — 83540 ASSAY OF IRON: CPT | Performed by: STUDENT IN AN ORGANIZED HEALTH CARE EDUCATION/TRAINING PROGRAM

## 2022-07-16 PROCEDURE — 30233N1 TRANSFUSION OF NONAUTOLOGOUS RED BLOOD CELLS INTO PERIPHERAL VEIN, PERCUTANEOUS APPROACH: ICD-10-PCS | Performed by: EMERGENCY MEDICINE

## 2022-07-16 PROCEDURE — 83690 ASSAY OF LIPASE: CPT | Performed by: EMERGENCY MEDICINE

## 2022-07-16 PROCEDURE — 82105 ALPHA-FETOPROTEIN SERUM: CPT | Performed by: STUDENT IN AN ORGANIZED HEALTH CARE EDUCATION/TRAINING PROGRAM

## 2022-07-16 PROCEDURE — 80053 COMPREHEN METABOLIC PANEL: CPT | Performed by: EMERGENCY MEDICINE

## 2022-07-16 PROCEDURE — 86901 BLOOD TYPING SEROLOGIC RH(D): CPT | Performed by: EMERGENCY MEDICINE

## 2022-07-16 PROCEDURE — 84484 ASSAY OF TROPONIN QUANT: CPT | Performed by: EMERGENCY MEDICINE

## 2022-07-16 PROCEDURE — 82248 BILIRUBIN DIRECT: CPT | Performed by: STUDENT IN AN ORGANIZED HEALTH CARE EDUCATION/TRAINING PROGRAM

## 2022-07-16 PROCEDURE — 85025 COMPLETE CBC W/AUTO DIFF WBC: CPT | Performed by: EMERGENCY MEDICINE

## 2022-07-16 PROCEDURE — 84100 ASSAY OF PHOSPHORUS: CPT | Performed by: EMERGENCY MEDICINE

## 2022-07-16 PROCEDURE — 99223 1ST HOSP IP/OBS HIGH 75: CPT | Performed by: STUDENT IN AN ORGANIZED HEALTH CARE EDUCATION/TRAINING PROGRAM

## 2022-07-16 PROCEDURE — 82728 ASSAY OF FERRITIN: CPT | Performed by: STUDENT IN AN ORGANIZED HEALTH CARE EDUCATION/TRAINING PROGRAM

## 2022-07-16 PROCEDURE — 82550 ASSAY OF CK (CPK): CPT | Performed by: EMERGENCY MEDICINE

## 2022-07-16 PROCEDURE — 96360 HYDRATION IV INFUSION INIT: CPT

## 2022-07-16 PROCEDURE — 99285 EMERGENCY DEPT VISIT HI MDM: CPT

## 2022-07-16 PROCEDURE — G0328 FECAL BLOOD SCRN IMMUNOASSAY: HCPCS | Performed by: EMERGENCY MEDICINE

## 2022-07-16 RX ORDER — ESCITALOPRAM OXALATE 20 MG/1
20 TABLET ORAL DAILY
Status: DISCONTINUED | OUTPATIENT
Start: 2022-07-17 | End: 2022-07-18 | Stop reason: HOSPADM

## 2022-07-16 RX ORDER — FUROSEMIDE 40 MG/1
40 TABLET ORAL DAILY
Status: DISCONTINUED | OUTPATIENT
Start: 2022-07-17 | End: 2022-07-16

## 2022-07-16 RX ORDER — ALBUTEROL SULFATE 90 UG/1
2 AEROSOL, METERED RESPIRATORY (INHALATION) EVERY 6 HOURS PRN
Status: DISCONTINUED | OUTPATIENT
Start: 2022-07-16 | End: 2022-07-18 | Stop reason: HOSPADM

## 2022-07-16 RX ORDER — LANOLIN ALCOHOL/MO/W.PET/CERES
100 CREAM (GRAM) TOPICAL DAILY
Status: DISCONTINUED | OUTPATIENT
Start: 2022-07-17 | End: 2022-07-18 | Stop reason: HOSPADM

## 2022-07-16 RX ORDER — FUROSEMIDE 10 MG/ML
20 INJECTION INTRAMUSCULAR; INTRAVENOUS ONCE
Status: COMPLETED | OUTPATIENT
Start: 2022-07-16 | End: 2022-07-16

## 2022-07-16 RX ORDER — PANTOPRAZOLE SODIUM 40 MG/1
40 TABLET, DELAYED RELEASE ORAL
Status: DISCONTINUED | OUTPATIENT
Start: 2022-07-17 | End: 2022-07-18 | Stop reason: HOSPADM

## 2022-07-16 RX ORDER — LACTULOSE 20 G/30ML
20 SOLUTION ORAL 2 TIMES DAILY
Status: DISCONTINUED | OUTPATIENT
Start: 2022-07-16 | End: 2022-07-18 | Stop reason: HOSPADM

## 2022-07-16 RX ORDER — SPIRONOLACTONE 25 MG/1
50 TABLET ORAL DAILY
Status: DISCONTINUED | OUTPATIENT
Start: 2022-07-16 | End: 2022-07-17

## 2022-07-16 RX ORDER — ONDANSETRON 2 MG/ML
4 INJECTION INTRAMUSCULAR; INTRAVENOUS EVERY 4 HOURS PRN
Status: DISCONTINUED | OUTPATIENT
Start: 2022-07-16 | End: 2022-07-16

## 2022-07-16 RX ORDER — FOLIC ACID 1 MG/1
1 TABLET ORAL DAILY
Status: DISCONTINUED | OUTPATIENT
Start: 2022-07-17 | End: 2022-07-18 | Stop reason: HOSPADM

## 2022-07-16 RX ORDER — FUROSEMIDE 10 MG/ML
40 INJECTION INTRAMUSCULAR; INTRAVENOUS DAILY
Status: DISCONTINUED | OUTPATIENT
Start: 2022-07-17 | End: 2022-07-16

## 2022-07-16 RX ORDER — NICOTINE 21 MG/24HR
1 PATCH, TRANSDERMAL 24 HOURS TRANSDERMAL DAILY
Status: DISCONTINUED | OUTPATIENT
Start: 2022-07-17 | End: 2022-07-16

## 2022-07-16 RX ORDER — BUPRENORPHINE AND NALOXONE 8; 2 MG/1; MG/1
8 FILM, SOLUBLE BUCCAL; SUBLINGUAL 2 TIMES DAILY
Status: DISCONTINUED | OUTPATIENT
Start: 2022-07-16 | End: 2022-07-18 | Stop reason: HOSPADM

## 2022-07-16 RX ORDER — NICOTINE 21 MG/24HR
1 PATCH, TRANSDERMAL 24 HOURS TRANSDERMAL DAILY
Status: DISCONTINUED | OUTPATIENT
Start: 2022-07-16 | End: 2022-07-18 | Stop reason: HOSPADM

## 2022-07-16 RX ORDER — ZOLPIDEM TARTRATE 6.25 MG/1
6.25 TABLET, FILM COATED, EXTENDED RELEASE ORAL
COMMUNITY
End: 2022-08-02

## 2022-07-16 RX ORDER — SPIRONOLACTONE 25 MG/1
50 TABLET ORAL DAILY
Status: DISCONTINUED | OUTPATIENT
Start: 2022-07-17 | End: 2022-07-16

## 2022-07-16 RX ORDER — LORAZEPAM 2 MG/ML
2 INJECTION INTRAMUSCULAR ONCE
Status: COMPLETED | OUTPATIENT
Start: 2022-07-16 | End: 2022-07-16

## 2022-07-16 RX ORDER — FUROSEMIDE 10 MG/ML
40 INJECTION INTRAMUSCULAR; INTRAVENOUS DAILY
Status: DISCONTINUED | OUTPATIENT
Start: 2022-07-16 | End: 2022-07-17

## 2022-07-16 RX ORDER — LACTULOSE 20 G/30ML
10 SOLUTION ORAL 3 TIMES DAILY
Status: DISCONTINUED | OUTPATIENT
Start: 2022-07-16 | End: 2022-07-16

## 2022-07-16 RX ADMIN — TRIMETHOBENZAMIDE HYDROCHLORIDE 200 MG: 100 INJECTION INTRAMUSCULAR at 20:43

## 2022-07-16 RX ADMIN — BUPRENORPHINE AND NALOXONE 8 MG: 8; 2 FILM BUCCAL; SUBLINGUAL at 22:39

## 2022-07-16 RX ADMIN — FUROSEMIDE 20 MG: 10 INJECTION, SOLUTION INTRAMUSCULAR; INTRAVENOUS at 23:13

## 2022-07-16 RX ADMIN — LACTULOSE 20 G: 20 SOLUTION ORAL at 19:39

## 2022-07-16 RX ADMIN — SODIUM CHLORIDE 1000 ML: 0.9 INJECTION, SOLUTION INTRAVENOUS at 16:00

## 2022-07-16 RX ADMIN — NICOTINE 1 PATCH: 21 PATCH, EXTENDED RELEASE TRANSDERMAL at 23:13

## 2022-07-16 RX ADMIN — SPIRONOLACTONE 50 MG: 25 TABLET ORAL at 19:39

## 2022-07-16 RX ADMIN — FUROSEMIDE 40 MG: 10 INJECTION, SOLUTION INTRAMUSCULAR; INTRAVENOUS at 19:39

## 2022-07-16 RX ADMIN — LORAZEPAM 2 MG: 2 INJECTION INTRAMUSCULAR; INTRAVENOUS at 20:51

## 2022-07-16 NOTE — ED NOTES
Specimen Note    Pt unable to provide urine sample; will continue to encourage        Madeleine Bernardo  07/16/22 7506

## 2022-07-16 NOTE — ED PROVIDER NOTES
History  Chief Complaint   Patient presents with    Altered Mental Status     Parents called EMS due to patient acting "not himself" and patient has not been taking medications  Pt has extensive mental health history         This is a 40year old male who presents to the ED with EMS  As per EMS the patient was found lying in his own urine and feces  He states he has not gotten out of bed for two weeks  He states he has not wanted to get out of bed  He states he has not had a drink in two weeks  He denies fevers  He denies pain  He denies nausea or vomiting  He does feel that he is a danger to himself, but does admit that he has not taken care of himself  Prior to Admission Medications   Prescriptions Last Dose Informant Patient Reported? Taking? albuterol (Ventolin HFA) 90 mcg/act inhaler 7/15/2022 at Unknown time Self No Yes   Sig: Inhale 2 puffs every 6 (six) hours as needed for wheezing   ammonium lactate (LAC-HYDRIN) 12 % lotion More than a month at Unknown time Self No No   Sig: Apply topically 2 (two) times a day   buprenorphine-naloxone (SUBOXONE) 8-2 mg per SL tablet 7/15/2022 at Unknown time Self Yes Yes   Sig: Place 2 tablets under the tongue daily Verified from [facility], [phone]   Precribed by Dr Philip Beck, Last Prescirbed 5/17/2021   escitalopram (LEXAPRO) 20 mg tablet 7/15/2022 at Unknown time  No Yes   Sig: Take 1 tablet (20 mg total) by mouth daily   ferrous sulfate 324 (65 Fe) mg Not Taking at Unknown time Self No No   Sig: Take 1 tablet (324 mg total) by mouth 2 (two) times a day before meals   Patient not taking: No sig reported   folic acid (FOLVITE) 1 mg tablet Not Taking at Unknown time Self No No   Sig: Take 1 tablet (1 mg total) by mouth daily   Patient not taking: No sig reported   furosemide (LASIX) 40 mg tablet More than a month at Unknown time  No No   Sig: Take 1 tablet (40 mg total) by mouth daily   lactulose (CHRONULAC) 10 g/15 mL solution More than a month at Unknown time  No No   Sig: TAKE 30 ML (20 G TOTAL) BY MOUTH 2 (TWO) TIMES A DAY   Patient taking differently: 10 g daily at bedtime   nicotine (NICODERM CQ) 14 mg/24hr TD 24 hr patch More than a month at Unknown time Self No No   Sig: Place 1 patch on the skin daily   Patient not taking: No sig reported   pantoprazole (PROTONIX) 40 mg tablet Not Taking at Unknown time  No No   Sig: Take 1 tablet (40 mg total) by mouth daily in the early morning   Patient not taking: Reported on 7/16/2022   spironolactone (ALDACTONE) 50 mg tablet Not Taking at Unknown time  No No   Sig: Take 1 tablet (50 mg total) by mouth daily   Patient not taking: Reported on 7/16/2022   zolpidem (AMBIEN CR) 6 25 MG CR tablet Past Week at Unknown time  Yes Yes   Sig: Take 6 25 mg by mouth daily at bedtime as needed for sleep      Facility-Administered Medications: None       Past Medical History:   Diagnosis Date    AAA (abdominal aortic aneurysm) (HCC)     Allergic     Anemia     Asthma     Depression 05/18/2021    Essential hypertension 05/06/2019    Obesity     Opiate dependence (HCC)     Substance abuse (HCC)        Past Surgical History:   Procedure Laterality Date    LYMPH NODE BIOPSY         Family History   Problem Relation Age of Onset    No Known Problems Mother     Diabetes Father     Prostate cancer Father     Hypertension Father      I have reviewed and agree with the history as documented  E-Cigarette/Vaping    E-Cigarette Use Current Some Day User      E-Cigarette/Vaping Substances     Social History     Tobacco Use    Smoking status: Current Every Day Smoker     Packs/day: 1 00    Smokeless tobacco: Never Used    Tobacco comment: 2/2019; PATIENT VAPES   Vaping Use    Vaping Use: Some days   Substance Use Topics    Alcohol use:  Yes     Alcohol/week: 12 0 standard drinks     Types: 12 Cans of beer per week    Drug use: Not Currently     Types: Heroin     Comment: 7/16/22 recovery       Review of Systems   All other systems reviewed and are negative        Physical Exam  Physical Exam  Constitutional:  Vital signs reviewed, appears pale, covered in feces and urine  Eyes: Pupils equal round reactive to light and accommodation, extraocular muscles intact, mild jaundice   HEENT: trachea midline, no JVD, moist mucous membranes  Respiratory: lung sounds clear throughout, no rhonchi, no rales  Cardiovascular: regular rate rhythm, no murmurs or rubs  Abdomen: soft, nontender, distended, no rebound or guarding  Back: no CVA tenderness, normal inspection  Extremities: no edema, pulses equal in all 4 extremities  Neuro: awake, alert, oriented, no focal weakness  Skin: warm, dry, intact, no rashes noted, no skin breakdown    Vital Signs  ED Triage Vitals   Temperature Pulse Respirations Blood Pressure SpO2   07/16/22 1535 07/16/22 1535 07/16/22 1535 07/16/22 1535 07/16/22 1538   97 7 °F (36 5 °C) 82 16 98/55 96 %      Temp Source Heart Rate Source Patient Position - Orthostatic VS BP Location FiO2 (%)   07/16/22 1808 07/16/22 1535 07/16/22 1535 07/16/22 1535 --   Tympanic Monitor Lying Right arm       Pain Score       07/16/22 1535       No Pain           Vitals:    07/18/22 1240 07/18/22 1255 07/18/22 1309 07/18/22 1355   BP: 129/72 114/76 124/66 (!) 125/46   Pulse: (!) 115 (!) 108 105 102   Patient Position - Orthostatic VS:             Visual Acuity  Visual Acuity    Flowsheet Row Most Recent Value   L Pupil Size (mm) 4   R Pupil Size (mm) 4   L Pupil Shape Round   R Pupil Shape Round          ED Medications  Medications   thiamine tablet 100 mg (100 mg Oral Given 7/00/39 3853)   folic acid (FOLVITE) tablet 1 mg (1 mg Oral Given 7/18/22 1047)   multivitamin-minerals (CENTRUM) tablet 1 tablet (1 tablet Oral Given 7/18/22 1048)   albuterol (PROVENTIL HFA,VENTOLIN HFA) inhaler 2 puff (has no administration in time range)   escitalopram (LEXAPRO) tablet 20 mg (20 mg Oral Given 7/18/22 1048)   pantoprazole (PROTONIX) EC tablet 40 mg (40 mg Oral Given 7/18/22 0610)   lactulose oral solution 20 g (20 g Oral Given 7/18/22 1047)   buprenorphine-naloxone (Suboxone) film 8 mg (8 mg Sublingual Given 7/18/22 1048)   trimethobenzamide (TIGAN) IM injection 200 mg (200 mg Intramuscular Given 7/17/22 0940)   nicotine (NICODERM CQ) 21 mg/24 hr TD 24 hr patch 1 patch (1 patch Transdermal Medication Applied 7/18/22 1049)   ferrous sulfate tablet 325 mg (325 mg Oral Given 7/18/22 1048)   potassium chloride (K-DUR,KLOR-CON) CR tablet 40 mEq (40 mEq Oral Not Given 7/18/22 1300)   midodrine (PROAMATINE) tablet 10 mg (10 mg Oral Not Given 7/18/22 1311)   oxazepam (SERAX) capsule 10 mg (10 mg Oral Given 7/18/22 1302)   spironolactone (ALDACTONE) tablet 50 mg (50 mg Oral Given 7/18/22 1047)   LORazepam (ATIVAN) tablet 1 mg (has no administration in time range)   furosemide (LASIX) injection 40 mg (40 mg Intravenous Given 7/18/22 1047)   albumin human (FLEXBUMIN) 25 % injection 25 g (25 g Intravenous New Bag 7/18/22 1048)   cefTRIAXone (ROCEPHIN) IVPB (premix in dextrose) 1,000 mg 50 mL (1,000 mg Intravenous New Bag 7/17/22 2052)   diphenhydrAMINE (BENADRYL) injection 25 mg (25 mg Intravenous Given 7/17/22 2144)   sodium chloride 0 9 % bolus 1,000 mL (0 mL Intravenous Stopped 7/16/22 1909)   LORazepam (ATIVAN) injection 2 mg (2 mg Intravenous Given 7/16/22 2051)   furosemide (LASIX) injection 20 mg (20 mg Intravenous Given 7/16/22 2313)   LORazepam (ATIVAN) tablet 2 mg (2 mg Oral Given 7/17/22 0521)   potassium chloride (K-DUR,KLOR-CON) CR tablet 40 mEq (40 mEq Oral Given 7/17/22 0543)   potassium-sodium phosphates (PHOS-NAK) packet 1 packet (1 packet Oral Given 7/17/22 0543)   magnesium oxide (MAG-OX) tablet 800 mg (800 mg Oral Given 7/17/22 0542)   magnesium sulfate 2 g/50 mL IVPB (premix) 2 g (2 g Intravenous New Bag 7/17/22 0916)   potassium-sodium phosphates (PHOS-NAK) packet 2 packet (2 packets Oral Given 7/17/22 0942)   LORazepam (ATIVAN) injection 2 mg (2 mg Intravenous Given 7/17/22 0935)   potassium-sodium phosphates (PHOS-NAK) packet 2 packet (2 packets Oral Given 7/17/22 1338)   LORazepam (ATIVAN) tablet 2 mg (2 mg Oral Given 7/17/22 1343)   calcium gluconate 1 g in sodium chloride 0 9% 50 mL (premix) (1 g Intravenous New Bag 7/17/22 1952)   magnesium oxide (MAG-OX) tablet 400 mg (400 mg Oral Given 7/17/22 1953)   LORazepam (ATIVAN) tablet 2 mg (2 mg Oral Given 7/17/22 1953)   magnesium sulfate 2 g/50 mL IVPB (premix) 2 g (2 g Intravenous New Bag 7/18/22 1048)   acetaminophen (TYLENOL) tablet 650 mg (650 mg Oral Given 7/18/22 1302)       Diagnostic Studies  Results Reviewed     Procedure Component Value Units Date/Time    Folate [[id number]]  (Normal) Collected: 07/16/22 1611    Lab Status: Final result Specimen: Blood from Arm, Left Updated: 07/17/22 0201     Folate 3 7 ng/mL     Vitamin B12 [[id number]]  (Abnormal) Collected: 07/16/22 1611    Lab Status: Final result Specimen: Blood from Arm, Left Updated: 07/17/22 0201     Vitamin B-12 1,422 pg/mL     Iron Saturation % [[id number]]  (Abnormal) Collected: 07/16/22 1611    Lab Status: Final result Specimen: Blood from Arm, Left Updated: 07/17/22 0157     Iron Saturation 4 %      TIBC 342 ug/dL      Iron 13 ug/dL     AFP tumor marker [[id number]]  (Normal) Collected: 07/16/22 1611    Lab Status: Final result Specimen: Blood from Arm, Left Updated: 07/17/22 0156     AFP TUMOR MARKER 3 5 ng/mL     Ferritin [[id number]]  (Normal) Collected: 07/16/22 1611    Lab Status: Final result Specimen: Blood from Arm, Left Updated: 07/17/22 0156     Ferritin 34 ng/mL     HS Troponin I 2hr [[id number]]  (Normal) Collected: 07/16/22 1901    Lab Status: Final result Specimen: Blood from Arm, Right Updated: 07/16/22 1933     hs TnI 2hr 6 ng/L      Delta 2hr hsTnI 0 ng/L     Rapid drug screen, urine [[id number]]  (Normal) Collected: 07/16/22 6696    Lab Status: Final result Specimen: Urine, Clean Catch Updated: 07/16/22 1820     Amph/Meth UR Negative     Barbiturate Ur Negative     Benzodiazepine Urine Negative     Cocaine Urine Negative     Methadone Urine Negative     Opiate Urine Negative     PCP Ur Negative     THC Urine Negative     Oxycodone Urine Negative    Narrative:      FOR MEDICAL PURPOSES ONLY  IF CONFIRMATION NEEDED PLEASE CONTACT THE LAB WITHIN 5 DAYS      Drug Screen Cutoff Levels:  AMPHETAMINE/METHAMPHETAMINES  1000 ng/mL  BARBITURATES     200 ng/mL  BENZODIAZEPINES     200 ng/mL  COCAINE      300 ng/mL  METHADONE      300 ng/mL  OPIATES      300 ng/mL  PHENCYCLIDINE     25 ng/mL  THC       50 ng/mL  OXYCODONE      100 ng/mL    Bilirubin, direct [[id number]]  (Abnormal) Collected: 07/16/22 1611    Lab Status: Final result Specimen: Blood from Arm, Left Updated: 07/16/22 1812     Bilirubin, Direct 2 58 mg/dL     Occult Bloood,Fecal Immunochemical [[id number]]  (Normal) Collected: 07/16/22 1653    Lab Status: Final result Specimen: Stool from Per Rectum Updated: 07/16/22 1710     OCCULT BLD, FECAL IMMUNOLOGICAL Negative    Narrative:        Performed by Fecal Immunochemical Test     HS Troponin 0hr (reflex protocol) [[id number]]  (Normal) Collected: 07/16/22 1611    Lab Status: Final result Specimen: Blood from Arm, Left Updated: 07/16/22 1644     hs TnI 0hr 6 ng/L     Comprehensive metabolic panel [[id number]]  (Abnormal) Collected: 07/16/22 1611    Lab Status: Final result Specimen: Blood from Arm, Left Updated: 07/16/22 1641     Sodium 133 mmol/L      Potassium 4 0 mmol/L      Chloride 94 mmol/L      CO2 28 mmol/L      ANION GAP 11 mmol/L      BUN 8 mg/dL      Creatinine 0 32 mg/dL      Glucose 118 mg/dL      Calcium 8 1 mg/dL      Corrected Calcium 8 7 mg/dL       U/L      ALT 29 U/L      Alkaline Phosphatase 221 U/L      Total Protein 7 6 g/dL      Albumin 3 3 g/dL      Total Bilirubin 4 61 mg/dL      eGFR 166 ml/min/1 73sq m     Narrative:      [place] guidelines for Chronic Kidney Disease (CKD):     Stage 1 with normal or high GFR (GFR > 90 mL/min/1 73 square meters)    Stage 2 Mild CKD (GFR = 60-89 mL/min/1 73 square meters)    Stage 3A Moderate CKD (GFR = 45-59 mL/min/1 73 square meters)    Stage 3B Moderate CKD (GFR = 30-44 mL/min/1 73 square meters)    Stage 4 Severe CKD (GFR = 15-29 mL/min/1 73 square meters)    Stage 5 End Stage CKD (GFR <15 mL/min/1 73 square meters)  Note: GFR calculation is accurate only with a steady state creatinine    Lipase [[id number]]  (Normal) Collected: 07/16/22 1611    Lab Status: Final result Specimen: Blood from Arm, Left Updated: 07/16/22 1641     Lipase 50 u/L     Ammonia [[id number]]  (Abnormal) Collected: 07/16/22 1611    Lab Status: Final result Specimen: Blood from Arm, Left Updated: 07/16/22 1641     Ammonia 98 umol/L     Phosphorus [[id number]]  (Abnormal) Collected: 07/16/22 1611    Lab Status: Final result Specimen: Blood from Arm, Left Updated: 07/16/22 1641     Phosphorus 2 6 mg/dL     Magnesium [[id number]]  (Normal) Collected: 07/16/22 1611    Lab Status: Final result Specimen: Blood from Arm, Left Updated: 07/16/22 1641     Magnesium 2 0 mg/dL     CK Total with Reflex CKMB [[id number]]  (Normal) Collected: 07/16/22 1611    Lab Status: Final result Specimen: Blood from Arm, Left Updated: 07/16/22 1641     Total CK 40 U/L     Ethanol [[id number]]  (Abnormal) Collected: 07/16/22 1611    Lab Status: Final result Specimen: Blood from Arm, Left Updated: 07/16/22 1638     Ethanol Lvl 448 mg/dL     CBC and differential [[id number]]  (Abnormal) Collected: 07/16/22 1611    Lab Status: Final result Specimen: Blood from Arm, Left Updated: 07/16/22 1626     WBC 6 09 Thousand/uL      RBC 2 15 Million/uL      Hemoglobin 3 6 g/dL      Hematocrit 13 7 %      MCV 64 fL      MCH 16 7 pg      MCHC 26 3 g/dL      RDW 30 8 %      MPV 8 5 fL      Platelets 570 Thousands/uL      nRBC 3 /100 WBCs      Neutrophils Relative 66 %      Immat GRANS % 2 % Lymphocytes Relative 23 %      Monocytes Relative 9 %      Eosinophils Relative 0 %      Basophils Relative 0 %      Neutrophils Absolute 3 99 Thousands/µL      Immature Grans Absolute 0 12 Thousand/uL      Lymphocytes Absolute 1 42 Thousands/µL      Monocytes Absolute 0 53 Thousand/µL      Eosinophils Absolute 0 01 Thousand/µL      Basophils Absolute 0 02 Thousands/µL     Protime-INR [[id number]]  (Abnormal) Collected: 07/16/22 1611    Lab Status: Final result Specimen: Blood from Arm, Left Updated: 07/16/22 1625     Protime 21 1 seconds      INR 1 87    APTT [[id number]]  (Abnormal) Collected: 07/16/22 1611    Lab Status: Final result Specimen: Blood from Arm, Left Updated: 07/16/22 1625     PTT 45 seconds                  XR chest portable   Final Result by Audrey Ramsay MD (07/17 1046)      No acute cardiopulmonary disease                    Workstation performed: [id number]         US abdomen complete with doppler    (Results Pending)   CT abdomen pelvis wo contrast    (Results Pending)              Procedures  ECG 12 Lead Documentation Only    Date/Time: 7/16/2022 6:04 PM  Performed by: Gabe Carrasquillo DO  Authorized by: Gabe Carrasquillo DO     ECG reviewed by me, the ED Provider: yes    Patient location:  ED  Comments:      Normal sinus rhythm, rate of 93, normal MS, normal QTC, no STEMI  CriticalCare Time  Performed by: Gabe Carrasquillo DO  Authorized by: Gabe Carrasquillo DO     Critical care provider statement:     Critical care time (minutes):  30    Critical care time was exclusive of:  Separately billable procedures and treating other patients and teaching time    Critical care was necessary to treat or prevent imminent or life-threatening deterioration of the following conditions:  Circulatory failure, dehydration, hepatic failure and metabolic crisis    Critical care was time spent personally by me on the following activities:  Obtaining history from patient or surrogate, development of treatment plan with patient or surrogate, discussions with consultants, evaluation of patient's response to treatment, examination of patient, ordering and performing treatments and interventions, ordering and review of laboratory studies, ordering and review of radiographic studies, re-evaluation of patient's condition and review of old charts  Comments: The patient had a hemoglobin of 3 6 requiring blood transfusion, IV hydration             ED Course  ED Course as of 07/18/22 1422   Sat Jul 16, 2022   1645 The patient was found to have a Hgb of 3 6  The patient appears stable in that he has no pain, no tachycardia, and his blood pressure appears stable  He is heme negative from below  This is likely not an acute process, but could become life threatening  He will be given blood products and has been consented  He will be placed in the hospital for further care and evaluation  MDM  Number of Diagnoses or Management Options  Anemia  Failure to thrive in adult  Liver failure [facility])  Diagnosis management comments: This is a 40year old male who presents to the ED with generalized weakness and no complaints  I considered liver failure, EtOH intoxication, anemia  These and other diagnoses were considered            Amount and/or Complexity of Data Reviewed  Clinical lab tests: reviewed        Disposition  Final diagnoses:   Anemia   Liver failure ([facility] [address] )   Failure to thrive in adult     Time reflects when diagnosis was documented in both MDM as applicable and the Disposition within this note     Time User Action Codes Description Comment    7/16/2022  5:24 PM Faisal Carrasquillo Add [D64 9] Anemia     7/16/2022  5:24 PM Faisal Carrasquillo Add [K72 90] Liver failure ([facility] [address] )     7/16/2022  5:25 PM Viola Shannon Add [R62 7] Failure to thrive in adult       ED Disposition     ED Disposition   Admit    Condition   Stable    Date/Time   Sat Jul 16, 2022  5:24 PM    Comment   Case was discussed with Dr Delia Ramos and the patient's admission status was agreed to be Admission Status: inpatient status to the service of Dr Delia Ramos   Follow-up Information    None             No discharge procedures on file      PDMP Review       Value Time User    PDMP Reviewed  Yes 7/16/2022  8:09 PM Radha Amaya           ED Provider  Electronically Signed by           Oksana Sanchez DO  07/18/22 1427

## 2022-07-16 NOTE — H&P
[address] [address]  H&P- Barrera Norman 1985, 40 y o  male MRN: [id number]  Unit/Bed#: -[facility] Encounter: [id number]  Primary Care Provider: Alida Mead PA-C   Date and time admitted to hospital: 7/16/2022  3:27 PM    * Anemia  Assessment & Plan  Presents with microcytic anemia hemoglobin of 3 6  Asymptomatic, vitals stable  Troponin negative  1 unit of PRBC ordered  Repeat Hg q8h     MCV noted to be signficantly low at 64, ? thalasemia will need to check if insurance will cover for thalasemia testing  F/U Iron panel  Heme occult blood negative in the ED        Alcoholic cirrhosis of liver without ascites ([facility] )  Assessment & Plan  Hx Decompensated cirrhosis with anasarca, hepatic encephalopathy, thrombocytopenia splenomegaly, hyperammonemia, and  Hepatomegaly most likely secondary to alcohol abuse but could also be due to underlying alcoholic hepatitis  -Previous AFP and right upper quadrant ultrasounds were normal  -Never had screening EGD for varices    Hepatic encephalopathy- resume home lactulose, ammonia level 98    Anasarca - start Lasix 40 daily IV  Resume Aldactone 50 mg    MELD 23  T  Bili 4 6, INR 1 87, cr 0 35, Na 133    Abdominal ultrasound ordered to assess for hepatocellular carcinoma ascites  F/U AFP  F/U Gi consult    Alcohol withdrawal (HCC)  Assessment & Plan  Alcohol level 448  CIWA protocol  MV thiamine folic acid    Wheezing  Assessment & Plan  Resume home inhalers  Follow-up chest x-ray    Depression  Assessment & Plan  Resume home Lexapro    Tobacco dependence  Assessment & Plan  Nicotine patch    VTE Pharmacologic Prophylaxis: VTE Score: 0 Low Risk (Score 0-2) - Encourage Ambulation  Code Status: Level 1 - Full Code   Discussion with family: Updated  (sister) via phone  Anticipated Length of Stay: Patient will be admitted on an inpatient basis with an anticipated length of stay of greater than 2 midnights secondary to anemia      Total Time for Visit, including Counseling / Coordination of Care: 45 minutes Greater than 50% of this total time spent on direct patient counseling and coordination of care  Chief Complaint: Decreased PO intake    History of Present Illness: Yohan Ochoa is a 40 y o  male with a PMH of alcoholic cirrhosis, asthma, alcohol abuse who presents with increasing confusion via EMS  Upon arrival to the ED lab work was significant for elevated alcohol level of 448, elevated ammonia level of 98, and anemia with hemoglobin level 3 6  Patient is currently confused but able to hold conversation  Patient states that he has not been compliant with his medications with no reason as to why  Patient denies having any hematemesis or rectal bleeding  Denies ever having an EGD done to assess for varices  Rectal exam in the ER negative for occult blood  In regards to his alcohol use patient states that he quit with drinking alcohol 1 week ago  Alcohol level on admission noted to be at elevated at 448  Currently admits to having some nausea  Also admits to having poor p o  Intake for last couple days due to nausea  Denies any fevers chills chest pain shortness of breath  Review of Systems:  Review of Systems   Gastrointestinal: Positive for abdominal distention and nausea  All other systems reviewed and are negative  Past Medical and Surgical History:   Past Medical History:   Diagnosis Date    AAA [facility]abdominal aortic aneurysm) ([facility] )     Allergic     Anemia     Asthma     Depression 05/18/2021    Essential hypertension 05/06/2019    Obesity     Opiate dependence ([facility] )     Substance abuse ([facility] )        Past Surgical History:   Procedure Laterality Date    LYMPH NODE BIOPSY         Meds/Allergies:  Prior to Admission medications    Medication Sig Start Date End Date Taking?  Authorizing Provider   albuterol (Ventolin HFA) 90 mcg/act inhaler Inhale 2 puffs every 6 (six) hours as needed for wheezing 1/25/21  Peter CUEVA Valarie Bui MD   buprenorphine-naloxone (SUBOXONE) 8-2 mg per SL tablet Place 2 tablets under the tongue daily Verified from [facility],   Precribed by Dr Elma Lozano, Last Prescirbed 5/17/2021   Yes Historical Provider, MD   escitalopram (LEXAPRO) 20 mg tablet Take 1 tablet (20 mg total) by mouth daily 10/7/21  Yes Alexandra Gregg PA-C   zolpidem (AMBIEN CR) 6 25 MG CR tablet Take 6 25 mg by mouth daily at bedtime as needed for sleep   Yes Historical Provider, MD   ammonium lactate (LAC-HYDRIN) 12 % lotion Apply topically 2 (two) times a day 5/26/21   Annie Ramirez MD   ferrous sulfate 324 (65 Fe) mg Take 1 tablet (324 mg total) by mouth 2 (two) times a day before meals  Patient not taking: No sig reported 5/26/21   Annie Ramirez MD   folic acid (FOLVITE) 1 mg tablet Take 1 tablet (1 mg total) by mouth daily  Patient not taking: No sig reported 1/26/21   Jennifer Parkinson MD   furosemide (LASIX) 40 mg tablet Take 1 tablet (40 mg total) by mouth daily 10/7/21   Alexandra Gregg PA-C   lactulose (CHRONULAC) 10 g/15 mL solution TAKE 30 ML (20 G TOTAL) BY MOUTH 2 (TWO) TIMES A DAY  Patient taking differently: 10 g daily at bedtime 8/2/21   Alexandra Gregg PA-C   nicotine (NICODERM CQ) 14 mg/24hr TD 24 hr patch Place 1 patch on the skin daily  Patient not taking: No sig reported 5/27/21   Annie Ramirez MD   pantoprazole (PROTONIX) 40 mg tablet Take 1 tablet (40 mg total) by mouth daily in the early morning  Patient not taking: Reported on 7/16/2022 7/29/21   Alexandra Gregg PA-C   spironolactone (ALDACTONE) 50 mg tablet Take 1 tablet (50 mg total) by mouth daily  Patient not taking: Reported on 7/16/2022 8/13/21   Alexandra Gregg PA-C     I have reviewed home medications using recent Epic encounter  Allergies:    Allergies   Allergen Reactions    Levofloxacin Other (See Comments)     BLACKED OUT       Social History:  Marital Status: /Civil Union   Patient Pre-hospital Living Situation: Home  Patient Pre-hospital Level of Mobility: unable to be assessed at time of evaluation  Patient Pre-hospital Diet Restrictions: none  Substance Use History:   Social History     Substance and Sexual Activity   Alcohol Use Yes    Alcohol/week: 12 0 standard drinks    Types: 12 Cans of beer per week     Social History     Tobacco Use   Smoking Status Current Every Day Smoker    Packs/day: 1 00   Smokeless Tobacco Never Used   Tobacco Comment    2/2019; PATIENT VAPES     Social History     Substance and Sexual Activity   Drug Use Not Currently    Types: Heroin    Comment: 7/16/22 recovery       Family History:  Family History   Problem Relation Age of Onset    No Known Problems Mother     Diabetes Father     Prostate cancer Father     Hypertension Father        Physical Exam:     Vitals:   Blood Pressure: 114/75 (07/16/22 1808)  Pulse: 85 (07/16/22 1808)  Temperature: (!) 96 8 °F (36 °C) (07/16/22 1808)  Temp Source: Tympanic (07/16/22 1808)  Respirations: 14 (07/16/22 1808)  Height: 6' 1" (185 4 cm) (07/16/22 1808)  Weight - Scale: 102 kg (225 lb) (07/16/22 1808)  SpO2: 91 % (07/16/22 1808)    Physical Exam  Vitals reviewed  HENT:      Head: Normocephalic and atraumatic  Right Ear: External ear normal       Left Ear: External ear normal       Nose: Nose normal       Mouth/Throat:      Mouth: Mucous membranes are moist       Pharynx: Oropharynx is clear  Eyes:      Extraocular Movements: Extraocular movements intact  Cardiovascular:      Rate and Rhythm: Normal rate and regular rhythm  Heart sounds: Murmur heard  Pulmonary:      Breath sounds: Wheezing present  Abdominal:      General: There is distension  Palpations: Abdomen is soft  Tenderness: There is no abdominal tenderness  Musculoskeletal:      Cervical back: Normal range of motion  Right lower leg: Edema present  Left lower leg: Edema present     Skin:     General: Skin is warm and dry  Neurological:      General: No focal deficit present  Mental Status: He is alert  Mental status is at baseline  Psychiatric:         Mood and Affect: Mood normal          Behavior: Behavior normal           Additional Data:     Lab Results:  Results from last 7 days   Lab Units 07/16/22  1611   WBC Thousand/uL 6 09   HEMOGLOBIN g/dL 3 6*   HEMATOCRIT % 13 7*   PLATELETS Thousands/uL 140*   NEUTROS PCT % 66   LYMPHS PCT % 23   MONOS PCT % 9   EOS PCT % 0     Results from last 7 days   Lab Units 07/16/22  1611   SODIUM mmol/L 133*   POTASSIUM mmol/L 4 0   CHLORIDE mmol/L 94*   CO2 mmol/L 28   BUN mg/dL 8   CREATININE mg/dL 0 32*   ANION GAP mmol/L 11   CALCIUM mg/dL 8 1*   ALBUMIN g/dL 3 3*   TOTAL BILIRUBIN mg/dL 4 61*   ALK PHOS U/L 221*   ALT U/L 29   AST U/L 128*   GLUCOSE RANDOM mg/dL 118     Results from last 7 days   Lab Units 07/16/22  1611   INR  1 87*                 x  Imaging: Personally reviewed the following imaging: chest xray  XR chest portable    (Results Pending)   US abdomen complete with doppler    (Results Pending)       EKG and Other Studies Reviewed on Admission:   · EKG: EKG ordered, pending  ** Please Note: This note has been constructed using a voice recognition system   **

## 2022-07-16 NOTE — ASSESSMENT & PLAN NOTE
Hx Decompensated cirrhosis with anasarca, hepatic encephalopathy, thrombocytopenia splenomegaly, hyperammonemia, and  Hepatomegaly most likely secondary to alcohol abuse but could also be due to underlying alcoholic hepatitis  -Previous AFP and right upper quadrant ultrasounds were normal  -Never had screening EGD for varices    Hepatic encephalopathy- resume home lactulose, ammonia level 98    Anasarca - start Lasix 40 daily IV  Resume Aldactone 50 mg    MELD 23  T  Bili 4 6, INR 1 87, cr 0 35, Na 133    Abdominal ultrasound ordered to assess for hepatocellular carcinoma ascites  F/U AFP  F/U Gi consult

## 2022-07-16 NOTE — ED NOTES
Specimen Note    Pt unable to provide urine sample; will continue to encourage //attempt #2     Marty Claire  07/16/22 [id number]

## 2022-07-16 NOTE — ASSESSMENT & PLAN NOTE
Presents with microcytic anemia hemoglobin of 3 6  Asymptomatic, vitals stable  Troponin negative  1 unit of PRBC ordered  Repeat Hg q8h     MCV noted to be signficantly low at 64, ? thalasemia will need to check if insurance will cover for thalasemia testing  F/U Iron panel  Heme occult blood negative in the ED

## 2022-07-17 ENCOUNTER — APPOINTMENT (INPATIENT)
Dept: RADIOLOGY | Facility: HOSPITAL | Age: 37
DRG: 432 | End: 2022-07-17

## 2022-07-17 PROBLEM — I95.0 IDIOPATHIC HYPOTENSION: Status: ACTIVE | Noted: 2022-07-17

## 2022-07-17 LAB
ABO GROUP BLD BPU: NORMAL
AFP-TM SERPL-MCNC: 3.5 NG/ML (ref 0.5–8)
ALBUMIN SERPL BCP-MCNC: 3.2 G/DL (ref 3.5–5)
ALBUMIN SERPL BCP-MCNC: 3.3 G/DL (ref 3.5–5)
ALP SERPL-CCNC: 194 U/L (ref 34–104)
ALP SERPL-CCNC: 201 U/L (ref 34–104)
ALT SERPL W P-5'-P-CCNC: 26 U/L (ref 7–52)
ALT SERPL W P-5'-P-CCNC: 26 U/L (ref 7–52)
ANION GAP SERPL CALCULATED.3IONS-SCNC: 11 MMOL/L (ref 4–13)
ANION GAP SERPL CALCULATED.3IONS-SCNC: 12 MMOL/L (ref 4–13)
AST SERPL W P-5'-P-CCNC: 100 U/L (ref 13–39)
AST SERPL W P-5'-P-CCNC: 103 U/L (ref 13–39)
BACTERIA UR QL AUTO: NORMAL /HPF
BASOPHILS NFR BLD MANUAL: 1 % (ref 0–1)
BILIRUB SERPL-MCNC: 5.47 MG/DL (ref 0.2–1)
BILIRUB SERPL-MCNC: 6.66 MG/DL (ref 0.2–1)
BILIRUB UR QL STRIP: NEGATIVE
BPU ID: NORMAL
BUN SERPL-MCNC: 6 MG/DL (ref 5–25)
BUN SERPL-MCNC: 7 MG/DL (ref 5–25)
CALCIUM ALBUM COR SERPL-MCNC: 8.5 MG/DL (ref 8.3–10.1)
CALCIUM ALBUM COR SERPL-MCNC: 9.1 MG/DL (ref 8.3–10.1)
CALCIUM SERPL-MCNC: 7.9 MG/DL (ref 8.4–10.2)
CALCIUM SERPL-MCNC: 8.5 MG/DL (ref 8.4–10.2)
CHLORIDE SERPL-SCNC: 94 MMOL/L (ref 96–108)
CHLORIDE SERPL-SCNC: 94 MMOL/L (ref 96–108)
CLARITY UR: CLEAR
CO2 SERPL-SCNC: 29 MMOL/L (ref 21–32)
CO2 SERPL-SCNC: 29 MMOL/L (ref 21–32)
COLOR UR: YELLOW
CREAT SERPL-MCNC: 0.35 MG/DL (ref 0.6–1.3)
CREAT SERPL-MCNC: 0.38 MG/DL (ref 0.6–1.3)
CROSSMATCH: NORMAL
ELLIPTOCYTES BLD QL SMEAR: PRESENT
ERYTHROCYTE [DISTWIDTH] IN BLOOD BY AUTOMATED COUNT: 30.5 % (ref 11.6–15.1)
FERRITIN SERPL-MCNC: 34 NG/ML (ref 8–388)
FOLATE SERPL-MCNC: 3.7 NG/ML (ref 3.1–17.5)
GFR SERPL CREATININE-BSD FRML MDRD: 154 ML/MIN/1.73SQ M
GFR SERPL CREATININE-BSD FRML MDRD: 160 ML/MIN/1.73SQ M
GLUCOSE SERPL-MCNC: 121 MG/DL (ref 65–140)
GLUCOSE SERPL-MCNC: 123 MG/DL (ref 65–140)
GLUCOSE UR STRIP-MCNC: NEGATIVE MG/DL
HCT VFR BLD AUTO: 16.1 % (ref 36.5–49.3)
HCT VFR BLD AUTO: 17.8 % (ref 36.5–49.3)
HCT VFR BLD AUTO: 20.3 % (ref 36.5–49.3)
HGB BLD-MCNC: 4.7 G/DL (ref 12–17)
HGB BLD-MCNC: 5.4 G/DL (ref 12–17)
HGB BLD-MCNC: 6.5 G/DL (ref 12–17)
HGB UR QL STRIP.AUTO: ABNORMAL
HGB UR QL STRIP.AUTO: ABNORMAL
HYPERCHROMIA BLD QL SMEAR: PRESENT
INR PPP: 1.92 (ref 0.84–1.19)
IRON SATN MFR SERPL: 4 % (ref 20–50)
IRON SERPL-MCNC: 13 UG/DL (ref 65–175)
KETONES UR STRIP-MCNC: NEGATIVE MG/DL
LDH SERPL-CCNC: 234 U/L (ref 140–271)
LEUKOCYTE ESTERASE UR QL STRIP: NEGATIVE
LYMPHOCYTES NFR BLD: 21 % (ref 14–44)
MAGNESIUM SERPL-MCNC: 1.6 MG/DL (ref 1.9–2.7)
MAGNESIUM SERPL-MCNC: 1.8 MG/DL (ref 1.9–2.7)
MAGNESIUM SERPL-MCNC: 2.2 MG/DL (ref 1.9–2.7)
MCH RBC QN AUTO: 19.6 PG (ref 26.8–34.3)
MCHC RBC AUTO-ENTMCNC: 29.2 G/DL (ref 31.4–37.4)
MCV RBC AUTO: 67 FL (ref 82–98)
MONOCYTES NFR BLD AUTO: 6 % (ref 4–12)
NEUTS BAND NFR BLD MANUAL: 1 THOUSAND/UL
NEUTS SEG NFR BLD AUTO: 71 % (ref 45–77)
NITRITE UR QL STRIP: NEGATIVE
NON-SQ EPI CELLS URNS QL MICRO: NORMAL /HPF
NRBC BLD AUTO-RTO: 5 /100 WBC (ref 0–2)
PH UR STRIP.AUTO: 7 [PH]
PHOSPHATE SERPL-MCNC: 2.3 MG/DL (ref 2.7–4.5)
PHOSPHATE SERPL-MCNC: 2.6 MG/DL (ref 2.7–4.5)
PLATELET # BLD AUTO: 101 THOUSANDS/UL (ref 149–390)
PLATELET BLD QL SMEAR: ABNORMAL
POIKILOCYTOSIS BLD QL SMEAR: PRESENT
POLYCHROMASIA BLD QL SMEAR: PRESENT
POTASSIUM SERPL-SCNC: 2.7 MMOL/L (ref 3.5–5.3)
POTASSIUM SERPL-SCNC: 3.3 MMOL/L (ref 3.5–5.3)
PROCALCITONIN SERPL-MCNC: 0.42 NG/ML
PROCALCITONIN SERPL-MCNC: 0.44 NG/ML
PROT SERPL-MCNC: 7.1 G/DL (ref 6.4–8.4)
PROT SERPL-MCNC: 7.2 G/DL (ref 6.4–8.4)
PROT UR STRIP-MCNC: NEGATIVE MG/DL
PROTHROMBIN TIME: 21.6 SECONDS (ref 11.6–14.5)
RBC # BLD AUTO: 2.4 MILLION/UL (ref 3.88–5.62)
RBC #/AREA URNS AUTO: NORMAL /HPF
RBC MORPH BLD: PRESENT
RBC, URINE: ABNORMAL
RETICS # AUTO: ABNORMAL 10*3/UL (ref 14356–105094)
RETICS # CALC: 0.39 % (ref 0.37–1.87)
SODIUM SERPL-SCNC: 134 MMOL/L (ref 135–147)
SODIUM SERPL-SCNC: 135 MMOL/L (ref 135–147)
SP GR UR STRIP.AUTO: 1.01 (ref 1–1.03)
TIBC SERPL-MCNC: 342 UG/DL (ref 250–450)
TOTAL CELLS COUNTED SPEC: 100
UNIT DISPENSE STATUS: NORMAL
UNIT PRODUCT CODE: NORMAL
UNIT PRODUCT VOLUME: 350 ML
UNIT RH: NORMAL
UROBILINOGEN UR QL STRIP.AUTO: 2 E.U./DL
VIT B12 SERPL-MCNC: 1422 PG/ML (ref 100–900)
WBC # BLD AUTO: 4.91 THOUSAND/UL (ref 4.31–10.16)
WBC #/AREA URNS AUTO: NORMAL /HPF

## 2022-07-17 PROCEDURE — 80053 COMPREHEN METABOLIC PANEL: CPT

## 2022-07-17 PROCEDURE — 85014 HEMATOCRIT: CPT

## 2022-07-17 PROCEDURE — 81015 MICROSCOPIC EXAM OF URINE: CPT

## 2022-07-17 PROCEDURE — 84100 ASSAY OF PHOSPHORUS: CPT | Performed by: STUDENT IN AN ORGANIZED HEALTH CARE EDUCATION/TRAINING PROGRAM

## 2022-07-17 PROCEDURE — 86901 BLOOD TYPING SEROLOGIC RH(D): CPT

## 2022-07-17 PROCEDURE — 85007 BL SMEAR W/DIFF WBC COUNT: CPT | Performed by: STUDENT IN AN ORGANIZED HEALTH CARE EDUCATION/TRAINING PROGRAM

## 2022-07-17 PROCEDURE — 85610 PROTHROMBIN TIME: CPT | Performed by: STUDENT IN AN ORGANIZED HEALTH CARE EDUCATION/TRAINING PROGRAM

## 2022-07-17 PROCEDURE — 86880 COOMBS TEST DIRECT: CPT

## 2022-07-17 PROCEDURE — 94760 N-INVAS EAR/PLS OXIMETRY 1: CPT

## 2022-07-17 PROCEDURE — 81003 URINALYSIS AUTO W/O SCOPE: CPT

## 2022-07-17 PROCEDURE — 83735 ASSAY OF MAGNESIUM: CPT | Performed by: STUDENT IN AN ORGANIZED HEALTH CARE EDUCATION/TRAINING PROGRAM

## 2022-07-17 PROCEDURE — 81001 URINALYSIS AUTO W/SCOPE: CPT | Performed by: STUDENT IN AN ORGANIZED HEALTH CARE EDUCATION/TRAINING PROGRAM

## 2022-07-17 PROCEDURE — 83615 LACTATE (LD) (LDH) ENZYME: CPT | Performed by: PHYSICIAN ASSISTANT

## 2022-07-17 PROCEDURE — 80053 COMPREHEN METABOLIC PANEL: CPT | Performed by: STUDENT IN AN ORGANIZED HEALTH CARE EDUCATION/TRAINING PROGRAM

## 2022-07-17 PROCEDURE — 84145 PROCALCITONIN (PCT): CPT | Performed by: STUDENT IN AN ORGANIZED HEALTH CARE EDUCATION/TRAINING PROGRAM

## 2022-07-17 PROCEDURE — 30233N1 TRANSFUSION OF NONAUTOLOGOUS RED BLOOD CELLS INTO PERIPHERAL VEIN, PERCUTANEOUS APPROACH: ICD-10-PCS

## 2022-07-17 PROCEDURE — 85027 COMPLETE CBC AUTOMATED: CPT

## 2022-07-17 PROCEDURE — P9016 RBC LEUKOCYTES REDUCED: HCPCS

## 2022-07-17 PROCEDURE — 99254 IP/OBS CNSLTJ NEW/EST MOD 60: CPT | Performed by: INTERNAL MEDICINE

## 2022-07-17 PROCEDURE — 87040 BLOOD CULTURE FOR BACTERIA: CPT | Performed by: STUDENT IN AN ORGANIZED HEALTH CARE EDUCATION/TRAINING PROGRAM

## 2022-07-17 PROCEDURE — 86900 BLOOD TYPING SEROLOGIC ABO: CPT

## 2022-07-17 PROCEDURE — 85018 HEMOGLOBIN: CPT

## 2022-07-17 PROCEDURE — 71045 X-RAY EXAM CHEST 1 VIEW: CPT

## 2022-07-17 PROCEDURE — 83735 ASSAY OF MAGNESIUM: CPT

## 2022-07-17 PROCEDURE — 84100 ASSAY OF PHOSPHORUS: CPT

## 2022-07-17 PROCEDURE — 85045 AUTOMATED RETICULOCYTE COUNT: CPT | Performed by: PHYSICIAN ASSISTANT

## 2022-07-17 PROCEDURE — 99233 SBSQ HOSP IP/OBS HIGH 50: CPT | Performed by: STUDENT IN AN ORGANIZED HEALTH CARE EDUCATION/TRAINING PROGRAM

## 2022-07-17 RX ORDER — LORAZEPAM 1 MG/1
2 TABLET ORAL ONCE
Status: COMPLETED | OUTPATIENT
Start: 2022-07-17 | End: 2022-07-17

## 2022-07-17 RX ORDER — FUROSEMIDE 10 MG/ML
40 INJECTION INTRAMUSCULAR; INTRAVENOUS
Status: DISCONTINUED | OUTPATIENT
Start: 2022-07-17 | End: 2022-07-18 | Stop reason: HOSPADM

## 2022-07-17 RX ORDER — CALCIUM GLUCONATE 20 MG/ML
1 INJECTION, SOLUTION INTRAVENOUS ONCE
Status: COMPLETED | OUTPATIENT
Start: 2022-07-17 | End: 2022-07-17

## 2022-07-17 RX ORDER — ACETAMINOPHEN 325 MG/1
650 TABLET ORAL EVERY 6 HOURS PRN
Status: DISCONTINUED | OUTPATIENT
Start: 2022-07-17 | End: 2022-07-18

## 2022-07-17 RX ORDER — ALBUMIN (HUMAN) 12.5 G/50ML
25 SOLUTION INTRAVENOUS 3 TIMES DAILY
Status: DISCONTINUED | OUTPATIENT
Start: 2022-07-17 | End: 2022-07-18 | Stop reason: HOSPADM

## 2022-07-17 RX ORDER — FUROSEMIDE 10 MG/ML
40 INJECTION INTRAMUSCULAR; INTRAVENOUS DAILY
Status: DISCONTINUED | OUTPATIENT
Start: 2022-07-17 | End: 2022-07-17

## 2022-07-17 RX ORDER — OXAZEPAM 10 MG
10 CAPSULE ORAL EVERY 8 HOURS SCHEDULED
Status: DISCONTINUED | OUTPATIENT
Start: 2022-07-17 | End: 2022-07-18 | Stop reason: HOSPADM

## 2022-07-17 RX ORDER — SPIRONOLACTONE 25 MG/1
50 TABLET ORAL DAILY
Status: DISCONTINUED | OUTPATIENT
Start: 2022-07-17 | End: 2022-07-18 | Stop reason: HOSPADM

## 2022-07-17 RX ORDER — FERROUS SULFATE 325(65) MG
325 TABLET ORAL
Status: DISCONTINUED | OUTPATIENT
Start: 2022-07-17 | End: 2022-07-18 | Stop reason: HOSPADM

## 2022-07-17 RX ORDER — DIPHENHYDRAMINE HYDROCHLORIDE 50 MG/ML
25 INJECTION INTRAMUSCULAR; INTRAVENOUS EVERY 6 HOURS PRN
Status: DISCONTINUED | OUTPATIENT
Start: 2022-07-17 | End: 2022-07-18 | Stop reason: HOSPADM

## 2022-07-17 RX ORDER — LORAZEPAM 1 MG/1
1 TABLET ORAL EVERY 8 HOURS PRN
Status: DISCONTINUED | OUTPATIENT
Start: 2022-07-17 | End: 2022-07-18 | Stop reason: HOSPADM

## 2022-07-17 RX ORDER — POTASSIUM CHLORIDE 20 MEQ/1
40 TABLET, EXTENDED RELEASE ORAL ONCE
Status: COMPLETED | OUTPATIENT
Start: 2022-07-17 | End: 2022-07-17

## 2022-07-17 RX ORDER — CEFTRIAXONE 1 G/50ML
1000 INJECTION, SOLUTION INTRAVENOUS EVERY 24 HOURS
Status: DISCONTINUED | OUTPATIENT
Start: 2022-07-17 | End: 2022-07-18 | Stop reason: HOSPADM

## 2022-07-17 RX ORDER — MIDODRINE HYDROCHLORIDE 5 MG/1
10 TABLET ORAL
Status: DISCONTINUED | OUTPATIENT
Start: 2022-07-17 | End: 2022-07-18 | Stop reason: HOSPADM

## 2022-07-17 RX ORDER — LORAZEPAM 2 MG/ML
2 INJECTION INTRAMUSCULAR ONCE
Status: COMPLETED | OUTPATIENT
Start: 2022-07-17 | End: 2022-07-17

## 2022-07-17 RX ORDER — POTASSIUM CHLORIDE 20 MEQ/1
40 TABLET, EXTENDED RELEASE ORAL
Status: DISPENSED | OUTPATIENT
Start: 2022-07-17 | End: 2022-07-18

## 2022-07-17 RX ORDER — MAGNESIUM SULFATE HEPTAHYDRATE 40 MG/ML
2 INJECTION, SOLUTION INTRAVENOUS ONCE
Status: COMPLETED | OUTPATIENT
Start: 2022-07-17 | End: 2022-07-17

## 2022-07-17 RX ADMIN — POTASSIUM CHLORIDE 40 MEQ: 1500 TABLET, EXTENDED RELEASE ORAL at 05:43

## 2022-07-17 RX ADMIN — BUPRENORPHINE AND NALOXONE 8 MG: 8; 2 FILM BUCCAL; SUBLINGUAL at 17:09

## 2022-07-17 RX ADMIN — ESCITALOPRAM OXALATE 20 MG: 20 TABLET ORAL at 09:42

## 2022-07-17 RX ADMIN — POTASSIUM & SODIUM PHOSPHATES POWDER PACK 280-160-250 MG 1 PACKET: 280-160-250 PACK at 05:43

## 2022-07-17 RX ADMIN — MIDODRINE HYDROCHLORIDE 10 MG: 5 TABLET ORAL at 16:32

## 2022-07-17 RX ADMIN — NICOTINE 1 PATCH: 21 PATCH, EXTENDED RELEASE TRANSDERMAL at 09:43

## 2022-07-17 RX ADMIN — SPIRONOLACTONE 50 MG: 25 TABLET ORAL at 11:55

## 2022-07-17 RX ADMIN — ALBUMIN (HUMAN) 25 G: 0.25 INJECTION, SOLUTION INTRAVENOUS at 21:44

## 2022-07-17 RX ADMIN — ACETAMINOPHEN 650 MG: 325 TABLET, FILM COATED ORAL at 19:52

## 2022-07-17 RX ADMIN — LORAZEPAM 2 MG: 2 INJECTION INTRAMUSCULAR; INTRAVENOUS at 09:35

## 2022-07-17 RX ADMIN — OXAZEPAM 10 MG: 10 CAPSULE, GELATIN COATED ORAL at 23:49

## 2022-07-17 RX ADMIN — FUROSEMIDE 40 MG: 10 INJECTION, SOLUTION INTRAMUSCULAR; INTRAVENOUS at 11:54

## 2022-07-17 RX ADMIN — Medication 400 MG: at 19:53

## 2022-07-17 RX ADMIN — PANTOPRAZOLE SODIUM 40 MG: 40 TABLET, DELAYED RELEASE ORAL at 05:21

## 2022-07-17 RX ADMIN — ACETAMINOPHEN 650 MG: 325 TABLET, FILM COATED ORAL at 05:43

## 2022-07-17 RX ADMIN — BUPRENORPHINE AND NALOXONE 8 MG: 8; 2 FILM BUCCAL; SUBLINGUAL at 09:42

## 2022-07-17 RX ADMIN — POTASSIUM & SODIUM PHOSPHATES POWDER PACK 280-160-250 MG 2 PACKET: 280-160-250 PACK at 09:42

## 2022-07-17 RX ADMIN — POTASSIUM CHLORIDE 40 MEQ: 1500 TABLET, EXTENDED RELEASE ORAL at 16:32

## 2022-07-17 RX ADMIN — TRIMETHOBENZAMIDE HYDROCHLORIDE 200 MG: 100 INJECTION INTRAMUSCULAR at 09:40

## 2022-07-17 RX ADMIN — DIPHENHYDRAMINE HYDROCHLORIDE 25 MG: 50 INJECTION, SOLUTION INTRAMUSCULAR; INTRAVENOUS at 21:44

## 2022-07-17 RX ADMIN — LORAZEPAM 2 MG: 1 TABLET ORAL at 05:21

## 2022-07-17 RX ADMIN — POTASSIUM CHLORIDE 40 MEQ: 1500 TABLET, EXTENDED RELEASE ORAL at 09:42

## 2022-07-17 RX ADMIN — Medication 1 TABLET: at 09:42

## 2022-07-17 RX ADMIN — CEFTRIAXONE 1000 MG: 1 INJECTION, SOLUTION INTRAVENOUS at 20:52

## 2022-07-17 RX ADMIN — MAGNESIUM SULFATE HEPTAHYDRATE 2 G: 40 INJECTION, SOLUTION INTRAVENOUS at 09:16

## 2022-07-17 RX ADMIN — ALBUMIN (HUMAN) 25 G: 0.25 INJECTION, SOLUTION INTRAVENOUS at 16:50

## 2022-07-17 RX ADMIN — FOLIC ACID 1 MG: 1 TABLET ORAL at 09:42

## 2022-07-17 RX ADMIN — Medication 800 MG: at 05:42

## 2022-07-17 RX ADMIN — POTASSIUM & SODIUM PHOSPHATES POWDER PACK 280-160-250 MG 2 PACKET: 280-160-250 PACK at 13:38

## 2022-07-17 RX ADMIN — LACTULOSE 20 G: 20 SOLUTION ORAL at 17:09

## 2022-07-17 RX ADMIN — FUROSEMIDE 40 MG: 10 INJECTION, SOLUTION INTRAMUSCULAR; INTRAVENOUS at 16:32

## 2022-07-17 RX ADMIN — MIDODRINE HYDROCHLORIDE 10 MG: 5 TABLET ORAL at 11:40

## 2022-07-17 RX ADMIN — LORAZEPAM 2 MG: 1 TABLET ORAL at 19:53

## 2022-07-17 RX ADMIN — LORAZEPAM 2 MG: 1 TABLET ORAL at 13:43

## 2022-07-17 RX ADMIN — CALCIUM GLUCONATE 1 G: 20 INJECTION, SOLUTION INTRAVENOUS at 19:52

## 2022-07-17 RX ADMIN — LACTULOSE 20 G: 20 SOLUTION ORAL at 09:42

## 2022-07-17 RX ADMIN — OXAZEPAM 10 MG: 10 CAPSULE, GELATIN COATED ORAL at 13:38

## 2022-07-17 RX ADMIN — THIAMINE HCL TAB 100 MG 100 MG: 100 TAB at 09:42

## 2022-07-17 RX ADMIN — POTASSIUM CHLORIDE 40 MEQ: 1500 TABLET, EXTENDED RELEASE ORAL at 11:55

## 2022-07-17 NOTE — PLAN OF CARE
Problem: MOBILITY - ADULT  Goal: Maintain or return to baseline ADL function  Description: INTERVENTIONS:  -  Assess patient's ability to carry out ADLs; assess patient's baseline for ADL function and identify physical deficits which impact ability to perform ADLs (bathing, care of mouth/teeth, toileting, grooming, dressing, etc )  - Assess/evaluate cause of self-care deficits   - Assess range of motion  - Assess patient's mobility; develop plan if impaired  - Assess patient's need for assistive devices and provide as appropriate  - Encourage maximum independence but intervene and supervise when necessary  - Involve family in performance of ADLs  - Assess for home care needs following discharge   - Consider OT consult to assist with ADL evaluation and planning for discharge  - Provide patient education as appropriate  Outcome: Not Progressing  Goal: Maintains/Returns to pre admission functional level  Description: INTERVENTIONS:  - Perform BMAT or MOVE assessment daily    - Set and communicate daily mobility goal to care team and patient/family/caregiver  - Collaborate with rehabilitation services on mobility goals if consulted  - Perform Range of Motion prn times a day  - Reposition patient every  self hours    - Dangle patient prn times a day  - Stand patient prn times a day  - Ambulate patient prn times a day  - Out of bed to chair prn times a day   - Out of bed for meals prn times a day  - Out of bed for toileting  - Record patient progress and toleration of activity level   Outcome: Not Progressing     Problem: Potential for Falls  Goal: Patient will remain free of falls  Description: INTERVENTIONS:  - Educate patient/family on patient safety including physical limitations  - Instruct patient to call for assistance with activity   - Consult OT/PT to assist with strengthening/mobility   - Keep Call bell within reach  - Keep bed low and locked with side rails adjusted as appropriate  - Keep care items and personal belongings within reach  - Initiate and maintain comfort rounds  - Make Fall Risk Sign visible to staff  - Offer Toileting every prn Hours, in advance of need  - Initiate/Maintain  bed alarm  - Obtain necessary fall risk management equipment: n/a  - Apply yellow socks and bracelet for high fall risk patients  - Consider moving patient to room near nurses station  Outcome: Not Progressing     Problem: SAFETY ADULT  Goal: Maintain or return to baseline ADL function  Description: INTERVENTIONS:  -  Assess patient's ability to carry out ADLs; assess patient's baseline for ADL function and identify physical deficits which impact ability to perform ADLs (bathing, care of mouth/teeth, toileting, grooming, dressing, etc )  - Assess/evaluate cause of self-care deficits   - Assess range of motion  - Assess patient's mobility; develop plan if impaired  - Assess patient's need for assistive devices and provide as appropriate  - Encourage maximum independence but intervene and supervise when necessary  - Involve family in performance of ADLs  - Assess for home care needs following discharge   - Consider OT consult to assist with ADL evaluation and planning for discharge  - Provide patient education as appropriate  Outcome: Not Progressing  Goal: Maintains/Returns to pre admission functional level  Description: INTERVENTIONS:  - Perform BMAT or MOVE assessment daily    - Set and communicate daily mobility goal to care team and patient/family/caregiver  - Collaborate with rehabilitation services on mobility goals if consulted  - Perform Range of Motion prn times a day  - Reposition patient every  self hours    - Dangle patient prn times a day  - Stand patient prn times a day  - Ambulate patient prn times a day  - Out of bed to chair prn times a day   - Out of bed for meals prn times a day  - Out of bed for toileting  - Record patient progress and toleration of activity level   Outcome: Not Progressing  Goal: Patient will remain free of falls  Description: INTERVENTIONS:  - Educate patient/family on patient safety including physical limitations  - Instruct patient to call for assistance with activity   - Consult OT/PT to assist with strengthening/mobility   - Keep Call bell within reach  - Keep bed low and locked with side rails adjusted as appropriate  - Keep care items and personal belongings within reach  - Initiate and maintain comfort rounds  - Make Fall Risk Sign visible to staff  - Offer Toileting every prn Hours, in advance of need  - Initiate/Maintain  bed alarm  - Obtain necessary fall risk management equipment: n/a  - Apply yellow socks and bracelet for high fall risk patients  - Consider moving patient to room near nurses station  Outcome: Not Progressing     Problem: DISCHARGE PLANNING  Goal: Discharge to home or other facility with appropriate resources  Description: INTERVENTIONS:  - Identify barriers to discharge w/patient and caregiver  - Arrange for needed discharge resources and transportation as appropriate  - Identify discharge learning needs (meds, wound care, etc )  - Arrange for interpretive services to assist at discharge as needed  - Refer to Case Management Department for coordinating discharge planning if the patient needs post-hospital services based on physician/advanced practitioner order or complex needs related to functional status, cognitive ability, or social support system  Outcome: Not Progressing     Problem: Knowledge Deficit  Goal: Patient/family/caregiver demonstrates understanding of disease process, treatment plan, medications, and discharge instructions  Description: Complete learning assessment and assess knowledge base    Interventions:  - Provide teaching at level of understanding  - Provide teaching via preferred learning methods  Outcome: Not Progressing

## 2022-07-17 NOTE — UTILIZATION REVIEW
Initial Clinical Review    Admission: Date/Time/Statement:   Admission Orders (From admission, onward)     Ordered        07/16/22 1725  INPATIENT ADMISSION  Once                      Orders Placed This Encounter   Procedures    INPATIENT ADMISSION     Standing Status:   Standing     Number of Occurrences:   1     Order Specific Question:   Level of Care     Answer:   Level 2 Stepdown / HOT [14]     Order Specific Question:   Estimated length of stay     Answer:   More than 2 Midnights     Order Specific Question:   Certification     Answer:   I certify that inpatient services are medically necessary for this patient for a duration of greater than two midnights  See H&P and MD Progress Notes for additional information about the patient's course of treatment  ED Arrival Information     Expected   -    Arrival   7/16/2022 15:27    Acuity   Urgent            Means of arrival   Ambulance    Escorted by   [address] [place], [place] [address]ist    Admission type   Urgent            Arrival complaint   weakness           Chief Complaint   Patient presents with    Altered Mental Status     Parents called EMS due to patient acting "not himself" and patient has not been taking medications  Pt has extensive mental health history           Initial Presentation: 40 y o  male presented to ED via EMS as inpatient admission for anemia  Patient with increased confusion HGB 3 6 denies having any hematemesis or rectal bleeding Other ED lab work was significant for elevated alcohol level of 448, elevated ammonia level of 98 PMH of alcoholic cirrhosis, asthma, alcohol abuse cirrhosis depression and tobacco use  Alcohol level on admission noted to be at elevated at 448  Currently admits to having some nausea  Also admits to having poor p o  Intake for last couple days due to nausea  c/o abdominal distention On exam (+) murmur wheezing abdominal distention and b/l lower leg edema  Plan transfuse 1 unit PRBC's,  Hg q8h Heme occult blood negative in the ED IV lasix for anasarca  And supportive care   2nd U PRBC's     Date: 07-17-22   Admits to feeling nauseous and thirsty (+) murmur rales and edema HGB q 8 hrs  3 6--> 4 7 --> 5 4  transfuse with 1 U PRBC's this morning   2 more units ordered (+) tachycardia  Right lower leg (+) 2  Left (+) 3 edema noted continue IV Lasix and resume aldactone,Hepatic encephalopathy- resume home lactulose Abdominal ultrasound ordered to assess for hepatocellular carcinoma & ascites    GI consult     ED Triage Vitals   Temperature Pulse Respirations Blood Pressure SpO2   07/16/22 1535 07/16/22 1535 07/16/22 1535 07/16/22 1535 07/16/22 1538   97 7 °F (36 5 °C) 82 16 98/55 96 %      Temp Source Heart Rate Source Patient Position - Orthostatic VS BP Location FiO2 (%)   07/16/22 1808 07/16/22 1535 07/16/22 1535 07/16/22 1535 --   Tympanic Monitor Lying Right arm       Pain Score       07/16/22 1535       No Pain          Wt Readings from Last 1 Encounters:   07/17/22 96 6 kg (212 lb 15 4 oz)     Additional Vital Signs:   Date/Time Temp Pulse Resp BP MAP (mmHg) SpO2 Calculated FIO2 (%) - Nasal Cannula Nasal Cannula O2 Flow Rate (L/min) O2 Device Patient Position - Orthostatic VS   07/17/22 1141 -- 110 Abnormal  -- 126/59 82 -- -- -- -- Lying   07/17/22 1040 -- 110 Abnormal  16 98/59 71 93 % -- -- None (Room air) Lying   07/17/22 0949 -- -- -- -- -- 96 % -- -- None (Room air) --   07/17/22 0924 -- 110 Abnormal  -- 108/53 -- -- -- -- -- --   07/17/22 0911 99 1 °F (37 3 °C) 110 Abnormal  16 108/53 -- 98 % 26 1 5 L/min Nasal cannula --   07/17/22 0828 98 1 °F (36 7 °C) 110 Abnormal  18 125/60 80 96 % -- -- Nasal cannula Lying   07/17/22 0805 -- -- -- -- -- -- 28 2 L/min Nasal cannula --   07/17/22 0605 99 6 °F (37 6 °C) 101 18 114/53 72 95 % 28 2 L/min Nasal cannula Lying   07/17/22 0540 99 °F (37 2 °C) 106 Abnormal  17 125/56 81 95 % 28 2 L/min Nasal cannula Lying   07/17/22 0500 99 5 °F (37 5 °C) 110 Abnormal  18 109/57 76 94 % 28 2 L/min Nasal cannula Lying   07/17/22 0055 99 °F (37 2 °C) 104 17 123/64 -- 95 % 28 2 L/min Nasal cannula Lying   07/16/22 2230 98 °F (36 7 °C) 107 Abnormal  17 111/69 85 94 % 28 2 L/min Nasal cannula Lying   07/16/22 2206 97 8 °F (36 6 °C) 112 Abnormal  18 104/57 78 95 % 28 2 L/min Nasal cannula Lying   07/16/22 2142 97 8 °F (36 6 °C) 103 17 103/66 80 94 % 28 2 L/min Nasal cannula Lying   07/16/22 2125 -- -- -- -- -- 93 % 28 2 L/min Nasal cannula --   07/16/22 2120 -- -- -- -- -- 87 % Abnormal  -- -- None (Room air) --   07/16/22 2101 97 9 °F (36 6 °C) 104 18 111/60 -- 93 % -- -- None (Room air) Lying   07/16/22 2024 97 8 °F (36 6 °C) 104 20 122/67 -- 96 % -- -- None (Room air) Lying   07/16/22 1945 97 1 °F (36 2 °C) Abnormal  100 18 114/61 -- -- -- -- -- --   07/16/22 1914 96 9 °F (36 1 °C) Abnormal  99 18 113/61 82 91 % -- -- None (Room air) Lying   07/16/22 1850 97 8 °F (36 6 °C) 93 16 109/71 -- 96 % -- -- None (Room air) --   07/16/22 1808 96 8 °F (36 °C) Abnormal  85 14 114/75 88 91 % -- -- None (Room air) Lying   07/16/22 1645 -- 97 12 113/52 -- 100 % -- -- None (Room air) Lying   07/16/22 1633 -- 87 18 --  -- 97 % -- -- None (Room air) --   BP: cuff readjusted at 07/16/22 1633   07/16/22 1630 -- 95 -- 111/56 -- 96 % -- -- None (Room air) Lying   07/16/22 1557 -- -- -- -- -- -- -- -- None (Room air)        Pertinent Labs/Diagnostic Test Results:   XR chest portable   (07/17 1046)      No acute cardiopulmonary disease     US abdomen complete with doppler    (Results Pending)         Results from last 7 days   Lab Units 07/17/22  1113 07/17/22  0347 07/16/22  1611   WBC Thousand/uL  --  4 91 6 09   HEMOGLOBIN g/dL 5 4* 4 7* 3 6*   HEMATOCRIT % 17 8* 16 1* 13 7*   PLATELETS Thousands/uL  --  101* 140*   NEUTROS ABS Thousands/µL  --   --  3 99     Results from last 7 days   Lab Units 07/17/22  0347   RETIC CT ABS  <10,000*   RETIC CT PCT % 0 39     Results from last 7 days   Lab Units 07/17/22  0347 07/16/22  1611   SODIUM mmol/L 135 133*   POTASSIUM mmol/L 2 7* 4 0   CHLORIDE mmol/L 94* 94*   CO2 mmol/L 29 28   ANION GAP mmol/L 12 11   BUN mg/dL 7 8   CREATININE mg/dL 0 35* 0 32*   EGFR ml/min/1 73sq m 160 166   CALCIUM mg/dL 7 9* 8 1*   MAGNESIUM mg/dL 1 6* 2 0   PHOSPHORUS mg/dL 2 6* 2 6*     Results from last 7 days   Lab Units 07/17/22  0347 07/16/22  1611   AST U/L 100* 128*   ALT U/L 26 29   ALK PHOS U/L 194* 221*   TOTAL PROTEIN g/dL 7 1 7 6   ALBUMIN g/dL 3 2* 3 3*   TOTAL BILIRUBIN mg/dL 5 47* 4 61*   BILIRUBIN DIRECT mg/dL  --  2 58*   AMMONIA umol/L  --  98*         Results from last 7 days   Lab Units 07/17/22  0347 07/16/22  1611   GLUCOSE RANDOM mg/dL 123 118       Results from last 7 days   Lab Units 07/16/22  1611   CK TOTAL U/L 40     Results from last 7 days   Lab Units 07/16/22  1901 07/16/22  1611   HS TNI 0HR ng/L  --  6   HS TNI 2HR ng/L 6  --    HSTNI D2 ng/L 0  --          Results from last 7 days   Lab Units 07/16/22  1611   PROTIME seconds 21 1*   INR  1 87*   PTT seconds 45*     Results from last 7 days   Lab Units 07/16/22  1611   FERRITIN ng/mL 34         Results from last 7 days   Lab Units 07/16/22  1611   LIPASE u/L 50       Results from last 7 days   Lab Units 07/16/22  1758   AMPH/METH  Negative   BARBITURATE UR  Negative   BENZODIAZEPINE UR  Negative   COCAINE UR  Negative   METHADONE URINE  Negative   OPIATE UR  Negative   PCP UR  Negative   THC UR  Negative     Results from last 7 days   Lab Units 07/16/22  1611   ETHANOL LVL mg/dL 448*       ED Treatment:   Medication Administration from 07/16/2022 1527 to 07/16/2022 1751       Date/Time Order Dose Route Action     07/16/2022 1600 sodium chloride 0 9 % bolus 1,000 mL 1,000 mL Intravenous New Bag        Past Medical History:   Diagnosis Date    AAA (abdominal aortic aneurysm) ([address] )     Allergic     Anemia     Asthma     Depression 05/18/2021    Essential hypertension 05/06/2019    Obesity     Opiate dependence ([place] [address] )     Substance abuse ([place] [address] )      Present on Admission:   Anemia   Alcoholic cirrhosis of liver without ascites (HCC)   Alcohol withdrawal (HCC)   Tobacco dependence   Depression   Substance abuse ([place] [address] )   Chronic bronchitis (HCC)      Admitting Diagnosis: Liver failure ([place] [address] ) [K72 90]  Weakness [R53 1]  Anemia [D64 9]  Failure to thrive in adult [R62 7]  Age/Sex: 40 y o  male  Admission Orders:  CIWA scoring  0-12   Telemetry  Continuous pulse oximetry  SCD  NPO        Scheduled Medications:  buprenorphine-naloxone, 8 mg, Sublingual, BID  escitalopram, 20 mg, Oral, Daily  ferrous sulfate, 325 mg, Oral, Daily With Breakfast  folic acid, 1 mg, Oral, Daily  furosemide, 40 mg, Intravenous, Daily  lactulose, 20 g, Oral, BID  midodrine, 10 mg, Oral, TID AC  multivitamin-minerals, 1 tablet, Oral, Daily  nicotine, 1 patch, Transdermal, Daily  oxazepam, 10 mg, Oral, Q8H RICHARDSON  pantoprazole, 40 mg, Oral, Early Morning  potassium chloride, 40 mEq, Oral, TID With Meals  spironolactone, 50 mg, Oral, Daily  thiamine, 100 mg, Oral, Daily      Continuous IV Infusions:     PRN Meds:  acetaminophen, 650 mg, Oral, Q6H PRN  albuterol, 2 puff, Inhalation, Q6H PRN  trimethobenzamide, 200 mg, Intramuscular, Q6H PRN        IP CONSULT TO GASTROENTEROLOGY    Network Utilization Review Department  ATTENTION: Please call with any questions or concerns to [phone] and carefully listen to the prompts so that you are directed to the right person  All voicemails are confidential   Sudie Crigler all requests for admission clinical reviews, approved or denied determinations and any other requests to dedicated fax number below belonging to the campus where the patient is receiving treatment   List of dedicated fax numbers for the Facilities:  [address] DENIALS (Administrative/Medical Necessity) [phone]   [address] (Maternity/NICU/Pediatrics) [phone] 5000 [facility] [address] [place] [phone]   [address]  [phone]   [address] [facility] [place] [address] [facility] 5267 [id number] [place] [address] [address] [phone]   [address] [phone]

## 2022-07-17 NOTE — ASSESSMENT & PLAN NOTE
Resume home inhalers for asthma  Follow-up chest x-ray - on my read no significant sindings for infection  Continue home inhalers for asthma

## 2022-07-17 NOTE — ASSESSMENT & PLAN NOTE
Hx Decompensated cirrhosis with anasarca, hepatic encephalopathy, thrombocytopenia splenomegaly, hyperammonemia, and  Hepatomegaly most likely secondary to alcohol abuse but could also be due to underlying alcoholic hepatitis  -Previous AFP and right upper quadrant ultrasounds were normal  -Never had screening EGD for varices    Hepatic encephalopathy- resume home lactulose, ammonia level 98 on admission    Anasarca - start Lasix 40 daily IV  Resume Aldactone 50 mg    MELD 23  T  Bili 4 6, INR 1 87, cr 0 35, Na 133    Abdominal ultrasound ordered to assess for hepatocellular carcinoma & ascites  AFP 3 5  F/U Gi consult

## 2022-07-17 NOTE — ASSESSMENT & PLAN NOTE
Blood pressure slightly on lower side in setting of anasarca from cirrhosis  Will start midodrine 10 mg tid

## 2022-07-17 NOTE — PROGRESS NOTES
[address]  Progress Note - [place] 1985, 40 y o  male MRN: [id number]  Unit/Bed#: MS [facility] Encounter: [id number]  Primary Care Provider: Steven Bentley PA-C   Date and time admitted to hospital: 7/16/2022  3:27 PM    * Anemia  Assessment & Plan  Presents with microcytic anemia hemoglobin of 3 6  Asymptomatic, vitals stable  Troponin negative  1 unit of PRBC ordered  Repeat Hg q12    MCV noted to be signficantly low at 64, ? thalasemia will need to check if insurance will cover for thalasemia testing  F/U Iron panel  Heme occult blood negative in the ED        Alcoholic cirrhosis of liver without ascites ([facility] [address] )  Assessment & Plan  Hx Decompensated cirrhosis with anasarca, hepatic encephalopathy, thrombocytopenia splenomegaly, hyperammonemia, and  Hepatomegaly most likely secondary to alcohol abuse but could also be due to underlying alcoholic hepatitis  -Previous AFP and right upper quadrant ultrasounds were normal  -Never had screening EGD for varices    Hepatic encephalopathy- resume home lactulose, ammonia level 98 on admission    Anasarca - start Lasix 40 daily IV  Resume Aldactone 50 mg    MELD 23  T  Bili 4 6, INR 1 87, cr 0 35, Na 133    Abdominal ultrasound ordered to assess for hepatocellular carcinoma & ascites  AFP 3 5  F/U Gi consult    Alcohol withdrawal (HCC)  Assessment & Plan  Alcohol level 448  CIWA protocol  MV thiamine folic acid  Added serax 10 mg tid for symptom control    Idiopathic hypotension  Assessment & Plan  Blood pressure slightly on lower side in setting of anasarca from cirrhosis  Will start midodrine 10 mg tid    Wheezing  Assessment & Plan  Resume home inhalers for asthma  Follow-up chest x-ray - on my read no significant sindings for infection  Continue home inhalers for asthma    Chronic bronchitis (HCC)  Assessment & Plan  Resume home inhalers for asthma  Follow-up chest x-ray - on my read no significant sindings for infection  Continue home inhalers for asthma    Depression  Assessment & Plan  Resume home Lexapro    Tobacco dependence  Assessment & Plan  Nicotine patch    Substance abuse ([facility] [address] )  Assessment & Plan  · Patient reports taking 8-2 Suboxone BID  · [place] PDMP confirmed 16 mg daily          VTE Pharmacologic Prophylaxis: VTE Score: 0 Low Risk (Score 0-2) - Encourage Ambulation  Patient Centered Rounds: I performed bedside rounds with nursing staff today  Discussions with Specialists or Other Care Team Provider: n/a    Education and Discussions with Family / Patient: Attempted to update  (sister) via phone  Left voicemail  Time Spent for Care: 30 minutes  More than 50% of total time spent on counseling and coordination of care as described above  Current Length of Stay: 1 day(s)  Current Patient Status: Inpatient   Certification Statement: The patient will continue to require additional inpatient hospital stay due to anemia, cirrhosis  Discharge Plan: Anticipate discharge in >72 hrs to discharge location to be determined pending rehab evaluations  Code Status: Level 1 - Full Code    Subjective:   Patient seen examined at bedside  Admits to feeling nauseous and thirsty  Denies any chest pain, sob, abdominal pain  Objective:     Vitals:   Temp (24hrs), Av 1 °F (36 7 °C), Min:96 8 °F (36 °C), Max:99 6 °F (37 6 °C)    Temp:  [96 8 °F (36 °C)-99 6 °F (37 6 °C)] 99 1 °F (37 3 °C)  HR:  [] 110  Resp:  [12-20] 16  BP: ()/(52-75) 98/59  SpO2:  [87 %-100 %] 93 %  Body mass index is 28 1 kg/m²  Input and Output Summary (last 24 hours): Intake/Output Summary (Last 24 hours) at 2022 1053  Last data filed at 2022 0959  Gross per 24 hour   Intake 1770 ml   Output 1150 ml   Net 620 ml       Physical Exam:   Physical Exam  Vitals reviewed  HENT:      Head: Normocephalic and atraumatic        Right Ear: External ear normal       Left Ear: External ear normal       Nose: Nose normal  Mouth/Throat:      Mouth: Mucous membranes are moist       Pharynx: Oropharynx is clear  Eyes:      Extraocular Movements: Extraocular movements intact  Cardiovascular:      Rate and Rhythm: Normal rate and regular rhythm  Heart sounds: Murmur heard  Pulmonary:      Effort: Pulmonary effort is normal       Breath sounds: Rales present  Abdominal:      General: Abdomen is flat  Palpations: Abdomen is soft  Tenderness: There is no abdominal tenderness  Musculoskeletal:      Cervical back: Normal range of motion  Right lower leg: Edema present  Left lower leg: Edema present  Skin:     General: Skin is warm and dry  Neurological:      General: No focal deficit present  Mental Status: He is alert  Mental status is at baseline     Psychiatric:         Mood and Affect: Mood normal          Behavior: Behavior normal           Additional Data:     Labs:  Results from last 7 days   Lab Units 07/17/22  0347 07/16/22  1611   WBC Thousand/uL 4 91 6 09   HEMOGLOBIN g/dL 4 7* 3 6*   HEMATOCRIT % 16 1* 13 7*   PLATELETS Thousands/uL 101* 140*   NEUTROS PCT %  --  66   LYMPHS PCT %  --  23   MONOS PCT %  --  9   EOS PCT %  --  0     Results from last 7 days   Lab Units 07/17/22  0347   SODIUM mmol/L 135   POTASSIUM mmol/L 2 7*   CHLORIDE mmol/L 94*   CO2 mmol/L 29   BUN mg/dL 7   CREATININE mg/dL 0 35*   ANION GAP mmol/L 12   CALCIUM mg/dL 7 9*   ALBUMIN g/dL 3 2*   TOTAL BILIRUBIN mg/dL 5 47*   ALK PHOS U/L 194*   ALT U/L 26   AST U/L 100*   GLUCOSE RANDOM mg/dL 123     Results from last 7 days   Lab Units 07/16/22  1611   INR  1 87*                   Lines/Drains:  Invasive Devices  Report    Peripheral Intravenous Line  Duration           Peripheral IV 07/16/22 Left Antecubital <1 day    Peripheral IV 07/16/22 Left Hand <1 day    Peripheral IV 07/16/22 Right Arm <1 day                  Telemetry:  Telemetry Orders (From admission, onward)             24 Hour Telemetry Monitoring Continuous x 24 Hours (Telem)        References:    Telemetry Guidelines   Question:  Reason for 24 Hour Telemetry  Answer:  Metabolic/Electrolyte Disturbance with High Probability of Dysrhythmia (K level <3 or >6, or KCL infusion >=10mEq/hr)                 Telemetry Reviewed: Normal Sinus Rhythm  Indication for Continued Telemetry Use: Metabolic/electrolyte disturbance with high probability of dysrhythmia             Imaging: Reviewed radiology reports from this admission including: chest xray    Recent Cultures (last 7 days):         Last 24 Hours Medication List:   Current Facility-Administered Medications   Medication Dose Route Frequency Provider Last Rate    acetaminophen  650 mg Oral Q6H PRN MARILEE Costa      albuterol  2 puff Inhalation Q6H PRN Blaine Serna DO      buprenorphine-naloxone  8 mg Sublingual BID MARILEE Snider      escitalopram  20 mg Oral Daily Pandi Todhe, DO      ferrous sulfate  325 mg Oral Daily With Breakfast MARILEE Snider      folic acid  1 mg Oral Daily Pandi Todhe, DO      furosemide  40 mg Intravenous Daily Pandi Todhe, DO      lactulose  20 g Oral BID Pandi Todhe, DO      midodrine  10 mg Oral TID AC Pandi Todhe, DO      multivitamin-minerals  1 tablet Oral Daily Pandi Todhe, DO      nicotine  1 patch Transdermal Daily MARILEE Snider      oxazepam  10 mg Oral Q8H [address] Pandi Todhe, DO      pantoprazole  40 mg Oral Early Morning Pandi Todhe, DO      potassium chloride  40 mEq Oral TID With Meals Pandi Todhe, DO      spironolactone  50 mg Oral Daily Pandi Todhe, DO      thiamine  100 mg Oral Daily Pandi Todhe, DO      trimethobenzamide  200 mg Intramuscular Q6H PRN MARILEE Costa          Today, Patient Was Seen By: Yecenia Edge DO    **Please Note: This note may have been constructed using a voice recognition system  **

## 2022-07-17 NOTE — QUICK NOTE
Notified by RN regarding temp of 101 5 during transfusion  Evaluated patient at bedside appears sitting comfortably in bed, offers no acute complaints  Denies any chest pain chest tightness shortness of breath diaphoresis  BP stable  Satting 95% on room air  Advised RN to continue with blood transfusion  F/U Blood cultures and UA  CXR done on 7/17 negative for acute findings  Will hold off on administering abx at this point as there is no source of infection  If spikes fever again consider starting ceftriaxone for sbp  Will most likely need paracentesis at some point, unfortunately no IR over the weekend

## 2022-07-17 NOTE — ASSESSMENT & PLAN NOTE
Presents with microcytic anemia hemoglobin of 3 6  Asymptomatic, vitals stable  Troponin negative  Repeat Hg q12  Currently on 3rd unit of PRBC  Most recent Hg 4 7    MCV noted to be signficantly low at 64, ? thalasemia will need to check if insurance will cover for thalasemia testing  Iron panel showing low iron levels with normal TIBC     Heme occult blood negative in the ED

## 2022-07-17 NOTE — ASSESSMENT & PLAN NOTE
Presents with microcytic anemia hemoglobin of 3 6  Asymptomatic, vitals stable  Troponin negative  Repeat Hg q12  After 5 units of blood hemoglobin up to 6 3  Currently receiving 60 unit    Iron panel showing low iron levels with normal TIBC  Heme occult blood negative in the ED    Plan for EGD today at 1:00 p m

## 2022-07-17 NOTE — PLAN OF CARE
Problem: GASTROINTESTINAL - ADULT  Goal: Minimal or absence of nausea and/or vomiting  Description: INTERVENTIONS:  - Administer IV fluids if ordered to ensure adequate hydration  - Maintain NPO status until nausea and vomiting are resolved  - Nasogastric tube if ordered  - Administer ordered antiemetic medications as needed  - Provide nonpharmacologic comfort measures as appropriate  - Advance diet as tolerated, if ordered  - Consider nutrition services referral to assist patient with adequate nutrition and appropriate food choices  Outcome: Progressing     Problem: METABOLIC, FLUID AND ELECTROLYTES - ADULT  Goal: Electrolytes maintained within normal limits  Description: INTERVENTIONS:  - Monitor labs and assess patient for signs and symptoms of electrolyte imbalances  - Administer electrolyte replacement as ordered  - Monitor response to electrolyte replacements, including repeat lab results as appropriate  - Instruct patient on fluid and nutrition as appropriate  Outcome: Progressing     Problem: METABOLIC, FLUID AND ELECTROLYTES - ADULT  Goal: Fluid balance maintained  Description: INTERVENTIONS:  - Monitor labs   - Monitor I/O and WT  - Instruct patient on fluid and nutrition as appropriate  - Assess for signs & symptoms of volume excess or deficit  Outcome: Progressing     Problem: HEMATOLOGIC - ADULT  Goal: Maintains hematologic stability  Description: INTERVENTIONS  - Assess for signs and symptoms of bleeding or hemorrhage  - Monitor labs  - Administer supportive blood products/factors as ordered and appropriate  Outcome: Progressing

## 2022-07-18 ENCOUNTER — HOSPITAL ENCOUNTER (INPATIENT)
Facility: HOSPITAL | Age: 37
LOS: 15 days | Discharge: HOME | DRG: 432 | End: 2022-08-02
Attending: INTERNAL MEDICINE | Admitting: INTERNAL MEDICINE

## 2022-07-18 ENCOUNTER — APPOINTMENT (INPATIENT)
Dept: CT IMAGING | Facility: HOSPITAL | Age: 37
DRG: 432 | End: 2022-07-18

## 2022-07-18 VITALS
HEIGHT: 73 IN | BODY MASS INDEX: 28.34 KG/M2 | TEMPERATURE: 99.8 F | SYSTOLIC BLOOD PRESSURE: 134 MMHG | DIASTOLIC BLOOD PRESSURE: 59 MMHG | HEART RATE: 94 BPM | RESPIRATION RATE: 18 BRPM | WEIGHT: 213.85 LBS | OXYGEN SATURATION: 92 %

## 2022-07-18 DIAGNOSIS — F43.10 PTSD (POST-TRAUMATIC STRESS DISORDER): ICD-10-CM

## 2022-07-18 DIAGNOSIS — D69.6 THROMBOCYTOPENIA (HCC): ICD-10-CM

## 2022-07-18 DIAGNOSIS — R91.8 MASS OF UPPER LOBE OF LEFT LUNG: ICD-10-CM

## 2022-07-18 DIAGNOSIS — G93.40 ENCEPHALOPATHY: ICD-10-CM

## 2022-07-18 DIAGNOSIS — I10 ESSENTIAL HYPERTENSION: Chronic | ICD-10-CM

## 2022-07-18 DIAGNOSIS — D64.9 ANEMIA, UNSPECIFIED TYPE: ICD-10-CM

## 2022-07-18 DIAGNOSIS — F19.10 SUBSTANCE ABUSE (HCC): Chronic | ICD-10-CM

## 2022-07-18 DIAGNOSIS — K70.30 ALCOHOLIC CIRRHOSIS OF LIVER WITHOUT ASCITES (HCC): Primary | ICD-10-CM

## 2022-07-18 DIAGNOSIS — R60.1 ANASARCA: ICD-10-CM

## 2022-07-18 DIAGNOSIS — R93.89 ABNORMAL CT SCAN: ICD-10-CM

## 2022-07-18 DIAGNOSIS — F32.A DEPRESSION, UNSPECIFIED DEPRESSION TYPE: Chronic | ICD-10-CM

## 2022-07-18 DIAGNOSIS — C78.7 LIVER METASTASIS (HCC): Chronic | ICD-10-CM

## 2022-07-18 PROBLEM — I71.60 THORACOABDOMINAL AORTIC ANEURYSM: Chronic | Status: ACTIVE | Noted: 2021-01-20

## 2022-07-18 PROBLEM — L03.90 CELLULITIS: Status: RESOLVED | Noted: 2021-01-19 | Resolved: 2022-07-18

## 2022-07-18 PROBLEM — L60.0 INGROWN NAIL OF GREAT TOE OF RIGHT FOOT: Status: RESOLVED | Noted: 2018-09-06 | Resolved: 2022-07-18

## 2022-07-18 PROBLEM — I71.6 THORACOABDOMINAL AORTIC ANEURYSM (HCC): Chronic | Status: ACTIVE | Noted: 2021-01-20

## 2022-07-18 PROBLEM — F17.200 TOBACCO DEPENDENCE: Chronic | Status: ACTIVE | Noted: 2021-01-19

## 2022-07-18 PROBLEM — R00.0 TACHYCARDIA: Status: RESOLVED | Noted: 2021-05-19 | Resolved: 2022-07-18

## 2022-07-18 PROBLEM — E16.2 HYPOGLYCEMIA: Status: RESOLVED | Noted: 2021-05-25 | Resolved: 2022-07-18

## 2022-07-18 PROBLEM — E87.1 HYPONATREMIA: Status: RESOLVED | Noted: 2021-01-22 | Resolved: 2022-07-18

## 2022-07-18 PROBLEM — R06.2 WHEEZING: Status: RESOLVED | Noted: 2022-07-16 | Resolved: 2022-07-18

## 2022-07-18 PROBLEM — J45.20 MILD INTERMITTENT ASTHMA WITHOUT COMPLICATION: Chronic | Status: ACTIVE | Noted: 2018-09-06

## 2022-07-18 LAB
ABO GROUP BLD BPU: NORMAL
ABO GROUP BLD BPU: NORMAL
ALBUMIN SERPL BCP-MCNC: 3.4 G/DL (ref 3.5–5)
ALBUMIN SERPL BCP-MCNC: 3.6 G/DL (ref 3.5–5)
ALP SERPL-CCNC: 204 U/L (ref 34–104)
ALP SERPL-CCNC: 213 U/L (ref 34–104)
ALT SERPL W P-5'-P-CCNC: 23 U/L (ref 7–52)
ALT SERPL W P-5'-P-CCNC: 24 U/L (ref 7–52)
ANION GAP SERPL CALCULATED.3IONS-SCNC: 5 MMOL/L (ref 4–13)
ANION GAP SERPL CALCULATED.3IONS-SCNC: 6 MMOL/L (ref 4–13)
AST SERPL W P-5'-P-CCNC: 107 U/L (ref 13–39)
AST SERPL W P-5'-P-CCNC: 107 U/L (ref 13–39)
BASOPHILS # BLD AUTO: 0.06 THOUSANDS/ΜL (ref 0–0.1)
BASOPHILS NFR BLD AUTO: 1 % (ref 0–1)
BILIRUB SERPL-MCNC: 10.94 MG/DL (ref 0.2–1)
BILIRUB SERPL-MCNC: 9.37 MG/DL (ref 0.2–1)
BPU ID: NORMAL
BPU ID: NORMAL
BUN SERPL-MCNC: 6 MG/DL (ref 5–25)
BUN SERPL-MCNC: 7 MG/DL (ref 5–25)
CA-I BLD-SCNC: 1.05 MMOL/L (ref 1.12–1.32)
CALCIUM ALBUM COR SERPL-MCNC: 9.5 MG/DL (ref 8.3–10.1)
CALCIUM SERPL-MCNC: 9 MG/DL (ref 8.4–10.2)
CALCIUM SERPL-MCNC: 9 MG/DL (ref 8.4–10.2)
CHLORIDE SERPL-SCNC: 91 MMOL/L (ref 96–108)
CHLORIDE SERPL-SCNC: 92 MMOL/L (ref 96–108)
CO2 SERPL-SCNC: 35 MMOL/L (ref 21–32)
CO2 SERPL-SCNC: 36 MMOL/L (ref 21–32)
CREAT SERPL-MCNC: 0.5 MG/DL (ref 0.6–1.3)
CREAT SERPL-MCNC: 0.52 MG/DL (ref 0.6–1.3)
CROSSMATCH: NORMAL
CROSSMATCH: NORMAL
EOSINOPHIL # BLD AUTO: 0.05 THOUSAND/ΜL (ref 0–0.61)
EOSINOPHIL NFR BLD AUTO: 1 % (ref 0–6)
ERYTHROCYTE [DISTWIDTH] IN BLOOD BY AUTOMATED COUNT: 28.1 % (ref 11.6–15.1)
ERYTHROCYTE [DISTWIDTH] IN BLOOD BY AUTOMATED COUNT: 28.5 % (ref 11.6–15.1)
GFR SERPL CREATININE-BSD FRML MDRD: 136 ML/MIN/1.73SQ M
GFR SERPL CREATININE-BSD FRML MDRD: 138 ML/MIN/1.73SQ M
GLUCOSE SERPL-MCNC: 91 MG/DL (ref 65–140)
GLUCOSE SERPL-MCNC: 97 MG/DL (ref 65–140)
HCT VFR BLD AUTO: 20 % (ref 36.5–49.3)
HCT VFR BLD AUTO: 23.3 % (ref 36.5–49.3)
HGB BLD-MCNC: 6.3 G/DL (ref 12–17)
HGB BLD-MCNC: 7.1 G/DL (ref 12–17)
IMM GRANULOCYTES # BLD AUTO: 0.07 THOUSAND/UL (ref 0–0.2)
IMM GRANULOCYTES NFR BLD AUTO: 1 % (ref 0–2)
INR PPP: 2.07 (ref 0.84–1.19)
INR PPP: 2.13 (ref 0.84–1.19)
LYMPHOCYTES # BLD AUTO: 0.82 THOUSANDS/ΜL (ref 0.6–4.47)
LYMPHOCYTES NFR BLD AUTO: 17 % (ref 14–44)
MAGNESIUM SERPL-MCNC: 1.8 MG/DL (ref 1.9–2.7)
MCH RBC QN AUTO: 22.3 PG (ref 26.8–34.3)
MCH RBC QN AUTO: 22.5 PG (ref 26.8–34.3)
MCHC RBC AUTO-ENTMCNC: 29.6 G/DL (ref 31.4–37.4)
MCHC RBC AUTO-ENTMCNC: 31.5 G/DL (ref 31.4–37.4)
MCV RBC AUTO: 71 FL (ref 82–98)
MCV RBC AUTO: 75 FL (ref 82–98)
MONOCYTES # BLD AUTO: 0.58 THOUSAND/ΜL (ref 0.17–1.22)
MONOCYTES NFR BLD AUTO: 12 % (ref 4–12)
NEUTROPHILS # BLD AUTO: 3.35 THOUSANDS/ΜL (ref 1.85–7.62)
NEUTS SEG NFR BLD AUTO: 68 % (ref 43–75)
NRBC BLD AUTO-RTO: 3 /100 WBCS
PHOSPHATE SERPL-MCNC: 2.2 MG/DL (ref 2.7–4.5)
PLATELET # BLD AUTO: 66 THOUSANDS/UL (ref 149–390)
PLATELET # BLD AUTO: 78 THOUSANDS/UL (ref 149–390)
POTASSIUM SERPL-SCNC: 3.5 MMOL/L (ref 3.5–5.3)
POTASSIUM SERPL-SCNC: 3.7 MMOL/L (ref 3.5–5.3)
PROT SERPL-MCNC: 7.1 G/DL (ref 6.4–8.4)
PROT SERPL-MCNC: 7.5 G/DL (ref 6.4–8.4)
PROTHROMBIN TIME: 23 SECONDS (ref 11.6–14.5)
PROTHROMBIN TIME: 23.3 SECONDS (ref 11.6–14.5)
RBC # BLD AUTO: 2.8 MILLION/UL (ref 3.88–5.62)
RBC # BLD AUTO: 3.1 MILLION/UL (ref 3.88–5.62)
SODIUM SERPL-SCNC: 132 MMOL/L (ref 135–147)
SODIUM SERPL-SCNC: 133 MMOL/L (ref 135–147)
TRANSFUSION STATUS PATIENT QL: NORMAL
UNIT DISPENSE STATUS: NORMAL
UNIT DISPENSE STATUS: NORMAL
UNIT PRODUCT CODE: NORMAL
UNIT PRODUCT CODE: NORMAL
UNIT PRODUCT VOLUME: 350 ML
UNIT PRODUCT VOLUME: 350 ML
UNIT RH: NORMAL
UNIT RH: NORMAL
WBC # BLD AUTO: 4.93 THOUSAND/UL (ref 4.31–10.16)
WBC # BLD AUTO: 5.24 THOUSAND/UL (ref 4.31–10.16)

## 2022-07-18 PROCEDURE — G1004 CDSM NDSC: HCPCS

## 2022-07-18 PROCEDURE — 82330 ASSAY OF CALCIUM: CPT

## 2022-07-18 PROCEDURE — 83735 ASSAY OF MAGNESIUM: CPT | Performed by: STUDENT IN AN ORGANIZED HEALTH CARE EDUCATION/TRAINING PROGRAM

## 2022-07-18 PROCEDURE — 85027 COMPLETE CBC AUTOMATED: CPT | Performed by: STUDENT IN AN ORGANIZED HEALTH CARE EDUCATION/TRAINING PROGRAM

## 2022-07-18 PROCEDURE — 80053 COMPREHEN METABOLIC PANEL: CPT | Performed by: STUDENT IN AN ORGANIZED HEALTH CARE EDUCATION/TRAINING PROGRAM

## 2022-07-18 PROCEDURE — 85610 PROTHROMBIN TIME: CPT

## 2022-07-18 PROCEDURE — 85025 COMPLETE CBC W/AUTO DIFF WBC: CPT

## 2022-07-18 PROCEDURE — 80053 COMPREHEN METABOLIC PANEL: CPT

## 2022-07-18 PROCEDURE — 99232 SBSQ HOSP IP/OBS MODERATE 35: CPT | Performed by: INTERNAL MEDICINE

## 2022-07-18 PROCEDURE — C9113 INJ PANTOPRAZOLE SODIUM, VIA: HCPCS

## 2022-07-18 PROCEDURE — 85610 PROTHROMBIN TIME: CPT | Performed by: STUDENT IN AN ORGANIZED HEALTH CARE EDUCATION/TRAINING PROGRAM

## 2022-07-18 PROCEDURE — 99239 HOSP IP/OBS DSCHRG MGMT >30: CPT | Performed by: STUDENT IN AN ORGANIZED HEALTH CARE EDUCATION/TRAINING PROGRAM

## 2022-07-18 PROCEDURE — 84100 ASSAY OF PHOSPHORUS: CPT | Performed by: STUDENT IN AN ORGANIZED HEALTH CARE EDUCATION/TRAINING PROGRAM

## 2022-07-18 PROCEDURE — 87040 BLOOD CULTURE FOR BACTERIA: CPT | Performed by: STUDENT IN AN ORGANIZED HEALTH CARE EDUCATION/TRAINING PROGRAM

## 2022-07-18 PROCEDURE — NC001 PR NO CHARGE: Performed by: INTERNAL MEDICINE

## 2022-07-18 PROCEDURE — 74176 CT ABD & PELVIS W/O CONTRAST: CPT

## 2022-07-18 PROCEDURE — NC001 PR NO CHARGE: Performed by: STUDENT IN AN ORGANIZED HEALTH CARE EDUCATION/TRAINING PROGRAM

## 2022-07-18 PROCEDURE — P9016 RBC LEUKOCYTES REDUCED: HCPCS

## 2022-07-18 RX ORDER — ALBUTEROL SULFATE 90 UG/1
2 AEROSOL, METERED RESPIRATORY (INHALATION) EVERY 6 HOURS PRN
Status: CANCELLED | OUTPATIENT
Start: 2022-07-18

## 2022-07-18 RX ORDER — FUROSEMIDE 10 MG/ML
40 INJECTION INTRAMUSCULAR; INTRAVENOUS
Status: CANCELLED | OUTPATIENT
Start: 2022-07-18

## 2022-07-18 RX ORDER — BUPRENORPHINE AND NALOXONE 8; 2 MG/1; MG/1
8 FILM, SOLUBLE BUCCAL; SUBLINGUAL 2 TIMES DAILY
Status: DISCONTINUED | OUTPATIENT
Start: 2022-07-18 | End: 2022-08-02 | Stop reason: HOSPADM

## 2022-07-18 RX ORDER — LORAZEPAM 1 MG/1
1 TABLET ORAL EVERY 8 HOURS PRN
Status: CANCELLED | OUTPATIENT
Start: 2022-07-18

## 2022-07-18 RX ORDER — LACTULOSE 20 G/30ML
20 SOLUTION ORAL 2 TIMES DAILY
Status: CANCELLED | OUTPATIENT
Start: 2022-07-18

## 2022-07-18 RX ORDER — BUPRENORPHINE AND NALOXONE 8; 2 MG/1; MG/1
8 FILM, SOLUBLE BUCCAL; SUBLINGUAL 2 TIMES DAILY
Status: CANCELLED | OUTPATIENT
Start: 2022-07-18

## 2022-07-18 RX ORDER — ACETAMINOPHEN 325 MG/1
975 TABLET ORAL EVERY 8 HOURS
Status: DISCONTINUED | OUTPATIENT
Start: 2022-07-18 | End: 2022-07-18

## 2022-07-18 RX ORDER — MIDODRINE HYDROCHLORIDE 5 MG/1
10 TABLET ORAL
Status: DISCONTINUED | OUTPATIENT
Start: 2022-07-19 | End: 2022-07-19

## 2022-07-18 RX ORDER — DIPHENHYDRAMINE HYDROCHLORIDE 50 MG/ML
25 INJECTION INTRAMUSCULAR; INTRAVENOUS EVERY 6 HOURS PRN
Status: DISCONTINUED | OUTPATIENT
Start: 2022-07-18 | End: 2022-08-02 | Stop reason: HOSPADM

## 2022-07-18 RX ORDER — ALBUTEROL SULFATE 90 UG/1
2 AEROSOL, METERED RESPIRATORY (INHALATION) EVERY 6 HOURS PRN
Status: DISCONTINUED | OUTPATIENT
Start: 2022-07-18 | End: 2022-08-02 | Stop reason: HOSPADM

## 2022-07-18 RX ORDER — LORAZEPAM 1 MG/1
1 TABLET ORAL EVERY 8 HOURS PRN
Status: DISCONTINUED | OUTPATIENT
Start: 2022-07-18 | End: 2022-08-02 | Stop reason: HOSPADM

## 2022-07-18 RX ORDER — NICOTINE 21 MG/24HR
1 PATCH, TRANSDERMAL 24 HOURS TRANSDERMAL DAILY
Status: DISCONTINUED | OUTPATIENT
Start: 2022-07-19 | End: 2022-08-02 | Stop reason: HOSPADM

## 2022-07-18 RX ORDER — FOLIC ACID 1 MG/1
1 TABLET ORAL DAILY
Status: CANCELLED | OUTPATIENT
Start: 2022-07-19

## 2022-07-18 RX ORDER — MAGNESIUM SULFATE HEPTAHYDRATE 40 MG/ML
2 INJECTION, SOLUTION INTRAVENOUS ONCE
Status: COMPLETED | OUTPATIENT
Start: 2022-07-18 | End: 2022-07-18

## 2022-07-18 RX ORDER — PANTOPRAZOLE SODIUM 40 MG/1
40 TABLET, DELAYED RELEASE ORAL
Status: CANCELLED | OUTPATIENT
Start: 2022-07-19

## 2022-07-18 RX ORDER — LANOLIN ALCOHOL/MO/W.PET/CERES
100 CREAM (GRAM) TOPICAL DAILY
Status: CANCELLED | OUTPATIENT
Start: 2022-07-19

## 2022-07-18 RX ORDER — SPIRONOLACTONE 25 MG/1
50 TABLET ORAL DAILY
Status: CANCELLED | OUTPATIENT
Start: 2022-07-19

## 2022-07-18 RX ORDER — ESCITALOPRAM OXALATE 20 MG/1
20 TABLET ORAL DAILY
Status: DISCONTINUED | OUTPATIENT
Start: 2022-07-19 | End: 2022-07-23

## 2022-07-18 RX ORDER — SPIRONOLACTONE 25 MG/1
50 TABLET ORAL DAILY
Status: DISCONTINUED | OUTPATIENT
Start: 2022-07-19 | End: 2022-07-18

## 2022-07-18 RX ORDER — CEFTRIAXONE 1 G/50ML
1000 INJECTION, SOLUTION INTRAVENOUS EVERY 24 HOURS
Status: DISCONTINUED | OUTPATIENT
Start: 2022-07-18 | End: 2022-07-19

## 2022-07-18 RX ORDER — OXAZEPAM 10 MG
10 CAPSULE ORAL EVERY 8 HOURS SCHEDULED
Status: DISCONTINUED | OUTPATIENT
Start: 2022-07-18 | End: 2022-07-18

## 2022-07-18 RX ORDER — OXAZEPAM 10 MG
10 CAPSULE ORAL EVERY 8 HOURS SCHEDULED
Status: CANCELLED | OUTPATIENT
Start: 2022-07-18

## 2022-07-18 RX ORDER — ALBUMIN (HUMAN) 12.5 G/50ML
25 SOLUTION INTRAVENOUS 3 TIMES DAILY
Status: DISCONTINUED | OUTPATIENT
Start: 2022-07-18 | End: 2022-07-25

## 2022-07-18 RX ORDER — CEFTRIAXONE 1 G/50ML
1000 INJECTION, SOLUTION INTRAVENOUS EVERY 24 HOURS
Status: CANCELLED | OUTPATIENT
Start: 2022-07-18

## 2022-07-18 RX ORDER — DIPHENHYDRAMINE HYDROCHLORIDE 50 MG/ML
25 INJECTION INTRAMUSCULAR; INTRAVENOUS EVERY 6 HOURS PRN
Status: CANCELLED | OUTPATIENT
Start: 2022-07-18

## 2022-07-18 RX ORDER — LORAZEPAM 2 MG/ML
2 INJECTION INTRAMUSCULAR ONCE
Status: COMPLETED | OUTPATIENT
Start: 2022-07-18 | End: 2022-07-18

## 2022-07-18 RX ORDER — ACETAMINOPHEN 325 MG/1
650 TABLET ORAL ONCE
Status: COMPLETED | OUTPATIENT
Start: 2022-07-18 | End: 2022-07-18

## 2022-07-18 RX ORDER — FOLIC ACID 1 MG/1
1 TABLET ORAL DAILY
Status: DISCONTINUED | OUTPATIENT
Start: 2022-07-19 | End: 2022-08-02 | Stop reason: HOSPADM

## 2022-07-18 RX ORDER — ESCITALOPRAM OXALATE 20 MG/1
20 TABLET ORAL DAILY
Status: CANCELLED | OUTPATIENT
Start: 2022-07-19

## 2022-07-18 RX ORDER — FUROSEMIDE 10 MG/ML
40 INJECTION INTRAMUSCULAR; INTRAVENOUS
Status: DISCONTINUED | OUTPATIENT
Start: 2022-07-19 | End: 2022-07-18

## 2022-07-18 RX ORDER — NICOTINE 21 MG/24HR
1 PATCH, TRANSDERMAL 24 HOURS TRANSDERMAL DAILY
Status: CANCELLED | OUTPATIENT
Start: 2022-07-19

## 2022-07-18 RX ORDER — LACTULOSE 20 G/30ML
20 SOLUTION ORAL 2 TIMES DAILY
Status: DISCONTINUED | OUTPATIENT
Start: 2022-07-18 | End: 2022-08-02 | Stop reason: HOSPADM

## 2022-07-18 RX ORDER — ALBUMIN (HUMAN) 12.5 G/50ML
25 SOLUTION INTRAVENOUS 3 TIMES DAILY
Status: CANCELLED | OUTPATIENT
Start: 2022-07-18

## 2022-07-18 RX ORDER — FERROUS SULFATE 325(65) MG
325 TABLET ORAL
Status: DISCONTINUED | OUTPATIENT
Start: 2022-07-19 | End: 2022-08-02 | Stop reason: HOSPADM

## 2022-07-18 RX ORDER — LANOLIN ALCOHOL/MO/W.PET/CERES
100 CREAM (GRAM) TOPICAL DAILY
Status: DISCONTINUED | OUTPATIENT
Start: 2022-07-19 | End: 2022-08-02 | Stop reason: HOSPADM

## 2022-07-18 RX ORDER — FERROUS SULFATE 325(65) MG
325 TABLET ORAL
Status: CANCELLED | OUTPATIENT
Start: 2022-07-19

## 2022-07-18 RX ORDER — ONDANSETRON 2 MG/ML
4 INJECTION INTRAMUSCULAR; INTRAVENOUS EVERY 6 HOURS PRN
Status: DISCONTINUED | OUTPATIENT
Start: 2022-07-18 | End: 2022-08-02

## 2022-07-18 RX ORDER — MIDODRINE HYDROCHLORIDE 5 MG/1
10 TABLET ORAL
Status: CANCELLED | OUTPATIENT
Start: 2022-07-18

## 2022-07-18 RX ADMIN — PANTOPRAZOLE SODIUM 40 MG: 40 TABLET, DELAYED RELEASE ORAL at 06:10

## 2022-07-18 RX ADMIN — ACETAMINOPHEN 650 MG: 325 TABLET, FILM COATED ORAL at 13:02

## 2022-07-18 RX ADMIN — CEFTRIAXONE 1000 MG: 1 INJECTION, SOLUTION INTRAVENOUS at 21:24

## 2022-07-18 RX ADMIN — ESCITALOPRAM OXALATE 20 MG: 20 TABLET ORAL at 10:48

## 2022-07-18 RX ADMIN — ACETAMINOPHEN 975 MG: 325 TABLET, FILM COATED ORAL at 01:59

## 2022-07-18 RX ADMIN — BUPRENORPHINE AND NALOXONE 8 MG: 8; 2 FILM BUCCAL; SUBLINGUAL at 10:48

## 2022-07-18 RX ADMIN — OCTREOTIDE ACETATE 25 MCG/HR: 500 INJECTION, SOLUTION INTRAVENOUS; SUBCUTANEOUS at 18:42

## 2022-07-18 RX ADMIN — ALBUMIN (HUMAN) 25 G: 0.25 INJECTION, SOLUTION INTRAVENOUS at 21:14

## 2022-07-18 RX ADMIN — FERROUS SULFATE TAB 325 MG (65 MG ELEMENTAL FE) 325 MG: 325 (65 FE) TAB at 10:48

## 2022-07-18 RX ADMIN — THIAMINE HCL TAB 100 MG 100 MG: 100 TAB at 10:47

## 2022-07-18 RX ADMIN — MIDODRINE HYDROCHLORIDE 10 MG: 5 TABLET ORAL at 10:47

## 2022-07-18 RX ADMIN — SPIRONOLACTONE 50 MG: 25 TABLET ORAL at 10:47

## 2022-07-18 RX ADMIN — FUROSEMIDE 40 MG: 10 INJECTION, SOLUTION INTRAMUSCULAR; INTRAVENOUS at 10:47

## 2022-07-18 RX ADMIN — ALBUMIN (HUMAN) 25 G: 0.25 INJECTION, SOLUTION INTRAVENOUS at 10:48

## 2022-07-18 RX ADMIN — NICOTINE 1 PATCH: 21 PATCH, EXTENDED RELEASE TRANSDERMAL at 10:49

## 2022-07-18 RX ADMIN — FOLIC ACID 1 MG: 1 TABLET ORAL at 10:47

## 2022-07-18 RX ADMIN — MAGNESIUM SULFATE HEPTAHYDRATE 2 G: 40 INJECTION, SOLUTION INTRAVENOUS at 10:48

## 2022-07-18 RX ADMIN — POTASSIUM CHLORIDE 40 MEQ: 1500 TABLET, EXTENDED RELEASE ORAL at 10:47

## 2022-07-18 RX ADMIN — LACTULOSE 20 G: 20 SOLUTION ORAL at 10:47

## 2022-07-18 RX ADMIN — Medication 1 TABLET: at 10:48

## 2022-07-18 RX ADMIN — LORAZEPAM 2 MG: 2 INJECTION INTRAMUSCULAR; INTRAVENOUS at 18:42

## 2022-07-18 RX ADMIN — OXAZEPAM 10 MG: 10 CAPSULE, GELATIN COATED ORAL at 13:02

## 2022-07-18 RX ADMIN — SODIUM CHLORIDE 8 MG/HR: 9 INJECTION, SOLUTION INTRAVENOUS at 18:42

## 2022-07-18 NOTE — ASSESSMENT & PLAN NOTE
Hx Decompensated cirrhosis with anasarca, hepatic encephalopathy, thrombocytopenia splenomegaly, hyperammonemia, and  Hepatomegaly most likely secondary to alcohol abuse but could also be due to underlying alcoholic hepatitis  -Previous AFP and right upper quadrant ultrasounds were normal  -Never had screening EGD for varices    Hepatic encephalopathy- resume home lactulose, ammonia level 98 on admission    Anasarca - Lasix 40 IV b i d  Resume Aldactone 50 mg    MELD 23  T  Bili 4 6, INR 1 87, cr 0 35, Na 133    Abdominal ultrasound ordered to assess for hepatocellular carcinoma & ascites  AFP 3 5  F/U Gi consult, appreciate recommendations

## 2022-07-18 NOTE — PROGRESS NOTES
[address]  Progress Note - Sajan Rogers 1985, 40 y o  male MRN: [id number]  Unit/Bed#: -[facility] Encounter: [id number]  Primary Care Provider: Ronita Severs, PA-C   Date and time admitted to hospital: 7/16/2022  3:27 PM    * Anemia  Assessment & Plan  Presents with microcytic anemia hemoglobin of 3 6  Asymptomatic, vitals stable  Troponin negative  Repeat Hg q12  After 5 units of blood hemoglobin up to 6 3  Currently receiving 6th unit    Iron panel showing low iron levels with normal TIBC  Heme occult blood negative in the ED    Plan for EGD today at 1:00 p m  Alcoholic cirrhosis of liver without ascites (HCC)  Assessment & Plan  Hx Decompensated cirrhosis with anasarca, hepatic encephalopathy, thrombocytopenia splenomegaly, hyperammonemia, and  Hepatomegaly most likely secondary to alcohol abuse but could also be due to underlying alcoholic hepatitis  -Previous AFP and right upper quadrant ultrasounds were normal  -Never had screening EGD for varices    Hepatic encephalopathy- resume home lactulose, ammonia level 98 on admission    Anasarca - Lasix 40 IV b i d  Resume Aldactone 50 mg    MELD 23  T  Bili 4 6, INR 1 87, cr 0 35, Na 133    Abdominal ultrasound ordered to assess for hepatocellular carcinoma & ascites  AFP 3 5  F/U Gi consult, appreciate recommendations    Alcohol withdrawal (HCC)  Assessment & Plan  Alcohol level 448  CIWA protocol  MV thiamine folic acid  Added serax 10 mg tid for symptom control    Idiopathic hypotension  Assessment & Plan  Blood pressure slightly on lower side in setting of anasarca from cirrhosis  Will start midodrine 10 mg tid    Chronic bronchitis (HCC)  Assessment & Plan  Resume home inhalers for asthma  Follow-up chest x-ray - on my read no significant sindings for infection  Continue home inhalers for asthma    Depression  Assessment & Plan  Resume home Lexapro    Tobacco dependence  Assessment & Plan  Nicotine patch    Substance abuse [facility])  Assessment & Plan  · Patient reports taking 8-2 Suboxone BID  · [place] 16 mg daily          VTE Pharmacologic Prophylaxis: VTE Score: 0 Low Risk (Score 0-2) - Encourage Ambulation  Patient Centered Rounds: I performed bedside rounds with nursing staff today  Discussions with Specialists or Other Care Team Provider: case management    Education and Discussions with Family / Patient: Updated  (sister) via phone  Time Spent for Care: 30 minutes  More than 50% of total time spent on counseling and coordination of care as described above  Current Length of Stay: 2 day(s)  Current Patient Status: Inpatient   Certification Statement: The patient will continue to require additional inpatient hospital stay due to anemia  Discharge Plan: Anticipate discharge in 48-72 hrs to discharge location to be determined pending rehab evaluations  Code Status: Level 1 - Full Code    Subjective:   Patient seen examined at bedside  Currently offers no acute complaints  States that he is feeling okay denies chest pain shortness a breath    Objective:     Vitals:   Temp (24hrs), Av 4 °F (38 °C), Min:98 9 °F (37 2 °C), Max:102 1 °F (38 9 °C)    Temp:  [98 9 °F (37 2 °C)-102 1 °F (38 9 °C)] 99 2 °F (37 3 °C)  HR:  [] 110  Resp:  [16-18] 16  BP: (115-141)/(57-80) 115/63  SpO2:  [87 %-97 %] 92 %  Body mass index is 28 21 kg/m²  Input and Output Summary (last 24 hours): Intake/Output Summary (Last 24 hours) at 2022 1222  Last data filed at 2022 0550  Gross per 24 hour   Intake 1132 5 ml   Output 2200 ml   Net -1067 5 ml       Physical Exam:   Physical Exam  Vitals reviewed  Constitutional:       Appearance: He is ill-appearing  HENT:      Head: Normocephalic and atraumatic  Right Ear: External ear normal       Left Ear: External ear normal       Nose: Nose normal       Mouth/Throat:      Mouth: Mucous membranes are moist       Pharynx: Oropharynx is clear  Eyes:      General: Scleral icterus present  Extraocular Movements: Extraocular movements intact  Cardiovascular:      Rate and Rhythm: Normal rate and regular rhythm  Heart sounds: Normal heart sounds  Pulmonary:      Effort: Pulmonary effort is normal       Comments: Decreased breath sounds bilateral lung bases  Abdominal:      General: Bowel sounds are normal  There is distension  Tenderness: There is no abdominal tenderness  Musculoskeletal:      Cervical back: Normal range of motion  Right lower leg: Edema present  Left lower leg: Edema present  Skin:     Coloration: Skin is jaundiced  Neurological:      Mental Status: He is alert  Mental status is at baseline  Psychiatric:         Mood and Affect: Mood normal          Behavior: Behavior normal           Additional Data:     Labs:  Results from last 7 days   Lab Units 07/18/22  0607 07/17/22  0347 07/16/22  1611   WBC Thousand/uL 5 24   < > 6 09   HEMOGLOBIN g/dL 6 3*   < > 3 6*   HEMATOCRIT % 20 0*   < > 13 7*   PLATELETS Thousands/uL 78*   < > 140*   NEUTROS PCT %  --   --  66   LYMPHS PCT %  --   --  23   MONOS PCT %  --   --  9   EOS PCT %  --   --  0    < > = values in this interval not displayed       Results from last 7 days   Lab Units 07/18/22  0607   SODIUM mmol/L 132*   POTASSIUM mmol/L 3 5   CHLORIDE mmol/L 91*   CO2 mmol/L 36*   BUN mg/dL 6   CREATININE mg/dL 0 50*   ANION GAP mmol/L 5   CALCIUM mg/dL 9 0   ALBUMIN g/dL 3 4*   TOTAL BILIRUBIN mg/dL 9 37*   ALK PHOS U/L 204*   ALT U/L 23   AST U/L 107*   GLUCOSE RANDOM mg/dL 91     Results from last 7 days   Lab Units 07/18/22  0607   INR  2 13*             Results from last 7 days   Lab Units 07/17/22  1838 07/17/22  1113   PROCALCITONIN ng/ml 0 44* 0 42*       Lines/Drains:  Invasive Devices  Report    Peripheral Intravenous Line  Duration           Peripheral IV 07/16/22 Left Antecubital 1 day    Peripheral IV 07/16/22 Left Hand 1 day    Peripheral IV 07/16/22 Right Arm 1 day          Drain  Duration           Urethral Catheter Latex 16 Fr  <1 day              Urinary Catheter:  Goal for removal: Remove after 48 hrs of I/O monitoring           Telemetry:  Telemetry Orders (From admission, onward)             24 Hour Telemetry Monitoring  Continuous x 24 Hours (Telem)        References:    Telemetry Guidelines   Question:  Reason for 24 Hour Telemetry  Answer:  Metabolic/Electrolyte Disturbance with High Probability of Dysrhythmia (K level <3 or >6, or KCL infusion >=10mEq/hr)                 Telemetry Reviewed: NSR with pvc's  Indication for Continued Telemetry Use: Metabolic/electrolyte disturbance with high probability of dysrhythmia             Imaging: No pertinent imaging reviewed  Recent Cultures (last 7 days):   Results from last 7 days   Lab Units 07/17/22 2042   BLOOD CULTURE  Received in Microbiology Lab  Culture in Progress  Received in Microbiology Lab  Culture in Progress         Last 24 Hours Medication List:   Current Facility-Administered Medications   Medication Dose Route Frequency Provider Last Rate    acetaminophen  650 mg Oral Once Pandi Todhe, DO      albumin human  25 g Intravenous TID Pandi Todhe, DO      albuterol  2 puff Inhalation Q6H PRN Estheri Todhe, DO      buprenorphine-naloxone  8 mg Sublingual BID MARILEE Snider      cefTRIAXone  1,000 mg Intravenous Q24H TYRONE ContrerasNP 1,000 mg (07/17/22 2052)    diphenhydrAMINE  25 mg Intravenous Q6H PRN MARILEE Contreras      escitalopram  20 mg Oral Daily Pandi Todhe, DO      ferrous sulfate  325 mg Oral Daily With Breakfast MARILEE Snider      folic acid  1 mg Oral Daily Pandi Todhe, DO      furosemide  40 mg Intravenous BID (diuretic) Pandi Todhe, DO      lactulose  20 g Oral BID Pandi Todhe, DO      LORazepam  1 mg Oral Q8H PRN Pandi Todhe, DO      magnesium sulfate  2 g Intravenous Once Pandi Todhe, DO      midodrine 10 mg Oral TID AC Blaine Tobrynn, DO      multivitamin-minerals  1 tablet Oral Daily Blaine Serna, DO      nicotine  1 patch Transdermal Daily MARILEE Snider      oxazepam  10 mg Oral Q8H [address] Pandi Tobrynn, DO      pantoprazole  40 mg Oral Early Morning Pandi Tobrynn, DO      potassium chloride  40 mEq Oral TID With Meals Blaine Tobrynn, DO      spironolactone  50 mg Oral Daily Blaine Serna, DO      thiamine  100 mg Oral Daily Blaine Serna, DO      trimethobenzamide  200 mg Intramuscular Q6H PRN MARILEE Miranda          Today, Patient Was Seen By: Eudora Heimlich, DO    **Please Note: This note may have been constructed using a voice recognition system  **

## 2022-07-18 NOTE — H&P
[facility]  H&P- Brenton Jose 1985, 40 y o  male MRN: [id number]  Unit/Bed#: ICU 03 Encounter: [id number]  Primary Care Provider: Lisa Atkinson PA-C   Date and time admitted to hospital: 7/18/2022  2:24 PM    * Alcoholic cirrhosis of liver without ascites ([facility] [address] )  Assessment & Plan  Hx Decompensated cirrhosis with anasarca, hepatic encephalopathy, thrombocytopenia splenomegaly, hyperammonemia, and  Hepatomegaly most likely secondary to alcohol abuse but could also be due to underlying alcoholic hepatitis  -Previous AFP and right upper quadrant ultrasounds were normal  -Never had screening EGD for varices  -Home lactulose for hepatic encephalopathy    -Anasarca - Lasix 40 IV b i d  Resume Aldactone 50 mg  Net negative 3L     -SBP: Spiking fevers on 7/17  CXR negative for acute findings, ua negative for uti  Blood cultures drawn  Started on ceftriaxone  Unfortunately no IR for paracentesis prior to abx     [facility] recommending transfer to a facility with ICU for EGD  -Admission: MELD 23 T  Bili 4 6, INR 1 87, cr 0 35, Na 133    Plan:  Abdominal ultrasound ordered to assess for hepatocellular carcinoma & ascites  AFP 3 5  GI consulted for suspected UGIB/varices; appreciate recommendations  IR consulted for possible liver biopsy  Continue Ceftriaxone for SBP ppx  F/u Bcx  Started octreotide and protonix gtt  Holding lasix and aldactone  NPO sips with meds  Hold AP/AC    Alcohol withdrawal (HCC)  Assessment & Plan  Alcohol level 448  Patient says last drink was on Friday 7/15  [facility] ED used serax 10 mg tid for symptom control    Plan:  CIWA protocol  Ativan  MV thiamine folic acid  D/C Serax      Anemia  Assessment & Plan  Presents with microcytic anemia hemoglobin of 3 6  Asymptomatic, vitals stable  Troponin negative  Repeat Hg q12  After 5 units of blood hemoglobin up to 6 3  Currently receiving 60 unit    Iron panel showing low iron levels with normal TIBC     Heme occult blood negative in the ED  Patient denying melena and hematemesis    Plan:  Received 6 units total pRBCs  F/u CBC this PM        Idiopathic hypotension  Assessment & Plan  Blood pressure slightly on lower side in setting of anasarca from cirrhosis    Plan:  Continue midodrine 10 mg TID started at [facility] ED    Chronic bronchitis [facility])  Assessment & Plan  Resume home inhalers for asthma  Follow-up chest x-ray - on my read no significant sindings for infection    Continue home inhalers for asthma    Depression  Assessment & Plan  Resume home Lexapro    Tobacco dependence  Assessment & Plan  Nicotine patch    Substance abuse ([facility] [address] )  Assessment & Plan  · Patient reports taking 8-2 Suboxone BID  · [place] 16 mg daily    -------------------------------------------------------------------------------------------------------------  Chief Complaint: Decreased PO intake    History of Present Illness     Maddi Caballero is a 40 y o  male pmhx alcoholic cirrhosis, multi substance abuse (alcohol, tobacco, on suboxone 8-2 BID), depression, asthma who originally presented to [place] ED via EMS for AMS  Hgb 3 6 POA s/p 6 u pRBCs  ETOH 448  Transferred here for ICU level of care, possibility of intubation for EGD and IR biopsy for diffuse hepatic metastasis concerning for hepatocellular carcinoma seen on CT abdomen  Denies melena, hematemesis  FOBT neg  Patient arrives Aaox4 with mild nausea, moderate anxiety and fatigue on 2 5 L NC  Patient not sure why he was transferred here  Patient says he has been hospitalized 3 times recently for recurrent issue  Patient says his last drink was on Friday July 15th  Patient drinks alcohol "off and on" but drinks about 1 5 L vodka a day at his peak  He has been having poor intake for about a week prior to hospitalization due to poor appetite; denying it was because of abdominal pain or nausea/vomiting       Denies fevers, chills, any pain, dyspnea, difficulty swallowing, vomiting, melena, hematemesis, headaches  Chart review patient's cirrhosis is managed by LIL[facility] with recs to see GI, however appears that pt unable to do so yet  History obtained from the patient   -------------------------------------------------------------------------------------------------------------  Dispo: Admit to Critical Care     Code Status: Level 1 - Full Code  --------------------------------------------------------------------------------------------------------------  Review of Systems    A 12-point, complete review of systems was reviewed and negative except as stated above     Physical Exam  Vitals and nursing note reviewed  Constitutional:       Appearance: He is well-developed  HENT:      Head: Normocephalic and atraumatic  Right Ear: External ear normal       Left Ear: External ear normal       Nose: Nose normal       Mouth/Throat:      Mouth: Mucous membranes are moist    Eyes:      General: Scleral icterus present  Conjunctiva/sclera: Conjunctivae normal    Cardiovascular:      Rate and Rhythm: Normal rate and regular rhythm  Pulses: Normal pulses  Heart sounds: Normal heart sounds  No murmur heard  Pulmonary:      Effort: Pulmonary effort is normal  No respiratory distress  Breath sounds: Rhonchi present  No wheezing or rales  Abdominal:      General: Abdomen is flat  There is distension  Palpations: Abdomen is soft  Tenderness: There is no abdominal tenderness  There is no guarding  Musculoskeletal:         General: Swelling present  Normal range of motion  Cervical back: Neck supple  Comments: BLE lymphedema   Skin:     General: Skin is warm and dry  Capillary Refill: Capillary refill takes less than 2 seconds  Neurological:      General: No focal deficit present  Mental Status: He is alert and oriented to person, place, and time     Psychiatric:         Mood and Affect: Mood normal          Behavior: Behavior normal  --------------------------------------------------------------------------------------------------------------  Vitals:   Vitals:    07/18/22 1710   BP: 126/63   Pulse: 89   Resp: 14   SpO2: 95%     Temp  Min: 96 8 °F (36 °C)  Max: 102 1 °F (38 9 °C)        There is no height or weight on file to calculate BMI  Laboratory and Diagnostics:  Results from last 7 days   Lab Units 07/18/22 0607 07/17/22  2043 07/17/22  1113 07/17/22  0347 07/16/22  1611   WBC Thousand/uL 5 24  --   --  4 91 6 09   HEMOGLOBIN g/dL 6 3* 6 5* 5 4* 4 7* 3 6*   HEMATOCRIT % 20 0* 20 3* 17 8* 16 1* 13 7*   PLATELETS Thousands/uL 78*  --   --  101* 140*   NEUTROS PCT %  --   --   --   --  66   MONOS PCT %  --   --   --   --  9     Results from last 7 days   Lab Units 07/18/22 0607 07/17/22  1113 07/17/22  0347 07/16/22  1611   SODIUM mmol/L 132* 134* 135 133*   POTASSIUM mmol/L 3 5 3 3* 2 7* 4 0   CHLORIDE mmol/L 91* 94* 94* 94*   CO2 mmol/L 36* 29 29 28   ANION GAP mmol/L 5 11 12 11   BUN mg/dL 6 6 7 8   CREATININE mg/dL 0 50* 0 38* 0 35* 0 32*   CALCIUM mg/dL 9 0 8 5 7 9* 8 1*   GLUCOSE RANDOM mg/dL 91 121 123 118   ALT U/L 23 26 26 29   AST U/L 107* 103* 100* 128*   ALK PHOS U/L 204* 201* 194* 221*   ALBUMIN g/dL 3 4* 3 3* 3 2* 3 3*   TOTAL BILIRUBIN mg/dL 9 37* 6 66* 5 47* 4 61*     Results from last 7 days   Lab Units 07/18/22  0607 07/17/22  1838 07/17/22  1113 07/17/22  0347 07/16/22  1611   MAGNESIUM mg/dL 1 8* 1 8* 2 2 1 6* 2 0   PHOSPHORUS mg/dL 2 2*  --  2 3* 2 6* 2 6*      Results from last 7 days   Lab Units 07/18/22  0607 07/17/22  2043 07/16/22  1611   INR  2 13* 1 92* 1 87*   PTT seconds  --   --  45*              ABG:    VBG:    Results from last 7 days   Lab Units 07/17/22  1838 07/17/22  1113   PROCALCITONIN ng/ml 0 44* 0 42*       Micro:  Results from last 7 days   Lab Units 07/17/22  2042   BLOOD CULTURE  Received in Microbiology Lab  Culture in Progress  Received in Microbiology Lab  Culture in Progress  EKG: NSR  Imaging: I have personally reviewed pertinent reports          Historical Information   Past Medical History:   Diagnosis Date    AAA (abdominal aortic aneurysm) ([place] [address] )     Allergic     Anemia     Asthma     Depression 05/18/2021    Essential hypertension 05/06/2019    Obesity     Opiate dependence ([place] [address] )     Substance abuse ([place] [address] )      Past Surgical History:   Procedure Laterality Date    LYMPH NODE BIOPSY       Social History   Social History     Substance and Sexual Activity   Alcohol Use Yes    Alcohol/week: 12 0 standard drinks    Types: 12 Cans of beer per week     Social History     Substance and Sexual Activity   Drug Use Not Currently    Types: Heroin    Comment: 7/16/22 recovery     Social History     Tobacco Use   Smoking Status Current Every Day Smoker    Packs/day: 1 00   Smokeless Tobacco Never Used   Tobacco Comment    2/2019; PATIENT VAPES     Exercise History: n/a  Family History:   Family History   Problem Relation Age of Onset    No Known Problems Mother     Diabetes Father     Prostate cancer Father     Hypertension Father      I have reviewed this patient's family history and commented on sigificant items within the HPI      Medications:  Current Facility-Administered Medications   Medication Dose Route Frequency    albumin human (FLEXBUMIN) 25 % injection 25 g  25 g Intravenous TID    albuterol (PROVENTIL HFA,VENTOLIN HFA) inhaler 2 puff  2 puff Inhalation Q6H PRN    buprenorphine-naloxone (Suboxone) film 8 mg  8 mg Sublingual BID    cefTRIAXone (ROCEPHIN) IVPB (premix in dextrose) 1,000 mg 50 mL  1,000 mg Intravenous Q24H    diphenhydrAMINE (BENADRYL) injection 25 mg  25 mg Intravenous Q6H PRN    [START ON 7/19/2022] escitalopram (LEXAPRO) tablet 20 mg  20 mg Oral Daily    [START ON 7/19/2022] ferrous sulfate tablet 325 mg  325 mg Oral Daily With Breakfast    [START ON 9/59/7304] folic acid (FOLVITE) tablet 1 mg  1 mg Oral Daily    lactulose oral solution 20 g  20 g Oral BID    LORazepam (ATIVAN) tablet 1 mg  1 mg Oral Q8H PRN    [START ON 7/19/2022] midodrine (PROAMATINE) tablet 10 mg  10 mg Oral TID AC    [START ON 7/19/2022] multivitamin-minerals (CENTRUM) tablet 1 tablet  1 tablet Oral Daily    [START ON 7/19/2022] nicotine (NICODERM CQ) 21 mg/24 hr TD 24 hr patch 1 patch  1 patch Transdermal Daily    octreotide (SandoSTATIN) 500 mcg in sodium chloride 0 9 % 250 mL infusion  25 mcg/hr Intravenous Continuous    ondansetron (ZOFRAN) injection 4 mg  4 mg Intravenous Q6H PRN    pantoprazole (PROTONIX) 80 mg in sodium chloride 0 9 % 100 mL infusion  8 mg/hr Intravenous Continuous    [START ON 7/19/2022] thiamine tablet 100 mg  100 mg Oral Daily     Home medications:  Prior to Admission Medications   Prescriptions Last Dose Informant Patient Reported? Taking? albuterol (Ventolin HFA) 90 mcg/act inhaler  Self No No   Sig: Inhale 2 puffs every 6 (six) hours as needed for wheezing   ammonium lactate (LAC-HYDRIN) 12 % lotion  Self No No   Sig: Apply topically 2 (two) times a day   buprenorphine-naloxone (SUBOXONE) 8-2 mg per SL tablet  Self Yes No   Sig: Place 2 tablets under the tongue daily Verified from [facility], [phone]   Precribed by Dr Lloyd Nolan, Last Prescirbed 5/17/2021   escitalopram (LEXAPRO) 20 mg tablet   No No   Sig: Take 1 tablet (20 mg total) by mouth daily   ferrous sulfate 324 (65 Fe) mg  Self No No   Sig: Take 1 tablet (324 mg total) by mouth 2 (two) times a day before meals   Patient not taking: No sig reported   folic acid (FOLVITE) 1 mg tablet  Self No No   Sig: Take 1 tablet (1 mg total) by mouth daily   Patient not taking: No sig reported   furosemide (LASIX) 40 mg tablet   No No   Sig: Take 1 tablet (40 mg total) by mouth daily   lactulose (CHRONULAC) 10 g/15 mL solution   No No   Sig: TAKE 30 ML (20 G TOTAL) BY MOUTH 2 (TWO) TIMES A DAY   Patient taking differently: 10 g daily at bedtime   nicotine (NICODERM CQ) 14 mg/24hr TD 24 hr patch  Self No No   Sig: Place 1 patch on the skin daily   Patient not taking: No sig reported   pantoprazole (PROTONIX) 40 mg tablet   No No   Sig: Take 1 tablet (40 mg total) by mouth daily in the early morning   Patient not taking: Reported on 7/16/2022   spironolactone (ALDACTONE) 50 mg tablet   No No   Sig: Take 1 tablet (50 mg total) by mouth daily   Patient not taking: Reported on 7/16/2022   zolpidem (AMBIEN CR) 6 25 MG CR tablet   Yes No   Sig: Take 6 25 mg by mouth daily at bedtime as needed for sleep      Facility-Administered Medications: None     Allergies: Allergies   Allergen Reactions    Levofloxacin Other (See Comments)     BLACKED OUT       ------------------------------------------------------------------------------------------------------------  Advance Directive and Living Will:      Power of :    POLST:    ------------------------------------------------------------------------------------------------------------  Anticipated Length of Stay is > 2 midnights    Care Time Delivered:   No Critical Care time spent       Camilla Campbell MD        Portions of the record may have been created with voice recognition software  Occasional wrong word or "sound a like" substitutions may have occurred due to the inherent limitations of voice recognition software    Read the chart carefully and recognize, using context, where substitutions have occurred

## 2022-07-18 NOTE — ASSESSMENT & PLAN NOTE
Presents with microcytic anemia hemoglobin of 3 6  Asymptomatic, vitals stable  Troponin negative  Repeat Hg q12  After 5 units of blood hemoglobin up to 6 3  Currently receiving 60 unit    Iron panel showing low iron levels with normal TIBC     Heme occult blood negative in the ED  Patient denying melena and hematemesis    Plan:  Received 6 units total pRBCs  F/u CBC this PM

## 2022-07-18 NOTE — ASSESSMENT & PLAN NOTE
Alcohol level 448  Patient says last drink was on Friday 7/15     [facility] ED used serax 10 mg tid for symptom control    Plan:  CIWA protocol  Ativan  MV thiamine folic acid  D/C Serax

## 2022-07-18 NOTE — PROGRESS NOTES
Progress Note - Maddi Caballero 40 y o  male MRN: [id number]    Unit/Bed#: Transfer 01 Encounter: [id number]      Subjective:   Very pleasant 61-year-old gentleman denies any pain  Admitted with a hemoglobin level 3 got 6 units hemoglobin this morning was 6 3 at 6 this morning  No overt bleeding  INR 2 3 bilirubin 9  Objective:     Vitals: Blood pressure (!) 125/46, pulse 102, temperature 98 2 °F (36 8 °C), temperature source Oral, resp  rate 18, height 6' 1" (1 854 m), weight 97 kg (213 lb 13 5 oz), SpO2 92 %  ,Body mass index is 28 21 kg/m²        Intake/Output Summary (Last 24 hours) at 7/18/2022 1417  Last data filed at 7/18/2022 1301  Gross per 24 hour   Intake 1132 5 ml   Output 4800 ml   Net -3667 5 ml       Physical Exam: HEENT- unremarkable except for icterus                             Neck- supple without thyromegally                             Cardiac- RRR, no MGRC                             Lungs- coarse breath sounds with rhonchi at the bases                             Abd- benign, rectal exam yellowish stool                             Ext- no CCE                             Neuro- grossly intact    Invasive Devices  Report    Peripheral Intravenous Line  Duration           Peripheral IV 07/16/22 Left Antecubital 1 day    Peripheral IV 07/16/22 Left Hand 1 day    Peripheral IV 07/16/22 Right Arm 1 day          Drain  Duration           Urethral Catheter Latex 16 Fr  <1 day                  Lab:    Recent Results (from the past 24 hour(s))   UA w Reflex to Microscopic w Reflex to Culture    Collection Time: 07/17/22  3:14 PM    Specimen: Urine, Clean Catch   Result Value Ref Range    Color, UA Yellow Yellow    Clarity, UA Clear Clear    Specific Gravity, UA 1 015 1 001 - 1 030    pH, UA 7 0 5 0, 5 5, 6 0, 6 5, 7 0, 7 5, 8 0    Leukocytes, UA Negative Negative    Nitrite, UA Negative Negative    Protein, UA Negative Negative, Interference- unable to analyze mg/dl    Glucose, UA Negative Negative mg/dl    Ketones, UA Negative Negative mg/dl    Urobilinogen, UA 2 0 0 2, 1 0 E U /dl E U /dl    Bilirubin, UA Negative Negative    Occult Blood, UA Trace-Intact (A) Negative   Urine Microscopic    Collection Time: 07/17/22  3:14 PM   Result Value Ref Range    RBC, UA 2-4 None Seen, 0-1, 1-2, 2-4, 0-5 /hpf    WBC, UA None Seen None Seen, 0-1, 1-2, 0-5, 2-4 /hpf    Epithelial Cells None Seen None Seen, Occasional /hpf    Bacteria, UA None Seen None Seen, Occasional /hpf   Magnesium    Collection Time: 07/17/22  6:38 PM   Result Value Ref Range    Magnesium 1 8 (L) 1 9 - 2 7 mg/dL   Procalcitonin    Collection Time: 07/17/22  6:38 PM   Result Value Ref Range    Procalcitonin 0 44 (H) <=0 25 ng/ml   Blood culture    Collection Time: 07/17/22  8:42 PM    Specimen: Arm, Right; Blood   Result Value Ref Range    Blood Culture Received in Microbiology Lab  Culture in Progress  Blood culture    Collection Time: 07/17/22  8:42 PM    Specimen: Arm, Right; Blood   Result Value Ref Range    Blood Culture Received in Microbiology Lab  Culture in Progress      Protime-INR    Collection Time: 07/17/22  8:43 PM   Result Value Ref Range    Protime 21 6 (H) 11 6 - 14 5 seconds    INR 1 92 (H) 0 84 - 1 19   Hemoglobin and hematocrit, blood    Collection Time: 07/17/22  8:43 PM   Result Value Ref Range    Hemoglobin 6 5 (LL) 12 0 - 17 0 g/dL    Hematocrit 20 3 (L) 36 5 - 49 3 %   Transfusion reaction evaluation    Collection Time: 07/17/22  9:45 PM   Result Value Ref Range    TRANSFUSION REACTION Febrile Transfusion Reaction    Blood reaction urine    Collection Time: 07/17/22  9:45 PM   Result Value Ref Range    Occult Blood, UA 2+ (A) Negative    RBC, URINE 20-30 (A) None Seen   CBC    Collection Time: 07/18/22  6:07 AM   Result Value Ref Range    WBC 5 24 4 31 - 10 16 Thousand/uL    RBC 2 80 (L) 3 88 - 5 62 Million/uL    Hemoglobin 6 3 (LL) 12 0 - 17 0 g/dL    Hematocrit 20 0 (L) 36 5 - 49 3 %    MCV 71 (L) 82 - 98 fL    MCH 22 5 (L) 26 8 - 34 3 pg    MCHC 31 5 31 4 - 37 4 g/dL    RDW 28 1 (H) 11 6 - 15 1 %    Platelets 78 (L) 303 - 390 Thousands/uL   Comprehensive metabolic panel    Collection Time: 07/18/22  6:07 AM   Result Value Ref Range    Sodium 132 (L) 135 - 147 mmol/L    Potassium 3 5 3 5 - 5 3 mmol/L    Chloride 91 (L) 96 - 108 mmol/L    CO2 36 (H) 21 - 32 mmol/L    ANION GAP 5 4 - 13 mmol/L    BUN 6 5 - 25 mg/dL    Creatinine 0 50 (L) 0 60 - 1 30 mg/dL    Glucose 91 65 - 140 mg/dL    Calcium 9 0 8 4 - 10 2 mg/dL    Corrected Calcium 9 5 8 3 - 10 1 mg/dL     (H) 13 - 39 U/L    ALT 23 7 - 52 U/L    Alkaline Phosphatase 204 (H) 34 - 104 U/L    Total Protein 7 1 6 4 - 8 4 g/dL    Albumin 3 4 (L) 3 5 - 5 0 g/dL    Total Bilirubin 9 37 (H) 0 20 - 1 00 mg/dL    eGFR 138 ml/min/1 73sq m   Magnesium    Collection Time: 07/18/22  6:07 AM   Result Value Ref Range    Magnesium 1 8 (L) 1 9 - 2 7 mg/dL   Protime-INR    Collection Time: 07/18/22  6:07 AM   Result Value Ref Range    Protime 23 3 (H) 11 6 - 14 5 seconds    INR 2 13 (H) 0 84 - 1 19   Phosphorus    Collection Time: 07/18/22  6:07 AM   Result Value Ref Range    Phosphorus 2 2 (L) 2 7 - 4 5 mg/dL   Prepare Leukoreduced RBC: 1 Units    Collection Time: 07/18/22  6:30 AM   Result Value Ref Range    Unit Product Code M7748M55     Unit Number [id number]     Unit ABO A     Unit [facility] POS     Crossmatch Compatible     Unit Dispense Status Presumed Trans     Unit Product Volume 350 ml   Prepare Leukoreduced RBC: 2 Units, Leukoreduced    Collection Time: 07/18/22  6:30 AM   Result Value Ref Range    Unit Product Code B3887C95     Unit Number [id number]     Unit ABO A     Unit [facility] POS     Crossmatch Compatible     Unit Dispense Status Presumed Trans     Unit Product Volume 350 ml   Prepare Leukoreduced RBC: 1 Units, Leukoreduced    Collection Time: 07/18/22 12:42 PM   Result Value Ref Range    Unit Product Code E9087G94     Unit Number [id number]     Unit ABO A     Unit [facility] POS     Crossmatch Compatible     Unit Dispense Status Issued     Unit Product Volume 350 ml       Imaging Studies:  XR chest portable    Result Date: 7/17/2022  Narrative: CHEST INDICATION:   Rhonchi on exam, concern for aspiration pneumonia 2/2 alcohol abuse  COMPARISON:  Chest radiograph from 5/18/2021, chest CT from 1/20/2021  EXAM PERFORMED/VIEWS:  XR CHEST PORTABLE FINDINGS: Cardiomediastinal silhouette appears unremarkable  The lungs are clear  No pneumothorax or pleural effusion  Osseous structures appear within normal limits for patient age  Impression: No acute cardiopulmonary disease  Workstation performed: [id number]         Assessment:  Decompensated cirrhosis secondary to alcohol  Persistently low hemoglobin without overt bleeding  Hemoglobin still relatively low  Plan:  Further transfusions prior to endoscopy, additionally may need a hospital ICU pending course  Additionally may need intubation for endoscopy  Will defer EGD for now until medically maximized  May consider transfer to a facility with ICU capabilities  Continue lactulose as you are doing  Consider CT scan abdomen and pelvis to ensure no retroperitoneal bleed and evaluate for ascites

## 2022-07-18 NOTE — ASSESSMENT & PLAN NOTE
Hx Decompensated cirrhosis with anasarca, hepatic encephalopathy, thrombocytopenia splenomegaly, hyperammonemia, and  Hepatomegaly most likely secondary to alcohol abuse but could also be due to underlying alcoholic hepatitis  -Previous AFP and right upper quadrant ultrasounds were normal  -Never had screening EGD for varices    Hepatic encephalopathy- resume home lactulose, ammonia level 98 on admission  Anasarca - Lasix 40 IV b i d  Resume Aldactone 50 mg  Net negative 3L     ? SBP: Spiking fevers on 7/17  CXR negative for acute findings, ua negative for uti  Blood cultures drawn  Started on ceftriaxone  Unfortunately no IR for paracentesis prior to abx  Admission: MELD 23 T  Bili 4 6, INR 1 87, cr 0 35, Na 133    Abdominal ultrasound ordered to assess for hepatocellular carcinoma & ascites  AFP 3 5  F/U Gi consult, appreciate recommendations  GI recommending transfer to a facility with ICU for EGD

## 2022-07-18 NOTE — QUICK NOTE
Patient with continued fevers ranging 100 9 F - 101 3 F despite PRN Tylenol  On exam, patient offers no complaints and only reports occasional discomfort from corona catheter  Denies any chest pain, shortness of breath, diaphoresis, itching, or flank pain  Patient was placed on 2 L NC at HS, similar to previous night  VSS  Blood cultures sent, UA with trace - intact blood  Started on Ceftriaxone per previous shift for SBP  Will place on Q8 Acetaminophen dosing for 1 day, monitor hepatic function  Per chart review, all blood products have been leukoreduced making F[facility]TR unlikely  Transfusion reaction labs ordered  Will hold off on IV fluids given patient's need for continued IV Lasix

## 2022-07-18 NOTE — PLAN OF CARE
Problem: MOBILITY - ADULT  Goal: Maintain or return to baseline ADL function  Description: INTERVENTIONS:  -  Assess patient's ability to carry out ADLs; assess patient's baseline for ADL function and identify physical deficits which impact ability to perform ADLs (bathing, care of mouth/teeth, toileting, grooming, dressing, etc )  - Assess/evaluate cause of self-care deficits   - Assess range of motion  - Assess patient's mobility; develop plan if impaired  - Assess patient's need for assistive devices and provide as appropriate  - Encourage maximum independence but intervene and supervise when necessary  - Involve family in performance of ADLs  - Assess for home care needs following discharge   - Consider OT consult to assist with ADL evaluation and planning for discharge  - Provide patient education as appropriate  Outcome: Not Progressing  Goal: Maintains/Returns to pre admission functional level  Description: INTERVENTIONS:  - Perform BMAT or MOVE assessment daily    - Set and communicate daily mobility goal to care team and patient/family/caregiver  - Collaborate with rehabilitation services on mobility goals if consulted  - Perform Range of Motion prn times a day  - Reposition patient every 2 hours    - Dangle patient prn times a day  - Stand patient prn times a day  - Ambulate patient prn times a day  - Out of bed to chair prn times a day   - Out of bed for meals prn times a day  - Out of bed for toileting  - Record patient progress and toleration of activity level   Outcome: Not Progressing     Problem: Potential for Falls  Goal: Patient will remain free of falls  Description: INTERVENTIONS:  - Educate patient/family on patient safety including physical limitations  - Instruct patient to call for assistance with activity   - Consult OT/PT to assist with strengthening/mobility   - Keep Call bell within reach  - Keep bed low and locked with side rails adjusted as appropriate  - Keep care items and personal belongings within reach  - Initiate and maintain comfort rounds  - Make Fall Risk Sign visible to staff  - Offer Toileting every 2 Hours, in advance of need  - Initiate/Maintain  bed alarm  - Obtain necessary fall risk management equipment: n/a  - Apply yellow socks and bracelet for high fall risk patients  - Consider moving patient to room near nurses station  Outcome: Not Progressing     Problem: Prexisting or High Potential for Compromised Skin Integrity  Goal: Skin integrity is maintained or improved  Description: INTERVENTIONS:  - Identify patients at risk for skin breakdown  - Assess and monitor skin integrity  - Assess and monitor nutrition and hydration status  - Monitor labs   - Assess for incontinence   - Turn and reposition patient  - Assist with mobility/ambulation  - Relieve pressure over bony prominences  - Avoid friction and shearing  - Provide appropriate hygiene as needed including keeping skin clean and dry  - Evaluate need for skin moisturizer/barrier cream  - Collaborate with interdisciplinary team   - Patient/family teaching  - Consider wound care consult   Outcome: Not Progressing     Problem: INFECTION - ADULT  Goal: Absence of fever/infection during neutropenic period  Description: INTERVENTIONS:  - Monitor WBC    Outcome: Not Progressing     Problem: SAFETY ADULT  Goal: Maintain or return to baseline ADL function  Description: INTERVENTIONS:  -  Assess patient's ability to carry out ADLs; assess patient's baseline for ADL function and identify physical deficits which impact ability to perform ADLs (bathing, care of mouth/teeth, toileting, grooming, dressing, etc )  - Assess/evaluate cause of self-care deficits   - Assess range of motion  - Assess patient's mobility; develop plan if impaired  - Assess patient's need for assistive devices and provide as appropriate  - Encourage maximum independence but intervene and supervise when necessary  - Involve family in performance of ADLs  - Assess for home care needs following discharge   - Consider OT consult to assist with ADL evaluation and planning for discharge  - Provide patient education as appropriate  Outcome: Not Progressing  Goal: Maintains/Returns to pre admission functional level  Description: INTERVENTIONS:  - Perform BMAT or MOVE assessment daily    - Set and communicate daily mobility goal to care team and patient/family/caregiver  - Collaborate with rehabilitation services on mobility goals if consulted  - Perform Range of Motion prn times a day  - Reposition patient every self hours    - Dangle patient prn times a day  - Stand patient prn times a day  - Ambulate patient prn times a day  - Out of bed to chair prn times a day   - Out of bed for meals prn times a day  - Out of bed for toileting  - Record patient progress and toleration of activity level   Outcome: Not Progressing  Goal: Patient will remain free of falls  Description: INTERVENTIONS:  - Educate patient/family on patient safety including physical limitations  - Instruct patient to call for assistance with activity   - Consult OT/PT to assist with strengthening/mobility   - Keep Call bell within reach  - Keep bed low and locked with side rails adjusted as appropriate  - Keep care items and personal belongings within reach  - Initiate and maintain comfort rounds  - Make Fall Risk Sign visible to staff  - Offer Toileting every 2 Hours, in advance of need  - Initiate/Maintain  bed alarm  - Obtain necessary fall risk management equipment: n/a  - Apply yellow socks and bracelet for high fall risk patients  - Consider moving patient to room near nurses station  Outcome: Not Progressing     Problem: DISCHARGE PLANNING  Goal: Discharge to home or other facility with appropriate resources  Description: INTERVENTIONS:  - Identify barriers to discharge w/patient and caregiver  - Arrange for needed discharge resources and transportation as appropriate  - Identify discharge learning needs (meds, wound care, etc )  - Arrange for interpretive services to assist at discharge as needed  - Refer to Case Management Department for coordinating discharge planning if the patient needs post-hospital services based on physician/advanced practitioner order or complex needs related to functional status, cognitive ability, or social support system  Outcome: Not Progressing     Problem: Knowledge Deficit  Goal: Patient/family/caregiver demonstrates understanding of disease process, treatment plan, medications, and discharge instructions  Description: Complete learning assessment and assess knowledge base    Interventions:  - Provide teaching at level of understanding  - Provide teaching via preferred learning methods  Outcome: Not Progressing     Problem: GASTROINTESTINAL - ADULT  Goal: Minimal or absence of nausea and/or vomiting  Description: INTERVENTIONS:  - Administer IV fluids if ordered to ensure adequate hydration  - Maintain NPO status until nausea and vomiting are resolved  - Nasogastric tube if ordered  - Administer ordered antiemetic medications as needed  - Provide nonpharmacologic comfort measures as appropriate  - Advance diet as tolerated, if ordered  - Consider nutrition services referral to assist patient with adequate nutrition and appropriate food choices  Outcome: Not Progressing

## 2022-07-18 NOTE — DISCHARGE SUMMARY
[address]  Discharge- Miah Ramírez 1985, 40 y o  male MRN: [id number]  Unit/Bed#: Transfer 01 Encounter: [id number]  Primary Care Provider: Amarilys Leonard PA-C   Date and time admitted to hospital: 7/16/2022  3:27 PM    * Anemia  Assessment & Plan  Presents with microcytic anemia hemoglobin of 3 6  Asymptomatic, vitals stable  Troponin negative  Repeat Hg q12  After 5 units of blood hemoglobin up to 6 3  Currently receiving 60 unit    Iron panel showing low iron levels with normal TIBC  Heme occult blood negative in the ED  Patient went down for EGD, repeat rectal exam was done which was negative for bleeding  GI concern for possible retroperitoneal hematoma and recommended transferring patient to a higher level of care before proceeding with EGD  Patient being transferred to [facility] ICU  CT abdomen showing Diffuse hepatic metastases concerning for hepatocellular carcinoma in this cirrhotic patient  Metastatic disease is not excluded given the normal AFP  54 mm partially imaged fusiform thoracoabdominal aortic aneurysm  No retroperitoneal hematoma noted      Alcoholic cirrhosis of liver without ascites (HCC)  Assessment & Plan  Hx Decompensated cirrhosis with anasarca, hepatic encephalopathy, thrombocytopenia splenomegaly, hyperammonemia, and  Hepatomegaly most likely secondary to alcohol abuse but could also be due to underlying alcoholic hepatitis  -Previous AFP and right upper quadrant ultrasounds were normal  -Never had screening EGD for varices    Hepatic encephalopathy- resume home lactulose, ammonia level 98 on admission  Anasarca - Lasix 40 IV b i d  Resume Aldactone 50 mg  Net negative 3L     ? SBP: Spiking fevers on 7/17  CXR negative for acute findings, ua negative for uti  Blood cultures drawn  Started on ceftriaxone  Unfortunately no IR for paracentesis prior to abx  Admission: MELD 23 T  Bili 4 6, INR 1 87, cr 0 35, Na 133    Abdominal ultrasound ordered to assess for hepatocellular carcinoma & ascites  AFP 3 5  F/U Gi consult, appreciate recommendations  GI recommending transfer to a facility with ICU as patient may need intubation for endoscopy     Encephalopathy  Assessment/Plan  Most likely 2/2 alcohol abuse with alcohol level on admission of 448 and component of hepatic encephalopathy with ammonia of 98 on admission from non compliance with prescribed lactulose    Alcohol withdrawal (HCC)  Assessment & Plan  Alcohol level 448  CIWA protocol  MV thiamine folic acid  Added serax 10 mg tid for symptom control    Idiopathic hypotension  Assessment & Plan  Blood pressure slightly on lower side in setting of anasarca from cirrhosis  Will start midodrine 10 mg tid    Chronic bronchitis (HCC)  Assessment & Plan  Resume home inhalers for asthma  Follow-up chest x-ray - on my read no significant sindings for infection  Continue home inhalers for asthma    Depression  Assessment & Plan  Resume home Lexapro    Tobacco dependence  Assessment & Plan  Nicotine patch    Substance abuse ([facility] [address] )  Assessment & Plan  · Patient reports taking 8-2 Suboxone BID  · [place]MP confirmed 16 mg daily      Medical Problems             Resolved Problems  Date Reviewed: 7/18/2022   None               Discharging Physician / Practitioner: Blake Fields DO  PCP: Janelle Sy PA-C  Admission Date:   Admission Orders (From admission, onward)     Ordered        07/16/22 [address]  Once                      Discharge Date: 07/18/22    Consultations During Hospital Stay:  · GI    Procedures Performed:   · Blood transfussions x 6    Significant Findings / Test Results:   CT abd/pelvis  Diffuse hepatic metastases concerning for hepatocellular carcinoma in this cirrhotic patient  Metastatic disease is not excluded given the normal AFP  Splenomegaly  Marked gallbladder distention    54 mm partially imaged fusiform thoracoabdominal aortic aneurysm    No source for hemorrhage is identified      Anemia hg 3 6    Alcohol level 448 on admission    Incidental Findings:   · See above     Test Results Pending at Discharge (will require follow up): · Blood cultures     Outpatient Tests Requested:  · n/a    Complications:  none    Reason for Admission: anemia    Hospital Course: Radha Weems is a 40 y o  male patient who originally presented to the hospital on 7/16/2022 due to altered mental status  Upon arrival patient was noted to have a hemoglobin of 3 6  Vitals were stable  Rectal exam in the ED was negative for occult blood  Patient was admitted and has received a total of 6 units of blood so far during his hospitalization  Most recent hemoglobin 6 3   GI has been consulted and patient was planned for EGD today  GI recommended transferring patient to facility with an ICU as patient may require EGD while intubated  There was also concern for retroperitoneal bleeding and a CT abdomen was done which was negative  CT abdomen did show diffuse hepatic Mets with 54 mm partially imaged fusiform thoracoabdominal aortic aneurysm  Patient was also placed on Lasix 40 IV b i d  And Aldactone 40 mg for volume overload in setting of cirrhosis  Patient's alcohol level on admission noted to be elevated at 448  Patient denies drinking for a week prior to being admitted  On admission patient was placed on CIWA protocol and Serax was added to help withdrawals  Please see above list of diagnoses and related plan for additional information  Condition at Discharge: poor    Discharge Day Visit / Exam:   * Please refer to separate progress note for these details *    Discussion with Family: Updated  (sister) via phone  Discharge instructions/Information to patient and family:   See after visit summary for information provided to patient and family        Provisions for Follow-Up Care:  See after visit summary for information related to follow-up care and any pertinent home health orders  Disposition:   [address] Transfer to [facility] ICU    Planned Readmission: yes     Discharge Statement:  I spent 45 minutes discharging the patient  This time was spent on the day of discharge  I had direct contact with the patient on the day of discharge  Greater than 50% of the total time was spent examining patient, answering all patient questions, arranging and discussing plan of care with patient as well as directly providing post-discharge instructions  Additional time then spent on discharge activities  Discharge Medications:  See after visit summary for reconciled discharge medications provided to patient and/or family        **Please Note: This note may have been constructed using a voice recognition system**

## 2022-07-18 NOTE — ASSESSMENT & PLAN NOTE
Blood pressure slightly on lower side in setting of anasarca from cirrhosis    Plan:  [facility]

## 2022-07-18 NOTE — UTILIZATION REVIEW
*PLEASE SEE INITIAL REVIEW ON 7/17/22 BY Joshua DELA CRUZ RN   *GEOFFREYT'L CLINICAL FOR 7/17/22 BELOW WITH GI CONSULT      Continued Stay Review  Date: 7/17 Sunday                  Current Patient Class: Inpatient  [facility]    HPI:  41 y/o male with PMHx Asthma, Alcohol Abuse with Cirrhosis, Depression + tobacco abuse -  initially admitted on 7/16/2 2nd Anemia with H/H 3 6/ 13 7 - transfused with 2 Units PRBC's    7//17/22 GI CONSULT:  ASSESSMENT AND PLAN:     severe anemia microcytosis high RDW, though there is no overt GI blood loss but any ongoing GI blood loss remains a diagnosis of exclusion  His MCV was normal couple years ago so I doubt it is thalassemia  He has received blood transfusion, tentative plans for EGD tomorrow to check for portal hypertensive gastropathy and/or varices or other source of possible blood loss  If no active GI bleeding, then can be re-evaluated for possible alcoholic hepatitis and or use of steroids      Cirrhosis of the liver most likely from alcohol with anasarca, patient well aware of fatal complications of advanced liver disease  Continue aggressive diuresis with IV diuretics and albumin infusion  Check ultrasound  Management of encephalopathy as being done      Monitor for alcohol withdrawal/ delirium tremens    Alcoholism/alcohol dependence and other medical problems  Patient well aware of fatal complications of advanced liver disease      Vital Signs:   Date/Time Temp Pulse Resp BP MAP (mmHg) SpO2 Calculated FIO2 (%) - Nasal Cannula Nasal Cannula O2 (L/min) O2 Device Patient Position - Orthostatic VS   07/17/22 2345 -- -- -- -- -- 96 % 28 2 L/min Nasal cannula --   07/17/22 2335 -- -- -- -- -- 87 % Abnormal  -- -- None (Room air) --   07/17/22 2245 101 2 °F (38 4 °C) Abnormal  110 Abnormal  18 134/77 -- 90 % -- -- None (Room air) Lying   07/17/22 2215 100 9 °F (38 3 °C) Abnormal  -- -- -- -- -- -- -- -- --   07/17/22 2057 102 1 °F (38 9 °C) Abnormal  119 Abnormal  18 129/71 92 94 % -- -- None (Room air) Lying   07/17/22 1933 101 3 °F (38 5 °C) Abnormal   105 16 137/68 89 93 % -- -- None (Room air) Lying   Temp: RAD Koehler is aware of it at 07/17/22 1933   07/17/22 1818 100 7 °F (38 2 °C) Abnormal  108 Abnormal  16 140/74 -- 92 % -- -- None (Room air) --   07/17/22 1730 99 8 °F (37 7 °C) 112 Abnormal  16 139/72 -- 94 % -- -- None (Room air) --   07/17/22 1658 100 °F (37 8 °C) 114 Abnormal  16 138/76 94 94 % -- -- None (Room air) Lying   07/17/22 1627 99 3 °F (37 4 °C) 110 Abnormal  16 135/70 88 95 % -- -- None (Room air) Lying   07/17/22 1533 100 4 °F (38 °C) 104 16 127/57 -- 93 % -- -- None (Room air) --   07/17/22 1519 100 5 °F (38 1 °C) 106 Abnormal  16 130/70 86 94 % -- -- None (Room air) Lying   07/17/22 1420 101 5 °F (38 6 °C) Abnormal  115 Abnormal  16 141/71 -- 95 % -- -- None (Room air) --   07/17/22 1345 99 7 °F (37 6 °C) 120 Abnormal  16 129/61 -- 94 % -- -- None (Room air) --   07/17/22 1323 99 5 °F (37 5 °C) 114 Abnormal  16 122/60 -- 97 % -- -- None (Room air) --   07/17/22 1141 -- 110 Abnormal  -- 126/59 82 -- -- -- -- Lying   07/17/22 1040 -- 110 Abnormal  16 98/59 71 93 % -- -- None (Room air) Lying   07/17/22 0949 -- -- -- -- -- 96 % -- -- None (Room air) --   07/17/22 0924 -- 110 Abnormal  -- 108/53 -- -- -- -- -- --   07/17/22 0911 99 1 °F (37 3 °C) 110 Abnormal  16 108/53 -- 98 % 26 1 5 L/min Nasal cannula --   07/17/22 0828 98 1 °F (36 7 °C) 110 Abnormal  18 125/60 80 96 % -- -- Nasal cannula Lying   07/17/22 0805 -- -- -- -- -- -- 28 2 L/min Nasal cannula --   07/17/22 0605 99 6 °F (37 6 °C) 101 18 114/53 72 95 % 28 2 L/min Nasal cannula Lying   07/17/22 0540 99 °F (37 2 °C) 106 Abnormal  17 125/56 81 95 % 28 2 L/min Nasal cannula Lying   07/17/22 0500 99 5 °F (37 5 °C) 110 Abnormal  18 109/57 76 94 % 28 2 L/min Nasal cannula Lying   07/17/22 0055 99 °F (37 2 °C) 104 17 123/64 -- 95 % 28 2 L/min Nasal cannula Lying      07/16 0701 07/17 0700 07/17 0701   -   P  O  720 480   Blood 700 1002 5   Total Intake(mL/kg) 1420 (14 7) 1482 5 (15 3)   Urine (mL/kg/hr) 1100 2250 (1)   Stool 0 0   Total Output 1100 2250   Net +320 -767 5        Unmeasured Stool Occurrence 2 x 2 x     Pertinent Labs/Diagnostic Results:   Results from last 7 days   Lab Units 07/17/22  2043 07/17/22  1113 07/17/22  0347 07/16/22  1611   WBC Thousand/uL  --   --  4 91 6 09   HEMOGLOBIN g/dL 6 5* 5 4* 4 7* 3 6*   HEMATOCRIT % 20 3* 17 8* 16 1* 13 7*   PLATELETS Thousands/uL  --   --  101* 140*   NEUTROS ABS Thousands/µL  --   --   --  3 99     Results from last 7 days   Lab Units 07/17/22  0347   RETIC CT ABS  <10,000*   RETIC CT PCT % 0 39     Results from last 7 days   Lab Units 07/17/22  1838 07/17/22  1113 07/17/22  0347 07/16/22  1611   SODIUM mmol/L  --  134* 135 133*   POTASSIUM mmol/L  --  3 3* 2 7* 4 0   CHLORIDE mmol/L  --  94* 94* 94*   CO2 mmol/L  --  29 29 28   ANION GAP mmol/L  --  11 12 11   BUN mg/dL  --  6 7 8   CREATININE mg/dL  --  0 38* 0 35* 0 32*   EGFR ml/min/1 73sq m  --  154 160 166   CALCIUM mg/dL  --  8 5 7 9* 8 1*   MAGNESIUM mg/dL 1 8* 2 2 1 6* 2 0   PHOSPHORUS mg/dL  --  2 3* 2 6* 2 6*     Results from last 7 days   Lab Units 07/17/22  1113 07/17/22  0347 07/16/22  1611   AST U/L 103* 100* 128*   ALT U/L 26 26 29   ALK PHOS U/L 201* 194* 221*   TOTAL PROTEIN g/dL 7 2 7 1 7 6   ALBUMIN g/dL 3 3* 3 2* 3 3*   TOTAL BILIRUBIN mg/dL 6 66* 5 47* 4 61*   BILIRUBIN DIRECT mg/dL  --   --  2 58*   AMMONIA umol/L  --   --  98*     Results from last 7 days   Lab Units 07/17/22  1113 07/17/22  0347 07/16/22  1611   GLUCOSE RANDOM mg/dL 121 123 118     Results from last 7 days   Lab Units 07/16/22  1611   CK TOTAL U/L 40     Results from last 7 days   Lab Units 07/16/22  1901 07/16/22  1611   HS TNI 0HR ng/L  --  6   HS TNI 2HR ng/L 6  --    HSTNI D2 ng/L 0  --          Results from last 7 days   Lab Units 07/17/22  2043 07/16/22  1611   PROTIME seconds 21 6* 21 1*   INR  1 92* 1 87*   PTT seconds  --  45*         Results from last 7 days   Lab Units 07/17/22  1838 07/17/22  1113   PROCALCITONIN ng/ml 0 44* 0 42*     Results from last 7 days   Lab Units 07/17/22  2145 07/17/22  1514   CLARITY UA   --  Clear   COLOR UA   --  Yellow   SPEC GRAV UA   --  1 015   PH UA   --  7 0   GLUCOSE UA mg/dl  --  Negative   KETONES UA mg/dl  --  Negative   BLOOD UA  2+* Trace-Intact*   PROTEIN UA mg/dl  --  Negative   NITRITE UA   --  Negative   BILIRUBIN UA   --  Negative   UROBILINOGEN UA E U /dl  --  2 0   LEUKOCYTES UA   --  Negative   WBC UA /hpf  --  None Seen   RBC UA /hpf  --  2-4   BACTERIA UA /hpf  --  None Seen   EPITHELIAL CELLS WET PREP /hpf  --  None Seen     Results from last 7 days   Lab Units 07/16/22  1758   AMPH/METH  Negative   BARBITURATE UR  Negative   BENZODIAZEPINE UR  Negative   COCAINE UR  Negative   METHADONE URINE  Negative   OPIATE UR  Negative   PCP UR  Negative   THC UR  Negative     Results from last 7 days   Lab Units 07/16/22  1611   ETHANOL LVL mg/dL 448*     Results from last 7 days   Lab Units 07/17/22  2042   BLOOD CULTURE  Received in Microbiology Lab  Culture in Progress  Received in Microbiology Lab  Culture in Progress             Scheduled Medications:  IV albumin human, 25 g, Intravenous, TID  buprenorphine-naloxone, 8 mg, Sublingual, BID  IV cefTRIAXone, 1,000 mg, Intravenous, Q24H  escitalopram, 20 mg, Oral, Daily  ferrous sulfate, 325 mg, Oral, Daily With Breakfast  folic acid, 1 mg, Oral, Daily  IV furosemide, 40 mg, Intravenous, BID (diuretic)   lactulose, 20 g, Oral, BID  IV magnesium sulfate, 2 g, Intravenous, Once  midodrine, 10 mg, Oral, TID AC  multivitamin-minerals, 1 tablet, Oral, Daily  nicotine, 1 patch, Transdermal, Daily  oxazepam, 10 mg, Oral, Q8H RICH[place]  pantoprazole, 40 mg, Oral, Early Morning  potassium chloride, 40 mEq, Oral, TID With Meals  spironolactone, 50 mg, Oral, Daily  thiamine, 100 mg, Oral, Daily    Continuous IV Infusions:  NONE    PRN Meds:  acetaminophen (TYLENOL) tablet 650 mg, Oral, Q6H, PRN - 7/17/X 2  Albuterol (PROVENTIL HFA, VENTOLIN HFA inhaler 2 puff, Inhalation, Q6H PRN  diphenhydrAMINE, 25 mg, Intravenous, Q6H PRN - 7/16 X 1  LORazepam, 1 mg, Oral, Q8H PRN  IM trimethobenzamide, 200 mg, Intramuscular, Q6H PRN - 7/16 X 1, 7/17 X 1    Discharge Plan: To be determined   Inpatient Case Management following for all discharge needs    Network Utilization Review Department  ATTENTION: Please call with any questions or concerns to [phone] and carefully listen to the prompts so that you are directed to the right person  All voicemails are confidential   Lynette Kussmaul all requests for admission clinical reviews, approved or denied determinations and any other requests to dedicated fax number below belonging to the campus where the patient is receiving treatment   List of dedicated fax numbers for the Facilities:  [address] DENIALS (Administrative/Medical Necessity) [phone]   [address] (Maternity/NICU/Pediatrics) [phone]   [address] [place] [address]  [phone]   Meli Sena [address] [id number] [place] [address] [address] [facility] [place] [address] [phone]     Continu

## 2022-07-18 NOTE — ASSESSMENT & PLAN NOTE
Hx Decompensated cirrhosis with anasarca, hepatic encephalopathy, thrombocytopenia splenomegaly, hyperammonemia, and  Hepatomegaly most likely secondary to alcohol abuse but could also be due to underlying alcoholic hepatitis  -Previous AFP and right upper quadrant ultrasounds were normal  -Never had screening EGD for varices  -Home lactulose for hepatic encephalopathy    -Anasarca - Lasix 40 IV b i d  Resume Aldactone 50 mg  Net negative 3L     -SBP: Spiking fevers on 7/17  CXR negative for acute findings, ua negative for uti  Blood cultures drawn  Started on ceftriaxone  Unfortunately no IR for paracentesis prior to abx     [facility] recommending transfer to a facility with ICU for EGD  -Admission: MELD 23 T  Bili 4 6, INR 1 87, cr 0 35, Na 133    Plan:  Abdominal ultrasound ordered to assess for hepatocellular carcinoma & ascites  AFP 3 5  GI consulted for suspected UGIB/varices; appreciate recommendations  IR consulted for possible liver biopsy  Continue Ceftriaxone for SBP ppx  F/u Bcx  Started octreotide and protonix gtt  Holding lasix and aldactone  NPO sips with meds  Hold AP/AC

## 2022-07-19 ENCOUNTER — ANESTHESIA (INPATIENT)
Dept: GASTROENTEROLOGY | Facility: HOSPITAL | Age: 37
DRG: 432 | End: 2022-07-19

## 2022-07-19 ENCOUNTER — APPOINTMENT (INPATIENT)
Dept: GASTROENTEROLOGY | Facility: HOSPITAL | Age: 37
DRG: 432 | End: 2022-07-19

## 2022-07-19 ENCOUNTER — ANESTHESIA EVENT (INPATIENT)
Dept: GASTROENTEROLOGY | Facility: HOSPITAL | Age: 37
DRG: 432 | End: 2022-07-19

## 2022-07-19 ENCOUNTER — TRANSITIONAL CARE MANAGEMENT (OUTPATIENT)
Dept: FAMILY MEDICINE CLINIC | Facility: CLINIC | Age: 37
End: 2022-07-19

## 2022-07-19 LAB
ABO GROUP BLD BPU: NORMAL
ABO GROUP BLD: NORMAL
ALBUMIN SERPL BCP-MCNC: 3.5 G/DL (ref 3.5–5)
ALP SERPL-CCNC: 194 U/L (ref 34–104)
ALT SERPL W P-5'-P-CCNC: 19 U/L (ref 7–52)
ANION GAP SERPL CALCULATED.3IONS-SCNC: 6 MMOL/L (ref 4–13)
AST SERPL W P-5'-P-CCNC: 85 U/L (ref 13–39)
ATRIAL RATE: 97 BPM
BASOPHILS # BLD AUTO: 0.08 THOUSANDS/ΜL (ref 0–0.1)
BASOPHILS NFR BLD AUTO: 2 % (ref 0–1)
BILIRUB DIRECT SERPL-MCNC: 5.7 MG/DL (ref 0–0.2)
BILIRUB SERPL-MCNC: 10.37 MG/DL (ref 0.2–1)
BLD GP AB SCN SERPL QL: NEGATIVE
BLD SMEAR INTERP: NORMAL
BPU ID: NORMAL
BUN SERPL-MCNC: 8 MG/DL (ref 5–25)
CALCIUM SERPL-MCNC: 8.8 MG/DL (ref 8.4–10.2)
CHLORIDE SERPL-SCNC: 93 MMOL/L (ref 96–108)
CO2 SERPL-SCNC: 34 MMOL/L (ref 21–32)
CREAT SERPL-MCNC: 0.51 MG/DL (ref 0.6–1.3)
CROSSMATCH: NORMAL
EOSINOPHIL # BLD AUTO: 0.12 THOUSAND/ΜL (ref 0–0.61)
EOSINOPHIL NFR BLD AUTO: 2 % (ref 0–6)
ERYTHROCYTE [DISTWIDTH] IN BLOOD BY AUTOMATED COUNT: 28.5 % (ref 11.6–15.1)
GFR SERPL CREATININE-BSD FRML MDRD: 137 ML/MIN/1.73SQ M
GLUCOSE SERPL-MCNC: 86 MG/DL (ref 65–140)
HCT VFR BLD AUTO: 22.3 % (ref 36.5–49.3)
HCT VFR BLD AUTO: 24.7 % (ref 36.5–49.3)
HGB BLD-MCNC: 6.7 G/DL (ref 12–17)
HGB BLD-MCNC: 7.6 G/DL (ref 12–17)
IMM GRANULOCYTES # BLD AUTO: 0.04 THOUSAND/UL (ref 0–0.2)
IMM GRANULOCYTES NFR BLD AUTO: 1 % (ref 0–2)
INR PPP: 2.03 (ref 0.84–1.19)
INR PPP: 2.27 (ref 0.84–1.19)
LDH SERPL-CCNC: 177 U/L (ref 140–271)
LYMPHOCYTES # BLD AUTO: 1.17 THOUSANDS/ΜL (ref 0.6–4.47)
LYMPHOCYTES NFR BLD AUTO: 23 % (ref 14–44)
MAGNESIUM SERPL-MCNC: 2.2 MG/DL (ref 1.9–2.7)
MCH RBC QN AUTO: 22.9 PG (ref 26.8–34.3)
MCHC RBC AUTO-ENTMCNC: 30 G/DL (ref 31.4–37.4)
MCV RBC AUTO: 76 FL (ref 82–98)
MONOCYTES # BLD AUTO: 0.59 THOUSAND/ΜL (ref 0.17–1.22)
MONOCYTES NFR BLD AUTO: 12 % (ref 4–12)
NEUTROPHILS # BLD AUTO: 3.11 THOUSANDS/ΜL (ref 1.85–7.62)
NEUTS SEG NFR BLD AUTO: 60 % (ref 43–75)
NRBC BLD AUTO-RTO: 1 /100 WBCS
P AXIS: 44 DEGREES
PHOSPHATE SERPL-MCNC: 2.5 MG/DL (ref 2.7–4.5)
PLATELET # BLD AUTO: 82 THOUSANDS/UL (ref 149–390)
POTASSIUM SERPL-SCNC: 3.9 MMOL/L (ref 3.5–5.3)
PR INTERVAL: 200 MS
PROT SERPL-MCNC: 7.1 G/DL (ref 6.4–8.4)
PROTHROMBIN TIME: 22.7 SECONDS (ref 11.6–14.5)
PROTHROMBIN TIME: 24.7 SECONDS (ref 11.6–14.5)
QRS AXIS: 23 DEGREES
QRSD INTERVAL: 98 MS
QT INTERVAL: 376 MS
QTC INTERVAL: 477 MS
RBC # BLD AUTO: 2.92 MILLION/UL (ref 3.88–5.62)
RETICS # AUTO: ABNORMAL 10*3/UL (ref 14356–105094)
RETICS # CALC: 2.62 % (ref 0.37–1.87)
RH BLD: POSITIVE
SODIUM SERPL-SCNC: 133 MMOL/L (ref 135–147)
SPECIMEN EXPIRATION DATE: NORMAL
T WAVE AXIS: 28 DEGREES
UNIT DISPENSE STATUS: NORMAL
UNIT PRODUCT CODE: NORMAL
UNIT PRODUCT VOLUME: 350 ML
UNIT RH: NORMAL
VENTRICULAR RATE: 97 BPM
WBC # BLD AUTO: 5.11 THOUSAND/UL (ref 4.31–10.16)

## 2022-07-19 PROCEDURE — 43244 EGD VARICES LIGATION: CPT | Performed by: INTERNAL MEDICINE

## 2022-07-19 PROCEDURE — 85025 COMPLETE CBC W/AUTO DIFF WBC: CPT | Performed by: NURSE PRACTITIONER

## 2022-07-19 PROCEDURE — 30233K1 TRANSFUSION OF NONAUTOLOGOUS FROZEN PLASMA INTO PERIPHERAL VEIN, PERCUTANEOUS APPROACH: ICD-10-PCS | Performed by: HOSPITALIST

## 2022-07-19 PROCEDURE — NC001 PR NO CHARGE: Performed by: RADIOLOGY

## 2022-07-19 PROCEDURE — 80053 COMPREHEN METABOLIC PANEL: CPT | Performed by: NURSE PRACTITIONER

## 2022-07-19 PROCEDURE — 06L38CZ OCCLUSION OF ESOPHAGEAL VEIN WITH EXTRALUMINAL DEVICE, VIA NATURAL OR ARTIFICIAL OPENING ENDOSCOPIC: ICD-10-PCS | Performed by: INTERNAL MEDICINE

## 2022-07-19 PROCEDURE — C9113 INJ PANTOPRAZOLE SODIUM, VIA: HCPCS

## 2022-07-19 PROCEDURE — 84100 ASSAY OF PHOSPHORUS: CPT | Performed by: STUDENT IN AN ORGANIZED HEALTH CARE EDUCATION/TRAINING PROGRAM

## 2022-07-19 PROCEDURE — 86900 BLOOD TYPING SEROLOGIC ABO: CPT

## 2022-07-19 PROCEDURE — 82248 BILIRUBIN DIRECT: CPT

## 2022-07-19 PROCEDURE — 85610 PROTHROMBIN TIME: CPT

## 2022-07-19 PROCEDURE — C9113 INJ PANTOPRAZOLE SODIUM, VIA: HCPCS | Performed by: INTERNAL MEDICINE

## 2022-07-19 PROCEDURE — 86901 BLOOD TYPING SEROLOGIC RH(D): CPT

## 2022-07-19 PROCEDURE — 86850 RBC ANTIBODY SCREEN: CPT

## 2022-07-19 PROCEDURE — 86920 COMPATIBILITY TEST SPIN: CPT

## 2022-07-19 PROCEDURE — 83615 LACTATE (LD) (LDH) ENZYME: CPT

## 2022-07-19 PROCEDURE — 93010 ELECTROCARDIOGRAM REPORT: CPT | Performed by: INTERNAL MEDICINE

## 2022-07-19 PROCEDURE — P9017 PLASMA 1 DONOR FRZ W/IN 8 HR: HCPCS

## 2022-07-19 PROCEDURE — 85045 AUTOMATED RETICULOCYTE COUNT: CPT

## 2022-07-19 PROCEDURE — 83735 ASSAY OF MAGNESIUM: CPT | Performed by: NURSE PRACTITIONER

## 2022-07-19 PROCEDURE — 99223 1ST HOSP IP/OBS HIGH 75: CPT | Performed by: INTERNAL MEDICINE

## 2022-07-19 PROCEDURE — 85018 HEMOGLOBIN: CPT

## 2022-07-19 PROCEDURE — 85610 PROTHROMBIN TIME: CPT | Performed by: NURSE PRACTITIONER

## 2022-07-19 PROCEDURE — 99233 SBSQ HOSP IP/OBS HIGH 50: CPT | Performed by: INTERNAL MEDICINE

## 2022-07-19 PROCEDURE — 83010 ASSAY OF HAPTOGLOBIN QUANT: CPT

## 2022-07-19 PROCEDURE — P9016 RBC LEUKOCYTES REDUCED: HCPCS

## 2022-07-19 PROCEDURE — 85014 HEMATOCRIT: CPT

## 2022-07-19 RX ORDER — CEFTRIAXONE 2 G/50ML
2000 INJECTION, SOLUTION INTRAVENOUS EVERY 24 HOURS
Status: DISCONTINUED | OUTPATIENT
Start: 2022-07-19 | End: 2022-07-19

## 2022-07-19 RX ORDER — ACETAMINOPHEN 325 MG/1
650 TABLET ORAL ONCE
Status: COMPLETED | OUTPATIENT
Start: 2022-07-19 | End: 2022-07-19

## 2022-07-19 RX ORDER — SPIRONOLACTONE 25 MG/1
50 TABLET ORAL DAILY
Status: DISCONTINUED | OUTPATIENT
Start: 2022-07-20 | End: 2022-07-20

## 2022-07-19 RX ORDER — FUROSEMIDE 10 MG/ML
40 INJECTION INTRAMUSCULAR; INTRAVENOUS EVERY 12 HOURS
Status: DISCONTINUED | OUTPATIENT
Start: 2022-07-19 | End: 2022-07-25

## 2022-07-19 RX ORDER — DEXAMETHASONE SODIUM PHOSPHATE 10 MG/ML
INJECTION, SOLUTION INTRAMUSCULAR; INTRAVENOUS AS NEEDED
Status: DISCONTINUED | OUTPATIENT
Start: 2022-07-19 | End: 2022-07-19

## 2022-07-19 RX ORDER — PROPOFOL 10 MG/ML
INJECTION, EMULSION INTRAVENOUS AS NEEDED
Status: DISCONTINUED | OUTPATIENT
Start: 2022-07-19 | End: 2022-07-19

## 2022-07-19 RX ORDER — CEFTRIAXONE 1 G/50ML
1000 INJECTION, SOLUTION INTRAVENOUS EVERY 24 HOURS
Status: DISCONTINUED | OUTPATIENT
Start: 2022-07-19 | End: 2022-07-19

## 2022-07-19 RX ORDER — SUCCINYLCHOLINE/SOD CL,ISO/PF 100 MG/5ML
SYRINGE (ML) INTRAVENOUS AS NEEDED
Status: DISCONTINUED | OUTPATIENT
Start: 2022-07-19 | End: 2022-07-19

## 2022-07-19 RX ORDER — SODIUM CHLORIDE, SODIUM LACTATE, POTASSIUM CHLORIDE, CALCIUM CHLORIDE 600; 310; 30; 20 MG/100ML; MG/100ML; MG/100ML; MG/100ML
INJECTION, SOLUTION INTRAVENOUS CONTINUOUS PRN
Status: DISCONTINUED | OUTPATIENT
Start: 2022-07-19 | End: 2022-07-19

## 2022-07-19 RX ORDER — LIDOCAINE HYDROCHLORIDE 10 MG/ML
INJECTION, SOLUTION EPIDURAL; INFILTRATION; INTRACAUDAL; PERINEURAL AS NEEDED
Status: DISCONTINUED | OUTPATIENT
Start: 2022-07-19 | End: 2022-07-19

## 2022-07-19 RX ORDER — NADOLOL 40 MG/1
40 TABLET ORAL DAILY
Status: DISCONTINUED | OUTPATIENT
Start: 2022-07-20 | End: 2022-08-02 | Stop reason: HOSPADM

## 2022-07-19 RX ORDER — ALBUMIN, HUMAN INJ 5% 5 %
SOLUTION INTRAVENOUS CONTINUOUS PRN
Status: DISCONTINUED | OUTPATIENT
Start: 2022-07-19 | End: 2022-07-19

## 2022-07-19 RX ORDER — CEFTRIAXONE 2 G/50ML
2000 INJECTION, SOLUTION INTRAVENOUS EVERY 24 HOURS
Status: DISCONTINUED | OUTPATIENT
Start: 2022-07-19 | End: 2022-07-22

## 2022-07-19 RX ADMIN — MIDODRINE HYDROCHLORIDE 10 MG: 5 TABLET ORAL at 06:01

## 2022-07-19 RX ADMIN — ONDANSETRON 4 MG: 2 INJECTION INTRAMUSCULAR; INTRAVENOUS at 01:38

## 2022-07-19 RX ADMIN — ALBUMIN HUMAN: 0.05 INJECTION, SOLUTION INTRAVENOUS at 16:50

## 2022-07-19 RX ADMIN — CEFTRIAXONE 2000 MG: 2 INJECTION, SOLUTION INTRAVENOUS at 20:23

## 2022-07-19 RX ADMIN — PROPOFOL 50 MG: 10 INJECTION, EMULSION INTRAVENOUS at 17:01

## 2022-07-19 RX ADMIN — ONDANSETRON 4 MG: 2 INJECTION INTRAMUSCULAR; INTRAVENOUS at 16:58

## 2022-07-19 RX ADMIN — SODIUM CHLORIDE 8 MG/HR: 9 INJECTION, SOLUTION INTRAVENOUS at 02:13

## 2022-07-19 RX ADMIN — ESCITALOPRAM OXALATE 20 MG: 20 TABLET ORAL at 08:56

## 2022-07-19 RX ADMIN — MULTIPLE VITAMINS W/ MINERALS TAB 1 TABLET: TAB ORAL at 08:56

## 2022-07-19 RX ADMIN — SODIUM CHLORIDE, SODIUM LACTATE, POTASSIUM CHLORIDE, AND CALCIUM CHLORIDE: .6; .31; .03; .02 INJECTION, SOLUTION INTRAVENOUS at 16:54

## 2022-07-19 RX ADMIN — SODIUM CHLORIDE 8 MG/HR: 9 INJECTION, SOLUTION INTRAVENOUS at 11:51

## 2022-07-19 RX ADMIN — SODIUM PHOSPHATE, MONOBASIC, MONOHYDRATE 21 MMOL: 276; 142 INJECTION, SOLUTION INTRAVENOUS at 14:13

## 2022-07-19 RX ADMIN — LACTULOSE 20 G: 20 SOLUTION ORAL at 18:10

## 2022-07-19 RX ADMIN — BUPRENORPHINE AND NALOXONE 8 MG: 8; 2 FILM BUCCAL; SUBLINGUAL at 08:57

## 2022-07-19 RX ADMIN — Medication 100 MG: at 16:58

## 2022-07-19 RX ADMIN — FUROSEMIDE 40 MG: 10 INJECTION, SOLUTION INTRAMUSCULAR; INTRAVENOUS at 20:15

## 2022-07-19 RX ADMIN — DEXAMETHASONE SODIUM PHOSPHATE 10 MG: 10 INJECTION, SOLUTION INTRAMUSCULAR; INTRAVENOUS at 17:13

## 2022-07-19 RX ADMIN — SODIUM CHLORIDE 8 MG/HR: 9 INJECTION, SOLUTION INTRAVENOUS at 23:44

## 2022-07-19 RX ADMIN — THIAMINE HCL TAB 100 MG 100 MG: 100 TAB at 08:56

## 2022-07-19 RX ADMIN — LORAZEPAM 1 MG: 1 TABLET ORAL at 21:01

## 2022-07-19 RX ADMIN — FERROUS SULFATE TAB 325 MG (65 MG ELEMENTAL FE) 325 MG: 325 (65 FE) TAB at 08:56

## 2022-07-19 RX ADMIN — PROPOFOL 200 MG: 10 INJECTION, EMULSION INTRAVENOUS at 16:58

## 2022-07-19 RX ADMIN — ALBUMIN (HUMAN) 25 G: 0.25 INJECTION, SOLUTION INTRAVENOUS at 20:13

## 2022-07-19 RX ADMIN — LORAZEPAM 1 MG: 1 TABLET ORAL at 12:46

## 2022-07-19 RX ADMIN — LORAZEPAM 1 MG: 1 TABLET ORAL at 01:38

## 2022-07-19 RX ADMIN — ALBUMIN (HUMAN) 25 G: 0.25 INJECTION, SOLUTION INTRAVENOUS at 09:01

## 2022-07-19 RX ADMIN — FOLIC ACID 1 MG: 1 TABLET ORAL at 08:56

## 2022-07-19 RX ADMIN — NICOTINE 1 PATCH: 21 PATCH, EXTENDED RELEASE TRANSDERMAL at 08:57

## 2022-07-19 RX ADMIN — ACETAMINOPHEN 650 MG: 325 TABLET ORAL at 08:56

## 2022-07-19 RX ADMIN — ALBUMIN (HUMAN) 25 G: 0.25 INJECTION, SOLUTION INTRAVENOUS at 18:09

## 2022-07-19 RX ADMIN — LIDOCAINE HYDROCHLORIDE 50 MG: 10 INJECTION, SOLUTION EPIDURAL; INFILTRATION; INTRACAUDAL at 16:58

## 2022-07-19 RX ADMIN — LACTULOSE 20 G: 20 SOLUTION ORAL at 08:56

## 2022-07-19 NOTE — ASSESSMENT & PLAN NOTE
Hx Decompensated cirrhosis with anasarca, hepatic encephalopathy, thrombocytopenia splenomegaly, hyperammonemia, and  Hepatomegaly most likely secondary to alcohol abuse but could also be due to underlying alcoholic hepatitis  -Previous AFP and right upper quadrant ultrasounds were normal  -Never had screening EGD for varices  -Home lactulose for hepatic encephalopathy    -Anasarca - Lasix 40 IV b i d  Resume Aldactone 50 mg  Net negative 3L     -SBP: Spiking fevers on 7/17  CXR negative for acute findings, ua negative for uti  Blood cultures drawn  Started on ceftriaxone  Unfortunately no IR for paracentesis prior to abx     [facility] GI recommending transfer to a facility with ICU for EGD  -Admission: MELD 23 T  Bili 4 6, INR 1 87, cr 0 35, Na 133    Plan:  Abdominal ultrasound ordered to assess for hepatocellular carcinoma & ascites  AFP 3 5  GI consulted for suspected UGIB/varices; appreciate recommendations   - s/p EGD; 4 bands for 2 varices; no active bleed   Started on nadolol 40 mg qd   - 2 FFP given prior to EGD  IR consulted for paracentesis   - Reconsult for IR liver bx whenever patient INR <1 5, out of ICU, hgb stable  Continue Ceftriaxone for SBP ppx  F/u Bcx   - ngtd 48 hrs x2  Protonix gtt  Resume lasix and aldactone  CLD  Hold AP/AC

## 2022-07-19 NOTE — QUICK NOTE
Hgb came back 6 7  No active bleeding noted  Since had possible transfusion reaction and is currently hemodynamically stable will hold off on transfusion for now  Will discuss this am with attending

## 2022-07-19 NOTE — ASSESSMENT & PLAN NOTE
Presents with microcytic anemia hemoglobin of 3 6  Asymptomatic, vitals stable  Troponin negative  Repeat Hg q12   After 5 units of blood hemoglobin up to 6 3  Currently receiving 60 unit    Iron panel showing low iron levels with normal TIBC     Heme occult blood negative in the ED  Patient denying melena and hematemesis    Plan:  Received 6 units total pRBCs  F/u CBC this PM  hgb 6 7 this AM  Held transfusion due to concerns of febrile reaction  Will proceed with 1 unit with tylenol

## 2022-07-19 NOTE — PROGRESS NOTES
[facility]  Progress Note - Maddi Caballero 1985, 40 y o  male MRN: [id number]  Unit/Bed#: ICU 03 Encounter: [id number]  Primary Care Provider: Josesito Harper PA-C   Date and time admitted to hospital: 7/18/2022  6:56 PM    * Alcoholic cirrhosis of liver without ascites ([address] )  Assessment & Plan  Hx Decompensated cirrhosis with anasarca, hepatic encephalopathy, thrombocytopenia splenomegaly, hyperammonemia, and  Hepatomegaly most likely secondary to alcohol abuse but could also be due to underlying alcoholic hepatitis  -Previous AFP and right upper quadrant ultrasounds were normal  -Never had screening EGD for varices  -Home lactulose for hepatic encephalopathy    -Anasarca - Lasix 40 IV b i d  Resume Aldactone 50 mg  Net negative 3L     -SBP: Spiking fevers on 7/17  CXR negative for acute findings, ua negative for uti  Blood cultures drawn  Started on ceftriaxone  Unfortunately no IR for paracentesis prior to abx     Мария [facility] recommending transfer to a facility with ICU for EGD  -Admission: MELD 23 T  Bili 4 6, INR 1 87, cr 0 35, Na 133    Plan:  Abdominal ultrasound ordered to assess for hepatocellular carcinoma & ascites  AFP 3 5  GI consulted for suspected UGIB/varices; appreciate recommendations  IR consulted for possible liver biopsy  Continue Ceftriaxone for SBP ppx  F/u Bcx  Started octreotide and protonix gtt   - will d/c octreotide  Holding lasix and aldactone  NPO sips with meds  Hold AP/AC    Alcohol withdrawal (HCC)  Assessment & Plan  Alcohol level 448  Patient says last drink was on Friday 7/15     [facility] ED used serax 10 mg tid for symptom control    Plan:  CIWA protocol  Ativan  MV thiamine folic acid  D/C Serax      Thrombocytopenia (HCC)  Assessment & Plan  Lab Results   Component Value Date    PLT 82 (L) 07/19/2022    PLT 66 (L) 07/18/2022    PLT 78 (L) 07/18/2022     (L) 07/17/2022     (L) 07/16/2022         Anemia  Assessment & Plan  Presents with microcytic anemia hemoglobin of 3 6  Asymptomatic, vitals stable  Troponin negative  Repeat Hg q12   After 5 units of blood hemoglobin up to 6 3  Currently receiving 60 unit    Iron panel showing low iron levels with normal TIBC  Heme occult blood negative in the ED  Patient denying melena and hematemesis    Plan:  Received 6 units total pRBCs  F/u CBC this PM  hgb 6 7 this AM  Held transfusion due to concerns of febrile reaction  Will proceed with 1 unit with tylenol        Idiopathic hypotension  Assessment & Plan  Blood pressure slightly on lower side in setting of anasarca from cirrhosis    Plan:  Continue midodrine 10 mg TID started at [facility] ED    Liver metastasis [facility])  Assessment & Plan  Suspicious CT abdomen findings for liver cancer  AFP WNL    Plan:  IR consulted for liver biopsy    Thoracoabdominal aortic aneurysm (HCC)  Assessment & Plan  CT findings; stable 54mm    Substance abuse ([facility] [address] )  Assessment & Plan  · Patient reports taking 8-2 Suboxone BID  · [place] confirmed 16 mg daily     Depression  Assessment & Plan  Resume home Lexapro    Tobacco dependence  Assessment & Plan  Nicotine patch    Mild intermittent asthma without complication  Assessment & Plan  Continue home inhalers    Chronic bronchitis (HCC)  Assessment & Plan  Resume home inhalers for asthma  Follow-up chest x-ray - on my read no significant sindings for infection    Continue home inhalers for asthma      ----------------------------------------------------------------------------------------  HPI/24hr events: 40 y o  male pmhx alcoholic cirrhosis, multi substance abuse (alcohol, tobacco, on suboxone 8-2 BID), depression, asthma who originally presented to [facility] ED via EMS for AMS  Hgb 3 6 POA s/p 6 u pRBCs  ETOH 448  Patient transferred from [facility] for EGD+/- intubation and IR consultation for liver mass biopsy  CT imaging concerning for hepatocellular carcinoma  Under CIWA protocol, last known drink Friday   Hgb 6 7, transfusion held this AM for concerns by night team about febrile nonhemolytic transfusion reaction  No acute events reported by night team  Patient started on CTX for SBP ppx  Patient appropriate for transfer out of the ICU today?: No  Disposition: Continue Critical Care   Code Status: Level 1 - Full Code  ---------------------------------------------------------------------------------------  SUBJECTIVE  No concerns but is hungry thirsty  Denies nausea, dyspnea, vomiting  Continues to endorse anxiety  No resp distress on 2 5 L NC  Feels the same from yesterday  Review of Systems  Review of systems was reviewed and negative unless stated above in HPI/24-hour events   ---------------------------------------------------------------------------------------  OBJECTIVE    Vitals   Vitals:    22 0242 22 0300 22 0500 22 0712   BP: 125/61 123/63 130/62 126/63   BP Location: Right arm   Right arm   Pulse: 92 91 81 84   Resp:   12   Temp: 98 7 °F (37 1 °C)   98 6 °F (37 °C)   TempSrc: Axillary   Oral   SpO2: 96% 96%  96%   Weight: 94 kg (207 lb 3 7 oz)        Temp (24hrs), Av 9 °F (37 2 °C), Min:98 °F (36 7 °C), Max:99 9 °F (37 7 °C)  Current: Temperature: 98 6 °F (37 °C)          Respiratory:  SpO2: SpO2: 96 %, SpO2 Activity: SpO2 Activity: At Rest, SpO2 Device: O2 Device: Nasal cannula, Capnography:         Invasive/non-invasive ventilation settings   Respiratory  Report   Lab Data (Last 4 hours)    None         O2/Vent Data (Last 4 hours)    None                Physical Exam  Vitals and nursing note reviewed  Constitutional:       Appearance: He is well-developed  HENT:      Head: Normocephalic and atraumatic  Right Ear: External ear normal       Left Ear: External ear normal       Nose: Nose normal       Mouth/Throat:      Mouth: Mucous membranes are moist    Eyes:      General: Scleral icterus present        Conjunctiva/sclera: Conjunctivae normal    Cardiovascular: Rate and Rhythm: Normal rate and regular rhythm  Pulses: Normal pulses  Heart sounds: Normal heart sounds  No murmur heard  Pulmonary:      Effort: Pulmonary effort is normal  No respiratory distress  Breath sounds: Rhonchi present  No wheezing or rales  Abdominal:      General: Abdomen is flat  There is distension  Palpations: Abdomen is soft  Tenderness: There is no abdominal tenderness  There is no guarding  Musculoskeletal:         General: Swelling present  Normal range of motion  Cervical back: Neck supple  Comments: BLE lymphedema   Skin:     General: Skin is warm and dry  Capillary Refill: Capillary refill takes less than 2 seconds  Neurological:      General: No focal deficit present  Mental Status: He is alert and oriented to person, place, and time     Psychiatric:         Mood and Affect: Mood normal          Behavior: Behavior normal              Laboratory and Diagnostics:  Results from last 7 days   Lab Units 07/19/22 0427 07/18/22 1802 07/18/22  0607 07/17/22  2043 07/17/22  1113 07/17/22  0347 07/16/22  1611   WBC Thousand/uL 5 11 4 93 5 24  --   --  4 91 6 09   HEMOGLOBIN g/dL 6 7* 7 1* 6 3* 6 5* 5 4* 4 7* 3 6*   HEMATOCRIT % 22 3* 23 3* 20 0* 20 3* 17 8* 16 1* 13 7*   PLATELETS Thousands/uL 82* 66* 78*  --   --  101* 140*   NEUTROS PCT % 60 68  --   --   --   --  66   MONOS PCT % 12 12  --   --   --   --  9     Results from last 7 days   Lab Units 07/19/22 0427 07/18/22 1802 07/18/22  0607 07/17/22  1113 07/17/22  0347 07/16/22  1611   SODIUM mmol/L 133* 133* 132* 134* 135 133*   POTASSIUM mmol/L 3 9 3 7 3 5 3 3* 2 7* 4 0   CHLORIDE mmol/L 93* 92* 91* 94* 94* 94*   CO2 mmol/L 34* 35* 36* 29 29 28   ANION GAP mmol/L 6 6 5 11 12 11   BUN mg/dL 8 7 6 6 7 8   CREATININE mg/dL 0 51* 0 52* 0 50* 0 38* 0 35* 0 32*   CALCIUM mg/dL 8 8 9 0 9 0 8 5 7 9* 8 1*   GLUCOSE RANDOM mg/dL 86 97 91 121 123 118   ALT U/L 19 24 23 26 26 29   AST U/L 85* 107* 107* 103* 100* 128*   ALK PHOS U/L 194* 213* 204* 201* 194* 221*   ALBUMIN g/dL 3 5 3 6 3 4* 3 3* 3 2* 3 3*   TOTAL BILIRUBIN mg/dL 10 37* 10 94* 9 37* 6 66* 5 47* 4 61*     Results from last 7 days   Lab Units 07/19/22  0427 07/18/22  0607 07/17/22  1838 07/17/22  1113 07/17/22  0347 07/16/22  1611   MAGNESIUM mg/dL 2 2 1 8* 1 8* 2 2 1 6* 2 0   PHOSPHORUS mg/dL 2 5* 2 2*  --  2 3* 2 6* 2 6*      Results from last 7 days   Lab Units 07/19/22  0427 07/18/22  1802 07/18/22  0607 07/17/22  2043 07/16/22  1611   INR  2 27* 2 07* 2 13* 1 92* 1 87*   PTT seconds  --   --   --   --  45*              ABG:    VBG:    Results from last 7 days   Lab Units 07/17/22  1838 07/17/22  1113   PROCALCITONIN ng/ml 0 44* 0 42*       Micro  Results from last 7 days   Lab Units 07/17/22  2042   BLOOD CULTURE  No Growth at 24 hrs  No Growth at 24 hrs  EKG: NSR  Imaging: I have personally reviewed pertinent reports  Intake and Output  I/O       07/17 0701 07/18 0700 07/18 0701  07/19 0700 07/19 0701  07/20 0700    I V  (mL/kg)  254 3 (2 7)     IV Piggyback  80     Total Intake(mL/kg)  334 3 (3 6)     Urine (mL/kg/hr)  350     Total Output  350     Net  -15 8                  Height and Weights         Body mass index is 27 34 kg/m²  Weight (last 2 days)     Date/Time Weight    07/19/22 0242 94 (207 23)            Nutrition       Diet Orders   (From admission, onward)             Start     Ordered    07/18/22 1840  Diet NPO; Ice chips  Diet effective now        References:    Nutrtion Support Algorithm Enteral vs  Parenteral   Question Answer Comment   Diet Type NPO    NPO Except:  Ice chips        07/18/22 1839                  Active Medications  Scheduled Meds:  Current Facility-Administered Medications   Medication Dose Route Frequency Provider Last Rate    acetaminophen  650 mg Oral Once Gisela Mullet, MD      albumin human  25 g Intravenous TID Blaine Serna DO      albuterol  2 puff Inhalation Q6H PRN Blaine Serna DO  buprenorphine-naloxone  8 mg Sublingual BID Blaine Torenatae, DO      cefTRIAXone  1,000 mg Intravenous Q24H Estherdemetrius Tobrynn, DO Stopped (07/18/22 2300)    diphenhydrAMINE  25 mg Intravenous Q6H PRN Estherdemetrius Serna, DO      escitalopram  20 mg Oral Daily Blaine Torenatae, DO      ferrous sulfate  325 mg Oral Daily With Breakfast Blaine Torenatae, DO      folic acid  1 mg Oral Daily Estherdemetrius Torenatae, DO      lactulose  20 g Oral BID Anneliese Adan MD      LORazepam  1 mg Oral Q8H PRN Estherdemetrius Serna, DO      midodrine  10 mg Oral TID AC Estherdemetrius Torenatae, DO      multivitamin-minerals  1 tablet Oral Daily Blaine Torenatae, DO      nicotine  1 patch Transdermal Daily Estherdemetrius Torenatae, DO      octreotide  25 mcg/hr Intravenous Continuous Anneliese Adan MD 25 mcg/hr (07/18/22 1842)    ondansetron  4 mg Intravenous Q6H PRN Anneliese Adan MD      pantoprozole (PROTONIX) infusion (Continuous)  8 mg/hr Intravenous Continuous Anneliese Adan MD 8 mg/hr (07/19/22 2052)    thiamine  100 mg Oral Daily Estherdemetrius Tobrynn, DO       Continuous Infusions:  octreotide, 25 mcg/hr, Last Rate: 25 mcg/hr (07/18/22 1842)  pantoprozole (PROTONIX) infusion (Continuous), 8 mg/hr, Last Rate: 8 mg/hr (07/19/22 0213)      PRN Meds:   albuterol, 2 puff, Q6H PRN  diphenhydrAMINE, 25 mg, Q6H PRN  LORazepam, 1 mg, Q8H PRN  ondansetron, 4 mg, Q6H PRN        Invasive Devices Review  Invasive Devices  Report    Peripheral Intravenous Line  Duration           Peripheral IV 07/16/22 Left Antecubital 2 days    Peripheral IV 07/16/22 Left Hand 2 days    Peripheral IV 07/16/22 Right Arm 2 days          Drain  Duration           Urethral Catheter Latex 16 Fr  1 day                Rationale for remaining devices: meds/access  ---------------------------------------------------------------------------------------  Advance Directive and Living Will:      Power of :    POLST: ---------------------------------------------------------------------------------------  Care Time Delivered:   No Critical Care time spent       Gisela Zamora MD      Portions of the record may have been created with voice recognition software  Occasional wrong word or "sound a like" substitutions may have occurred due to the inherent limitations of voice recognition software    Read the chart carefully and recognize, using context, where substitutions have occurred

## 2022-07-19 NOTE — ANESTHESIA PREPROCEDURE EVALUATION
Procedure:  EGD    Relevant Problems   CARDIO   (+) Cardiac murmur   (+) Essential hypertension   (+) Thoracoabdominal aortic aneurysm (HCC)      GI/HEPATIC   (+) Alcoholic cirrhosis of liver without ascites (HCC)   (+) Liver metastasis (HCC)      HEMATOLOGY   (+) Anemia   (+) Thrombocytopenia (HCC)      NEURO/PSYCH   (+) Depression      PULMONARY   (+) Chronic bronchitis (HCC)   (+) Mild intermittent asthma without complication        30-[id number] male with past medical history for active alcohol abuse, cirrhosis, new finding of liver Mets  Patient was not feeling well for several days  The patient did have a he low hemoglobin  There is no clearly identifiable source of the bleeding  Will do EGD to rule out varices/other source of bleeding  The patient did have a profoundly low iron panel     Recent labs personally reviewed:  Lab Results   Component Value Date    WBC 5 11 07/19/2022    HGB 6 7 (LL) 07/19/2022    PLT 82 (L) 07/19/2022     Lab Results   Component Value Date    K 3 9 07/19/2022    BUN 8 07/19/2022    CREATININE 0 51 (L) 07/19/2022    GLUCOSE 94 01/19/2021     Lab Results   Component Value Date    PTT 45 (H) 07/16/2022      Lab Results   Component Value Date    INR 2 27 (H) 07/19/2022       Lab Results   Component Value Date    HGBA1C 6 3 (H) 09/28/2020       Type and Screen:  A    TTE 2021  LEFT VENTRICLE:  Left ventricular systolic function appears preserved with ejection fraction of about 55-60%  No definite regional wall motion abnormality seen although it cannot be absolutely excluded on the basis of this study  Normal diastolic filling pattern     RIGHT VENTRICLE:  Right ventricle free wall was not well visualized    Right ventricle is possibly mildly dilated  Normal right ventricular systolic function with TAPSE of 2 9 cm     MITRAL VALVE:  Mildly increased thickness of anterior mitral valve leaflet with no obvious vegetation  There is trace mitral regurgitation  No evidence of mitral stenosis     AORTIC VALVE:  Aortic valve was not well visualized  There is no significant aortic stenosis or regurgitation     TRICUSPID VALVE:  There is mild to moderate tricuspid regurgitation  There is moderate pulmonary hypertension with peak PA pressure of 59 mm Hg     AORTA:  Normal aortic root size     IVC, HEPATIC VEINS:  IVC is dilated and shows decreased respiratory variation              Anesthesia Plan  ASA Score- 4 Emergent    Anesthesia Type- general with ASA Monitors  Additional Monitors:   Airway Plan: ETT  Comment: Pt w large amount ascites, possible/likely undiagnosed varices, will intubate for airway protection, likely post-op mechanical ventilation necessary given large amt ascites          Plan Factors-Exercise tolerance (METS): <4 METS  Chart reviewed  Existing labs reviewed  Patient summary reviewed  Obstructive sleep apnea risk education given perioperatively  Induction- intravenous  Postoperative Plan-   Planned trial extubation    Informed Consent- Anesthetic plan and risks discussed with patient  I personally reviewed this patient with the CRNA  Discussed and agreed on the Anesthesia Plan with the CRNA  Krista Garcia

## 2022-07-19 NOTE — ASSESSMENT & PLAN NOTE
Presents with microcytic anemia hemoglobin of 3 6  Asymptomatic, vitals stable  Troponin negative  Repeat Hg q12   After 5 units of blood hemoglobin up to 6 3  Currently receiving 60 unit    Iron panel showing low iron levels with normal TIBC     Heme occult blood negative in the ED  Patient denying melena and hematemesis  Prior note of febrile reaction with pRBC (was noted after receiving 6 units; no temperature spike after receiving 7th unit, although pt was pre-treated with tylenol)    Plan:  Received 7 units total pRBCs  Transfuse hgb < 7

## 2022-07-19 NOTE — ASSESSMENT & PLAN NOTE
Per discussion with patient's sister: Patient served in the [employer] and spent 18 months in [place], there is concern for PTSD  Sister has reached out to CM inquiring about VA programs to help with PTSD  She tells me that he had drug addiction prior to serving and developed alcohol addiction after his [employer]  Also shares that he went through a divorce 2-3 years ago and a girlfriend that recently passed away last November  Has 1 son and 1 step-daughter that he has not seen in years  Accounts from sister concerning for uncontrolled depression       Plan:  Resume home Lexapro  1 mg ativan TID PRN

## 2022-07-19 NOTE — ASSESSMENT & PLAN NOTE
Lab Results   Component Value Date    PLT 82 (L) 07/19/2022    PLT 66 (L) 07/18/2022    PLT 78 (L) 07/18/2022     (L) 07/17/2022     (L) 07/16/2022

## 2022-07-19 NOTE — CONSULTS
Interventional Radiology  Consultation 7/19/2022     Consults  Leanor Mcburney   1985   [id number]      Assessment/Plan:  40year old male with decompensated liver failure due to alcoholic cirrhosis  Found to have multiple hepatic masses on recent CT  INR is elevated and trending up, currently 2 27  The patient is anemic requiring multiple transfusions  Currently also has ascites which would need to be drained prior to biopsy to decrease the risk of bleeding  Once the patient's hgb is stable, INR is below 1 5 and the patient is being transferred out of the ICU please re-consult IR to evaluate for hepatic mass biopsy  Medical Problems             Problem List     * (Principal) Alcoholic cirrhosis of liver without ascites (HCC)    Mild intermittent asthma without complication (Chronic)    Mass of upper lobe of left lung    Essential hypertension (Chronic)    Obesity, Class I, BMI 30-34 9    Substance abuse (HCC) (Chronic)    Tobacco dependence (Chronic)    Anemia    Thrombocytopenia (HCC)    Alcohol withdrawal (HCC)    Thoracoabdominal aortic aneurysm (HCC) (Chronic)    Abnormal CT scan    Anasarca    Cardiac murmur    Depression (Chronic)    Chronic bronchitis (HCC)    Idiopathic hypotension    Liver metastasis (HCC) (Chronic)                  Subjective:     Patient ID: Leanor Mcburney is a 40 y o  male  History of Present Illness  40year old male with decompensated liver failure due to alcoholic cirrhosis  Found to have multiple hepatic masses on recent CT  INR is elevated and trending up, currently 2 27  The patient is anemic requiring multiple transfusions  Currently also has ascites which would need to be drained prior to biopsy to decrease the risk of bleeding  Once the patient's hgb is stable, INR is below 1 5 and the patient is being transferred out of the ICU please re-consult IR to evaluate for hepatic mass biopsy      Review of Systems  as above    Past Medical History:   Diagnosis Date    AAA (abdominal aortic aneurysm) ([place] [address] )     Allergic     Anemia     Asthma     Depression 05/18/2021    Essential hypertension 05/06/2019    Liver metastasis ([place] [address] ) 7/18/2022    Obesity     Opiate dependence ([place] [address] )     Substance abuse ([place] [address] )         Past Surgical History:   Procedure Laterality Date    LYMPH NODE BIOPSY          Social History     Tobacco Use   Smoking Status Current Every Day Smoker    Packs/day: 1 00   Smokeless Tobacco Never Used   Tobacco Comment    2/2019; PATIENT VAPES        Social History     Substance and Sexual Activity   Alcohol Use Yes    Alcohol/week: 12 0 standard drinks    Types: 12 Cans of beer per week        Social History     Substance and Sexual Activity   Drug Use Not Currently    Types: Heroin    Comment: 7/16/22 recovery        Allergies   Allergen Reactions    Levofloxacin Other (See Comments)     BLACKED OUT       Current Facility-Administered Medications   Medication Dose Route Frequency Provider Last Rate Last Admin    albumin human (FLEXBUMIN) 25 % injection 25 g  25 g Intravenous TID Blaine Serna, DO   25 g at 07/19/22 0901    albuterol (PROVENTIL HFA,VENTOLIN HFA) inhaler 2 puff  2 puff Inhalation Q6H PRN Blaine Serna DO        buprenorphine-naloxone (Suboxone) film 8 mg  8 mg Sublingual BID Blaine Serna DO   8 mg at 07/19/22 0857    cefTRIAXone (ROCEPHIN) IVPB (premix in dextrose) 2,000 mg 50 mL  2,000 mg Intravenous Q24H Aysha Koch MD        diphenhydrAMINE (BENADRYL) injection 25 mg  25 mg Intravenous Q6H PRN Blaine Serna, DO        escitalopram (LEXAPRO) tablet 20 mg  20 mg Oral Daily Blaine Serna, DO   20 mg at 07/19/22 0856    ferrous sulfate tablet 325 mg  325 mg Oral Daily With Breakfast Blaine Serna DO   325 mg at 83/99/19 5451    folic acid (FOLVITE) tablet 1 mg  1 mg Oral Daily Blaine Serna DO   1 mg at 07/19/22 0856    lactulose oral solution 20 g  20 g Oral BID Aysha Koch MD   20 g at 07/19/22 0856    LORazepam (ATIVAN) tablet 1 mg  1 mg Oral Q8H PRN Pandi Todhe, DO   1 mg at 07/19/22 1246    multivitamin-minerals (CENTRUM) tablet 1 tablet  1 tablet Oral Daily Pandi Todhe, DO   1 tablet at 07/19/22 0856    nicotine (NICODERM CQ) 21 mg/24 hr TD 24 hr patch 1 patch  1 patch Transdermal Daily Pandi Todhe, DO   1 patch at 07/19/22 0857    ondansetron (ZOFRAN) injection 4 mg  4 mg Intravenous Q6H PRN Judananda Reason, MD   4 mg at 07/19/22 0138    pantoprazole (PROTONIX) 80 mg in sodium chloride 0 9 % 100 mL infusion  8 mg/hr Intravenous Continuous Judeen Reason, MD 10 mL/hr at 07/19/22 1151 8 mg/hr at 07/19/22 1151    sodium phosphate 21 mmol in dextrose 5 % 250 mL Infusion  21 mmol Intravenous Once Judeen Reason, MD        thiamine tablet 100 mg  100 mg Oral Daily Pandi Todhe, DO   100 mg at 07/19/22 0856          Objective:    Vitals:    07/19/22 1326 07/19/22 1330 07/19/22 1335 07/19/22 1345   BP: 126/60 127/60 125/63 129/61   BP Location:       Pulse: 73 69  71   Resp: 18 12  20   Temp: 98 7 °F (37 1 °C)  98 °F (36 7 °C) 98 3 °F (36 8 °C)   TempSrc:   Axillary Axillary   SpO2:  95%  95%   Weight:            Physical Exam  Not performed  No results found for: BNP   Lab Results   Component Value Date    WBC 5 11 07/19/2022    HGB 6 7 (LL) 07/19/2022    HCT 22 3 (L) 07/19/2022    MCV 76 (L) 07/19/2022    PLT 82 (L) 07/19/2022     Lab Results   Component Value Date    INR 2 27 (H) 07/19/2022    INR 2 07 (H) 07/18/2022    INR 2 13 (H) 07/18/2022    PROTIME 24 7 (H) 07/19/2022    PROTIME 23 0 (H) 07/18/2022    PROTIME 23 3 (H) 07/18/2022     Lab Results   Component Value Date    PTT 45 (H) 07/16/2022         I have personally reviewed pertinent imaging and laboratory results  Code Status: Level 1 - Full Code  Advance Directive and Living Will:      Power of :    POLST:      I spent 5 minutes reviewing imaging and formulating a plan for this patient      IR has been consulted to evaluate the patient, determine the appropriate procedure, and whether or not a procedure can and should be performed  Thank you for allowing me to participate in the care of Sandor Trinh  Please don't hesitate to call, text, email, or TigerText with any questions  This text is generated with voice recognition software  There may be translation, syntax,  or grammatical errors  If you have any questions, please contact the dictating provider

## 2022-07-19 NOTE — PLAN OF CARE
Problem: Potential for Falls  Goal: Patient will remain free of falls  Description: INTERVENTIONS:  - Educate patient/family on patient safety including physical limitations  - Instruct patient to call for assistance with activity   - Consult OT/PT to assist with strengthening/mobility   - Keep Call bell within reach  - Keep bed low and locked with side rails adjusted as appropriate  - Keep care items and personal belongings within reach  - Initiate and maintain comfort rounds  - Make Fall Risk Sign visible to staff  - Offer Toileting every 2 Hours, in advance of need  - Initiate/Maintain bed alarm  - Apply yellow socks and bracelet for high fall risk patients  - Consider moving patient to room near nurses station  Outcome: Progressing     Problem: Prexisting or High Potential for Compromised Skin Integrity  Goal: Skin integrity is maintained or improved  Description: INTERVENTIONS:  - Identify patients at risk for skin breakdown  - Assess and monitor skin integrity  - Assess and monitor nutrition and hydration status  - Monitor labs   - Assess for incontinence   - Turn and reposition patient  - Assist with mobility/ambulation  - Relieve pressure over bony prominences  - Avoid friction and shearing  - Provide appropriate hygiene as needed including keeping skin clean and dry  - Evaluate need for skin moisturizer/barrier cream  - Collaborate with interdisciplinary team   - Patient/family teaching  - Consider wound care consult   Outcome: Progressing     Problem: MOBILITY - ADULT  Goal: Maintain or return to baseline ADL function  Description: INTERVENTIONS:  -  Assess patient's ability to carry out ADLs; assess patient's baseline for ADL function and identify physical deficits which impact ability to perform ADLs (bathing, care of mouth/teeth, toileting, grooming, dressing, etc )  - Assess/evaluate cause of self-care deficits   - Assess range of motion  - Assess patient's mobility; develop plan if impaired  - Assess patient's need for assistive devices and provide as appropriate  - Encourage maximum independence but intervene and supervise when necessary  - Involve family in performance of ADLs  - Assess for home care needs following discharge   - Consider OT consult to assist with ADL evaluation and planning for discharge  - Provide patient education as appropriate  Outcome: Progressing  Goal: Maintains/Returns to pre admission functional level  Description: INTERVENTIONS:  - Perform BMAT or MOVE assessment daily    - Set and communicate daily mobility goal to care team and patient/family/caregiver  - Collaborate with rehabilitation services on mobility goals if consulted  - Perform Range of Motion 3 times a day  - Reposition patient every 2 hours    - Dangle patient 3 times a day  - Stand patient 3 times a day  - Ambulate patient 3 times a day  - Out of bed to chair 3 times a day   - Out of bed for meals 3 times a day  - Out of bed for toileting  - Record patient progress and toleration of activity level   Outcome: Progressing

## 2022-07-19 NOTE — ASSESSMENT & PLAN NOTE
Blood pressure slightly on lower side in setting of anasarca from cirrhosis    Plan:  Discontinue midodrine 10 mg TID started at [facility]

## 2022-07-19 NOTE — ANESTHESIA POSTPROCEDURE EVALUATION
Post-Op Assessment Note    CV Status:  Stable  Pain Score: 0    Pain management: adequate     Mental Status:  Alert and awake   Hydration Status:  Euvolemic and stable   PONV Controlled:  Controlled   Airway Patency:  Patent and adequate      Post Op Vitals Reviewed: Yes      Staff: CRNA         No complications documented      BP   140/76   Temp      Pulse 84   Resp 16   SpO2 96%

## 2022-07-19 NOTE — UTILIZATION REVIEW
Initial Clinical Review    Admission: Date/Time/Statement:   Admission Orders (From admission, onward)     Ordered        07/18/22 1839  Inpatient Admission  Once                      Orders Placed This Encounter   Procedures    Inpatient Admission     Standing Status:   Standing     Number of Occurrences:   1     Order Specific Question:   Level of Care     Answer:   Level 1 Stepdown [13]     Order Specific Question:   Estimated length of stay     Answer:   More than 2 Midnights     Order Specific Question:   Certification     Answer:   I certify that inpatient services are medically necessary for this patient for a duration of greater than two midnights  See H&P and MD Progress Notes for additional information about the patient's course of treatment  Arrival Information     Patient transfer from [facility] as higher level of care due to need for facility with ICU as may need intubation for endoscopy  chief complaint:  Decreased oral intake      Initial Presentation: 40 y o  male from [facility] via ems admitted inpatient due to alcoholic cirrhosis of liver/alcohol withdrawal/Anemia/idiopathyic hypotension  Presented initial to [facility] ED on 7/16/22 due to altered mental status, alcohol level of 448, last drink 7/15/22,  initial hgb was 3 6 and has received 6 units of PRBC, was started on CIWA and scheduled Serax  CT abdomen showed diffuse hepatic Mets with 54 mm partially imaged fusiform thoracoabdominal aortic aneurysm  He was started on Lasix and aldactone with Cirrhosis  Needs egd and GI recommends to transfer to facility with provision for ICU  At [facility]: patient hs  + anasarca  Plan is hold Lasix and aldactone, check abdominal US, consult GI and IR, continue Ceftriaxone for SBP prophylaxis, start octreotide and Protonix gtt  NPO  Hold AP and AC  Continue CIWA, ativan as needed, dc Serax, continue MVI, thiamine and folic acid  Trend hgb every 12 hours    Trend BP and continue Midodrine  Per GI 7/18/22:  Patient  Has decompensated cirrhosis secondary to alcohol  without signs of bleeding  Plan is optimize medically then egd  Continue Lactulose  Ct abdomen  Date: 7/19/22  Day 2:  Overnight possible transfusion reaction  Febrile  Patient hungry and thirsty  Anxious  Feels unchanged  Remains on oxygen  On exam: scleral icterus  Lungs rhonchi  Abdominal distention  Bilateral LE edema  Generalized swelling  H&H 6 7/22  3  Platelets 82  Sylvia Skipper AST 85  Total bilirubin 10 37  INR 2 27  Continue ceftriaxone, Protonix gtt  Hold lasix and aldactone  Dc octreotide  Albumin added  NPO  Continue CIWA  Transfuse and give tyelnol  7/19/22 per GI - patient has decompensated alcoholic cirrhosis complicated by anemia, fluid overload, liver lesions and hepatic encephalopathy  MELD 26  Plan is check serology studies, Hep B and C, liver biopsy with IR, continue ceftriaxone, post paracentesis resume lasix and aldactone, for egd today, alcohol with drawal protocol  Give 2 units FFP prior to egd  7/19/223 per IR - Patient with decompensated liver failure has multiple hepatic masses on ct  INR is elevated  Prior to biopsy, INR needs to be < 1 5, hgb stable, ascites needs to be drained  Re consult when out of ICU for hepatic mass biopsy  Procedure 7/19/22: egd Esophagus-small sliding hiatal hernia  2 column varices noted and placed 4 bands  · Stomach-diffuse portal hypertensive gastropathy noted  · The duodenum appeared normal  Recommend:  egd in 4 to 6 weeks  Start nadolol       Vitals   Temperature Pulse Respirations Blood Pressure SpO2   07/18/22 1901 07/18/22 1710 07/18/22 1710 07/18/22 1710 07/18/22 1710   98 7 °F (37 1 °C) 89 14 126/63 95 %      Temp Source Heart Rate Source Patient Position - Orthostatic VS BP Location FiO2 (%)   07/18/22 1901 -- 07/18/22 1901 07/18/22 1901 --   Oral  Lying Right arm       Pain Score       07/18/22 1800       No Pain          Wt Readings from Last 1 Encounters:   07/19/22 94 kg (207 lb 3 7 oz)     Additional Vital Signs:   07/19/22 0712 98 6 °F (37 °C) 84 12 126/63 86 96 % Nasal cannula Lying   07/19/22 0500 -- 81 -- 130/62 -- -- -- --   07/19/22 0300 -- 91 19 123/63 87 96 % -- --   07/19/22 0242 98 7 °F (37 1 °C) 92 17 125/61 84 96 % Nasal cannula Lying   07/19/22 0100 -- 96 -- 113/62 -- -- -- --   07/18/22 2300 -- 98 -- 130/60 -- -- -- --   07/18/22 2232 98 °F (36 7 °C) 103 13 128/65 90 95 % Nasal cannula Lying   07/18/22 1901 98 7 °F (37 1 °C) 86 22 133/63 90 96 % Nasal cannula      CIWA  7/19/22:   0 at 0500    6 at 0100     7/18/22-  0 at 2300   7 at 1901    11 at 1710  Pertinent Labs/Diagnostic Test Results:   No orders to display     7/17/22 CxR No acute cardiopulmonary disease  7/28/22 ct abdomen Diffuse hepatic metastases concerning for hepatocellular carcinoma in this cirrhotic patient  Metastatic disease is not excluded given the normal AFP  Splenomegaly  Marked gallbladder distention  54 mm partially imaged fusiform thoracoabdominal aortic aneurysm  No source for hemorrhage is identified  7/16/22 ecg Normal sinus rhythm  Nonspecific T wave abnormality  Prolonged QT  Abnormal ECG  When compared with ECG of 18-MAY-2021 17:30,  Nonspecific T wave abnormality, worse in Anterolateral leads  Results from last 7 days   Lab Units 07/19/22  0427 07/18/22  1802 07/18/22  0607 07/17/22  2043 07/17/22  1113 07/17/22  0347 07/16/22  1611   WBC Thousand/uL 5 11 4 93 5 24  --   --    < > 6 09   HEMOGLOBIN g/dL 6 7* 7 1* 6 3* 6 5* 5 4*   < > 3 6*   HEMATOCRIT % 22 3* 23 3* 20 0* 20 3* 17 8*   < > 13 7*   PLATELETS Thousands/uL 82* 66* 78*  --   --    < > 140*   NEUTROS ABS Thousands/µL 3 11 3 35  --   --   --   --  3 99    < > = values in this interval not displayed       Results from last 7 days   Lab Units 07/17/22  0347   RETIC CT ABS  <10,000*   RETIC CT PCT % 0 39     Results from last 7 days   Lab Units 07/19/22 0427 07/18/22 1802 07/18/22 0607 07/17/22  1838 07/17/22 1113 07/17/22 0347 07/16/22  1611   SODIUM mmol/L 133* 133* 132*  --  134* 135 133*   POTASSIUM mmol/L 3 9 3 7 3 5  --  3 3* 2 7* 4 0   CHLORIDE mmol/L 93* 92* 91*  --  94* 94* 94*   CO2 mmol/L 34* 35* 36*  --  29 29 28   ANION GAP mmol/L 6 6 5  --  11 12 11   BUN mg/dL 8 7 6  --  6 7 8   CREATININE mg/dL 0 51* 0 52* 0 50*  --  0 38* 0 35* 0 32*   EGFR ml/min/1 73sq m 137 136 138  --  154 160 166   CALCIUM mg/dL 8 8 9 0 9 0  --  8 5 7 9* 8 1*   CALCIUM, IONIZED mmol/L  --  1 05*  --   --   --   --   --    MAGNESIUM mg/dL 2 2  --  1 8* 1 8* 2 2 1 6* 2 0   PHOSPHORUS mg/dL 2 5*  --  2 2*  --  2 3* 2 6* 2 6*     Results from last 7 days   Lab Units 07/19/22 0427 07/18/22 1802 07/18/22 0607 07/17/22 1113 07/17/22 0347 07/16/22  1611   AST U/L 85* 107* 107* 103* 100* 128*   ALT U/L 19 24 23 26 26 29   ALK PHOS U/L 194* 213* 204* 201* 194* 221*   TOTAL PROTEIN g/dL 7 1 7 5 7 1 7 2 7 1 7 6   ALBUMIN g/dL 3 5 3 6 3 4* 3 3* 3 2* 3 3*   TOTAL BILIRUBIN mg/dL 10 37* 10 94* 9 37* 6 66* 5 47* 4 61*   BILIRUBIN DIRECT mg/dL  --   --   --   --   --  2 58*   AMMONIA umol/L  --   --   --   --   --  98*     Results from last 7 days   Lab Units 07/19/22 0427 07/18/22 1802 07/18/22 0607 07/17/22  1113 07/17/22  0347 07/16/22  1611   GLUCOSE RANDOM mg/dL 86 97 91 121 123 118     Results from last 7 days   Lab Units 07/16/22  1611   CK TOTAL U/L 40     Results from last 7 days   Lab Units 07/16/22  1901 07/16/22  1611   HS TNI 0HR ng/L  --  6   HS TNI 2HR ng/L 6  --    HSTNI D2 ng/L 0  --      Results from last 7 days   Lab Units 07/19/22  0427 07/18/22  1802 07/18/22  0607 07/17/22  2043 07/16/22  1611   PROTIME seconds 24 7* 23 0* 23 3*   < > 21 1*   INR  2 27* 2 07* 2 13*   < > 1 87*   PTT seconds  --   --   --   --  45*    < > = values in this interval not displayed       Results from last 7 days   Lab Units 07/17/22  1838 07/17/22  1113   PROCALCITONIN ng/ml 0 44* 0 42*     Results from last 7 days   Lab Units 07/16/22  1611   FERRITIN ng/mL 34     Results from last 7 days   Lab Units 07/16/22  1611   LIPASE u/L 50     Results from last 7 days   Lab Units 07/17/22  2145 07/17/22  1514   CLARITY UA   --  Clear   COLOR UA   --  Yellow   SPEC GRAV UA   --  1 015   PH UA   --  7 0   GLUCOSE UA mg/dl  --  Negative   KETONES UA mg/dl  --  Negative   BLOOD UA  2+* Trace-Intact*   PROTEIN UA mg/dl  --  Negative   NITRITE UA   --  Negative   BILIRUBIN UA   --  Negative   UROBILINOGEN UA E U /dl  --  2 0   LEUKOCYTES UA   --  Negative   WBC UA /hpf  --  None Seen   RBC UA /hpf  --  2-4   BACTERIA UA /hpf  --  None Seen   EPITHELIAL CELLS WET PREP /hpf  --  None Seen     Results from last 7 days   Lab Units 07/16/22  1758   AMPH/METH  Negative   BARBITURATE UR  Negative   BENZODIAZEPINE UR  Negative   COCAINE UR  Negative   METHADONE URINE  Negative   OPIATE UR  Negative   PCP UR  Negative   THC UR  Negative     Results from last 7 days   Lab Units 07/16/22  1611   ETHANOL LVL mg/dL 448*     Results from last 7 days   Lab Units 07/17/22  2042   BLOOD CULTURE  No Growth at 24 hrs  No Growth at 24 hrs       Results from last 7 days   Lab Units 07/17/22  1113   TOTAL COUNTED  100         Past Medical History:   Diagnosis Date    AAA (abdominal aortic aneurysm) (HCC)     Allergic     Anemia     Asthma     Depression 05/18/2021    Essential hypertension 05/06/2019    Liver metastasis ([facility] ) 7/18/2022    Obesity     Opiate dependence ([facility] )     Substance abuse ([facility] )      Present on Admission:   Alcohol withdrawal ([facility] )   Anemia   Alcoholic cirrhosis of liver without ascites (HCC)   Depression   Tobacco dependence   Chronic bronchitis (HCC)   Idiopathic hypotension   Substance abuse (HCC)   Thrombocytopenia (HCC)   Thoracoabdominal aortic aneurysm (HCC)   Mild intermittent asthma without complication   Liver metastasis (HCC)      Admitting Diagnosis: Anemia liver cirrhosis h h 3,4  Age/Sex: 40 y o  male  Admission Orders: 7/18/22 1839 inpatient to ICU  Scheduled Medications:  albumin human, 25 g, Intravenous, TID  buprenorphine-naloxone, 8 mg, Sublingual, BID  cefTRIAXone, 1,000 mg, Intravenous, Q24H  escitalopram, 20 mg, Oral, Daily  ferrous sulfate, 325 mg, Oral, Daily With Breakfast  folic acid, 1 mg, Oral, Daily  lactulose, 20 g, Oral, BID  midodrine, 10 mg, Oral, TID AC  multivitamin-minerals, 1 tablet, Oral, Daily  nicotine, 1 patch, Transdermal, Daily  thiamine, 100 mg, Oral, Daily    LORazepam (ATIVAN) injection 2 mg  Dose: 2 mg  Freq: Once Route: IV  Indications of Use: AGITATION,ALCOHOL WITHDRAWAL SYNDROME  Start: 07/18/22 1815 End: 07/18/22 1842    Continuous IV Infusions:  pantoprozole (PROTONIX) infusion (Continuous), 8 mg/hr, Intravenous, Continuous    octreotide (SandoSTATIN) 500 mcg in sodium chloride 0 9 % 250 mL infusion  Rate: 12 5 mL/hr Dose: 25 mcg/hr  Freq: Continuous Route: IV  Last Dose: Stopped (07/19/22 0900)  Start: 07/18/22 1730 End: 07/19/22 0834    PRN Meds:  albuterol, 2 puff, Inhalation, Q6H PRN  diphenhydrAMINE, 25 mg, Intravenous, Q6H PRN  LORazepam, 1 mg, Oral, Q8H PRN - used x 1 7/19/22   ondansetron, 4 mg, Intravenous, Q6H PRN - used x 1 7/19/22 7/19/22 transfuse 1 unit leukoreduced RBC  CIWA  Cardio pulmonary monitoring   Bilateral SCDs  NPO    IP CONSULT TO GASTROENTEROLOGY  INPATIENT CONSULT TO IR    Network Utilization Review Department  ATTENTION: Please call with any questions or concerns to [phone] and carefully listen to the prompts so that you are directed to the right person  All voicemails are confidential   Kerrie Alba all requests for admission clinical reviews, approved or denied determinations and any other requests to dedicated fax number below belonging to the campus where the patient is receiving treatment   List of dedicated fax numbers for the Facilities:  FACILITY NAME UR FAX NUMBER   ADMISSION DENIALS (Administrative/Medical Necessity) [phone]   [address] (Maternity/NICU/Pediatrics) 261 [facility],[address] [facility] 125 [facility]  [phone]   Meli Sena [address] [facility] [place] [address] [id number] [place] [address] [address] [phone] left

## 2022-07-19 NOTE — ASSESSMENT & PLAN NOTE
Blood pressure slightly on lower side in setting of anasarca from cirrhosis    Plan:  Continue midodrine 10 mg TID started at [facility] ED

## 2022-07-19 NOTE — CONSULTS
Consultation - [facility]  Crow Mccarty 40 y o  male MRN: [id number]  Unit/Bed#: ICU 03 Encounter: [id number]        Inpatient consult to gastroenterology  Consult performed by: John Melo PA-C  Consult ordered by: Loraine Felix MD          Reason for Consult / Principal Problem: cirrhosis    ASSESSMENT and PLAN:    Principal Problem:    Alcoholic cirrhosis of liver without ascites ([facility] )  Active Problems:    Mild intermittent asthma without complication    Substance abuse ([facility] )    Tobacco dependence    Anemia    Thrombocytopenia ([facility] )    Alcohol withdrawal ([facility] )    Thoracoabdominal aortic aneurysm ([facility] )    Depression    Chronic bronchitis ([facility] )    Idiopathic hypotension    Liver metastasis ([facility] )    #1  Decompensated alcoholic cirrhosis complicated by anemia, fluid overload, multiple liver lesions, hepatic encephalopathy: MELD score is 26, MDF 68, AFP normal, noncontrast CT concerning for diffuse hepatic mets (possible HCC versus other primary)  -check serological work up including hep b and c with history drug use   -continue lactulose for encephalopathy, goal of 3 BM daily  -recommend liver bx by IR for further diagnosis of liver lesions  -patient is already on SBP prophylaxis in the setting of acute blood loss anemia  -recommend paracentesis with full panel of fluid studies which have been ordered, this can also assess for hemorrhagic ascites  -on ceftriaxone for SBP prophylaxis  -consider starting steroids for superimposed alcoholic hepatitis on top of cirrhosis, will discuss with attending   -would recommend resuming lasix 40 and aldactone 100 mg after paracentesis for fluid overload  -low salt diet when able to take PO  -plan for EGD today, will screen for varices, consider beta blocker pending findings   -alcohol withdrawal protocol  -discussed alcohol cessation in detail with patient     #2   Acute blood loss anemia: patient has had 6 units total in last 3 days with no significant improvement, no obvious GI blood loss such as melena or BRBPR  -continue to monitor hgb, transfuse as necessary if able (had febrile reaction)  -plan for EGD today to rule out varices, portal hypertensive gastropathy  -paracentesis, rule out hemorrhagic ascites  -consider hemolysis labs (indirect bilirubin is 5)  -consider repeat imaging with contrast through thigh if above is engative to rule out hematoma  No obvious bruising on exam  -consider colonoscopy as well if all above negative   -2 units FFP prior to EGD today    -------------------------------------------------------------------------------------------------------------------    HPI: This is a 40year old male with history of depression, significant alcohol abuse, HTN, history of heroin abuse and opioid dependence on suboxone who presented to the [facility] a few days ago due to altered mental status  Patient's parent's reportedly called 911 because patient was not taking his meds and was found lying in his own urine and feces  He reports he has not drank in over 2 weeks but his alcohol level was elevated in ED  He reports drinking about 3/4 of a 1 5 L vodka bottle daily  He was found with a new diagnosis of cirrhosis last year in January 2021 but has continued to drink and has not followed up  This admission he is noted to have fluid overload, multiple liver lesions, refractory anemia, and encephalopathy  He was started on lactulose  He had a total of 6 units of blood without significant improvement  No obvious bruising, CT scan of abdomen without contrast negative for hemorrhage, has not had a paracentesis yet but this is ordered  Patient is on suboxone for his of heroin abuse  He denies abdominal pain currently  deneis nausea, vomiting, black stool, blood in stool  He has lost a lot of weight  No history of EGD or colonoscopy in the past     REVIEW OF SYSTEMS:    CONSTITUTIONAL: Denies any fever, chills, or rigors   decreased appetite, and recent weight loss  HEENT: No earache or tinnitus  Denies hearing loss or visual disturbances  CARDIOVASCULAR: No chest pain or palpitations  RESPIRATORY: Denies any cough, hemoptysis, shortness of breath or dyspnea on exertion  GASTROINTESTINAL: As noted in the History of Present Illness  GENITOURINARY: No problems with urination  Denies any hematuria or dysuria  +dark urine  NEUROLOGIC: No dizziness or vertigo, denies headaches  MUSCULOSKELETAL: Denies any muscle or joint pain  SKIN: Denies skin rashes or itching  jaundice  ENDOCRINE: Denies excessive thirst  Denies intolerance to heat or cold  PSYCHOSOCIAL: Denies anxiety  Denies any recent memory loss   +depression      Historical Information   Past Medical History:   Diagnosis Date    AAA (abdominal aortic aneurysm) ([place] [address] )     Allergic     Anemia     Asthma     Depression 05/18/2021    Essential hypertension 05/06/2019    Liver metastasis ([place] [address] ) 7/18/2022    Obesity     Opiate dependence ([place] [address] )     Substance abuse ([place] [address] )      Past Surgical History:   Procedure Laterality Date    LYMPH NODE BIOPSY       Social History   Social History     Substance and Sexual Activity   Alcohol Use Yes    Alcohol/week: 12 0 standard drinks    Types: 12 Cans of beer per week     Social History     Substance and Sexual Activity   Drug Use Not Currently    Types: Heroin    Comment: 7/16/22 recovery     Social History     Tobacco Use   Smoking Status Current Every Day Smoker    Packs/day: 1 00   Smokeless Tobacco Never Used   Tobacco Comment    2/2019; PATIENT VAPES     Family History   Problem Relation Age of Onset    No Known Problems Mother     Diabetes Father     Prostate cancer Father     Hypertension Father        Meds/Allergies     Medications Prior to Admission   Medication    albuterol (Ventolin HFA) 90 mcg/act inhaler    ammonium lactate (LAC-HYDRIN) 12 % lotion    buprenorphine-naloxone (SUBOXONE) 8-2 mg per SL tablet    escitalopram (LEXAPRO) 20 mg tablet    ferrous sulfate 324 (65 Fe) mg    folic acid (FOLVITE) 1 mg tablet    furosemide (LASIX) 40 mg tablet    lactulose (CHRONULAC) 10 g/15 mL solution    nicotine (NICODERM CQ) 14 mg/24hr TD 24 hr patch    pantoprazole (PROTONIX) 40 mg tablet    spironolactone (ALDACTONE) 50 mg tablet    zolpidem (AMBIEN CR) 6 25 MG CR tablet     Current Facility-Administered Medications   Medication Dose Route Frequency    albumin human (FLEXBUMIN) 25 % injection 25 g  25 g Intravenous TID    albuterol (PROVENTIL HFA,VENTOLIN HFA) inhaler 2 puff  2 puff Inhalation Q6H PRN    buprenorphine-naloxone (Suboxone) film 8 mg  8 mg Sublingual BID    cefTRIAXone (ROCEPHIN) IVPB (premix in dextrose) 1,000 mg 50 mL  1,000 mg Intravenous Q24H    diphenhydrAMINE (BENADRYL) injection 25 mg  25 mg Intravenous Q6H PRN    escitalopram (LEXAPRO) tablet 20 mg  20 mg Oral Daily    ferrous sulfate tablet 325 mg  325 mg Oral Daily With Breakfast    folic acid (FOLVITE) tablet 1 mg  1 mg Oral Daily    lactulose oral solution 20 g  20 g Oral BID    LORazepam (ATIVAN) tablet 1 mg  1 mg Oral Q8H PRN    midodrine (PROAMATINE) tablet 10 mg  10 mg Oral TID AC    multivitamin-minerals (CENTRUM) tablet 1 tablet  1 tablet Oral Daily    nicotine (NICODERM CQ) 21 mg/24 hr TD 24 hr patch 1 patch  1 patch Transdermal Daily    ondansetron (ZOFRAN) injection 4 mg  4 mg Intravenous Q6H PRN    pantoprazole (PROTONIX) 80 mg in sodium chloride 0 9 % 100 mL infusion  8 mg/hr Intravenous Continuous    thiamine tablet 100 mg  100 mg Oral Daily       Allergies   Allergen Reactions    Levofloxacin Other (See Comments)     BLACKED OUT           Objective     Blood pressure 126/63, pulse 84, temperature 98 6 °F (37 °C), temperature source Oral, resp  rate 12, weight 94 kg (207 lb 3 7 oz), SpO2 96 %        Intake/Output Summary (Last 24 hours) at 7/19/2022 0924  Last data filed at 7/19/2022 0600  Gross per 24 hour   Intake 334 25 ml Output 350 ml   Net -15 75 ml         PHYSICAL EXAM:      General Appearance:   Alert, cooperative, no distress, chronically ill appearing, appears older the stated age   [de-identified]:   Normocephalic, atraumatic, scleral icterus present, no oropharyngeal thrush present      Neck:  Supple, symmetrical, trachea midline, no adenopathy;    thyroid: no enlargement/tenderness/nodules; no carotid  bruit or JVD    Lungs:   Rhonchi on the right, no rales, wheezing; respirations unlabored    Heart[de-identified]   S1 and S2 normal; regular rate and rhythm; no murmur, rub, or gallop  Abdomen:   Soft, non-tender, distended; normal bowel sounds; no masses, no organomegaly    Genitalia:   Deferred    Rectal:   Deferred    Extremities:  No cyanosis, clubbing; LE edema b/l, chronic venous stasis changes, minimal asterixis     Pulses:  2+ and symmetric all extremities    Skin:  Pale and jaundice, Skin texture, turgor normal, no rashes or lesions; no obvious bruising   Lymph nodes:  No palpable cervical, axillary or inguinal lymphadenopathy        Lab Results:   Results from last 7 days   Lab Units 07/19/22  0427   WBC Thousand/uL 5 11   HEMOGLOBIN g/dL 6 7*   HEMATOCRIT % 22 3*   PLATELETS Thousands/uL 82*   NEUTROS PCT % 60   LYMPHS PCT % 23   MONOS PCT % 12   EOS PCT % 2     Results from last 7 days   Lab Units 07/19/22  0427   POTASSIUM mmol/L 3 9   CHLORIDE mmol/L 93*   CO2 mmol/L 34*   BUN mg/dL 8   CREATININE mg/dL 0 51*   CALCIUM mg/dL 8 8   ALK PHOS U/L 194*   ALT U/L 19   AST U/L 85*     Results from last 7 days   Lab Units 07/19/22  0427   INR  2 27*     Results from last 7 days   Lab Units 07/16/22  1611   LIPASE u/L 50       Imaging Studies: I have personally reviewed pertinent imaging studies  CT abdomen pelvis wo contrast    Result Date: 7/18/2022  Impression: Diffuse hepatic metastases concerning for hepatocellular carcinoma in this cirrhotic patient  Metastatic disease is not excluded given the normal AFP  Splenomegaly   Marked gallbladder distention  54 mm partially imaged fusiform thoracoabdominal aortic aneurysm  No source for hemorrhage is identified  * I personally telephoned this result to Yan Vera on 7/18/2022 4:15 PM  Workstation performed: [id number]           Patient was seen and examined by Dr Denise Hernandez  All graham medical decisions were made by Dr Denise Hernandez  Thank you for allowing us to participate in the care of this present patient  We will follow-up with you closely

## 2022-07-19 NOTE — PLAN OF CARE
Problem: Potential for Falls  Goal: Patient will remain free of falls  Description: INTERVENTIONS:  - Educate patient/family on patient safety including physical limitations  - Instruct patient to call for assistance with activity   - Consult OT/PT to assist with strengthening/mobility   - Keep Call bell within reach  - Keep bed low and locked with side rails adjusted as appropriate  - Keep care items and personal belongings within reach  - Initiate and maintain comfort rounds  - Make Fall Risk Sign visible to staff  - Initiate/Maintain bed alarm  - Apply yellow socks and bracelet for high fall risk patients  - Consider moving patient to room near nurses station  Outcome: Progressing     Problem: Prexisting or High Potential for Compromised Skin Integrity  Goal: Skin integrity is maintained or improved  Description: INTERVENTIONS:  - Identify patients at risk for skin breakdown  - Assess and monitor skin integrity  - Assess and monitor nutrition and hydration status  - Monitor labs   - Assess for incontinence   - Turn and reposition patient  - Assist with mobility/ambulation  - Relieve pressure over bony prominences  - Avoid friction and shearing  - Provide appropriate hygiene as needed including keeping skin clean and dry  - Evaluate need for skin moisturizer/barrier cream  - Collaborate with interdisciplinary team   - Patient/family teaching  - Consider wound care consult   Outcome: Progressing     Problem: MOBILITY - ADULT  Goal: Maintain or return to baseline ADL function  Description: INTERVENTIONS:  -  Assess patient's ability to carry out ADLs; assess patient's baseline for ADL function and identify physical deficits which impact ability to perform ADLs (bathing, care of mouth/teeth, toileting, grooming, dressing, etc )  - Assess/evaluate cause of self-care deficits   - Assess range of motion  - Assess patient's mobility; develop plan if impaired  - Assess patient's need for assistive devices and provide as appropriate  - Encourage maximum independence but intervene and supervise when necessary  - Involve family in performance of ADLs  - Assess for home care needs following discharge   - Consider OT consult to assist with ADL evaluation and planning for discharge  - Provide patient education as appropriate  Outcome: Progressing  Goal: Maintains/Returns to pre admission functional level  Description: INTERVENTIONS:  - Perform BMAT or MOVE assessment daily    - Set and communicate daily mobility goal to care team and patient/family/caregiver  - Collaborate with rehabilitation services on mobility goals if consulted  - Perform Range of Motion 2 times a day  - Reposition patient every 2 hours    - Record patient progress and toleration of activity level   Outcome: Progressing

## 2022-07-19 NOTE — ASSESSMENT & PLAN NOTE
Suspicious CT abdomen findings for liver cancer  AFP WNL    Plan:  IR consulted for liver biopsy   - reach back out/consult when hgb stable, INR <1 5, patient xferred out of ICU for liver bx

## 2022-07-19 NOTE — ASSESSMENT & PLAN NOTE
Hx Decompensated cirrhosis with anasarca, hepatic encephalopathy, thrombocytopenia splenomegaly, hyperammonemia, and  Hepatomegaly most likely secondary to alcohol abuse but could also be due to underlying alcoholic hepatitis  -Previous AFP and right upper quadrant ultrasounds were normal  -Never had screening EGD for varices  -Home lactulose for hepatic encephalopathy    -Anasarca - Lasix 40 IV b i d  Resume Aldactone 50 mg  Net negative 3L     -SBP: Spiking fevers on 7/17  CXR negative for acute findings, ua negative for uti  Blood cultures drawn  Started on ceftriaxone  Unfortunately no IR for paracentesis prior to abx     [facility] recommending transfer to a facility with ICU for EGD  -Admission: MELD 23 T  Bili 4 6, INR 1 87, cr 0 35, Na 133    Plan:  Abdominal ultrasound ordered to assess for hepatocellular carcinoma & ascites  AFP 3 5  GI consulted for suspected UGIB/varices; appreciate recommendations  IR consulted for possible liver biopsy  Continue Ceftriaxone for SBP ppx  F/u Bcx  Started octreotide and protonix gtt   - will d/c octreotide  Holding lasix and aldactone  NPO sips with meds  Hold AP/AC

## 2022-07-20 ENCOUNTER — APPOINTMENT (INPATIENT)
Dept: RADIOLOGY | Facility: HOSPITAL | Age: 37
DRG: 432 | End: 2022-07-20

## 2022-07-20 PROBLEM — I95.0 IDIOPATHIC HYPOTENSION: Status: RESOLVED | Noted: 2022-07-17 | Resolved: 2022-07-20

## 2022-07-20 PROBLEM — J45.20 MILD INTERMITTENT ASTHMA WITHOUT COMPLICATION: Chronic | Status: RESOLVED | Noted: 2018-09-06 | Resolved: 2022-07-20

## 2022-07-20 LAB
ABO GROUP BLD BPU: NORMAL
ALBUMIN SERPL BCP-MCNC: 3.9 G/DL (ref 3.5–5)
ALP SERPL-CCNC: 181 U/L (ref 34–104)
ALT SERPL W P-5'-P-CCNC: 17 U/L (ref 7–52)
ANION GAP SERPL CALCULATED.3IONS-SCNC: 6 MMOL/L (ref 4–13)
APPEARANCE FLD: CLEAR
AST SERPL W P-5'-P-CCNC: 63 U/L (ref 13–39)
BASOPHILS # BLD AUTO: 0.01 THOUSANDS/ΜL (ref 0–0.1)
BASOPHILS NFR BLD AUTO: 0 % (ref 0–1)
BILIRUB SERPL-MCNC: 11.16 MG/DL (ref 0.2–1)
BPU ID: NORMAL
BUN SERPL-MCNC: 9 MG/DL (ref 5–25)
CALCIUM SERPL-MCNC: 9.2 MG/DL (ref 8.4–10.2)
CHLORIDE SERPL-SCNC: 91 MMOL/L (ref 96–108)
CO2 SERPL-SCNC: 34 MMOL/L (ref 21–32)
COLOR FLD: YELLOW
CREAT SERPL-MCNC: 0.55 MG/DL (ref 0.6–1.3)
CROSSMATCH: NORMAL
EOSINOPHIL # BLD AUTO: 0 THOUSAND/ΜL (ref 0–0.61)
EOSINOPHIL NFR BLD AUTO: 0 % (ref 0–6)
ERYTHROCYTE [DISTWIDTH] IN BLOOD BY AUTOMATED COUNT: 28.8 % (ref 11.6–15.1)
GFR SERPL CREATININE-BSD FRML MDRD: 133 ML/MIN/1.73SQ M
GLUCOSE FLD-MCNC: 150 MG/DL
GLUCOSE SERPL-MCNC: 151 MG/DL (ref 65–140)
HBV CORE AB SER QL: NORMAL
HBV CORE IGM SER QL: NORMAL
HBV SURFACE AG SER QL: NORMAL
HCT VFR BLD AUTO: 25.4 % (ref 36.5–49.3)
HCV AB SER QL: NORMAL
HGB BLD-MCNC: 7.7 G/DL (ref 12–17)
HISTIOCYTES NFR FLD: 70 %
IMM GRANULOCYTES # BLD AUTO: 0.04 THOUSAND/UL (ref 0–0.2)
IMM GRANULOCYTES NFR BLD AUTO: 1 % (ref 0–2)
LDH FLD L TO P-CCNC: 81 U/L
LYMPHOCYTES # BLD AUTO: 0.45 THOUSANDS/ΜL (ref 0.6–4.47)
LYMPHOCYTES NFR BLD AUTO: 12 % (ref 14–44)
LYMPHOCYTES NFR BLD AUTO: 13 %
MAGNESIUM SERPL-MCNC: 1.9 MG/DL (ref 1.9–2.7)
MCH RBC QN AUTO: 23.7 PG (ref 26.8–34.3)
MCHC RBC AUTO-ENTMCNC: 30.3 G/DL (ref 31.4–37.4)
MCV RBC AUTO: 78 FL (ref 82–98)
MONO+MESO NFR FLD MANUAL: 8 %
MONOCYTES # BLD AUTO: 0.21 THOUSAND/ΜL (ref 0.17–1.22)
MONOCYTES NFR BLD AUTO: 5 % (ref 4–12)
NEUTROPHILS # BLD AUTO: 3.2 THOUSANDS/ΜL (ref 1.85–7.62)
NEUTS SEG NFR BLD AUTO: 82 % (ref 43–75)
NEUTS SEG NFR BLD AUTO: 9 %
NRBC BLD AUTO-RTO: 1 /100 WBCS
PH BODY FLUID: 7.8
PHOSPHATE SERPL-MCNC: 2 MG/DL (ref 2.7–4.5)
PLATELET # BLD AUTO: 94 THOUSANDS/UL (ref 149–390)
POTASSIUM SERPL-SCNC: 3.9 MMOL/L (ref 3.5–5.3)
PROT FLD-MCNC: 2.4 G/DL
PROT SERPL-MCNC: 7.4 G/DL (ref 6.4–8.4)
RBC # BLD AUTO: 3.25 MILLION/UL (ref 3.88–5.62)
SITE: NORMAL
SODIUM SERPL-SCNC: 131 MMOL/L (ref 135–147)
TOTAL CELLS COUNTED SPEC: 100
UNIT DISPENSE STATUS: NORMAL
UNIT PRODUCT CODE: NORMAL
UNIT PRODUCT VOLUME: 250 ML
UNIT PRODUCT VOLUME: 280 ML
UNIT PRODUCT VOLUME: 350 ML
UNIT RH: NORMAL
WBC # BLD AUTO: 3.91 THOUSAND/UL (ref 4.31–10.16)
WBC # FLD MANUAL: 135 /UL

## 2022-07-20 PROCEDURE — 99232 SBSQ HOSP IP/OBS MODERATE 35: CPT | Performed by: INTERNAL MEDICINE

## 2022-07-20 PROCEDURE — 86803 HEPATITIS C AB TEST: CPT | Performed by: INTERNAL MEDICINE

## 2022-07-20 PROCEDURE — 86705 HEP B CORE ANTIBODY IGM: CPT | Performed by: INTERNAL MEDICINE

## 2022-07-20 PROCEDURE — 87070 CULTURE OTHR SPECIMN AEROBIC: CPT

## 2022-07-20 PROCEDURE — 82945 GLUCOSE OTHER FLUID: CPT

## 2022-07-20 PROCEDURE — 99222 1ST HOSP IP/OBS MODERATE 55: CPT | Performed by: NURSE PRACTITIONER

## 2022-07-20 PROCEDURE — 88112 CYTOPATH CELL ENHANCE TECH: CPT | Performed by: PATHOLOGY

## 2022-07-20 PROCEDURE — 88305 TISSUE EXAM BY PATHOLOGIST: CPT | Performed by: PATHOLOGY

## 2022-07-20 PROCEDURE — 49083 ABD PARACENTESIS W/IMAGING: CPT | Performed by: INTERNAL MEDICINE

## 2022-07-20 PROCEDURE — 87340 HEPATITIS B SURFACE AG IA: CPT | Performed by: INTERNAL MEDICINE

## 2022-07-20 PROCEDURE — 87075 CULTR BACTERIA EXCEPT BLOOD: CPT

## 2022-07-20 PROCEDURE — 0W9G3ZZ DRAINAGE OF PERITONEAL CAVITY, PERCUTANEOUS APPROACH: ICD-10-PCS | Performed by: INTERNAL MEDICINE

## 2022-07-20 PROCEDURE — 30233N1 TRANSFUSION OF NONAUTOLOGOUS RED BLOOD CELLS INTO PERIPHERAL VEIN, PERCUTANEOUS APPROACH: ICD-10-PCS | Performed by: HOSPITALIST

## 2022-07-20 PROCEDURE — 84100 ASSAY OF PHOSPHORUS: CPT | Performed by: INTERNAL MEDICINE

## 2022-07-20 PROCEDURE — 87205 SMEAR GRAM STAIN: CPT

## 2022-07-20 PROCEDURE — 83615 LACTATE (LD) (LDH) ENZYME: CPT

## 2022-07-20 PROCEDURE — 89051 BODY FLUID CELL COUNT: CPT

## 2022-07-20 PROCEDURE — 86015 ACTIN ANTIBODY EACH: CPT | Performed by: INTERNAL MEDICINE

## 2022-07-20 PROCEDURE — 80053 COMPREHEN METABOLIC PANEL: CPT | Performed by: INTERNAL MEDICINE

## 2022-07-20 PROCEDURE — 86381 MITOCHONDRIAL ANTIBODY EACH: CPT | Performed by: INTERNAL MEDICINE

## 2022-07-20 PROCEDURE — 83735 ASSAY OF MAGNESIUM: CPT | Performed by: INTERNAL MEDICINE

## 2022-07-20 PROCEDURE — C9113 INJ PANTOPRAZOLE SODIUM, VIA: HCPCS

## 2022-07-20 PROCEDURE — 86704 HEP B CORE ANTIBODY TOTAL: CPT | Performed by: INTERNAL MEDICINE

## 2022-07-20 PROCEDURE — 49083 ABD PARACENTESIS W/IMAGING: CPT

## 2022-07-20 PROCEDURE — 86038 ANTINUCLEAR ANTIBODIES: CPT | Performed by: INTERNAL MEDICINE

## 2022-07-20 PROCEDURE — 84157 ASSAY OF PROTEIN OTHER: CPT

## 2022-07-20 PROCEDURE — 85025 COMPLETE CBC W/AUTO DIFF WBC: CPT | Performed by: INTERNAL MEDICINE

## 2022-07-20 PROCEDURE — 83986 ASSAY PH BODY FLUID NOS: CPT

## 2022-07-20 RX ORDER — SPIRONOLACTONE 100 MG/1
100 TABLET, FILM COATED ORAL DAILY
Status: DISCONTINUED | OUTPATIENT
Start: 2022-07-21 | End: 2022-07-25

## 2022-07-20 RX ORDER — LANOLIN ALCOHOL/MO/W.PET/CERES
6 CREAM (GRAM) TOPICAL
Status: DISCONTINUED | OUTPATIENT
Start: 2022-07-20 | End: 2022-08-02 | Stop reason: HOSPADM

## 2022-07-20 RX ORDER — MAGNESIUM SULFATE 1 G/100ML
1 INJECTION INTRAVENOUS ONCE
Status: COMPLETED | OUTPATIENT
Start: 2022-07-20 | End: 2022-07-20

## 2022-07-20 RX ORDER — PANTOPRAZOLE SODIUM 40 MG/10ML
40 INJECTION, POWDER, LYOPHILIZED, FOR SOLUTION INTRAVENOUS 2 TIMES DAILY
Status: DISCONTINUED | OUTPATIENT
Start: 2022-07-21 | End: 2022-07-20

## 2022-07-20 RX ORDER — LORAZEPAM 2 MG/ML
0.5 INJECTION INTRAMUSCULAR ONCE
Status: DISCONTINUED | OUTPATIENT
Start: 2022-07-20 | End: 2022-07-26

## 2022-07-20 RX ADMIN — LORAZEPAM 1 MG: 1 TABLET ORAL at 16:37

## 2022-07-20 RX ADMIN — SODIUM PHOSPHATE, MONOBASIC, MONOHYDRATE 21 MMOL: 276; 142 INJECTION, SOLUTION INTRAVENOUS at 07:47

## 2022-07-20 RX ADMIN — ALBUMIN (HUMAN) 25 G: 0.25 INJECTION, SOLUTION INTRAVENOUS at 08:12

## 2022-07-20 RX ADMIN — FOLIC ACID 1 MG: 1 TABLET ORAL at 08:12

## 2022-07-20 RX ADMIN — BUPRENORPHINE AND NALOXONE 8 MG: 8; 2 FILM BUCCAL; SUBLINGUAL at 00:25

## 2022-07-20 RX ADMIN — FUROSEMIDE 40 MG: 10 INJECTION, SOLUTION INTRAMUSCULAR; INTRAVENOUS at 20:11

## 2022-07-20 RX ADMIN — MAGNESIUM SULFATE HEPTAHYDRATE 1 G: 1 INJECTION, SOLUTION INTRAVENOUS at 08:16

## 2022-07-20 RX ADMIN — GLYCERIN 1 DROP: .002; .002; .01 SOLUTION/ DROPS OPHTHALMIC at 16:40

## 2022-07-20 RX ADMIN — NADOLOL 40 MG: 40 TABLET ORAL at 08:16

## 2022-07-20 RX ADMIN — ALBUMIN (HUMAN) 25 G: 0.25 INJECTION, SOLUTION INTRAVENOUS at 20:11

## 2022-07-20 RX ADMIN — FERROUS SULFATE TAB 325 MG (65 MG ELEMENTAL FE) 325 MG: 325 (65 FE) TAB at 08:12

## 2022-07-20 RX ADMIN — Medication 6 MG: at 01:03

## 2022-07-20 RX ADMIN — FUROSEMIDE 40 MG: 10 INJECTION, SOLUTION INTRAMUSCULAR; INTRAVENOUS at 08:12

## 2022-07-20 RX ADMIN — ESCITALOPRAM OXALATE 20 MG: 20 TABLET ORAL at 08:12

## 2022-07-20 RX ADMIN — SODIUM CHLORIDE 8 MG/HR: 9 INJECTION, SOLUTION INTRAVENOUS at 21:24

## 2022-07-20 RX ADMIN — SPIRONOLACTONE 50 MG: 25 TABLET ORAL at 08:12

## 2022-07-20 RX ADMIN — LACTULOSE 20 G: 20 SOLUTION ORAL at 17:13

## 2022-07-20 RX ADMIN — ALBUMIN (HUMAN) 25 G: 0.25 INJECTION, SOLUTION INTRAVENOUS at 16:37

## 2022-07-20 RX ADMIN — MULTIPLE VITAMINS W/ MINERALS TAB 1 TABLET: TAB ORAL at 08:16

## 2022-07-20 RX ADMIN — NICOTINE 1 PATCH: 21 PATCH, EXTENDED RELEASE TRANSDERMAL at 08:16

## 2022-07-20 RX ADMIN — BUPRENORPHINE AND NALOXONE 8 MG: 8; 2 FILM BUCCAL; SUBLINGUAL at 17:14

## 2022-07-20 RX ADMIN — Medication 6 MG: at 22:24

## 2022-07-20 RX ADMIN — CEFTRIAXONE 2000 MG: 2 INJECTION, SOLUTION INTRAVENOUS at 20:11

## 2022-07-20 RX ADMIN — THIAMINE HCL TAB 100 MG 100 MG: 100 TAB at 08:12

## 2022-07-20 RX ADMIN — LACTULOSE 20 G: 20 SOLUTION ORAL at 08:12

## 2022-07-20 RX ADMIN — BUPRENORPHINE AND NALOXONE 8 MG: 8; 2 FILM BUCCAL; SUBLINGUAL at 08:13

## 2022-07-20 RX ADMIN — LORAZEPAM 1 MG: 1 TABLET ORAL at 07:46

## 2022-07-20 NOTE — PROGRESS NOTES
[facility]  Progress Note - Barrera Austin 1985, 40 y o  male MRN: [id number]  Unit/Bed#: ICU 03 Encounter: [id number]  Primary Care Provider: Alida Mead PA-C   Date and time admitted to hospital: 7/18/2022  6:49 PM    * Alcoholic cirrhosis of liver without ascites ([facility] )  Assessment & Plan  Hx Decompensated cirrhosis with anasarca, hepatic encephalopathy, thrombocytopenia splenomegaly, hyperammonemia, and  Hepatomegaly most likely secondary to alcohol abuse but could also be due to underlying alcoholic hepatitis  -Previous AFP and right upper quadrant ultrasounds were normal  -Never had screening EGD for varices  -Home lactulose for hepatic encephalopathy    -Anasarca - Lasix 40 IV b i d  Resume Aldactone 50 mg  Net negative 3L     -SBP: Spiking fevers on 7/17  CXR negative for acute findings, ua negative for uti  Blood cultures drawn  Started on ceftriaxone  Unfortunately no IR for paracentesis prior to abx     [facility] recommending transfer to a facility with ICU for EGD  -Admission: MELD 23 T  Bili 4 6, INR 1 87, cr 0 35, Na 133    Plan:  Abdominal ultrasound ordered to assess for hepatocellular carcinoma & ascites  AFP 3 5  GI consulted for suspected UGIB/varices; appreciate recommendations   - s/p EGD; 4 bands for 2 varices; no active bleed  Started on nadolol 40 mg qd   - 2 FFP given prior to EGD  IR consulted for paracentesis   - Reconsult for IR liver bx whenever patient INR <1 5, out of ICU, hgb stable  Continue Ceftriaxone for SBP ppx  F/u Bcx   - ngtd 48 hrs x2  Protonix gtt  Resume lasix and aldactone  CLD  Hold AP/AC    Alcohol withdrawal (HCC)  Assessment & Plan  Alcohol level 448  Patient says last drink was on Friday 7/15     [facility] ED used serax 10 mg tid for symptom control    Plan:  CIWA protocol  Ativan  MV thiamine folic acid  D/C Serax      Liver metastasis ([facility] )  Assessment & Plan  Suspicious CT abdomen findings for liver cancer  AFP WNL    Plan:  IR consulted for liver biopsy   - reach back out/consult when hgb stable, INR <1 5, patient xferred out of ICU for liver bx    Thoracoabdominal aortic aneurysm [facility])  Assessment & Plan  CT findings; stable 54mm    Anemia  Assessment & Plan  Presents with microcytic anemia hemoglobin of 3 6  Asymptomatic, vitals stable  Troponin negative  Repeat Hg q12   After 5 units of blood hemoglobin up to 6 3  Currently receiving 60 unit    Iron panel showing low iron levels with normal TIBC  Heme occult blood negative in the ED  Patient denying melena and hematemesis  Prior note of febrile reaction with pRBC (was noted after receiving 6 units; no temperature spike after receiving 7th unit, although pt was pre-treated with tylenol)    Plan:  Received 7 units total pRBCs  Transfuse hgb < 7        Idiopathic hypotension  Assessment & Plan  Blood pressure slightly on lower side in setting of anasarca from cirrhosis    Plan:  Discontinue midodrine 10 mg TID started at [facility] ED    Chronic bronchitis [facility])  Assessment & Plan  Resume home inhalers for asthma  Follow-up chest x-ray - on my read no significant sindings for infection    Continue home inhalers for asthma    Depression  Assessment & Plan  Per discussion with patient's sister: Patient served in the [employer] and spent 18 months in [facility], there is concern for PTSD  Sister has reached out to CM inquiring about VA programs to help with PTSD  She tells me that he had drug addiction prior to serving and developed alcohol addiction after his [employer]  Also shares that he went through a divorce 2-3 years ago and a girlfriend that recently passed away last November  Has 1 son and 1 step-daughter that he has not seen in years  Accounts from sister concerning for uncontrolled depression       Plan:  Resume home Lexapro  1 mg ativan TID PRN    Thrombocytopenia (HCC)  Assessment & Plan  Lab Results   Component Value Date    PLT 94 (L) 07/20/2022    PLT 82 (L) 07/19/2022 PLT 66 (L) 2022    PLT 78 (L) 2022     (L) 2022         Tobacco dependence  Assessment & Plan  Nicotine patch    Substance abuse ([facility] )  Assessment & Plan  · Patient reports taking 8-2 Suboxone BID  · [place]MP confirmed 16 mg daily     Mild intermittent asthma without complication  Assessment & Plan  Continue home inhalers      ----------------------------------------------------------------------------------------  HPI/24hr events: s/p  Bands for 2 varices, no active bleeding on EGD, 2 units FFP given prior  CLD  Started on nadolol 40 mg qd  Resumed home lasix and spironolactone  IR contacted for paracentesis; requested re-consult whenever INR <1 5, hgb stable and out of ICU  No acute events overnight, no ativan needed per JENNIE  AC/AP still held  D/c'ed midodrine  Patient appropriate for transfer out of the ICU today?: No  Disposition: Transfer to Stepdown Level 2 consider  Code Status: Level 1 - Full Code  ---------------------------------------------------------------------------------------  SUBJECTIVE  No complaints, pain, dyspnea, fever or chills, nausea, vomiting  Having less anxiety, currently controlled  Pt observed consuming CLD and desires "real food " Reiterated plan to patient and he said he understood  Tolerated procedure well        Review of Systems  Review of systems was reviewed and negative unless stated above in HPI/24-hour events   ---------------------------------------------------------------------------------------  OBJECTIVE    Vitals   Vitals:    22 0500 22 0600 22 0730 22 0816   BP: 118/57 124/63 112/60 119/59   BP Location:   Right arm    Pulse: 75 79 81 81   Resp: 14 13 18    Temp:   99 4 °F (37 4 °C)    TempSrc:   Oral    SpO2: 93% 94% 96%    Weight:         Temp (24hrs), Av 5 °F (36 9 °C), Min:97 7 °F (36 5 °C), Max:99 4 °F (37 4 °C)  Current: Temperature: 99 4 °F (37 4 °C)          Respiratory:  SpO2: SpO2: 96 %, SpO2 Activity: SpO2 Activity: At Rest, SpO2 Device: O2 Device: None (Room air), Capnography:    Nasal Cannula O2 Flow Rate (L/min): 1 L/min    Invasive/non-invasive ventilation settings   Respiratory  Report   Lab Data (Last 4 hours)    None         O2/Vent Data (Last 4 hours)    None                Physical Exam  Vitals and nursing note reviewed  Constitutional:       Appearance: He is well-developed  HENT:      Head: Normocephalic and atraumatic  Right Ear: External ear normal       Left Ear: External ear normal       Nose: Nose normal       Mouth/Throat:      Mouth: Mucous membranes are moist    Eyes:      General: Scleral icterus present  Conjunctiva/sclera: Conjunctivae normal    Cardiovascular:      Rate and Rhythm: Normal rate and regular rhythm  Pulses: Normal pulses  Heart sounds: Normal heart sounds  No murmur heard  Pulmonary:      Effort: Pulmonary effort is normal  No respiratory distress  Breath sounds: No wheezing, rhonchi or rales  Abdominal:      General: Abdomen is flat  There is distension (per patient - normal)  Palpations: Abdomen is soft  Tenderness: There is no abdominal tenderness  There is no guarding  Musculoskeletal:         General: Normal range of motion  Cervical back: Neck supple  Comments: BLE lymphedema   Skin:     General: Skin is warm and dry  Capillary Refill: Capillary refill takes less than 2 seconds  Neurological:      General: No focal deficit present  Mental Status: He is alert and oriented to person, place, and time        Comments: Intention tremors; worse in left fingers   Psychiatric:         Mood and Affect: Mood normal          Behavior: Behavior normal              Laboratory and Diagnostics:  Results from last 7 days   Lab Units 07/20/22  0429 07/19/22  1748 07/19/22  0427 07/18/22  1802 07/18/22  0607 07/17/22  2043 07/17/22  1113 07/17/22  0347 07/16/22  1611   WBC Thousand/uL 3 91*  --  5 11 4 93 5 24  --   --  4 91 6  09   HEMOGLOBIN g/dL 7 7* 7 6* 6 7* 7 1* 6 3* 6 5* 5 4* 4 7* 3 6*   HEMATOCRIT % 25 4* 24 7* 22 3* 23 3* 20 0* 20 3* 17 8* 16 1* 13 7*   PLATELETS Thousands/uL 94*  --  82* 66* 78*  --   --  101* 140*   NEUTROS PCT % 82*  --  60 68  --   --   --   --  66   MONOS PCT % 5  --  12 12  --   --   --   --  9     Results from last 7 days   Lab Units 07/20/22  0429 07/19/22  0427 07/18/22  1802 07/18/22  0607 07/17/22  1113 07/17/22  0347 07/16/22  1611   SODIUM mmol/L 131* 133* 133* 132* 134* 135 133*   POTASSIUM mmol/L 3 9 3 9 3 7 3 5 3 3* 2 7* 4 0   CHLORIDE mmol/L 91* 93* 92* 91* 94* 94* 94*   CO2 mmol/L 34* 34* 35* 36* 29 29 28   ANION GAP mmol/L 6 6 6 5 11 12 11   BUN mg/dL 9 8 7 6 6 7 8   CREATININE mg/dL 0 55* 0 51* 0 52* 0 50* 0 38* 0 35* 0 32*   CALCIUM mg/dL 9 2 8 8 9 0 9 0 8 5 7 9* 8 1*   GLUCOSE RANDOM mg/dL 151* 86 97 91 121 123 118   ALT U/L 17 19 24 23 26 26 29   AST U/L 63* 85* 107* 107* 103* 100* 128*   ALK PHOS U/L 181* 194* 213* 204* 201* 194* 221*   ALBUMIN g/dL 3 9 3 5 3 6 3 4* 3 3* 3 2* 3 3*   TOTAL BILIRUBIN mg/dL 11 16* 10 37* 10 94* 9 37* 6 66* 5 47* 4 61*     Results from last 7 days   Lab Units 07/20/22  0429 07/19/22  0427 07/18/22  0607 07/17/22  1838 07/17/22  1113 07/17/22  0347 07/16/22  1611   MAGNESIUM mg/dL 1 9 2 2 1 8* 1 8* 2 2 1 6* 2 0   PHOSPHORUS mg/dL 2 0* 2 5* 2 2*  --  2 3* 2 6* 2 6*      Results from last 7 days   Lab Units 07/19/22  1748 07/19/22  0427 07/18/22  1802 07/18/22  0607 07/17/22  2043 07/16/22  1611   INR  2 03* 2 27* 2 07* 2 13* 1 92* 1 87*   PTT seconds  --   --   --   --   --  45*              ABG:    VBG:    Results from last 7 days   Lab Units 07/17/22  1838 07/17/22  1113   PROCALCITONIN ng/ml 0 44* 0 42*       Micro  Results from last 7 days   Lab Units 07/18/22  1033 07/17/22  2042   BLOOD CULTURE  No Growth at 24 hrs  No Growth at 48 hrs  No Growth at 48 hrs  EKG: NSR  Imaging: I have personally reviewed pertinent reports        Intake and Output  I/O       07/18 0701 07/19 0700 07/19 0701  07/20 0700 07/20 0701 07/21 0700    I V  (mL/kg) 254 3 (2 7) 727 7 (8)     Blood  760     IV Piggyback 80 586 5     Total Intake(mL/kg) 334 3 (3 6) 2074 1 (22 7)     Urine (mL/kg/hr) 350 3870 (1 8) 475 (2 7)    Total Output 350 3870 475    Net -15 8 -1795 9 -475                 Height and Weights         Body mass index is 26 61 kg/m²  Weight (last 2 days)     Date/Time Weight    07/20/22 0216 91 5 (201 72)    07/19/22 0242 94 (207 23)            Nutrition       Diet Orders   (From admission, onward)             Start     Ordered    07/19/22 1942  Diet Clear Liquid  Diet effective now        References:    Nutrtion Support Algorithm Enteral vs  Parenteral   Question Answer Comment   Diet Type Clear Liquid    RD to adjust diet per protocol?  No        07/19/22 1941                  Active Medications  Scheduled Meds:  Current Facility-Administered Medications   Medication Dose Route Frequency Provider Last Rate    albumin human  25 g Intravenous TID Merle Marsh MD      albuterol  2 puff Inhalation Q6H PRN Merle Marsh MD      buprenorphine-naloxone  8 mg Sublingual BID Merle Marsh MD      cefTRIAXone  2,000 mg Intravenous Q24H Merle Marsh MD 2,000 mg (07/19/22 2023)    diphenhydrAMINE  25 mg Intravenous Q6H PRN Merle Marsh MD      escitalopram  20 mg Oral Daily Merle Marsh MD      ferrous sulfate  325 mg Oral Daily With Breakfast Merle Marsh MD      folic acid  1 mg Oral Daily Merle Marsh MD      furosemide  40 mg Intravenous Q12H Yoon Cools Shai, CRNP      lactulose  20 g Oral BID Merle Marsh MD      LORazepam  1 mg Oral Q8H PRN Merle Marsh MD      melatonin  6 mg Oral HS Dario Rincon MD      multivitamin-minerals  1 tablet Oral Daily Merle Marsh MD      nadolol  40 mg Oral Daily Yoon Cools Shai, CRNP      nicotine  1 patch Transdermal Daily Merle Marsh MD      ondansetron  4 mg Intravenous Q6H PRN Melissa Winslow MD      pantoprozole (PROTONIX) infusion (Continuous)  8 mg/hr Intravenous Continuous Melissa Winslow MD 8 mg/hr (07/19/22 2344)    sodium phosphate  21 mmol Intravenous Once Britney Burgos MD 21 mmol (07/20/22 0747)    spironolactone  50 mg Oral Daily Cuca Bio, CRNP      thiamine  100 mg Oral Daily Melissa Winslow MD       Continuous Infusions:  pantoprozole (PROTONIX) infusion (Continuous), 8 mg/hr, Last Rate: 8 mg/hr (07/19/22 2344)      PRN Meds:   albuterol, 2 puff, Q6H PRN  diphenhydrAMINE, 25 mg, Q6H PRN  LORazepam, 1 mg, Q8H PRN  ondansetron, 4 mg, Q6H PRN        Invasive Devices Review  Invasive Devices  Report    Peripheral Intravenous Line  Duration           Peripheral IV 07/16/22 Left Antecubital 3 days    Peripheral IV 07/16/22 Right Arm 3 days    Peripheral IV 07/19/22 Right Hand <1 day          Drain  Duration           Urethral Catheter Latex 16 Fr  2 days                Rationale for remaining devices: meds/access  ---------------------------------------------------------------------------------------  Advance Directive and Living Will:      Power of :    POLST:    ---------------------------------------------------------------------------------------  Care Time Delivered:   No Critical Care time spent       Britney Burgos MD      Portions of the record may have been created with voice recognition software  Occasional wrong word or "sound a like" substitutions may have occurred due to the inherent limitations of voice recognition software    Read the chart carefully and recognize, using context, where substitutions have occurred

## 2022-07-20 NOTE — PROGRESS NOTES
Progress Note - Addie Joshua 40 y o  male MRN: [id number]    Unit/Bed#: ICU 03 Encounter: [id number]        Subjective:   Patient denies abdominal pain, nausea or vomiting    Objective:     Vitals: Blood pressure 116/56, pulse 74, temperature 98 9 °F (37 2 °C), temperature source Oral, resp  rate 22, weight 91 5 kg (201 lb 11 5 oz), SpO2 96 %  ,Body mass index is 26 61 kg/m²  Intake/Output Summary (Last 24 hours) at 7/20/2022 1322  Last data filed at 7/20/2022 1233  Gross per 24 hour   Intake 2089 3 ml   Output 6555 ml   Net -4465 7 ml       Physical Exam:   General appearance: alert, appears stated age and cooperative  Lungs: clear to auscultation bilaterally, no labored breathing/accessory muscle use  Heart: regular rate and rhythm, S1, S2 normal, no murmur, click, rub or gallop  Abdomen: soft, non-tender; bowel sounds normal; no masses,  no organomegaly  Extremities: no edema    Invasive Devices  Report    Peripheral Intravenous Line  Duration           Peripheral IV 07/16/22 Left Antecubital 3 days    Peripheral IV 07/16/22 Right Arm 3 days    Peripheral IV 07/19/22 Right Hand <1 day                Lab, Imaging and other studies: I have personally reviewed pertinent reports      Admission on 07/18/2022   Component Date Value    WBC 07/18/2022 4 93     RBC 07/18/2022 3 10 (A)    Hemoglobin 07/18/2022 7 1 (A)    Hematocrit 07/18/2022 23 3 (A)    MCV 07/18/2022 75 (A)    MCH 07/18/2022 22 3 (A)    MCHC 07/18/2022 29 6 (A)    RDW 07/18/2022 28 5 (A)    Platelets 40/40/6018 66 (A)    nRBC 07/18/2022 3     Neutrophils Relative 07/18/2022 68     Immat GRANS % 07/18/2022 1     Lymphocytes Relative 07/18/2022 17     Monocytes Relative 07/18/2022 12     Eosinophils Relative 07/18/2022 1     Basophils Relative 07/18/2022 1     Neutrophils Absolute 07/18/2022 3 35     Immature Grans Absolute 07/18/2022 0 07     Lymphocytes Absolute 07/18/2022 0 82     Monocytes Absolute 07/18/2022 0 58     Eosinophils Absolute 07/18/2022 0 05     Basophils Absolute 07/18/2022 0 06     Sodium 07/18/2022 133 (A)    Potassium 07/18/2022 3 7     Chloride 07/18/2022 92 (A)    CO2 07/18/2022 35 (A)    ANION GAP 07/18/2022 6     BUN 07/18/2022 7     Creatinine 07/18/2022 0 52 (A)    Glucose 07/18/2022 97     Calcium 07/18/2022 9 0     AST 07/18/2022 107 (A)    ALT 07/18/2022 24     Alkaline Phosphatase 07/18/2022 213 (A)    Total Protein 07/18/2022 7 5     Albumin 07/18/2022 3 6     Total Bilirubin 07/18/2022 10 94 (A)    eGFR 07/18/2022 136     Calcium, Ionized 07/18/2022 1 05 (A)    Protime 07/18/2022 23 0 (A)    INR 07/18/2022 2 07 (A)    Phosphorus 07/19/2022 2 5 (A)    Protime 07/19/2022 24 7 (A)    INR 07/19/2022 2 27 (A)    Sodium 07/19/2022 133 (A)    Potassium 07/19/2022 3 9     Chloride 07/19/2022 93 (A)    CO2 07/19/2022 34 (A)    ANION GAP 07/19/2022 6     BUN 07/19/2022 8     Creatinine 07/19/2022 0 51 (A)    Glucose 07/19/2022 86     Calcium 07/19/2022 8 8     AST 07/19/2022 85 (A)    ALT 07/19/2022 19     Alkaline Phosphatase 07/19/2022 194 (A)    Total Protein 07/19/2022 7 1     Albumin 07/19/2022 3 5     Total Bilirubin 07/19/2022 10 37 (A)    eGFR 07/19/2022 137     WBC 07/19/2022 5 11     RBC 07/19/2022 2 92 (A)    Hemoglobin 07/19/2022 6 7 (A)    Hematocrit 07/19/2022 22 3 (A)    MCV 07/19/2022 76 (A)    MCH 07/19/2022 22 9 (A)    MCHC 07/19/2022 30 0 (A)    RDW 07/19/2022 28 5 (A)    Platelets 78/24/8002 82 (A)    nRBC 07/19/2022 1     Neutrophils Relative 07/19/2022 60     Immat GRANS % 07/19/2022 1     Lymphocytes Relative 07/19/2022 23     Monocytes Relative 07/19/2022 12     Eosinophils Relative 07/19/2022 2     Basophils Relative 07/19/2022 2 (A)    Neutrophils Absolute 07/19/2022 3 11     Immature Grans Absolute 07/19/2022 0 04     Lymphocytes Absolute 07/19/2022 1 17     Monocytes Absolute 07/19/2022 0 59     Eosinophils Absolute 07/19/2022 0 12     Basophils Absolute 07/19/2022 0 08     Magnesium 07/19/2022 2 2     Unit Product Code 07/20/2022 B5244G45     Unit Number 07/20/2022 [id number]     Unit ABO 07/20/2022 A     Unit RH 07/20/2022 POS     Crossmatch 07/20/2022 Compatible     Unit Dispense Status 07/20/2022 Presumed Trans     Unit Product Volume 07/20/2022 350     Hemoglobin 07/19/2022 7 6 (A)    Hematocrit 07/19/2022 24 7 (A)    ABO Grouping 07/19/2022 A     Rh Factor 07/19/2022 Positive     Antibody Screen 07/19/2022 Negative     Specimen Expiration Date 07/19/2022 [id number]     Protime 07/19/2022 22 7 (A)    INR 07/19/2022 2 03 (A)    Bilirubin, Direct 07/19/2022 5 70 (A)    Unit Product Code 07/20/2022 W0276A61     Unit Number 07/20/2022 [id number]     Unit ABO 07/20/2022 A     Unit [facility] 07/20/2022 POS     Unit Dispense Status 07/20/2022 Presumed Trans     Unit Product Volume 07/20/2022 280     Unit Product Code 07/20/2022 [id number]     Unit Number 07/20/2022 [id number]     Unit ABO 07/20/2022 A     Unit RH 07/20/2022 POS     Unit Dispense Status 07/20/2022 Presumed Trans     Unit Product Volume 07/20/2022 250     Hemolysis Smear 07/19/2022 Schistocytes noted     LD 07/19/2022 177     Retic Ct Abs 07/19/2022 86,500     Retic Ct Pct 07/19/2022 2 62 (A)    Unit Product Code 07/19/2022 [id number]     Unit Number 07/19/2022 [id number]     Unit ABO 07/19/2022 A     Unit [facility] 07/19/2022 POS     Unit Dispense Status 07/19/2022 Crossmatched     Unit Product Volume 07/19/2022 280     Unit Product Code 07/19/2022 [id number]     Unit Number 07/19/2022 [id number]     Unit ABO 07/19/2022 A     Unit RH 07/19/2022 POS     Crossmatch 07/19/2022 Compatible     Unit Dispense Status 07/19/2022 Crossmatched     Unit Product Volume 07/19/2022 350     WBC 07/20/2022 3 91 (A)    RBC 07/20/2022 3 25 (A)    Hemoglobin 07/20/2022 7 7 (A)    Hematocrit 07/20/2022 25 4 (A)    MCV 07/20/2022 78 (A)    MCH 07/20/2022 23 7 (A)    MCHC 07/20/2022 30 3 (A)    RDW 07/20/2022 28 8 (A)    Platelets 64/58/4582 94 (A)    nRBC 07/20/2022 1     Neutrophils Relative 07/20/2022 82 (A)    Immat GRANS % 07/20/2022 1     Lymphocytes Relative 07/20/2022 12 (A)    Monocytes Relative 07/20/2022 5     Eosinophils Relative 07/20/2022 0     Basophils Relative 07/20/2022 0     Neutrophils Absolute 07/20/2022 3 20     Immature Grans Absolute 07/20/2022 0 04     Lymphocytes Absolute 07/20/2022 0 45 (A)    Monocytes Absolute 07/20/2022 0 21     Eosinophils Absolute 07/20/2022 0 00     Basophils Absolute 07/20/2022 0 01     Sodium 07/20/2022 131 (A)    Potassium 07/20/2022 3 9     Chloride 07/20/2022 91 (A)    CO2 07/20/2022 34 (A)    ANION GAP 07/20/2022 6     BUN 07/20/2022 9     Creatinine 07/20/2022 0 55 (A)    Glucose 07/20/2022 151 (A)    Calcium 07/20/2022 9 2     AST 07/20/2022 63 (A)    ALT 07/20/2022 17     Alkaline Phosphatase 07/20/2022 181 (A)    Total Protein 07/20/2022 7 4     Albumin 07/20/2022 3 9     Total Bilirubin 07/20/2022 11 16 (A)    eGFR 07/20/2022 133     Magnesium 07/20/2022 1 9     Phosphorus 07/20/2022 2 0 (A)    Hepatitis B Surface Ag 07/20/2022 Non-reactive     Hepatitis C Ab 07/20/2022 Non-reactive     Hep B C IgM 07/20/2022 Non-reactive     Hep B Core Total Ab 07/20/2022 Non-reactive          Assessment/Plan:    1  Severe anemia, EGD yesterday seeing banding of esophageal varices, no evidence of any active GI bleeding currently  Possible component of hemolysis    - monitor hemoglobin, complete hemolytic workup    -may advance to full liquid diet, consider soft diet tomorrow if continue no signs of upper GI bleeding    -continue IV Protonix drip for 72 hour total then transition to twice daily dosing    -patient's case and plan were discussed with ICU team      2   Decompensated alcoholic cirrhosis with ascites, hepatic encephalopathy; appears alert and oriented x3 at this time, patient had been noted with multiple liver lesions    - follow-up on IR liver biopsy    -monitor meld labs daily    -continue lactulose, titrate to 3-4 bowel movements daily    -continue diuretics, monitor kidney function closely    - continue nadolol, blood pressure permitting

## 2022-07-20 NOTE — INCIDENTAL FINDINGS
The following findings require follow up:  Radiographic finding   Findin22: Scanned for concerns of retroperitoneal hematoma  CT abdomen pelvis: 54 mm partially imaged fusiform thoracoabdominal aortic aneurysm  2021 CTA: scanned for suspected PE: Per chart review: 4 4 cm fusiform thoracoabdominal aortic aneurysm  The aneurysm arises from the descending thoracic aorta, extending into the abdomen  Follow up required: Yes   Recommend US abdominal Q3-6 months with vascular surgery referral at discretion of primary care physician   Follow up should be done within 3-6 month(s)    Please notify the following clinician to assist with the follow up:   MD Jeny Condon, PA-C

## 2022-07-20 NOTE — ASSESSMENT & PLAN NOTE
Per discussion with patient's sister: Patient served in the [employer] and spent 18 months in [place], there is concern for PTSD  Sister has reached out to CM inquiring about VA programs to help with PTSD  She tells me that he had drug addiction prior to serving and developed alcohol addiction after his [employer]  Also shares that he went through a divorce 2-3 years ago and a girlfriend that recently passed away last November  Has 1 son and 1 step-daughter that he has not seen in years  Accounts from sister concerning for uncontrolled depression       Plan:  Resume home Lexapro  1 mg ativan TID PRN

## 2022-07-20 NOTE — ASSESSMENT & PLAN NOTE
Lab Results   Component Value Date    PLT 94 (L) 07/20/2022    PLT 82 (L) 07/19/2022    PLT 66 (L) 07/18/2022    PLT 78 (L) 07/18/2022     (L) 07/17/2022

## 2022-07-20 NOTE — ASSESSMENT & PLAN NOTE
CT abdomen:  Diffuse hepatic metastases concerning for hepatocellular carcinoma in this cirrhotic patient  Metastatic disease is not excluded given the normal AFP  AFP WNL    Plan:  Interventional radiology consulted  Liver biopsy planned for tomorrow  Patient INR is 1 9 today    Currently coordinating with the blood bank for preparation of 2 units of cross-matched blood  FFP to be given within 1 hour of biopsy

## 2022-07-20 NOTE — H&P (VIEW-ONLY)
Medical Oncology/Hematology Consult Note  Jarocho Rausch, male, 40 y o , 1985,  ICU 03/ICU 03, [id number]       Admitted to ICU on 7/18/22  ED Visit on 7/16/22- for increasing confusion    Assessment and Plan:    1  Chronic microcytic hypochromic anemia  -Anemia evident since 1/19/2021  -Hemoglobin 3 6 on 7/16/22  Prior lab on 5/26/22 was 7 4  Baseline 7-8 g/dL  -s/p 7 units of PRBC transfusions  No source for hemorrhage is identified  Currently on Protonix drip  7/16/22- 3 6  7/17/22- 4 7 -->5 4 -->6 5  7/18/22- 6 3 -->7 1  7/19/22- 6 7-->7 6  7/20/22- 7 7  -Of note, hemoglobin 7 7    -iron panel: iron saturation 4%, ferritin 34    -ruling out hemolytic anemia  Some labs still pending, but LD returned normal, high total bilirubin (if bilirubin elevation is due to hemolysis, the elevation is most likely in the indirect/unconjugated bilirubin)  Haptoglobin pending  However, normal/increase haptoglobin does not necessarily discount the suspicion for hemolysis as haptoglobin is an acute phase reactant that can be increased in the setting of inflammation  Low haptoglobin can also include hepatic insufficiency and or abdominal trauma  -differential includes microangiopathic hemolytic anemia as well  Waiting for Griselda test result ( MAHA is Griselda-negative hemolysis)  Lab findings will also include increased LD, increased direct bilirubin, low haptoglobin  -other systemic disorders that can occur along with mom and thrombocytopenia can include infections and malignancies    - Patient does not have severe hypertension, infection, autoimmune diseases, history of organ/cell transplant  -Denies exposure to new medications that could have precipitated a rapid drop in hemoglobin   -Creatinine 0 55, BUN normal   -Presence of splenomegaly    Labs:  · LD -normal  · Hemolysis smear -schistocytes noted  · retic count Abs- normal, Pct mildly elevated at 2 62%  · Peripheral smear- decreased platelet estimate, slight elevation in nucleated RBC (erythroblasts), 5/100 WBCs  · Direct bilirubin -5 70 elevated   · Haptoglobin-pending  · Griselda test-pending    2  Chronic thrombocytopenia  -Of note, 94K   -Evident since 1/19/21 (one-time normal values in 5/2021)  -No signs of bleeding, no petechiae, diffuse bruising, purpura  -Thrombocytopenia most likely a complication from liver cirrhosis    3  Abnormal Imaging  -CT A/P (7/18/22)- Diffuse hepatic metastases concerning for hepatocellular carcinoma in this cirrhotic patient  Metastatic disease is not excluded given the normal AFP  Marked gallbladder distention  54 mm partially imaged fusiform thoracoabdominal aortic aneurysm  Moderate volume of abdominal and pelvic ascites consistent with simple fluid   -Splenomegaly- enlarged, measuring  17 1 cm     -history of decompensated alcoholic cirrhosis of liver  Complications include hepatic encephalopathy,  -AFP tumor marker at 3 5 (7/16/22)  Lab results in the past were normal as well (4 5 on 1/21/21)  -IR consulted for liver biopsy with INR goal of <1 5, stable hemoglobin prior to procedure    PLAN:  1) Transfuse for hemoglobin <7  2) Work up for hemolytic anemia pending  3) Agree with abdominal US to assess for hepatocellular carcinoma   4) Agree with IR liver biopsy when stable  5) Patient will be set up for outpatient oncology appointment  Requesting to be seen in [employer]  Outpatient follow up plan:  to be set up by ABBY Giordano, Cancer Care Coordinator    Thank you for this consult  Saeed Werner, [address], [address]  Hematology-Oncology      Reason for consultation:  anemia, thrombocytopenia, hepatic metastases    History of present illness: Arabella Jauregui is a 40 y o  male with a PMH of alcoholic cirrhosis, asthma, alcohol abuse  He presented in the ED with increasing confusion  He was found to have elevated alcohol level of 448, elevated ammonia level of 98, and anemia with hemoglobin level 3 6   Denies having any hematuria, hematemesis, hematochezia, epistaxis  He reported quitting alcohol use in the past week and half  He reported that he has been suffering of depression since early 2019  He lost his job, his wife filed for divorce, and his girlfriend passed away at home after a violent seizure activity  He currently lives at home with his parents  He has other siblings who can help him if he needs to travel to the [facility]   Patient agrees to pursue treatment if biopsy returns with malignancy  Patient denies nausea, vomiting, fevers, chills  Denies chest pain, shortness a breath  Complains discomfort on his abdominal area, stating that he just wants to eat  Patient will most likely need assistance with alcohol and tobacco dependence along with possible cancer journey      Jam Villalba of Systems: Review of Systems   Constitutional: Positive for activity change, appetite change and fatigue  Negative for chills and fever  HENT: Negative for ear pain and sore throat  Eyes: Negative for pain and visual disturbance  Respiratory: Negative for cough and shortness of breath  Cardiovascular: Positive for leg swelling  Negative for chest pain and palpitations  Gastrointestinal: Negative for abdominal pain and vomiting  Genitourinary: Negative for dysuria and hematuria  Musculoskeletal: Negative for arthralgias and back pain  Skin: Positive for pallor  Negative for color change and rash  Neurological: Positive for weakness  Negative for seizures and syncope  All other systems reviewed and are negative  Oncology History:   Cancer Staging  No matching staging information was found for the patient  Oncology History    No history exists         Past Medical History:   Past Medical History:   Diagnosis Date    AAA (abdominal aortic aneurysm) ([facility] )     Allergic     Anemia     Asthma     Depression 05/18/2021    Essential hypertension 05/06/2019    [facility] 7/18/2022    Obesity     [facility]     Substance abuse ([facility] )        Past Surgical History:   Procedure Laterality Date    LYMPH NODE BIOPSY         Family History   Problem Relation Age of Onset    No Known Problems Mother     Diabetes Father     Prostate cancer Father     Hypertension Father        Social History     Socioeconomic History    Marital status: /Civil Union     Spouse name: Not on file    Number of children: Not on file    Years of education: Not on file    Highest education level: Not on file   Occupational History    Not on file   Tobacco Use    Smoking status: Current Every Day Smoker     Packs/day: 1 00    Smokeless tobacco: Never Used    Tobacco comment: 2/2019; PATIENT VAPES   Vaping Use    Vaping Use: Some days   Substance and Sexual Activity    Alcohol use:  Yes     Alcohol/week: 12 0 standard drinks     Types: 12 Cans of beer per week    Drug use: Not Currently     Types: Heroin     Comment: 7/16/22 recovery    Sexual activity: Not Currently   Other Topics Concern    Not on file   Social History Narrative         Social Determinants of Health     Financial Resource Strain: Not on file   Food Insecurity: Not on file   Transportation Needs: Not on file   Physical Activity: Not on file   Stress: Not on file   Social Connections: Not on file   Intimate Partner Violence: Not on file   Housing Stability: Not on file         Current Facility-Administered Medications:     albumin human (FLEXBUMIN) 25 % injection 25 g, 25 g, Intravenous, TID, Merle Marsh MD, 25 g at 07/20/22 0812    albuterol (PROVENTIL HFA,VENTOLIN HFA) inhaler 2 puff, 2 puff, Inhalation, Q6H PRN, Merle Marsh MD    buprenorphine-naloxone (Suboxone) film 8 mg, 8 mg, Sublingual, BID, Merle Marsh MD, 8 mg at 07/20/22 0813    cefTRIAXone (ROCEPHIN) IVPB (premix in dextrose) 2,000 mg 50 mL, 2,000 mg, Intravenous, Q24H, Merle Marsh MD, Stopped at 07/20/22 1107    diphenhydrAMINE (BENADRYL) injection 25 mg, 25 mg, Intravenous, Q6H PRN, Jose Vega MD    escitalopram (LEXAPRO) tablet 20 mg, 20 mg, Oral, Daily, Jose Vega MD, 20 mg at 07/20/22 8049    ferrous sulfate tablet 325 mg, 325 mg, Oral, Daily With Breakfast, Jose Vega MD, 325 mg at 37/14/08 6735    folic acid (FOLVITE) tablet 1 mg, 1 mg, Oral, Daily, Jose Vega MD, 1 mg at 07/20/22 0663    furosemide (LASIX) injection 40 mg, 40 mg, Intravenous, Q12H, MARILEE Dunbar, 40 mg at 07/20/22 3376    glycerin-hypromellose- (ARTIFICIAL TEARS) ophthalmic solution 1 drop, 1 drop, Both Eyes, Q3H PRN, Francoistrevon Daugherty MD    lactulose oral solution 20 g, 20 g, Oral, BID, Jose Vega MD, 20 g at 07/20/22 1286    LORazepam (ATIVAN) tablet 1 mg, 1 mg, Oral, Q8H PRN, Jose Vega MD, 1 mg at 07/20/22 0746    melatonin tablet 6 mg, 6 mg, Oral, HS, Paul Montoya MD, 6 mg at 07/20/22 0103    multivitamin-minerals (CENTRUM) tablet 1 tablet, 1 tablet, Oral, Daily, Jose Vega MD, 1 tablet at 07/20/22 0816    nadolol (CORGARD) tablet 40 mg, 40 mg, Oral, Daily, MARILEE Dunbar, 40 mg at 07/20/22 0816    nicotine (NICODERM CQ) 21 mg/24 hr TD 24 hr patch 1 patch, 1 patch, Transdermal, Daily, Jose Vega MD, 1 patch at 07/20/22 0816    ondansetron (ZOFRAN) injection 4 mg, 4 mg, Intravenous, Q6H PRN, Jose Vega MD, 4 mg at 07/19/22 1658    pantoprazole (PROTONIX) 80 mg in sodium chloride 0 9 % 100 mL infusion, 8 mg/hr, Intravenous, Continuous, Jose Vega MD, Last Rate: 10 mL/hr at 07/19/22 2344, 8 mg/hr at 07/19/22 2344    sodium phosphate 21 mmol in dextrose 5 % 250 mL Infusion, 21 mmol, Intravenous, Once, Francois MD Dat, Last Rate: 62 5 mL/hr at 07/20/22 0747, 21 mmol at 07/20/22 0747    spironolactone (ALDACTONE) tablet 50 mg, 50 mg, Oral, Daily, Ynes MARILEE Strickland, 50 mg at 07/20/22 8448    thiamine tablet 100 mg, 100 mg, Oral, Daily, Jose Vega MD, 100 mg at 07/20/22 0812    Medications Prior to Admission   Medication    albuterol (Ventolin HFA) 90 mcg/act inhaler    ammonium lactate (LAC-HYDRIN) 12 % lotion    buprenorphine-naloxone (SUBOXONE) 8-2 mg per SL tablet    escitalopram (LEXAPRO) 20 mg tablet    ferrous sulfate 324 (65 Fe) mg    folic acid (FOLVITE) 1 mg tablet    furosemide (LASIX) 40 mg tablet    lactulose (CHRONULAC) 10 g/15 mL solution    nicotine (NICODERM CQ) 14 mg/24hr TD 24 hr patch    pantoprazole (PROTONIX) 40 mg tablet    spironolactone (ALDACTONE) 50 mg tablet    zolpidem (AMBIEN CR) 6 25 MG CR tablet       Allergies   Allergen Reactions    Levofloxacin Other (See Comments)     BLACKED OUT         Physical Exam:     /59   Pulse 77   Temp 99 4 °F (37 4 °C) (Oral)   Resp 22   Wt 91 5 kg (201 lb 11 5 oz)   SpO2 93%   BMI 26 61 kg/m²     Physical Exam  Constitutional:       Appearance: He is obese  He is ill-appearing  HENT:      Head: Normocephalic and atraumatic  Mouth/Throat:      Mouth: Mucous membranes are dry  Eyes:      Extraocular Movements: Extraocular movements intact  Conjunctiva/sclera: Conjunctivae normal    Cardiovascular:      Rate and Rhythm: Normal rate and regular rhythm  Pulmonary:      Effort: Pulmonary effort is normal       Comments: Decreased breath sounds BL LL  Abdominal:      Palpations: Abdomen is soft  Tenderness: There is abdominal tenderness  Musculoskeletal:      Cervical back: Normal range of motion  Right lower leg: Edema present  Left lower leg: Edema present  Skin:     General: Skin is warm and dry  Findings: Bruising present  Neurological:      Mental Status: He is alert and oriented to person, place, and time  Psychiatric:         Mood and Affect: Mood normal          Behavior: Behavior normal          Thought Content:  Thought content normal          Recent Results (from the past 48 hour(s))   CBC and differential    Collection Time: 07/18/22 6:02 PM   Result Value Ref Range    WBC 4 93 4 31 - 10 16 Thousand/uL    RBC 3 10 (L) 3 88 - 5 62 Million/uL    Hemoglobin 7 1 (L) 12 0 - 17 0 g/dL    Hematocrit 23 3 (L) 36 5 - 49 3 %    MCV 75 (L) 82 - 98 fL    MCH 22 3 (L) 26 8 - 34 3 pg    MCHC 29 6 (L) 31 4 - 37 4 g/dL    RDW 28 5 (H) 11 6 - 15 1 %    Platelets 66 (L) 452 - 390 Thousands/uL    nRBC 3 /100 WBCs    Neutrophils Relative 68 43 - 75 %    Immat GRANS % 1 0 - 2 %    Lymphocytes Relative 17 14 - 44 %    Monocytes Relative 12 4 - 12 %    Eosinophils Relative 1 0 - 6 %    Basophils Relative 1 0 - 1 %    Neutrophils Absolute 3 35 1 85 - 7 62 Thousands/µL    Immature Grans Absolute 0 07 0 00 - 0 20 Thousand/uL    Lymphocytes Absolute 0 82 0 60 - 4 47 Thousands/µL    Monocytes Absolute 0 58 0 17 - 1 22 Thousand/µL    Eosinophils Absolute 0 05 0 00 - 0 61 Thousand/µL    Basophils Absolute 0 06 0 00 - 0 10 Thousands/µL   Comprehensive metabolic panel    Collection Time: 07/18/22  6:02 PM   Result Value Ref Range    Sodium 133 (L) 135 - 147 mmol/L    Potassium 3 7 3 5 - 5 3 mmol/L    Chloride 92 (L) 96 - 108 mmol/L    CO2 35 (H) 21 - 32 mmol/L    ANION GAP 6 4 - 13 mmol/L    BUN 7 5 - 25 mg/dL    Creatinine 0 52 (L) 0 60 - 1 30 mg/dL    Glucose 97 65 - 140 mg/dL    Calcium 9 0 8 4 - 10 2 mg/dL     (H) 13 - 39 U/L    ALT 24 7 - 52 U/L    Alkaline Phosphatase 213 (H) 34 - 104 U/L    Total Protein 7 5 6 4 - 8 4 g/dL    Albumin 3 6 3 5 - 5 0 g/dL    Total Bilirubin 10 94 (H) 0 20 - 1 00 mg/dL    eGFR 136 ml/min/1 73sq m   Calcium, ionized    Collection Time: 07/18/22  6:02 PM   Result Value Ref Range    Calcium, Ionized 1 05 (L) 1 12 - 1 32 mmol/L   Protime-INR    Collection Time: 07/18/22  6:02 PM   Result Value Ref Range    Protime 23 0 (H) 11 6 - 14 5 seconds    INR 2 07 (H) 0 84 - 1 19   Phosphorus    Collection Time: 07/19/22  4:27 AM   Result Value Ref Range    Phosphorus 2 5 (L) 2 7 - 4 5 mg/dL   Protime-INR    Collection Time: 07/19/22  4:27 AM Result Value Ref Range    Protime 24 7 (H) 11 6 - 14 5 seconds    INR 2 27 (H) 0 84 - 1 19   Comprehensive metabolic panel    Collection Time: 07/19/22  4:27 AM   Result Value Ref Range    Sodium 133 (L) 135 - 147 mmol/L    Potassium 3 9 3 5 - 5 3 mmol/L    Chloride 93 (L) 96 - 108 mmol/L    CO2 34 (H) 21 - 32 mmol/L    ANION GAP 6 4 - 13 mmol/L    BUN 8 5 - 25 mg/dL    Creatinine 0 51 (L) 0 60 - 1 30 mg/dL    Glucose 86 65 - 140 mg/dL    Calcium 8 8 8 4 - 10 2 mg/dL    AST 85 (H) 13 - 39 U/L    ALT 19 7 - 52 U/L    Alkaline Phosphatase 194 (H) 34 - 104 U/L    Total Protein 7 1 6 4 - 8 4 g/dL    Albumin 3 5 3 5 - 5 0 g/dL    Total Bilirubin 10 37 (H) 0 20 - 1 00 mg/dL    eGFR 137 ml/min/1 73sq m   CBC and differential    Collection Time: 07/19/22  4:27 AM   Result Value Ref Range    WBC 5 11 4 31 - 10 16 Thousand/uL    RBC 2 92 (L) 3 88 - 5 62 Million/uL    Hemoglobin 6 7 (LL) 12 0 - 17 0 g/dL    Hematocrit 22 3 (L) 36 5 - 49 3 %    MCV 76 (L) 82 - 98 fL    MCH 22 9 (L) 26 8 - 34 3 pg    MCHC 30 0 (L) 31 4 - 37 4 g/dL    RDW 28 5 (H) 11 6 - 15 1 %    Platelets 82 (L) 049 - 390 Thousands/uL    nRBC 1 /100 WBCs    Neutrophils Relative 60 43 - 75 %    Immat GRANS % 1 0 - 2 %    Lymphocytes Relative 23 14 - 44 %    Monocytes Relative 12 4 - 12 %    Eosinophils Relative 2 0 - 6 %    Basophils Relative 2 (H) 0 - 1 %    Neutrophils Absolute 3 11 1 85 - 7 62 Thousands/µL    Immature Grans Absolute 0 04 0 00 - 0 20 Thousand/uL    Lymphocytes Absolute 1 17 0 60 - 4 47 Thousands/µL    Monocytes Absolute 0 59 0 17 - 1 22 Thousand/µL    Eosinophils Absolute 0 12 0 00 - 0 61 Thousand/µL    Basophils Absolute 0 08 0 00 - 0 10 Thousands/µL   Magnesium    Collection Time: 07/19/22  4:27 AM   Result Value Ref Range    Magnesium 2 2 1 9 - 2 7 mg/dL   Bilirubin, direct    Collection Time: 07/19/22  4:27 AM   Result Value Ref Range    Bilirubin, Direct 5 70 (H) 0 00 - 0 20 mg/dL   Prepare Leukoreduced RBC: 1 Units, Leukoreduced Collection Time: 07/19/22  6:30 AM   Result Value Ref Range    Unit Product Code E1339S22     Unit Number [id number]     Unit ABO A     Unit RH POS     Crossmatch Compatible     Unit Dispense Status Presumed Trans     Unit Product Volume 350 ml   Type and screen    Collection Time: 07/19/22 10:39 AM   Result Value Ref Range    ABO Grouping A     Rh Factor Positive     Antibody Screen Negative     Specimen Expiration Date [id number]    Prepare fresh frozen plasma: 1 Units    Collection Time: 07/19/22  4:49 PM   Result Value Ref Range    Unit Product Code W3840S24     Unit Number [id number]     Unit ABO A     Unit [facility] POS     Unit Dispense Status Crossmatched     Unit Product Volume 280 ml   Prepare Leukoreduced RBC: 1 Units    Collection Time: 07/19/22  4:51 PM   Result Value Ref Range    Unit Product Code O6771R65     Unit Number [id number]     Unit ABO A     Unit [facility] POS     Crossmatch Compatible     Unit Dispense Status Crossmatched     Unit Product Volume 350 ml   Hemoglobin and hematocrit, blood    Collection Time: 07/19/22  5:48 PM   Result Value Ref Range    Hemoglobin 7 6 (L) 12 0 - 17 0 g/dL    Hematocrit 24 7 (L) 36 5 - 49 3 %   Protime-INR    Collection Time: 07/19/22  5:48 PM   Result Value Ref Range    Protime 22 7 (H) 11 6 - 14 5 seconds    INR 2 03 (H) 0 84 - 1 19   Lactate dehydrogenase    Collection Time: 07/19/22  5:48 PM   Result Value Ref Range     140 - 271 U/L   Retic Count    Collection Time: 07/19/22  5:48 PM   Result Value Ref Range    Retic Ct Abs 86,500 14,356 - 105,094    Retic Ct Pct 2 62 (H) 0 37 - 1 87 %   Hemolysis Smear    Collection Time: 07/19/22  5:51 PM   Result Value Ref Range    Hemolysis Smear Schistocytes noted    CBC and differential    Collection Time: 07/20/22  4:29 AM   Result Value Ref Range    WBC 3 91 (L) 4 31 - 10 16 Thousand/uL    RBC 3 25 (L) 3 88 - 5 62 Million/uL    Hemoglobin 7 7 (L) 12 0 - 17 0 g/dL    Hematocrit 25 4 (L) 36 5 - 49 3 %    MCV 78 (L) 82 - 98 fL    MCH 23 7 (L) 26 8 - 34 3 pg    MCHC 30 3 (L) 31 4 - 37 4 g/dL    RDW 28 8 (H) 11 6 - 15 1 %    Platelets 94 (L) 908 - 390 Thousands/uL    nRBC 1 /100 WBCs    Neutrophils Relative 82 (H) 43 - 75 %    Immat GRANS % 1 0 - 2 %    Lymphocytes Relative 12 (L) 14 - 44 %    Monocytes Relative 5 4 - 12 %    Eosinophils Relative 0 0 - 6 %    Basophils Relative 0 0 - 1 %    Neutrophils Absolute 3 20 1 85 - 7 62 Thousands/µL    Immature Grans Absolute 0 04 0 00 - 0 20 Thousand/uL    Lymphocytes Absolute 0 45 (L) 0 60 - 4 47 Thousands/µL    Monocytes Absolute 0 21 0 17 - 1 22 Thousand/µL    Eosinophils Absolute 0 00 0 00 - 0 61 Thousand/µL    Basophils Absolute 0 01 0 00 - 0 10 Thousands/µL   Comprehensive metabolic panel    Collection Time: 07/20/22  4:29 AM   Result Value Ref Range    Sodium 131 (L) 135 - 147 mmol/L    Potassium 3 9 3 5 - 5 3 mmol/L    Chloride 91 (L) 96 - 108 mmol/L    CO2 34 (H) 21 - 32 mmol/L    ANION GAP 6 4 - 13 mmol/L    BUN 9 5 - 25 mg/dL    Creatinine 0 55 (L) 0 60 - 1 30 mg/dL    Glucose 151 (H) 65 - 140 mg/dL    Calcium 9 2 8 4 - 10 2 mg/dL    AST 63 (H) 13 - 39 U/L    ALT 17 7 - 52 U/L    Alkaline Phosphatase 181 (H) 34 - 104 U/L    Total Protein 7 4 6 4 - 8 4 g/dL    Albumin 3 9 3 5 - 5 0 g/dL    Total Bilirubin 11 16 (H) 0 20 - 1 00 mg/dL    eGFR 133 ml/min/1 73sq m   Magnesium    Collection Time: 07/20/22  4:29 AM   Result Value Ref Range    Magnesium 1 9 1 9 - 2 7 mg/dL   Phosphorus    Collection Time: 07/20/22  4:29 AM   Result Value Ref Range    Phosphorus 2 0 (L) 2 7 - 4 5 mg/dL   Prepare Leukoreduced RBC: 1 Units    Collection Time: 07/20/22  5:47 AM   Result Value Ref Range    Unit Product Code P0951Z40     Unit Number [id number]     Unit ABO A     Unit RH POS     Crossmatch Compatible     Unit Dispense Status Presumed Trans     Unit Product Volume 350 ml   Prepare fresh frozen plasma: 1 Units    Collection Time: 07/20/22  5:47 AM   Result Value Ref Range    Unit Product Code J5129R65     Unit Number [id number]     Unit ABO A     Unit [facility] POS     Unit Dispense Status Presumed Trans     Unit Product Volume 280 ml   Prepare fresh frozen plasma: 1 Units    Collection Time: 07/20/22  5:47 AM   Result Value Ref Range    Unit Product Code L8687D92     Unit Number [id number]     Unit ABO A     Unit [facility] POS     Unit Dispense Status Presumed Trans     Unit Product Volume 250 ml       EGD    Result Date: 7/19/2022  Narrative: [facility] [address] [facility], [address] [phone] DATE OF SERVICE: 7/19/22 PHYSICIAN(S): Attending: Rowan Faith MD Fellow: No Staff Documented INDICATION: Alcoholic cirrhosis of liver without ascites ([facility] [address] ), Anemia, unspecified type POST-OP DIAGNOSIS: See the impression below  PREPROCEDURE: Informed consent was obtained for the procedure, including sedation  Risks of perforation, hemorrhage, adverse drug reaction and aspiration were discussed  The patient was placed in the left lateral decubitus position  Patient was explained about the risks and benefits of the procedure  Risks including but not limited to bleeding, infection, and perforation were explained in detail  Also explained about less than 100% sensitivity with the exam and other alternatives  DETAILS OF PROCEDURE: Patient was taken to the procedure room where a time out was performed to confirm correct patient and correct procedure  The patient underwent general anesthesia, which was administered by an anesthesia professional  The patient's blood pressure, heart rate, level of consciousness, respirations and oxygen were monitored throughout the procedure  The scope was advanced to the second part of the duodenum  Retroflexion was performed in the fundus  The patient experienced no blood loss  The procedure was not difficult  The patient tolerated the procedure well  There were no apparent complications   ANESTHESIA INFORMATION: ASA: IV Anesthesia Type: Anesthesia type not filed in the log  MEDICATIONS: No administrations occurring from 1654 to 1718 on 07/19/22 FINDINGS: Esophagus-small sliding hiatal hernia  2 column varices noted and placed 4 bands  Stomach-diffuse portal hypertensive gastropathy noted  The duodenum appeared normal  SPECIMENS: * No specimens in log *     Impression: As above RECOMMENDATION: Schedule repeat EGD in 4-6 weeks Start on nadolol 40 mg daily   Doreen Granados MD     CT abdomen pelvis wo contrast    Result Date: 7/18/2022  Narrative: CT ABDOMEN AND PELVIS WITHOUT IV CONTRAST INDICATION:   Retroperitoneal hematoma suspected Anemia, rectal exam negative, please evaluate for retroperitoneal bleed  COMPARISON:  CTA chest January 20, 2021 TECHNIQUE:  CT examination of the abdomen and pelvis was performed without intravenous contrast  This examination was performed without intravenous contrast in the context of the critical nationwide Omnipaque shortage  Axial, sagittal, and coronal 2D reformatted images were created from the source data and submitted for interpretation  Radiation dose length product (DLP) for this visit:  723 2 mGy-cm   This examination, like all CT scans performed in the [facility], was performed utilizing techniques to minimize radiation dose exposure, including the use of iterative reconstruction and automated exposure control  Enteric contrast was not administered  FINDINGS: ABDOMEN LOWER CHEST:  No clinically significant abnormality identified in the visualized lower chest  LIVER/BILIARY TREE:  The liver demonstrates cirrhotic morphology  Innumerable hypodense masses are present throughout the entire liver  GALLBLADDER:  The gallbladder is markedly distended containing multiple gallstones  SPLEEN:  The spleen is enlarged, measuring  17 1 cm  PANCREAS:  Unremarkable  ADRENAL GLANDS:  Unremarkable  KIDNEYS/URETERS:  Unremarkable  No hydronephrosis  STOMACH AND BOWEL:  Unremarkable   APPENDIX:  No findings to suggest appendicitis  ABDOMINOPELVIC CAVITY:  Moderate volume of abdominal and pelvic ascites consistent with simple fluid ([facility]oun[place] unit 5)  No pneumoperitoneum  Evaluation without intravenous contrast is limited, but there are likely multiple erin hepatis and celiac axis lymph nodes  VESSELS:  54 mm fusiform thoracoabdominal aortic aneurysm  The proximal extent was not imaged  PELVIS REPRODUCTIVE ORGANS:  Unremarkable for patient's age  URINARY BLADDER:  Collapsed around a Rizo catheter  ABDOMINAL WALL/INGUINAL REGIONS:  Unremarkable  OSSEOUS STRUCTURES:  No acute fracture or destructive osseous lesion  Impression: Diffuse hepatic metastases concerning for hepatocellular carcinoma in this cirrhotic patient  Metastatic disease is not excluded given the normal AFP  Splenomegaly  Marked gallbladder distention  54 mm partially imaged fusiform thoracoabdominal aortic aneurysm  No source for hemorrhage is identified  * I personally telephoned this result to Anton Bean on 7/18/2022 4:15 PM  Workstation performed: [id number]     XR chest portable    Result Date: 7/17/2022  Narrative: CHEST INDICATION:   Rhonchi on exam, concern for aspiration pneumonia 2/2 alcohol abuse  COMPARISON:  Chest radiograph from 5/18/2021, chest CT from 1/20/2021  EXAM PERFORMED/VIEWS:  XR CHEST PORTABLE FINDINGS: Cardiomediastinal silhouette appears unremarkable  The lungs are clear  No pneumothorax or pleural effusion  Osseous structures appear within normal limits for patient age  Impression: No acute cardiopulmonary disease  Workstation performed: [id number]     US bedside procedure    Result Date: 7/19/2022  Narrative: 1 2 [id number] 2 [id number]      Labs and pertinent reports reviewed  This note has been generated by voice recognition software system  Therefore, there may be spelling, grammar, and or syntax errors  Please contact if questions arise

## 2022-07-20 NOTE — QUICK NOTE
Patient seen and evaluated by Critical Care today and deemed to be appropriate for transfer to Stepdown Level 2  Brief ICU course: 66QYL pmhx alcoholic cirrhosis (MELD 26 today), multisubstance abuse (etoh, smoking, on suboxone), anxiety/depression who originally presented to [facility] on 7/16 due to AMS  Upon arrival patient was noted to have a Hgb 3 6; denies/no witnessed hematemesis or melena  Vitals were stable  Transferred here for EGD/IR biopsy for hepatic lesions concerning for [address]  seen on CT  S/P 7u pRBCs, 2 FFP; EGD yesterday placed 4 bands for 2 varices, no active bleed  GI continues to be on board pending labs  Patient's alcohol level on admission noted to be elevated at 448; allegedly last drink Friday, past 72 hours, should be appropriate to discontinue CIWA tomorrow if he continues to have 0-mild CIWA scores  Continuing ceftriaxone day 4 for SBP ppx  Scheduled for IR paracentesis @ 1315 today  IR wants to be reconsulted for liver bx once INR <1 5 (today 2), hgb stable and OUT of the ICU  Heme consulted today for concerns about hemolysis in setting of elevated indirect bilirubin and +schisocytes on smear  Spoke to Dr Page January from [facility]  Patient to be transferred under Dr Luther Mcdaniel  Major changes to treatment plan from the day of transfer include transitioning from PPI gtt to IV BID  Continuing to hold [facility], operated by [facility]  -Will need to consult IR for liver biopsy whenever patient's hemoglobin is stable, INR <1 5 and patient OUT of ICU     -Will need outpatient follow-up for incidental finding of 54 mm AAA found on CT abdomen pelvis  Patient had a incidentally noted 44 mm AAA January 2021  Recommend Q 3-6 month surveillance  Please address this when patient is near discharge  See separate incidental note 7/20 for more details  Outpatient vascular surgery consult at discretion of primary team/primary care physician     -Advance diet per GI   Continue PPI gtt 72 hours then transition to IV per GI     Critical care can be contacted via [employer] with any questions or concerns

## 2022-07-20 NOTE — ASSESSMENT & PLAN NOTE
7/18/21 - CT findings; stable 54 mm  On chart review patient had a CTA on January 2021 with noted 44 mm AAA  Does not appear AAA has been under surveillance  Refer to incidental findings noted on 07/20    Plan:  Please arrange for outpatient follow-up with primary care physician  Would recommend abdominal ultrasound to surveil AAA

## 2022-07-20 NOTE — ASSESSMENT & PLAN NOTE
Hx Decompensated cirrhosis with anasarca, hepatic encephalopathy, thrombocytopenia splenomegaly, hyperammonemia, and  Hepatomegaly most likely secondary to alcohol abuse but could also be due to underlying alcoholic hepatitis  -Previous AFP and right upper quadrant ultrasounds were normal  -Never had screening EGD for varices  -Home lactulose for hepatic encephalopathy  Chandra MCGRAW recommending transfer to a facility with ICU for EGD  -current meld score =26;  Sodium= 133, total bilirubin= 10 6, creatinine= 0 54, INR= 1 9  -an EGD performed showing 2 esophageal varices that underwent subsequent banding  -SBP: Spiking fevers on 7/17  CXR negative for acute findings, ua negative for uti  Blood cultures drawn  Started on ceftriaxone  He will complete a course of antibiotics for SBP prophylaxis  Paracentesis fluid shows 135 wbc's; 9% neutrophils  Fevers have resolved  Patient currently hemodynamically stable      Plan:  1)Abdominal ultrasound ordered to assess for hepatocellular carcinoma & ascites  AFP 3 5    2)GI consulted for suspected UGIB/varices; appreciate recommendations   - s/p EGD; 4 bands for 2 varices; no active bleed   Started on nadolol 40 mg qd   - 2 FFP given prior to EGD    3)IR consulted for paracentesis - procedure planned for tomorrow    4)Continue Ceftriaxone for SBP ppx (day 5)    5)Continue octreotide drip    6)F/u Bcx   - ngtd 48 hrs x2    7)Protonix IV BID    8)Resume lasix and aldactone    9)Diet NPO after midnight    10)Hold AP/AC

## 2022-07-20 NOTE — ASSESSMENT & PLAN NOTE
Alcohol level 448  Patient says last drink was on Friday 7/15  OS ED used serax 10 mg tid for symptom control  Patient advised to stop drinking and is in agreement  However on leaving the room, patient appears to be pouring hand  into his drinks        Plan:  JENNIE protocol  Ativan  MV thiamine folic acid  D/C Serax

## 2022-07-20 NOTE — BRIEF OP NOTE (RAD/CATH)
INTERVENTIONAL RADIOLOGY PROCEDURE NOTE    Date: 7/20/2022    Procedure: Paracentesis    Preoperative diagnosis:   1  Alcoholic cirrhosis of liver without ascites ([facility] [address] )    2  Anasarca    3  Anemia, unspecified type    4  Thrombocytopenia ([facility] [address] )    5  Mass of upper lobe of left lung    6  Substance abuse ([email or web address] )    7  Depression, unspecified depression type         Postoperative diagnosis: Same  Surgeon: Abbi Celestin MD     Assistant: None  No qualified resident was available  Blood loss: None    Specimens: Ascites fluid sent to the laboratory for evaluation     Findings: Ultrasound-guided paracentesis performed  Complications: None immediate      Anesthesia: local

## 2022-07-20 NOTE — CONSULTS
Medical Oncology/Hematology Consult Note  Bravo Johnson, male, 40 y o , 1985,  ICU 03/ICU 03, [id number]       Admitted to ICU on 7/18/22  ED Visit on 7/16/22- for increasing confusion    Assessment and Plan:    1  Chronic microcytic hypochromic anemia  -Anemia evident since 1/19/2021  -Hemoglobin 3 6 on 7/16/22  Prior lab on 5/26/22 was 7 4  Baseline 7-8 g/dL  -s/p 7 units of PRBC transfusions  No source for hemorrhage is identified  Currently on Protonix drip  7/16/22- 3 6  7/17/22- 4 7 -->5 4 -->6 5  7/18/22- 6 3 -->7 1  7/19/22- 6 7-->7 6  7/20/22- 7 7  -Of note, hemoglobin 7 7    -iron panel: iron saturation 4%, ferritin 34    -ruling out hemolytic anemia  Some labs still pending, but LD returned normal, high total bilirubin (if bilirubin elevation is due to hemolysis, the elevation is most likely in the indirect/unconjugated bilirubin)  Haptoglobin pending  However, normal/increase haptoglobin does not necessarily discount the suspicion for hemolysis as haptoglobin is an acute phase reactant that can be increased in the setting of inflammation  Low haptoglobin can also include hepatic insufficiency and or abdominal trauma  -differential includes microangiopathic hemolytic anemia as well  Waiting for Griselda test result ( MAHA is Griselda-negative hemolysis)  Lab findings will also include increased LD, increased direct bilirubin, low haptoglobin  -other systemic disorders that can occur along with mom and thrombocytopenia can include infections and malignancies    - Patient does not have severe hypertension, infection, autoimmune diseases, history of organ/cell transplant  -Denies exposure to new medications that could have precipitated a rapid drop in hemoglobin   -Creatinine 0 55, BUN normal   -Presence of splenomegaly    Labs:  · LD -normal  · Hemolysis smear -schistocytes noted  · retic count Abs- normal, Pct mildly elevated at 2 62%  · Peripheral smear- decreased platelet estimate, slight elevation in nucleated RBC (erythroblasts), 5/100 WBCs  · Direct bilirubin -5 70 elevated   · Haptoglobin-pending  · Griselda test-pending    2  Chronic thrombocytopenia  -Of note, 94K   -Evident since 1/19/21 (one-time normal values in 5/2021)  -No signs of bleeding, no petechiae, diffuse bruising, purpura  -Thrombocytopenia most likely a complication from liver cirrhosis    3  Abnormal Imaging  -CT A/P (7/18/22)- Diffuse hepatic metastases concerning for hepatocellular carcinoma in this cirrhotic patient  Metastatic disease is not excluded given the normal AFP  Marked gallbladder distention  54 mm partially imaged fusiform thoracoabdominal aortic aneurysm  Moderate volume of abdominal and pelvic ascites consistent with simple fluid   -Splenomegaly- enlarged, measuring  17 1 cm     -history of decompensated alcoholic cirrhosis of liver  Complications include hepatic encephalopathy,  -AFP tumor marker at 3 5 (7/16/22)  Lab results in the past were normal as well (4 5 on 1/21/21)  -IR consulted for liver biopsy with INR goal of <1 5, stable hemoglobin prior to procedure    PLAN:  1) Transfuse for hemoglobin <7  2) Work up for hemolytic anemia pending  3) Agree with abdominal US to assess for hepatocellular carcinoma   4) Agree with IR liver biopsy when stable  5) Patient will be set up for outpatient oncology appointment  Requesting to be seen in [address] [place] [address]  Outpatient follow up plan:  to be set up by ABBY Mijares, Cancer Care Coordinator    Thank you for this consult  Latia Maldonado, [address], [address]  Hematology-Oncology      Reason for consultation:  anemia, thrombocytopenia, hepatic metastases    History of present illness: Brenton Garcia is a 40 y o  male with a PMH of alcoholic cirrhosis, asthma, alcohol abuse  He presented in the ED with increasing confusion  He was found to have elevated alcohol level of 448, elevated ammonia level of 98, and anemia with hemoglobin level 3 6   Denies having any hematuria, hematemesis, hematochezia, epistaxis  He reported quitting alcohol use in the past week and half  He reported that he has been suffering of depression since early 2019  He lost his job, his wife filed for divorce, and his girlfriend passed away at home after a violent seizure activity  He currently lives at home with his parents  He has other siblings who can help him if he needs to travel to the [facility]   Patient agrees to pursue treatment if biopsy returns with malignancy  Patient denies nausea, vomiting, fevers, chills  Denies chest pain, shortness a breath  Complains discomfort on his abdominal area, stating that he just wants to eat  Patient will most likely need assistance with alcohol and tobacco dependence along with possible cancer journey      Lucina Mcgraw of Systems: Review of Systems   Constitutional: Positive for activity change, appetite change and fatigue  Negative for chills and fever  HENT: Negative for ear pain and sore throat  Eyes: Negative for pain and visual disturbance  Respiratory: Negative for cough and shortness of breath  Cardiovascular: Positive for leg swelling  Negative for chest pain and palpitations  Gastrointestinal: Negative for abdominal pain and vomiting  Genitourinary: Negative for dysuria and hematuria  Musculoskeletal: Negative for arthralgias and back pain  Skin: Positive for pallor  Negative for color change and rash  Neurological: Positive for weakness  Negative for seizures and syncope  All other systems reviewed and are negative  Oncology History:   Cancer Staging  No matching staging information was found for the patient  Oncology History    No history exists         Past Medical History:   Past Medical History:   Diagnosis Date    AAA (abdominal aortic aneurysm) ([facility] )     Allergic     Anemia     Asthma     Depression 05/18/2021    Essential hypertension 05/06/2019    [facility] 7/18/2022    Obesity     [facility]     Substance abuse ([facility] )        Past Surgical History:   Procedure Laterality Date    LYMPH NODE BIOPSY         Family History   Problem Relation Age of Onset    No Known Problems Mother     Diabetes Father     Prostate cancer Father     Hypertension Father        Social History     Socioeconomic History    Marital status: /Civil Union     Spouse name: Not on file    Number of children: Not on file    Years of education: Not on file    Highest education level: Not on file   Occupational History    Not on file   Tobacco Use    Smoking status: Current Every Day Smoker     Packs/day: 1 00    Smokeless tobacco: Never Used    Tobacco comment: 2/2019; PATIENT VAPES   Vaping Use    Vaping Use: Some days   Substance and Sexual Activity    Alcohol use:  Yes     Alcohol/week: 12 0 standard drinks     Types: 12 Cans of beer per week    Drug use: Not Currently     Types: Heroin     Comment: 7/16/22 recovery    Sexual activity: Not Currently   Other Topics Concern    Not on file   Social History Narrative         Social Determinants of Health     Financial Resource Strain: Not on file   Food Insecurity: Not on file   Transportation Needs: Not on file   Physical Activity: Not on file   Stress: Not on file   Social Connections: Not on file   Intimate Partner Violence: Not on file   Housing Stability: Not on file         Current Facility-Administered Medications:     albumin human (FLEXBUMIN) 25 % injection 25 g, 25 g, Intravenous, TID, Lori Haro MD, 25 g at 07/20/22 0812    albuterol (PROVENTIL HFA,VENTOLIN HFA) inhaler 2 puff, 2 puff, Inhalation, Q6H PRN, Lori Haro MD    buprenorphine-naloxone (Suboxone) film 8 mg, 8 mg, Sublingual, BID, Lori Haro MD, 8 mg at 07/20/22 0813    cefTRIAXone (ROCEPHIN) IVPB (premix in dextrose) 2,000 mg 50 mL, 2,000 mg, Intravenous, Q24H, Lori Haro MD, Stopped at 07/20/22 1107    diphenhydrAMINE (BENADRYL) injection 25 mg, 25 mg, Intravenous, Q6H PRN, Jennifer Rogers MD    escitalopram (LEXAPRO) tablet 20 mg, 20 mg, Oral, Daily, Jennifer Rogers MD, 20 mg at 07/20/22 8999    ferrous sulfate tablet 325 mg, 325 mg, Oral, Daily With Breakfast, Jennifer Rogers MD, 325 mg at 11/07/64 9259    folic acid (FOLVITE) tablet 1 mg, 1 mg, Oral, Daily, Jennifer Rogers MD, 1 mg at 07/20/22 9524    furosemide (LASIX) injection 40 mg, 40 mg, Intravenous, Q12H, MARILEE Holley, 40 mg at 07/20/22 9150    glycerin-hypromellose- (ARTIFICIAL TEARS) ophthalmic solution 1 drop, 1 drop, Both Eyes, Q3H PRN, Nava Keene MD    lactulose oral solution 20 g, 20 g, Oral, BID, Jennifer Rogers MD, 20 g at 07/20/22 7661    LORazepam (ATIVAN) tablet 1 mg, 1 mg, Oral, Q8H PRN, Jennifer Rogers MD, 1 mg at 07/20/22 0746    melatonin tablet 6 mg, 6 mg, Oral, HS, Steve Laboy MD, 6 mg at 07/20/22 0103    multivitamin-minerals (CENTRUM) tablet 1 tablet, 1 tablet, Oral, Daily, Jennifer Rogers MD, 1 tablet at 07/20/22 0816    nadolol (CORGARD) tablet 40 mg, 40 mg, Oral, Daily, MARILEE Holley, 40 mg at 07/20/22 0816    nicotine (NICODERM CQ) 21 mg/24 hr TD 24 hr patch 1 patch, 1 patch, Transdermal, Daily, Jennifer Rogers MD, 1 patch at 07/20/22 0816    ondansetron (ZOFRAN) injection 4 mg, 4 mg, Intravenous, Q6H PRN, Jennifer Rogers MD, 4 mg at 07/19/22 1658    pantoprazole (PROTONIX) 80 mg in sodium chloride 0 9 % 100 mL infusion, 8 mg/hr, Intravenous, Continuous, Jennifer Rogers MD, Last Rate: 10 mL/hr at 07/19/22 2344, 8 mg/hr at 07/19/22 2344    sodium phosphate 21 mmol in dextrose 5 % 250 mL Infusion, 21 mmol, Intravenous, Once, Nava Keene MD, Last Rate: 62 5 mL/hr at 07/20/22 0747, 21 mmol at 07/20/22 0747    spironolactone (ALDACTONE) tablet 50 mg, 50 mg, Oral, Daily, MARILEE Holley, 50 mg at 07/20/22 4857    thiamine tablet 100 mg, 100 mg, Oral, Daily, Jennifer Rogers MD, 100 mg at 07/20/22 0812    Medications Prior to Admission   Medication    albuterol (Ventolin HFA) 90 mcg/act inhaler    ammonium lactate (LAC-HYDRIN) 12 % lotion    buprenorphine-naloxone (SUBOXONE) 8-2 mg per SL tablet    escitalopram (LEXAPRO) 20 mg tablet    ferrous sulfate 324 (65 Fe) mg    folic acid (FOLVITE) 1 mg tablet    furosemide (LASIX) 40 mg tablet    lactulose (CHRONULAC) 10 g/15 mL solution    nicotine (NICODERM CQ) 14 mg/24hr TD 24 hr patch    pantoprazole (PROTONIX) 40 mg tablet    spironolactone (ALDACTONE) 50 mg tablet    zolpidem (AMBIEN CR) 6 25 MG CR tablet       Allergies   Allergen Reactions    Levofloxacin Other (See Comments)     BLACKED OUT         Physical Exam:     /59   Pulse 77   Temp 99 4 °F (37 4 °C) (Oral)   Resp 22   Wt 91 5 kg (201 lb 11 5 oz)   SpO2 93%   BMI 26 61 kg/m²     Physical Exam  Constitutional:       Appearance: He is obese  He is ill-appearing  HENT:      Head: Normocephalic and atraumatic  Mouth/Throat:      Mouth: Mucous membranes are dry  Eyes:      Extraocular Movements: Extraocular movements intact  Conjunctiva/sclera: Conjunctivae normal    Cardiovascular:      Rate and Rhythm: Normal rate and regular rhythm  Pulmonary:      Effort: Pulmonary effort is normal       Comments: Decreased breath sounds BL LL  Abdominal:      Palpations: Abdomen is soft  Tenderness: There is abdominal tenderness  Musculoskeletal:      Cervical back: Normal range of motion  Right lower leg: Edema present  Left lower leg: Edema present  Skin:     General: Skin is warm and dry  Findings: Bruising present  Neurological:      Mental Status: He is alert and oriented to person, place, and time  Psychiatric:         Mood and Affect: Mood normal          Behavior: Behavior normal          Thought Content:  Thought content normal          Recent Results (from the past 48 hour(s))   CBC and differential    Collection Time: 07/18/22 6:02 PM   Result Value Ref Range    WBC 4 93 4 31 - 10 16 Thousand/uL    RBC 3 10 (L) 3 88 - 5 62 Million/uL    Hemoglobin 7 1 (L) 12 0 - 17 0 g/dL    Hematocrit 23 3 (L) 36 5 - 49 3 %    MCV 75 (L) 82 - 98 fL    MCH 22 3 (L) 26 8 - 34 3 pg    MCHC 29 6 (L) 31 4 - 37 4 g/dL    RDW 28 5 (H) 11 6 - 15 1 %    Platelets 66 (L) 206 - 390 Thousands/uL    nRBC 3 /100 WBCs    Neutrophils Relative 68 43 - 75 %    Immat GRANS % 1 0 - 2 %    Lymphocytes Relative 17 14 - 44 %    Monocytes Relative 12 4 - 12 %    Eosinophils Relative 1 0 - 6 %    Basophils Relative 1 0 - 1 %    Neutrophils Absolute 3 35 1 85 - 7 62 Thousands/µL    Immature Grans Absolute 0 07 0 00 - 0 20 Thousand/uL    Lymphocytes Absolute 0 82 0 60 - 4 47 Thousands/µL    Monocytes Absolute 0 58 0 17 - 1 22 Thousand/µL    Eosinophils Absolute 0 05 0 00 - 0 61 Thousand/µL    Basophils Absolute 0 06 0 00 - 0 10 Thousands/µL   Comprehensive metabolic panel    Collection Time: 07/18/22  6:02 PM   Result Value Ref Range    Sodium 133 (L) 135 - 147 mmol/L    Potassium 3 7 3 5 - 5 3 mmol/L    Chloride 92 (L) 96 - 108 mmol/L    CO2 35 (H) 21 - 32 mmol/L    ANION GAP 6 4 - 13 mmol/L    BUN 7 5 - 25 mg/dL    Creatinine 0 52 (L) 0 60 - 1 30 mg/dL    Glucose 97 65 - 140 mg/dL    Calcium 9 0 8 4 - 10 2 mg/dL     (H) 13 - 39 U/L    ALT 24 7 - 52 U/L    Alkaline Phosphatase 213 (H) 34 - 104 U/L    Total Protein 7 5 6 4 - 8 4 g/dL    Albumin 3 6 3 5 - 5 0 g/dL    Total Bilirubin 10 94 (H) 0 20 - 1 00 mg/dL    eGFR 136 ml/min/1 73sq m   Calcium, ionized    Collection Time: 07/18/22  6:02 PM   Result Value Ref Range    Calcium, Ionized 1 05 (L) 1 12 - 1 32 mmol/L   Protime-INR    Collection Time: 07/18/22  6:02 PM   Result Value Ref Range    Protime 23 0 (H) 11 6 - 14 5 seconds    INR 2 07 (H) 0 84 - 1 19   Phosphorus    Collection Time: 07/19/22  4:27 AM   Result Value Ref Range    Phosphorus 2 5 (L) 2 7 - 4 5 mg/dL   Protime-INR    Collection Time: 07/19/22  4:27 AM Result Value Ref Range    Protime 24 7 (H) 11 6 - 14 5 seconds    INR 2 27 (H) 0 84 - 1 19   Comprehensive metabolic panel    Collection Time: 07/19/22  4:27 AM   Result Value Ref Range    Sodium 133 (L) 135 - 147 mmol/L    Potassium 3 9 3 5 - 5 3 mmol/L    Chloride 93 (L) 96 - 108 mmol/L    CO2 34 (H) 21 - 32 mmol/L    ANION GAP 6 4 - 13 mmol/L    BUN 8 5 - 25 mg/dL    Creatinine 0 51 (L) 0 60 - 1 30 mg/dL    Glucose 86 65 - 140 mg/dL    Calcium 8 8 8 4 - 10 2 mg/dL    AST 85 (H) 13 - 39 U/L    ALT 19 7 - 52 U/L    Alkaline Phosphatase 194 (H) 34 - 104 U/L    Total Protein 7 1 6 4 - 8 4 g/dL    Albumin 3 5 3 5 - 5 0 g/dL    Total Bilirubin 10 37 (H) 0 20 - 1 00 mg/dL    eGFR 137 ml/min/1 73sq m   CBC and differential    Collection Time: 07/19/22  4:27 AM   Result Value Ref Range    WBC 5 11 4 31 - 10 16 Thousand/uL    RBC 2 92 (L) 3 88 - 5 62 Million/uL    Hemoglobin 6 7 (LL) 12 0 - 17 0 g/dL    Hematocrit 22 3 (L) 36 5 - 49 3 %    MCV 76 (L) 82 - 98 fL    MCH 22 9 (L) 26 8 - 34 3 pg    MCHC 30 0 (L) 31 4 - 37 4 g/dL    RDW 28 5 (H) 11 6 - 15 1 %    Platelets 82 (L) 796 - 390 Thousands/uL    nRBC 1 /100 WBCs    Neutrophils Relative 60 43 - 75 %    Immat GRANS % 1 0 - 2 %    Lymphocytes Relative 23 14 - 44 %    Monocytes Relative 12 4 - 12 %    Eosinophils Relative 2 0 - 6 %    Basophils Relative 2 (H) 0 - 1 %    Neutrophils Absolute 3 11 1 85 - 7 62 Thousands/µL    Immature Grans Absolute 0 04 0 00 - 0 20 Thousand/uL    Lymphocytes Absolute 1 17 0 60 - 4 47 Thousands/µL    Monocytes Absolute 0 59 0 17 - 1 22 Thousand/µL    Eosinophils Absolute 0 12 0 00 - 0 61 Thousand/µL    Basophils Absolute 0 08 0 00 - 0 10 Thousands/µL   Magnesium    Collection Time: 07/19/22  4:27 AM   Result Value Ref Range    Magnesium 2 2 1 9 - 2 7 mg/dL   Bilirubin, direct    Collection Time: 07/19/22  4:27 AM   Result Value Ref Range    Bilirubin, Direct 5 70 (H) 0 00 - 0 20 mg/dL   Prepare Leukoreduced RBC: 1 Units, Leukoreduced Collection Time: 07/19/22  6:30 AM   Result Value Ref Range    Unit Product Code V0471U77     Unit Number [id number]     Unit ABO A     Unit RH POS     Crossmatch Compatible     Unit Dispense Status Presumed Trans     Unit Product Volume 350 ml   Type and screen    Collection Time: 07/19/22 10:39 AM   Result Value Ref Range    ABO Grouping A     Rh Factor Positive     Antibody Screen Negative     Specimen Expiration Date [id number]    Prepare fresh frozen plasma: 1 Units    Collection Time: 07/19/22  4:49 PM   Result Value Ref Range    Unit Product Code F3340O71     Unit Number [id number]     Unit ABO A     Unit [facility] POS     Unit Dispense Status Crossmatched     Unit Product Volume 280 ml   Prepare Leukoreduced RBC: 1 Units    Collection Time: 07/19/22  4:51 PM   Result Value Ref Range    Unit Product Code U6673U43     Unit Number [id number]     Unit ABO A     Unit [facility] POS     Crossmatch Compatible     Unit Dispense Status Crossmatched     Unit Product Volume 350 ml   Hemoglobin and hematocrit, blood    Collection Time: 07/19/22  5:48 PM   Result Value Ref Range    Hemoglobin 7 6 (L) 12 0 - 17 0 g/dL    Hematocrit 24 7 (L) 36 5 - 49 3 %   Protime-INR    Collection Time: 07/19/22  5:48 PM   Result Value Ref Range    Protime 22 7 (H) 11 6 - 14 5 seconds    INR 2 03 (H) 0 84 - 1 19   Lactate dehydrogenase    Collection Time: 07/19/22  5:48 PM   Result Value Ref Range     140 - 271 U/L   Retic Count    Collection Time: 07/19/22  5:48 PM   Result Value Ref Range    Retic Ct Abs 86,500 14,356 - 105,094    Retic Ct Pct 2 62 (H) 0 37 - 1 87 %   Hemolysis Smear    Collection Time: 07/19/22  5:51 PM   Result Value Ref Range    Hemolysis Smear Schistocytes noted    CBC and differential    Collection Time: 07/20/22  4:29 AM   Result Value Ref Range    WBC 3 91 (L) 4 31 - 10 16 Thousand/uL    RBC 3 25 (L) 3 88 - 5 62 Million/uL    Hemoglobin 7 7 (L) 12 0 - 17 0 g/dL    Hematocrit 25 4 (L) 36 5 - 49 3 %    MCV 78 (L) 82 - 98 fL    MCH 23 7 (L) 26 8 - 34 3 pg    MCHC 30 3 (L) 31 4 - 37 4 g/dL    RDW 28 8 (H) 11 6 - 15 1 %    Platelets 94 (L) 334 - 390 Thousands/uL    nRBC 1 /100 WBCs    Neutrophils Relative 82 (H) 43 - 75 %    Immat GRANS % 1 0 - 2 %    Lymphocytes Relative 12 (L) 14 - 44 %    Monocytes Relative 5 4 - 12 %    Eosinophils Relative 0 0 - 6 %    Basophils Relative 0 0 - 1 %    Neutrophils Absolute 3 20 1 85 - 7 62 Thousands/µL    Immature Grans Absolute 0 04 0 00 - 0 20 Thousand/uL    Lymphocytes Absolute 0 45 (L) 0 60 - 4 47 Thousands/µL    Monocytes Absolute 0 21 0 17 - 1 22 Thousand/µL    Eosinophils Absolute 0 00 0 00 - 0 61 Thousand/µL    Basophils Absolute 0 01 0 00 - 0 10 Thousands/µL   Comprehensive metabolic panel    Collection Time: 07/20/22  4:29 AM   Result Value Ref Range    Sodium 131 (L) 135 - 147 mmol/L    Potassium 3 9 3 5 - 5 3 mmol/L    Chloride 91 (L) 96 - 108 mmol/L    CO2 34 (H) 21 - 32 mmol/L    ANION GAP 6 4 - 13 mmol/L    BUN 9 5 - 25 mg/dL    Creatinine 0 55 (L) 0 60 - 1 30 mg/dL    Glucose 151 (H) 65 - 140 mg/dL    Calcium 9 2 8 4 - 10 2 mg/dL    AST 63 (H) 13 - 39 U/L    ALT 17 7 - 52 U/L    Alkaline Phosphatase 181 (H) 34 - 104 U/L    Total Protein 7 4 6 4 - 8 4 g/dL    Albumin 3 9 3 5 - 5 0 g/dL    Total Bilirubin 11 16 (H) 0 20 - 1 00 mg/dL    eGFR 133 ml/min/1 73sq m   Magnesium    Collection Time: 07/20/22  4:29 AM   Result Value Ref Range    Magnesium 1 9 1 9 - 2 7 mg/dL   Phosphorus    Collection Time: 07/20/22  4:29 AM   Result Value Ref Range    Phosphorus 2 0 (L) 2 7 - 4 5 mg/dL   Prepare Leukoreduced RBC: 1 Units    Collection Time: 07/20/22  5:47 AM   Result Value Ref Range    Unit Product Code R3840I24     Unit Number [id number]     Unit ABO A     Unit RH POS     Crossmatch Compatible     Unit Dispense Status Presumed Trans     Unit Product Volume 350 ml   Prepare fresh frozen plasma: 1 Units    Collection Time: 07/20/22  5:47 AM   Result Value Ref Range    Unit Product Code W7988L76     Unit Number [id number]     Unit ABO A     Unit [facility] POS     Unit Dispense Status Presumed Trans     Unit Product Volume 280 ml   Prepare fresh frozen plasma: 1 Units    Collection Time: 07/20/22  5:47 AM   Result Value Ref Range    Unit Product Code U7146G83     Unit Number [id number]     Unit ABO A     Unit [facility] POS     Unit Dispense Status Presumed Trans     Unit Product Volume 250 ml       EGD    Result Date: 7/19/2022  Narrative: [facility] [facility], [address] [place] [address] [phone] DATE OF SERVICE: 7/19/22 PHYSICIAN(S): Attending: Hamlet Pineda MD Fellow: No Staff Documented INDICATION: Alcoholic cirrhosis of liver without ascites ([facility] ), Anemia, unspecified type POST-OP DIAGNOSIS: See the impression below  PREPROCEDURE: Informed consent was obtained for the procedure, including sedation  Risks of perforation, hemorrhage, adverse drug reaction and aspiration were discussed  The patient was placed in the left lateral decubitus position  Patient was explained about the risks and benefits of the procedure  Risks including but not limited to bleeding, infection, and perforation were explained in detail  Also explained about less than 100% sensitivity with the exam and other alternatives  DETAILS OF PROCEDURE: Patient was taken to the procedure room where a time out was performed to confirm correct patient and correct procedure  The patient underwent general anesthesia, which was administered by an anesthesia professional  The patient's blood pressure, heart rate, level of consciousness, respirations and oxygen were monitored throughout the procedure  The scope was advanced to the second part of the duodenum  Retroflexion was performed in the fundus  The patient experienced no blood loss  The procedure was not difficult  The patient tolerated the procedure well  There were no apparent complications   ANESTHESIA INFORMATION: ASA: IV Anesthesia Type: Anesthesia type not filed in the log  MEDICATIONS: No administrations occurring from 1654 to 1718 on 07/19/22 FINDINGS: Esophagus-small sliding hiatal hernia  2 column varices noted and placed 4 bands  Stomach-diffuse portal hypertensive gastropathy noted  The duodenum appeared normal  SPECIMENS: * No specimens in log *     Impression: As above RECOMMENDATION: Schedule repeat EGD in 4-6 weeks Start on nadolol 40 mg daily   Doreen Granados MD     CT abdomen pelvis wo contrast    Result Date: 7/18/2022  Narrative: CT ABDOMEN AND PELVIS WITHOUT IV CONTRAST INDICATION:   Retroperitoneal hematoma suspected Anemia, rectal exam negative, please evaluate for retroperitoneal bleed  COMPARISON:  CTA chest January 20, 2021 TECHNIQUE:  CT examination of the abdomen and pelvis was performed without intravenous contrast  This examination was performed without intravenous contrast in the context of the critical nationwide Omnipaque shortage  Axial, sagittal, and coronal 2D reformatted images were created from the source data and submitted for interpretation  Radiation dose length product (DLP) for this visit:  723 2 mGy-cm   This examination, like all CT scans performed in the [facility], was performed utilizing techniques to minimize radiation dose exposure, including the use of iterative reconstruction and automated exposure control  Enteric contrast was not administered  FINDINGS: ABDOMEN LOWER CHEST:  No clinically significant abnormality identified in the visualized lower chest  LIVER/BILIARY TREE:  The liver demonstrates cirrhotic morphology  Innumerable hypodense masses are present throughout the entire liver  GALLBLADDER:  The gallbladder is markedly distended containing multiple gallstones  SPLEEN:  The spleen is enlarged, measuring  17 1 cm  PANCREAS:  Unremarkable  ADRENAL GLANDS:  Unremarkable  KIDNEYS/URETERS:  Unremarkable  No hydronephrosis  STOMACH AND BOWEL:  Unremarkable   APPENDIX:  No findings to suggest appendicitis  ABDOMINOPELVIC CAVITY:  Moderate volume of abdominal and pelvic ascites consistent with simple fluid ([place]oun[place] unit 5)  No pneumoperitoneum  Evaluation without intravenous contrast is limited, but there are likely multiple erin hepatis and celiac axis lymph nodes  VESSELS:  54 mm fusiform thoracoabdominal aortic aneurysm  The proximal extent was not imaged  PELVIS REPRODUCTIVE ORGANS:  Unremarkable for patient's age  URINARY BLADDER:  Collapsed around a Rizo catheter  ABDOMINAL WALL/INGUINAL REGIONS:  Unremarkable  OSSEOUS STRUCTURES:  No acute fracture or destructive osseous lesion  Impression: Diffuse hepatic metastases concerning for hepatocellular carcinoma in this cirrhotic patient  Metastatic disease is not excluded given the normal AFP  Splenomegaly  Marked gallbladder distention  54 mm partially imaged fusiform thoracoabdominal aortic aneurysm  No source for hemorrhage is identified  * I personally telephoned this result to Chanda Pearson on 7/18/2022 4:15 PM  Workstation performed: [id number]     XR chest portable    Result Date: 7/17/2022  Narrative: CHEST INDICATION:   Rhonchi on exam, concern for aspiration pneumonia 2/2 alcohol abuse  COMPARISON:  Chest radiograph from 5/18/2021, chest CT from 1/20/2021  EXAM PERFORMED/VIEWS:  XR CHEST PORTABLE FINDINGS: Cardiomediastinal silhouette appears unremarkable  The lungs are clear  No pneumothorax or pleural effusion  Osseous structures appear within normal limits for patient age  Impression: No acute cardiopulmonary disease  Workstation performed: [id number]     US bedside procedure    Result Date: 7/19/2022  Narrative: 1 2 [id number] 2 [id number] 2      Labs and pertinent reports reviewed  This note has been generated by voice recognition software system  Therefore, there may be spelling, grammar, and or syntax errors  Please contact if questions arise

## 2022-07-20 NOTE — ASSESSMENT & PLAN NOTE
Presents with microcytic anemia hemoglobin of 3 6 s/p 7u pRBC (hgb 7 7) and 2 u FFP  Asymptomatic, vitals stable  Troponin negative    Iron panel showing low iron levels with normal TIBC  Heme occult blood negative in the ED  Patient denying melena and hematemesis  Prior note of febrile reaction with pRBC (was noted after receiving 6 units; no temperature spike after receiving 7th unit, although pt was pre-treated with tylenol)    Plan:  EGD no active bleed; 4 bands placed for 2 varices  Started on nadolol    Received 7 units total pRBCs  Transfuse hgb < 7  Heme consulted; appreciate recs

## 2022-07-20 NOTE — ASSESSMENT & PLAN NOTE
Alcohol level 448  Patient says last drink was on Friday 7/15     [facility] used serax 10 mg tid for symptom control    Plan:  CIWA protocol  Ativan  MV thiamine folic acid  D/C Serax

## 2022-07-20 NOTE — ASSESSMENT & PLAN NOTE
Lab Results   Component Value Date    PLT 94 (L) 07/20/2022    PLT 82 (L) 07/19/2022    PLT 66 (L) 07/18/2022    PLT 78 (L) 07/18/2022     (L) 07/17/2022     Thrombocytopenia most likely 2/2 liver cirrhosis

## 2022-07-21 LAB
ABO GROUP BLD BPU: NORMAL
ABO GROUP BLD: NORMAL
ACTIN IGG SERPL-ACNC: 12 UNITS (ref 0–19)
ALBUMIN SERPL BCP-MCNC: 4.2 G/DL (ref 3.5–5)
ALP SERPL-CCNC: 170 U/L (ref 34–104)
ALT SERPL W P-5'-P-CCNC: 19 U/L (ref 7–52)
ANION GAP SERPL CALCULATED.3IONS-SCNC: 9 MMOL/L (ref 4–13)
AST SERPL W P-5'-P-CCNC: 74 U/L (ref 13–39)
BASOPHILS # BLD AUTO: 0 THOUSANDS/ΜL (ref 0–0.1)
BASOPHILS NFR BLD AUTO: 0 % (ref 0–1)
BILIRUB SERPL-MCNC: 10.63 MG/DL (ref 0.2–1)
BLD GP AB SCN SERPL QL: NEGATIVE
BPU ID: NORMAL
BUN SERPL-MCNC: 6 MG/DL (ref 5–25)
CALCIUM SERPL-MCNC: 9.5 MG/DL (ref 8.4–10.2)
CHLORIDE SERPL-SCNC: 92 MMOL/L (ref 96–108)
CO2 SERPL-SCNC: 32 MMOL/L (ref 21–32)
CREAT SERPL-MCNC: 0.54 MG/DL (ref 0.6–1.3)
DAT POLY-SP REAG RBC QL: NEGATIVE
EOSINOPHIL # BLD AUTO: 0.04 THOUSAND/ΜL (ref 0–0.61)
EOSINOPHIL NFR BLD AUTO: 1 % (ref 0–6)
ERYTHROCYTE [DISTWIDTH] IN BLOOD BY AUTOMATED COUNT: 29.9 % (ref 11.6–15.1)
GFR SERPL CREATININE-BSD FRML MDRD: 134 ML/MIN/1.73SQ M
GLUCOSE SERPL-MCNC: 104 MG/DL (ref 65–140)
HAPTOGLOB SERPL-MCNC: 29 MG/DL (ref 17–317)
HCT VFR BLD AUTO: 25.6 % (ref 36.5–49.3)
HGB BLD-MCNC: 7.9 G/DL (ref 12–17)
IMM GRANULOCYTES # BLD AUTO: 0.03 THOUSAND/UL (ref 0–0.2)
IMM GRANULOCYTES NFR BLD AUTO: 1 % (ref 0–2)
INR PPP: 1.96 (ref 0.84–1.19)
LYMPHOCYTES # BLD AUTO: 0.95 THOUSANDS/ΜL (ref 0.6–4.47)
LYMPHOCYTES NFR BLD AUTO: 18 % (ref 14–44)
MAGNESIUM SERPL-MCNC: 2 MG/DL (ref 1.9–2.7)
MCH RBC QN AUTO: 24.8 PG (ref 26.8–34.3)
MCHC RBC AUTO-ENTMCNC: 30.9 G/DL (ref 31.4–37.4)
MCV RBC AUTO: 80 FL (ref 82–98)
MITOCHONDRIA M2 IGG SER-ACNC: <20 UNITS (ref 0–20)
MONOCYTES # BLD AUTO: 0.68 THOUSAND/ΜL (ref 0.17–1.22)
MONOCYTES NFR BLD AUTO: 13 % (ref 4–12)
NEUTROPHILS # BLD AUTO: 3.62 THOUSANDS/ΜL (ref 1.85–7.62)
NEUTS SEG NFR BLD AUTO: 67 % (ref 43–75)
NRBC BLD AUTO-RTO: 0 /100 WBCS
PHOSPHATE SERPL-MCNC: 2.4 MG/DL (ref 2.7–4.5)
PLATELET # BLD AUTO: 101 THOUSANDS/UL (ref 149–390)
POTASSIUM SERPL-SCNC: 3.5 MMOL/L (ref 3.5–5.3)
PROT SERPL-MCNC: 7.5 G/DL (ref 6.4–8.4)
PROTHROMBIN TIME: 22 SECONDS (ref 11.6–14.5)
RBC # BLD AUTO: 3.19 MILLION/UL (ref 3.88–5.62)
RH BLD: POSITIVE
SODIUM SERPL-SCNC: 133 MMOL/L (ref 135–147)
SPECIMEN EXPIRATION DATE: NORMAL
UNIT DISPENSE STATUS: NORMAL
UNIT PRODUCT CODE: NORMAL
UNIT PRODUCT VOLUME: 280 ML
UNIT RH: NORMAL
WBC # BLD AUTO: 5.32 THOUSAND/UL (ref 4.31–10.16)

## 2022-07-21 PROCEDURE — 80053 COMPREHEN METABOLIC PANEL: CPT

## 2022-07-21 PROCEDURE — 86901 BLOOD TYPING SEROLOGIC RH(D): CPT

## 2022-07-21 PROCEDURE — 85025 COMPLETE CBC W/AUTO DIFF WBC: CPT

## 2022-07-21 PROCEDURE — 86880 COOMBS TEST DIRECT: CPT | Performed by: NURSE PRACTITIONER

## 2022-07-21 PROCEDURE — 86900 BLOOD TYPING SEROLOGIC ABO: CPT

## 2022-07-21 PROCEDURE — C9113 INJ PANTOPRAZOLE SODIUM, VIA: HCPCS

## 2022-07-21 PROCEDURE — 83735 ASSAY OF MAGNESIUM: CPT

## 2022-07-21 PROCEDURE — 86850 RBC ANTIBODY SCREEN: CPT

## 2022-07-21 PROCEDURE — 85610 PROTHROMBIN TIME: CPT

## 2022-07-21 PROCEDURE — 99232 SBSQ HOSP IP/OBS MODERATE 35: CPT | Performed by: INTERNAL MEDICINE

## 2022-07-21 PROCEDURE — 84100 ASSAY OF PHOSPHORUS: CPT

## 2022-07-21 RX ADMIN — ALBUMIN (HUMAN) 25 G: 0.25 INJECTION, SOLUTION INTRAVENOUS at 21:11

## 2022-07-21 RX ADMIN — BUPRENORPHINE AND NALOXONE 8 MG: 8; 2 FILM BUCCAL; SUBLINGUAL at 09:18

## 2022-07-21 RX ADMIN — MULTIPLE VITAMINS W/ MINERALS TAB 1 TABLET: TAB ORAL at 09:17

## 2022-07-21 RX ADMIN — BUPRENORPHINE AND NALOXONE 8 MG: 8; 2 FILM BUCCAL; SUBLINGUAL at 18:28

## 2022-07-21 RX ADMIN — ALBUMIN (HUMAN) 25 G: 0.25 INJECTION, SOLUTION INTRAVENOUS at 16:13

## 2022-07-21 RX ADMIN — NICOTINE 1 PATCH: 21 PATCH, EXTENDED RELEASE TRANSDERMAL at 09:20

## 2022-07-21 RX ADMIN — FOLIC ACID 1 MG: 1 TABLET ORAL at 09:17

## 2022-07-21 RX ADMIN — ESCITALOPRAM OXALATE 20 MG: 20 TABLET ORAL at 09:18

## 2022-07-21 RX ADMIN — SPIRONOLACTONE 100 MG: 100 TABLET ORAL at 09:20

## 2022-07-21 RX ADMIN — SODIUM CHLORIDE 8 MG/HR: 9 INJECTION, SOLUTION INTRAVENOUS at 20:21

## 2022-07-21 RX ADMIN — NADOLOL 40 MG: 40 TABLET ORAL at 09:20

## 2022-07-21 RX ADMIN — FUROSEMIDE 40 MG: 10 INJECTION, SOLUTION INTRAMUSCULAR; INTRAVENOUS at 09:16

## 2022-07-21 RX ADMIN — FERROUS SULFATE TAB 325 MG (65 MG ELEMENTAL FE) 325 MG: 325 (65 FE) TAB at 09:20

## 2022-07-21 RX ADMIN — LORAZEPAM 1 MG: 1 TABLET ORAL at 04:21

## 2022-07-21 RX ADMIN — LACTULOSE 20 G: 20 SOLUTION ORAL at 18:28

## 2022-07-21 RX ADMIN — ALBUMIN (HUMAN) 25 G: 0.25 INJECTION, SOLUTION INTRAVENOUS at 09:16

## 2022-07-21 RX ADMIN — CEFTRIAXONE 2000 MG: 2 INJECTION, SOLUTION INTRAVENOUS at 20:21

## 2022-07-21 RX ADMIN — SODIUM CHLORIDE 8 MG/HR: 9 INJECTION, SOLUTION INTRAVENOUS at 08:38

## 2022-07-21 RX ADMIN — THIAMINE HCL TAB 100 MG 100 MG: 100 TAB at 09:17

## 2022-07-21 RX ADMIN — LORAZEPAM 1 MG: 1 TABLET ORAL at 16:14

## 2022-07-21 RX ADMIN — LACTULOSE 20 G: 20 SOLUTION ORAL at 09:18

## 2022-07-21 NOTE — ASSESSMENT & PLAN NOTE
Alcohol level 448  Patient says last drink was on Friday 7/15  [facility] ED used serax 10 mg tid for symptom control  Patient advised to stop drinking and is in agreement  However on leaving the room, patient appears to be pouring hand  into his drinks        Plan:  JENNIE protocol  Ativan  MV thiamine folic acid  D/C Serax  Patient to be seen by Apple Best

## 2022-07-21 NOTE — ASSESSMENT & PLAN NOTE
CT abdomen:  Diffuse hepatic metastases concerning for hepatocellular carcinoma in this cirrhotic patient  Metastatic disease is not excluded given the normal AFP  AFP WNL    Plan:  Interventional radiology consulted  Liver biopsy performed today  Patient INR is 2 0 today    2 units FFP given in preparation of biopsy  Awaiting results

## 2022-07-21 NOTE — ASSESSMENT & PLAN NOTE
Per discussion with patient's sister: Patient served in the [employer] and spent 18 months in [place], there is concern for PTSD  Sister has reached out to CM inquiring about VA programs to help with PTSD  She tells me that he had drug addiction prior to serving and developed alcohol addiction after his [employer]  Also shares that he went through a divorce 2-3 years ago and a girlfriend that recently passed away last November  Has 1 son and 1 step-daughter that he has not seen in years  Accounts from sister concerning for uncontrolled depression       Plan:  Resume home Lexapro  1 mg ativan TID PRN  Patient to be seen by aL while telehealth

## 2022-07-21 NOTE — ASSESSMENT & PLAN NOTE
Lab Results   Component Value Date     (L) 07/21/2022    PLT 94 (L) 07/20/2022    PLT 82 (L) 07/19/2022    PLT 66 (L) 07/18/2022    PLT 78 (L) 07/18/2022     Thrombocytopenia most likely 2/2 liver cirrhosis   Give 1 unit of platelets if count drops below 50,000

## 2022-07-21 NOTE — CASE MANAGEMENT
Case Management Assessment & Discharge Planning Note    Patient name Yohan Ochoa  Location ICU 03/ICU 75 MRN [id number]  : 1985 Date 2022       Current Admission Date: 2022  Current Admission Diagnosis:Alcoholic cirrhosis of liver without ascites [facility])   Patient Active Problem List    Diagnosis Date Noted    [facility] ) 2022    Chronic bronchitis ([facility] ) 2021    Cardiac murmur 2021    Depression 2021    Anasarca 2021    Alcohol withdrawal ([facility] ) 2021    Thoracoabdominal aortic aneurysm ([facility] ) 2021    Abnormal CT scan 2021    Substance abuse ([facility] ) 2021    Tobacco dependence     Alcoholic cirrhosis of liver without ascites ([facility] ) 2021    Anemia 2021    Thrombocytopenia ([facility] ) 2021    Mass of upper lobe of left lung 2019    Essential hypertension 2019    Obesity, Class I, BMI 30-34 9 2019      LOS (days): 3  Geometric Mean LOS (GMLOS) (days): 4 70  Days to GMLOS:1 7     OBJECTIVE:  PATIENT READMITTED [facility] 35  of Unplanned Readmission Score: 23 17         Current admission status: Inpatient  Referral Reason: Other (Social Issues and Drug/ETOH/MH)    Preferred Pharmacy:   [facility]/pharmacy #[id number]- [address] [address] [place] [address]  Phone: [phone] Fax: [phone] - [facility]  Phone: [phone] Fax: [phone]    Primary Care Provider: Angely Sahni PA-C    Primary Insurance:   Secondary Insurance:     ASSESSMENT:  Willie 26 Proxies    There are no active Health Care Proxies on file         Advance Directives  Does patient have a [address] [place] [address] Avenue?: No  Was patient offered paperwork?: Yes (Declined)  Does patient currently have a Health Care decision maker?: Yes, please see Health Care Proxy section  Does patient have Advance Directives?: No  Was patient offered paperwork?: Yes (Declined)  Primary Contact: Kala Connor (Sister)         Readmission Root Cause  30 Day Readmission: No    Patient Information  Admitted from[de-identified] Home  Mental Status: Alert  During Assessment patient was accompanied by: Not accompanied during assessment  Assessment information provided by[de-identified] Patient  Primary Caregiver: Self  Support Systems: Self, Family members, [address] of Residence: [address] [place] [address] do you live in?: [address] entry access options  Select all that apply : Stairs  Number of steps to enter home : 3  Do the steps have railings?: Yes  Type of Current Residence: Apartment  Floor Level: 1  Upon entering residence, is there a bedroom on the main floor (no further steps)?: Yes  Upon entering residence, is there a bathroom on the main floor (no further steps)?: Yes  In the last 12 months, was there a time when you were not able to pay the mortgage or rent on time?: No  In the last 12 months, how many places have you lived?: 1  In the last 12 months, was there a time when you did not have a steady place to sleep or slept in a shelter (including now)?: No  Homeless/housing insecurity resource given?: N/A  Living Arrangements: Lives w/ Parent(s) (Patient currently lives at home with his parents)  Is patient a ?: Yes  Is patient active with [facility])?: No    Activities of Daily Living Prior to Admission  Functional Status: Independent  Completes ADLs independently?: Yes  Ambulates independently?: Yes  Does patient use assisted devices?: No  Does patient currently own DME?: No  Does patient have a history of Outpatient Therapy (PT/OT)?: No  Does the patient have a history of Short-Term Rehab?: No  Does patient have a history of HHC?: No  Does patient currently have [address]?: No         Patient Information Continued  Income Source:  Other (Comment) (Patient partial SSD from the [place])  Does patient have prescription coverage?: No  Within the past 12 months, you worried that your food would run out before you got the money to buy more : Never true  Within the past 12 months, the food you bought just didn't last and you didn't have money to get more : Never true  Food insecurity resource given?: N/A  Does patient receive dialysis treatments?: No  Does patient have a history of substance abuse?: Yes, Currently using  Current substance use preference: Alcohol/ETOH  Historical substance use preference: Alcohol/ETOH  History of Withdrawal Symptoms: Denies past symptoms  Is patient currently in treatment for substance abuse?: No  Treatment options provided (CATCH referral made  Patient repors no HX of any IP treatment but has been to counseling and groups )  Does patient have a history of Mental Health Diagnosis?: Yes (PTSD)  Is patient receiving treatment for mental health?: No  Patient declined treatment information    Has patient received inpatient treatment related to mental health in the last 2 years?: No         Means of Transportation  Means of Transport to Appts[de-identified] Drives Self  In the past 12 months, has lack of transportation kept you from medical appointments or from getting medications?: No  In the past 12 months, has lack of transportation kept you from meetings, work, or from getting things needed for daily living?: No  Was application for public transport provided?: N/A        DISCHARGE DETAILS:    Discharge planning discussed with[de-identified] Patient in person and then his sister on the phone  Freedom of Choice: Yes  Comments - Freedom of Choice: No current CM DC needs discussed or identifed  CM contacted family/caregiver?: Yes Jorge Coles (Sister))  Were Treatment Team discharge recommendations reviewed with patient/caregiver?: Yes          Contacts  Patient Contacts: Jorge Coles (sister)  Relationship to Patient[de-identified] Family  Contact Method: Phone  Phone Number: [phone]  Reason/Outcome: Emergency Contact, Discharge Planning, Continuity of Care              Other Referral/Resources/Interventions Provided:  Financial Resources Provided: Financial Counselor (CM emailed hospital financial counselors for a PATHS referral as patient does not have any current insurance)  Interventions: D&A Warm Handoff  Referral Comments: CM made a CATCH referral for patient  Additional Comments: CM met with patient at bedside  Patient is alert and oriented laying in bed  CM Assessment completed  Patient is currently living at home with his parents  He states that he is unemployed and using his GI [employer] to go to school  CM asked if it was okay to contact his sister and patient was agreeable  CM called patient's sister and discussed CM Assessment and DCP/needs  They would like to see patient get some treatment but understand he needs to be agreeable to this  There are 5 siblings  They have been encouraging him to seek the help he needs and he tells them he is handling it but they are unsure if he actually is  CM explained that we referred him to the [facility] program and will see if he opens up to them as he did not to CM  Patient's sister stated she is going to talk to her sisters and try to see about coming to see him tomorrow  As a family they need to have a talk with him as they are unsure if they are going to allow him to return to their parents without getting treatment first                   CM reviewed discharge planning process including the following: identifying caregivers at home and preference for d/c planning  CM will continue to follow for care coordination and update assessment as necessary

## 2022-07-21 NOTE — ASSESSMENT & PLAN NOTE
Hx Decompensated cirrhosis with anasarca, hepatic encephalopathy, thrombocytopenia splenomegaly, hyperammonemia, and  Hepatomegaly most likely secondary to alcohol abuse but could also be due to underlying alcoholic hepatitis  -Previous AFP and right upper quadrant ultrasounds were normal  -Never had screening EGD for varices  -Home lactulose for hepatic encephalopathy  Rico MCGRAW recommending transfer to a facility with ICU for EGD  -current meld score =26  -an EGD performed showing 2 esophageal varices that underwent subsequent banding  -SBP: Spiking fevers on 7/17  CXR negative for acute findings, ua negative for uti  Blood cultures drawn  Started on ceftriaxone  He will complete a course of antibiotics for SBP prophylaxis  Paracentesis fluid shows 135 wbc's; 9% neutrophils  Fevers have resolved  Patient currently hemodynamically stable  -Abdominal ultrasound ordered to assess for hepatocellular carcinoma & ascites  -AFP 3 5  -GI consulted for suspected UGIB/varices; appreciate recommendations     - s/p EGD; 4 bands for 2 varices; no active bleed   Started on nadolol 40 mg qd      - 2 FFP given prior to EGD       Plan:  Continue octreotide drip  F/u Bcx   - no growth after 4 days  Protonix IV BID  Resume lasix and aldactone  Hold AP/AC  On nadolol

## 2022-07-21 NOTE — PROGRESS NOTES
[facility]  Progress Note - Leslie Hodge 1985, 40 y o  male MRN: [id number]  Unit/Bed#: ICU 03 Encounter: [id number]  Primary Care Provider: Mely Garcia PA-C   Date and time admitted to hospital: 7/18/2022  2:53 PM    * Alcoholic cirrhosis of liver without ascites ([facility] [address] )  Assessment & Plan  Hx Decompensated cirrhosis with anasarca, hepatic encephalopathy, thrombocytopenia splenomegaly, hyperammonemia, and  Hepatomegaly most likely secondary to alcohol abuse but could also be due to underlying alcoholic hepatitis  -Previous AFP and right upper quadrant ultrasounds were normal  -Never had screening EGD for varices  -Home lactulose for hepatic encephalopathy  [facility] recommending transfer to a facility with ICU for EGD  -current meld score =26;  Sodium= 133, total bilirubin= 10 6, creatinine= 0 54, INR= 1 9  -an EGD performed showing 2 esophageal varices that underwent subsequent banding  -SBP: Spiking fevers on 7/17  CXR negative for acute findings, ua negative for uti  Blood cultures drawn  Started on ceftriaxone  He will complete a course of antibiotics for SBP prophylaxis  Paracentesis fluid shows 135 wbc's; 9% neutrophils  Fevers have resolved  Patient currently hemodynamically stable      Plan:  1)Abdominal ultrasound ordered to assess for hepatocellular carcinoma & ascites  AFP 3 5    2)GI consulted for suspected UGIB/varices; appreciate recommendations   - s/p EGD; 4 bands for 2 varices; no active bleed   Started on nadolol 40 mg qd   - 2 FFP given prior to EGD    3)IR consulted for paracentesis - procedure planned for tomorrow    4)Continue Ceftriaxone for SBP ppx (day 5)    5)Continue octreotide drip    6)F/u Bcx   - ngtd 48 hrs x2    7)Protonix IV BID    8)Resume lasix and aldactone    9)Diet NPO after midnight    10)Hold AP/AC    Liver metastasis (HCC)  Assessment & Plan  CT abdomen:  Diffuse hepatic metastases concerning for hepatocellular carcinoma in this cirrhotic patient  Metastatic disease is not excluded given the normal AFP  AFP WNL    Plan:  Interventional radiology consulted  Liver biopsy planned for tomorrow  Patient INR is 1 9 today  Currently coordinating with the blood bank for preparation of 2 units of cross-matched blood  FFP to be given within 1 hour of biopsy    Depression  Assessment & Plan  Per discussion with patient's sister: Patient served in the [employer] and spent 18 months in [place], there is concern for PTSD  Sister has reached out to CM inquiring about VA programs to help with PTSD  She tells me that he had drug addiction prior to serving and developed alcohol addiction after his [employer]  Also shares that he went through a divorce 2-3 years ago and a girlfriend that recently passed away last November  Has 1 son and 1 step-daughter that he has not seen in years  Accounts from sister concerning for uncontrolled depression  Plan:  Resume home Lexapro  1 mg ativan TID PRN  Psychiatric consult ordered    Anemia  Assessment & Plan  Presents with microcytic anemia hemoglobin of 3 6 s/p 7u pRBC (hgb 7 7) and 2 u FFP  Asymptomatic, vitals stable  Troponin negative    Iron panel showing low iron levels with normal TIBC  Heme occult blood negative in the ED  Patient denying melena and hematemesis  Prior note of febrile reaction with pRBC (was noted after receiving 6 units; no temperature spike after receiving 7th unit, although pt was pre-treated with tylenol)    Plan:  EGD no active bleed; 4 bands placed for 2 varices  Started on nadolol  Received 7 units total pRBCs  Transfuse hgb < 7  Heme consulted; appreciate recs      Alcohol withdrawal (HCC)  Assessment & Plan  Alcohol level 448  Patient says last drink was on Friday 7/15  [facility] serax 10 mg tid for symptom control  Patient advised to stop drinking and is in agreement  However on leaving the room, patient appears to be pouring hand  into his drinks  Plan: SUNGWA protocol  Ativan  MV thiamine folic acid  D/C Serax  Psychiatric consult       Chronic bronchitis (HCC)  Assessment & Plan  Resume home inhalers for asthma    Thoracoabdominal aortic aneurysm [facility])  Assessment & Plan  7/18/21 - CT findings; stable 54 mm  On chart review patient had a CTA on January 2021 with noted 44 mm AAA  Does not appear AAA has been under surveillance  Refer to incidental findings noted on 07/20    Plan:  Please arrange for outpatient follow-up with primary care physician  Would recommend abdominal ultrasound to surveil AAA    Thrombocytopenia [facility])  Assessment & Plan  Lab Results   Component Value Date     (L) 07/21/2022    PLT 94 (L) 07/20/2022    PLT 82 (L) 07/19/2022    PLT 66 (L) 07/18/2022    PLT 78 (L) 07/18/2022     Thrombocytopenia most likely 2/2 liver cirrhosis     Tobacco dependence  Assessment & Plan  Nicotine patch    Substance abuse ([facility] [address] )  Assessment & Plan  · Patient reports taking 8-2 Suboxone BID  · [place] confirmed 16 mg daily     Psychiatric consult ordered        VTE Pharmacologic Prophylaxis: VTE Score: 1 Patient unable to ambulate  Compression devices applied    Patient Centered Rounds: I performed bedside rounds with nursing staff today  Discussions with Specialists or Other Care Team Provider:     Education and Discussions with Family / Patient: Updated  (sister) via phone  Current Length of Stay: 3 day(s)  Current Patient Status: Inpatient   Discharge Plan: Anticipate discharge in >72 hrs to rehab facility  Code Status: Level 1 - Full Code    Subjective:   Patient seen this morning at bedside  AAO x3  Does not appear to be in any distress  Patient acknowledges he has a problem with alcohol  Emphasis was put on immediate cessation of alcohol intake  Patient has described the circumstances surrounding his alcoholism  He reports having periods of sobriety followed by lengthy periods of alcohol abuse    The patient denies withdrawal symptoms  Denies chest pain or shortness of breath  Patient says he can still make good urine  Patient endorses some nausea  Spoke with patient's sister this afternoon and patient had told her he had quit drinking despite elevated ETOH levels on admission  Patient noted to be putting hand  in his water after visit  Objective:     Vitals:   Temp (24hrs), Av 5 °F (36 9 °C), Min:97 8 °F (36 6 °C), Max:99 1 °F (37 3 °C)    Temp:  [97 8 °F (36 6 °C)-99 1 °F (37 3 °C)] 98 9 °F (37 2 °C)  HR:  [63-79] 78  Resp:  [12-19] 16  BP: ()/(50-59) 109/56  SpO2:  [92 %-95 %] 93 %  Body mass index is 25 28 kg/m²  Input and Output Summary (last 24 hours): Intake/Output Summary (Last 24 hours) at 2022 1513  Last data filed at 2022 1116  Gross per 24 hour   Intake 988 17 ml   Output 5000 ml   Net -4011 83 ml       Physical Exam:   Physical Exam  Constitutional:       General: He is not in acute distress  Appearance: He is ill-appearing  He is not toxic-appearing  HENT:      Head: Normocephalic and atraumatic  Eyes:      General: Scleral icterus present  Extraocular Movements: Extraocular movements intact  Pupils: Pupils are equal, round, and reactive to light  Cardiovascular:      Rate and Rhythm: Normal rate and regular rhythm  Pulses: Normal pulses  Heart sounds: Normal heart sounds  No murmur heard  No gallop  Pulmonary:      Effort: Pulmonary effort is normal  No respiratory distress  Breath sounds: Wheezing present  Comments: Coarse crackles heard throughout lung fields bilaterally  Mild wheezing on expiration appreciated  Chest:      Chest wall: No tenderness  Abdominal:      General: Bowel sounds are normal  There is distension  Palpations: There is no mass  Tenderness: There is no abdominal tenderness  There is no guarding  Comments: Abdomen appears distended  No caput medusa noted    No tenderness on palpation  Musculoskeletal:         General: Normal range of motion  Comments: Plus two pedal edema bilaterally  Muscle wasting in all 4 extremities  Skin:     General: Skin is warm  Coloration: Skin is not jaundiced or pale  Findings: No rash  Neurological:      General: No focal deficit present  Mental Status: He is oriented to person, place, and time  Mental status is at baseline  Cranial Nerves: No cranial nerve deficit  Sensory: No sensory deficit  Motor: No weakness  Psychiatric:         Mood and Affect: Mood normal          Behavior: Behavior normal           Additional Data:     Labs:  Results from last 7 days   Lab Units 07/21/22  0428   WBC Thousand/uL 5 32   HEMOGLOBIN g/dL 7 9*   HEMATOCRIT % 25 6*   PLATELETS Thousands/uL 101*   NEUTROS PCT % 67   LYMPHS PCT % 18   MONOS PCT % 13*   EOS PCT % 1     Results from last 7 days   Lab Units 07/21/22  0428   SODIUM mmol/L 133*   POTASSIUM mmol/L 3 5   CHLORIDE mmol/L 92*   CO2 mmol/L 32   BUN mg/dL 6   CREATININE mg/dL 0 54*   ANION GAP mmol/L 9   CALCIUM mg/dL 9 5   ALBUMIN g/dL 4 2   TOTAL BILIRUBIN mg/dL 10 63*   ALK PHOS U/L 170*   ALT U/L 19   AST U/L 74*   GLUCOSE RANDOM mg/dL 104     Results from last 7 days   Lab Units 07/21/22  0428   INR  1 96*             Results from last 7 days   Lab Units 07/17/22  1838 07/17/22  1113   PROCALCITONIN ng/ml 0 44* 0 42*       Lines/Drains:  Invasive Devices  Report    Peripheral Intravenous Line  Duration           Peripheral IV 07/16/22 Left Antecubital 4 days    Peripheral IV 07/19/22 Right Hand 1 day                      Imaging: Reviewed radiology reports from this admission including: abdominal/pelvic CT    Recent Cultures (last 7 days):   Results from last 7 days   Lab Units 07/20/22  1613 07/18/22  1033 07/17/22  2042   BLOOD CULTURE   --  Staphylococcus coagulase negative* No Growth at 72 hrs  No Growth at 72 hrs     GRAM STAIN RESULT  1+ Polys  No bacteria seen  --   --    BODY FLUID CULTURE, STERILE  No growth  --   --        Last 24 Hours Medication List:   Current Facility-Administered Medications   Medication Dose Route Frequency Provider Last Rate    albumin human  25 g Intravenous TID Denise Wayne MD 0 g (07/20/22 2150)    albuterol  2 puff Inhalation Q6H PRN Denise Wayne MD      buprenorphine-naloxone  8 mg Sublingual BID Denise Wayne MD      cefTRIAXone  2,000 mg Intravenous Q24H Denise Wayne MD Stopped (07/20/22 2150)    diphenhydrAMINE  25 mg Intravenous Q6H PRN Denise Wayne MD      escitalopram  20 mg Oral Daily Denise Wayne MD      ferrous sulfate  325 mg Oral Daily With Breakfast Denise Wayne MD      folic acid  1 mg Oral Daily Denise Wayne MD      furosemide  40 mg Intravenous Q12H OrenMARILEE Shipley      glycerin-hypromellose-  1 drop Both Eyes Q3H PRN Jazmin Duval MD      lactulose  20 g Oral BID Denise Wayne MD      LORazepam  0 5 mg Intravenous Once Jazmin Duval MD      LORazepam  1 mg Oral Q8H PRN Denise Wayne MD      melatonin  6 mg Oral HS Nico Peres MD      multivitamin-minerals  1 tablet Oral Daily Denise Wayne MD      nadolol  40 mg Oral Daily MARILEE Craven      nicotine  1 patch Transdermal Daily Denise Wayne MD      ondansetron  4 mg Intravenous Q6H PRN Denise Wayne MD      pantoprozole (PROTONIX) infusion (Continuous)  8 mg/hr Intravenous Continuous Jazmin Duval MD 8 mg/hr (07/21/22 5708)    spironolactone  100 mg Oral Daily Denise Wayne MD      thiamine  100 mg Oral Daily Denise Wayne MD          Today, Patient Was Seen By: Mian Harmon MD    **Please Note: This note may have been constructed using a voice recognition system  **

## 2022-07-21 NOTE — PROGRESS NOTES
Progress Note - Cris Cortés 40 y o  male MRN: [id number]    Unit/Bed#: ICU 03 Encounter: [id number]        Subjective:   Patient denies any abdominal pain, nausea or vomiting, any hematemesis, melena or hematochezia  Reports to be tolerating full liquid diet without difficulty  Objective:     Vitals: Blood pressure 97/55, pulse 77, temperature 99 1 °F (37 3 °C), temperature source Oral, resp  rate 17, weight 86 9 kg (191 lb 9 3 oz), SpO2 94 %  ,Body mass index is 25 28 kg/m²  Intake/Output Summary (Last 24 hours) at 7/21/2022 1011  Last data filed at 7/21/2022 0600  Gross per 24 hour   Intake 659 17 ml   Output 6000 ml   Net -5340 83 ml       Physical Exam:   General appearance: alert, jaundiced, chronically ill-appearing, appears stated age and cooperative  Lungs: clear to auscultation bilaterally, no labored breathing/accessory muscle use  Heart: regular rate and rhythm, S1, S2 normal, no murmur, click, rub or gallop  Abdomen: soft, non-tender; bowel sounds normal; no masses,  no organomegaly  Extremities: no edema    Invasive Devices  Report    Peripheral Intravenous Line  Duration           Peripheral IV 07/16/22 Left Antecubital 4 days    Peripheral IV 07/19/22 Right Hand 1 day                Lab, Imaging and other studies: I have personally reviewed pertinent reports  No results displayed because visit has over 200 results  Assessment/Plan:    1  Decompensated alcoholic cirrhosis, MELD today 25 (stable over last 3 days), complicated by ascites, hepatic encephalopathy, portal hypertension with esophageal varices  Active alcohol abuse, alcohol level noted to be 448 on admission  Patient also noted with diffuse intrahepatic lesions on CT scan 7/18; AFP noted normal, question for non-AFP producing multifocal HCC or intrahepatic metastatic disease    Patient underwent EGD with esophageal variceal band ligation 2 days ago    No evidence of active bleeding at this time, hemoglobin appears stable this morning      -will cautiously advanced to surgical soft diet at this time    -monitor MELD labs daily     - continue Protonix drip for 72 hour total then transition to twice daily dosing    -continue octreotide drip for now    -continue IV ceftriaxone, patient on day 5    -oncology input appreciated, hemolytic anemia workup underway, IR consulted for liver biopsy    -monitor mental status closely, continue lactulose and titrate to 3-4 bowel movements daily    -continue nadolol    - continue diuretics

## 2022-07-21 NOTE — ASSESSMENT & PLAN NOTE
Presents with microcytic anemia hemoglobin of 3 6 s/p 7u pRBC (hgb 7 7) and 2 u FFP  Asymptomatic, vitals stable  Troponin negative    Iron panel showing low iron levels with normal TIBC  Heme occult blood negative in the ED  Patient denying melena and hematemesis  Prior note of febrile reaction with pRBC (was noted after receiving 6 units; no temperature spike after receiving 7th unit, although pt was pre-treated with tylenol)  EGD no active bleed; 4 bands placed for 2 varices  Received 7 units total pRBCs    Plan:  Started on nadolol    Transfuse hgb < 7  Heme consulted; appreciate recs

## 2022-07-21 NOTE — ASSESSMENT & PLAN NOTE
· Patient reports taking 8-2 Suboxone BID  · [place] confirmed 16 mg daily     Patient to be seen by [facility]

## 2022-07-21 NOTE — CONSULTS
Interventional Radiology  Consultation 7/21/2022     Consults  Nick Rockwell   1985   [id number]      Assessment/Plan:  39 yo male with EtOH cirrhosis, presented to [facility] on 7/16 with hepatic failure and profound anemia (Hgb 3 6), [facility], and found on EGD to have 2 column varices (banded) and hypertensive portal gastropathy  CT revealed cirrhotic liver with innumerable hepatic mass lesions (normal AFP), likely non-AFP producing HCC vs mets  Biopsy requested  Pt has INR 1 96 which will need to be corrected (with FFP) prior to and/or at time of bx  Any residual/recurrent ascites may need to be re-tapped to permit post-bx hepatic capsular tamponade, as well  Pt to be NPO tonight, and will determine in the AM the feasibility and timing of bx  D/W Abdoulaye Kim  Medical Problems             Problem List     * (Principal) Alcoholic cirrhosis of liver without ascites (HCC)    Mass of upper lobe of left lung    Essential hypertension (Chronic)    Obesity, Class I, BMI 30-34 9    Substance abuse (HCC) (Chronic)    Tobacco dependence (Chronic)    Anemia    Thrombocytopenia (HCC)    Alcohol withdrawal (HCC)    Thoracoabdominal aortic aneurysm (HCC) (Chronic)    Abnormal CT scan    Anasarca    Cardiac murmur    Depression (Chronic)    Chronic bronchitis (HCC)    Liver metastasis (HCC) (Chronic)                  Subjective:     Patient ID: Nick Rockwell is a 40 y o  male  History of Present Illness  39 yo male with EtOH cirrhosis, presented to [facility] on 7/16 with hepatic failure and profound anemia (Hgb 3 6), transferred to [facility], and found on EGD to have 2 column varices (banded) and hypertensive portal gastropathy  CT revealed cirrhotic liver with innumerable hepatic mass lesions (normal AFP), likely non-AFP producing HCC vs mets  Biopsy requested       Review of Systems      Past Medical History:   Diagnosis Date    AAA (abdominal aortic aneurysm) (HCC)     Allergic     Anemia     Asthma     Depression 05/18/2021    Essential hypertension 05/06/2019    Liver metastasis ([facility] [address] ) 7/18/2022    Obesity     [facility]     [facility] ([email or web address] )         Past Surgical History:   Procedure Laterality Date    IR PARACENTESIS  7/20/2022    LYMPH NODE BIOPSY          Social History     Tobacco Use   Smoking Status Current Every Day Smoker    Packs/day: 1 00   Smokeless Tobacco Never Used   Tobacco Comment    2/2019; PATIENT VAPES        Social History     Substance and Sexual Activity   Alcohol Use Yes    Alcohol/week: 12 0 standard drinks    Types: 12 Cans of beer per week        Social History     Substance and Sexual Activity   Drug Use Not Currently    Types: Heroin    Comment: 7/16/22 recovery        Allergies   Allergen Reactions    Levofloxacin Other (See Comments)     BLACKED OUT       Current Facility-Administered Medications   Medication Dose Route Frequency Provider Last Rate Last Admin    albumin human (FLEXBUMIN) 25 % injection 25 g  25 g Intravenous TID Eliel Connelly MD 0 mL/hr at 07/20/22 2150 25 g at 07/21/22 1613    albuterol (PROVENTIL HFA,VENTOLIN HFA) inhaler 2 puff  2 puff Inhalation Q6H PRN Eliel Connelly MD        buprenorphine-naloxone (Suboxone) film 8 mg  8 mg Sublingual BID Eliel Connelly MD   8 mg at 07/21/22 0918    cefTRIAXone (ROCEPHIN) IVPB (premix in dextrose) 2,000 mg 50 mL  2,000 mg Intravenous Q24H Eliel Connelly MD   Stopped at 07/20/22 2150    diphenhydrAMINE (BENADRYL) injection 25 mg  25 mg Intravenous Q6H PRN Eliel Connelly MD        escitalopram (LEXAPRO) tablet 20 mg  20 mg Oral Daily Eliel Connelly MD   20 mg at 07/21/22 5670    ferrous sulfate tablet 325 mg  325 mg Oral Daily With Breakfast Eliel Connelly MD   325 mg at 04/44/93 3339    folic acid (FOLVITE) tablet 1 mg  1 mg Oral Daily Eliel Connelly MD   1 mg at 07/21/22 0917    furosemide (LASIX) injection 40 mg  40 mg Intravenous Q12H [facility], CRNP   40 mg at 07/21/22 1230  glycerin-hypromellose- (ARTIFICIAL TEARS) ophthalmic solution 1 drop  1 drop Both Eyes Q3H PRN Madison Weathers MD   1 drop at 07/20/22 1640    lactulose oral solution 20 g  20 g Oral BID Georgia Pedersen MD   20 g at 07/21/22 7191    LORazepam (ATIVAN) injection 0 5 mg  0 5 mg Intravenous Once Madison Weathers MD        LORazepam (ATIVAN) tablet 1 mg  1 mg Oral Q8H PRN Georgia Pedersen MD   1 mg at 07/21/22 1614    melatonin tablet 6 mg  6 mg Oral HS Miri Waters MD   6 mg at 07/20/22 2224    multivitamin-minerals (CENTRUM) tablet 1 tablet  1 tablet Oral Daily Georgia Pedersen MD   1 tablet at 07/21/22 0917    nadolol (CORGARD) tablet 40 mg  40 mg Oral Daily MARILEE Davis   40 mg at 07/21/22 0920    nicotine (NICODERM CQ) 21 mg/24 hr TD 24 hr patch 1 patch  1 patch Transdermal Daily Georgia Pedersen MD   1 patch at 07/21/22 0920    ondansetron (ZOFRAN) injection 4 mg  4 mg Intravenous Q6H PRN Georgia Pedersen MD   4 mg at 07/19/22 1658    pantoprazole (PROTONIX) 80 mg in sodium chloride 0 9 % 100 mL infusion  8 mg/hr Intravenous Continuous Madison Weathers MD 10 mL/hr at 07/21/22 0838 8 mg/hr at 07/21/22 0838    spironolactone (ALDACTONE) tablet 100 mg  100 mg Oral Daily Georgia Pedersen MD   100 mg at 07/21/22 0920    thiamine tablet 100 mg  100 mg Oral Daily Georgia Pedersen MD   100 mg at 07/21/22 0917          Objective:    Vitals:    07/21/22 0757 07/21/22 0920 07/21/22 1110 07/21/22 1600   BP: 97/55 97/55 109/56 101/56   BP Location: Right arm  Right arm Right arm   Pulse: 78 77 78 76   Resp: 17  16 12   Temp: 99 1 °F (37 3 °C)  98 9 °F (37 2 °C) 98 °F (36 7 °C)   TempSrc: Oral  Oral Oral   SpO2: 94%  93% 90%   Weight:            Physical Exam      No results found for: BNP   Lab Results   Component Value Date    WBC 5 32 07/21/2022    HGB 7 9 (L) 07/21/2022    HCT 25 6 (L) 07/21/2022    MCV 80 (L) 07/21/2022     (L) 07/21/2022     Lab Results   Component Value Date    INR 1 96 (H) 07/21/2022    INR 2 03 (H) 07/19/2022    INR 2 27 (H) 07/19/2022    PROTIME 22 0 (H) 07/21/2022    PROTIME 22 7 (H) 07/19/2022    PROTIME 24 7 (H) 07/19/2022     Lab Results   Component Value Date    PTT 45 (H) 07/16/2022         I have personally reviewed pertinent imaging and laboratory results  Code Status: Level 1 - Full Code  Advance Directive and Living Will:      Power of :    POLST:      IR has been consulted to evaluate the patient, determine the appropriate procedure, and whether or not a procedure can and should be performed  Thank you for allowing me to participate in the care of Leslie Hodge  Please don't hesitate to call, text, email, or TigerText with any questions  This text is generated with voice recognition software  There may be translation, syntax,  or grammatical errors  If you have any questions, please contact the dictating provider

## 2022-07-21 NOTE — CONSULTS
Interventional Radiology  Consultation 7/21/2022     Consults  Jannette Ludwig   1985   [id number]      Assessment/Plan:  39 yo male with EtOH cirrhosis, presented to [facility] on 7/16 with hepatic failure and profound anemia (Hgb 3 6), transferred to [facility], and found on EGD to have 2 column varices (banded) and hypertensive portal gastropathy  CT revealed cirrhotic liver with innumerable hepatic mass lesions (normal AFP), likely non-AFP producing HCC vs mets  Biopsy requested  Pt has INR 1 96 which will need to be corrected (with FFP) prior to and/or at time of bx  Any residual/recurrent ascites may need to be re-tapped to permit post-bx hepatic capsular tamponade, as well  Pt to be NPO tonight, and will determine in the AM the feasibility and timing of bx  D/W Yancy Factor  Medical Problems             Problem List     * (Principal) Alcoholic cirrhosis of liver without ascites (HCC)    Mass of upper lobe of left lung    Essential hypertension (Chronic)    Obesity, Class I, BMI 30-34 9    Substance abuse (HCC) (Chronic)    Tobacco dependence (Chronic)    Anemia    Thrombocytopenia (HCC)    Alcohol withdrawal (HCC)    Thoracoabdominal aortic aneurysm (HCC) (Chronic)    Abnormal CT scan    Anasarca    Cardiac murmur    Depression (Chronic)    Chronic bronchitis (HCC)    Liver metastasis (HCC) (Chronic)                  Subjective:     Patient ID: Jannette Ludwig is a 40 y o  male  History of Present Illness  39 yo male with EtOH cirrhosis, presented to [facility] on 7/16 with hepatic failure and profound anemia (Hgb 3 6), transferred to [facility], and found on EGD to have 2 column varices (banded) and hypertensive portal gastropathy  CT revealed cirrhotic liver with innumerable hepatic mass lesions (normal AFP), likely non-AFP producing HCC vs mets  Biopsy requested       Review of Systems      Past Medical History:   Diagnosis Date    AAA (abdominal aortic aneurysm) (HCC)     Allergic     Anemia     Asthma     Depression 05/18/2021    Essential hypertension 05/06/2019    Liver metastasis ([facility] ) 7/18/2022    Obesity     Opiate dependence ([facility] )     Substance abuse ([facility] )         Past Surgical History:   Procedure Laterality Date    IR PARACENTESIS  7/20/2022    LYMPH NODE BIOPSY          Social History     Tobacco Use   Smoking Status Current Every Day Smoker    Packs/day: 1 00   Smokeless Tobacco Never Used   Tobacco Comment    2/2019; PATIENT VAPES        Social History     Substance and Sexual Activity   Alcohol Use Yes    Alcohol/week: 12 0 standard drinks    Types: 12 Cans of beer per week        Social History     Substance and Sexual Activity   Drug Use Not Currently    Types: Heroin    Comment: 7/16/22 recovery        Allergies   Allergen Reactions    Levofloxacin Other (See Comments)     BLACKED OUT       Current Facility-Administered Medications   Medication Dose Route Frequency Provider Last Rate Last Admin    albumin human (FLEXBUMIN) 25 % injection 25 g  25 g Intravenous TID Sol Mccarthy MD 0 mL/hr at 07/20/22 2150 25 g at 07/21/22 1613    albuterol (PROVENTIL HFA,VENTOLIN HFA) inhaler 2 puff  2 puff Inhalation Q6H PRN Sol Mccarthy MD        buprenorphine-naloxone (Suboxone) film 8 mg  8 mg Sublingual BID Sol Mccarthy MD   8 mg at 07/21/22 0918    cefTRIAXone (ROCEPHIN) IVPB (premix in dextrose) 2,000 mg 50 mL  2,000 mg Intravenous Q24H Sol Mccarthy MD   Stopped at 07/20/22 2150    diphenhydrAMINE (BENADRYL) injection 25 mg  25 mg Intravenous Q6H PRN Sol Mccarthy MD        escitalopram (LEXAPRO) tablet 20 mg  20 mg Oral Daily Sol Mccarthy MD   20 mg at 07/21/22 0538    ferrous sulfate tablet 325 mg  325 mg Oral Daily With Breakfast Sol Mccarthy MD   325 mg at 01/55/34 2804    folic acid (FOLVITE) tablet 1 mg  1 mg Oral Daily Sol Mccarthy MD   1 mg at 07/21/22 0917    furosemide (LASIX) injection 40 mg  40 mg Intravenous Q12H MARILEE Curtis   40 mg at 07/21/22 5706  glycerin-hypromellose- (ARTIFICIAL TEARS) ophthalmic solution 1 drop  1 drop Both Eyes Q3H PRN Radha Parra MD   1 drop at 07/20/22 1640    lactulose oral solution 20 g  20 g Oral BID Luz Elena Okeefe MD   20 g at 07/21/22 9211    LORazepam (ATIVAN) injection 0 5 mg  0 5 mg Intravenous Once Radha Parra MD        LORazepam (ATIVAN) tablet 1 mg  1 mg Oral Q8H PRN Luz Elena Okeefe MD   1 mg at 07/21/22 1614    melatonin tablet 6 mg  6 mg Oral HS Christiano Rubi MD   6 mg at 07/20/22 2224    multivitamin-minerals (CENTRUM) tablet 1 tablet  1 tablet Oral Daily Luz Elena Okeefe MD   1 tablet at 07/21/22 0917    nadolol (CORGARD) tablet 40 mg  40 mg Oral Daily Edythe MARILEE Pearson   40 mg at 07/21/22 0920    nicotine (NICODERM CQ) 21 mg/24 hr TD 24 hr patch 1 patch  1 patch Transdermal Daily Luz Elena Okeefe MD   1 patch at 07/21/22 0920    ondansetron (ZOFRAN) injection 4 mg  4 mg Intravenous Q6H PRN Luz Elena Okeefe MD   4 mg at 07/19/22 1658    pantoprazole (PROTONIX) 80 mg in sodium chloride 0 9 % 100 mL infusion  8 mg/hr Intravenous Continuous Radha Parra MD 10 mL/hr at 07/21/22 0838 8 mg/hr at 07/21/22 0838    spironolactone (ALDACTONE) tablet 100 mg  100 mg Oral Daily Luz Elena Okeefe MD   100 mg at 07/21/22 0920    thiamine tablet 100 mg  100 mg Oral Daily Luz Elena Okeefe MD   100 mg at 07/21/22 0917          Objective:    Vitals:    07/21/22 0757 07/21/22 0920 07/21/22 1110 07/21/22 1600   BP: 97/55 97/55 109/56 101/56   BP Location: Right arm  Right arm Right arm   Pulse: 78 77 78 76   Resp: 17  16 12   Temp: 99 1 °F (37 3 °C)  98 9 °F (37 2 °C) 98 °F (36 7 °C)   TempSrc: Oral  Oral Oral   SpO2: 94%  93% 90%   Weight:            Physical Exam      No results found for: BNP   Lab Results   Component Value Date    WBC 5 32 07/21/2022    HGB 7 9 (L) 07/21/2022    HCT 25 6 (L) 07/21/2022    MCV 80 (L) 07/21/2022     (L) 07/21/2022     Lab Results   Component Value Date    INR 1 96 (H) 07/21/2022    INR 2 03 (H) 07/19/2022    INR 2 27 (H) 07/19/2022    PROTIME 22 0 (H) 07/21/2022    PROTIME 22 7 (H) 07/19/2022    PROTIME 24 7 (H) 07/19/2022     Lab Results   Component Value Date    PTT 45 (H) 07/16/2022         I have personally reviewed pertinent imaging and laboratory results  Code Status: Level 1 - Full Code  Advance Directive and Living Will:      Power of :    POLST:      IR has been consulted to evaluate the patient, determine the appropriate procedure, and whether or not a procedure can and should be performed  Thank you for allowing me to participate in the care of Gurjit Fleming  Please don't hesitate to call, text, email, or TigerText with any questions  This text is generated with voice recognition software  There may be translation, syntax,  or grammatical errors  If you have any questions, please contact the dictating provider

## 2022-07-22 ENCOUNTER — APPOINTMENT (INPATIENT)
Dept: RADIOLOGY | Facility: HOSPITAL | Age: 37
DRG: 432 | End: 2022-07-22
Attending: RADIOLOGY

## 2022-07-22 ENCOUNTER — TELEPHONE (OUTPATIENT)
Dept: HEMATOLOGY ONCOLOGY | Facility: CLINIC | Age: 37
End: 2022-07-22

## 2022-07-22 LAB
ABO GROUP BLD BPU: NORMAL
ALBUMIN SERPL BCP-MCNC: 4.2 G/DL (ref 3.5–5)
ALP SERPL-CCNC: 155 U/L (ref 34–104)
ALT SERPL W P-5'-P-CCNC: 20 U/L (ref 7–52)
ANION GAP SERPL CALCULATED.3IONS-SCNC: 7 MMOL/L (ref 4–13)
APTT PPP: 45 SECONDS (ref 23–37)
APTT PPP: 47 SECONDS (ref 23–37)
AST SERPL W P-5'-P-CCNC: 63 U/L (ref 13–39)
BACTERIA BLD CULT: ABNORMAL
BASOPHILS # BLD AUTO: 0.01 THOUSANDS/ΜL (ref 0–0.1)
BASOPHILS NFR BLD AUTO: 0 % (ref 0–1)
BILIRUB SERPL-MCNC: 9.97 MG/DL (ref 0.2–1)
BLD SMEAR INTERP: NORMAL
BPU ID: NORMAL
BUN SERPL-MCNC: 9 MG/DL (ref 5–25)
CALCIUM SERPL-MCNC: 9.5 MG/DL (ref 8.4–10.2)
CHLORIDE SERPL-SCNC: 92 MMOL/L (ref 96–108)
CO2 SERPL-SCNC: 34 MMOL/L (ref 21–32)
CREAT SERPL-MCNC: 0.51 MG/DL (ref 0.6–1.3)
D DIMER PPP FEU-MCNC: 2.33 UG/ML FEU
EOSINOPHIL # BLD AUTO: 0.08 THOUSAND/ΜL (ref 0–0.61)
EOSINOPHIL NFR BLD AUTO: 2 % (ref 0–6)
ERYTHROCYTE [DISTWIDTH] IN BLOOD BY AUTOMATED COUNT: 31 % (ref 11.6–15.1)
FDP BLD QL AGGL: <10
FIBRINOGEN PPP-MCNC: 157 MG/DL (ref 227–495)
GFR SERPL CREATININE-BSD FRML MDRD: 137 ML/MIN/1.73SQ M
GLUCOSE SERPL-MCNC: 104 MG/DL (ref 65–140)
HCT VFR BLD AUTO: 24.5 % (ref 36.5–49.3)
HGB BLD-MCNC: 7.5 G/DL (ref 12–17)
IMM GRANULOCYTES # BLD AUTO: 0.01 THOUSAND/UL (ref 0–0.2)
IMM GRANULOCYTES NFR BLD AUTO: 0 % (ref 0–2)
INR PPP: 1.85 (ref 0.84–1.19)
INR PPP: 2.01 (ref 0.84–1.19)
LYMPHOCYTES # BLD AUTO: 1.12 THOUSANDS/ΜL (ref 0.6–4.47)
LYMPHOCYTES NFR BLD AUTO: 30 % (ref 14–44)
MAGNESIUM SERPL-MCNC: 1.9 MG/DL (ref 1.9–2.7)
MCH RBC QN AUTO: 24.7 PG (ref 26.8–34.3)
MCHC RBC AUTO-ENTMCNC: 30.6 G/DL (ref 31.4–37.4)
MCV RBC AUTO: 81 FL (ref 82–98)
MONOCYTES # BLD AUTO: 0.57 THOUSAND/ΜL (ref 0.17–1.22)
MONOCYTES NFR BLD AUTO: 16 % (ref 4–12)
NEUTROPHILS # BLD AUTO: 1.89 THOUSANDS/ΜL (ref 1.85–7.62)
NEUTS SEG NFR BLD AUTO: 52 % (ref 43–75)
NRBC BLD AUTO-RTO: 0 /100 WBCS
PHOSPHATE SERPL-MCNC: 2.8 MG/DL (ref 2.7–4.5)
PLATELET # BLD AUTO: 91 THOUSANDS/UL (ref 149–390)
PLATELET # BLD AUTO: 91 THOUSANDS/UL (ref 149–390)
POTASSIUM SERPL-SCNC: 3.1 MMOL/L (ref 3.5–5.3)
PROT SERPL-MCNC: 7.3 G/DL (ref 6.4–8.4)
PROTHROMBIN TIME: 21.1 SECONDS (ref 11.6–14.5)
PROTHROMBIN TIME: 22.5 SECONDS (ref 11.6–14.5)
RBC # BLD AUTO: 3.04 MILLION/UL (ref 3.88–5.62)
RYE IGE QN: NEGATIVE
SODIUM SERPL-SCNC: 133 MMOL/L (ref 135–147)
UNIT DISPENSE STATUS: NORMAL
UNIT PRODUCT CODE: NORMAL
UNIT PRODUCT VOLUME: 250 ML
UNIT RH: NORMAL
WBC # BLD AUTO: 3.68 THOUSAND/UL (ref 4.31–10.16)

## 2022-07-22 PROCEDURE — C9113 INJ PANTOPRAZOLE SODIUM, VIA: HCPCS | Performed by: INTERNAL MEDICINE

## 2022-07-22 PROCEDURE — 47000 NEEDLE BIOPSY OF LIVER PERQ: CPT

## 2022-07-22 PROCEDURE — 99232 SBSQ HOSP IP/OBS MODERATE 35: CPT | Performed by: NURSE PRACTITIONER

## 2022-07-22 PROCEDURE — 99232 SBSQ HOSP IP/OBS MODERATE 35: CPT | Performed by: INTERNAL MEDICINE

## 2022-07-22 PROCEDURE — 88313 SPECIAL STAINS GROUP 2: CPT | Performed by: PATHOLOGY

## 2022-07-22 PROCEDURE — 85049 AUTOMATED PLATELET COUNT: CPT | Performed by: NURSE PRACTITIONER

## 2022-07-22 PROCEDURE — 76942 ECHO GUIDE FOR BIOPSY: CPT | Performed by: RADIOLOGY

## 2022-07-22 PROCEDURE — 85379 FIBRIN DEGRADATION QUANT: CPT | Performed by: NURSE PRACTITIONER

## 2022-07-22 PROCEDURE — 88307 TISSUE EXAM BY PATHOLOGIST: CPT | Performed by: PATHOLOGY

## 2022-07-22 PROCEDURE — P9017 PLASMA 1 DONOR FRZ W/IN 8 HR: HCPCS

## 2022-07-22 PROCEDURE — 85610 PROTHROMBIN TIME: CPT | Performed by: NURSE PRACTITIONER

## 2022-07-22 PROCEDURE — 85420 FIBRINOLYTIC PLASMINOGEN: CPT | Performed by: NURSE PRACTITIONER

## 2022-07-22 PROCEDURE — 80053 COMPREHEN METABOLIC PANEL: CPT | Performed by: STUDENT IN AN ORGANIZED HEALTH CARE EDUCATION/TRAINING PROGRAM

## 2022-07-22 PROCEDURE — 85300 ANTITHROMBIN III ACTIVITY: CPT | Performed by: NURSE PRACTITIONER

## 2022-07-22 PROCEDURE — C9113 INJ PANTOPRAZOLE SODIUM, VIA: HCPCS

## 2022-07-22 PROCEDURE — 85730 THROMBOPLASTIN TIME PARTIAL: CPT | Performed by: NURSE PRACTITIONER

## 2022-07-22 PROCEDURE — 76942 ECHO GUIDE FOR BIOPSY: CPT

## 2022-07-22 PROCEDURE — 83735 ASSAY OF MAGNESIUM: CPT | Performed by: STUDENT IN AN ORGANIZED HEALTH CARE EDUCATION/TRAINING PROGRAM

## 2022-07-22 PROCEDURE — 47000 NEEDLE BIOPSY OF LIVER PERQ: CPT | Performed by: RADIOLOGY

## 2022-07-22 PROCEDURE — 99152 MOD SED SAME PHYS/QHP 5/>YRS: CPT

## 2022-07-22 PROCEDURE — 0FB23ZX EXCISION OF LEFT LOBE LIVER, PERCUTANEOUS APPROACH, DIAGNOSTIC: ICD-10-PCS | Performed by: RADIOLOGY

## 2022-07-22 PROCEDURE — 84100 ASSAY OF PHOSPHORUS: CPT | Performed by: STUDENT IN AN ORGANIZED HEALTH CARE EDUCATION/TRAINING PROGRAM

## 2022-07-22 PROCEDURE — 85025 COMPLETE CBC W/AUTO DIFF WBC: CPT | Performed by: STUDENT IN AN ORGANIZED HEALTH CARE EDUCATION/TRAINING PROGRAM

## 2022-07-22 PROCEDURE — 85610 PROTHROMBIN TIME: CPT | Performed by: STUDENT IN AN ORGANIZED HEALTH CARE EDUCATION/TRAINING PROGRAM

## 2022-07-22 PROCEDURE — 85362 FIBRIN DEGRADATION PRODUCTS: CPT | Performed by: NURSE PRACTITIONER

## 2022-07-22 PROCEDURE — 85730 THROMBOPLASTIN TIME PARTIAL: CPT | Performed by: STUDENT IN AN ORGANIZED HEALTH CARE EDUCATION/TRAINING PROGRAM

## 2022-07-22 PROCEDURE — 30233K1 TRANSFUSION OF NONAUTOLOGOUS FROZEN PLASMA INTO PERIPHERAL VEIN, PERCUTANEOUS APPROACH: ICD-10-PCS | Performed by: HOSPITALIST

## 2022-07-22 PROCEDURE — 85384 FIBRINOGEN ACTIVITY: CPT | Performed by: NURSE PRACTITIONER

## 2022-07-22 RX ORDER — MIDAZOLAM HYDROCHLORIDE 2 MG/2ML
INJECTION, SOLUTION INTRAMUSCULAR; INTRAVENOUS CODE/TRAUMA/SEDATION MEDICATION
Status: COMPLETED | OUTPATIENT
Start: 2022-07-22 | End: 2022-07-22

## 2022-07-22 RX ORDER — POTASSIUM CHLORIDE 20 MEQ/1
40 TABLET, EXTENDED RELEASE ORAL ONCE
Status: COMPLETED | OUTPATIENT
Start: 2022-07-22 | End: 2022-07-22

## 2022-07-22 RX ORDER — FENTANYL CITRATE 50 UG/ML
INJECTION, SOLUTION INTRAMUSCULAR; INTRAVENOUS CODE/TRAUMA/SEDATION MEDICATION
Status: COMPLETED | OUTPATIENT
Start: 2022-07-22 | End: 2022-07-22

## 2022-07-22 RX ORDER — PANTOPRAZOLE SODIUM 40 MG/10ML
40 INJECTION, POWDER, LYOPHILIZED, FOR SOLUTION INTRAVENOUS EVERY 12 HOURS SCHEDULED
Status: DISCONTINUED | OUTPATIENT
Start: 2022-07-22 | End: 2022-07-25

## 2022-07-22 RX ADMIN — FENTANYL CITRATE 50 MCG: 50 INJECTION, SOLUTION INTRAMUSCULAR; INTRAVENOUS at 13:55

## 2022-07-22 RX ADMIN — BUPRENORPHINE AND NALOXONE 8 MG: 8; 2 FILM BUCCAL; SUBLINGUAL at 17:27

## 2022-07-22 RX ADMIN — LORAZEPAM 1 MG: 1 TABLET ORAL at 00:32

## 2022-07-22 RX ADMIN — MIDAZOLAM HYDROCHLORIDE 1 MG: 1 INJECTION, SOLUTION INTRAMUSCULAR; INTRAVENOUS at 13:54

## 2022-07-22 RX ADMIN — LORAZEPAM 1 MG: 1 TABLET ORAL at 21:35

## 2022-07-22 RX ADMIN — PANTOPRAZOLE SODIUM 40 MG: 40 INJECTION, POWDER, FOR SOLUTION INTRAVENOUS at 20:59

## 2022-07-22 RX ADMIN — POTASSIUM CHLORIDE 40 MEQ: 1500 TABLET, EXTENDED RELEASE ORAL at 07:22

## 2022-07-22 RX ADMIN — ESCITALOPRAM OXALATE 20 MG: 20 TABLET ORAL at 08:27

## 2022-07-22 RX ADMIN — ALBUMIN (HUMAN) 25 G: 0.25 INJECTION, SOLUTION INTRAVENOUS at 08:27

## 2022-07-22 RX ADMIN — FUROSEMIDE 40 MG: 10 INJECTION, SOLUTION INTRAMUSCULAR; INTRAVENOUS at 20:59

## 2022-07-22 RX ADMIN — NICOTINE 1 PATCH: 21 PATCH, EXTENDED RELEASE TRANSDERMAL at 08:30

## 2022-07-22 RX ADMIN — BUPRENORPHINE AND NALOXONE 8 MG: 8; 2 FILM BUCCAL; SUBLINGUAL at 08:27

## 2022-07-22 RX ADMIN — PANTOPRAZOLE SODIUM 40 MG: 40 INJECTION, POWDER, FOR SOLUTION INTRAVENOUS at 08:27

## 2022-07-22 RX ADMIN — SODIUM CHLORIDE 8 MG/HR: 9 INJECTION, SOLUTION INTRAVENOUS at 05:35

## 2022-07-22 RX ADMIN — Medication 6 MG: at 21:04

## 2022-07-22 RX ADMIN — ALBUMIN (HUMAN) 25 G: 0.25 INJECTION, SOLUTION INTRAVENOUS at 20:59

## 2022-07-22 RX ADMIN — ALBUMIN (HUMAN) 25 G: 0.25 INJECTION, SOLUTION INTRAVENOUS at 15:51

## 2022-07-22 NOTE — TELEPHONE ENCOUNTER
Patient assistance review in progress    07/28/22  Intake received   Attempted to call patient to introduce myself and there is no voicemail setup  Patient currently in the Hospital   Will attempt again when discharged

## 2022-07-22 NOTE — TELEPHONE ENCOUNTER
Soft Intake Form   Patient Details   Krissy Herrera     1985     Reason For Appointment   Who is Calling? Afshin Solorio   If not patient, Name? NA   DID YOU CONFIRM INSURANCE WITH PATIENT? Routed to finance   Who is the Referring Doctor? MARILEE Sandoval   What is the diagnosis? Abnormal imaging indicating diffuse hepatic metastases, chronic microcytic hypochromic anemia, chronic thrombocytopenia   Has this diagnosis been confirmed by a biopsy/surgery? If yes, what is the date it was done? Liver bx planned for either 7/22 or 7/23 while inpatient   Biopsy done at [place] [address]? If not, where was it done? When completed, Yes  Was imaging done, and was it [facility]? If not, where was it done? Yes-[facility]   Have you been seen by another Oncologist?  If so, who and where (name of facility, city and state) No   For 2nd Opinions Only: Are you currently undergoing treatment, or are you scheduled to start treatment? If yes, name of facility, city and state  NA   For "History Of" only: Have you completed treatment? NA    Have you had Genetic Testing done in the past?  If so, advise to bring test results to their visit No   Record Gathering Information   Did you advise to have records faxed to [phone]? NA   Did you advise to have disks sent to the proper address with imaging? ("History of" Patients)  5 years of imaging for breast patients-Mammos, US etc NA   Scheduling Information   Did you send new patient paperwork? Email or mail? NA   What is the best call back number? (If the [facility] is calling, please use their number) NA   Miscellaneous Information      The patient has been scheduled for a HFU appointment with Dr Alyce Camp in the [facility] office on 8/10 at 1120

## 2022-07-22 NOTE — BRIEF OP NOTE (RAD/CATH)
INTERVENTIONAL RADIOLOGY PROCEDURE NOTE    Date: 7/22/2022    Procedure: LIVER MASS BIOPSY    Preoperative diagnosis:   1  Alcoholic cirrhosis of liver without ascites ([facility] )    2  L[facility] ([facility] )    3  Abnormal CT scan    4  Anasarca    5  Anemia, unspecified type    6  Thrombocytopenia ([facility] )    7  Mass of upper lobe of left lung    8  Substance abuse ([facility] )    9  Depression, unspecified depression type    10  PTSD (post-traumatic stress disorder)         Postoperative diagnosis: Same  Surgeon: April Borrego MD     Assistant: None  No qualified resident was available  Blood loss: 0    Specimens: 18g core x3     Findings: left liver mass biopsy    D stat in tract    Complications: None immediate      Anesthesia: conscious sedation

## 2022-07-22 NOTE — PROGRESS NOTES
Medical Oncology/Hematology Progress Note  Krissy Herrera, male, 40 y o , 1985,  ICU 03/ICU 03, [id number]       Admitted to ICU on 7/18/22  ED Visit on 7/16/22- for increasing confusion    Assessment and Plan:    1  Chronic microcytic hypochromic anemia  -Anemia evident since 1/19/2021  -Hemoglobin 3 6 on 7/16/22  Prior lab on 5/26/22 was 7 4  Baseline 7-8 g/dL  -s/p 7 units of PRBC transfusions  No source for hemorrhage is identified  Currently on Protonix drip  7/16/22- 3 6  7/17/22- 4 7 -->5 4 -->6 5  7/18/22- 6 3 -->7 1  7/19/22- 6 7-->7 6  7/20/22- 7 7   7/21/22- 7 9  7/22/22- 7 5  Stable, no significant drop in hemoglobin for the past 3 days    -In ruling out hemolytic anemia  LD, haptoglobin returned normal, Griselda test negative  Indirect bilirubin (total - direct) at 4 27 mg/dL which is elevated  -other systemic disorders that can occur along with anemia and thrombocytopenia can include infections and malignancies  - Patient does not have severe hypertension, infection, autoimmune diseases, history of organ/cell transplant  -Denies exposure to new medications that could have precipitated a rapid drop in hemoglobin   -Creatinine 0 51, BUN normal   -Presence of splenomegaly    Labs:  · LD -normal  · Haptoglobin- normal (29 mg/dL)  · Griselda test- Negative  · Hemolysis smear -schistocytes noted  · retic count Abs- normal, Pct mildly elevated at 2 62%  · Peripheral smear- decreased platelet estimate, slight elevation in nucleated RBC (erythroblasts), 5/100 WBCs  · Direct bilirubin -5 70 elevated, total bilirubin- 9 97  · Indirect bilirubin= total - direct  4 27 mg/dL (elevated)  · iron panel: iron saturation 4%, ferritin 34      2  Chronic thrombocytopenia  -Of note, 94K   -Evident since 1/19/21 (one-time normal values in 5/2021)  -No signs of bleeding, no petechiae, diffuse bruising, purpura  -Thrombocytopenia most likely a complication from liver cirrhosis    3   Abnormal Imaging  -CT A/P (7/18/22)- Diffuse hepatic metastases concerning for hepatocellular carcinoma in this cirrhotic patient  Metastatic disease is not excluded given the normal AFP  Marked gallbladder distention  54 mm partially imaged fusiform thoracoabdominal aortic aneurysm  Moderate volume of abdominal and pelvic ascites consistent with simple fluid   -Splenomegaly- enlarged, measuring  17 1 cm     -history of decompensated alcoholic cirrhosis of liver  Complications include hepatic encephalopathy  -AFP tumor marker at 3 5 (7/16/22)  Lab results in the past were normal as well (4 5 on 1/21/21)  -Bilirubin 9 97 (today) <--10 63 <--11 16 <--10 37 <--10 94 <--9 37  -Hx of hyperbilirubinemia since 2020  Prior to admission, his bilirubin was at 4 61  -s/p IR paracentesis on 7/20/22, removed 3500 mL of fluid  -IR biopsy scheduled today, 7/22/22      PLAN:  1) Transfuse for hemoglobin <7  2) CBC daily, continue to monitor hemoglobin, signs of bleeding  3) DIC Fibrin profile pending  4) IR liver biopsy today; await pathology  Recommendations for systemic chemotherapy will be highly dependent on patient's bilirubin  For now, patient has an outpatient appointment with Dr Anders Vázquez on 8/10/22  Will reassess recommendation for treatment once malignancy is confirmed and bilirubin improves significantly  5) Recommendation for iron infusion    Outpatient follow up plan:  Dr Tiara Burnham on 8/10/22    Please don't hesitate to reach out for any questions or concerns  Rod Dubois, [address], [place]  Hematology-Oncology      Reason for consultation:  anemia, thrombocytopenia, hepatic metastases    History of present illness: Balaji Sharp is a 40 y o  male with a PMH of alcoholic cirrhosis, asthma, alcohol abuse  He presented in the ED with increasing confusion  He was found to have elevated alcohol level of 448, elevated ammonia level of 98, and anemia with hemoglobin level 3 6  Denies having any hematuria, hematemesis, hematochezia, epistaxis    He reported quitting alcohol use in the past week and half  He reported that he has been suffering of depression since early 2019  He lost his job, his wife filed for divorce, and his girlfriend passed away at home after a violent seizure activity  He currently lives at home with his parents  He has other siblings who can help him if he needs to travel to the [facility]   Patient agrees to pursue treatment if biopsy returns with malignancy  Patient denies nausea, vomiting, fevers, chills  Denies chest pain, shortness a breath  Complains discomfort on his abdominal area, stating that he just wants to eat  Patient will most likely need assistance with alcohol and tobacco dependence along with possible cancer journey      [facility]: Review of Systems   Constitutional: Positive for activity change and fatigue  Negative for appetite change, chills and fever  HENT: Negative for ear pain and sore throat  Eyes: Negative for pain and visual disturbance  Respiratory: Negative for cough and shortness of breath  Cardiovascular: Positive for leg swelling  Negative for chest pain and palpitations  Gastrointestinal: Negative for abdominal pain and vomiting  Genitourinary: Negative for dysuria and hematuria  Musculoskeletal: Negative for arthralgias and back pain  Skin: Positive for pallor  Negative for color change and rash  Neurological: Positive for weakness  Negative for seizures and syncope  All other systems reviewed and are negative  Oncology History:   Cancer Staging  No matching staging information was found for the patient  Oncology History    No history exists         Past Medical History:   Past Medical History:   Diagnosis Date    AAA (abdominal aortic aneurysm) ([facility] [address] )     Allergic     Anemia     Asthma     Depression 05/18/2021    Essential hypertension 05/06/2019    Liver metastasis ([facility] [address] ) 7/18/2022    Obesity     Opiate dependence ([facility] [address] )     Substance abuse [facility])        Past Surgical History:   Procedure Laterality Date    IR PARACENTESIS  7/20/2022    LYMPH NODE BIOPSY         Family History   Problem Relation Age of Onset    No Known Problems Mother     Diabetes Father     Prostate cancer Father     Hypertension Father        Social History     Socioeconomic History    Marital status: /Civil Union     Spouse name: Not on file    Number of children: Not on file    Years of education: Not on file    Highest education level: Not on file   Occupational History    Not on file   Tobacco Use    Smoking status: Current Every Day Smoker     Packs/day: 1 00    Smokeless tobacco: Never Used    Tobacco comment: 2/2019; PATIENT VAPES   Vaping Use    Vaping Use: Some days   Substance and Sexual Activity    Alcohol use: Yes     Alcohol/week: 12 0 standard drinks     Types: 12 Cans of beer per week    Drug use: Not Currently     Types: Heroin     Comment: 7/16/22 recovery    Sexual activity: Not Currently   Other Topics Concern    Not on file   Social History Narrative         Social Determinants of Health     Financial Resource Strain: Not on file   Food Insecurity: No Food Insecurity    Worried About Running Out of Food in the Last Year: Never true    Asim of Food in the Last Year: Never true   Transportation Needs: No Transportation Needs    Lack of Transportation (Medical): No    Lack of Transportation (Non-Medical):  No   Physical Activity: Not on file   Stress: Not on file   Social Connections: Not on file   Intimate Partner Violence: Not on file   Housing Stability: Low Risk     Unable to Pay for Housing in the Last Year: No    Number of Places Lived in the Last Year: 1    Unstable Housing in the Last Year: No         Current Facility-Administered Medications:     albumin human (FLEXBUMIN) 25 % injection 25 g, 25 g, Intravenous, TID, Doreen Granados MD, Last Rate: 0 mL/hr at 07/21/22 2141, 25 g at 07/22/22 0827    albuterol (PROVENTIL HFA,VENTOLIN HFA) inhaler 2 puff, 2 puff, Inhalation, Q6H PRN, Paulette Odonnell MD    buprenorphine-naloxone (Suboxone) film 8 mg, 8 mg, Sublingual, BID, Paulette Odonnell MD, 8 mg at 07/22/22 0827    cefTRIAXone (ROCEPHIN) IVPB (premix in dextrose) 2,000 mg 50 mL, 2,000 mg, Intravenous, Q24H, Paulette Odonnell MD, Stopped at 07/21/22 2051    diphenhydrAMINE (BENADRYL) injection 25 mg, 25 mg, Intravenous, Q6H PRN, Paulette Odonnell MD    escitalopram (LEXAPRO) tablet 20 mg, 20 mg, Oral, Daily, Paulette Odonnell MD, 20 mg at 07/22/22 6293    ferrous sulfate tablet 325 mg, 325 mg, Oral, Daily With Breakfast, Paulette Odonnell MD, 325 mg at 35/46/86 4356    folic acid (FOLVITE) tablet 1 mg, 1 mg, Oral, Daily, Paulette Odonnell MD, 1 mg at 07/21/22 0917    furosemide (LASIX) injection 40 mg, 40 mg, Intravenous, Q12H, MARILEE Faustin, 40 mg at 07/21/22 0916    glycerin-hypromellose- (ARTIFICIAL TEARS) ophthalmic solution 1 drop, 1 drop, Both Eyes, Q3H PRN, Ana Rendon MD, 1 drop at 07/20/22 1640    lactulose oral solution 20 g, 20 g, Oral, BID, Paulette Odonnell MD, 20 g at 07/21/22 1828    LORazepam (ATIVAN) injection 0 5 mg, 0 5 mg, Intravenous, Once, Ana Rendon MD    LORazepam (ATIVAN) tablet 1 mg, 1 mg, Oral, Q8H PRN, Paulette Odonnell MD, 1 mg at 07/22/22 0032    melatonin tablet 6 mg, 6 mg, Oral, HS, Qing Smith MD, 6 mg at 07/20/22 2224    multivitamin-minerals (CENTRUM) tablet 1 tablet, 1 tablet, Oral, Daily, Paulette Odonnell MD, 1 tablet at 07/21/22 0917    nadolol (CORGARD) tablet 40 mg, 40 mg, Oral, Daily, MARILEE Dior, 40 mg at 07/21/22 0920    nicotine (NICODERM CQ) 21 mg/24 hr TD 24 hr patch 1 patch, 1 patch, Transdermal, Daily, Paulette Odonnell MD, 1 patch at 07/22/22 0830    ondansetron (ZOFRAN) injection 4 mg, 4 mg, Intravenous, Q6H PRN, Paulette Odonnell MD, 4 mg at 07/19/22 1658    pantoprazole (PROTONIX) injection 40 mg, 40 mg, Intravenous, Q12H [address], Michelle Meek MD, 40 mg at 07/22/22 0827    spironolactone (ALDACTONE) tablet 100 mg, 100 mg, Oral, Daily, Rowan Faith MD, 100 mg at 07/21/22 0920    thiamine tablet 100 mg, 100 mg, Oral, Daily, Rowan Faith MD, 100 mg at 07/21/22 5881    Medications Prior to Admission   Medication    albuterol (Ventolin HFA) 90 mcg/act inhaler    ammonium lactate (LAC-HYDRIN) 12 % lotion    buprenorphine-naloxone (SUBOXONE) 8-2 mg per SL tablet    escitalopram (LEXAPRO) 20 mg tablet    ferrous sulfate 324 (65 Fe) mg    folic acid (FOLVITE) 1 mg tablet    furosemide (LASIX) 40 mg tablet    lactulose (CHRONULAC) 10 g/15 mL solution    nicotine (NICODERM CQ) 14 mg/24hr TD 24 hr patch    pantoprazole (PROTONIX) 40 mg tablet    spironolactone (ALDACTONE) 50 mg tablet    zolpidem (AMBIEN CR) 6 25 MG CR tablet       Allergies   Allergen Reactions    Levofloxacin Other (See Comments)     BLACKED OUT         Physical Exam:     /53 (BP Location: Right arm)   Pulse 64   Temp 97 8 °F (36 6 °C) (Axillary)   Resp 17   Wt 85 7 kg (188 lb 15 oz)   SpO2 95%   BMI 24 93 kg/m²     Physical Exam  Constitutional:       Appearance: He is obese  He is not ill-appearing  HENT:      Head: Normocephalic and atraumatic  Mouth/Throat:      Mouth: Mucous membranes are dry  Eyes:      Extraocular Movements: Extraocular movements intact  Conjunctiva/sclera: Conjunctivae normal    Cardiovascular:      Rate and Rhythm: Normal rate and regular rhythm  Pulmonary:      Effort: Pulmonary effort is normal       Comments: Decreased breath sounds BL LL  Abdominal:      Palpations: Abdomen is soft  Tenderness: There is abdominal tenderness  Musculoskeletal:      Cervical back: Normal range of motion  Right lower leg: Edema present  Left lower leg: Edema present  Skin:     General: Skin is warm and dry  Findings: Bruising present     Neurological:      Mental Status: He is alert and oriented to person, place, and time  Psychiatric:         Mood and Affect: Mood normal          Behavior: Behavior normal          Thought Content: Thought content normal          Recent Results (from the past 48 hour(s))   Body fluid white cell count with differential    Collection Time: 07/20/22  4:13 PM   Result Value Ref Range    Site Paracentesis     Color, Fluid Yellow Clear, Colorless,Yellow    Clarity, Fluid Clear Clear    WBC, Fluid 135 /ul   Body fluid culture and Gram stain    Collection Time: 07/20/22  4:13 PM    Specimen: Paracentesis; Body Fluid   Result Value Ref Range    Body Fluid Culture, Sterile No growth     Gram Stain Result 1+ Polys     Gram Stain Result No bacteria seen    Anaerobic culture, body fluid    Collection Time: 07/20/22  4:13 PM    Specimen: Paracentesis;  Body Fluid   Result Value Ref Range    Anaerobic Culture No growth    Total Protein, Fluid    Collection Time: 07/20/22  4:13 PM   Result Value Ref Range    Protein, Fluid 2 4 g/dL   Glucose, body fluid    Collection Time: 07/20/22  4:13 PM   Result Value Ref Range    Glucose, Fluid 150 mg/dL   LD (LDH), Body Fluid    Collection Time: 07/20/22  4:13 PM   Result Value Ref Range    LD, Fluid 81 U/L   pH, body fluid    Collection Time: 07/20/22  4:13 PM   Result Value Ref Range    PH BODY FLUID 7 8    Body Fluid Diff    Collection Time: 07/20/22  4:13 PM   Result Value Ref Range    Total Counted 100     Neutrophils % (Fluid) 9 %    Lymphs % (Fluid) 13 %    Mesothelial % (Fluid) 8 %    Histiocyte % (Fluid) 70 %   Comprehensive metabolic panel    Collection Time: 07/21/22  4:28 AM   Result Value Ref Range    Sodium 133 (L) 135 - 147 mmol/L    Potassium 3 5 3 5 - 5 3 mmol/L    Chloride 92 (L) 96 - 108 mmol/L    CO2 32 21 - 32 mmol/L    ANION GAP 9 4 - 13 mmol/L    BUN 6 5 - 25 mg/dL    Creatinine 0 54 (L) 0 60 - 1 30 mg/dL    Glucose 104 65 - 140 mg/dL    Calcium 9 5 8 4 - 10 2 mg/dL    AST 74 (H) 13 - 39 U/L    ALT 19 7 - 52 U/L    Alkaline Phosphatase 170 (H) 34 - 104 U/L    Total Protein 7 5 6 4 - 8 4 g/dL    Albumin 4 2 3 5 - 5 0 g/dL    Total Bilirubin 10 63 (H) 0 20 - 1 00 mg/dL    eGFR 134 ml/min/1 73sq m   Protime-INR    Collection Time: 07/21/22  4:28 AM   Result Value Ref Range    Protime 22 0 (H) 11 6 - 14 5 seconds    INR 1 96 (H) 0 84 - 1 19   CBC and differential    Collection Time: 07/21/22  4:28 AM   Result Value Ref Range    WBC 5 32 4 31 - 10 16 Thousand/uL    RBC 3 19 (L) 3 88 - 5 62 Million/uL    Hemoglobin 7 9 (L) 12 0 - 17 0 g/dL    Hematocrit 25 6 (L) 36 5 - 49 3 %    MCV 80 (L) 82 - 98 fL    MCH 24 8 (L) 26 8 - 34 3 pg    MCHC 30 9 (L) 31 4 - 37 4 g/dL    RDW 29 9 (H) 11 6 - 15 1 %    Platelets 173 (L) 893 - 390 Thousands/uL    nRBC 0 /100 WBCs    Neutrophils Relative 67 43 - 75 %    Immat GRANS % 1 0 - 2 %    Lymphocytes Relative 18 14 - 44 %    Monocytes Relative 13 (H) 4 - 12 %    Eosinophils Relative 1 0 - 6 %    Basophils Relative 0 0 - 1 %    Neutrophils Absolute 3 62 1 85 - 7 62 Thousands/µL    Immature Grans Absolute 0 03 0 00 - 0 20 Thousand/uL    Lymphocytes Absolute 0 95 0 60 - 4 47 Thousands/µL    Monocytes Absolute 0 68 0 17 - 1 22 Thousand/µL    Eosinophils Absolute 0 04 0 00 - 0 61 Thousand/µL    Basophils Absolute 0 00 0 00 - 0 10 Thousands/µL   Magnesium    Collection Time: 07/21/22  4:28 AM   Result Value Ref Range    Magnesium 2 0 1 9 - 2 7 mg/dL   Phosphorus    Collection Time: 07/21/22  4:28 AM   Result Value Ref Range    Phosphorus 2 4 (L) 2 7 - 4 5 mg/dL   Prepare fresh frozen plasma: 1 Units    Collection Time: 07/21/22  8:42 AM   Result Value Ref Range    Unit Product Code O1415N20     Unit Number [id number]     Unit ABO A     Unit [facility] POS     Unit Dispense Status Return to Inv     Unit Product Volume 280 ml   Direct antiglobulin test    Collection Time: 07/21/22  2:49 PM   Result Value Ref Range    DIRECT JUAN MIGUEL Negative    Type and screen    Collection Time: 07/21/22  2:49 PM   Result Value Ref Range    ABO Grouping A     Rh Factor Positive     Antibody Screen Negative     Specimen Expiration Date [id number]    APTT    Collection Time: 07/22/22  5:33 AM   Result Value Ref Range    PTT 45 (H) 23 - 37 seconds   Protime-INR    Collection Time: 07/22/22  5:33 AM   Result Value Ref Range    Protime 22 5 (H) 11 6 - 14 5 seconds    INR 2 01 (H) 0 84 - 1 19   CBC and differential    Collection Time: 07/22/22  5:33 AM   Result Value Ref Range    WBC 3 68 (L) 4 31 - 10 16 Thousand/uL    RBC 3 04 (L) 3 88 - 5 62 Million/uL    Hemoglobin 7 5 (L) 12 0 - 17 0 g/dL    Hematocrit 24 5 (L) 36 5 - 49 3 %    MCV 81 (L) 82 - 98 fL    MCH 24 7 (L) 26 8 - 34 3 pg    MCHC 30 6 (L) 31 4 - 37 4 g/dL    RDW 31 0 (H) 11 6 - 15 1 %    Platelets 91 (L) 591 - 390 Thousands/uL    nRBC 0 /100 WBCs    Neutrophils Relative 52 43 - 75 %    Immat GRANS % 0 0 - 2 %    Lymphocytes Relative 30 14 - 44 %    Monocytes Relative 16 (H) 4 - 12 %    Eosinophils Relative 2 0 - 6 %    Basophils Relative 0 0 - 1 %    Neutrophils Absolute 1 89 1 85 - 7 62 Thousands/µL    Immature Grans Absolute 0 01 0 00 - 0 20 Thousand/uL    Lymphocytes Absolute 1 12 0 60 - 4 47 Thousands/µL    Monocytes Absolute 0 57 0 17 - 1 22 Thousand/µL    Eosinophils Absolute 0 08 0 00 - 0 61 Thousand/µL    Basophils Absolute 0 01 0 00 - 0 10 Thousands/µL   Comprehensive metabolic panel    Collection Time: 07/22/22  5:33 AM   Result Value Ref Range    Sodium 133 (L) 135 - 147 mmol/L    Potassium 3 1 (L) 3 5 - 5 3 mmol/L    Chloride 92 (L) 96 - 108 mmol/L    CO2 34 (H) 21 - 32 mmol/L    ANION GAP 7 4 - 13 mmol/L    BUN 9 5 - 25 mg/dL    Creatinine 0 51 (L) 0 60 - 1 30 mg/dL    Glucose 104 65 - 140 mg/dL    Calcium 9 5 8 4 - 10 2 mg/dL    AST 63 (H) 13 - 39 U/L    ALT 20 7 - 52 U/L    Alkaline Phosphatase 155 (H) 34 - 104 U/L    Total Protein 7 3 6 4 - 8 4 g/dL    Albumin 4 2 3 5 - 5 0 g/dL    Total Bilirubin 9 97 (H) 0 20 - 1 00 mg/dL    eGFR 137 ml/min/1 73sq m   Phosphorus Collection Time: 07/22/22  5:33 AM   Result Value Ref Range    Phosphorus 2 8 2 7 - 4 5 mg/dL   Magnesium    Collection Time: 07/22/22  5:33 AM   Result Value Ref Range    Magnesium 1 9 1 9 - 2 7 mg/dL   Prepare fresh frozen plasma: 2 Units    Collection Time: 07/22/22  7:23 AM   Result Value Ref Range    Unit Product Code P6980O09     Unit Number [id number]     Unit ABO AB     Unit RH POS     Unit Dispense Status Return to [facility]     Unit Product Volume 250 ml   Prepare fresh frozen plasma: 2 Units    Collection Time: 07/22/22  7:24 AM   Result Value Ref Range    Unit Product Code I1198K37     Unit Number [id number]     Unit ABO A     Unit [facility] POS     Unit Dispense Status Return to [facility]     Unit Product Volume 250 ml    Unit Product Code Z5383H95     Unit Number [id number]     Unit ABO A     Unit [facility] POS     Unit Dispense Status Crossmatched     Unit Product Volume 280 ml    Unit Product Code R3800A79     Unit Number [id number]     Unit ABO AB     Unit [facility] POS     Unit Dispense Status Crossmatched     Unit Product Volume 250 ml       EGD    Result Date: 7/19/2022  Narrative: [address] [place] [address] [phone] DATE OF SERVICE: 7/19/22 PHYSICIAN(S): Attending: Hamlet Pineda MD Fellow: No Staff Documented INDICATION: Alcoholic cirrhosis of liver without ascites ([facility] [address] ), Anemia, unspecified type POST-OP DIAGNOSIS: See the impression below  PREPROCEDURE: Informed consent was obtained for the procedure, including sedation  Risks of perforation, hemorrhage, adverse drug reaction and aspiration were discussed  The patient was placed in the left lateral decubitus position  Patient was explained about the risks and benefits of the procedure  Risks including but not limited to bleeding, infection, and perforation were explained in detail  Also explained about less than 100% sensitivity with the exam and other alternatives   DETAILS OF PROCEDURE: Patient was taken to the procedure room where a time out was performed to confirm correct patient and correct procedure  The patient underwent general anesthesia, which was administered by an anesthesia professional  The patient's blood pressure, heart rate, level of consciousness, respirations and oxygen were monitored throughout the procedure  The scope was advanced to the second part of the duodenum  Retroflexion was performed in the fundus  The patient experienced no blood loss  The procedure was not difficult  The patient tolerated the procedure well  There were no apparent complications  ANESTHESIA INFORMATION: ASA: IV Anesthesia Type: Anesthesia type not filed in the log  MEDICATIONS: No administrations occurring from 1654 to 1718 on 07/19/22 FINDINGS: Esophagus-small sliding hiatal hernia  2 column varices noted and placed 4 bands  Stomach-diffuse portal hypertensive gastropathy noted  The duodenum appeared normal  SPECIMENS: * No specimens in log *     Impression: As above RECOMMENDATION: Schedule repeat EGD in 4-6 weeks Start on nadolol 40 mg daily   Joe Nava MD     CT abdomen pelvis wo contrast    Result Date: 7/18/2022  Narrative: CT ABDOMEN AND PELVIS WITHOUT IV CONTRAST INDICATION:   Retroperitoneal hematoma suspected Anemia, rectal exam negative, please evaluate for retroperitoneal bleed  COMPARISON:  CTA chest January 20, 2021 TECHNIQUE:  CT examination of the abdomen and pelvis was performed without intravenous contrast  This examination was performed without intravenous contrast in the context of the critical nationwide Omnipaque shortage  Axial, sagittal, and coronal 2D reformatted images were created from the source data and submitted for interpretation  Radiation dose length product (DLP) for this visit:  723 2 mGy-cm     This examination, like all CT scans performed in the [facility], was performed utilizing techniques to minimize radiation dose exposure, including the use of iterative reconstruction and automated exposure control  Enteric contrast was not administered  FINDINGS: ABDOMEN LOWER CHEST:  No clinically significant abnormality identified in the visualized lower chest  LIVER/BILIARY TREE:  The liver demonstrates cirrhotic morphology  Innumerable hypodense masses are present throughout the entire liver  GALLBLADDER:  The gallbladder is markedly distended containing multiple gallstones  SPLEEN:  The spleen is enlarged, measuring  17 1 cm  PANCREAS:  Unremarkable  ADRENAL GLANDS:  Unremarkable  KIDNEYS/URETERS:  Unremarkable  No hydronephrosis  STOMACH AND BOWEL:  Unremarkable  APPENDIX:  No findings to suggest appendicitis  ABDOMINOPELVIC CAVITY:  Moderate volume of abdominal and pelvic ascites consistent with simple fluid (Hounsfield unit 5)  No pneumoperitoneum  Evaluation without intravenous contrast is limited, but there are likely multiple erin hepatis and celiac axis lymph nodes  VESSELS:  54 mm fusiform thoracoabdominal aortic aneurysm  The proximal extent was not imaged  PELVIS REPRODUCTIVE ORGANS:  Unremarkable for patient's age  URINARY BLADDER:  Collapsed around a Rizo catheter  ABDOMINAL WALL/INGUINAL REGIONS:  Unremarkable  OSSEOUS STRUCTURES:  No acute fracture or destructive osseous lesion  Impression: Diffuse hepatic metastases concerning for hepatocellular carcinoma in this cirrhotic patient  Metastatic disease is not excluded given the normal AFP  Splenomegaly  Marked gallbladder distention  54 mm partially imaged fusiform thoracoabdominal aortic aneurysm  No source for hemorrhage is identified  * I personally telephoned this result to Mary Beth Fleming on 7/18/2022 4:15 PM  Workstation performed: [id number]     XR chest portable    Result Date: 7/17/2022  Narrative: CHEST INDICATION:   Rhonchi on exam, concern for aspiration pneumonia 2/2 alcohol abuse  COMPARISON:  Chest radiograph from 5/18/2021, chest CT from 1/20/2021   EXAM PERFORMED/VIEWS:  XR CHEST PORTABLE FINDINGS: Cardiomediastinal silhouette appears unremarkable  The lungs are clear  No pneumothorax or pleural effusion  Osseous structures appear within normal limits for patient age  Impression: No acute cardiopulmonary disease  Workstation performed: [id number]      bedside procedure    Result Date: 7/19/2022  Narrative: 1 2 [phone] [id number] [phone]      Labs and pertinent reports reviewed  This note has been generated by voice recognition software system  Therefore, there may be spelling, grammar, and or syntax errors  Please contact if questions arise

## 2022-07-22 NOTE — PROGRESS NOTES
Progress Note - Maddi Caballero 40 y o  male MRN: [id number]    Unit/Bed#: ICU 03 Encounter: [id number]        Subjective:   Patient denies any abdominal pain, nausea or vomiting, any hematemesis or melena  Objective:     Vitals: Blood pressure 100/54, pulse 68, temperature 97 8 °F (36 6 °C), temperature source Oral, resp  rate 18, weight 85 7 kg (188 lb 15 oz), SpO2 94 %  ,Body mass index is 24 93 kg/m²  Intake/Output Summary (Last 24 hours) at 7/22/2022 1229  Last data filed at 7/22/2022 1045  Gross per 24 hour   Intake 503 33 ml   Output 1685 ml   Net -1181 67 ml       Physical Exam:   General appearance: alert, appears stated age and cooperative  Lungs: clear to auscultation bilaterally, no labored breathing/accessory muscle use  Heart: regular rate and rhythm, S1, S2 normal, no murmur, click, rub or gallop  Abdomen: soft, non-tender; bowel sounds normal; no masses,  no organomegaly  Extremities: no edema    Invasive Devices  Report    Peripheral Intravenous Line  Duration           Peripheral IV 07/16/22 Left Antecubital 5 days    Peripheral IV 07/19/22 Right Hand 2 days                Lab, Imaging and other studies: I have personally reviewed pertinent reports  No results displayed because visit has over 200 results           Latest Reference Range & Units 07/22/22 05:33 07/22/22 07:23 07/22/22 12:14   Sodium 135 - 147 mmol/L 133 (L)     Potassium 3 5 - 5 3 mmol/L 3 1 (L)     Chloride 96 - 108 mmol/L 92 (L)     CO2 21 - 32 mmol/L 34 (H)     Anion Gap 4 - 13 mmol/L 7     BUN 5 - 25 mg/dL 9     Creatinine 0 60 - 1 30 mg/dL 0 51 (L)     Glucose, Random 65 - 140 mg/dL 104     Calcium 8 4 - 10 2 mg/dL 9 5     AST 13 - 39 U/L 63 (H)     ALT 7 - 52 U/L 20     Alkaline Phosphatase 34 - 104 U/L 155 (H)     Total Protein 6 4 - 8 4 g/dL 7 3     Albumin 3 5 - 5 0 g/dL 4 2     TOTAL BILIRUBIN 0 20 - 1 00 mg/dL 9 97 (H)     eGFR ml/min/1 73sq m 137     Phosphorus 2 7 - 4 5 mg/dL 2 8     Magnesium 1 9 - 2 7 mg/dL 1  9     WBC 4 31 - 10 16 Thousand/uL 3 68 (L)     Red Blood Cell Count 3 88 - 5 62 Million/uL 3 04 (L)     Hemoglobin 12 0 - 17 0 g/dL 7 5 (L)     HCT 36 5 - 49 3 % 24 5 (L)     MCV 82 - 98 fL 81 (L)     MCH 26 8 - 34 3 pg 24 7 (L)     MCHC 31 4 - 37 4 g/dL 30 6 (L)     RDW 11 6 - 15 1 % 31 0 (H)     Platelet Count 995 - 390 Thousands/uL 91 (L)     nRBC /100 WBCs 0     Neutrophils % 43 - 75 % 52     Immat GRANS % 0 - 2 % 0     Lymphocytes Relative 14 - 44 % 30     Monocytes Relative 4 - 12 % 16 (H)     Eosinophils 0 - 6 % 2     Basophils Relative 0 - 1 % 0     Immature Grans Absolute 0 00 - 0 20 Thousand/uL 0 01     Absolute Neutrophils 1 85 - 7 62 Thousands/µL 1 89     Lymphocytes Absolute 0 60 - 4 47 Thousands/µL 1 12     Absolute Monocytes 0 17 - 1 22 Thousand/µL 0 57     Absolute Eosinophils 0 00 - 0 61 Thousand/µL 0 08     Basophils Absolute 0 00 - 0 10 Thousands/µL 0 01     PROTIME 11 6 - 14 5 seconds 22 5 (H)     POCT INR 0 84 - 1 19  2 01 (H)     PTT 23 - 37 seconds 45 (H)     Unit ABO   AB A  A  AB   Unit RH   POS POS  POS  POS   Unit Number   [id number]  [id number]  [id number]   Unit Dispense Status   Return to Inv Return to Inv  Issued  Crossmatched   (L): Data is abnormally low  (H): Data is abnormally high    Assessment/Plan:    1  Decompensated liver cirrhosis secondary to alcohol abuse, patient also noted with multiple intrahepatic lesions of unclear etiology, patient being planned for liver biopsy  Meld score 25 today  Alcohol level elevated on admission    Patient underwent EGD with esophageal variceal banding 3 days ago and shows no signs of active GI bleeding at this time    -monitor meld labs daily    -follow-up on liver biopsy    -monitor hemoglobin, transfuse if needed, consider repeating EGD if he shows evidence of upper GI bleeding    -alcohol cessation advised

## 2022-07-22 NOTE — PHYSICAL THERAPY NOTE
Physical Therapy Cancellation Note         07/22/22 1407   PT Last Visit   PT Visit Date 07/22/22   Note Type   Note type Cancelled Session   Cancel Reasons Other   Additional Comments referral received for PT eval and tx  attempted to see pt for eval but NSG reports he is receiving platelets tranfusion and will be going for biospy  will follow and initiate PT as appropriate       Jessica Diaz, PT

## 2022-07-22 NOTE — INTERVAL H&P NOTE
Update: (This section must be completed if the H&P was completed greater than 24 hrs to procedure or admission)    H&P reviewed  After examining the patient, I find no changed to the H&P since it had been written  cirrhosis, numerous liver mass  Biopsy for diagnosis  Recent paracentesis    Elevated INR noted  FFP given  Still with elevated risk    Informed consent obtained    MP2 asa3    Patient re-evaluated   Accept as history and physical     Amada Piedra MD/July 22, 2022/1:49 PM

## 2022-07-22 NOTE — OCCUPATIONAL THERAPY NOTE
Occupational Therapy Cancellation     Patient Name: Amauri Mcdaniel  [id number] Date: 7/22/2022  Problem List  Principal Problem:    Alcoholic cirrhosis of liver without ascites ([facility] )  Active Problems:    Substance abuse ([facility] )    Tobacco dependence    Anemia    Thrombocytopenia (HCC)    Alcohol withdrawal ([facility] )    Thoracoabdominal aortic aneurysm (HCC)    Depression    Chronic bronchitis (HCC)    Liver metastasis ([facility] )    Past Medical History  Past Medical History:   Diagnosis Date    AAA (abdominal aortic aneurysm) ([facility] )     Allergic     Anemia     Asthma     Depression 05/18/2021    Essential hypertension 05/06/2019    Liver metastasis ([facility] ) 7/18/2022    Obesity     Opiate dependence ([place] [address] )     Substance abuse [facility])      Past Surgical History  Past Surgical History:   Procedure Laterality Date    IR PARACENTESIS  7/20/2022    LYMPH NODE BIOPSY          07/22/22 1306   OT Last Visit   OT Visit Date 07/22/22  (Friday)   Note Type   Note type Cancelled Session   Cancel Reasons Other  (receiving platelets per RNOsman and leaving floor at [phone] for IR)   Additional Comments Chart review completed and attempted to see pt for OT eval  Per RN not appropriate to participate   Will cancel and continue to follow as appropriate and schedule allows   [facility], OTR/L

## 2022-07-23 LAB
ABO GROUP BLD BPU: NORMAL
ALBUMIN SERPL BCP-MCNC: 4.5 G/DL (ref 3.5–5)
ALP SERPL-CCNC: 161 U/L (ref 34–104)
ALT SERPL W P-5'-P-CCNC: 20 U/L (ref 7–52)
ANION GAP SERPL CALCULATED.3IONS-SCNC: 7 MMOL/L (ref 4–13)
AST SERPL W P-5'-P-CCNC: 59 U/L (ref 13–39)
BACTERIA BLD CULT: NORMAL
BACTERIA BLD CULT: NORMAL
BACTERIA SPEC ANAEROBE CULT: NO GROWTH
BACTERIA SPEC BFLD CULT: NO GROWTH
BASOPHILS # BLD AUTO: 0.03 THOUSANDS/ΜL (ref 0–0.1)
BASOPHILS NFR BLD AUTO: 1 % (ref 0–1)
BILIRUB SERPL-MCNC: 10.65 MG/DL (ref 0.2–1)
BPU ID: NORMAL
BUN SERPL-MCNC: 10 MG/DL (ref 5–25)
CALCIUM SERPL-MCNC: 9.7 MG/DL (ref 8.4–10.2)
CHLORIDE SERPL-SCNC: 94 MMOL/L (ref 96–108)
CO2 SERPL-SCNC: 33 MMOL/L (ref 21–32)
CREAT SERPL-MCNC: 0.53 MG/DL (ref 0.6–1.3)
CROSSMATCH: NORMAL
DEPRECATED AT III PPP: 38 % OF NORMAL (ref 92–136)
EOSINOPHIL # BLD AUTO: 0.07 THOUSAND/ΜL (ref 0–0.61)
EOSINOPHIL NFR BLD AUTO: 2 % (ref 0–6)
ERYTHROCYTE [DISTWIDTH] IN BLOOD BY AUTOMATED COUNT: 31.6 % (ref 11.6–15.1)
GFR SERPL CREATININE-BSD FRML MDRD: 135 ML/MIN/1.73SQ M
GLUCOSE SERPL-MCNC: 103 MG/DL (ref 65–140)
GRAM STN SPEC: NORMAL
GRAM STN SPEC: NORMAL
HCT VFR BLD AUTO: 24.6 % (ref 36.5–49.3)
HGB BLD-MCNC: 7.5 G/DL (ref 12–17)
IMM GRANULOCYTES # BLD AUTO: 0.01 THOUSAND/UL (ref 0–0.2)
IMM GRANULOCYTES NFR BLD AUTO: 0 % (ref 0–2)
INR PPP: 2 (ref 0.84–1.19)
LYMPHOCYTES # BLD AUTO: 0.86 THOUSANDS/ΜL (ref 0.6–4.47)
LYMPHOCYTES NFR BLD AUTO: 26 % (ref 14–44)
MCH RBC QN AUTO: 24.8 PG (ref 26.8–34.3)
MCHC RBC AUTO-ENTMCNC: 30.5 G/DL (ref 31.4–37.4)
MCV RBC AUTO: 82 FL (ref 82–98)
MONOCYTES # BLD AUTO: 0.64 THOUSAND/ΜL (ref 0.17–1.22)
MONOCYTES NFR BLD AUTO: 20 % (ref 4–12)
NEUTROPHILS # BLD AUTO: 1.67 THOUSANDS/ΜL (ref 1.85–7.62)
NEUTS SEG NFR BLD AUTO: 51 % (ref 43–75)
NRBC BLD AUTO-RTO: 0 /100 WBCS
PLATELET # BLD AUTO: 99 THOUSANDS/UL (ref 149–390)
POTASSIUM SERPL-SCNC: 3.3 MMOL/L (ref 3.5–5.3)
PROT SERPL-MCNC: 7.3 G/DL (ref 6.4–8.4)
PROTHROMBIN TIME: 22.4 SECONDS (ref 11.6–14.5)
RBC # BLD AUTO: 3.02 MILLION/UL (ref 3.88–5.62)
SODIUM SERPL-SCNC: 134 MMOL/L (ref 135–147)
TPA PPP QL CHRO: 36 % OF NORMAL (ref 77–138)
UNIT DISPENSE STATUS: NORMAL
UNIT PRODUCT CODE: NORMAL
UNIT PRODUCT VOLUME: 250 ML
UNIT PRODUCT VOLUME: 250 ML
UNIT PRODUCT VOLUME: 280 ML
UNIT PRODUCT VOLUME: 350 ML
UNIT RH: NORMAL
WBC # BLD AUTO: 3.28 THOUSAND/UL (ref 4.31–10.16)

## 2022-07-23 PROCEDURE — 99232 SBSQ HOSP IP/OBS MODERATE 35: CPT | Performed by: INTERNAL MEDICINE

## 2022-07-23 PROCEDURE — C9113 INJ PANTOPRAZOLE SODIUM, VIA: HCPCS | Performed by: INTERNAL MEDICINE

## 2022-07-23 PROCEDURE — 80053 COMPREHEN METABOLIC PANEL: CPT

## 2022-07-23 PROCEDURE — G0425 INPT/ED TELECONSULT30: HCPCS | Performed by: GENERAL PRACTICE

## 2022-07-23 PROCEDURE — 85610 PROTHROMBIN TIME: CPT | Performed by: INTERNAL MEDICINE

## 2022-07-23 PROCEDURE — 85025 COMPLETE CBC W/AUTO DIFF WBC: CPT | Performed by: INTERNAL MEDICINE

## 2022-07-23 RX ORDER — POTASSIUM CHLORIDE 20 MEQ/1
40 TABLET, EXTENDED RELEASE ORAL ONCE
Status: COMPLETED | OUTPATIENT
Start: 2022-07-23 | End: 2022-07-23

## 2022-07-23 RX ORDER — GABAPENTIN 300 MG/1
300 CAPSULE ORAL 2 TIMES DAILY PRN
Status: DISCONTINUED | OUTPATIENT
Start: 2022-07-23 | End: 2022-08-02 | Stop reason: HOSPADM

## 2022-07-23 RX ORDER — CLONIDINE HYDROCHLORIDE 0.1 MG/1
0.1 TABLET ORAL
Status: DISCONTINUED | OUTPATIENT
Start: 2022-07-23 | End: 2022-08-02 | Stop reason: HOSPADM

## 2022-07-23 RX ORDER — POTASSIUM CHLORIDE 20 MEQ/1
20 TABLET, EXTENDED RELEASE ORAL ONCE
Status: COMPLETED | OUTPATIENT
Start: 2022-07-23 | End: 2022-07-23

## 2022-07-23 RX ORDER — DESVENLAFAXINE 50 MG/1
50 TABLET, EXTENDED RELEASE ORAL DAILY
Status: DISCONTINUED | OUTPATIENT
Start: 2022-07-24 | End: 2022-08-02 | Stop reason: HOSPADM

## 2022-07-23 RX ADMIN — LORAZEPAM 1 MG: 1 TABLET ORAL at 18:09

## 2022-07-23 RX ADMIN — BUPRENORPHINE AND NALOXONE 8 MG: 8; 2 FILM BUCCAL; SUBLINGUAL at 18:27

## 2022-07-23 RX ADMIN — FUROSEMIDE 40 MG: 10 INJECTION, SOLUTION INTRAMUSCULAR; INTRAVENOUS at 21:28

## 2022-07-23 RX ADMIN — FERROUS SULFATE TAB 325 MG (65 MG ELEMENTAL FE) 325 MG: 325 (65 FE) TAB at 08:09

## 2022-07-23 RX ADMIN — POTASSIUM CHLORIDE 20 MEQ: 1500 TABLET, EXTENDED RELEASE ORAL at 13:26

## 2022-07-23 RX ADMIN — CLONIDINE HYDROCHLORIDE 0.1 MG: 0.1 TABLET ORAL at 21:28

## 2022-07-23 RX ADMIN — ALBUMIN (HUMAN) 25 G: 0.25 INJECTION, SOLUTION INTRAVENOUS at 08:15

## 2022-07-23 RX ADMIN — THIAMINE HCL TAB 100 MG 100 MG: 100 TAB at 08:09

## 2022-07-23 RX ADMIN — NADOLOL 40 MG: 40 TABLET ORAL at 08:08

## 2022-07-23 RX ADMIN — MULTIPLE VITAMINS W/ MINERALS TAB 1 TABLET: TAB ORAL at 08:09

## 2022-07-23 RX ADMIN — SPIRONOLACTONE 100 MG: 100 TABLET ORAL at 08:09

## 2022-07-23 RX ADMIN — Medication 6 MG: at 21:28

## 2022-07-23 RX ADMIN — ESCITALOPRAM OXALATE 20 MG: 20 TABLET ORAL at 08:09

## 2022-07-23 RX ADMIN — GABAPENTIN 300 MG: 300 CAPSULE ORAL at 18:09

## 2022-07-23 RX ADMIN — FUROSEMIDE 40 MG: 10 INJECTION, SOLUTION INTRAMUSCULAR; INTRAVENOUS at 08:13

## 2022-07-23 RX ADMIN — LORAZEPAM 1 MG: 1 TABLET ORAL at 08:10

## 2022-07-23 RX ADMIN — POTASSIUM CHLORIDE 40 MEQ: 1500 TABLET, EXTENDED RELEASE ORAL at 08:07

## 2022-07-23 RX ADMIN — FOLIC ACID 1 MG: 1 TABLET ORAL at 08:09

## 2022-07-23 RX ADMIN — LACTULOSE 20 G: 20 SOLUTION ORAL at 08:24

## 2022-07-23 RX ADMIN — PANTOPRAZOLE SODIUM 40 MG: 40 INJECTION, POWDER, FOR SOLUTION INTRAVENOUS at 08:13

## 2022-07-23 RX ADMIN — LACTULOSE 20 G: 20 SOLUTION ORAL at 18:41

## 2022-07-23 RX ADMIN — ALBUMIN (HUMAN) 25 G: 0.25 INJECTION, SOLUTION INTRAVENOUS at 18:26

## 2022-07-23 RX ADMIN — BUPRENORPHINE AND NALOXONE 8 MG: 8; 2 FILM BUCCAL; SUBLINGUAL at 09:31

## 2022-07-23 RX ADMIN — NICOTINE 1 PATCH: 21 PATCH, EXTENDED RELEASE TRANSDERMAL at 08:23

## 2022-07-23 RX ADMIN — PANTOPRAZOLE SODIUM 40 MG: 40 INJECTION, POWDER, FOR SOLUTION INTRAVENOUS at 21:28

## 2022-07-23 RX ADMIN — ALBUMIN (HUMAN) 25 G: 0.25 INJECTION, SOLUTION INTRAVENOUS at 21:28

## 2022-07-23 NOTE — ASSESSMENT & PLAN NOTE
Hx Decompensated cirrhosis with anasarca, hepatic encephalopathy, thrombocytopenia splenomegaly, hyperammonemia, and  Hepatomegaly most likely secondary to alcohol abuse but could also be due to underlying alcoholic hepatitis  -Previous AFP and right upper quadrant ultrasounds were normal  -Never had screening EGD for varices  -Home lactulose for hepatic encephalopathy  Rico MCGRAW recommending transfer to a facility with ICU for EGD  -current meld score =25  -an EGD performed showing 2 esophageal varices that underwent subsequent banding  -SBP: Spiking fevers on 7/17  CXR negative for acute findings, ua negative for uti  Blood cultures drawn  Started on ceftriaxone  He will complete a course of antibiotics for SBP prophylaxis  Paracentesis fluid shows 135 wbc's; 9% neutrophils  Fevers have resolved  Patient currently hemodynamically stable  -Abdominal ultrasound ordered to assess for hepatocellular carcinoma & ascites  -AFP 3 5  -GI consulted for suspected UGIB/varices; appreciate recommendations     - s/p EGD; 4 bands for 2 varices; no active bleed   Started on nadolol 40 mg qd      - 2 FFP given prior to EGD       Plan:   Protonix IV BID  lasix IV 40 q 12  aldactone  Hold AP/AC  On nadolol

## 2022-07-23 NOTE — ASSESSMENT & PLAN NOTE
Lab Results   Component Value Date     (L) 07/24/2022    PLT 99 (L) 07/23/2022    PLT 91 (L) 07/22/2022    PLT 91 (L) 07/22/2022     (L) 07/21/2022     Thrombocytopenia most likely 2/2 liver cirrhosis   Give 1 unit of platelets if count drops below 50,000

## 2022-07-23 NOTE — ASSESSMENT & PLAN NOTE
Alcohol level 448  Patient says last drink was on Friday 7/15  [facility] ED used serax 10 mg tid for symptom control  Patient advised to stop drinking and is in agreement  However on leaving the room, patient appears to be pouring hand  into his drinks  Plan:  JENNIE protocol  Ativan  MV thiamine folic acid  D/C Serax  Patient being seen by [facility]

## 2022-07-23 NOTE — PROGRESS NOTES
Progress Note - [facility]  Miah Ramírez 40 y o  male MRN: [id number]    Unit/Bed#: ICU 03 Encounter: [id number]      Assessment/Plan:  1  Decompensated alcoholic liver disease with esophageal varices,  ascites, and hepatic encephalopathy  Decompensated liver cirrhosis secondary to alcohol use complicated by ascites, hepatic encephalopathy, esophageal varices  Patient continues to drink alcohol now has multiple liver lesions  Meld score 25 today  Alcohol level elevated on admission  Patient underwent EGD with esophageal variceal banding 3 days ago and shows no signs of active GI bleeding at this time  Patient completed 7 day course of IV ceftriaxone  Patient completed pantoprazole and octreotide infusion  Patient had liver biopsy of liver lesions of unknown etiology pathology pending     -continue pantoprazole BID    -continue nadolol 40 mg daily holds if SBP less than 110     -continue lactulose and titrate for 2-3 bowel movements daily    -continue diuretics Aldactone and Lasix     -monitor meld labs daily     -follow-up on liver biopsy     -monitor hemoglobin, transfuse if needed, consider repeating EGD if he shows evidence of upper GI bleeding     -alcohol cessation advised      Subjective:   Lying in bed in no acute distress  Tolerating diet  Denies nausea, vomiting, hematemesis or melena  Denies epigastric or abdominal pain  No signs of GI bleed  Objective:     Vitals: Blood pressure 112/55, pulse 74, temperature 98 7 °F (37 1 °C), resp  rate 14, height 6' 1" (1 854 m), weight 84 2 kg (185 lb 10 oz), SpO2 97 %  ,Body mass index is 24 49 kg/m²  Intake/Output Summary (Last 24 hours) at 7/23/2022 1410  Last data filed at 7/23/2022 1326  Gross per 24 hour   Intake 1660 ml   Output 4000 ml   Net -2340 ml       Physical Exam: Physical Exam  Vitals and nursing note reviewed  Constitutional:       General: He is not in acute distress  Appearance: He is ill-appearing     Eyes:      General: Scleral icterus present  Cardiovascular:      Rate and Rhythm: Normal rate and regular rhythm  Pulses: Normal pulses  Heart sounds: Normal heart sounds  Pulmonary:      Effort: Pulmonary effort is normal  No respiratory distress  Breath sounds: Normal breath sounds  No stridor  No wheezing, rhonchi or rales  Abdominal:      General: Bowel sounds are normal  There is no distension  Palpations: Abdomen is soft  There is no mass  Tenderness: There is no abdominal tenderness  There is no guarding or rebound  Hernia: No hernia is present  Musculoskeletal:      Right lower leg: No edema  Left lower leg: No edema  Skin:     General: Skin is warm and dry  Coloration: Skin is jaundiced  Skin is not pale  Neurological:      Mental Status: He is alert and oriented to person, place, and time     Psychiatric:         Mood and Affect: Mood normal          Invasive Devices  Report    Peripheral Intravenous Line  Duration           Peripheral IV 07/16/22 Left Antecubital 6 days    Peripheral IV 07/19/22 Right Hand 3 days                Current Facility-Administered Medications:     albumin human (FLEXBUMIN) 25 % injection 25 g, 25 g, Intravenous, TID, Ella Zamora MD, Stopped at 07/23/22 0932    albuterol (PROVENTIL HFA,VENTOLIN HFA) inhaler 2 puff, 2 puff, Inhalation, Q6H PRN, Ella Zamora MD    buprenorphine-naloxone (Suboxone) film 8 mg, 8 mg, Sublingual, BID, Ella Zamora MD, 8 mg at 07/23/22 0931    diphenhydrAMINE (BENADRYL) injection 25 mg, 25 mg, Intravenous, Q6H PRN, Ella Zamora MD    escitalopram (LEXAPRO) tablet 20 mg, 20 mg, Oral, Daily, Ella Zamora MD, 20 mg at 07/23/22 0809    ferrous sulfate tablet 325 mg, 325 mg, Oral, Daily With Breakfast, Ella Zamora MD, 325 mg at 36/61/31 4660    folic acid (FOLVITE) tablet 1 mg, 1 mg, Oral, Daily, Ella Zamora MD, 1 mg at 07/23/22 0809    furosemide (LASIX) injection 40 mg, 40 mg, Intravenous, Q12H, MARILEE Levy, 40 mg at 07/23/22 0813    glycerin-hypromellose- (ARTIFICIAL TEARS) ophthalmic solution 1 drop, 1 drop, Both Eyes, Q3H PRN, Kenya Morataya MD, 1 drop at 07/20/22 1640    lactulose oral solution 20 g, 20 g, Oral, BID, Anjel Major MD, 20 g at 07/23/22 0244    LORazepam (ATIVAN) injection 0 5 mg, 0 5 mg, Intravenous, Once, Kenya Morataya MD    LORazepam (ATIVAN) tablet 1 mg, 1 mg, Oral, Q8H PRN, Anjel Major MD, 1 mg at 07/23/22 0810    melatonin tablet 6 mg, 6 mg, Oral, HS, Rakan Elizabeth MD, 6 mg at 07/22/22 2104    multivitamin-minerals (CENTRUM) tablet 1 tablet, 1 tablet, Oral, Daily, Anjel Major MD, 1 tablet at 07/23/22 0809    nadolol (CORGARD) tablet 40 mg, 40 mg, Oral, Daily, MARILEE Levy, 40 mg at 07/23/22 0808    nicotine (NICODERM CQ) 21 mg/24 hr TD 24 hr patch 1 patch, 1 patch, Transdermal, Daily, Anjel Major MD, 1 patch at 07/23/22 0823    ondansetron (ZOFRAN) injection 4 mg, 4 mg, Intravenous, Q6H PRN, Anjel Major MD, 4 mg at 07/19/22 1658    pantoprazole (PROTONIX) injection 40 mg, 40 mg, Intravenous, Q12H [facility], Abdoulaye Kim MD, 40 mg at 07/23/22 0813    spironolactone (ALDACTONE) tablet 100 mg, 100 mg, Oral, Daily, Anjel Major MD, 100 mg at 07/23/22 0809    thiamine tablet 100 mg, 100 mg, Oral, Daily, Anjel Major MD, 100 mg at 07/23/22 0809    Lab Results:   Recent Results (from the past 24 hour(s))   Comprehensive metabolic panel    Collection Time: 07/23/22  4:51 AM   Result Value Ref Range    Sodium 134 (L) 135 - 147 mmol/L    Potassium 3 3 (L) 3 5 - 5 3 mmol/L    Chloride 94 (L) 96 - 108 mmol/L    CO2 33 (H) 21 - 32 mmol/L    ANION GAP 7 4 - 13 mmol/L    BUN 10 5 - 25 mg/dL    Creatinine 0 53 (L) 0 60 - 1 30 mg/dL    Glucose 103 65 - 140 mg/dL    Calcium 9 7 8 4 - 10 2 mg/dL    AST 59 (H) 13 - 39 U/L    ALT 20 7 - 52 U/L    Alkaline Phosphatase 161 (H) 34 - 104 U/L    Total Protein 7 3 6 4 - 8 4 g/dL    Albumin 4 5 3 5 - 5 0 g/dL    Total Bilirubin 10 65 (H) 0 20 - 1 00 mg/dL    eGFR 135 ml/min/1 73sq m   Protime-INR    Collection Time: 07/23/22  4:51 AM   Result Value Ref Range    Protime 22 4 (H) 11 6 - 14 5 seconds    INR 2 00 (H) 0 84 - 1 19   CBC and differential    Collection Time: 07/23/22  4:51 AM   Result Value Ref Range    WBC 3 28 (L) 4 31 - 10 16 Thousand/uL    RBC 3 02 (L) 3 88 - 5 62 Million/uL    Hemoglobin 7 5 (L) 12 0 - 17 0 g/dL    Hematocrit 24 6 (L) 36 5 - 49 3 %    MCV 82 82 - 98 fL    MCH 24 8 (L) 26 8 - 34 3 pg    MCHC 30 5 (L) 31 4 - 37 4 g/dL    RDW 31 6 (H) 11 6 - 15 1 %    Platelets 99 (L) 444 - 390 Thousands/uL    nRBC 0 /100 WBCs    Neutrophils Relative 51 43 - 75 %    Immat GRANS % 0 0 - 2 %    Lymphocytes Relative 26 14 - 44 %    Monocytes Relative 20 (H) 4 - 12 %    Eosinophils Relative 2 0 - 6 %    Basophils Relative 1 0 - 1 %    Neutrophils Absolute 1 67 (L) 1 85 - 7 62 Thousands/µL    Immature Grans Absolute 0 01 0 00 - 0 20 Thousand/uL    Lymphocytes Absolute 0 86 0 60 - 4 47 Thousands/µL    Monocytes Absolute 0 64 0 17 - 1 22 Thousand/µL    Eosinophils Absolute 0 07 0 00 - 0 61 Thousand/µL    Basophils Absolute 0 03 0 00 - 0 10 Thousands/µL   Prepare fresh frozen plasma: 2 Units    Collection Time: 07/23/22  5:47 AM   Result Value Ref Range    Unit Product Code X6082S78     Unit Number [id number]     Unit ABO A     Unit [facility] POS     Unit Dispense Status Return to [facility]     Unit Product Volume 250 ml    Unit Product Code R6773R80     Unit Number [id number]     Unit ABO A     Unit [facility] POS     Unit Dispense Status Presumed Trans     Unit Product Volume 280 ml    Unit Product Code V2720P97     Unit Number [id number]     Unit ABO AB     Unit [facility] POS     Unit Dispense Status Presumed Trans     Unit Product Volume 250 ml   Prepare Leukoreduced RBC: 1 Units    Collection Time: 07/23/22 11:31 AM   Result Value Ref Range    Unit Product Code O3870W19     Unit Number [id number]     Unit ABO A     Unit RH POS     Crossmatch Compatible     Unit Dispense Status Return to Inv     Unit Product Volume 350 ml             Imaging Studies: EGD    Result Date: 7/19/2022  Narrative: [facility] [address] [facility], [address] [place] [address] [phone] DATE OF SERVICE: 7/19/22 PHYSICIAN(S): Attending: Anjel Major MD Fellow: No Staff Documented INDICATION: Alcoholic cirrhosis of liver without ascites ([facility] [address] ), Anemia, unspecified type POST-OP DIAGNOSIS: See the impression below  PREPROCEDURE: Informed consent was obtained for the procedure, including sedation  Risks of perforation, hemorrhage, adverse drug reaction and aspiration were discussed  The patient was placed in the left lateral decubitus position  Patient was explained about the risks and benefits of the procedure  Risks including but not limited to bleeding, infection, and perforation were explained in detail  Also explained about less than 100% sensitivity with the exam and other alternatives  DETAILS OF PROCEDURE: Patient was taken to the procedure room where a time out was performed to confirm correct patient and correct procedure  The patient underwent general anesthesia, which was administered by an anesthesia professional  The patient's blood pressure, heart rate, level of consciousness, respirations and oxygen were monitored throughout the procedure  The scope was advanced to the second part of the duodenum  Retroflexion was performed in the fundus  The patient experienced no blood loss  The procedure was not difficult  The patient tolerated the procedure well  There were no apparent complications  ANESTHESIA INFORMATION: ASA: IV Anesthesia Type: Anesthesia type not filed in the log  MEDICATIONS: No administrations occurring from 1654 to 1718 on 07/19/22 FINDINGS: Esophagus-small sliding hiatal hernia  2 column varices noted and placed 4 bands   Stomach-diffuse portal hypertensive gastropathy noted  The duodenum appeared normal  SPECIMENS: * No specimens in log *     Impression: As above RECOMMENDATION: Schedule repeat EGD in 4-6 weeks Start on nadolol 40 mg daily   Georgia Pedersen MD     CT abdomen pelvis wo contrast    Result Date: 7/18/2022  Narrative: CT ABDOMEN AND PELVIS WITHOUT IV CONTRAST INDICATION:   Retroperitoneal hematoma suspected Anemia, rectal exam negative, please evaluate for retroperitoneal bleed  COMPARISON:  CTA chest January 20, 2021 TECHNIQUE:  CT examination of the abdomen and pelvis was performed without intravenous contrast  This examination was performed without intravenous contrast in the context of the critical nationwide Omnipaque shortage  Axial, sagittal, and coronal 2D reformatted images were created from the source data and submitted for interpretation  Radiation dose length product (DLP) for this visit:  723 2 mGy-cm   This examination, like all CT scans performed in the [facility], was performed utilizing techniques to minimize radiation dose exposure, including the use of iterative reconstruction and automated exposure control  Enteric contrast was not administered  FINDINGS: ABDOMEN LOWER CHEST:  No clinically significant abnormality identified in the visualized lower chest  LIVER/BILIARY TREE:  The liver demonstrates cirrhotic morphology  Innumerable hypodense masses are present throughout the entire liver  GALLBLADDER:  The gallbladder is markedly distended containing multiple gallstones  SPLEEN:  The spleen is enlarged, measuring  17 1 cm  PANCREAS:  Unremarkable  ADRENAL GLANDS:  Unremarkable  KIDNEYS/URETERS:  Unremarkable  No hydronephrosis  STOMACH AND BOWEL:  Unremarkable  APPENDIX:  No findings to suggest appendicitis  ABDOMINOPELVIC CAVITY:  Moderate volume of abdominal and pelvic ascites consistent with simple fluid (Hounsfield unit 5)  No pneumoperitoneum    Evaluation without intravenous contrast is limited, but there are likely multiple erin hepatis and celiac axis lymph nodes  VESSELS:  54 mm fusiform thoracoabdominal aortic aneurysm  The proximal extent was not imaged  PELVIS REPRODUCTIVE ORGANS:  Unremarkable for patient's age  URINARY BLADDER:  Collapsed around a Rizo catheter  ABDOMINAL WALL/INGUINAL REGIONS:  Unremarkable  OSSEOUS STRUCTURES:  No acute fracture or destructive osseous lesion  Impression: Diffuse hepatic metastases concerning for hepatocellular carcinoma in this cirrhotic patient  Metastatic disease is not excluded given the normal AFP  Splenomegaly  Marked gallbladder distention  54 mm partially imaged fusiform thoracoabdominal aortic aneurysm  No source for hemorrhage is identified  * I personally telephoned this result to Brittany Sood on 7/18/2022 4:15 PM  Workstation performed: [id number]     XR chest portable    Result Date: 7/17/2022  Narrative: CHEST INDICATION:   Rhonchi on exam, concern for aspiration pneumonia 2/2 alcohol abuse  COMPARISON:  Chest radiograph from 5/18/2021, chest CT from 1/20/2021  EXAM PERFORMED/VIEWS:  XR CHEST PORTABLE FINDINGS: Cardiomediastinal silhouette appears unremarkable  The lungs are clear  No pneumothorax or pleural effusion  Osseous structures appear within normal limits for patient age  Impression: No acute cardiopulmonary disease  Workstation performed: [id number]     IR INPATIENT Paracentesis    Result Date: 7/21/2022  Narrative: PROCEDURE: Diagnostic and therapeutic paracentesis STAFF: HENNY Gardiner  NUMBER OF IMAGES: 2  COMPLICATIONS: None  MEDICATIONS: 1% lidocaine subcutaneous  INDICATION: Symptomatic ascites  PROCEDURE: Using ultrasound guidance, the right paracolic gutter was punctured and a catheter was placed into the peritoneal cavity  A total of 3500 milliliters of fluid was removed  The catheter was then removed  FINDINGS: Moderate ascites  Straw colored ascites was removed  Impression: Diagnostic and therapeutic paracentesis   Workstation performed: [id number]     IR biopsy liver mass    Result Date: 7/22/2022  Narrative: Ultrasound-guided biopsy of a representative liver mass Clinical History: Cirrhosis, numerous liver masses  Recent paracentesis  Elevated INR, given FFP  Conscious sedation time: 15 minutes Technique: The patient was seen in the holding area and identified verbally and by wristband  After discussion of the procedure and its details, risks, benefits and alternatives, both verbal and written informed consent were obtained from the patient  Elevated risk of bleeding was discussed  The patient was then brought to the ultrasound area and placed supine on the table  After a brief ultrasound examination was performed of the left liver and correlated with prior imaging, a site on the abdomen was prepped and draped in usual sterile fashion  Sterile ultrasound technique with sterile gel and sterile probe covers was also utilized  A preprocedure timeout was performed per standard department protocol  Under sonographic guidance, lidocaine was administered to the skin and underlying soft tissues  A biopince biopsy needle set was advanced into the liver under direct ultrasound guidance  The inner stylette was removed and an 18-gauge biopsy needle was advanced into the lesion and core samples obtained under continuous sonographic guidance  D-Stat was administered under ultrasound to the biopsy tract for hemostatic purposes  The patient tolerated the procedure well and suffered no immediate complications  Findings: Innumerable hypoechoic nodules throughout the liver  No free ascites  Needle within the left hepatic lobe in a representative mass  Postprocedure changes without hematoma       Impression: Impression: Ultrasound-guided biopsy of a representative liver mass Workstation performed: [id number]     US bedside procedure    Result Date: 7/19/2022  Narrative: 1 [phone] [id number] 2 [phone] [id number]          [facility], MARILEE      Please Note: "This note has been constructed using a voice recognition system  Therefore there may be syntax, spelling, and/or grammatical errors   Please call if you have any questions  "**

## 2022-07-23 NOTE — PLAN OF CARE
Problem: Potential for Falls  Goal: Patient will remain free of falls  Description: INTERVENTIONS:  - Educate patient/family on patient safety including physical limitations  - Instruct patient to call for assistance with activity   - Consult OT/PT to assist with strengthening/mobility   - Keep Call bell within reach  - Keep bed low and locked with side rails adjusted as appropriate  - Keep care items and personal belongings within reach  - Initiate and maintain comfort rounds  - Make Fall Risk Sign visible to staff  - Apply yellow socks and bracelet for high fall risk patients  - Consider moving patient to room near nurses station  Outcome: Progressing     Problem: Prexisting or High Potential for Compromised Skin Integrity  Goal: Skin integrity is maintained or improved  Description: INTERVENTIONS:  - Identify patients at risk for skin breakdown  - Assess and monitor skin integrity  - Assess and monitor nutrition and hydration status  - Monitor labs   - Assess for incontinence   - Turn and reposition patient  - Assist with mobility/ambulation  - Relieve pressure over bony prominences  - Avoid friction and shearing  - Provide appropriate hygiene as needed including keeping skin clean and dry  - Evaluate need for skin moisturizer/barrier cream  - Collaborate with interdisciplinary team   - Patient/family teaching  - Consider wound care consult   Outcome: Progressing     Problem: MOBILITY - ADULT  Goal: Maintain or return to baseline ADL function  Description: INTERVENTIONS:  -  Assess patient's ability to carry out ADLs; assess patient's baseline for ADL function and identify physical deficits which impact ability to perform ADLs (bathing, care of mouth/teeth, toileting, grooming, dressing, etc )  - Assess/evaluate cause of self-care deficits   - Assess range of motion  - Assess patient's mobility; develop plan if impaired  - Assess patient's need for assistive devices and provide as appropriate  - Encourage maximum independence but intervene and supervise when necessary  - Involve family in performance of ADLs  - Assess for home care needs following discharge   - Consider OT consult to assist with ADL evaluation and planning for discharge  - Provide patient education as appropriate  Outcome: Progressing  Goal: Maintains/Returns to pre admission functional level  Description: INTERVENTIONS:  - Perform BMAT or MOVE assessment daily    - Set and communicate daily mobility goal to care team and patient/family/caregiver     - Collaborate with rehabilitation services on mobility goals if consulted  - Out of bed for toileting  - Record patient progress and toleration of activity level   Outcome: Progressing     Problem: PAIN - ADULT  Goal: Verbalizes/displays adequate comfort level or baseline comfort level  Description: Interventions:  - Encourage patient to monitor pain and request assistance  - Assess pain using appropriate pain scale  - Administer analgesics based on type and severity of pain and evaluate response  - Implement non-pharmacological measures as appropriate and evaluate response  - Consider cultural and social influences on pain and pain management  - Notify physician/advanced practitioner if interventions unsuccessful or patient reports new pain  Outcome: Progressing     Problem: SAFETY ADULT  Goal: Patient will remain free of falls  Description: INTERVENTIONS:  - Educate patient/family on patient safety including physical limitations  - Instruct patient to call for assistance with activity   - Consult OT/PT to assist with strengthening/mobility   - Keep Call bell within reach  - Keep bed low and locked with side rails adjusted as appropriate  - Keep care items and personal belongings within reach  - Initiate and maintain comfort rounds  - Make Fall Risk Sign visible to staff  - Apply yellow socks and bracelet for high fall risk patients  - Consider moving patient to room near nurses station  Outcome: Progressing  Goal: Maintain or return to baseline ADL function  Description: INTERVENTIONS:  -  Assess patient's ability to carry out ADLs; assess patient's baseline for ADL function and identify physical deficits which impact ability to perform ADLs (bathing, care of mouth/teeth, toileting, grooming, dressing, etc )  - Assess/evaluate cause of self-care deficits   - Assess range of motion  - Assess patient's mobility; develop plan if impaired  - Assess patient's need for assistive devices and provide as appropriate  - Encourage maximum independence but intervene and supervise when necessary  - Involve family in performance of ADLs  - Assess for home care needs following discharge   - Consider OT consult to assist with ADL evaluation and planning for discharge  - Provide patient education as appropriate  Outcome: Progressing  Goal: Maintains/Returns to pre admission functional level  Description: INTERVENTIONS:  - Perform BMAT or MOVE assessment daily    - Set and communicate daily mobility goal to care team and patient/family/caregiver     - Collaborate with rehabilitation services on mobility goals if consulted  - Out of bed for toileting  - Record patient progress and toleration of activity level   Outcome: Progressing     Problem: DISCHARGE PLANNING  Goal: Discharge to home or other facility with appropriate resources  Description: INTERVENTIONS:  - Identify barriers to discharge w/patient and caregiver  - Arrange for needed discharge resources and transportation as appropriate  - Identify discharge learning needs (meds, wound care, etc )  - Arrange for interpretive services to assist at discharge as needed  - Refer to Case Management Department for coordinating discharge planning if the patient needs post-hospital services based on physician/advanced practitioner order or complex needs related to functional status, cognitive ability, or social support system  Outcome: Progressing     Problem: Knowledge Deficit  Goal: Patient/family/caregiver demonstrates understanding of disease process, treatment plan, medications, and discharge instructions  Description: Complete learning assessment and assess knowledge base  Interventions:  - Provide teaching at level of understanding  - Provide teaching via preferred learning methods  Outcome: Progressing     Problem: NEUROSENSORY - ADULT  Goal: Achieves maximal functionality and self care  Description: INTERVENTIONS  - Monitor swallowing and airway patency with patient fatigue and changes in neurological status  - Encourage and assist patient to increase activity and self care     - Encourage visually impaired, hearing impaired and aphasic patients to use assistive/communication devices  Outcome: Progressing     Problem: GASTROINTESTINAL - ADULT  Goal: Maintains or returns to baseline bowel function  Description: INTERVENTIONS:  - Assess bowel function  - Encourage oral fluids to ensure adequate hydration  - Administer IV fluids if ordered to ensure adequate hydration  - Administer ordered medications as needed  - Encourage mobilization and activity  - Consider nutritional services referral to assist patient with adequate nutrition and appropriate food choices  Outcome: Progressing  Goal: Maintains adequate nutritional intake  Description: INTERVENTIONS:  - Monitor percentage of each meal consumed  - Identify factors contributing to decreased intake, treat as appropriate  - Assist with meals as needed  - Monitor I&O, weight, and lab values if indicated  - Obtain nutrition services referral as needed  Outcome: Progressing     Problem: HEMATOLOGIC - ADULT  Goal: Maintains hematologic stability  Description: INTERVENTIONS  - Assess for signs and symptoms of bleeding or hemorrhage  - Monitor labs  - Administer supportive blood products/factors as ordered and appropriate  Outcome: Progressing

## 2022-07-23 NOTE — ASSESSMENT & PLAN NOTE
Hx Decompensated cirrhosis with anasarca, hepatic encephalopathy, thrombocytopenia splenomegaly, hyperammonemia, and  Hepatomegaly most likely secondary to alcohol abuse but could also be due to underlying alcoholic hepatitis  -Previous AFP and right upper quadrant ultrasounds were normal  -Never had screening EGD for varices  -Home lactulose for hepatic encephalopathy  Chandra MCGRAW recommending transfer to a facility with ICU for EGD  -current meld score =26  -an EGD performed showing 2 esophageal varices that underwent subsequent banding  -SBP: Spiking fevers on 7/17  CXR negative for acute findings, ua negative for uti  Blood cultures drawn  Started on ceftriaxone  He will complete a course of antibiotics for SBP prophylaxis  Paracentesis fluid shows 135 wbc's; 9% neutrophils  Fevers have resolved  Patient currently hemodynamically stable  -Abdominal ultrasound ordered to assess for hepatocellular carcinoma & ascites  -AFP 3 5  -GI consulted for suspected UGIB/varices; appreciate recommendations     - s/p EGD; 4 bands for 2 varices; no active bleed  Started on nadolol 40 mg qd      - 2 FFP given prior to EGD      INR too, no bleeding, no interventions planned for today, will hold on further FFP         Plan:   F/u Bcx   - no growth after 4 days  Protonix IV BID  lasix IV 40 q 12  aldactone  Hold AP/AC  On nadolol

## 2022-07-23 NOTE — ASSESSMENT & PLAN NOTE
CT abdomen:  Diffuse hepatic metastases concerning for hepatocellular carcinoma in this cirrhotic patient  Metastatic disease is not excluded given the normal AFP    AFP WNL  Liver biopsy on 07/22/2022    Plan:  Awaiting biopsy results  Patient downgraded to [facility] surge

## 2022-07-23 NOTE — ASSESSMENT & PLAN NOTE
· Patient reports taking 8-2 Suboxone BID  · [place] 16 mg daily   Will discuss with case management/Mook for possible partial hospitalization during rehab

## 2022-07-23 NOTE — ASSESSMENT & PLAN NOTE
· Patient reports taking 8-2 Suboxone BID  · [place] 16 mg daily     Patient to be seen by [facility]

## 2022-07-23 NOTE — ASSESSMENT & PLAN NOTE
CT abdomen:  Diffuse hepatic metastases concerning for hepatocellular carcinoma in this cirrhotic patient  Metastatic disease is not excluded given the normal AFP    AFP WNL  Liver biopsy on 07/22/2022    Plan:  Awaiting biopsy results

## 2022-07-23 NOTE — PROGRESS NOTES
[facility]  Progress Note - Addie Joshua 1985, 40 y o  male MRN: [id number]  Unit/Bed#: ICU 03 Encounter: [id number]  Primary Care Provider: Janelle Sy PA-C   Date and time admitted to hospital: 7/18/2022  4:59 PM    Depression, concern for PTSD  Assessment & Plan  Per discussion with patient's sister: Patient served in the [employer] and spent 18 months in [place], there is concern for PTSD  Sister has reached out to  inquiring about VA programs to help with PTSD  She tells me that he had drug addiction prior to serving and developed alcohol addiction after his [employer]  Also shares that he went through a divorce 2-3 years ago and a girlfriend that recently passed away last November  Has 1 son and 1 step-daughter that he has not seen in years  Accounts from sister concerning for uncontrolled depression  Plan:  Resume home Lexapro  1 mg ativan TID PRN  Patient to be seen by [facility] while telehealth -have called via TT and inquired as to timing of consult      Liver metastasis ([facility] )  Assessment & Plan  CT abdomen:  Diffuse hepatic metastases concerning for hepatocellular carcinoma in this cirrhotic patient  Metastatic disease is not excluded given the normal AFP  AFP WNL  Liver biopsy on 07/22/2022    Plan:  Awaiting biopsy results  Patient stable from liver biopsy, will downgrade from step-down to to med surge  Alcohol withdrawal (HCC)  Assessment & Plan  Alcohol level 448  Patient says last drink was on Friday 7/15  [facility] ED used serax 10 mg tid for symptom control  Patient advised to stop drinking and is in agreement  However on leaving the room, patient appears to be pouring hand  into his drinks        Plan:  CIWA protocol  Ativan  MV thiamine folic acid  D/C Serax  Patient to be seen by [facility] Telehealth      * Alcoholic cirrhosis of liver without ascites ([facility] )  Assessment & Plan  Hx Decompensated cirrhosis with anasarca, hepatic encephalopathy, thrombocytopenia splenomegaly, hyperammonemia, and  Hepatomegaly most likely secondary to alcohol abuse but could also be due to underlying alcoholic hepatitis  -Previous AFP and right upper quadrant ultrasounds were normal  -Never had screening EGD for varices  -Home lactulose for hepatic encephalopathy  Elisha MCGRAW recommending transfer to a facility with ICU for EGD  -current meld score =26  -an EGD performed showing 2 esophageal varices that underwent subsequent banding  -SBP: Spiking fevers on 7/17  CXR negative for acute findings, ua negative for uti  Blood cultures drawn  Started on ceftriaxone  He will complete a course of antibiotics for SBP prophylaxis  Paracentesis fluid shows 135 wbc's; 9% neutrophils  Fevers have resolved  Patient currently hemodynamically stable  -Abdominal ultrasound ordered to assess for hepatocellular carcinoma & ascites  -AFP 3 5  -GI consulted for suspected UGIB/varices; appreciate recommendations     - s/p EGD; 4 bands for 2 varices; no active bleed  Started on nadolol 40 mg qd      - 2 FFP given prior to EGD      INR too, no bleeding, no interventions planned for today, will hold on further FFP         Plan:   F/u Bcx   - no growth after 4 days  Protonix IV BID  lasix IV 40 q 12  aldactone  Hold AP/AC  On nadolol    Chronic bronchitis (HCC)  Assessment & Plan  Resume home inhalers for asthma    Thoracoabdominal aortic aneurysm [facility])  Assessment & Plan  7/18/21 - CT findings; stable 54 mm  On chart review patient had a CTA on January 2021 with noted 44 mm AAA  Does not appear AAA has been under surveillance  Refer to incidental findings noted on 07/20    Plan:  Please arrange for outpatient follow-up with primary care physician  Would recommend abdominal ultrasound to surveil AAA    Thrombocytopenia [facility])  Assessment & Plan  Lab Results   Component Value Date    PLT 99 (L) 07/23/2022    PLT 91 (L) 07/22/2022    PLT 91 (L) 07/22/2022     (L) 07/21/2022 PLT 94 (L) 2022     Thrombocytopenia most likely 2/2 liver cirrhosis   Give 1 unit of platelets if count drops below 50,000    Anemia  Assessment & Plan  Presents with microcytic anemia hemoglobin of 3 6 s/p 7u pRBC (hgb 7 7) and 2 u FFP  Asymptomatic, vitals stable  Troponin negative    Iron panel showing low iron levels with normal TIBC  Heme occult blood negative in the ED  Patient denying melena and hematemesis  Prior note of febrile reaction with pRBC (was noted after receiving 6 units; no temperature spike after receiving 7th unit, although pt was pre-treated with tylenol)  EGD no active bleed; 4 bands placed for 2 varices  Received 7 units total pRBCs    Plan:  Started on nadolol  Transfuse hgb < 7  Heme consulted; appreciate recs      Tobacco dependence  Assessment & Plan  Nicotine patch    Substance abuse ([facility] )  Assessment & Plan  · Patient reports taking 8-2 Suboxone BID  · [place] 16 mg daily     Patient to be seen by [facility]        VTE Pharmacologic Prophylaxis: VTE Score: 1 Low Risk (Score 0-2) - Encourage Ambulation  Patient Centered Rounds: I performed bedside rounds with nursing staff today  Discussions with Specialists or Other Care Team Provider:  Respiratory    Education and Discussions with Family / Patient: Attempted to update  (sister) via phone  Left voicemail  Current Length of Stay: 5 day(s)  Current Patient Status: Inpatient   Discharge Plan: Anticipate discharge in 48-72 hrs to discharge location to be determined pending rehab evaluations  Code Status: Level 1 - Full Code    Subjective:   Patient states that he is mentally doing better  He is happy that he has eaten  He is urinating fine in  Has had 2 bowel movements yesterday  No bleeding or pain at liver biopsy site      Objective:     Vitals:   Temp (24hrs), Av 7 °F (37 1 °C), Min:98 4 °F (36 9 °C), Max:99 °F (37 2 °C)    Temp:  [98 4 °F (36 9 °C)-99 °F (37 2 °C)] 98 7 °F (37 1 °C)  HR:  [58-78] 74  Resp:  [12-26] 14  BP: ()/(52-67) 112/55  SpO2:  [81 %-97 %] 97 %  Body mass index is 24 49 kg/m²  Input and Output Summary (last 24 hours): Intake/Output Summary (Last 24 hours) at 7/23/2022 1234  Last data filed at 7/23/2022 1045  Gross per 24 hour   Intake 2303 ml   Output 4000 ml   Net -1697 ml       Physical Exam:   Physical Exam  Vitals and nursing note reviewed  Constitutional:       General: He is not in acute distress  Appearance: Normal appearance  He is well-developed  He is not ill-appearing, toxic-appearing or diaphoretic  Comments: Patient is calm and a0x3    HENT:      Head: Normocephalic and atraumatic  Eyes:      Extraocular Movements: Extraocular movements intact  Conjunctiva/sclera: Conjunctivae normal    Cardiovascular:      Rate and Rhythm: Normal rate and regular rhythm  Heart sounds: No murmur heard  Pulmonary:      Effort: Pulmonary effort is normal  No respiratory distress  Abdominal:      General: There is no distension  Palpations: Abdomen is soft  Tenderness: There is no abdominal tenderness  There is no guarding  Musculoskeletal:      Cervical back: Neck supple  Right lower leg: No edema  Left lower leg: No edema  Skin:     General: Skin is warm and dry  Neurological:      Mental Status: He is alert and oriented to person, place, and time     Psychiatric:         Mood and Affect: Mood normal          Behavior: Behavior normal           Additional Data:     Labs:  Results from last 7 days   Lab Units 07/23/22  0451   WBC Thousand/uL 3 28*   HEMOGLOBIN g/dL 7 5*   HEMATOCRIT % 24 6*   PLATELETS Thousands/uL 99*   NEUTROS PCT % 51   LYMPHS PCT % 26   MONOS PCT % 20*   EOS PCT % 2     Results from last 7 days   Lab Units 07/23/22  0451   SODIUM mmol/L 134*   POTASSIUM mmol/L 3 3*   CHLORIDE mmol/L 94*   CO2 mmol/L 33*   BUN mg/dL 10   CREATININE mg/dL 0 53*   ANION GAP mmol/L 7   CALCIUM mg/dL 9 7 ALBUMIN g/dL 4 5   TOTAL BILIRUBIN mg/dL 10 65*   ALK PHOS U/L 161*   ALT U/L 20   AST U/L 59*   GLUCOSE RANDOM mg/dL 103     Results from last 7 days   Lab Units 07/23/22  0451   INR  2 00*             Results from last 7 days   Lab Units 07/17/22  1838 07/17/22  1113   PROCALCITONIN ng/ml 0 44* 0 42*       Lines/Drains:  Invasive Devices  Report    Peripheral Intravenous Line  Duration           Peripheral IV 07/16/22 Left Antecubital 6 days    Peripheral IV 07/19/22 Right Hand 3 days                      Imaging: Reviewed radiology reports from this admission including: On her biopsy liver, IR paracentesis, ultrasound bedside, CT abdomen pelvis, x-ray portal    Recent Cultures (last 7 days):   Results from last 7 days   Lab Units 07/20/22  1613 07/18/22  1033 07/17/22  2042   BLOOD CULTURE   --  Staphylococcus coagulase negative* No Growth After 5 Days  No Growth After 5 Days     GRAM STAIN RESULT  1+ Polys  No bacteria seen  --   --    BODY FLUID CULTURE, STERILE  No growth  --   --        Last 24 Hours Medication List:   Current Facility-Administered Medications   Medication Dose Route Frequency Provider Last Rate    albumin human  25 g Intravenous TID Tawana Vega MD Stopped (07/23/22 0932)    albuterol  2 puff Inhalation Q6H PRN Tawana Vega MD      buprenorphine-naloxone  8 mg Sublingual BID Tawana Vega MD      diphenhydrAMINE  25 mg Intravenous Q6H PRN Tawana Vega MD      escitalopram  20 mg Oral Daily Tawana Vega MD      ferrous sulfate  325 mg Oral Daily With Breakfast Tawana Vega MD      folic acid  1 mg Oral Daily Tawana Vega MD      furosemide  40 mg Intravenous Q12H Cam MARILEE Aragon      glycerin-hypromellose-  1 drop Both Eyes Q3H PRN Marisela Torres MD      lactulose  20 g Oral BID Tawana Vega MD      LORazepam  0 5 mg Intravenous Once Marisela Torres MD      LORazepam  1 mg Oral Q8H PRN Tawana Vega MD      melatonin  6 mg Oral HS Debbie Hollis MD      multivitamin-minerals  1 tablet Oral Daily Snehal Connor MD      nadolol  40 mg Oral Daily MARILEE Smith      nicotine  1 patch Transdermal Daily Snehal Connor MD      ondansetron  4 mg Intravenous Q6H PRN Snehal Connor MD      pantoprazole  40 mg Intravenous Q12H [address] Nilsa Rich MD      potassium chloride  20 mEq Oral Once Pavan Diehl MD      spironolactone  100 mg Oral Daily Snehal Connor MD      thiamine  100 mg Oral Daily Snehal Connor MD          Today, Patient Was Seen By: Pavan Diehl MD    **Please Note: This note may have been constructed using a voice recognition system  **

## 2022-07-23 NOTE — ASSESSMENT & PLAN NOTE
Per discussion with patient's sister: Patient served in the [employer] and spent 18 months in [place], there is concern for PTSD  Sister has reached out to CM inquiring about VA programs to help with PTSD  She tells me that he had drug addiction prior to serving and developed alcohol addiction after his [employer]  Also shares that he went through a divorce 2-3 years ago and a girlfriend that recently passed away last November  Has 1 son and 1 step-daughter that he has not seen in years  Accounts from sister concerning for uncontrolled depression       Plan:  Resume home Lexapro  1 mg ativan TID PRN  Patient to be seen by [facility] while telehealth -have called via TT and inquired as to timing of consult
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
## 2022-07-23 NOTE — ASSESSMENT & PLAN NOTE
Per discussion with patient's sister: Patient served in the [employer] and spent 18 months in [place], there is concern for PTSD  Sister has reached out to CM inquiring about VA programs to help with PTSD  She tells me that he had drug addiction prior to serving and developed alcohol addiction after his [employer]  Also shares that he went through a divorce 2-3 years ago and a girlfriend that recently passed away last November  Has 1 son and 1 step-daughter that he has not seen in years  Accounts from sister concerning for uncontrolled depression       Plan:  Resume home Lexapro  1 mg ativan TID PRN  Were discussed with Case Management/Mook for possible partial hospitalization during rehab

## 2022-07-24 LAB
ALBUMIN SERPL BCP-MCNC: 4.7 G/DL (ref 3.5–5)
ALP SERPL-CCNC: 157 U/L (ref 34–104)
ALT SERPL W P-5'-P-CCNC: 20 U/L (ref 7–52)
ANION GAP SERPL CALCULATED.3IONS-SCNC: 7 MMOL/L (ref 4–13)
ANISOCYTOSIS BLD QL SMEAR: PRESENT
AST SERPL W P-5'-P-CCNC: 59 U/L (ref 13–39)
BASOPHILS # BLD MANUAL: 0 THOUSAND/UL (ref 0–0.1)
BASOPHILS NFR MAR MANUAL: 0 % (ref 0–1)
BILIRUB SERPL-MCNC: 11.07 MG/DL (ref 0.2–1)
BUN SERPL-MCNC: 13 MG/DL (ref 5–25)
CALCIUM SERPL-MCNC: 10.1 MG/DL (ref 8.4–10.2)
CHLORIDE SERPL-SCNC: 95 MMOL/L (ref 96–108)
CO2 SERPL-SCNC: 32 MMOL/L (ref 21–32)
CREAT SERPL-MCNC: 0.52 MG/DL (ref 0.6–1.3)
EOSINOPHIL # BLD MANUAL: 0.12 THOUSAND/UL (ref 0–0.4)
EOSINOPHIL NFR BLD MANUAL: 3 % (ref 0–6)
ERYTHROCYTE [DISTWIDTH] IN BLOOD BY AUTOMATED COUNT: 31.6 % (ref 11.6–15.1)
GFR SERPL CREATININE-BSD FRML MDRD: 136 ML/MIN/1.73SQ M
GLUCOSE SERPL-MCNC: 102 MG/DL (ref 65–140)
HCT VFR BLD AUTO: 24.2 % (ref 36.5–49.3)
HGB BLD-MCNC: 7.3 G/DL (ref 12–17)
HYPERCHROMIA BLD QL SMEAR: PRESENT
INR PPP: 1.85 (ref 0.84–1.19)
LYMPHOCYTES # BLD AUTO: 1.19 THOUSAND/UL (ref 0.6–4.47)
LYMPHOCYTES # BLD AUTO: 31 % (ref 14–44)
MCH RBC QN AUTO: 24.4 PG (ref 26.8–34.3)
MCHC RBC AUTO-ENTMCNC: 30.2 G/DL (ref 31.4–37.4)
MCV RBC AUTO: 81 FL (ref 82–98)
MONOCYTES # BLD AUTO: 0.73 THOUSAND/UL (ref 0–1.22)
MONOCYTES NFR BLD: 19 % (ref 4–12)
NEUTROPHILS # BLD MANUAL: 1.81 THOUSAND/UL (ref 1.85–7.62)
NEUTS BAND NFR BLD MANUAL: 1 % (ref 0–8)
NEUTS SEG NFR BLD AUTO: 46 % (ref 43–75)
PLATELET # BLD AUTO: 110 THOUSANDS/UL (ref 149–390)
PLATELET BLD QL SMEAR: ABNORMAL
POLYCHROMASIA BLD QL SMEAR: PRESENT
POTASSIUM SERPL-SCNC: 3.8 MMOL/L (ref 3.5–5.3)
PROT SERPL-MCNC: 7.6 G/DL (ref 6.4–8.4)
PROTHROMBIN TIME: 21.1 SECONDS (ref 11.6–14.5)
RBC # BLD AUTO: 2.99 MILLION/UL (ref 3.88–5.62)
SODIUM SERPL-SCNC: 134 MMOL/L (ref 135–147)
TARGETS BLD QL SMEAR: PRESENT
WBC # BLD AUTO: 3.85 THOUSAND/UL (ref 4.31–10.16)

## 2022-07-24 PROCEDURE — 85027 COMPLETE CBC AUTOMATED: CPT | Performed by: INTERNAL MEDICINE

## 2022-07-24 PROCEDURE — C9113 INJ PANTOPRAZOLE SODIUM, VIA: HCPCS | Performed by: INTERNAL MEDICINE

## 2022-07-24 PROCEDURE — 97163 PT EVAL HIGH COMPLEX 45 MIN: CPT

## 2022-07-24 PROCEDURE — 80053 COMPREHEN METABOLIC PANEL: CPT | Performed by: INTERNAL MEDICINE

## 2022-07-24 PROCEDURE — 99232 SBSQ HOSP IP/OBS MODERATE 35: CPT | Performed by: INTERNAL MEDICINE

## 2022-07-24 PROCEDURE — 97530 THERAPEUTIC ACTIVITIES: CPT

## 2022-07-24 PROCEDURE — 85007 BL SMEAR W/DIFF WBC COUNT: CPT | Performed by: INTERNAL MEDICINE

## 2022-07-24 PROCEDURE — 85610 PROTHROMBIN TIME: CPT | Performed by: INTERNAL MEDICINE

## 2022-07-24 RX ORDER — POTASSIUM CHLORIDE 20 MEQ/1
20 TABLET, EXTENDED RELEASE ORAL ONCE
Status: COMPLETED | OUTPATIENT
Start: 2022-07-24 | End: 2022-07-24

## 2022-07-24 RX ADMIN — SPIRONOLACTONE 100 MG: 100 TABLET ORAL at 09:23

## 2022-07-24 RX ADMIN — ALBUMIN (HUMAN) 25 G: 0.25 INJECTION, SOLUTION INTRAVENOUS at 09:20

## 2022-07-24 RX ADMIN — FUROSEMIDE 40 MG: 10 INJECTION, SOLUTION INTRAMUSCULAR; INTRAVENOUS at 20:24

## 2022-07-24 RX ADMIN — CLONIDINE HYDROCHLORIDE 0.1 MG: 0.1 TABLET ORAL at 21:03

## 2022-07-24 RX ADMIN — BUPRENORPHINE AND NALOXONE 8 MG: 8; 2 FILM BUCCAL; SUBLINGUAL at 17:38

## 2022-07-24 RX ADMIN — LORAZEPAM 1 MG: 1 TABLET ORAL at 20:30

## 2022-07-24 RX ADMIN — POTASSIUM CHLORIDE 20 MEQ: 1500 TABLET, EXTENDED RELEASE ORAL at 09:25

## 2022-07-24 RX ADMIN — ALBUMIN (HUMAN) 25 G: 0.25 INJECTION, SOLUTION INTRAVENOUS at 17:39

## 2022-07-24 RX ADMIN — FOLIC ACID 1 MG: 1 TABLET ORAL at 09:23

## 2022-07-24 RX ADMIN — FERROUS SULFATE TAB 325 MG (65 MG ELEMENTAL FE) 325 MG: 325 (65 FE) TAB at 09:22

## 2022-07-24 RX ADMIN — NICOTINE 1 PATCH: 21 PATCH, EXTENDED RELEASE TRANSDERMAL at 09:23

## 2022-07-24 RX ADMIN — MULTIPLE VITAMINS W/ MINERALS TAB 1 TABLET: TAB ORAL at 09:24

## 2022-07-24 RX ADMIN — DESVENLAFAXINE 50 MG: 50 TABLET, FILM COATED, EXTENDED RELEASE ORAL at 09:25

## 2022-07-24 RX ADMIN — THIAMINE HCL TAB 100 MG 100 MG: 100 TAB at 09:23

## 2022-07-24 RX ADMIN — LACTULOSE 20 G: 20 SOLUTION ORAL at 17:38

## 2022-07-24 RX ADMIN — BUPRENORPHINE AND NALOXONE 8 MG: 8; 2 FILM BUCCAL; SUBLINGUAL at 09:23

## 2022-07-24 RX ADMIN — Medication 6 MG: at 21:03

## 2022-07-24 RX ADMIN — FUROSEMIDE 40 MG: 10 INJECTION, SOLUTION INTRAMUSCULAR; INTRAVENOUS at 09:23

## 2022-07-24 RX ADMIN — LACTULOSE 20 G: 20 SOLUTION ORAL at 09:23

## 2022-07-24 RX ADMIN — PANTOPRAZOLE SODIUM 40 MG: 40 INJECTION, POWDER, FOR SOLUTION INTRAVENOUS at 09:23

## 2022-07-24 RX ADMIN — ALBUMIN (HUMAN) 25 G: 0.25 INJECTION, SOLUTION INTRAVENOUS at 20:23

## 2022-07-24 RX ADMIN — LORAZEPAM 1 MG: 1 TABLET ORAL at 09:23

## 2022-07-24 RX ADMIN — PANTOPRAZOLE SODIUM 40 MG: 40 INJECTION, POWDER, FOR SOLUTION INTRAVENOUS at 20:24

## 2022-07-24 NOTE — PHYSICAL THERAPY NOTE
PHYSICAL THERAPY EVALUATION  NAME: Darlene Lab  AGE:   40 y o  MRN:  [id number]  ADMIT DX: Anemia liver cirrhosis h h 3,4    PMH:   Past Medical History:   Diagnosis Date    AAA (abdominal aortic aneurysm) (HCC)     Allergic     Anemia     Asthma     Depression 2021    Essential hypertension 2019    Liver metastasis ([facility] ) 2022    Obesity     Opiate dependence ([place] [address] )     Substance abuse ([place] [address] )      LENGTH OF STAY: 6       22 0946   PT Last Visit   PT Visit Date 22   Note Type   Note type Evaluation   Pain Assessment   Pain Assessment Tool 0-10   Pain Score No Pain   Restrictions/Precautions   Weight Bearing Precautions Per Order No   Other Precautions Fall Risk   Home Living   Type of [facility] Two level;Stairs to enter with rails  (split level home; 1 RADHA and 5 steps to main living floor)   [address]  (owns but was not using)   Additional Comments Ambulates independently without AD at baseline  Pt reports being very sedentary cue to depression for the last couple of months  Pt reports he was able to ambulate independently OOB to bathroom and back, however that was primarily the extent of his mobility  Prior Function   Level of [place] Independent with ADLs and functional mobility   Lives With Family  (parents, pt reports they are close to 80 and unable to provide significant physical assist)   Receives Help From Other (Comment)  (pt reports he has 3 sisters that would be able to provide some support if needed)   ADL Assistance Independent   IADLs Independent  (pt primarily had delivery services)   Falls in the last 6 months 1 to 4   General   Family/Caregiver Present No   Cognition   Overall Cognitive Status WFL   Arousal/Participation Cooperative   Orientation Level Oriented X4   Memory Within functional limits   Following Commands Follows all commands and directions without difficulty   Comments Pt identified by name and      Subjective Subjective Agrees to PT evaluation and is pleasant and cooperative throughout session  "I have chicken legs now, I've just lost so much strength "   RLE Assessment   RLE Assessment X   Strength RLE   RLE Overall Strength 4-/5  (functionally)   LLE Assessment   LLE Assessment X   Strength LLE   LLE Overall Strength 4-/5  (functionally)   Coordination   Movements are Fluid and Coordinated 0   Coordination and Movement Description visible LE shaking with mobility   Bed Mobility   Supine to Sit 5  Supervision   Additional items HOB elevated; Bedrails; Increased time required;Verbal cues   Sit to Supine 5  Supervision   Additional items HOB elevated; Increased time required;Verbal cues;LE management   Transfers   Sit to Stand 3  Moderate assistance   Additional items Assist x 1; Increased time required;Verbal cues   Stand to Sit 3  Moderate assistance   Additional items Assist x 1; Increased time required;Verbal cues   Ambulation/Elevation   Gait pattern Improper Weight shift;Decreased foot clearance; Short stride; Excessively slow  (slight retropulsion initially)   Gait Assistance 3  Moderate assist   Additional items Assist x 1;Verbal cues   Assistive Device None  (HHA)   Distance 4` lateral steps along EOB toward R side   Balance   Static Sitting Fair +   Dynamic Sitting Fair   Static Standing Poor +   Dynamic Standing Poor   Ambulatory Poor   Endurance Deficit   Endurance Deficit Yes   Endurance Deficit Description limited ambulation distance, fatigue, LE shaking/tremors with mobility   Activity Tolerance   Activity Tolerance Patient limited by fatigue   Nurse Made Aware Per RN, pt appropriate to evaluate   Assessment   Prognosis Fair   Problem List Decreased strength;Decreased endurance; Impaired balance;Decreased mobility; Decreased coordination   Goals   Patient Goals to get some strength back   STG Expiration Date 08/03/22   Short Term Goal #1 Pt will be able to: (1) perform bed mobility with mod I to promote OOB activity (2) perform sit to stand with min A to decrease burden of care (3) ambulate at least 200` with min A and least restrictive AD to increase activity tolerance (4) increase standing balance by 1 grade to decrease risk of falls (5) negotiate at least 5 stairs with min A and use of handrail to allow safe access into home   PT Treatment Day 1   Plan   Treatment/Interventions Functional transfer training;LE strengthening/ROM; Elevations; Therapeutic exercise; Endurance training;Patient/family training;Equipment eval/education; Bed mobility;Gait training   PT Frequency 3-5x/wk   Recommendation   PT Discharge Recommendation Post acute rehabilitation services   Equipment Recommended [facility] Recommended Wheeled walker   AM-PAC Basic Mobility Inpatient   Turning in Bed Without Bedrails 3   Lying on Back to Sitting on Edge of Flat Bed 3   Moving Bed to Chair 2   Standing Up From Chair 2   Walk in Room 2   Climb 3-5 Stairs 1   Basic Mobility Inpatient Raw Score 13   Basic Mobility Standardized Score 33 99   Highest Level Of Mobility   -[facility] Goal 4: Move to chair/commode   -[facility] Achieved 5: Stand (1 or more minutes)   Additional Treatment Session   Start Time 0925   End Time 0946   Treatment Assessment Pt agrees to PT treatment and is pleasant and cooperative throughout session  Able to perform ~x2 additional sit to stand transfers from EOB with mod A x1 and verbal cues for body positioning/technique  Education provided on use and precautions of RW  Able to ambulate an additional ~6` forward/backward x1 with mod A x1  Limited due to increasing visible shaking/tremors in BLE and pt reported LE fatigue  Pt able to tolerate sitting EOB for ~10` with Fair (+) balance while performing light LE HEP  Will continue to benefit from ongoing skilled PT to maximize his functional mobility and increase his level of independence     Equipment Use RW   Additional Treatment Day 1   Exercises   Knee AROM [employer] Sitting;15 reps;AROM; Bilateral   Ankle Pumps Sitting;10 reps;AROM; Bilateral   End of Consult   Patient Position at End of Consult Supine; All needs within reach   The patient's [facility] Basic Mobility Inpatient Short Form Raw Score is 13, Standardized Score is 33 99  A standardized score less than 40 78 or Raw Score of 16 suggests the patient may benefit from discharge to post-acute rehabilitation services, which DOES coincide with CURRENT above PT recommendations  However please refer to therapist recommendation for discharge planning given other factors that may influence destination  Adapted from [employer] of [facility] 6-Clicks Basic Mobility and Daily Activity Scores With Discharge Destination  Physical Therapy, 2021;101:1-9  DOI: 10 [email or web address]      Assessment: Pt is a 40 y o  male seen for PT evaluation s/p admit to [address] [place] [address] on 3/68/3672 w/ Alcoholic cirrhosis of liver without ascites ([facility] [address] )  Order placed for PT  Comorbidities affecting pt's physical performance at time of assessment include: HTN  Personal factors affecting pt at time of IE include: depression, steps to enter environment, limited home support, inability to perform IADLs, inability to perform ADLs, inability to ambulate household distances, and recent fall(s)  Prior to admission, pt was was independent w/ all functional mobility w/ out AD and lived with parents   Upon evaluation: Pt requires supervision for bed mobility, mod A for sit to stand, and mod A for ambulation with RW  (Please find full objective findings from PT assessment regarding body systems outlined above)  Impairments and limitations also listed above, especially due to  weakness, impaired balance, decreased endurance, gait deviations, decreased activity tolerance, and fall risk    Pt's clinical presentation is currently unstable/unpredictable seen in pt's presentation of fall risk, monitoring in ICU, and significant decline in functional mobility compared to baseline  Pt to benefit from continued skilled PT tx while in hospital and upon DC to address deficits as defined above and maximize level of functional mobility  From PT/mobility standpoint, recommendation at time of d/c would be inpatient rehab pending progress  Recommend  progression of ambulation and initiation of HEP as appropriate         Amara Pereira, PT,DPT

## 2022-07-24 NOTE — PLAN OF CARE
Problem: PHYSICAL THERAPY ADULT  Goal: Performs mobility at highest level of function for planned discharge setting  See evaluation for individualized goals  Description: Treatment/Interventions: Functional transfer training, LE strengthening/ROM, Elevations, Therapeutic exercise, Endurance training, Patient/family training, Equipment eval/education, Bed mobility, Gait training  Equipment Recommended: Yoli Benavidez       See flowsheet documentation for full assessment, interventions and recommendations  7/24/2022 1103 by Isaias Gibson, PT  Note: Prognosis: Fair  Problem List: Decreased strength, Decreased endurance, Impaired balance, Decreased mobility, Decreased coordination  Assessment: Pt is a 40 y o  male seen for PT evaluation s/p admit to [address] [place] [address] on 2/00/2912 w/ Alcoholic cirrhosis of liver without ascites ([facility] [address] )  Order placed for PT  Comorbidities affecting pt's physical performance at time of assessment include: HTN  Personal factors affecting pt at time of IE include: depression, steps to enter environment, limited home support, inability to perform IADLs, inability to perform ADLs, inability to ambulate household distances, and recent fall(s)  Prior to admission, pt was was independent w/ all functional mobility w/ out AD and lived with parents   Upon evaluation: Pt requires supervision for bed mobility, mod A for sit to stand, and mod A for ambulation with RW  (Please find full objective findings from PT assessment regarding body systems outlined above)  Impairments and limitations also listed above, especially due to  weakness, impaired balance, decreased endurance, gait deviations, decreased activity tolerance, and fall risk  Pt's clinical presentation is currently unstable/unpredictable seen in pt's presentation of fall risk, monitoring in ICU, and significant decline in functional mobility compared to baseline    Pt to benefit from continued skilled PT tx while in hospital and upon DC to address deficits as defined above and maximize level of functional mobility  From PT/mobility standpoint, recommendation at time of d/c would be inpatient rehab pending progress  Recommend  progression of ambulation and initiation of HEP as appropriate   PT Discharge Recommendation: Post acute rehabilitation services    See flowsheet documentation for full assessment  7/24/2022 1102 by Jonathan Lares PT  Note: Prognosis: Fair  Problem List: Decreased strength, Decreased endurance, Impaired balance, Decreased mobility, Decreased coordination  Assessment: Pt is a 40 y o  male seen for PT evaluation s/p admit to [address] [place] [address] on 3/31/5508 w/ Alcoholic cirrhosis of liver without ascites ([facility] [address] )  Order placed for PT  Comorbidities affecting pt's physical performance at time of assessment include: HTN  Personal factors affecting pt at time of IE include: depression, steps to enter environment, limited home support, inability to perform IADLs, inability to perform ADLs, inability to ambulate household distances, and recent fall(s)  Prior to admission, pt was was independent w/ all functional mobility w/ out AD and lived with parents   Upon evaluation: Pt requires supervision for bed mobility, mod A for sit to stand, and mod A for ambulation with RW  (Please find full objective findings from PT assessment regarding body systems outlined above)  Impairments and limitations also listed above, especially due to  weakness, impaired balance, decreased endurance, gait deviations, decreased activity tolerance, and fall risk  Pt's clinical presentation is currently unstable/unpredictable seen in pt's presentation of fall risk, monitoring in ICU, and significant decline in functional mobility compared to baseline  Pt to benefit from continued skilled PT tx while in hospital and upon DC to address deficits as defined above and maximize level of functional mobility   From PT/mobility standpoint, recommendation at time of d/c would be inpatient rehab pending progress  Recommend  progression of ambulation and initiation of HEP as appropriate   PT Discharge Recommendation: Post acute rehabilitation services    See flowsheet documentation for full assessment

## 2022-07-24 NOTE — PROGRESS NOTES
[facility]  Progress Note - Elenita  1985, 40 y o  male MRN: [id number]  Unit/Bed#: ICU 03 Encounter: [id number]  Primary Care Provider: Jim Goodwin PA-C   Date and time admitted to hospital: 7/18/2022  3:46 PM    * Alcoholic cirrhosis of liver without ascites ([facility] [address] )  Assessment & Plan  Hx Decompensated cirrhosis with anasarca, hepatic encephalopathy, thrombocytopenia splenomegaly, hyperammonemia, and  Hepatomegaly most likely secondary to alcohol abuse but could also be due to underlying alcoholic hepatitis  -Previous AFP and right upper quadrant ultrasounds were normal  -Never had screening EGD for varices  -Home lactulose for hepatic encephalopathy  [facility] recommending transfer to a facility with ICU for EGD  -current meld score =25  -an EGD performed showing 2 esophageal varices that underwent subsequent banding  -SBP: Spiking fevers on 7/17  CXR negative for acute findings, ua negative for uti  Blood cultures drawn  Started on ceftriaxone  He will complete a course of antibiotics for SBP prophylaxis  Paracentesis fluid shows 135 wbc's; 9% neutrophils  Fevers have resolved  Patient currently hemodynamically stable  -Abdominal ultrasound ordered to assess for hepatocellular carcinoma & ascites  -AFP 3 5  -GI consulted for suspected UGIB/varices; appreciate recommendations     - s/p EGD; 4 bands for 2 varices; no active bleed  Started on nadolol 40 mg qd      - 2 FFP given prior to EGD       Plan:   Protonix IV BID  lasix IV 40 q 12  aldactone  Hold AP/AC  On nadolol    Liver metastasis (HCC)  Assessment & Plan  CT abdomen:  Diffuse hepatic metastases concerning for hepatocellular carcinoma in this cirrhotic patient  Metastatic disease is not excluded given the normal AFP    AFP WNL  Liver biopsy on 07/22/2022    Plan:  Awaiting biopsy results  Patient downgraded to med surge      Depression, concern for PTSD  Assessment & Plan  Per discussion with patient's sister: Patient served in the [employer] and spent 18 months in [facility], there is concern for PTSD  Sister has reached out to CM inquiring about VA programs to help with PTSD  She tells me that he had drug addiction prior to serving and developed alcohol addiction after his [employer]  Also shares that he went through a divorce 2-3 years ago and a girlfriend that recently passed away last November  Has 1 son and 1 step-daughter that he has not seen in years  Accounts from sister concerning for uncontrolled depression  Plan:  Resume home Lexapro  1 mg ativan TID PRN  Were discussed with Case Management/Mook for possible partial hospitalization during rehab      Anemia  Assessment & Plan  Presents with microcytic anemia hemoglobin of 3 6 s/p 7u pRBC (hgb 7 7) and 2 u FFP  Asymptomatic, vitals stable  Troponin negative    Iron panel showing low iron levels with normal TIBC  Heme occult blood negative in the ED  Patient denying melena and hematemesis  Prior note of febrile reaction with pRBC (was noted after receiving 6 units; no temperature spike after receiving 7th unit, although pt was pre-treated with tylenol)  EGD no active bleed; 4 bands placed for 2 varices  Received 7 units total pRBCs      Plan:  Started on nadolol  Transfuse hgb < 7  Heme consulted; appreciate recs      Alcohol withdrawal ([facility] [address] )  Assessment & Plan  Alcohol level 448  Patient says last drink was on Friday 7/15  [facility] used serax 10 mg tid for symptom control  Patient advised to stop drinking and is in agreement  However on leaving the room, patient appears to be pouring hand  into his drinks  Plan:  [facility] protocol  Ativan  MV thiamine folic acid  D/C Serax  Patient being seen by [facility] telehealth      Chronic bronchitis ([facility] [address] )  Assessment & Plan  Resume home inhalers for asthma    Thoracoabdominal aortic aneurysm [facility])  Assessment & Plan  7/18/21 - CT findings; stable 54 mm  On chart review patient had a CTA on January 2021 with noted 44 mm AAA  Does not appear AAA has been under surveillance  Refer to incidental findings noted on 07/20    Plan:  Please arrange for outpatient follow-up with primary care physician  Would recommend abdominal ultrasound to surveil AAA    Thrombocytopenia [facility])  Assessment & Plan  Lab Results   Component Value Date     (L) 07/24/2022    PLT 99 (L) 07/23/2022    PLT 91 (L) 07/22/2022    PLT 91 (L) 07/22/2022     (L) 07/21/2022     Thrombocytopenia most likely 2/2 liver cirrhosis   Give 1 unit of platelets if count drops below 50,000    Tobacco dependence  Assessment & Plan  Nicotine patch    Substance abuse ([facility] [address] )  Assessment & Plan  · Patient reports taking 8-2 Suboxone BID  · [place]MP confirmed 16 mg daily   Will discuss with case management/Mook for possible partial hospitalization during rehab          VTE Pharmacologic Prophylaxis: VTE Score: 1 Moderate Risk (Score 3-4) - Pharmacological DVT Prophylaxis Contraindicated  Sequential Compression Devices Ordered  Patient Centered Rounds: I performed bedside rounds with nursing staff today  Discussions with Specialists or Other Care Team Provider:     Education and Discussions with Family / Patient: Updated  (sister) via phone  Current Length of Stay: 6 day(s)  Current Patient Status: Inpatient   Discharge Plan: Anticipate discharge in 48-72 hrs to rehab facility  Code Status: Level 1 - Full Code    Subjective:   Patient seen this morning at bedside  No acute overnight events reported  Does not appear to be any acute distress  Patient says he is feeling well his appetite has returned  Denies any signs of withdrawal   Denies chest pain, shortness of breath, palpitations  Denies nausea, vomiting, diarrhea  He says he has been sleeping well and feels comfortable  Will talk with Case Management and [place] about the possibility of partial hospitalization while patient completes rehab        Objective: Vitals:   Temp (24hrs), Av 7 °F (37 1 °C), Min:98 2 °F (36 8 °C), Max:99 1 °F (37 3 °C)    Temp:  [98 2 °F (36 8 °C)-99 1 °F (37 3 °C)] 99 1 °F (37 3 °C)  HR:  [67-72] 72  Resp:  [17-18] 18  BP: (108-109)/(52-54) 108/54  SpO2:  [96 %-97 %] 97 %  Body mass index is 24 32 kg/m²  Input and Output Summary (last 24 hours): Intake/Output Summary (Last 24 hours) at 2022 1156  Last data filed at 2022 0443  Gross per 24 hour   Intake 100 ml   Output 1475 ml   Net -1375 ml       Physical Exam:   Physical Exam  Constitutional:       General: He is not in acute distress  Appearance: Normal appearance  He is normal weight  He is not ill-appearing or toxic-appearing  HENT:      Head: Normocephalic and atraumatic  Eyes:      General: Scleral icterus present  Extraocular Movements: Extraocular movements intact  Conjunctiva/sclera: Conjunctivae normal       Pupils: Pupils are equal, round, and reactive to light  Cardiovascular:      Rate and Rhythm: Normal rate and regular rhythm  Pulses: Normal pulses  Heart sounds: Normal heart sounds  No murmur heard  No gallop  Pulmonary:      Effort: Pulmonary effort is normal  No respiratory distress  Breath sounds: Normal breath sounds  No stridor  No wheezing, rhonchi or rales  Chest:      Chest wall: No tenderness  Abdominal:      General: Bowel sounds are normal  There is distension  Palpations: Abdomen is soft  Musculoskeletal:         General: Normal range of motion  Skin:     General: Skin is warm  Coloration: Skin is not jaundiced or pale  Findings: No rash  Neurological:      General: No focal deficit present  Mental Status: He is alert and oriented to person, place, and time  Mental status is at baseline  Cranial Nerves: No cranial nerve deficit  Sensory: No sensory deficit  Motor: No weakness        Comments: No asterixis noted    Psychiatric:         Mood and Affect: Mood normal          Behavior: Behavior normal       Comments: Patient appears to be feeling well today  He is in a good mood          Additional Data:     Labs:  Results from last 7 days   Lab Units 07/24/22  0446 07/23/22  0451   WBC Thousand/uL 3 85* 3 28*   HEMOGLOBIN g/dL 7 3* 7 5*   HEMATOCRIT % 24 2* 24 6*   PLATELETS Thousands/uL 110* 99*   BANDS PCT % 1  --    NEUTROS PCT %  --  51   LYMPHS PCT %  --  26   LYMPHO PCT % 31  --    MONOS PCT %  --  20*   MONO PCT % 19*  --    EOS PCT % 3 2     Results from last 7 days   Lab Units 07/24/22  0446   SODIUM mmol/L 134*   POTASSIUM mmol/L 3 8   CHLORIDE mmol/L 95*   CO2 mmol/L 32   BUN mg/dL 13   CREATININE mg/dL 0 52*   ANION GAP mmol/L 7   CALCIUM mg/dL 10 1   ALBUMIN g/dL 4 7   TOTAL BILIRUBIN mg/dL 11 07*   ALK PHOS U/L 157*   ALT U/L 20   AST U/L 59*   GLUCOSE RANDOM mg/dL 102     Results from last 7 days   Lab Units 07/24/22  0446   INR  1 85*             Results from last 7 days   Lab Units 07/17/22  1838   PROCALCITONIN ng/ml 0 44*       Lines/Drains:  Invasive Devices  Report    Peripheral Intravenous Line  Duration           Peripheral IV 07/23/22 Left Forearm <1 day                      Imaging: No pertinent imaging reviewed  Recent Cultures (last 7 days):   Results from last 7 days   Lab Units 07/20/22  1613 07/18/22  1033 07/17/22  2042   BLOOD CULTURE   --  Staphylococcus coagulase negative* No Growth After 5 Days  No Growth After 5 Days     GRAM STAIN RESULT  1+ Polys  No bacteria seen  --   --    BODY FLUID CULTURE, STERILE  No growth  --   --        Last 24 Hours Medication List:   Current Facility-Administered Medications   Medication Dose Route Frequency Provider Last Rate    albumin human  25 g Intravenous TID Lori Haro MD 0 g (07/23/22 0932)    albuterol  2 puff Inhalation Q6H PRN Lori Haro MD      buprenorphine-naloxone  8 mg Sublingual BID Lori Haro MD      cloNIDine  0 1 mg Oral HS Btia Waters MD      desvenlafaxine succinate  50 mg Oral Daily Susan Remy MD      diphenhydrAMINE  25 mg Intravenous Q6H PRN Nico Mendoza MD      ferrous sulfate  325 mg Oral Daily With Breakfast Nico Mendoza MD      folic acid  1 mg Oral Daily Nico Mendoza MD      furosemide  40 mg Intravenous Q12H MARILEE Galicia      gabapentin  300 mg Oral BID PRN Susan Remy MD      glycerin-hypromellose-  1 drop Both Eyes Q3H PRN Gino Marcos MD      lactulose  20 g Oral BID Nico Mendoza MD      LORazepam  0 5 mg Intravenous Once Gino Marcos MD      LORazepam  1 mg Oral Q8H PRN Nico Mendoza MD      melatonin  6 mg Oral HS Mary Ann Crane MD      multivitamin-minerals  1 tablet Oral Daily Nico Mendoza MD      nadolol  40 mg Oral Daily MARILEE Pichardo      nicotine  1 patch Transdermal Daily Nico Mendoza MD      ondansetron  4 mg Intravenous Q6H PRN Nico Mendoza MD      pantoprazole  40 mg Intravenous Q12H [facility] Nayeli Matta MD      spironolactone  100 mg Oral Daily Nico Mendoza MD      thiamine  100 mg Oral Daily Ncio Mendoza MD          Today, Patient Was Seen By: Aidan Uriostegui MD    **Please Note: This note may have been constructed using a voice recognition system  **

## 2022-07-24 NOTE — PLAN OF CARE
Problem: Potential for Falls  Goal: Patient will remain free of falls  Description: INTERVENTIONS:  - Educate patient/family on patient safety including physical limitations  - Instruct patient to call for assistance with activity   - Consult OT/PT to assist with strengthening/mobility   - Keep Call bell within reach  - Keep bed low and locked with side rails adjusted as appropriate  - Keep care items and personal belongings within reach  - Initiate and maintain comfort rounds  - Make Fall Risk Sign visible to staff  Problem: Prexisting or High Potential for Compromised Skin Integrity  Goal: Skin integrity is maintained or improved  Description: INTERVENTIONS:  - Identify patients at risk for skin breakdown  - Assess and monitor skin integrity  - Assess and monitor nutrition and hydration status  - Monitor labs   - Assess for incontinence   - Turn and reposition patient  - Assist with mobility/ambulation  - Relieve pressure over bony prominences  - Avoid friction and shearing  - Provide appropriate hygiene as needed including keeping skin clean and dry  - Evaluate need for skin moisturizer/barrier cream  - Collaborate with interdisciplinary team   - Patient/family teaching  - Consider wound care consult   Outcome: Progressing     Problem: MOBILITY - ADULT  Goal: Maintain or return to baseline ADL function  Description: INTERVENTIONS:  -  Assess patient's ability to carry out ADLs; assess patient's baseline for ADL function and identify physical deficits which impact ability to perform ADLs (bathing, care of mouth/teeth, toileting, grooming, dressing, etc )  - Assess/evaluate cause of self-care deficits   - Assess range of motion  - Assess patient's mobility; develop plan if impaired  - Assess patient's need for assistive devices and provide as appropriate  - Encourage maximum independence but intervene and supervise when necessary  - Involve family in performance of ADLs  - Assess for home care needs following discharge   - Consider OT consult to assist with ADL evaluation and planning for discharge  - Provide patient education as appropriate  Outcome: Progressing  Goal: Maintains/Returns to pre admission functional level  Description: INTERVENTIONS:  - Perform BMAT or MOVE assessment daily    - Set and communicate daily mobility goal to care team and patient/family/caregiver     - Collaborate with rehabilitation services on mobility goals if consulted  Problem: PAIN - ADULT  Goal: Verbalizes/displays adequate comfort level or baseline comfort level  Description: Interventions:  - Encourage patient to monitor pain and request assistance  - Assess pain using appropriate pain scale  - Administer analgesics based on type and severity of pain and evaluate response  - Implement non-pharmacological measures as appropriate and evaluate response  - Consider cultural and social influences on pain and pain management  - Notify physician/advanced practitioner if interventions unsuccessful or patient reports new pain  Outcome: Progressing     Problem: SAFETY ADULT  Goal: Patient will remain free of falls  Description: INTERVENTIONS:  - Educate patient/family on patient safety including physical limitations  - Instruct patient to call for assistance with activity   - Consult OT/PT to assist with strengthening/mobility   - Keep Call bell within reach  - Keep bed low and locked with side rails adjusted as appropriate  - Keep care items and personal belongings within reach  - Initiate and maintain comfort rounds  - Make Fall Risk Sign visible to staff  - Apply yellow socks and bracelet for high fall risk patients  - Consider moving patient to room near nurses station  Outcome: Progressing  Goal: Maintain or return to baseline ADL function  Description: INTERVENTIONS:  -  Assess patient's ability to carry out ADLs; assess patient's baseline for ADL function and identify physical deficits which impact ability to perform ADLs (bathing, care of mouth/teeth, toileting, grooming, dressing, etc )  - Assess/evaluate cause of self-care deficits   - Assess range of motion  - Assess patient's mobility; develop plan if impaired  - Assess patient's need for assistive devices and provide as appropriate  - Encourage maximum independence but intervene and supervise when necessary  - Involve family in performance of ADLs  - Assess for home care needs following discharge   - Consider OT consult to assist with ADL evaluation and planning for discharge  - Provide patient education as appropriate  Outcome: Progressing  Goal: Maintains/Returns to pre admission functional level  Description: INTERVENTIONS:  - Perform BMAT or MOVE assessment daily    - Set and communicate daily mobility goal to care team and patient/family/caregiver     - Collaborate with rehabilitation services on mobility goals if consulted  - Out of bed for toileting  - Record patient progress and toleration of activity level   Outcome: Progressing     - Out of bed for toileting  - Record patient progress and toleration of activity level   Outcome: Progressing     - Apply yellow socks and bracelet for high fall risk patients  - Consider moving patient to room near nurses station  Outcome: Progressing

## 2022-07-25 PROBLEM — G93.40 ENCEPHALOPATHY: Status: ACTIVE | Noted: 2022-07-25

## 2022-07-25 LAB
ALBUMIN SERPL BCP-MCNC: 4.8 G/DL (ref 3.5–5)
ALP SERPL-CCNC: 145 U/L (ref 34–104)
ALT SERPL W P-5'-P-CCNC: 20 U/L (ref 7–52)
ANION GAP SERPL CALCULATED.3IONS-SCNC: 10 MMOL/L (ref 4–13)
AST SERPL W P-5'-P-CCNC: 64 U/L (ref 13–39)
BILIRUB SERPL-MCNC: 10.61 MG/DL (ref 0.2–1)
BUN SERPL-MCNC: 14 MG/DL (ref 5–25)
CALCIUM SERPL-MCNC: 10.1 MG/DL (ref 8.4–10.2)
CHLORIDE SERPL-SCNC: 94 MMOL/L (ref 96–108)
CO2 SERPL-SCNC: 28 MMOL/L (ref 21–32)
CREAT SERPL-MCNC: 0.5 MG/DL (ref 0.6–1.3)
ERYTHROCYTE [DISTWIDTH] IN BLOOD BY AUTOMATED COUNT: 31.7 % (ref 11.6–15.1)
GFR SERPL CREATININE-BSD FRML MDRD: 138 ML/MIN/1.73SQ M
GLUCOSE SERPL-MCNC: 120 MG/DL (ref 65–140)
HCT VFR BLD AUTO: 24.1 % (ref 36.5–49.3)
HGB BLD-MCNC: 7.3 G/DL (ref 12–17)
INR PPP: 1.8 (ref 0.84–1.19)
MCH RBC QN AUTO: 24.8 PG (ref 26.8–34.3)
MCHC RBC AUTO-ENTMCNC: 30.3 G/DL (ref 31.4–37.4)
MCV RBC AUTO: 82 FL (ref 82–98)
PLATELET # BLD AUTO: 122 THOUSANDS/UL (ref 149–390)
POTASSIUM SERPL-SCNC: 3.9 MMOL/L (ref 3.5–5.3)
PROT SERPL-MCNC: 7.5 G/DL (ref 6.4–8.4)
PROTHROMBIN TIME: 20.7 SECONDS (ref 11.6–14.5)
RBC # BLD AUTO: 2.94 MILLION/UL (ref 3.88–5.62)
SODIUM SERPL-SCNC: 132 MMOL/L (ref 135–147)
WBC # BLD AUTO: 3.57 THOUSAND/UL (ref 4.31–10.16)

## 2022-07-25 PROCEDURE — 80053 COMPREHEN METABOLIC PANEL: CPT | Performed by: INTERNAL MEDICINE

## 2022-07-25 PROCEDURE — 99232 SBSQ HOSP IP/OBS MODERATE 35: CPT | Performed by: INTERNAL MEDICINE

## 2022-07-25 PROCEDURE — 85027 COMPLETE CBC AUTOMATED: CPT | Performed by: INTERNAL MEDICINE

## 2022-07-25 PROCEDURE — C9113 INJ PANTOPRAZOLE SODIUM, VIA: HCPCS | Performed by: INTERNAL MEDICINE

## 2022-07-25 PROCEDURE — 85610 PROTHROMBIN TIME: CPT | Performed by: INTERNAL MEDICINE

## 2022-07-25 RX ORDER — PANTOPRAZOLE SODIUM 40 MG/1
40 TABLET, DELAYED RELEASE ORAL
Status: DISCONTINUED | OUTPATIENT
Start: 2022-07-26 | End: 2022-08-02 | Stop reason: HOSPADM

## 2022-07-25 RX ORDER — FUROSEMIDE 40 MG/1
40 TABLET ORAL DAILY
Status: DISCONTINUED | OUTPATIENT
Start: 2022-07-25 | End: 2022-08-02 | Stop reason: HOSPADM

## 2022-07-25 RX ORDER — PANTOPRAZOLE SODIUM 40 MG/1
40 TABLET, DELAYED RELEASE ORAL
Status: DISCONTINUED | OUTPATIENT
Start: 2022-07-25 | End: 2022-07-25

## 2022-07-25 RX ORDER — SPIRONOLACTONE 100 MG/1
100 TABLET, FILM COATED ORAL DAILY
Status: DISCONTINUED | OUTPATIENT
Start: 2022-07-26 | End: 2022-08-02 | Stop reason: HOSPADM

## 2022-07-25 RX ADMIN — LACTULOSE 20 G: 20 SOLUTION ORAL at 08:28

## 2022-07-25 RX ADMIN — PANTOPRAZOLE SODIUM 40 MG: 40 INJECTION, POWDER, FOR SOLUTION INTRAVENOUS at 08:28

## 2022-07-25 RX ADMIN — ALBUMIN (HUMAN) 25 G: 0.25 INJECTION, SOLUTION INTRAVENOUS at 08:28

## 2022-07-25 RX ADMIN — Medication 6 MG: at 22:42

## 2022-07-25 RX ADMIN — BUPRENORPHINE AND NALOXONE 8 MG: 8; 2 FILM BUCCAL; SUBLINGUAL at 08:31

## 2022-07-25 RX ADMIN — FUROSEMIDE 40 MG: 40 TABLET ORAL at 11:35

## 2022-07-25 RX ADMIN — THIAMINE HCL TAB 100 MG 100 MG: 100 TAB at 08:27

## 2022-07-25 RX ADMIN — DESVENLAFAXINE 50 MG: 50 TABLET, FILM COATED, EXTENDED RELEASE ORAL at 09:08

## 2022-07-25 RX ADMIN — LACTULOSE 20 G: 20 SOLUTION ORAL at 17:42

## 2022-07-25 RX ADMIN — GLYCERIN 1 DROP: .002; .002; .01 SOLUTION/ DROPS OPHTHALMIC at 05:02

## 2022-07-25 RX ADMIN — MULTIPLE VITAMINS W/ MINERALS TAB 1 TABLET: TAB ORAL at 08:27

## 2022-07-25 RX ADMIN — FERROUS SULFATE TAB 325 MG (65 MG ELEMENTAL FE) 325 MG: 325 (65 FE) TAB at 08:28

## 2022-07-25 RX ADMIN — LORAZEPAM 1 MG: 1 TABLET ORAL at 19:43

## 2022-07-25 RX ADMIN — BUPRENORPHINE AND NALOXONE 8 MG: 8; 2 FILM BUCCAL; SUBLINGUAL at 17:45

## 2022-07-25 RX ADMIN — FOLIC ACID 1 MG: 1 TABLET ORAL at 08:27

## 2022-07-25 RX ADMIN — LORAZEPAM 1 MG: 1 TABLET ORAL at 09:08

## 2022-07-25 RX ADMIN — NICOTINE 1 PATCH: 21 PATCH, EXTENDED RELEASE TRANSDERMAL at 08:29

## 2022-07-25 NOTE — ASSESSMENT & PLAN NOTE
CT abdomen:  Diffuse hepatic metastases concerning for hepatocellular carcinoma in this cirrhotic patient  Metastatic disease is not excluded given the normal AFP    AFP WNL  Liver biopsy on 07/22/2022    Plan:  Awaiting biopsy results  Patient downgraded to [facility] surge

## 2022-07-25 NOTE — ASSESSMENT & PLAN NOTE
Hx Decompensated cirrhosis with anasarca, hepatic encephalopathy, thrombocytopenia splenomegaly, hyperammonemia, and  Hepatomegaly most likely secondary to alcohol abuse but could also be due to underlying alcoholic hepatitis  -Previous AFP and right upper quadrant ultrasounds were normal  -Never had screening EGD for varices  -Home lactulose for hepatic encephalopathy  Rico MCGRAW recommending transfer to a facility with ICU for EGD  -an EGD performed showing 2 esophageal varices that underwent subsequent banding  -SBP: Spiking fevers on 7/17  CXR negative for acute findings, ua negative for uti  Blood cultures drawn  Started on ceftriaxone  He will complete a course of antibiotics for SBP prophylaxis  Paracentesis fluid shows 135 wbc's; 9% neutrophils  Fevers have resolved  Patient currently hemodynamically stable  -Abdominal ultrasound ordered to assess for hepatocellular carcinoma & ascites  -AFP 3 5  -GI consulted for suspected UGIB/varices; appreciate recommendations     - s/p EGD; 4 bands for 2 varices; no active bleed   Started on nadolol 40 mg qd      - 2 FFP given prior to EGD       Plan:   Trend MELD score: 7/25/22 - MELD score = 26  Protonix IV BID  lasix IV 40 q 12  aldactone  Hold AP/AC  On nadolol

## 2022-07-25 NOTE — ASSESSMENT & PLAN NOTE
Lab Results   Component Value Date     (L) 07/25/2022     (L) 07/24/2022    PLT 99 (L) 07/23/2022    PLT 91 (L) 07/22/2022    PLT 91 (L) 07/22/2022     Thrombocytopenia most likely 2/2 liver cirrhosis   Give 1 unit of platelets if count drops below 50,000

## 2022-07-25 NOTE — PROGRESS NOTES
[facility]  Progress Note - Gurjit Fleming 1985, 40 y o  male MRN: [id number]  Unit/Bed#: S -01 Encounter: [id number]  Primary Care Provider: Ronn Guardado PA-C   Date and time admitted to hospital: 7/18/2022  2:88 PM    * Alcoholic cirrhosis of liver without ascites ([facility] [address] )  Assessment & Plan  Hx Decompensated cirrhosis with anasarca, hepatic encephalopathy, thrombocytopenia splenomegaly, hyperammonemia, and  Hepatomegaly most likely secondary to alcohol abuse but could also be due to underlying alcoholic hepatitis  -Previous AFP and right upper quadrant ultrasounds were normal  -Never had screening EGD for varices  -Home lactulose for hepatic encephalopathy  [facility] GI recommending transfer to a facility with ICU for EGD  -an EGD performed showing 2 esophageal varices that underwent subsequent banding  -SBP: Spiking fevers on 7/17  CXR negative for acute findings, ua negative for uti  Blood cultures drawn  Started on ceftriaxone  He will complete a course of antibiotics for SBP prophylaxis  Paracentesis fluid shows 135 wbc's; 9% neutrophils  Fevers have resolved  Patient currently hemodynamically stable  -Abdominal ultrasound ordered to assess for hepatocellular carcinoma & ascites  -AFP 3 5  -GI consulted for suspected UGIB/varices; appreciate recommendations     - s/p EGD; 4 bands for 2 varices; no active bleed  Started on nadolol 40 mg qd      - 2 FFP given prior to EGD       Plan:   Trend MELD score: 7/25/22 - MELD score = 26  Protonix IV BID  lasix IV 40 q 12  aldactone  Hold AP/AC  On nadolol    Liver metastasis (HCC)  Assessment & Plan  CT abdomen:  Diffuse hepatic metastases concerning for hepatocellular carcinoma in this cirrhotic patient  Metastatic disease is not excluded given the normal AFP    AFP WNL  Liver biopsy on 07/22/2022    Plan:  Awaiting biopsy results  Patient downgraded to med surge      Depression, concern for PTSD  Assessment & Plan  Per discussion with patient's sister: Patient served in the [employer] and spent 18 months in [facility], there is concern for PTSD  Sister has reached out to CM inquiring about VA programs to help with PTSD  She tells me that he had drug addiction prior to serving and developed alcohol addiction after his [employer]  Also shares that he went through a divorce 2-3 years ago and a girlfriend that recently passed away last November  Has 1 son and 1 step-daughter that he has not seen in years  Accounts from sister concerning for uncontrolled depression  Plan:  Patient started on desvenlafaxine 50 mg q24h  1 mg ativan TID PRN  Per Case Management, patient cannot have partial psychiatric hospitalization and rehab at the same time  Awaiting placement for rehab    Anemia  Assessment & Plan  Presents with microcytic anemia hemoglobin of 3 6 s/p 7u pRBC (hgb 7 7) and 2 u FFP  Asymptomatic, vitals stable  Troponin negative    Iron panel showing low iron levels with normal TIBC  Heme occult blood negative in the ED  Patient denying melena and hematemesis  Prior note of febrile reaction with pRBC (was noted after receiving 6 units; no temperature spike after receiving 7th unit, although pt was pre-treated with tylenol)  EGD no active bleed; 4 bands placed for 2 varices  Received 7 units total pRBCs    Recent Labs     07/23/22  0451 07/24/22  0446 07/25/22  0645   HGB 7 5* 7 3* 7 3*         Plan:  Started on nadolol  Transfuse hgb < 7  Heme consulted; appreciate recs      Alcohol withdrawal ([facility] [address] )  Assessment & Plan  Alcohol level 448  Patient says last drink was on Friday 7/15  [facility] 10 mg tid for symptom control  Patient advised to stop drinking and is in agreement  However on leaving the room, patient appears to be pouring hand  into his drinks        Plan:  CIWA protocol  Ativan  MV thiamine folic acid  D/C Serax      Chronic bronchitis ([facility] [address] )  Assessment & Plan  Resume home inhalers for asthma    Thoracoabdominal aortic aneurysm [facility])  Assessment & Plan  7/18/21 - CT findings; stable 54 mm  On chart review patient had a CTA on January 2021 with noted 44 mm AAA  Does not appear AAA has been under surveillance  Refer to incidental findings noted on 07/20    Plan:  Please arrange for outpatient follow-up with primary care physician  Would recommend abdominal ultrasound to surveil AAA    Thrombocytopenia [facility])  Assessment & Plan  Lab Results   Component Value Date     (L) 07/25/2022     (L) 07/24/2022    PLT 99 (L) 07/23/2022    PLT 91 (L) 07/22/2022    PLT 91 (L) 07/22/2022     Thrombocytopenia most likely 2/2 liver cirrhosis   Give 1 unit of platelets if count drops below 50,000    Tobacco dependence  Assessment & Plan  Nicotine patch    Substance abuse ([facility] [address] )  Assessment & Plan  · Patient reports taking 8-2 Suboxone BID  · [place] PDMP confirmed 16 mg daily    Per case management, patient cannot have partial psychiatric hospitalization and rehab at the same time        VTE Pharmacologic Prophylaxis: VTE Score: 1 Low Risk (Score 0-2) - Encourage Ambulation  Patient Centered Rounds: I performed bedside rounds with nursing staff today  Discussions with Specialists or Other Care Team Provider:     Education and Discussions with Family / Patient: Attempted to update  (sister) via phone  Unable to contact  Current Length of Stay: 7 day(s)  Current Patient Status: Inpatient   Discharge Plan: Awaiting placement for rehab    Code Status: Level 1 - Full Code    Subjective:   Patient doing well this morning  Reports improving appetite and sleeping well  Does not appear to be in any acute distress  No overnight events reported  Current lead denying withdrawal symptoms  Patient denies chest pain, shortness of breath, palpitations  He denies having any nausea or vomiting  No changes in bowel movements or urination    Discussion with patient about sequence of events regarding rehab and psychiatric treatment took place  Objective:     Vitals:   Temp (24hrs), Av 4 °F (36 9 °C), Min:98 1 °F (36 7 °C), Max:98 6 °F (37 °C)    Temp:  [98 1 °F (36 7 °C)-98 6 °F (37 °C)] 98 4 °F (36 9 °C)  HR:  [70-75] 75  Resp:  [17-18] 17  BP: (102-118)/(57-65) 102/57  SpO2:  [94 %-96 %] 94 %  Body mass index is 24 49 kg/m²  Input and Output Summary (last 24 hours): Intake/Output Summary (Last 24 hours) at 2022 1413  Last data filed at 2022 1207  Gross per 24 hour   Intake --   Output 1800 ml   Net -1800 ml       Physical Exam:   Physical Exam  Constitutional:       General: He is not in acute distress  Appearance: Normal appearance  He is not ill-appearing or toxic-appearing  HENT:      Head: Normocephalic and atraumatic  Eyes:      General: Scleral icterus present  Extraocular Movements: Extraocular movements intact  Pupils: Pupils are equal, round, and reactive to light  Cardiovascular:      Rate and Rhythm: Normal rate and regular rhythm  Pulses: Normal pulses  Heart sounds: Normal heart sounds  No murmur heard  No gallop  Comments: Holosystolic murmur heard on left upper sternal border  Mild thrill can be palpated  Pulmonary:      Effort: Pulmonary effort is normal  No respiratory distress  Breath sounds: Normal breath sounds  No stridor  No wheezing, rhonchi or rales  Chest:      Chest wall: No tenderness  Abdominal:      General: Bowel sounds are normal  There is distension  Palpations: Abdomen is soft  Tenderness: There is no abdominal tenderness  Musculoskeletal:         General: Normal range of motion  Cervical back: Normal range of motion  Skin:     General: Skin is warm  Coloration: Skin is not jaundiced or pale  Findings: No rash  Neurological:      General: No focal deficit present  Mental Status: He is alert and oriented to person, place, and time  Mental status is at baseline  Cranial Nerves: No cranial nerve deficit  Sensory: No sensory deficit  Psychiatric:         Mood and Affect: Mood normal          Behavior: Behavior normal       Comments: Mood appears to be improving          Additional Data:     Labs:  Results from last 7 days   Lab Units 07/25/22  0645 07/24/22  0446 07/23/22  0451   WBC Thousand/uL 3 57* 3 85* 3 28*   HEMOGLOBIN g/dL 7 3* 7 3* 7 5*   HEMATOCRIT % 24 1* 24 2* 24 6*   PLATELETS Thousands/uL 122* 110* 99*   BANDS PCT %  --  1  --    NEUTROS PCT %  --   --  51   LYMPHS PCT %  --   --  26   LYMPHO PCT %  --  31  --    MONOS PCT %  --   --  20*   MONO PCT %  --  19*  --    EOS PCT %  --  3 2     Results from last 7 days   Lab Units 07/25/22  0645   SODIUM mmol/L 132*   POTASSIUM mmol/L 3 9   CHLORIDE mmol/L 94*   CO2 mmol/L 28   BUN mg/dL 14   CREATININE mg/dL 0 50*   ANION GAP mmol/L 10   CALCIUM mg/dL 10 1   ALBUMIN g/dL 4 8   TOTAL BILIRUBIN mg/dL 10 61*   ALK PHOS U/L 145*   ALT U/L 20   AST U/L 64*   GLUCOSE RANDOM mg/dL 120     Results from last 7 days   Lab Units 07/25/22  0645   INR  1 80*                   Lines/Drains:  Invasive Devices  Report    Peripheral Intravenous Line  Duration           Peripheral IV 07/23/22 Left Forearm 1 day                      Imaging: No pertinent imaging reviewed      Recent Cultures (last 7 days):   Results from last 7 days   Lab Units 07/20/22  1613   GRAM STAIN RESULT  1+ Polys  No bacteria seen   BODY FLUID CULTURE, STERILE  No growth       Last 24 Hours Medication List:   Current Facility-Administered Medications   Medication Dose Route Frequency Provider Last Rate    albuterol  2 puff Inhalation Q6H PRN Louis Stevenson DO      buprenorphine-naloxone  8 mg Sublingual BID Louis Stevenson DO      cloNIDine  0 1 mg Oral HS Louis Stevenson DO      desvenlafaxine succinate  50 mg Oral Daily Louis Stevenson DO      diphenhydrAMINE  25 mg Intravenous Q6H PRN Louis Stevenson DO      ferrous sulfate  325 mg Oral Daily With Breakfast Nimisha Hare, DO      folic acid  1 mg Oral Daily Nimisha Hare, DO      furosemide  40 mg Oral Daily Seth Mena MD      gabapentin  300 mg Oral BID PRN Nimisha Hare, DO      glycerin-hypromellose-  1 drop Both Eyes Q3H PRN Nimisha Hare, DO      lactulose  20 g Oral BID Nimisha Hare, DO      LORazepam  0 5 mg Intravenous Once Nimisha Hare, DO      LORazepam  1 mg Oral Q8H PRN Nimisha Hare, DO      melatonin  6 mg Oral HS Nimisha Hare, DO      multivitamin-minerals  1 tablet Oral Daily Nimisha Hare, DO      nadolol  40 mg Oral Daily Nimisha Hare, DO      nicotine  1 patch Transdermal Daily Nimisha Hare, DO      ondansetron  4 mg Intravenous Q6H PRN Nimishagi Rose, DO      [START ON 7/26/2022] pantoprazole  40 mg Oral Early Morning MD Maida Conley ON 7/26/2022] spironolactone  100 mg Oral Daily Seth Mena MD      thiamine  100 mg Oral Daily Nimishagi Rose DO          Today, Patient Was Seen By: Jerardo De Paz MD    **Please Note: This note may have been constructed using a voice recognition system  **

## 2022-07-25 NOTE — ASSESSMENT & PLAN NOTE
Per discussion with patient's sister: Patient served in the [employer] and spent 18 months in [place], there is concern for PTSD  Sister has reached out to CM inquiring about VA programs to help with PTSD  She tells me that he had drug addiction prior to serving and developed alcohol addiction after his [employer]  Also shares that he went through a divorce 2-3 years ago and a girlfriend that recently passed away last November  Has 1 son and 1 step-daughter that he has not seen in years  Accounts from sister concerning for uncontrolled depression  Plan:  Patient started on desvenlafaxine 50 mg q24h  1 mg ativan TID PRN  Per Case Management, patient cannot have partial psychiatric hospitalization and rehab at the same time    Awaiting placement for rehab

## 2022-07-25 NOTE — ASSESSMENT & PLAN NOTE
Presents with microcytic anemia hemoglobin of 3 6 s/p 7u pRBC (hgb 7 7) and 2 u FFP  Asymptomatic, vitals stable  Troponin negative    Iron panel showing low iron levels with normal TIBC  Heme occult blood negative in the ED  Patient denying melena and hematemesis  Prior note of febrile reaction with pRBC (was noted after receiving 6 units; no temperature spike after receiving 7th unit, although pt was pre-treated with tylenol)  EGD no active bleed; 4 bands placed for 2 varices  Received 7 units total pRBCs    Recent Labs     07/23/22  0451 07/24/22  0446 07/25/22  0645   HGB 7 5* 7 3* 7 3*         Plan:  Started on nadolol    Transfuse hgb < 7  Heme consulted; erma sutton

## 2022-07-25 NOTE — ASSESSMENT & PLAN NOTE
· Patient reports taking 8-2 Suboxone BID  · [place]MP confirmed 16 mg daily    Per case management, patient cannot have partial psychiatric hospitalization and rehab at the same time

## 2022-07-25 NOTE — PLAN OF CARE
Problem: Potential for Falls  Goal: Patient will remain free of falls  Description: INTERVENTIONS:  - Educate patient/family on patient safety including physical limitations  - Instruct patient to call for assistance with activity   - Consult OT/PT to assist with strengthening/mobility   - Keep Call bell within reach  - Keep bed low and locked with side rails adjusted as appropriate  - Keep care items and personal belongings within reach  - Initiate and maintain comfort rounds  - Make Fall Risk Sign visible to staff  - Offer Toileting every  Hours, in advance of need  - Initiate/Maintain alarm  - Obtain necessary fall risk management equipment:   - Apply yellow socks and bracelet for high fall risk patients  - Consider moving patient to room near nurses station  Outcome: Progressing     Problem: Prexisting or High Potential for Compromised Skin Integrity  Goal: Skin integrity is maintained or improved  Description: INTERVENTIONS:  - Identify patients at risk for skin breakdown  - Assess and monitor skin integrity  - Assess and monitor nutrition and hydration status  - Monitor labs   - Assess for incontinence   - Turn and reposition patient  - Assist with mobility/ambulation  - Relieve pressure over bony prominences  - Avoid friction and shearing  - Provide appropriate hygiene as needed including keeping skin clean and dry  - Evaluate need for skin moisturizer/barrier cream  - Collaborate with interdisciplinary team   - Patient/family teaching  - Consider wound care consult   Outcome: Progressing     Problem: MOBILITY - ADULT  Goal: Maintain or return to baseline ADL function  Description: INTERVENTIONS:  -  Assess patient's ability to carry out ADLs; assess patient's baseline for ADL function and identify physical deficits which impact ability to perform ADLs (bathing, care of mouth/teeth, toileting, grooming, dressing, etc )  - Assess/evaluate cause of self-care deficits   - Assess range of motion  - Assess patient's mobility; develop plan if impaired  - Assess patient's need for assistive devices and provide as appropriate  - Encourage maximum independence but intervene and supervise when necessary  - Involve family in performance of ADLs  - Assess for home care needs following discharge   - Consider OT consult to assist with ADL evaluation and planning for discharge  - Provide patient education as appropriate  Outcome: Progressing  Goal: Maintains/Returns to pre admission functional level  Description: INTERVENTIONS:  - Perform BMAT or MOVE assessment daily    - Set and communicate daily mobility goal to care team and patient/family/caregiver  - Collaborate with rehabilitation services on mobility goals if consulted  - Perform Range of Motion  times a day  - Reposition patient every  hours    - Dangle patient  times a day  - Stand patient  times a day  - Ambulate patient  times a day  - Out of bed to chair  times a day   - Out of bed for meals  times a day  - Out of bed for toileting  - Record patient progress and toleration of activity level   Outcome: Progressing     Problem: PAIN - ADULT  Goal: Verbalizes/displays adequate comfort level or baseline comfort level  Description: Interventions:  - Encourage patient to monitor pain and request assistance  - Assess pain using appropriate pain scale  - Administer analgesics based on type and severity of pain and evaluate response  - Implement non-pharmacological measures as appropriate and evaluate response  - Consider cultural and social influences on pain and pain management  - Notify physician/advanced practitioner if interventions unsuccessful or patient reports new pain  Outcome: Progressing     Problem: SAFETY ADULT  Goal: Patient will remain free of falls  Description: INTERVENTIONS:  - Educate patient/family on patient safety including physical limitations  - Instruct patient to call for assistance with activity   - Consult OT/PT to assist with strengthening/mobility   - Keep Call bell within reach  - Keep bed low and locked with side rails adjusted as appropriate  - Keep care items and personal belongings within reach  - Initiate and maintain comfort rounds  - Make Fall Risk Sign visible to staff  - Offer Toileting every  Hours, in advance of need  - Initiate/Maintain alarm  - Obtain necessary fall risk management equipment:   - Apply yellow socks and bracelet for high fall risk patients  - Consider moving patient to room near nurses station  Outcome: Progressing  Goal: Maintain or return to baseline ADL function  Description: INTERVENTIONS:  -  Assess patient's ability to carry out ADLs; assess patient's baseline for ADL function and identify physical deficits which impact ability to perform ADLs (bathing, care of mouth/teeth, toileting, grooming, dressing, etc )  - Assess/evaluate cause of self-care deficits   - Assess range of motion  - Assess patient's mobility; develop plan if impaired  - Assess patient's need for assistive devices and provide as appropriate  - Encourage maximum independence but intervene and supervise when necessary  - Involve family in performance of ADLs  - Assess for home care needs following discharge   - Consider OT consult to assist with ADL evaluation and planning for discharge  - Provide patient education as appropriate  Outcome: Progressing  Goal: Maintains/Returns to pre admission functional level  Description: INTERVENTIONS:  - Perform BMAT or MOVE assessment daily    - Set and communicate daily mobility goal to care team and patient/family/caregiver  - Collaborate with rehabilitation services on mobility goals if consulted  - Perform Range of Motion  times a day  - Reposition patient every  hours    - Dangle patient  times a day  - Stand patient  times a day  - Ambulate patient  times a day  - Out of bed to chair  times a day   - Out of bed for meals times a day  - Out of bed for toileting  - Record patient progress and toleration of activity level   Outcome: Progressing     Problem: DISCHARGE PLANNING  Goal: Discharge to home or other facility with appropriate resources  Description: INTERVENTIONS:  - Identify barriers to discharge w/patient and caregiver  - Arrange for needed discharge resources and transportation as appropriate  - Identify discharge learning needs (meds, wound care, etc )  - Arrange for interpretive services to assist at discharge as needed  - Refer to Case Management Department for coordinating discharge planning if the patient needs post-hospital services based on physician/advanced practitioner order or complex needs related to functional status, cognitive ability, or social support system  Outcome: Progressing     Problem: Knowledge Deficit  Goal: Patient/family/caregiver demonstrates understanding of disease process, treatment plan, medications, and discharge instructions  Description: Complete learning assessment and assess knowledge base  Interventions:  - Provide teaching at level of understanding  - Provide teaching via preferred learning methods  Outcome: Progressing     Problem: NEUROSENSORY - ADULT  Goal: Achieves maximal functionality and self care  Description: INTERVENTIONS  - Monitor swallowing and airway patency with patient fatigue and changes in neurological status  - Encourage and assist patient to increase activity and self care     - Encourage visually impaired, hearing impaired and aphasic patients to use assistive/communication devices  Outcome: Progressing     Problem: GASTROINTESTINAL - ADULT  Goal: Maintains or returns to baseline bowel function  Description: INTERVENTIONS:  - Assess bowel function  - Encourage oral fluids to ensure adequate hydration  - Administer IV fluids if ordered to ensure adequate hydration  - Administer ordered medications as needed  - Encourage mobilization and activity  - Consider nutritional services referral to assist patient with adequate nutrition and appropriate food choices  Outcome: Progressing  Goal: Maintains adequate nutritional intake  Description: INTERVENTIONS:  - Monitor percentage of each meal consumed  - Identify factors contributing to decreased intake, treat as appropriate  - Assist with meals as needed  - Monitor I&O, weight, and lab values if indicated  - Obtain nutrition services referral as needed  Outcome: Progressing     Problem: HEMATOLOGIC - ADULT  Goal: Maintains hematologic stability  Description: INTERVENTIONS  - Assess for signs and symptoms of bleeding or hemorrhage  - Monitor labs  - Administer supportive blood products/factors as ordered and appropriate  Outcome: Progressing

## 2022-07-26 LAB
ALBUMIN SERPL BCP-MCNC: 4.6 G/DL (ref 3.5–5)
ALP SERPL-CCNC: 144 U/L (ref 34–104)
ALT SERPL W P-5'-P-CCNC: 24 U/L (ref 7–52)
ANION GAP SERPL CALCULATED.3IONS-SCNC: 6 MMOL/L (ref 4–13)
AST SERPL W P-5'-P-CCNC: 72 U/L (ref 13–39)
BASOPHILS # BLD AUTO: 0.06 THOUSANDS/ΜL (ref 0–0.1)
BASOPHILS NFR BLD AUTO: 2 % (ref 0–1)
BILIRUB SERPL-MCNC: 10.3 MG/DL (ref 0.2–1)
BUN SERPL-MCNC: 11 MG/DL (ref 5–25)
CALCIUM SERPL-MCNC: 10.2 MG/DL (ref 8.4–10.2)
CHLORIDE SERPL-SCNC: 96 MMOL/L (ref 96–108)
CO2 SERPL-SCNC: 29 MMOL/L (ref 21–32)
CREAT SERPL-MCNC: 0.5 MG/DL (ref 0.6–1.3)
EOSINOPHIL # BLD AUTO: 0.09 THOUSAND/ΜL (ref 0–0.61)
EOSINOPHIL NFR BLD AUTO: 2 % (ref 0–6)
ERYTHROCYTE [DISTWIDTH] IN BLOOD BY AUTOMATED COUNT: 31.5 % (ref 11.6–15.1)
GFR SERPL CREATININE-BSD FRML MDRD: 138 ML/MIN/1.73SQ M
GLUCOSE SERPL-MCNC: 97 MG/DL (ref 65–140)
HCT VFR BLD AUTO: 24.2 % (ref 36.5–49.3)
HGB BLD-MCNC: 7.3 G/DL (ref 12–17)
IMM GRANULOCYTES # BLD AUTO: 0.02 THOUSAND/UL (ref 0–0.2)
IMM GRANULOCYTES NFR BLD AUTO: 1 % (ref 0–2)
INR PPP: 1.78 (ref 0.84–1.19)
LYMPHOCYTES # BLD AUTO: 0.96 THOUSANDS/ΜL (ref 0.6–4.47)
LYMPHOCYTES NFR BLD AUTO: 25 % (ref 14–44)
MCH RBC QN AUTO: 24.8 PG (ref 26.8–34.3)
MCHC RBC AUTO-ENTMCNC: 30.2 G/DL (ref 31.4–37.4)
MCV RBC AUTO: 82 FL (ref 82–98)
MONOCYTES # BLD AUTO: 0.86 THOUSAND/ΜL (ref 0.17–1.22)
MONOCYTES NFR BLD AUTO: 22 % (ref 4–12)
NEUTROPHILS # BLD AUTO: 1.89 THOUSANDS/ΜL (ref 1.85–7.62)
NEUTS SEG NFR BLD AUTO: 48 % (ref 43–75)
NRBC BLD AUTO-RTO: 0 /100 WBCS
PLATELET # BLD AUTO: 147 THOUSANDS/UL (ref 149–390)
POTASSIUM SERPL-SCNC: 3.9 MMOL/L (ref 3.5–5.3)
PROT SERPL-MCNC: 7.5 G/DL (ref 6.4–8.4)
PROTHROMBIN TIME: 20.5 SECONDS (ref 11.6–14.5)
RBC # BLD AUTO: 2.94 MILLION/UL (ref 3.88–5.62)
SODIUM SERPL-SCNC: 131 MMOL/L (ref 135–147)
WBC # BLD AUTO: 3.88 THOUSAND/UL (ref 4.31–10.16)

## 2022-07-26 PROCEDURE — 85610 PROTHROMBIN TIME: CPT

## 2022-07-26 PROCEDURE — 97167 OT EVAL HIGH COMPLEX 60 MIN: CPT

## 2022-07-26 PROCEDURE — 80053 COMPREHEN METABOLIC PANEL: CPT

## 2022-07-26 PROCEDURE — 99232 SBSQ HOSP IP/OBS MODERATE 35: CPT | Performed by: HOSPITALIST

## 2022-07-26 PROCEDURE — 85025 COMPLETE CBC W/AUTO DIFF WBC: CPT

## 2022-07-26 RX ADMIN — FUROSEMIDE 40 MG: 40 TABLET ORAL at 09:22

## 2022-07-26 RX ADMIN — FOLIC ACID 1 MG: 1 TABLET ORAL at 09:22

## 2022-07-26 RX ADMIN — MULTIPLE VITAMINS W/ MINERALS TAB 1 TABLET: TAB ORAL at 09:22

## 2022-07-26 RX ADMIN — DESVENLAFAXINE 50 MG: 50 TABLET, FILM COATED, EXTENDED RELEASE ORAL at 09:23

## 2022-07-26 RX ADMIN — Medication 6 MG: at 21:29

## 2022-07-26 RX ADMIN — NADOLOL 40 MG: 40 TABLET ORAL at 09:23

## 2022-07-26 RX ADMIN — LACTULOSE 20 G: 20 SOLUTION ORAL at 09:22

## 2022-07-26 RX ADMIN — GABAPENTIN 300 MG: 300 CAPSULE ORAL at 09:22

## 2022-07-26 RX ADMIN — SPIRONOLACTONE 100 MG: 100 TABLET ORAL at 09:22

## 2022-07-26 RX ADMIN — LACTULOSE 20 G: 20 SOLUTION ORAL at 17:27

## 2022-07-26 RX ADMIN — NICOTINE 1 PATCH: 21 PATCH, EXTENDED RELEASE TRANSDERMAL at 09:22

## 2022-07-26 RX ADMIN — LORAZEPAM 1 MG: 1 TABLET ORAL at 06:38

## 2022-07-26 RX ADMIN — BUPRENORPHINE AND NALOXONE 8 MG: 8; 2 FILM BUCCAL; SUBLINGUAL at 17:27

## 2022-07-26 RX ADMIN — LORAZEPAM 1 MG: 1 TABLET ORAL at 15:19

## 2022-07-26 RX ADMIN — FERROUS SULFATE TAB 325 MG (65 MG ELEMENTAL FE) 325 MG: 325 (65 FE) TAB at 09:22

## 2022-07-26 RX ADMIN — THIAMINE HCL TAB 100 MG 100 MG: 100 TAB at 09:22

## 2022-07-26 RX ADMIN — PANTOPRAZOLE SODIUM 40 MG: 40 TABLET, DELAYED RELEASE ORAL at 06:37

## 2022-07-26 RX ADMIN — BUPRENORPHINE AND NALOXONE 8 MG: 8; 2 FILM BUCCAL; SUBLINGUAL at 09:30

## 2022-07-26 NOTE — CASE MANAGEMENT
Case Management Discharge Planning Note    Patient name Nick Rockwell  [facility] S /S -53 MRN [id number]  : 1985 Date 2022       Current Admission Date: 2022  Current Admission Diagnosis:Alcoholic cirrhosis of liver without ascites [facility])   Patient Active Problem List    Diagnosis Date Noted    Encephalopathy 2022    Liver metastasis ([facility] ) 2022    Chronic bronchitis ([facility] ) 2021    Cardiac murmur 2021    Depression, concern for PTSD 2021    Anasarca 2021    Alcohol withdrawal ([facility] ) 2021    Thoracoabdominal aortic aneurysm ([facility] ) 2021    Abnormal CT scan 2021    Substance abuse ([facility] ) 2021    Tobacco dependence     Alcoholic cirrhosis of liver without ascites ([facility] ) 2021    Anemia 2021    Thrombocytopenia ([facility] ) 2021    Mass of upper lobe of left lung 2019    Essential hypertension 2019    Obesity, Class I, BMI 30-34 9 2019      LOS (days): 8  Geometric Mean LOS (GMLOS) (days): 4 70  Days to GMLOS:-3 1     OBJECTIVE:  Risk of Unplanned Readmission Score: 22 48         Current admission status: Inpatient   Preferred Pharmacy:   [id number]- [address] [place] [address]  Phone: [phone] Fax: [address] [address]  Phone: [phone] Fax: [phone]    Primary Care Provider: Jeny Murray PA-C    Primary Insurance:   Secondary Insurance:     DISCHARGE DETAILS:    Discharge planning discussed with[de-identified] Patient and sisterDamián  Freedom of Choice: Yes  Comments - Freedom of Choice: CM informed patient and sister of discharge recommendations from the care team  Patient and sister in agreement with SNF placement but sister also inquire if the [place] can assist with placement [place] [facility] for PTSD  CM informed sister will inquire further  CM left a message with Tor Bella, Contract Nursing Home Coordinator for the [place] @ [phone] ext [phone]  CM contacted family/caregiver?: Yes  Were Treatment Team discharge recommendations reviewed with patient/caregiver?: Yes  Did patient/caregiver verbalize understanding of patient care needs?: N/A- going to facility  Were patient/caregiver advised of the risks associated with not following Treatment Team discharge recommendations?: Yes    Contacts  Patient Contacts: Dalila Wynn (sister)  Relationship to Patient[de-identified] Family  Contact Method: Phone  Phone Number: [phone]  Reason/Outcome: Discharge Planning              Other Referral/Resources/Interventions Provided:  Referral Comments: CM informed patient and sister of discharge recommendations from the care team  Patient and sister in agreement with SNF placement but sister also [facility] to [place] in [facility] for PTSD  CM informed sister will inquire further  CM left a message with Tor Bella, Contract Nursing Home Coordinator for the [place] @ [phone] ext [phone] to inquire on assistance from the [place] for rehab  CM also left a message for Richardson Damico, from the [place] in [address] [place] [address] Antonio Chela @ [phone] ext [phone] to inquire further if this facility does physical therapy and occupation therapy as well as treatment for PTSD           Treatment Team Recommendation: Short Term Rehab  Discharge Destination Plan[de-identified] Short Term Rehab

## 2022-07-26 NOTE — OCCUPATIONAL THERAPY NOTE
Occupational Therapy Evaluation      Sajan Rogers    7/26/2022    Patient Active Problem List   Diagnosis    Mass of upper lobe of left lung    Essential hypertension    Obesity, Class I, BMI 30-34 9    Substance abuse ([facility] )    Tobacco dependence    Alcoholic cirrhosis of liver without ascites (HCC)    Anemia    Thrombocytopenia (HCC)    Alcohol withdrawal (HCC)    Thoracoabdominal aortic aneurysm (HCC)    Abnormal CT scan    Anasarca    Cardiac murmur    Depression, concern for PTSD    Chronic bronchitis (HCC)    Liver metastasis (HCC)    Encephalopathy       Past Medical History:   Diagnosis Date    AAA (abdominal aortic aneurysm) (HCC)     Allergic     Anemia     Asthma     Depression 05/18/2021    Essential hypertension 05/06/2019    Liver metastasis ([facility] ) 7/18/2022    Obesity     Opiate dependence ([facility] )     Substance abuse ([facility] )        Past Surgical History:   Procedure Laterality Date    IR BIOPSY LIVER MASS  7/22/2022    IR PARACENTESIS  7/20/2022    LYMPH NODE BIOPSY          07/26/22 1002   OT Last Visit   OT Visit Date 07/26/22   Note Type   Note type Evaluation   Restrictions/Precautions   Weight Bearing Precautions Per Order No   Other Precautions Chair Alarm; Bed Alarm; Fall Risk   Pain Assessment   Pain Assessment Tool 0-10   Pain Score No Pain   Home Living   Type of [address] [place] [address] Two level  (split level; 1 RADHA + 5 RADHA to main floor)   [facility] Function   Level of [place] Independent with ADLs and functional mobility   Lives With Family  (pt lives with his parents; reporting they are elderly and unable to assist physically)   Receives Help From Family   ADL Assistance Independent   IADLs Independent  (+ ; but not often)   Falls in the last 6 months 1 to 4   Comments Pt reports (I) with ADLs PTA; no AD for mobility  Pt reports living with his parents, primarily getting delivery for meals  Lifestyle   Autonomy Pt (I) with ADLs baseline living with parents in a split level home   Reciprocal Relationships Supportive parents and family who A with IADLs   Subjective   Subjective "I can feel myself all wobbly at my knees"; prefers to be called TJ   ADL   Eating Assistance 6  Modified independent   Eating Deficit Setup; Beverage management   LB Dressing Assistance 3  Moderate Assistance   LB Dressing Deficit Setup;Steadying;Verbal cueing;Supervision/safety; Increased time to complete; Don/doff R sock; Don/doff L sock; Thread RLE into pants; Thread LLE into pants;Pull up over hips; Fasteners  (Able to don R sock until heel, A to pull over heel  Required A to don L sock  Min/mod A x 1 to stand to pull pants to hips  Poor + balance in stance)   Toileting Assistance  Unable to assess   Toileting Deficit Other (Comment)  (pt declined need to void at this time)   Bed Mobility   Supine to Sit 5  Supervision   Additional items Assist x 1;HOB elevated; Increased time required;Verbal cues   Sit to Supine 5  Supervision   Additional items Assist x 1;HOB elevated; Increased time required;Verbal cues   Additional Comments Pt able to sit EOB with supervision   Transfers   Sit to Stand 3  Moderate assistance   Additional items Assist x 1; Increased time required;Verbal cues   Stand to Sit 3  Moderate assistance   Additional items Assist x 1; Increased time required;Verbal cues   Additional Comments Pt initially able to rise with min A x 1 and VC for safe hand placement  With further trials and fatigue, pt required mod A x 1   Functional Mobility   Functional Mobility 4  Minimal assistance   Additional Comments Short household distance to bathroom and back with RW with Min (A)  Easily fatigued with short distances, occasional BLE knee buckling and shakiness     Additional items Rolling walker   Balance   Static Sitting Fair +   Dynamic Sitting Fair   Static Standing Poor +   Dynamic Standing Poor   Activity Tolerance   Activity Tolerance Patient limited by fatigue   Medical Staff Made Aware Spoke to [facility]   Nurse Made Aware yes, RN Nathanael Saucedo ok to see pt   RUE Assessment   RUE Assessment WFL   LUE Assessment   LUE Assessment WFL   Hand Function   Gross Motor Coordination Functional   Fine Motor Coordination Functional  (WFL but slight tremors noted and + time to tie pants)   Sensation   Light Touch No apparent deficits   Vision-Basic Assessment   Current Vision Wears glasses only for reading   Cognition   Overall Cognitive Status WFL  (appears WFL but questionable higher level cognitive skills)   Arousal/Participation Alert; Cooperative   Attention Attends with cues to redirect   Orientation Level Oriented X4  (generally oriented to time)   Memory Decreased recall of precautions   Following Commands Follows one step commands without difficulty   Comments Pt pleasant and agreeable to OT session  Questionable insight into deficits, required VC for safety at times  Somewhat impaired short term memeory with difficulty recalling recent events  Will continue to assess higher level cognitive skills  Assessment   Limitation Decreased ADL status; Decreased Safe judgement during ADL;Decreased endurance;Decreased self-care trans;Decreased high-level ADLs   Prognosis Good   Assessment Patient is a 40 y o  male seen for OT evaluation s/p admit to [facility] 0/73[facility]7105 w/Alcoholic cirrhosis of liver without ascites ([facility] [place] [facility] )  Comorbidities affecting patient's functional performance at time of assessment include: alcohol withdrawal, chronic bronchitis, liver metastases, substance abuse, tobacco dependence, anemia and depression  Orders received for OT evaluation and treatment  Patient identified during session through name and wristband  PTA, patient was independent with functional mobility without assistive device, independent with ADLS, requiring assist for IADLS and living with parents  in a split level home with 1+5  steps to enter   Personal factors affecting patient at time of initial evaluation include: limited caregiver support, steps to enter, limited insight into deficits, flat affect, decreased initiation and engagement, difficulty performing ADLs and difficulty performing IADLs  Patient is alert and oriented x 4 and presents with ability to recognize a problem, define a problem, identify alternative plan, select a plan, organize steps in a plan, implement plan and evaluate outcome (problem solving)  The evaluation identifies the following performance deficits: weakness, impaired balance, decreased endurance, decreased coordination, increased fall risk, new onset of impairment of functional mobility, decreased ADLS, decreased IADLS, decreased activity tolerance, decreased safety awareness and decreased strength, that result in activity limitations  Based on the OT evaluation outcomes, functional performance deficits, and assessment findings, pt has been identified as high complexity, because the patient presents with comorbidites that affect occupational performance and required significant modification of tasks or assistance with consideration of multiple treatment options  The patient's raw score on the AM-PAC Daily Activity inpatient short form is 17, standardized score is 37 26, less than 39 4  Patient to benefit from continued Occupational Therapy treatment to address above deficits and maximize level of independence with ADLs and functional mobility  Occupational Performance areas to address include: bathing/ shower, dressing, toilet hygiene, transfer to all surfaces and functional ambulation  From OT standpoint, recommendation at time of d/c would be Post acute rehabilitation services  Goals   Patient Goals to get stronger   LTG Time Frame 10-14   Long Term Goal #1 see goals below   Plan   Treatment Interventions ADL retraining;Functional transfer training;UE strengthening/ROM; Endurance training;Patient/family training;Equipment evaluation/education; Compensatory technique education;Continued evaluation; Energy conservation; Activityengagement   Goal Expiration Date 08/05/22   OT Treatment Day 0   OT Frequency 3-5x/wk   Recommendation   OT Discharge Recommendation Post acute rehabilitation services   Equipment Recommended Bedside commode   Commode Type Standard   Additional Comments  At end of session, pt returned to supine in bed with all needs met and call bell within reach  Additional Comments 2 Pt encouraged to ring call bell for toileting needs and use bathroom instead of commode to address endurance and strength deficits  AM-PAC Daily Activity Inpatient   Lower Body Dressing 2   Bathing 2   Toileting 2   Upper Body Dressing 3   Grooming 4   Eating 4   Daily Activity Raw Score 17   Daily Activity Standardized Score (Calc for Raw Score >=11) 37 26   AM-PAC Applied Cognition Inpatient   Following a Speech/Presentation 3   Understanding Ordinary Conversation 4   Taking Medications 3   Remembering Where Things Are Placed or Put Away 3   Remembering List of 4-5 Errands 3   Taking Care of Complicated Tasks 3   Applied Cognition Raw Score 19   Applied Cognition Standardized Score 39 77     GOALS:    *Goals established to promote patient goal of to get stronger:      *ADL transfers with (S) for inc'd independence with ADLs/purposeful tasks    *LB ADL with (S) using AE prn for inc'd independence with self cares    *Toileting with (S) for clothing management and hygiene for return to PLOF with personal care    *Increase stand tolerance x 3  m for inc'd tolerance with standing purposeful tasks    *Participate in 10m UE therex to increase overall stamina/activity tolerance for purposeful tasks    *Bed mobility- (I) for inc'd independence to manage own comfort and initiate EOB & OOB purposeful tasks    *Patient will verbalize 3 safety awareness/ principles to prevent falls in the home setting       *Patient will verbalize and demonstrate use of energy conservation/deep breathing techniques and work simplification skills during functional activities with no verbal cues  *Patient will increase OOB/sitting tolerance to 2-4 hours per day to increase participation in self-care and leisure tasks with no s/s of exertion  *Patient will engage in ongoing cognitive assessment to assist with safe discharge planning/recommendations  *Patient will increase functional mobility to and from bathroom with rolling walker with supervision to increase performance with ADLS and to use a toilet  *Patient will improve functional activity tolerance to 10 minutes of sustained functional tasks to increase participation in basic self-care and decrease assistance level       Miguelina Gerard, OTR/L

## 2022-07-26 NOTE — ASSESSMENT & PLAN NOTE
Hx Decompensated cirrhosis with anasarca, hepatic encephalopathy, thrombocytopenia splenomegaly, hyperammonemia, and  Hepatomegaly most likely secondary to alcohol abuse but could also be due to underlying alcoholic hepatitis  -Previous AFP and right upper quadrant ultrasounds were normal  -Never had screening EGD for varices  -Home lactulose for hepatic encephalopathy  Kaley MCGRAW recommending transfer to a facility with ICU for EGD  -an EGD performed showing 2 esophageal varices that underwent subsequent banding  -SBP: Spiking fevers on 7/17  CXR negative for acute findings, ua negative for uti  Blood cultures drawn  Started on ceftriaxone  He will complete a course of antibiotics for SBP prophylaxis  Paracentesis fluid shows 135 wbc's; 9% neutrophils  Fevers have resolved  Patient currently hemodynamically stable  -Abdominal ultrasound ordered to assess for hepatocellular carcinoma & ascites  -AFP 3 5  -GI consulted for suspected UGIB/varices; appreciate recommendations     - s/p EGD; 4 bands for 2 varices; no active bleed   Started on nadolol 40 mg qd      - 2 FFP given prior to EGD       Plan:   Trend MELD score: 7/26/22 - MELD score = 26  Protonix PO BID  lasix IV 40 q 12  aldactone  Hold AP/AC  On nadolol

## 2022-07-26 NOTE — PLAN OF CARE
Problem: Potential for Falls  Goal: Patient will remain free of falls  Description: INTERVENTIONS:  - Educate patient/family on patient safety including physical limitations  - Instruct patient to call for assistance with activity   - Consult OT/PT to assist with strengthening/mobility   - Keep Call bell within reach  - Keep bed low and locked with side rails adjusted as appropriate  - Keep care items and personal belongings within reach  - Initiate and maintain comfort rounds  - Make Fall Risk Sign visible to staff  - Offer Toileting every  Hours, in advance of need  - Initiate/Maintain alarm  - Obtain necessary fall risk management equipment:   - Apply yellow socks and bracelet for high fall risk patients  - Consider moving patient to room near nurses station  Outcome: Progressing     Problem: Prexisting or High Potential for Compromised Skin Integrity  Goal: Skin integrity is maintained or improved  Description: INTERVENTIONS:  - Identify patients at risk for skin breakdown  - Assess and monitor skin integrity  - Assess and monitor nutrition and hydration status  - Monitor labs   - Assess for incontinence   - Turn and reposition patient  - Assist with mobility/ambulation  - Relieve pressure over bony prominences  - Avoid friction and shearing  - Provide appropriate hygiene as needed including keeping skin clean and dry  - Evaluate need for skin moisturizer/barrier cream  - Collaborate with interdisciplinary team   - Patient/family teaching  - Consider wound care consult   Outcome: Progressing     Problem: MOBILITY - ADULT  Goal: Maintain or return to baseline ADL function  Description: INTERVENTIONS:  -  Assess patient's ability to carry out ADLs; assess patient's baseline for ADL function and identify physical deficits which impact ability to perform ADLs (bathing, care of mouth/teeth, toileting, grooming, dressing, etc )  - Assess/evaluate cause of self-care deficits   - Assess range of motion  - Assess patient's mobility; develop plan if impaired  - Assess patient's need for assistive devices and provide as appropriate  - Encourage maximum independence but intervene and supervise when necessary  - Involve family in performance of ADLs  - Assess for home care needs following discharge   - Consider OT consult to assist with ADL evaluation and planning for discharge  - Provide patient education as appropriate  Outcome: Progressing  Goal: Maintains/Returns to pre admission functional level  Description: INTERVENTIONS:  - Perform BMAT or MOVE assessment daily    - Set and communicate daily mobility goal to care team and patient/family/caregiver  - Collaborate with rehabilitation services on mobility goals if consulted  - Perform Range of Motion  times a day  - Reposition patient every  hours    - Dangle patient  times a day  - Stand patient  times a day  - Ambulate patient  times a day  - Out of bed to chair  times a day   - Out of bed for meals  times a day  - Out of bed for toileting  - Record patient progress and toleration of activity level   Outcome: Progressing     Problem: PAIN - ADULT  Goal: Verbalizes/displays adequate comfort level or baseline comfort level  Description: Interventions:  - Encourage patient to monitor pain and request assistance  - Assess pain using appropriate pain scale  - Administer analgesics based on type and severity of pain and evaluate response  - Implement non-pharmacological measures as appropriate and evaluate response  - Consider cultural and social influences on pain and pain management  - Notify physician/advanced practitioner if interventions unsuccessful or patient reports new pain  Outcome: Progressing     Problem: SAFETY ADULT  Goal: Patient will remain free of falls  Description: INTERVENTIONS:  - Educate patient/family on patient safety including physical limitations  - Instruct patient to call for assistance with activity   - Consult OT/PT to assist with strengthening/mobility   - Keep Call bell within reach  - Keep bed low and locked with side rails adjusted as appropriate  - Keep care items and personal belongings within reach  - Initiate and maintain comfort rounds  - Make Fall Risk Sign visible to staff  - Offer Toileting every  Hours, in advance of need  - Initiate/Maintain alarm  - Obtain necessary fall risk management equipment:   - Apply yellow socks and bracelet for high fall risk patients  - Consider moving patient to room near nurses station  Outcome: Progressing  Goal: Maintain or return to baseline ADL function  Description: INTERVENTIONS:  -  Assess patient's ability to carry out ADLs; assess patient's baseline for ADL function and identify physical deficits which impact ability to perform ADLs (bathing, care of mouth/teeth, toileting, grooming, dressing, etc )  - Assess/evaluate cause of self-care deficits   - Assess range of motion  - Assess patient's mobility; develop plan if impaired  - Assess patient's need for assistive devices and provide as appropriate  - Encourage maximum independence but intervene and supervise when necessary  - Involve family in performance of ADLs  - Assess for home care needs following discharge   - Consider OT consult to assist with ADL evaluation and planning for discharge  - Provide patient education as appropriate  Outcome: Progressing  Goal: Maintains/Returns to pre admission functional level  Description: INTERVENTIONS:  - Perform BMAT or MOVE assessment daily    - Set and communicate daily mobility goal to care team and patient/family/caregiver  - Collaborate with rehabilitation services on mobility goals if consulted  - Perform Range of Motion  times a day  - Reposition patient every  hours    - Dangle patient  times a day  - Stand patient  times a day  - Ambulate patient  times a day  - Out of bed to chair  times a day   - Out of bed for fabricio times a day  - Out of bed for toileting  - Record patient progress and toleration of activity level   Outcome: Progressing     Problem: Knowledge Deficit  Goal: Patient/family/caregiver demonstrates understanding of disease process, treatment plan, medications, and discharge instructions  Description: Complete learning assessment and assess knowledge base  Interventions:  - Provide teaching at level of understanding  - Provide teaching via preferred learning methods  Outcome: Progressing     Problem: DISCHARGE PLANNING  Goal: Discharge to home or other facility with appropriate resources  Description: INTERVENTIONS:  - Identify barriers to discharge w/patient and caregiver  - Arrange for needed discharge resources and transportation as appropriate  - Identify discharge learning needs (meds, wound care, etc )  - Arrange for interpretive services to assist at discharge as needed  - Refer to Case Management Department for coordinating discharge planning if the patient needs post-hospital services based on physician/advanced practitioner order or complex needs related to functional status, cognitive ability, or social support system  Outcome: Progressing     Problem: NEUROSENSORY - ADULT  Goal: Achieves maximal functionality and self care  Description: INTERVENTIONS  - Monitor swallowing and airway patency with patient fatigue and changes in neurological status  - Encourage and assist patient to increase activity and self care     - Encourage visually impaired, hearing impaired and aphasic patients to use assistive/communication devices  Outcome: Progressing     Problem: HEMATOLOGIC - ADULT  Goal: Maintains hematologic stability  Description: INTERVENTIONS  - Assess for signs and symptoms of bleeding or hemorrhage  - Monitor labs  - Administer supportive blood products/factors as ordered and appropriate  Outcome: Progressing     Problem: GASTROINTESTINAL - ADULT  Goal: Maintains or returns to baseline bowel function  Description: INTERVENTIONS:  - Assess bowel function  - Encourage oral fluids to ensure adequate hydration  - Administer IV fluids if ordered to ensure adequate hydration  - Administer ordered medications as needed  - Encourage mobilization and activity  - Consider nutritional services referral to assist patient with adequate nutrition and appropriate food choices  Outcome: Progressing  Goal: Maintains adequate nutritional intake  Description: INTERVENTIONS:  - Monitor percentage of each meal consumed  - Identify factors contributing to decreased intake, treat as appropriate  - Assist with meals as needed  - Monitor I&O, weight, and lab values if indicated  - Obtain nutrition services referral as needed  Outcome: Progressing

## 2022-07-26 NOTE — PROGRESS NOTES
[facility]  Progress Note - Leena Mccain 1985, 40 y o  male MRN: [id number]  Unit/Bed#: S -01 Encounter: [id number]  Primary Care Provider: Nani Box PA-C   Date and time admitted to hospital: 7/18/2022  5:47 PM    * Alcoholic cirrhosis of liver without ascites ([facility] [address] )  Assessment & Plan  Hx Decompensated cirrhosis with anasarca, hepatic encephalopathy, thrombocytopenia splenomegaly, hyperammonemia, and  Hepatomegaly most likely secondary to alcohol abuse but could also be due to underlying alcoholic hepatitis  -Previous AFP and right upper quadrant ultrasounds were normal  -Never had screening EGD for varices  -Home lactulose for hepatic encephalopathy  [facility] recommending transfer to a facility with ICU for EGD  -an EGD performed showing 2 esophageal varices that underwent subsequent banding  -SBP: Spiking fevers on 7/17  CXR negative for acute findings, ua negative for uti  Blood cultures drawn  Started on ceftriaxone  He will complete a course of antibiotics for SBP prophylaxis  Paracentesis fluid shows 135 wbc's; 9% neutrophils  Fevers have resolved  Patient currently hemodynamically stable  -Abdominal ultrasound ordered to assess for hepatocellular carcinoma & ascites  -AFP 3 5  -GI consulted for suspected UGIB/varices; appreciate recommendations     - s/p EGD; 4 bands for 2 varices; no active bleed  Started on nadolol 40 mg qd      - 2 FFP given prior to EGD       Plan:   Trend MELD score: 7/26/22 - MELD score = 26  Protonix PO BID  lasix IV 40 q 12  aldactone  Hold AP/AC  On nadolol    Liver metastasis (HCC)  Assessment & Plan  CT abdomen:  Diffuse hepatic metastases concerning for hepatocellular carcinoma in this cirrhotic patient  Metastatic disease is not excluded given the normal AFP    AFP WNL  Liver biopsy on 07/22/2022    Plan:  Awaiting biopsy results  Patient downgraded to med surge      Depression, concern for PTSD  Assessment & Plan  Per discussion with patient's sister: Patient served in the [employer] and spent 18 months in [place], there is concern for PTSD  Sister has reached out to CM inquiring about [facility] programs to help with PTSD  She tells me that he had drug addiction prior to serving and developed alcohol addiction after his [employer]  Also shares that he went through a divorce 2-3 years ago and a girlfriend that recently passed away last November  Has 1 son and 1 step-daughter that he has not seen in years  Accounts from sister concerning for uncontrolled depression  Plan:  Patient started on desvenlafaxine 50 mg q24h  1 mg ativan TID PRN  Per Case Management, patient cannot have partial psychiatric hospitalization and rehab at the same time  Awaiting placement for rehab    Anemia  Assessment & Plan  Presents with microcytic anemia hemoglobin of 3 6 s/p 7u pRBC (hgb 7 7) and 2 u FFP  Asymptomatic, vitals stable  Troponin negative    Iron panel showing low iron levels with normal TIBC  Heme occult blood negative in the ED  Patient denying melena and hematemesis  Prior note of febrile reaction with pRBC (was noted after receiving 6 units; no temperature spike after receiving 7th unit, although pt was pre-treated with tylenol)  EGD no active bleed; 4 bands placed for 2 varices  Received 7 units total pRBCs    Recent Labs     07/24/22  0446 07/25/22  0645 07/26/22  0643   HGB 7 3* 7 3* 7 3*         Plan:  Started on nadolol  Transfuse hgb < 7  Heme consulted; appreciate recs      Alcohol withdrawal ([facility] [address] )  Assessment & Plan  Alcohol level 448  Patient says last drink was on Friday 7/15  [place] [facility] used serax 10 mg tid for symptom control  Patient advised to stop drinking and is in agreement  However on leaving the room, patient appears to be pouring hand  into his drinks      Denies withdrawal symptoms    Plan:  CIWA protocol  Ativan  MV thiamine folic acid  D/C Serax      Chronic bronchitis ([facility] [address] )  Assessment & Plan  Resume home inhalers for asthma    Thoracoabdominal aortic aneurysm [facility])  Assessment & Plan  21 - CT findings; stable 54 mm  On chart review patient had a CTA on 2021 with noted 44 mm AAA  Does not appear AAA has been under surveillance  Refer to incidental findings noted on     Plan:  Please arrange for outpatient follow-up with primary care physician  Would recommend abdominal ultrasound to surveil AAA    Thrombocytopenia [facility])  Assessment & Plan  Lab Results   Component Value Date     (L) 2022     (L) 2022     (L) 2022    PLT 99 (L) 2022    PLT 91 (L) 2022     Thrombocytopenia most likely 2/2 liver cirrhosis   Give 1 unit of platelets if count drops below 50,000    Tobacco dependence  Assessment & Plan  Nicotine patch    Substance abuse ([facility] [address] )  Assessment & Plan  · Patient reports taking 8-2 Suboxone BID  · [place] PDMP confirmed 16 mg daily    Per case management, patient cannot have partial psychiatric hospitalization and rehab at the same time          VTE Pharmacologic Prophylaxis: VTE Score: 1 Low Risk (Score 0-2) - Encourage Ambulation  Patient Centered Rounds: I performed bedside rounds with nursing staff today  Discussions with Specialists or Other Care Team Provider:     Education and Discussions with Family / Patient: Updated  (sister) via phone  Current Length of Stay: 8 day(s)  Current Patient Status: Inpatient   Discharge Plan: [place] pending rehab placement    Code Status: Level 1 - Full Code    Subjective:   Patient doing well this morning  No changes overnight  He does not appear to be in any acute distress  Denies n/v/d/c  Patient says his appetite is good  Medically stable  Discharge pending placement into rehab facility        Objective:     Vitals:   Temp (24hrs), Av 4 °F (36 9 °C), Min:98 3 °F (36 8 °C), Max:98 4 °F (36 9 °C)    Temp:  [98 3 °F (36 8 °C)-98 4 °F (36 9 °C)] 98 4 °F (36 9 °C)  HR:  [71-78] 74  Resp:  [16] 16  BP: (108-118)/(54-68) 113/55  SpO2:  [95 %-97 %] 95 %  Body mass index is 24 49 kg/m²  Input and Output Summary (last 24 hours): Intake/Output Summary (Last 24 hours) at 7/26/2022 1058  Last data filed at 7/26/2022 5293  Gross per 24 hour   Intake 820 ml   Output 700 ml   Net 120 ml       Physical Exam:   Physical Exam  Constitutional:       General: He is not in acute distress  Appearance: Normal appearance  He is normal weight  He is not ill-appearing or toxic-appearing  HENT:      Head: Normocephalic and atraumatic  Eyes:      General: Scleral icterus present  Extraocular Movements: Extraocular movements intact  Pupils: Pupils are equal, round, and reactive to light  Cardiovascular:      Rate and Rhythm: Normal rate and regular rhythm  Pulses: Normal pulses  Heart sounds: No murmur heard  No gallop  Comments: Holosystolic murmur heard in LLSB  Pulmonary:      Effort: Pulmonary effort is normal  No respiratory distress  Breath sounds: Normal breath sounds  No stridor  No wheezing, rhonchi or rales  Chest:      Chest wall: No tenderness  Abdominal:      General: Bowel sounds are normal  There is distension  Palpations: Abdomen is soft  Tenderness: There is no abdominal tenderness  There is no guarding  Musculoskeletal:         General: Normal range of motion  Cervical back: Normal range of motion  Skin:     General: Skin is warm  Coloration: Skin is not jaundiced or pale  Findings: No rash  Neurological:      General: No focal deficit present  Mental Status: He is alert and oriented to person, place, and time  Mental status is at baseline  Cranial Nerves: No cranial nerve deficit  Sensory: No sensory deficit  Motor: No weakness     Psychiatric:         Mood and Affect: Mood normal          Behavior: Behavior normal           Additional Data:     Labs:  Results from last 7 days   Lab Units 07/26/22  0643 07/25/22  0645 07/24/22  0446   WBC Thousand/uL 3 88*   < > 3 85*   HEMOGLOBIN g/dL 7 3*   < > 7 3*   HEMATOCRIT % 24 2*   < > 24 2*   PLATELETS Thousands/uL 147*   < > 110*   BANDS PCT %  --   --  1   NEUTROS PCT % 48  --   --    LYMPHS PCT % 25  --   --    LYMPHO PCT %  --   --  31   MONOS PCT % 22*  --   --    MONO PCT %  --   --  19*   EOS PCT % 2  --  3    < > = values in this interval not displayed  Results from last 7 days   Lab Units 07/26/22  0643   SODIUM mmol/L 131*   POTASSIUM mmol/L 3 9   CHLORIDE mmol/L 96   CO2 mmol/L 29   BUN mg/dL 11   CREATININE mg/dL 0 50*   ANION GAP mmol/L 6   CALCIUM mg/dL 10 2   ALBUMIN g/dL 4 6   TOTAL BILIRUBIN mg/dL 10 30*   ALK PHOS U/L 144*   ALT U/L 24   AST U/L 72*   GLUCOSE RANDOM mg/dL 97     Results from last 7 days   Lab Units 07/26/22  0643   INR  1 78*                   Lines/Drains:  Invasive Devices  Report    Peripheral Intravenous Line  Duration           Peripheral IV 07/23/22 Left Forearm 2 days                      Imaging: No pertinent imaging reviewed      Recent Cultures (last 7 days):   Results from last 7 days   Lab Units 07/20/22  1613   GRAM STAIN RESULT  1+ Polys  No bacteria seen   BODY FLUID CULTURE, STERILE  No growth       Last 24 Hours Medication List:   Current Facility-Administered Medications   Medication Dose Route Frequency Provider Last Rate    albuterol  2 puff Inhalation Q6H PRN Vara Omar, DO      buprenorphine-naloxone  8 mg Sublingual BID Vara Omar, DO      cloNIDine  0 1 mg Oral HS Vara Omar, DO      desvenlafaxine succinate  50 mg Oral Daily Vara Nakbrian, DO      diphenhydrAMINE  25 mg Intravenous Q6H PRN Vara Omar, DO      ferrous sulfate  325 mg Oral Daily With Breakfast Varulises Nelson, DO      folic acid  1 mg Oral Daily Vara Omar, DO      furosemide  40 mg Oral Daily Seth Murillo MD      gabapentin  300 mg Oral BID PRN Vara Omar, DO      glycerin-hypromellose-  1 drop Both Eyes Q3H PRN Liberty , DO      lactulose  20 g Oral BID Liberty , DO      LORazepam  1 mg Oral Q8H PRN Liberty , DO      melatonin  6 mg Oral HS Liberty , DO      multivitamin-minerals  1 tablet Oral Daily Liberty , DO      nadolol  40 mg Oral Daily Liberty , DO      nicotine  1 patch Transdermal Daily Liberty , DO      ondansetron  4 mg Intravenous Q6H PRN Liberty , DO      pantoprazole  40 mg Oral Early Morning Seth Marie MD      spironolactone  100 mg Oral Daily Seth Marie MD      thiamine  100 mg Oral Daily Liberty , DO          Today, Patient Was Seen By: Raiza Landrum MD    **Please Note: This note may have been constructed using a voice recognition system  **

## 2022-07-26 NOTE — ASSESSMENT & PLAN NOTE
Lab Results   Component Value Date     (L) 07/26/2022     (L) 07/25/2022     (L) 07/24/2022    PLT 99 (L) 07/23/2022    PLT 91 (L) 07/22/2022     Thrombocytopenia most likely 2/2 liver cirrhosis   Give 1 unit of platelets if count drops below 50,000

## 2022-07-26 NOTE — ASSESSMENT & PLAN NOTE
CT abdomen:  Diffuse hepatic metastases concerning for hepatocellular carcinoma in this cirrhotic patient  Metastatic disease is not excluded given the normal AFP    AFP WNL  Liver biopsy on 07/22/2022    Plan:  Awaiting biopsy results  Patient downgraded to [facility] surge

## 2022-07-26 NOTE — PLAN OF CARE
Problem: OCCUPATIONAL THERAPY ADULT  Goal: Performs self-care activities at highest level of function for planned discharge setting  See evaluation for individualized goals  Description: Treatment Interventions: ADL retraining, Functional transfer training, UE strengthening/ROM, Endurance training, Patient/family training, Equipment evaluation/education, Compensatory technique education, Continued evaluation, Energy conservation, Activityengagement  Equipment Recommended: Bedside commode       See flowsheet documentation for full assessment, interventions and recommendations  Note: Limitation: Decreased ADL status, Decreased Safe judgement during ADL, Decreased endurance, Decreased self-care trans, Decreased high-level ADLs  Prognosis: Good  Assessment: Patient is a 40 y o  male seen for OT evaluation s/p admit to [address] [place] [address] on 9/08/5720 w/Alcoholic cirrhosis of liver without ascites ([facility] [address] )  Comorbidities affecting patient's functional performance at time of assessment include: alcohol withdrawal, chronic bronchitis, liver metastases, substance abuse, tobacco dependence, anemia and depression  Orders received for OT evaluation and treatment  Patient identified during session through name and wristband  PTA, patient was independent with functional mobility without assistive device, independent with ADLS, requiring assist for IADLS and living with parents  in a split level home with 1+5  steps to enter  Personal factors affecting patient at time of initial evaluation include: limited caregiver support, steps to enter, limited insight into deficits, flat affect, decreased initiation and engagement, difficulty performing ADLs and difficulty performing IADLs  Patient is alert and oriented x 4 and presents with ability to recognize a problem, define a problem, identify alternative plan, select a plan, organize steps in a plan, implement plan and evaluate outcome (problem solving)   The evaluation identifies the following performance deficits: weakness, impaired balance, decreased endurance, decreased coordination, increased fall risk, new onset of impairment of functional mobility, decreased ADLS, decreased IADLS, decreased activity tolerance, decreased safety awareness and decreased strength, that result in activity limitations  Based on the OT evaluation outcomes, functional performance deficits, and assessment findings, pt has been identified as high complexity, because the patient presents with comorbidites that affect occupational performance and required significant modification of tasks or assistance with consideration of multiple treatment options  The patient's raw score on the AM-PAC Daily Activity inpatient short form is 17, standardized score is 37 26, less than 39 4  Patient to benefit from continued Occupational Therapy treatment to address above deficits and maximize level of independence with ADLs and functional mobility  Occupational Performance areas to address include: bathing/ shower, dressing, toilet hygiene, transfer to all surfaces and functional ambulation  From OT standpoint, recommendation at time of d/c would be Post acute rehabilitation services       OT Discharge Recommendation: Post acute rehabilitation services     Wanda Russ

## 2022-07-26 NOTE — ASSESSMENT & PLAN NOTE
Per discussion with patient's sister: Patient served in the [employer] and spent 18 months in [place], there is concern for PTSD  Sister has reached out to CM inquiring about [facility] programs to help with PTSD  She tells me that he had drug addiction prior to serving and developed alcohol addiction after his [employer]  Also shares that he went through a divorce 2-3 years ago and a girlfriend that recently passed away last November  Has 1 son and 1 step-daughter that he has not seen in years  Accounts from sister concerning for uncontrolled depression  Plan:  Patient started on desvenlafaxine 50 mg q24h  1 mg ativan TID PRN  Per Case Management, patient cannot have partial psychiatric hospitalization and rehab at the same time    Awaiting placement for rehab

## 2022-07-26 NOTE — ASSESSMENT & PLAN NOTE
Alcohol level 448  Patient says last drink was on Friday 7/15  [facility] ED used serax 10 mg tid for symptom control  Patient advised to stop drinking and is in agreement  However on leaving the room, patient appears to be pouring hand  into his drinks      Denies withdrawal symptoms    Plan:  JENNIE protocol  Ativan  MV thiamine folic acid  D/C Serax

## 2022-07-26 NOTE — ASSESSMENT & PLAN NOTE
Presents with microcytic anemia hemoglobin of 3 6 s/p 7u pRBC (hgb 7 7) and 2 u FFP  Asymptomatic, vitals stable  Troponin negative    Iron panel showing low iron levels with normal TIBC  Heme occult blood negative in the ED  Patient denying melena and hematemesis  Prior note of febrile reaction with pRBC (was noted after receiving 6 units; no temperature spike after receiving 7th unit, although pt was pre-treated with tylenol)  EGD no active bleed; 4 bands placed for 2 varices  Received 7 units total pRBCs    Recent Labs     07/24/22  0446 07/25/22  0645 07/26/22  0643   HGB 7 3* 7 3* 7 3*         Plan:  Started on nadolol    Transfuse hgb < 7  Heme consulted; erma sutton

## 2022-07-27 LAB
ALBUMIN SERPL BCP-MCNC: 4.4 G/DL (ref 3.5–5)
ALP SERPL-CCNC: 151 U/L (ref 34–104)
ALT SERPL W P-5'-P-CCNC: 27 U/L (ref 7–52)
ANION GAP SERPL CALCULATED.3IONS-SCNC: 8 MMOL/L (ref 4–13)
ANISOCYTOSIS BLD QL SMEAR: PRESENT
AST SERPL W P-5'-P-CCNC: 95 U/L (ref 13–39)
BASOPHILS # BLD MANUAL: 0.12 THOUSAND/UL (ref 0–0.1)
BASOPHILS NFR MAR MANUAL: 3 % (ref 0–1)
BILIRUB SERPL-MCNC: 9.42 MG/DL (ref 0.2–1)
BUN SERPL-MCNC: 13 MG/DL (ref 5–25)
CALCIUM SERPL-MCNC: 10 MG/DL (ref 8.4–10.2)
CHLORIDE SERPL-SCNC: 98 MMOL/L (ref 96–108)
CO2 SERPL-SCNC: 27 MMOL/L (ref 21–32)
CREAT SERPL-MCNC: 0.48 MG/DL (ref 0.6–1.3)
EOSINOPHIL # BLD MANUAL: 0.04 THOUSAND/UL (ref 0–0.4)
EOSINOPHIL NFR BLD MANUAL: 1 % (ref 0–6)
ERYTHROCYTE [DISTWIDTH] IN BLOOD BY AUTOMATED COUNT: 31.5 % (ref 11.6–15.1)
GFR SERPL CREATININE-BSD FRML MDRD: 140 ML/MIN/1.73SQ M
GLUCOSE SERPL-MCNC: 104 MG/DL (ref 65–140)
HCT VFR BLD AUTO: 24.5 % (ref 36.5–49.3)
HGB BLD-MCNC: 7.2 G/DL (ref 12–17)
HYPERCHROMIA BLD QL SMEAR: PRESENT
INR PPP: 1.77 (ref 0.84–1.19)
LYMPHOCYTES # BLD AUTO: 0.83 THOUSAND/UL (ref 0.6–4.47)
LYMPHOCYTES # BLD AUTO: 21 % (ref 14–44)
MCH RBC QN AUTO: 24.3 PG (ref 26.8–34.3)
MCHC RBC AUTO-ENTMCNC: 29.4 G/DL (ref 31.4–37.4)
MCV RBC AUTO: 83 FL (ref 82–98)
MONOCYTES # BLD AUTO: 0.75 THOUSAND/UL (ref 0–1.22)
MONOCYTES NFR BLD: 19 % (ref 4–12)
NEUTROPHILS # BLD MANUAL: 2.21 THOUSAND/UL (ref 1.85–7.62)
NEUTS SEG NFR BLD AUTO: 56 % (ref 43–75)
PLATELET # BLD AUTO: 160 THOUSANDS/UL (ref 149–390)
PLATELET BLD QL SMEAR: ADEQUATE
POIKILOCYTOSIS BLD QL SMEAR: PRESENT
POTASSIUM SERPL-SCNC: 3.8 MMOL/L (ref 3.5–5.3)
PROT SERPL-MCNC: 7.4 G/DL (ref 6.4–8.4)
PROTHROMBIN TIME: 20.4 SECONDS (ref 11.6–14.5)
RBC # BLD AUTO: 2.96 MILLION/UL (ref 3.88–5.62)
RBC MORPH BLD: PRESENT
SODIUM SERPL-SCNC: 133 MMOL/L (ref 135–147)
TARGETS BLD QL SMEAR: PRESENT
WBC # BLD AUTO: 3.95 THOUSAND/UL (ref 4.31–10.16)

## 2022-07-27 PROCEDURE — 85027 COMPLETE CBC AUTOMATED: CPT

## 2022-07-27 PROCEDURE — 99232 SBSQ HOSP IP/OBS MODERATE 35: CPT | Performed by: INTERNAL MEDICINE

## 2022-07-27 PROCEDURE — 82390 ASSAY OF CERULOPLASMIN: CPT | Performed by: PHYSICIAN ASSISTANT

## 2022-07-27 PROCEDURE — 85007 BL SMEAR W/DIFF WBC COUNT: CPT

## 2022-07-27 PROCEDURE — 85610 PROTHROMBIN TIME: CPT | Performed by: INTERNAL MEDICINE

## 2022-07-27 PROCEDURE — 80053 COMPREHEN METABOLIC PANEL: CPT | Performed by: INTERNAL MEDICINE

## 2022-07-27 PROCEDURE — 99232 SBSQ HOSP IP/OBS MODERATE 35: CPT | Performed by: HOSPITALIST

## 2022-07-27 PROCEDURE — 82103 ALPHA-1-ANTITRYPSIN TOTAL: CPT | Performed by: PHYSICIAN ASSISTANT

## 2022-07-27 RX ADMIN — FUROSEMIDE 40 MG: 40 TABLET ORAL at 09:35

## 2022-07-27 RX ADMIN — DESVENLAFAXINE 50 MG: 50 TABLET, FILM COATED, EXTENDED RELEASE ORAL at 09:36

## 2022-07-27 RX ADMIN — BUPRENORPHINE AND NALOXONE 8 MG: 8; 2 FILM BUCCAL; SUBLINGUAL at 18:20

## 2022-07-27 RX ADMIN — THIAMINE HCL TAB 100 MG 100 MG: 100 TAB at 09:35

## 2022-07-27 RX ADMIN — BUPRENORPHINE AND NALOXONE 8 MG: 8; 2 FILM BUCCAL; SUBLINGUAL at 09:39

## 2022-07-27 RX ADMIN — FERROUS SULFATE TAB 325 MG (65 MG ELEMENTAL FE) 325 MG: 325 (65 FE) TAB at 09:35

## 2022-07-27 RX ADMIN — FOLIC ACID 1 MG: 1 TABLET ORAL at 09:35

## 2022-07-27 RX ADMIN — LACTULOSE 20 G: 20 SOLUTION ORAL at 18:20

## 2022-07-27 RX ADMIN — Medication 6 MG: at 21:13

## 2022-07-27 RX ADMIN — PANTOPRAZOLE SODIUM 40 MG: 40 TABLET, DELAYED RELEASE ORAL at 05:31

## 2022-07-27 RX ADMIN — NICOTINE 1 PATCH: 21 PATCH, EXTENDED RELEASE TRANSDERMAL at 09:39

## 2022-07-27 RX ADMIN — LACTULOSE 20 G: 20 SOLUTION ORAL at 09:35

## 2022-07-27 RX ADMIN — LORAZEPAM 1 MG: 1 TABLET ORAL at 18:26

## 2022-07-27 RX ADMIN — CLONIDINE HYDROCHLORIDE 0.1 MG: 0.1 TABLET ORAL at 21:13

## 2022-07-27 RX ADMIN — MULTIPLE VITAMINS W/ MINERALS TAB 1 TABLET: TAB ORAL at 09:35

## 2022-07-27 RX ADMIN — LORAZEPAM 1 MG: 1 TABLET ORAL at 09:41

## 2022-07-27 RX ADMIN — SPIRONOLACTONE 100 MG: 100 TABLET ORAL at 09:35

## 2022-07-27 NOTE — ASSESSMENT & PLAN NOTE
Lab Results   Component Value Date     07/27/2022     (L) 07/26/2022     (L) 07/25/2022     (L) 07/24/2022    PLT 99 (L) 07/23/2022     Thrombocytopenia most likely 2/2 liver cirrhosis   Give 1 unit of platelets if count drops below 50,000

## 2022-07-27 NOTE — PROGRESS NOTES
Progress Note - Tai Villafana 40 y o  male MRN: [id number]    Unit/Bed#: S -01 Encounter: [id number]        Subjective:   Patient denies any abdominal pain, nausea or vomiting, any chest pain, shortness of breath, fevers or chills  Objective:     Vitals: Blood pressure 106/58, pulse 76, temperature 98 7 °F (37 1 °C), resp  rate 16, height 6' 1" (1 854 m), weight 81 5 kg (179 lb 10 8 oz), SpO2 95 %  ,Body mass index is 23 71 kg/m²  Intake/Output Summary (Last 24 hours) at 7/27/2022 1336  Last data filed at 7/27/2022 1001  Gross per 24 hour   Intake 620 ml   Output 1350 ml   Net -730 ml       Physical Exam:   General appearance: alert, jaundiced, appears stated age and cooperative  Lungs: clear to auscultation bilaterally, no labored breathing/accessory muscle use  Heart: regular rate and rhythm, S1, S2 normal, no murmur, click, rub or gallop  Abdomen: soft, non-tender; bowel sounds normal; no masses,  no organomegaly  Extremities: no edema; jaundiced    Invasive Devices  Report    Peripheral Intravenous Line  Duration           Peripheral IV 07/23/22 Left Forearm 3 days                Lab, Imaging and other studies: I have personally reviewed pertinent reports  No results displayed because visit has over 200 results  CT ABDOMEN AND PELVIS WITHOUT IV CONTRAST 7/18/22     INDICATION:   Retroperitoneal hematoma suspected  Anemia, rectal exam negative, please evaluate for retroperitoneal bleed      COMPARISON:  CTA chest January 20, 2021     TECHNIQUE:  CT examination of the abdomen and pelvis was performed without intravenous contrast  This examination was performed without intravenous contrast in the context of the critical nationwide Omnipaque shortage  Axial, sagittal, and coronal 2D   reformatted images were created from the source data and submitted for interpretation       Radiation dose length product (DLP) for this visit:  723 2 mGy-cm     This examination, like all CT scans performed in the [facility], was performed utilizing techniques to minimize radiation dose exposure, including the use of iterative   reconstruction and automated exposure control       Enteric contrast was not administered       FINDINGS:     ABDOMEN     LOWER CHEST:  No clinically significant abnormality identified in the visualized lower chest      LIVER/BILIARY TREE:  The liver demonstrates cirrhotic morphology  Innumerable hypodense masses are present throughout the entire liver      GALLBLADDER:  The gallbladder is markedly distended containing multiple gallstones      SPLEEN:  The spleen is enlarged, measuring  17 1 cm      PANCREAS:  Unremarkable      ADRENAL GLANDS:  Unremarkable      KIDNEYS/URETERS:  Unremarkable  No hydronephrosis      STOMACH AND BOWEL:  Unremarkable      APPENDIX:  No findings to suggest appendicitis      ABDOMINOPELVIC CAVITY:  Moderate volume of abdominal and pelvic ascites consistent with simple fluid (Hounsfield unit 5)  No pneumoperitoneum  Evaluation without intravenous contrast is limited, but there are likely multiple erin hepatis and celiac   axis lymph nodes      VESSELS:  54 mm fusiform thoracoabdominal aortic aneurysm  The proximal extent was not imaged      PELVIS     REPRODUCTIVE ORGANS:  Unremarkable for patient's age      URINARY BLADDER:  Collapsed around a Rizo catheter      ABDOMINAL WALL/INGUINAL REGIONS:  Unremarkable      OSSEOUS STRUCTURES:  No acute fracture or destructive osseous lesion      IMPRESSION:     Diffuse hepatic metastases concerning for hepatocellular carcinoma in this cirrhotic patient  Metastatic disease is not excluded given the normal AFP     Splenomegaly      Marked gallbladder distention      54 mm partially imaged fusiform thoracoabdominal aortic aneurysm      No source for hemorrhage is identified  Assessment/Plan:    1   Decompensated liver cirrhosis secondary to alcohol abuse most likely, patient also was noted on noncontrast CT scan earlier this admission with multiple intrahepatic lesions of unclear etiology, he underwent IR biopsy of liver lesion which has now been resulted and shows evidence of cirrhosis and steatohepatitis but also does not appear to contain any atypical or malignant cells  These suspected liver lesions should be characterized further  His AFP was noted normal     Otherwise his MELD score appears to be stable over the last several days (26), mentation appears stable, no overt signs of about a can cephalopathy or substantial fluid overload at this time  He also underwent EGD with esophageal variceal banding approximately 8 days ago        - monitor MELD labs daily    -discussed with Internal Medicine, recommend MRI of the liver for further evaluation; patient is currently awaiting transfer, MRI can be done inpatient or outpatient depending on anticipated timeframe of his placement    - alcohol cessation    -follow-up on ceruloplasmin and alpha-1 antitrypsin levels    - monitor hemoglobin, transfuse if needed, consider repeating EGD if he shows evidence of recurrent upper GI bleeding

## 2022-07-27 NOTE — ASSESSMENT & PLAN NOTE
Per discussion with patient's sister: Patient served in the [employer] and spent 18 months in [place], there is concern for PTSD  Sister has reached out to CM inquiring about VA programs to help with PTSD  She tells me that he had drug addiction prior to serving and developed alcohol addiction after his [employer]  Also shares that he went through a divorce 2-3 years ago and a girlfriend that recently passed away last November  Has 1 son and 1 step-daughter that he has not seen in years  Accounts from sister concerning for uncontrolled depression  Plan:  Patient started on desvenlafaxine 50 mg q24h  1 mg ativan TID PRN  Per Case Management, patient cannot have partial psychiatric hospitalization and rehab at the same time    Awaiting placement for rehab

## 2022-07-27 NOTE — ASSESSMENT & PLAN NOTE
Hx Decompensated cirrhosis with anasarca, hepatic encephalopathy, thrombocytopenia splenomegaly, hyperammonemia, and  Hepatomegaly most likely secondary to alcohol abuse but could also be due to underlying alcoholic hepatitis  -Previous AFP and right upper quadrant ultrasounds were normal  -Never had screening EGD for varices  -Home lactulose for hepatic encephalopathy  Vanessa MCGRAW recommending transfer to a facility with ICU for EGD  -an EGD performed showing 2 esophageal varices that underwent subsequent banding  -SBP: Spiking fevers on 7/17  CXR negative for acute findings, ua negative for uti  Blood cultures drawn  Started on ceftriaxone  He will complete a course of antibiotics for SBP prophylaxis  Paracentesis fluid shows 135 wbc's; 9% neutrophils  Fevers have resolved  Patient currently hemodynamically stable  -Abdominal ultrasound ordered to assess for hepatocellular carcinoma & ascites  -AFP 3 5  -GI consulted for suspected UGIB/varices; appreciate recommendations     - s/p EGD; 4 bands for 2 varices; no active bleed   Started on nadolol 40 mg qd      - 2 FFP given prior to EGD       Plan:   Trend MELD score: 7/27/22 - MELD score = 25  Protonix PO BID  lasix IV 40 q 12  aldactone  Hold AP/AC  On nadolol

## 2022-07-27 NOTE — ASSESSMENT & PLAN NOTE
Presents with microcytic anemia hemoglobin of 3 6 s/p 7u pRBC (hgb 7 7) and 2 u FFP  Asymptomatic, vitals stable  Troponin negative    Iron panel showing low iron levels with normal TIBC  Heme occult blood negative in the ED  Patient denying melena and hematemesis  Prior note of febrile reaction with pRBC (was noted after receiving 6 units; no temperature spike after receiving 7th unit, although pt was pre-treated with tylenol)  EGD no active bleed; 4 bands placed for 2 varices  Received 7 units total pRBCs    Recent Labs     07/25/22  0645 07/26/22  0643 07/27/22  0521   HGB 7 3* 7 3* 7 2*         Plan:  Started on nadolol    Transfuse hgb < 7  Heme consulted; erma sutton

## 2022-07-27 NOTE — CASE MANAGEMENT
Case Management Discharge Planning Note    Patient name Crow Weston S /S -22 MRN [id number]  : 1985 Date 2022       Current Admission Date: 2022  Current Admission Diagnosis:Alcoholic cirrhosis of liver without ascites [facility])   Patient Active Problem List    Diagnosis Date Noted    Encephalopathy 2022    Liver metastasis ([facility] [address] ) 2022    Chronic bronchitis ([facility] [address] ) 2021    Cardiac murmur 2021    Depression, concern for PTSD 2021    Anasarca 2021    [facility]    Thoracoabdominal aortic aneurysm ([facility] [address] ) 2021    Abnormal CT scan 2021    Substance abuse ([facility] ) 2021    Tobacco dependence     Alcoholic cirrhosis of liver without ascites ([facility] ) 2021    Anemia 2021    Thrombocytopenia ([facility] ) 2021    Mass of upper lobe of left lung 2019    Essential hypertension 2019    Obesity, Class I, BMI 30-34 9 2019      LOS (days): 9  Geometric Mean LOS (GMLOS) (days): 4 70  Days to GMLOS:-4     OBJECTIVE:  Risk of Unplanned Readmission Score: 22 79         Current admission status: Inpatient   Preferred Pharmacy:   [facility]/pharmacy #[id number] [place] [address]  Phone: [phone] Fax: [phone], [place] [address]  Phone: [phone] Fax: [phone]    Primary Care Provider: Mello Elaine PA-C    Primary Insurance:   Secondary Insurance:     DISCHARGE DETAILS:       Other Referral/Resources/Interventions Provided:  Referral Comments: CM did not receive a call back from Alberta Stacy, [address] [place] [address] so CM made another call @ [phone] ext [phone] and left another message to return call regarding SNF placement with the [place]   CM spoke to Marcianne Course, [facility] Coordinator for the [facility] in [place] @ [phone] ext [phone] and Franchesca Weinstein informed CM that patient cannot have PT/OT at the facility but they do provide PTSD treatment once he's completed rehab at a SNF for PT/OT  Sharri emailed CM the application for patient/family to complete and they may consider him once he's completed rehab at [facility]  DYAN informed patient and sister  CM to follow up with Mago Bhardwaj, from the [facility] for SNF placement  CM received a return call from [facility], from the [facility] to inquire further on Ramin's needs  He's going to inquire if he'll meet criteria for Acute Rehab with the [facility] and keep me posted on determination

## 2022-07-27 NOTE — ASSESSMENT & PLAN NOTE
Alcohol level 448  Patient says last drink was on Friday 7/15  OS ED used serax 10 mg tid for symptom control  Patient advised to stop drinking and is in agreement  However on leaving the room, patient appears to be pouring hand  into his drinks      Denies withdrawal symptoms    Plan:  JENNIE protocol  Clonidine  MV thiamine folic acid  D/C Serax

## 2022-07-27 NOTE — PROGRESS NOTES
[facility]  Progress Note - Karen Phillips 1985, 40 y o  male MRN: [id number]  Unit/Bed#: S -Doyle Encounter: [id number]  Primary Care Provider: Alyssa Nj PA-C   Date and time admitted to hospital: 7/18/2022  6:54 PM    * Alcoholic cirrhosis of liver without ascites ([facility] [address] )  Assessment & Plan  Hx Decompensated cirrhosis with anasarca, hepatic encephalopathy, thrombocytopenia splenomegaly, hyperammonemia, and  Hepatomegaly most likely secondary to alcohol abuse but could also be due to underlying alcoholic hepatitis  -Previous AFP and right upper quadrant ultrasounds were normal  -Never had screening EGD for varices  -Home lactulose for hepatic encephalopathy  [facility] recommending transfer to a facility with ICU for EGD  -an EGD performed showing 2 esophageal varices that underwent subsequent banding  -SBP: Spiking fevers on 7/17  CXR negative for acute findings, ua negative for uti  Blood cultures drawn  Started on ceftriaxone  He will complete a course of antibiotics for SBP prophylaxis  Paracentesis fluid shows 135 wbc's; 9% neutrophils  Fevers have resolved  Patient currently hemodynamically stable  -Abdominal ultrasound ordered to assess for hepatocellular carcinoma & ascites  -AFP 3 5  -GI consulted for suspected UGIB/varices; appreciate recommendations     - s/p EGD; 4 bands for 2 varices; no active bleed  Started on nadolol 40 mg qd      - 2 FFP given prior to EGD       Plan:   Trend MELD score: 7/27/22 - MELD score = 25  Protonix PO BID  lasix IV 40 q 12  aldactone  Hold AP/AC  On nadolol    Liver metastasis (HCC)  Assessment & Plan  CT abdomen:  Diffuse hepatic metastases concerning for hepatocellular carcinoma in this cirrhotic patient  Metastatic disease is not excluded given the normal AFP    AFP WNL  Liver biopsy on 07/22/2022    Plan:  Awaiting biopsy results  Patient downgraded to med surge      Depression, concern for PTSD  Assessment & Plan  Per discussion with patient's sister: Patient served in the [employer] and spent 18 months in [place], there is concern for PTSD  Sister has reached out to CM inquiring about VA programs to help with PTSD  She tells me that he had drug addiction prior to serving and developed alcohol addiction after his [employer]  Also shares that he went through a divorce 2-3 years ago and a girlfriend that recently passed away last November  Has 1 son and 1 step-daughter that he has not seen in years  Accounts from sister concerning for uncontrolled depression  Plan:  Patient started on desvenlafaxine 50 mg q24h  1 mg ativan TID PRN  Per Case Management, patient cannot have partial psychiatric hospitalization and rehab at the same time  Awaiting placement for rehab    Anemia  Assessment & Plan  Presents with microcytic anemia hemoglobin of 3 6 s/p 7u pRBC (hgb 7 7) and 2 u FFP  Asymptomatic, vitals stable  Troponin negative    Iron panel showing low iron levels with normal TIBC  Heme occult blood negative in the ED  Patient denying melena and hematemesis  Prior note of febrile reaction with pRBC (was noted after receiving 6 units; no temperature spike after receiving 7th unit, although pt was pre-treated with tylenol)  EGD no active bleed; 4 bands placed for 2 varices  Received 7 units total pRBCs    Recent Labs     07/25/22  0645 07/26/22  0643 07/27/22  0521   HGB 7 3* 7 3* 7 2*         Plan:  Started on nadolol  Transfuse hgb < 7  Heme consulted; appreciate recs      Alcohol withdrawal ([facility] [address] )  Assessment & Plan  Alcohol level 448  Patient says last drink was on Friday 7/15  [place] ED used serax 10 mg tid for symptom control  Patient advised to stop drinking and is in agreement  However on leaving the room, patient appears to be pouring hand  into his drinks      Denies withdrawal symptoms    Plan:  [facility] protocol  Clonidine  MV thiamine folic acid  D/C Serax      Chronic bronchitis ([facility] [address] )  Assessment & Plan  Resume home inhalers for asthma    Thoracoabdominal aortic aneurysm [facility])  Assessment & Plan  21 - CT findings; stable 54 mm  On chart review patient had a CTA on 2021 with noted 44 mm AAA  Does not appear AAA has been under surveillance  Refer to incidental findings noted on     Plan:  Please arrange for outpatient follow-up with primary care physician  Would recommend abdominal ultrasound to surveil AAA    Thrombocytopenia [facility])  Assessment & Plan  Lab Results   Component Value Date     2022     (L) 2022     (L) 2022     (L) 2022    PLT 99 (L) 2022     Thrombocytopenia most likely 2/2 liver cirrhosis   Give 1 unit of platelets if count drops below 50,000    Tobacco dependence  Assessment & Plan  Nicotine patch    Substance abuse ([facility] [address] )  Assessment & Plan  · Patient reports taking 8-2 Suboxone BID  · [place] PDMP confirmed 16 mg daily    Per case management, patient cannot have partial psychiatric hospitalization and rehab at the same time          VTE Pharmacologic Prophylaxis: VTE Score: 1 Moderate Risk (Score 3-4) - Pharmacological DVT Prophylaxis Contraindicated  Sequential Compression Devices Ordered  Patient Centered Rounds: I performed bedside rounds with nursing staff today  Discussions with Specialists or Other Care Team Provider:     Education and Discussions with Family / Patient: Updated  (sister) via phone  Current Length of Stay: 9 day(s)  Current Patient Status: Inpatient   Discharge Plan: Discharge pending placement in rehab    Code Status: Level 1 - Full Code    Subjective: Patient doing well this morning  No changes since the day before  Patient still endorses anxiety  Appetite continues to improve  Denies n/v/d/c  Patient still making good urine  Denies CP or SOB  Awaiting placement into rehab      Objective:     Vitals:   Temp (24hrs), Av 4 °F (36 9 °C), Min:98 3 °F (36 8 °C), Max:98 4 °F (36 9 °C)    Temp:  [98 3 °F (36 8 °C)-98 4 °F (36 9 °C)] 98 4 °F (36 9 °C)  HR:  [67-88] 74  Resp:  [16-18] 16  BP: (100-109)/(50-60) 104/56  SpO2:  [95 %-96 %] 95 %  Body mass index is 23 71 kg/m²  Input and Output Summary (last 24 hours): Intake/Output Summary (Last 24 hours) at 7/27/2022 1026  Last data filed at 7/27/2022 1001  Gross per 24 hour   Intake 560 ml   Output 2050 ml   Net -1490 ml       Physical Exam:   Constitutional:       General: He is not in acute distress  Appearance: Normal appearance  He is normal weight  He is not ill-appearing or toxic-appearing  HENT:      Head: Normocephalic and atraumatic  Eyes:      General: Scleral icterus present  Extraocular Movements: Extraocular movements intact  Pupils: Pupils are equal, round, and reactive to light  Cardiovascular:      Rate and Rhythm: Normal rate and regular rhythm  Pulses: Normal pulses  Heart sounds: No murmur heard  No gallop  Comments: Holosystolic murmur heard in LLSB  Pulmonary:      Effort: Pulmonary effort is normal  No respiratory distress  Breath sounds: Normal breath sounds  No stridor  No wheezing, rhonchi or rales  Chest:      Chest wall: No tenderness  Abdominal:      General: Bowel sounds are normal  There is distension  Palpations: Abdomen is soft  Tenderness: There is no abdominal tenderness  There is no guarding  Musculoskeletal:         General: Normal range of motion  Cervical back: Normal range of motion  Skin:     General: Skin is warm  Coloration: Skin is not jaundiced or pale  Findings: No rash  Neurological:      General: No focal deficit present  Mental Status: He is alert and oriented to person, place, and time  Mental status is at baseline  Cranial Nerves: No cranial nerve deficit  Sensory: No sensory deficit  Motor: No weakness     Psychiatric:         Mood and Affect: Mood normal          Behavior: Behavior normal      Additional Data:     Labs:  Results from last 7 days   Lab Units 07/27/22  0521 07/26/22  0643 07/25/22  0645 07/24/22  0446   WBC Thousand/uL 3 95* 3 88*   < > 3 85*   HEMOGLOBIN g/dL 7 2* 7 3*   < > 7 3*   HEMATOCRIT % 24 5* 24 2*   < > 24 2*   PLATELETS Thousands/uL 160 147*   < > 110*   BANDS PCT %  --   --   --  1   NEUTROS PCT %  --  48  --   --    LYMPHS PCT %  --  25  --   --    LYMPHO PCT % 21  --   --  31   MONOS PCT %  --  22*  --   --    MONO PCT % 19*  --   --  19*   EOS PCT % 1 2  --  3    < > = values in this interval not displayed  Results from last 7 days   Lab Units 07/27/22  0521   SODIUM mmol/L 133*   POTASSIUM mmol/L 3 8   CHLORIDE mmol/L 98   CO2 mmol/L 27   BUN mg/dL 13   CREATININE mg/dL 0 48*   ANION GAP mmol/L 8   CALCIUM mg/dL 10 0   ALBUMIN g/dL 4 4   TOTAL BILIRUBIN mg/dL 9 42*   ALK PHOS U/L 151*   ALT U/L 27   AST U/L 95*   GLUCOSE RANDOM mg/dL 104     Results from last 7 days   Lab Units 07/27/22  0521   INR  1 77*                   Lines/Drains:  Invasive Devices  Report    Peripheral Intravenous Line  Duration           Peripheral IV 07/23/22 Left Forearm 3 days                      Imaging: No pertinent imaging reviewed      Recent Cultures (last 7 days):   Results from last 7 days   Lab Units 07/20/22  1613   GRAM STAIN RESULT  1+ Polys  No bacteria seen   BODY FLUID CULTURE, STERILE  No growth       Last 24 Hours Medication List:   Current Facility-Administered Medications   Medication Dose Route Frequency Provider Last Rate    albuterol  2 puff Inhalation Q6H PRN Sinda Gus, DO      buprenorphine-naloxone  8 mg Sublingual BID Sinda Gus, DO      cloNIDine  0 1 mg Oral HS Sinda Gus, DO      desvenlafaxine succinate  50 mg Oral Daily Sinda Gus, DO      diphenhydrAMINE  25 mg Intravenous Q6H PRN Sinda Gus, DO      ferrous sulfate  325 mg Oral Daily With Breakfast Sinda Gus, DO      folic acid  1 mg Oral Daily Sinda Gus, DO      furosemide  40 mg Oral Daily Seth Guzman MD      gabapentin  300 mg Oral BID PRN Yuly Shultz,       glycerin-hypromellose-  1 drop Both Eyes Q3H PRN Yuly Shultz, DO      lactulose  20 g Oral BID Yuly Shultz, DO      LORazepam  1 mg Oral Q8H PRN Yuly Shultz, DO      melatonin  6 mg Oral HS Yuly Shultz, DO      multivitamin-minerals  1 tablet Oral Daily Yuly Shultz, DO      nadolol  40 mg Oral Daily Yuly Shultz, DO      nicotine  1 patch Transdermal Daily Yuly Shultz, DO      ondansetron  4 mg Intravenous Q6H PRN Yuly Shultz,       pantoprazole  40 mg Oral Early Morning Seth Guzman MD      spironolactone  100 mg Oral Daily Seth Guzman MD      thiamine  100 mg Oral Daily Yuly Shultz,           Today, Patient Was Seen By: Agus Tucker MD    **Please Note: This note may have been constructed using a voice recognition system  **

## 2022-07-28 ENCOUNTER — PATIENT OUTREACH (OUTPATIENT)
Dept: HEMATOLOGY ONCOLOGY | Facility: CLINIC | Age: 37
End: 2022-07-28

## 2022-07-28 ENCOUNTER — APPOINTMENT (INPATIENT)
Dept: MRI IMAGING | Facility: HOSPITAL | Age: 37
DRG: 432 | End: 2022-07-28

## 2022-07-28 LAB
A1AT SERPL-MCNC: 136 MG/DL (ref 95–164)
ALBUMIN SERPL BCP-MCNC: 4.4 G/DL (ref 3.5–5)
ALP SERPL-CCNC: 155 U/L (ref 34–104)
ALT SERPL W P-5'-P-CCNC: 34 U/L (ref 7–52)
ANION GAP SERPL CALCULATED.3IONS-SCNC: 5 MMOL/L (ref 4–13)
AST SERPL W P-5'-P-CCNC: 100 U/L (ref 13–39)
BASOPHILS # BLD AUTO: 0.07 THOUSANDS/ΜL (ref 0–0.1)
BASOPHILS NFR BLD AUTO: 2 % (ref 0–1)
BILIRUB SERPL-MCNC: 8.71 MG/DL (ref 0.2–1)
BUN SERPL-MCNC: 9 MG/DL (ref 5–25)
CALCIUM SERPL-MCNC: 10 MG/DL (ref 8.4–10.2)
CERULOPLASMIN SERPL-MCNC: 17.2 MG/DL (ref 16–31)
CHLORIDE SERPL-SCNC: 98 MMOL/L (ref 96–108)
CO2 SERPL-SCNC: 30 MMOL/L (ref 21–32)
CREAT SERPL-MCNC: 0.46 MG/DL (ref 0.6–1.3)
EOSINOPHIL # BLD AUTO: 0.1 THOUSAND/ΜL (ref 0–0.61)
EOSINOPHIL NFR BLD AUTO: 2 % (ref 0–6)
ERYTHROCYTE [DISTWIDTH] IN BLOOD BY AUTOMATED COUNT: 31.5 % (ref 11.6–15.1)
GFR SERPL CREATININE-BSD FRML MDRD: 143 ML/MIN/1.73SQ M
GLUCOSE SERPL-MCNC: 106 MG/DL (ref 65–140)
HCT VFR BLD AUTO: 24.5 % (ref 36.5–49.3)
HGB BLD-MCNC: 7.5 G/DL (ref 12–17)
IMM GRANULOCYTES # BLD AUTO: 0.01 THOUSAND/UL (ref 0–0.2)
IMM GRANULOCYTES NFR BLD AUTO: 0 % (ref 0–2)
INR PPP: 1.76 (ref 0.84–1.19)
LYMPHOCYTES # BLD AUTO: 0.87 THOUSANDS/ΜL (ref 0.6–4.47)
LYMPHOCYTES NFR BLD AUTO: 21 % (ref 14–44)
MCH RBC QN AUTO: 25.6 PG (ref 26.8–34.3)
MCHC RBC AUTO-ENTMCNC: 30.6 G/DL (ref 31.4–37.4)
MCV RBC AUTO: 84 FL (ref 82–98)
MONOCYTES # BLD AUTO: 0.7 THOUSAND/ΜL (ref 0.17–1.22)
MONOCYTES NFR BLD AUTO: 17 % (ref 4–12)
NEUTROPHILS # BLD AUTO: 2.42 THOUSANDS/ΜL (ref 1.85–7.62)
NEUTS SEG NFR BLD AUTO: 58 % (ref 43–75)
NRBC BLD AUTO-RTO: 0 /100 WBCS
PLATELET # BLD AUTO: 157 THOUSANDS/UL (ref 149–390)
POTASSIUM SERPL-SCNC: 4 MMOL/L (ref 3.5–5.3)
PROT SERPL-MCNC: 7.7 G/DL (ref 6.4–8.4)
PROTHROMBIN TIME: 20.3 SECONDS (ref 11.6–14.5)
RBC # BLD AUTO: 2.93 MILLION/UL (ref 3.88–5.62)
SODIUM SERPL-SCNC: 133 MMOL/L (ref 135–147)
WBC # BLD AUTO: 4.17 THOUSAND/UL (ref 4.31–10.16)

## 2022-07-28 PROCEDURE — 80053 COMPREHEN METABOLIC PANEL: CPT

## 2022-07-28 PROCEDURE — A9585 GADOBUTROL INJECTION: HCPCS | Performed by: PHYSICIAN ASSISTANT

## 2022-07-28 PROCEDURE — G1004 CDSM NDSC: HCPCS

## 2022-07-28 PROCEDURE — 99232 SBSQ HOSP IP/OBS MODERATE 35: CPT | Performed by: HOSPITALIST

## 2022-07-28 PROCEDURE — 85610 PROTHROMBIN TIME: CPT

## 2022-07-28 PROCEDURE — 85025 COMPLETE CBC W/AUTO DIFF WBC: CPT

## 2022-07-28 PROCEDURE — 74183 MRI ABD W/O CNTR FLWD CNTR: CPT

## 2022-07-28 RX ADMIN — CLONIDINE HYDROCHLORIDE 0.1 MG: 0.1 TABLET ORAL at 21:19

## 2022-07-28 RX ADMIN — Medication 6 MG: at 21:20

## 2022-07-28 RX ADMIN — MULTIPLE VITAMINS W/ MINERALS TAB 1 TABLET: TAB ORAL at 08:54

## 2022-07-28 RX ADMIN — THIAMINE HCL TAB 100 MG 100 MG: 100 TAB at 08:53

## 2022-07-28 RX ADMIN — PANTOPRAZOLE SODIUM 40 MG: 40 TABLET, DELAYED RELEASE ORAL at 06:01

## 2022-07-28 RX ADMIN — LACTULOSE 20 G: 20 SOLUTION ORAL at 08:54

## 2022-07-28 RX ADMIN — BUPRENORPHINE AND NALOXONE 8 MG: 8; 2 FILM BUCCAL; SUBLINGUAL at 18:29

## 2022-07-28 RX ADMIN — BUPRENORPHINE AND NALOXONE 8 MG: 8; 2 FILM BUCCAL; SUBLINGUAL at 10:06

## 2022-07-28 RX ADMIN — FUROSEMIDE 40 MG: 40 TABLET ORAL at 08:54

## 2022-07-28 RX ADMIN — LORAZEPAM 1 MG: 1 TABLET ORAL at 08:53

## 2022-07-28 RX ADMIN — GADOBUTROL 8 ML: 604.72 INJECTION INTRAVENOUS at 17:18

## 2022-07-28 RX ADMIN — FERROUS SULFATE TAB 325 MG (65 MG ELEMENTAL FE) 325 MG: 325 (65 FE) TAB at 08:53

## 2022-07-28 RX ADMIN — DESVENLAFAXINE 50 MG: 50 TABLET, FILM COATED, EXTENDED RELEASE ORAL at 08:57

## 2022-07-28 RX ADMIN — FOLIC ACID 1 MG: 1 TABLET ORAL at 08:53

## 2022-07-28 RX ADMIN — SPIRONOLACTONE 100 MG: 100 TABLET ORAL at 08:53

## 2022-07-28 RX ADMIN — NICOTINE 1 PATCH: 21 PATCH, EXTENDED RELEASE TRANSDERMAL at 08:53

## 2022-07-28 RX ADMIN — LACTULOSE 20 G: 20 SOLUTION ORAL at 17:52

## 2022-07-28 NOTE — CASE MANAGEMENT
Case Management Progress Note    Patient name Leanor Mcburney  Location S /S -01 MRN [id number]  : 1985 Date 2022       LOS (days): 10  Geometric Mean LOS (GMLOS) (days): 4 70  Days to GMLOS:-5 3        OBJECTIVE:        Current admission status: Inpatient  Preferred Pharmacy:   [id number] [address]  [address] [place] [address]  Phone: [phone] Fax: [phone] [address] [place] [address]  Phone: [phone] Fax: [phone]    Primary Care Provider: Moreno Talley PA-C    Primary Insurance:   Secondary Insurance:     PROGRESS NOTE:      CM updated  patient regarding no accepting SNF facilities as of yet  Patient in agreement with expanding referral search - CM expanded referral search and will keep patient updated once we have an accepting facility

## 2022-07-28 NOTE — PLAN OF CARE
Problem: Potential for Falls  Goal: Patient will remain free of falls  Description: INTERVENTIONS:  - Educate patient/family on patient safety including physical limitations  - Instruct patient to call for assistance with activity   - Consult OT/PT to assist with strengthening/mobility   - Keep Call bell within reach  - Keep bed low and locked with side rails adjusted as appropriate  - Keep care items and personal belongings within reach  - Initiate and maintain comfort rounds  - Make Fall Risk Sign visible to staff  - Offer Toileting every 2 Hours, in advance of need  - Initiate/Maintain alarm  - Obtain necessary fall risk management equipment:   - Apply yellow socks and bracelet for high fall risk patients  - Consider moving patient to room near nurses station  Outcome: Progressing     Problem: Prexisting or High Potential for Compromised Skin Integrity  Goal: Skin integrity is maintained or improved  Description: INTERVENTIONS:  - Identify patients at risk for skin breakdown  - Assess and monitor skin integrity  - Assess and monitor nutrition and hydration status  - Monitor labs   - Assess for incontinence   - Turn and reposition patient  - Assist with mobility/ambulation  - Relieve pressure over bony prominences  - Avoid friction and shearing  - Provide appropriate hygiene as needed including keeping skin clean and dry  - Evaluate need for skin moisturizer/barrier cream  - Collaborate with interdisciplinary team   - Patient/family teaching  - Consider wound care consult   Outcome: Progressing     Problem: MOBILITY - ADULT  Goal: Maintain or return to baseline ADL function  Description: INTERVENTIONS:  -  Assess patient's ability to carry out ADLs; assess patient's baseline for ADL function and identify physical deficits which impact ability to perform ADLs (bathing, care of mouth/teeth, toileting, grooming, dressing, etc )  - Assess/evaluate cause of self-care deficits   - Assess range of motion  - Assess patient's mobility; develop plan if impaired  - Assess patient's need for assistive devices and provide as appropriate  - Encourage maximum independence but intervene and supervise when necessary  - Involve family in performance of ADLs  - Assess for home care needs following discharge   - Consider OT consult to assist with ADL evaluation and planning for discharge  - Provide patient education as appropriate  Outcome: Progressing  Goal: Maintains/Returns to pre admission functional level  Description: INTERVENTIONS:  - Perform BMAT or MOVE assessment daily    - Set and communicate daily mobility goal to care team and patient/family/caregiver  - Collaborate with rehabilitation services on mobility goals if consulted  - Perform Range of Motion 3 times a day  - Reposition patient every 2 hours    - Dangle patient 3 times a day  - Stand patient 3 times a day  - Ambulate patient 3 times a day  - Out of bed to chair 3 times a day   - Out of bed for meals 3 times a day  - Out of bed for toileting  - Record patient progress and toleration of activity level   Outcome: Progressing     Problem: PAIN - ADULT  Goal: Verbalizes/displays adequate comfort level or baseline comfort level  Description: Interventions:  - Encourage patient to monitor pain and request assistance  - Assess pain using appropriate pain scale  - Administer analgesics based on type and severity of pain and evaluate response  - Implement non-pharmacological measures as appropriate and evaluate response  - Consider cultural and social influences on pain and pain management  - Notify physician/advanced practitioner if interventions unsuccessful or patient reports new pain  Outcome: Progressing     Problem: SAFETY ADULT  Goal: Patient will remain free of falls  Description: INTERVENTIONS:  - Educate patient/family on patient safety including physical limitations  - Instruct patient to call for assistance with activity   - Consult OT/PT to assist with strengthening/mobility   - Keep Call bell within reach  - Keep bed low and locked with side rails adjusted as appropriate  - Keep care items and personal belongings within reach  - Initiate and maintain comfort rounds  - Make Fall Risk Sign visible to staff  - Offer Toileting every 2 Hours, in advance of need  - Initiate/Maintain alarm  - Obtain necessary fall risk management equipment:   - Apply yellow socks and bracelet for high fall risk patients  - Consider moving patient to room near nurses station  Outcome: Progressing  Goal: Maintain or return to baseline ADL function  Description: INTERVENTIONS:  -  Assess patient's ability to carry out ADLs; assess patient's baseline for ADL function and identify physical deficits which impact ability to perform ADLs (bathing, care of mouth/teeth, toileting, grooming, dressing, etc )  - Assess/evaluate cause of self-care deficits   - Assess range of motion  - Assess patient's mobility; develop plan if impaired  - Assess patient's need for assistive devices and provide as appropriate  - Encourage maximum independence but intervene and supervise when necessary  - Involve family in performance of ADLs  - Assess for home care needs following discharge   - Consider OT consult to assist with ADL evaluation and planning for discharge  - Provide patient education as appropriate  Outcome: Progressing  Goal: Maintains/Returns to pre admission functional level  Description: INTERVENTIONS:  - Perform BMAT or MOVE assessment daily    - Set and communicate daily mobility goal to care team and patient/family/caregiver  - Collaborate with rehabilitation services on mobility goals if consulted  - Perform Range of Motion 3 times a day  - Reposition patient every 2 hours    - Dangle patient 3 times a day  - Stand patient 3 times a day  - Ambulate patient 3 times a day  - Out of bed to chair 3 times a day   - Out of bed for meals 3 times a day  - Out of bed for toileting  - Record patient progress and toleration of activity level   Outcome: Progressing     Problem: DISCHARGE PLANNING  Goal: Discharge to home or other facility with appropriate resources  Description: INTERVENTIONS:  - Identify barriers to discharge w/patient and caregiver  - Arrange for needed discharge resources and transportation as appropriate  - Identify discharge learning needs (meds, wound care, etc )  - Arrange for interpretive services to assist at discharge as needed  - Refer to Case Management Department for coordinating discharge planning if the patient needs post-hospital services based on physician/advanced practitioner order or complex needs related to functional status, cognitive ability, or social support system  Outcome: Progressing     Problem: Knowledge Deficit  Goal: Patient/family/caregiver demonstrates understanding of disease process, treatment plan, medications, and discharge instructions  Description: Complete learning assessment and assess knowledge base  Interventions:  - Provide teaching at level of understanding  - Provide teaching via preferred learning methods  Outcome: Progressing     Problem: NEUROSENSORY - ADULT  Goal: Achieves maximal functionality and self care  Description: INTERVENTIONS  - Monitor swallowing and airway patency with patient fatigue and changes in neurological status  - Encourage and assist patient to increase activity and self care     - Encourage visually impaired, hearing impaired and aphasic patients to use assistive/communication devices  Outcome: Progressing     Problem: GASTROINTESTINAL - ADULT  Goal: Maintains or returns to baseline bowel function  Description: INTERVENTIONS:  - Assess bowel function  - Encourage oral fluids to ensure adequate hydration  - Administer IV fluids if ordered to ensure adequate hydration  - Administer ordered medications as needed  - Encourage mobilization and activity  - Consider nutritional services referral to assist patient with adequate nutrition and appropriate food choices  Outcome: Progressing  Goal: Maintains adequate nutritional intake  Description: INTERVENTIONS:  - Monitor percentage of each meal consumed  - Identify factors contributing to decreased intake, treat as appropriate  - Assist with meals as needed  - Monitor I&O, weight, and lab values if indicated  - Obtain nutrition services referral as needed  Outcome: Progressing     Problem: HEMATOLOGIC - ADULT  Goal: Maintains hematologic stability  Description: INTERVENTIONS  - Assess for signs and symptoms of bleeding or hemorrhage  - Monitor labs  - Administer supportive blood products/factors as ordered and appropriate  Outcome: Progressing     Problem: Nutrition/Hydration-ADULT  Goal: Nutrient/Hydration intake appropriate for improving, restoring or maintaining nutritional needs  Description: Monitor and assess patient's nutrition/hydration status for malnutrition  Collaborate with interdisciplinary team and initiate plan and interventions as ordered  Monitor patient's weight and dietary intake as ordered or per policy  Utilize nutrition screening tool and intervene as necessary  Determine patient's food preferences and provide high-protein, high-caloric foods as appropriate       INTERVENTIONS:  - Monitor oral intake, urinary output, labs, and treatment plans  - Assess nutrition and hydration status and recommend course of action  - Evaluate amount of meals eaten  - Assist patient with eating if necessary   - Allow adequate time for meals  - Recommend/ encourage appropriate diets, oral nutritional supplements, and vitamin/mineral supplements  - Order, calculate, and assess calorie counts as needed  - Recommend, monitor, and adjust tube feedings and TPN/PPN based on assessed needs  - Assess need for intravenous fluids  - Provide specific nutrition/hydration education as appropriate  - Include patient/family/caregiver in decisions related to nutrition  Outcome: Progressing

## 2022-07-28 NOTE — PROGRESS NOTES
[facility]  Progress Note - Muriel Roe 1985, 40 y o  male MRN: [id number]  Unit/Bed#: S -01 Encounter: [id number]  Primary Care Provider: Sophie Llanos PA-C   Date and time admitted to hospital: 7/18/2022  5:02 PM    * Alcoholic cirrhosis of liver without ascites ([facility] )  Assessment & Plan  Hx Decompensated cirrhosis with anasarca, hepatic encephalopathy, thrombocytopenia splenomegaly, hyperammonemia, and  Hepatomegaly most likely secondary to alcohol abuse but could also be due to underlying alcoholic hepatitis  -Previous AFP and right upper quadrant ultrasounds were normal  -Never had screening EGD for varices  -Home lactulose for hepatic encephalopathy  [facility] recommending transfer to a facility with ICU for EGD  -an EGD performed showing 2 esophageal varices that underwent subsequent banding  -SBP: Spiking fevers on 7/17  CXR negative for acute findings, ua negative for uti  Blood cultures drawn  Started on ceftriaxone  He will complete a course of antibiotics for SBP prophylaxis  Paracentesis fluid shows 135 wbc's; 9% neutrophils  Fevers have resolved  Patient currently hemodynamically stable  -Abdominal ultrasound ordered to assess for hepatocellular carcinoma & ascites  -AFP 3 5  -GI consulted for suspected UGIB/varices; appreciate recommendations     - s/p EGD; 4 bands for 2 varices; no active bleed  Started on nadolol 40 mg qd      - 2 FFP given prior to EGD       Plan:   Trend MELD score: 7/28/22 - MELD score = 24  Protonix PO BID  lasix PO 40 q 12  aldactone  Hold AP/AC  On nadolol  Cleared by GI for discharge  Follow up as outpatient  Liver metastasis ([place] [address] )  Assessment & Plan  CT abdomen:  Diffuse hepatic metastases concerning for hepatocellular carcinoma in this cirrhotic patient  Metastatic disease is not excluded given the normal AFP    AFP WNL  Liver biopsy on 07/22/2022 - Resulted on 7/27 - NEGATIVE FOR MALIGNANCY     Plan:  Awaiting biopsy results  Patient downgraded to med surge      Depression, concern for PTSD  Assessment & Plan  Per discussion with patient's sister: Patient served in the [employer] and spent 18 months in [facility], there is concern for PTSD  Sister has reached out to CM inquiring about VA programs to help with PTSD  She tells me that he had drug addiction prior to serving and developed alcohol addiction after his [employer]  Also shares that he went through a divorce 2-3 years ago and a girlfriend that recently passed away last November  Has 1 son and 1 step-daughter that he has not seen in years  Accounts from sister concerning for uncontrolled depression  Plan:  Patient started on desvenlafaxine 50 mg q24h  1 mg ativan TID PRN  Per Case Management, patient cannot have partial psychiatric hospitalization and rehab at the same time  Awaiting placement for rehab    Anemia  Assessment & Plan  Presents with microcytic anemia hemoglobin of 3 6 s/p 7u pRBC (hgb 7 7) and 2 u FFP  Asymptomatic, vitals stable  Troponin negative    Iron panel showing low iron levels with normal TIBC  Heme occult blood negative in the ED  Patient denying melena and hematemesis  Prior note of febrile reaction with pRBC (was noted after receiving 6 units; no temperature spike after receiving 7th unit, although pt was pre-treated with tylenol)  EGD no active bleed; 4 bands placed for 2 varices  Received 7 units total pRBCs    Recent Labs     07/26/22  0643 07/27/22  0521 07/28/22  0554   HGB 7 3* 7 2* 7 5*         Plan:  Started on nadolol  Transfuse hgb < 7  Heme consulted; appreciate recs      Alcohol withdrawal ([facility] )  Assessment & Plan  Alcohol level 448  Patient says last drink was on Friday 7/15  [facility] used serax 10 mg tid for symptom control  Patient advised to stop drinking and is in agreement  However on leaving the room, patient appears to be pouring hand  into his drinks      Denies withdrawal symptoms    Plan:  WA protocol  Clonidine  MV thiamine folic acid  D/C Serax      Chronic bronchitis (HCC)  Assessment & Plan  Resume home inhalers for asthma    Thoracoabdominal aortic aneurysm [facility])  Assessment & Plan  7/18/21 - CT findings; stable 54 mm  On chart review patient had a CTA on January 2021 with noted 44 mm AAA  Does not appear AAA has been under surveillance  Refer to incidental findings noted on 07/20    Plan:  Please arrange for outpatient follow-up with primary care physician  Would recommend abdominal ultrasound to surveil AAA    Thrombocytopenia [facility])  Assessment & Plan  Lab Results   Component Value Date     07/28/2022     07/27/2022     (L) 07/26/2022     (L) 07/25/2022     (L) 07/24/2022     Thrombocytopenia most likely 2/2 liver cirrhosis   Give 1 unit of platelets if count drops below 50,000    Tobacco dependence  Assessment & Plan  Nicotine patch    Substance abuse ([facility] )  Assessment & Plan  · Patient reports taking 8-2 Suboxone BID  · [place]MP confirmed 16 mg daily    Per case management, patient cannot have partial psychiatric hospitalization and rehab at the same time        VTE Pharmacologic Prophylaxis: VTE Score: 1 Low Risk (Score 0-2) - Encourage Ambulation  Patient Centered Rounds: I performed bedside rounds with nursing staff today  Discussions with Specialists or Other Care Team Provider:     Education and Discussions with Family / Patient: Updated  (sister) via phone  Current Length of Stay: 10 day(s)  Current Patient Status: Inpatient   Discharge Plan: Discharge pending placement in rehab    Code Status: Level 1 - Full Code    Subjective:   Patient doing well this morning  No changes overnight  Continues to improve  Denies N/V/D/C  No chest pain or shortness of breath reported  Patient endorses good appetite  MELD score today improved to 24  Cleared by GI for discharge  Discharge pending placement in rehab  Objective:     Vitals:   Temp (24hrs), Av 3 °F (36 8 °C), Min:98 1 °F (36 7 °C), Max:98 7 °F (37 1 °C)    Temp:  [98 1 °F (36 7 °C)-98 7 °F (37 1 °C)] 98 3 °F (36 8 °C)  HR:  [72-81] 81  Resp:  [16-18] 18  BP: (104-117)/(56-64) 110/59  SpO2:  [95 %-97 %] 97 %  Body mass index is 24 93 kg/m²  Input and Output Summary (last 24 hours): Intake/Output Summary (Last 24 hours) at 2022 0838  Last data filed at 2022 0136  Gross per 24 hour   Intake 1065 ml   Output 1700 ml   Net -635 ml       Physical Exam:   Physical Exam  Constitutional:       General: He is not in acute distress  Appearance: Normal appearance  He is normal weight  He is not ill-appearing or toxic-appearing  HENT:      Head: Normocephalic and atraumatic  Eyes:      General: Scleral icterus present  Extraocular Movements: Extraocular movements intact  Conjunctiva/sclera: Conjunctivae normal       Pupils: Pupils are equal, round, and reactive to light  Cardiovascular:      Rate and Rhythm: Normal rate and regular rhythm  Pulses: Normal pulses  Heart sounds: Normal heart sounds  No murmur heard  No gallop  Pulmonary:      Effort: Pulmonary effort is normal  No respiratory distress  Breath sounds: Normal breath sounds  No stridor  No wheezing, rhonchi or rales  Chest:      Chest wall: No tenderness  Abdominal:      General: Abdomen is flat  Bowel sounds are normal       Palpations: Abdomen is soft  Tenderness: There is no abdominal tenderness  Musculoskeletal:         General: Normal range of motion  Skin:     General: Skin is warm  Coloration: Skin is not jaundiced or pale  Findings: No rash  Neurological:      General: No focal deficit present  Mental Status: He is alert and oriented to person, place, and time  Mental status is at baseline  Cranial Nerves: No cranial nerve deficit  Sensory: No sensory deficit  Motor: No weakness     Psychiatric: Mood and Affect: Mood normal          Behavior: Behavior normal           Additional Data:     Labs:  Results from last 7 days   Lab Units 07/28/22  0554 07/25/22  0645 07/24/22  0446   WBC Thousand/uL 4 17*   < > 3 85*   HEMOGLOBIN g/dL 7 5*   < > 7 3*   HEMATOCRIT % 24 5*   < > 24 2*   PLATELETS Thousands/uL 157   < > 110*   BANDS PCT %  --   --  1   NEUTROS PCT % 58   < >  --    LYMPHS PCT % 21   < >  --    LYMPHO PCT   --    < > 31   MONOS PCT % 17*   < >  --    MONO PCT   --    < > 19*   EOS PCT % 2   < > 3    < > = values in this interval not displayed  Results from last 7 days   Lab Units 07/28/22  0554   SODIUM mmol/L 133*   POTASSIUM mmol/L 4 0   CHLORIDE mmol/L 98   CO2 mmol/L 30   BUN mg/dL 9   CREATININE mg/dL 0 46*   ANION GAP mmol/L 5   CALCIUM mg/dL 10 0   ALBUMIN g/dL 4 4   TOTAL BILIRUBIN mg/dL 8 71*   ALK PHOS U/L 155*   ALT U/L 34   AST U/L 100*   GLUCOSE RANDOM mg/dL 106     Results from last 7 days   Lab Units 07/28/22  0554   INR  1 76*                   Lines/Drains:  Invasive Devices  Report    Peripheral Intravenous Line  Duration           Peripheral IV 07/27/22 Left;Ventral (anterior) Forearm <1 day                      Imaging: No pertinent imaging reviewed      Recent Cultures (last 7 days):         Last 24 Hours Medication List:   Current Facility-Administered Medications   Medication Dose Route Frequency Provider Last Rate    albuterol  2 puff Inhalation Q6H PRN Reyne Titus, DO      buprenorphine-naloxone  8 mg Sublingual BID Reyne Titus, DO      cloNIDine  0 1 mg Oral HS Reyne Titus, DO      desvenlafaxine succinate  50 mg Oral Daily Reyne Titus, DO      diphenhydrAMINE  25 mg Intravenous Q6H PRN Reyne Titus, DO      ferrous sulfate  325 mg Oral Daily With Breakfast Reyne Titus, DO      folic acid  1 mg Oral Daily Reyne Titus, DO      furosemide  40 mg Oral Daily Seth Wallace MD      gabapentin  300 mg Oral BID PRN Reyne Titus, DO      glycerin-hypromellose-  1 drop Both Eyes Q3H PRN Luwana Helling, DO      lactulose  20 g Oral BID Luwana Helling, DO      LORazepam  1 mg Oral Q8H PRN Luwana Helling, DO      melatonin  6 mg Oral HS Luwana Helling, DO      multivitamin-minerals  1 tablet Oral Daily Luwana Helling, DO      nadolol  40 mg Oral Daily Luwana Helling, DO      nicotine  1 patch Transdermal Daily Luwana Helling, DO      ondansetron  4 mg Intravenous Q6H PRN Luwana Helling, DO      pantoprazole  40 mg Oral Early Morning Seth Mcnally MD      spironolactone  100 mg Oral Daily Seth Mcnally MD      thiamine  100 mg Oral Daily Luwana Helling, DO          Today, Patient Was Seen By: Jennifer Bravo MD    **Please Note: This note may have been constructed using a voice recognition system  **

## 2022-07-28 NOTE — PROGRESS NOTES
Patients biopsy resulted negative for malignancy  MRI has been ordered  At this time there is no further navigation needed

## 2022-07-28 NOTE — ASSESSMENT & PLAN NOTE
CT abdomen:  Diffuse hepatic metastases concerning for hepatocellular carcinoma in this cirrhotic patient  Metastatic disease is not excluded given the normal AFP    AFP WNL  Liver biopsy on 07/22/2022 - Resulted on 7/27 - NEGATIVE FOR MALIGNANCY     Plan:  Awaiting biopsy results  Patient downgraded to med surge

## 2022-07-28 NOTE — ASSESSMENT & PLAN NOTE
Hx Decompensated cirrhosis with anasarca, hepatic encephalopathy, thrombocytopenia splenomegaly, hyperammonemia, and  Hepatomegaly most likely secondary to alcohol abuse but could also be due to underlying alcoholic hepatitis  -Previous AFP and right upper quadrant ultrasounds were normal  -Never had screening EGD for varices  -Home lactulose for hepatic encephalopathy  Lisa MCGRAW recommending transfer to a facility with ICU for EGD  -an EGD performed showing 2 esophageal varices that underwent subsequent banding  -SBP: Spiking fevers on 7/17  CXR negative for acute findings, ua negative for uti  Blood cultures drawn  Started on ceftriaxone  He will complete a course of antibiotics for SBP prophylaxis  Paracentesis fluid shows 135 wbc's; 9% neutrophils  Fevers have resolved  Patient currently hemodynamically stable  -Abdominal ultrasound ordered to assess for hepatocellular carcinoma & ascites  -AFP 3 5  -GI consulted for suspected UGIB/varices; appreciate recommendations     - s/p EGD; 4 bands for 2 varices; no active bleed  Started on nadolol 40 mg qd      - 2 FFP given prior to EGD       Plan:   Trend MELD score: 7/28/22 - MELD score = 24  Protonix PO BID  lasix PO 40 q 12  aldactone  Hold AP/AC  On nadolol  Cleared by GI for discharge  Follow up as outpatient

## 2022-07-28 NOTE — ASSESSMENT & PLAN NOTE
Presents with microcytic anemia hemoglobin of 3 6 s/p 7u pRBC (hgb 7 7) and 2 u FFP  Asymptomatic, vitals stable  Troponin negative    Iron panel showing low iron levels with normal TIBC  Heme occult blood negative in the ED  Patient denying melena and hematemesis  Prior note of febrile reaction with pRBC (was noted after receiving 6 units; no temperature spike after receiving 7th unit, although pt was pre-treated with tylenol)  EGD no active bleed; 4 bands placed for 2 varices  Received 7 units total pRBCs    Recent Labs     07/26/22  0643 07/27/22  0521 07/28/22  0554   HGB 7 3* 7 2* 7 5*         Plan:  Started on nadolol    Transfuse hgb < 7  Heme consulted; erma sutton

## 2022-07-28 NOTE — ASSESSMENT & PLAN NOTE
Lab Results   Component Value Date     07/28/2022     07/27/2022     (L) 07/26/2022     (L) 07/25/2022     (L) 07/24/2022     Thrombocytopenia most likely 2/2 liver cirrhosis   Give 1 unit of platelets if count drops below 50,000

## 2022-07-28 NOTE — NURSING NOTE
Pt refused lab work this morning  RN and PCA both attempted lab work multiple times  RN educated patient on why the lab work was needed  Pt continued to state "You are not sticking me " Provider notified

## 2022-07-28 NOTE — ASSESSMENT & PLAN NOTE
Per discussion with patient's sister: Patient served in the [employer] and spent 18 months in [place], there is concern for PTSD  Sister has reached out to CM inquiring about VA programs to help with PTSD  She tells me that he had drug addiction prior to serving and developed alcohol addiction after his [employer]  Also shares that he went through a divorce 2-3 years ago and a girlfriend that recently passed away last November  Has 1 son and 1 step-daughter that he has not seen in years  Accounts from sister concerning for uncontrolled depression  Plan:  Patient started on desvenlafaxine 50 mg q24h  1 mg ativan TID PRN  Per Case Management, patient cannot have partial psychiatric hospitalization and rehab at the same time    Awaiting placement for rehab

## 2022-07-29 PROBLEM — C78.7 LIVER METASTASIS (HCC): Chronic | Status: RESOLVED | Noted: 2022-07-18 | Resolved: 2022-07-29

## 2022-07-29 LAB
ALBUMIN SERPL BCP-MCNC: 4.3 G/DL (ref 3.5–5)
ALP SERPL-CCNC: 156 U/L (ref 34–104)
ALT SERPL W P-5'-P-CCNC: 37 U/L (ref 7–52)
ANION GAP SERPL CALCULATED.3IONS-SCNC: 7 MMOL/L (ref 4–13)
AST SERPL W P-5'-P-CCNC: 112 U/L (ref 13–39)
BILIRUB SERPL-MCNC: 8.34 MG/DL (ref 0.2–1)
BUN SERPL-MCNC: 8 MG/DL (ref 5–25)
CALCIUM SERPL-MCNC: 9.9 MG/DL (ref 8.4–10.2)
CHLORIDE SERPL-SCNC: 99 MMOL/L (ref 96–108)
CO2 SERPL-SCNC: 29 MMOL/L (ref 21–32)
CREAT SERPL-MCNC: 0.45 MG/DL (ref 0.6–1.3)
GFR SERPL CREATININE-BSD FRML MDRD: 144 ML/MIN/1.73SQ M
GLUCOSE SERPL-MCNC: 91 MG/DL (ref 65–140)
INR PPP: 1.91 (ref 0.84–1.19)
POTASSIUM SERPL-SCNC: 4.1 MMOL/L (ref 3.5–5.3)
PROT SERPL-MCNC: 7.6 G/DL (ref 6.4–8.4)
PROTHROMBIN TIME: 22.1 SECONDS (ref 11.6–14.5)
SODIUM SERPL-SCNC: 135 MMOL/L (ref 135–147)

## 2022-07-29 PROCEDURE — 99232 SBSQ HOSP IP/OBS MODERATE 35: CPT | Performed by: HOSPITALIST

## 2022-07-29 PROCEDURE — 80053 COMPREHEN METABOLIC PANEL: CPT

## 2022-07-29 PROCEDURE — 97110 THERAPEUTIC EXERCISES: CPT

## 2022-07-29 PROCEDURE — 97116 GAIT TRAINING THERAPY: CPT

## 2022-07-29 PROCEDURE — 85610 PROTHROMBIN TIME: CPT

## 2022-07-29 RX ADMIN — LORAZEPAM 1 MG: 1 TABLET ORAL at 10:25

## 2022-07-29 RX ADMIN — LACTULOSE 20 G: 20 SOLUTION ORAL at 09:44

## 2022-07-29 RX ADMIN — LORAZEPAM 1 MG: 1 TABLET ORAL at 02:20

## 2022-07-29 RX ADMIN — FERROUS SULFATE TAB 325 MG (65 MG ELEMENTAL FE) 325 MG: 325 (65 FE) TAB at 09:45

## 2022-07-29 RX ADMIN — SPIRONOLACTONE 100 MG: 100 TABLET ORAL at 09:44

## 2022-07-29 RX ADMIN — Medication 6 MG: at 21:07

## 2022-07-29 RX ADMIN — NICOTINE 1 PATCH: 21 PATCH, EXTENDED RELEASE TRANSDERMAL at 09:46

## 2022-07-29 RX ADMIN — MULTIPLE VITAMINS W/ MINERALS TAB 1 TABLET: TAB ORAL at 09:45

## 2022-07-29 RX ADMIN — BUPRENORPHINE AND NALOXONE 8 MG: 8; 2 FILM BUCCAL; SUBLINGUAL at 18:03

## 2022-07-29 RX ADMIN — CLONIDINE HYDROCHLORIDE 0.1 MG: 0.1 TABLET ORAL at 21:07

## 2022-07-29 RX ADMIN — THIAMINE HCL TAB 100 MG 100 MG: 100 TAB at 09:45

## 2022-07-29 RX ADMIN — FUROSEMIDE 40 MG: 40 TABLET ORAL at 09:45

## 2022-07-29 RX ADMIN — FOLIC ACID 1 MG: 1 TABLET ORAL at 09:45

## 2022-07-29 RX ADMIN — LACTULOSE 20 G: 20 SOLUTION ORAL at 18:03

## 2022-07-29 RX ADMIN — BUPRENORPHINE AND NALOXONE 8 MG: 8; 2 FILM BUCCAL; SUBLINGUAL at 09:49

## 2022-07-29 RX ADMIN — DESVENLAFAXINE 50 MG: 50 TABLET, FILM COATED, EXTENDED RELEASE ORAL at 10:25

## 2022-07-29 RX ADMIN — NADOLOL 40 MG: 40 TABLET ORAL at 10:25

## 2022-07-29 RX ADMIN — PANTOPRAZOLE SODIUM 40 MG: 40 TABLET, DELAYED RELEASE ORAL at 05:17

## 2022-07-29 NOTE — ASSESSMENT & PLAN NOTE
· Patient reports taking 8-2 Suboxone BID  · [place] confirmed 16 mg daily    Per case management, patient cannot have partial psychiatric hospitalization and rehab at the same time  Awaiting placement into rehab

## 2022-07-29 NOTE — ASSESSMENT & PLAN NOTE
Presents with microcytic anemia hemoglobin of 3 6 s/p 7u pRBC (hgb 7 7) and 2 u FFP  Asymptomatic, vitals stable  Troponin negative    Iron panel showing low iron levels with normal TIBC  Heme occult blood negative in the ED  Patient denying melena and hematemesis  Prior note of febrile reaction with pRBC (was noted after receiving 6 units; no temperature spike after receiving 7th unit, although pt was pre-treated with tylenol)  EGD no active bleed; 4 bands placed for 2 varices  Received 7 units total pRBCs    Recent Labs     07/27/22  0521 07/28/22  0554   HGB 7 2* 7 5*         Plan:  Started on nadolol    Transfuse hgb < 7  Heme consulted; erma sutton

## 2022-07-29 NOTE — ASSESSMENT & PLAN NOTE
Per discussion with patient's sister: Patient served in the [employer] and spent 18 months in [place], there is concern for PTSD  Sister has reached out to CM inquiring about VA programs to help with PTSD  She tells me that he had drug addiction prior to serving and developed alcohol addiction after his [employer]  Also shares that he went through a divorce 2-3 years ago and a girlfriend that recently passed away last November  Has 1 son and 1 step-daughter that he has not seen in years  Accounts from sister concerning for uncontrolled depression  Plan:  Patient started on desvenlafaxine 50 mg q24h  1 mg ativan TID PRN  Per Case Management, patient cannot have partial psychiatric hospitalization and rehab at the same time    Awaiting placement for rehab

## 2022-07-29 NOTE — PLAN OF CARE
Problem: PHYSICAL THERAPY ADULT  Goal: Performs mobility at highest level of function for planned discharge setting  See evaluation for individualized goals  Description: Treatment/Interventions: Functional transfer training, LE strengthening/ROM, Elevations, Therapeutic exercise, Endurance training, Patient/family training, Equipment eval/education, Bed mobility, Gait training  Equipment Recommended: Perfecto Cotton       See flowsheet documentation for full assessment, interventions and recommendations  Outcome: Progressing  Note: Prognosis: Fair  Problem List: Decreased strength, Decreased endurance, Impaired balance, Decreased mobility, Decreased coordination  Assessment: pt began tx session lying supine in the bed and was agreeable to participate in PT intervention  progress was noted as pt was able to perform a supine<>sit EOB transfer w/ a decrease in level of assistance required mod I as pt demonstrated good recall from previous tx session on using bed rail to assist with completing transfer to EOB  pt was able to sit EOB and perform all TE activities w/o LOB in order to increase static/dynamic sitting balance  pt continues to be limited with functional mobility and ambulation distance but was able to increase ambulation distance compared to previosu tx sessions 60'x1 RW with min Ax1 for safety and balance  post tx pt in bed with call bell and all pt needs met  Continue to recommend post acute rehab services at the time of D/C in order to maximize pt functional independence and safety with all OOB mobility        PT Discharge Recommendation: Post acute rehabilitation services    See flowsheet documentation for full assessment

## 2022-07-29 NOTE — CASE MANAGEMENT
Case Management Progress Note    Patient name Sandor Trinh  Location S /S -01 MRN [id number]  : 1985 Date 2022       LOS (days): 11  Geometric Mean LOS (GMLOS) (days): 4 70  Days to GMLOS:-6 3        OBJECTIVE:        Current admission status: Inpatient  Preferred Pharmacy:   [id number]  [address] [facility]  Phone: [phone] Fax: [phone]    [address] [place]  Phone: [phone] Fax: [phone]    Primary Care Provider: Pablo Moncada PA-C    Primary Insurance:   Secondary Insurance:     PROGRESS NOTE:    CM expanded SNF referrals as no accepting facilities received as of yet  Denials due to patient requiring Suboxone as well as no beds or ETOH/Mental issues  CM reached out to   from the [facility] @ [phone] ext [phone] to inquire if the [facility] would authorize  [facility]  Ani inform CM that patient does NOT have a PCP within the [facility] and he will need a PCP within the [facility] to authorize [facility]  When CM inquired on process,   stated patient willl need to reach Eligibility Dept at the [facility] to request a PCP  Once the patient has a PCP, patient will need to make a visit at the office so his PCP can determine if he meets criteria for [facility] after evaluating patient  CM informed patient of above

## 2022-07-29 NOTE — ASSESSMENT & PLAN NOTE
CT abdomen:  Diffuse hepatic metastases concerning for hepatocellular carcinoma in this cirrhotic patient  Metastatic disease is not excluded given the normal AFP    AFP WNL  Liver biopsy on 07/22/2022 - Resulted on 7/27 - NEGATIVE FOR MALIGNANCY   MRI shows fibrosis and steatosis but negative for metastatic liver disease    REOLVED

## 2022-07-29 NOTE — PHYSICAL THERAPY NOTE
PHYSICAL THERAPY NOTE          Patient Name: Adan Lesches RIRYD'J Date: 7/29/2022 07/29/22 0825   PT Last Visit   PT Visit Date 07/29/22   Note Type   Note Type Treatment   Pain Assessment   Pain Assessment Tool 0-10   Pain Score No Pain   Patient's Stated Pain Goal No pain   Hospital Pain Intervention(s) Repositioned; Ambulation/increased activity; Rest   Multiple Pain Sites No   Restrictions/Precautions   Weight Bearing Precautions Per Order No   Other Precautions Chair Alarm; Bed Alarm; Fall Risk   General   Chart Reviewed Yes   Response to Previous Treatment Patient with no complaints from previous session  Family/Caregiver Present No   Cognition   Overall Cognitive Status WFL   Arousal/Participation Alert; Responsive; Cooperative   Attention Attends with cues to redirect   Orientation Level Oriented X4   Memory Decreased recall of precautions   Following Commands Follows one step commands without difficulty   Comments pt was pleasant, cooperative and motivated to participate in pT intervention   Subjective   Subjective pt was agreeable to participate in PT intervention   Bed Mobility   Supine to Sit 6  Modified independent   Additional items Assist x 1; Increased time required;HOB elevated; Bedrails   Sit to Supine 6  Modified independent   Additional items Assist x 1;HOB elevated; Bedrails; Increased time required   Additional Comments pt was able to sit EOB and perform TE activities in order to increase static/dynamic sitting balance w/o LOB   Transfers   Sit to Stand 5  Supervision   Additional items Assist x 1; Increased time required;Verbal cues  (w/ RW)   Stand to Sit 5  Supervision   Additional items Assist x 1; Increased time required;Verbal cues  (w/ RW)   Stand pivot 5  Supervision   Additional items Assist x 1; Increased time required;Verbal cues  (w/ RW)   Additional Comments pt continues to require rW to complete all functional transfers safely with VC's for hand placement while ascending to rW and descending back to seated EOB   Ambulation/Elevation   Gait pattern Decreased foot clearance; Improper Weight shift; Short stride; Excessively slow;Decreased heel strike;Decreased toe off   Gait Assistance 4  Minimal assist   Additional items Assist x 1;Verbal cues  (VC's for RW management)   Assistive Device Rolling walker   Distance 60'x1 RW   Balance   Static Sitting Fair +   Dynamic Sitting Fair   Static Standing Fair -   Ambulatory Poor +   Endurance Deficit   Endurance Deficit Yes   Endurance Deficit Description limited activity tolerance and ambulation distance   Activity Tolerance   Activity Tolerance Patient limited by fatigue   Nurse Made Aware Spoke to RN   Exercises   Knee AROM Long Arc Quad Sitting;15 reps;AROM; Bilateral   Ankle Pumps Sitting;20 reps;AROM; Bilateral   Marching Sitting;10 reps;AROM; Bilateral   Assessment   Prognosis Fair   Problem List Decreased strength;Decreased endurance; Impaired balance;Decreased mobility; Decreased coordination   Assessment pt began tx session lying supine in the bed and was agreeable to participate in PT intervention  progress was noted as pt was able to perform a supine<>sit EOB transfer w/ a decrease in level of assistance required mod I as pt demonstrated good recall from previous tx session on using bed rail to assist with completing transfer to EOB  pt was able to sit EOB and perform all TE activities w/o LOB in order to increase static/dynamic sitting balance  pt continues to be limited with functional mobility and ambulation distance but was able to increase ambulation distance compared to previosu tx sessions 60'x1 RW with min Ax1 for safety and balance  post tx pt in bed with call bell and all pt needs met   Continue to recommend post acute rehab services at the time of D/C in order to maximize pt functional independence and safety with all OOB mobility   Goals   Patient Goals to start moving around more   STG Expiration Date 08/03/22   PT Treatment Day 2   Plan   Treatment/Interventions Functional transfer training;LE strengthening/ROM; Therapeutic exercise; Endurance training;Patient/family training;Equipment eval/education; Bed mobility;Gait training;Spoke to nursing   Progress Progressing toward goals   PT Frequency 3-5x/wk   Recommendation   PT Discharge Recommendation Post acute rehabilitation services   Equipment Recommended [facility] Recommended Wheeled walker   AM-PAC Basic Mobility Inpatient   Turning in Bed Without Bedrails 3   Lying on Back to Sitting on Edge of Flat Bed 4   Moving Bed to Chair 3   Standing Up From Chair 3   Walk in Room 3   Climb 3-5 Stairs 1   Basic Mobility Inpatient Raw Score 17   Basic Mobility Standardized Score 39 67   Highest Level Of Mobility   -[facility] Goal 5: Stand one or more mins   -HL Achieved 7: Walk 25 feet or more   Education   Education Provided Mobility training;Assistive device; Other  (functional transfers)   Patient Demonstrates acceptance/verbal understanding   End of Consult   Patient Position at End of Consult Supine;Bed/Chair alarm activated; All needs within reach   The patient's AM-PAC Basic Mobility Inpatient Short Form Raw Score is 17  A Raw score of greater than 16 suggests the patient may benefit from discharge to home  Please also refer to the recommendation of the Physical Therapist for safe discharge planning       Pt would benefit from continued skilled PT intervention as pt continues to demonstrated a decrease in functional mobility, activity tolerance due to fatigue and limited ambulation distance as pt required a therapeutic seated rest break after 60'x1 RW of ambulation       Princess Beck, PTA

## 2022-07-29 NOTE — PROGRESS NOTES
[facility]  Progress Note - Zakia Duran 1985, 40 y o  male MRN: [id number]  Unit/Bed#: S -01 Encounter: [id number]  Primary Care Provider: Cathy Landrum PA-C   Date and time admitted to hospital: 7/18/2022  8:90 PM    * Alcoholic cirrhosis of liver without ascites ([facility] [address] )  Assessment & Plan  Hx Decompensated cirrhosis with anasarca, hepatic encephalopathy, thrombocytopenia splenomegaly, hyperammonemia, and  Hepatomegaly most likely secondary to alcohol abuse but could also be due to underlying alcoholic hepatitis  -Previous AFP and right upper quadrant ultrasounds were normal  -Never had screening EGD for varices  -Home lactulose for hepatic encephalopathy  [facility] recommending transfer to a facility with ICU for EGD  -an EGD performed showing 2 esophageal varices that underwent subsequent banding  -SBP: Spiking fevers on 7/17  CXR negative for acute findings, ua negative for uti  Blood cultures drawn  Started on ceftriaxone  He will complete a course of antibiotics for SBP prophylaxis  Paracentesis fluid shows 135 wbc's; 9% neutrophils  Fevers have resolved  Patient currently hemodynamically stable  -Abdominal ultrasound ordered to assess for hepatocellular carcinoma & ascites  -AFP 3 5  -GI consulted for suspected UGIB/varices; appreciate recommendations     - s/p EGD; 4 bands for 2 varices; no active bleed  Started on nadolol 40 mg qd      - 2 FFP given prior to EGD       Plan:   Trend MELD score: 7/29/22 - MELD score = 24  Protonix PO BID  lasix PO 40 q 12  aldactone  Hold AP/AC  On nadolol  Cleared by GI for discharge  Follow up as outpatient  Liver metastasis (HCC)-resolved as of 7/29/2022  Assessment & Plan  CT abdomen:  Diffuse hepatic metastases concerning for hepatocellular carcinoma in this cirrhotic patient  Metastatic disease is not excluded given the normal AFP    AFP WNL  Liver biopsy on 07/22/2022 - Resulted on 7/27 - NEGATIVE FOR MALIGNANCY MRI shows fibrosis and steatosis but negative for metastatic liver disease    REOLVED      Depression, concern for PTSD  Assessment & Plan  Per discussion with patient's sister: Patient served in the [employer] and spent 18 months in [place], there is concern for PTSD  Sister has reached out to CM inquiring about [facility] programs to help with PTSD  She tells me that he had drug addiction prior to serving and developed alcohol addiction after his [employer]  Also shares that he went through a divorce 2-3 years ago and a girlfriend that recently passed away last November  Has 1 son and 1 step-daughter that he has not seen in years  Accounts from sister concerning for uncontrolled depression  Plan:  Patient started on desvenlafaxine 50 mg q24h  1 mg ativan TID PRN  Per Case Management, patient cannot have partial psychiatric hospitalization and rehab at the same time  Awaiting placement for rehab    Anemia  Assessment & Plan  Presents with microcytic anemia hemoglobin of 3 6 s/p 7u pRBC (hgb 7 7) and 2 u FFP  Asymptomatic, vitals stable  Troponin negative    Iron panel showing low iron levels with normal TIBC  Heme occult blood negative in the ED  Patient denying melena and hematemesis  Prior note of febrile reaction with pRBC (was noted after receiving 6 units; no temperature spike after receiving 7th unit, although pt was pre-treated with tylenol)  EGD no active bleed; 4 bands placed for 2 varices  Received 7 units total pRBCs    Recent Labs     07/27/22  0521 07/28/22  0554   HGB 7 2* 7 5*         Plan:  Started on nadolol  Transfuse hgb < 7  Heme consulted; appreciate recs      Alcohol withdrawal ([facility] [address] )  Assessment & Plan  Alcohol level 448  Patient says last drink was on Friday 7/15  [facility] used serax 10 mg tid for symptom control  Patient advised to stop drinking and is in agreement  However on leaving the room, patient appears to be pouring hand  into his drinks      Denies withdrawal symptoms    Plan:  CIWA protocol  Clonidine  MV thiamine folic acid  D/C Serax      Chronic bronchitis (HCC)  Assessment & Plan  Resume home inhalers for asthma    Thoracoabdominal aortic aneurysm [facility])  Assessment & Plan  7/18/21 - CT findings; stable 54 mm  On chart review patient had a CTA on January 2021 with noted 44 mm AAA  Does not appear AAA has been under surveillance  Refer to incidental findings noted on 07/20    Plan:  Please arrange for outpatient follow-up with primary care physician  Would recommend abdominal ultrasound to surveil AAA    Thrombocytopenia [facility])  Assessment & Plan  Lab Results   Component Value Date     07/28/2022     07/27/2022     (L) 07/26/2022     (L) 07/25/2022     (L) 07/24/2022     Thrombocytopenia most likely 2/2 liver cirrhosis   Give 1 unit of platelets if count drops below 50,000    Tobacco dependence  Assessment & Plan  Nicotine patch    Substance abuse ([facility] [address] )  Assessment & Plan  · Patient reports taking 8-2 Suboxone BID  · [place] PDMP confirmed 16 mg daily    Per case management, patient cannot have partial psychiatric hospitalization and rehab at the same time  Awaiting placement into rehab        VTE Pharmacologic Prophylaxis: VTE Score: 1 Low Risk (Score 0-2) - Encourage Ambulation  Patient Centered Rounds: I performed bedside rounds with nursing staff today  Discussions with Specialists or Other Care Team Provider:     Education and Discussions with Family / Patient: Attempted to update  (sister) via phone  Unable to contact  Current Length of Stay: 11 day(s)  Current Patient Status: Inpatient   Discharge Plan: Discharge pending placement into rehab    Code Status: Level 1 - Full Code    Subjective:   Patient seen this morning up and out of bed using walker  Continues to improve  Patient says he had a good night with no new changes or complaints  Denies n/v/d/c  Denies CP or SOB    Patient says he feels anxious once he gets "booted up"  Appetite remains good  Patient reports still having bowel movements and making good urine  Will continue to work with case management to find placement into rehab  Objective:     Vitals:   Temp (24hrs), Av 4 °F (36 9 °C), Min:98 2 °F (36 8 °C), Max:98 7 °F (37 1 °C)    Temp:  [98 2 °F (36 8 °C)-98 7 °F (37 1 °C)] 98 2 °F (36 8 °C)  HR:  [70-81] 71  Resp:  [16-17] 17  BP: (104-114)/(55-64) 113/55  SpO2:  [95 %-96 %] 96 %  Body mass index is 24 61 kg/m²  Input and Output Summary (last 24 hours): Intake/Output Summary (Last 24 hours) at 2022 0934  Last data filed at 2022 0222  Gross per 24 hour   Intake 480 ml   Output 2568 ml   Net -2088 ml       Physical Exam:   Physical Exam  Constitutional:       General: He is not in acute distress  Appearance: Normal appearance  He is normal weight  He is not ill-appearing or toxic-appearing  HENT:      Head: Normocephalic and atraumatic  Eyes:      General: Scleral icterus present  Extraocular Movements: Extraocular movements intact  Conjunctiva/sclera: Conjunctivae normal       Pupils: Pupils are equal, round, and reactive to light  Cardiovascular:      Rate and Rhythm: Normal rate and regular rhythm  Pulses: Normal pulses  Heart sounds: Normal heart sounds  No murmur heard  No gallop  Pulmonary:      Effort: Pulmonary effort is normal  No respiratory distress  Breath sounds: Normal breath sounds  No stridor  No wheezing, rhonchi or rales  Chest:      Chest wall: No tenderness  Abdominal:      General: Abdomen is flat  Bowel sounds are normal  There is no distension  Palpations: Abdomen is soft  Tenderness: There is no abdominal tenderness  There is no guarding  Musculoskeletal:         General: Normal range of motion  Skin:     General: Skin is warm  Coloration: Skin is not jaundiced or pale  Findings: No rash     Neurological:      General: No focal deficit present  Mental Status: He is alert and oriented to person, place, and time  Mental status is at baseline  Cranial Nerves: No cranial nerve deficit  Sensory: No sensory deficit  Motor: No weakness  Psychiatric:         Mood and Affect: Mood normal          Behavior: Behavior normal           Additional Data:     Labs:  Results from last 7 days   Lab Units 07/28/22  0554 07/25/22  0645 07/24/22  0446   WBC Thousand/uL 4 17*   < > 3 85*   HEMOGLOBIN g/dL 7 5*   < > 7 3*   HEMATOCRIT % 24 5*   < > 24 2*   PLATELETS Thousands/uL 157   < > 110*   BANDS PCT %  --   --  1   NEUTROS PCT % 58   < >  --    LYMPHS PCT % 21   < >  --    LYMPHO PCT   --    < > 31   MONOS PCT % 17*   < >  --    MONO PCT   --    < > 19*   EOS PCT % 2   < > 3    < > = values in this interval not displayed       Results from last 7 days   Lab Units 07/29/22  0514   SODIUM mmol/L 135   POTASSIUM mmol/L 4 1   CHLORIDE mmol/L 99   CO2 mmol/L 29   BUN mg/dL 8   CREATININE mg/dL 0 45*   ANION GAP mmol/L 7   CALCIUM mg/dL 9 9   ALBUMIN g/dL 4 3   TOTAL BILIRUBIN mg/dL 8 34*   ALK PHOS U/L 156*   ALT U/L 37   AST U/L 112*   GLUCOSE RANDOM mg/dL 91     Results from last 7 days   Lab Units 07/29/22  0514   INR  1 91*                   Lines/Drains:  Invasive Devices  Report    Peripheral Intravenous Line  Duration           Peripheral IV 07/27/22 Left;Ventral (anterior) Forearm 1 day                      Imaging: Reviewed radiology reports from this admission including: MRI abdomen/MRCP    Recent Cultures (last 7 days):         Last 24 Hours Medication List:   Current Facility-Administered Medications   Medication Dose Route Frequency Provider Last Rate    albuterol  2 puff Inhalation Q6H PRN Dk [place], DO      buprenorphine-naloxone  8 mg Sublingual BID Dk Coats, DO      cloNIDine  0 1 mg Oral HS Dk Coats, DO      desvenlafaxine succinate  50 mg Oral Daily Dk Coats, DO      diphenhydrAMINE  25 mg Intravenous Q6H PRN Louis Stevenson, DO      ferrous sulfate  325 mg Oral Daily With Breakfast Louis Stevenson, DO      folic acid  1 mg Oral Daily Louis Stevenson, DO      furosemide  40 mg Oral Daily Seth Doherty MD      gabapentin  300 mg Oral BID PRN Louis Stevenson, DO      glycerin-hypromellose-  1 drop Both Eyes Q3H PRN Louis Stevenson, DO      lactulose  20 g Oral BID Louis Stevenson, DO      LORazepam  1 mg Oral Q8H PRN Louis Stevenson, DO      melatonin  6 mg Oral HS Louis Stevenson, DO      multivitamin-minerals  1 tablet Oral Daily Louis Stevenson, DO      nadolol  40 mg Oral Daily Louis Stevenson, DO      nicotine  1 patch Transdermal Daily Louis Stevenson, DO      ondansetron  4 mg Intravenous Q6H PRN Louis Stevenson, DO      pantoprazole  40 mg Oral Early Morning Seth Doherty MD      spironolactone  100 mg Oral Daily Seth Doherty MD      thiamine  100 mg Oral Daily Louis Stevenson DO          Today, Patient Was Seen By: Lupis Vázquez MD    **Please Note: This note may have been constructed using a voice recognition system  **

## 2022-07-29 NOTE — ASSESSMENT & PLAN NOTE
Hx Decompensated cirrhosis with anasarca, hepatic encephalopathy, thrombocytopenia splenomegaly, hyperammonemia, and  Hepatomegaly most likely secondary to alcohol abuse but could also be due to underlying alcoholic hepatitis  -Previous AFP and right upper quadrant ultrasounds were normal  -Never had screening EGD for varices  -Home lactulose for hepatic encephalopathy  [facility] recommending transfer to a facility with ICU for EGD  -an EGD performed showing 2 esophageal varices that underwent subsequent banding  -SBP: Spiking fevers on 7/17  CXR negative for acute findings, ua negative for uti  Blood cultures drawn  Started on ceftriaxone  He will complete a course of antibiotics for SBP prophylaxis  Paracentesis fluid shows 135 wbc's; 9% neutrophils  Fevers have resolved  Patient currently hemodynamically stable  -Abdominal ultrasound ordered to assess for hepatocellular carcinoma & ascites  -AFP 3 5  -GI consulted for suspected UGIB/varices; appreciate recommendations     - s/p EGD; 4 bands for 2 varices; no active bleed  Started on nadolol 40 mg qd      - 2 FFP given prior to EGD       Plan:   Trend MELD score: 7/29/22 - MELD score = 24  Protonix PO BID  lasix PO 40 q 12  aldactone  Hold AP/AC  On nadolol  Cleared by GI for discharge  Follow up as outpatient

## 2022-07-30 PROBLEM — R26.2 AMBULATORY DYSFUNCTION: Status: ACTIVE | Noted: 2022-07-30

## 2022-07-30 LAB
ALBUMIN SERPL BCP-MCNC: 4.2 G/DL (ref 3.5–5)
ALP SERPL-CCNC: 161 U/L (ref 34–104)
ALT SERPL W P-5'-P-CCNC: 40 U/L (ref 7–52)
ANION GAP SERPL CALCULATED.3IONS-SCNC: 8 MMOL/L (ref 4–13)
AST SERPL W P-5'-P-CCNC: 116 U/L (ref 13–39)
BILIRUB SERPL-MCNC: 7.78 MG/DL (ref 0.2–1)
BUN SERPL-MCNC: 8 MG/DL (ref 5–25)
CALCIUM SERPL-MCNC: 9.8 MG/DL (ref 8.4–10.2)
CHLORIDE SERPL-SCNC: 97 MMOL/L (ref 96–108)
CO2 SERPL-SCNC: 27 MMOL/L (ref 21–32)
CREAT SERPL-MCNC: 0.46 MG/DL (ref 0.6–1.3)
GFR SERPL CREATININE-BSD FRML MDRD: 143 ML/MIN/1.73SQ M
GLUCOSE SERPL-MCNC: 120 MG/DL (ref 65–140)
INR PPP: 1.88 (ref 0.84–1.19)
POTASSIUM SERPL-SCNC: 3.9 MMOL/L (ref 3.5–5.3)
PROT SERPL-MCNC: 7.5 G/DL (ref 6.4–8.4)
PROTHROMBIN TIME: 21.8 SECONDS (ref 11.6–14.5)
SODIUM SERPL-SCNC: 132 MMOL/L (ref 135–147)

## 2022-07-30 PROCEDURE — 85610 PROTHROMBIN TIME: CPT

## 2022-07-30 PROCEDURE — 80053 COMPREHEN METABOLIC PANEL: CPT

## 2022-07-30 PROCEDURE — 99232 SBSQ HOSP IP/OBS MODERATE 35: CPT | Performed by: HOSPITALIST

## 2022-07-30 RX ADMIN — LACTULOSE 20 G: 20 SOLUTION ORAL at 17:17

## 2022-07-30 RX ADMIN — FERROUS SULFATE TAB 325 MG (65 MG ELEMENTAL FE) 325 MG: 325 (65 FE) TAB at 08:29

## 2022-07-30 RX ADMIN — LORAZEPAM 1 MG: 1 TABLET ORAL at 03:59

## 2022-07-30 RX ADMIN — Medication 6 MG: at 21:21

## 2022-07-30 RX ADMIN — NADOLOL 40 MG: 40 TABLET ORAL at 08:30

## 2022-07-30 RX ADMIN — BUPRENORPHINE AND NALOXONE 8 MG: 8; 2 FILM BUCCAL; SUBLINGUAL at 17:17

## 2022-07-30 RX ADMIN — FOLIC ACID 1 MG: 1 TABLET ORAL at 08:29

## 2022-07-30 RX ADMIN — FUROSEMIDE 40 MG: 40 TABLET ORAL at 08:29

## 2022-07-30 RX ADMIN — DESVENLAFAXINE 50 MG: 50 TABLET, FILM COATED, EXTENDED RELEASE ORAL at 08:30

## 2022-07-30 RX ADMIN — LACTULOSE 20 G: 20 SOLUTION ORAL at 08:29

## 2022-07-30 RX ADMIN — MULTIPLE VITAMINS W/ MINERALS TAB 1 TABLET: TAB ORAL at 08:28

## 2022-07-30 RX ADMIN — NICOTINE 1 PATCH: 21 PATCH, EXTENDED RELEASE TRANSDERMAL at 08:29

## 2022-07-30 RX ADMIN — BUPRENORPHINE AND NALOXONE 8 MG: 8; 2 FILM BUCCAL; SUBLINGUAL at 08:29

## 2022-07-30 RX ADMIN — LORAZEPAM 1 MG: 1 TABLET ORAL at 16:12

## 2022-07-30 RX ADMIN — SPIRONOLACTONE 100 MG: 100 TABLET ORAL at 08:29

## 2022-07-30 RX ADMIN — PANTOPRAZOLE SODIUM 40 MG: 40 TABLET, DELAYED RELEASE ORAL at 05:17

## 2022-07-30 RX ADMIN — THIAMINE HCL TAB 100 MG 100 MG: 100 TAB at 08:29

## 2022-07-30 NOTE — ASSESSMENT & PLAN NOTE
Hx Decompensated cirrhosis with anasarca, hepatic encephalopathy, thrombocytopenia splenomegaly, hyperammonemia, and  Hepatomegaly most likely secondary to alcohol abuse but could also be due to underlying alcoholic hepatitis  -Previous AFP and right upper quadrant ultrasounds were normal  -SBP: Spiking fevers on 7/17  CXR negative for acute findings, ua negative for uti  Blood cultures drawn  Started on ceftriaxone  He will complete a course of antibiotics for SBP prophylaxis  Paracentesis fluid shows 135 wbc's; 9% neutrophils  Fevers have resolved  Patient currently hemodynamically stable  -AFP 3 5     - s/p EGD; 4 bands for 2 varices; no active bleed  Started on nadolol 40 mg qd      - 2 FFP given prior to EGD       Plan:   Trend MELD score: MELD score = 24 today   Protonix PO BID  lasix PO 40 q 12  aldactone  Hold AP/AC  On nadolol  Cleared by GI for discharge  Follow up as outpatient

## 2022-07-30 NOTE — PLAN OF CARE
Problem: Prexisting or High Potential for Compromised Skin Integrity  Goal: Skin integrity is maintained or improved  Description: INTERVENTIONS:  - Identify patients at risk for skin breakdown  - Assess and monitor skin integrity  - Assess and monitor nutrition and hydration status  - Monitor labs   - Assess for incontinence   - Turn and reposition patient  - Assist with mobility/ambulation  - Relieve pressure over bony prominences  - Avoid friction and shearing  - Provide appropriate hygiene as needed including keeping skin clean and dry  - Evaluate need for skin moisturizer/barrier cream  - Collaborate with interdisciplinary team   - Patient/family teaching  - Consider wound care consult   Outcome: Progressing     Problem: SAFETY ADULT  Goal: Patient will remain free of falls  Description: INTERVENTIONS:  - Educate patient/family on patient safety including physical limitations  - Instruct patient to call for assistance with activity   - Consult OT/PT to assist with strengthening/mobility   - Keep Call bell within reach  - Keep bed low and locked with side rails adjusted as appropriate  - Keep care items and personal belongings within reach  - Initiate and maintain comfort rounds  - Make Fall Risk Sign visible to staff  - Offer Toileting every  Hours, in advance of need  - Initiate/Maintain alarm  - Obtain necessary fall risk management equipment:  - Apply yellow socks and bracelet for high fall risk patients  - Consider moving patient to room near nurses station  Outcome: Progressing

## 2022-07-30 NOTE — ASSESSMENT & PLAN NOTE
Secondary to illness/hospitalization and physical deconditioning   PT/ OT recommended postacute rehab   Patient is a [place] patient and referrals made so far were declined 2/2 to suboxone, EtOH hx and PTSD  Patient mentioned if he can't get rehab he can go home with PT  Per CM, he will need to [facility] with [facility] and be seen by PCP and he can place referral for home PT, if indicated  Continue PT while inpatient  Encouraged the patient to ambulate with the walker during the day, if no risk for fall

## 2022-07-30 NOTE — PLAN OF CARE
Problem: Potential for Falls  Goal: Patient will remain free of falls  Description: INTERVENTIONS:  - Educate patient/family on patient safety including physical limitations  - Instruct patient to call for assistance with activity   - Consult OT/PT to assist with strengthening/mobility   - Keep Call bell within reach  - Keep bed low and locked with side rails adjusted as appropriate  - Keep care items and personal belongings within reach  - Initiate and maintain comfort rounds  - Make Fall Risk Sign visible to staff  - Offer Toileting every  Hours, in advance of need  - Initiate/Maintain alarm  - Obtain necessary fall risk management equipme  - Apply yellow socks and bracelet for high fall risk patients  - Consider moving patient to room near nurses station  Outcome: Progressing     Problem: Prexisting or High Potential for Compromised Skin Integrity  Goal: Skin integrity is maintained or improved  Description: INTERVENTIONS:  - Identify patients at risk for skin breakdown  - Assess and monitor skin integrity  - Assess and monitor nutrition and hydration status  - Monitor labs   - Assess for incontinence   - Turn and reposition patient  - Assist with mobility/ambulation  - Relieve pressure over bony prominences  - Avoid friction and shearing  - Provide appropriate hygiene as needed including keeping skin clean and dry  - Evaluate need for skin moisturizer/barrier cream  - Collaborate with interdisciplinary team   - Patient/family teaching  - Consider wound care consult   Outcome: Progressing     Problem: MOBILITY - ADULT  Goal: Maintain or return to baseline ADL function  Description: INTERVENTIONS:  -  Assess patient's ability to carry out ADLs; assess patient's baseline for ADL function and identify physical deficits which impact ability to perform ADLs (bathing, care of mouth/teeth, toileting, grooming, dressing, etc )  - Assess/evaluate cause of self-care deficits   - Assess range of motion  - Assess patient's mobility; develop plan if impaired  - Assess patient's need for assistive devices and provide as appropriate  - Encourage maximum independence but intervene and supervise when necessary  - Involve family in performance of ADLs  - Assess for home care needs following discharge   - Consider OT consult to assist with ADL evaluation and planning for discharge  - Provide patient education as appropriate  Outcome: Progressing  Goal: Maintains/Returns to pre admission functional level  Description: INTERVENTIONS:  - Perform BMAT or MOVE assessment daily    - Set and communicate daily mobility goal to care team and patient/family/caregiver  - Collaborate with rehabilitation services on mobility goals if consulted  - Perform Range of Motion  times a day  - Reposition patient every  hours    - Dangle patient  times a day  - Stand patient  times a day  - Ambulate patient  times a day  - Out of bed to chair  times a day   - Out of bed for meals  times a day  - Out of bed for toileting  - Record patient progress and toleration of activity level   Outcome: Progressing     Problem: PAIN - ADULT  Goal: Verbalizes/displays adequate comfort level or baseline comfort level  Description: Interventions:  - Encourage patient to monitor pain and request assistance  - Assess pain using appropriate pain scale  - Administer analgesics based on type and severity of pain and evaluate response  - Implement non-pharmacological measures as appropriate and evaluate response  - Consider cultural and social influences on pain and pain management  - Notify physician/advanced practitioner if interventions unsuccessful or patient reports new pain  Outcome: Progressing     Problem: SAFETY ADULT  Goal: Patient will remain free of falls  Description: INTERVENTIONS:  - Educate patient/family on patient safety including physical limitations  - Instruct patient to call for assistance with activity   - Consult OT/PT to assist with strengthening/mobility   - Keep Call bell within reach  - Keep bed low and locked with side rails adjusted as appropriate  - Keep care items and personal belongings within reach  - Initiate and maintain comfort rounds  - Make Fall Risk Sign visible to staff  - Offer Toileting every  Hours, in advance of need  - Initiate/Maintainalarm  - Obtain necessary fall risk management equipment:   - Apply yellow socks and bracelet for high fall risk patients  - Consider moving patient to room near nurses station  Outcome: Progressing  Goal: Maintain or return to baseline ADL function  Description: INTERVENTIONS:  -  Assess patient's ability to carry out ADLs; assess patient's baseline for ADL function and identify physical deficits which impact ability to perform ADLs (bathing, care of mouth/teeth, toileting, grooming, dressing, etc )  - Assess/evaluate cause of self-care deficits   - Assess range of motion  - Assess patient's mobility; develop plan if impaired  - Assess patient's need for assistive devices and provide as appropriate  - Encourage maximum independence but intervene and supervise when necessary  - Involve family in performance of ADLs  - Assess for home care needs following discharge   - Consider OT consult to assist with ADL evaluation and planning for discharge  - Provide patient education as appropriate  Outcome: Progressing  Goal: Maintains/Returns to pre admission functional level  Description: INTERVENTIONS:  - Perform BMAT or MOVE assessment daily    - Set and communicate daily mobility goal to care team and patient/family/caregiver  - Collaborate with rehabilitation services on mobility goals if consulted  - Perform Range of Motion  times a day  - Reposition patient every  hours    - Dangle patient  times a day  - Stand patient  times a day  - Ambulate patient  times a day  - Out of bed to chair  times a day   - Out of bed for meals  times a day  - Out of bed for toileting  - Record patient progress and toleration of activity level   Outcome: Progressing     Problem: DISCHARGE PLANNING  Goal: Discharge to home or other facility with appropriate resources  Description: INTERVENTIONS:  - Identify barriers to discharge w/patient and caregiver  - Arrange for needed discharge resources and transportation as appropriate  - Identify discharge learning needs (meds, wound care, etc )  - Arrange for interpretive services to assist at discharge as needed  - Refer to Case Management Department for coordinating discharge planning if the patient needs post-hospital services based on physician/advanced practitioner order or complex needs related to functional status, cognitive ability, or social support system  Outcome: Progressing     Problem: Knowledge Deficit  Goal: Patient/family/caregiver demonstrates understanding of disease process, treatment plan, medications, and discharge instructions  Description: Complete learning assessment and assess knowledge base    Interventions:  - Provide teaching at level of understanding  - Provide teaching via preferred learning methods  Outcome: Progressing     Problem: HEMATOLOGIC - ADULT  Goal: Maintains hematologic stability  Description: INTERVENTIONS  - Assess for signs and symptoms of bleeding or hemorrhage  - Monitor labs  - Administer supportive blood products/factors as ordered and appropriate  Outcome: Progressing

## 2022-07-30 NOTE — ASSESSMENT & PLAN NOTE
Alcohol level 448 on admission  Patient says last drink was on Friday 7/15  [facility] [place] [facility] used serax 10 mg tid for symptom control  Patient advised to stop drinking and is in agreement  However on leaving the room, patient appears to be pouring hand  into his drinks      Denies withdrawal symptoms    Plan:  Can howard SCHNEIDER, pt is being admitted for 12 days   Continue thiamine folic acid

## 2022-07-30 NOTE — ASSESSMENT & PLAN NOTE
Per previous resident "Per discussion with patient's sister: Patient served in the [employer] and spent 18 months in [place], there is concern for PTSD  Sister has reached out to CM inquiring about [facility] programs to help with PTSD  She tells me that he had drug addiction prior to serving and developed alcohol addiction after his [employer]  Also shares that he went through a divorce 2-3 years ago and a girlfriend that recently passed away last November  Has 1 son and 1 step-daughter that he has not seen in years "    Accounts from sister concerning for uncontrolled depression  Patient was seen by Psychiatry who recommended desvenlafaxine(Pristiq) 50 mg q24h  Continue Pristiq and clonidine  1 mg ativan TID PRN  Also psychiatry recommended partial psychiatric hospitalization, this cannot be down while in rehab only as outpatient

## 2022-07-30 NOTE — ASSESSMENT & PLAN NOTE
Lab Results   Component Value Date     07/28/2022     07/27/2022     (L) 07/26/2022     (L) 07/25/2022     (L) 07/24/2022     Thrombocytopenia most likely 2/2 liver cirrhosis   Stable

## 2022-07-30 NOTE — PROGRESS NOTES
[facility]  Progress Note - Yohan Ochoa 1985, 40 y o  male MRN: [id number]  Unit/Bed#: S -[facility] Encounter: [id number]  Primary Care Provider: Angely Sahni PA-C   Date and time admitted to hospital: 7/18/2022  3:31 PM    * Alcoholic cirrhosis of liver without ascites ([facility] [address] )  Assessment & Plan  Hx Decompensated cirrhosis with anasarca, hepatic encephalopathy, thrombocytopenia splenomegaly, hyperammonemia, and  Hepatomegaly most likely secondary to alcohol abuse but could also be due to underlying alcoholic hepatitis  -Previous AFP and right upper quadrant ultrasounds were normal  -SBP: Spiking fevers on 7/17  CXR negative for acute findings, ua negative for uti  Blood cultures drawn  Started on ceftriaxone  He will complete a course of antibiotics for SBP prophylaxis  Paracentesis fluid shows 135 wbc's; 9% neutrophils  Fevers have resolved  Patient currently hemodynamically stable  -AFP 3 5     - s/p EGD; 4 bands for 2 varices; no active bleed  Started on nadolol 40 mg qd      - 2 FFP given prior to EGD       Plan:   Trend MELD score: MELD score = 24 today   Protonix PO BID  lasix PO 40 q 12  aldactone  Hold AP/AC  On nadolol  Cleared by GI for discharge  Follow up as outpatient  Ambulatory dysfunction  Assessment & Plan  Secondary to illness/hospitalization and physical deconditioning   PT/ OT recommended postacute rehab   Patient is a [place] patient and referrals made so far were declined 2/2 to suboxone, EtOH hx and PTSD  Patient mentioned if he can't get rehab he can go home with PT  Per , he will need to have PCP set with [facility] and be seen by PCP and he can place referral for home PT, if indicated  Continue PT while inpatient  Encouraged the patient to ambulate with the walker during the day, if no risk for fall       Chronic bronchitis ([facility] )  Assessment & Plan  Continue home inhalers for asthma    Depression, concern for PTSD  Assessment & Plan  Per previous resident "Per discussion with patient's sister: Patient served in the [employer] and spent 18 months in [place], there is concern for PTSD  Sister has reached out to CM inquiring about [facility] programs to help with PTSD  She tells me that he had drug addiction prior to serving and developed alcohol addiction after his [employer]  Also shares that he went through a divorce 2-3 years ago and a girlfriend that recently passed away last November  Has 1 son and 1 step-daughter that he has not seen in years "    Accounts from sister concerning for uncontrolled depression  Patient was seen by Psychiatry who recommended desvenlafaxine(Pristiq) 50 mg q24h  Continue Pristiq and clonidine  1 mg ativan TID PRN  Also psychiatry recommended partial psychiatric hospitalization, this cannot be down while in rehab only as outpatient  Thoracoabdominal aortic aneurysm [facility])  Assessment & Plan  7/18/21 - CT findings; stable 54 mm  On chart review patient had a CTA on January 2021 with noted 44 mm AAA  Does not appear AAA has been under surveillance  Refer to incidental findings noted on 07/20    Plan:  Outpatient follow-up with primary care physician      Alcohol withdrawal [facility])  Assessment & Plan  Alcohol level 448 on admission  Patient says last drink was on Friday 7/15  [facility] ED used serax 10 mg tid for symptom control  Patient advised to stop drinking and is in agreement  However on leaving the room, patient appears to be pouring hand  into his drinks      Denies withdrawal symptoms    Plan:  Can dc CIWA, pt is being admitted for 12 days   Continue thiamine folic acid        Thrombocytopenia (HCC)  Assessment & Plan  Lab Results   Component Value Date     07/28/2022     07/27/2022     (L) 07/26/2022     (L) 07/25/2022     (L) 07/24/2022     Thrombocytopenia most likely 2/2 liver cirrhosis   Stable     Anemia  Assessment & Plan  Recent Labs     07/28/22  0554   HGB 7 5* Iron panel showing low iron levels with normal TIBC  Heme occult blood negative in the ED  Patient denying melena and hematemesis  Prior note of febrile reaction with pRBC (was noted after receiving 6 units; no temperature spike after receiving 7th unit, although pt was pre-treated with tylenol)  EGD no active bleed; 4 bands placed for 2 varices during this admission  Received 7 units total pRBCs since admission    Hemoglobin currently stable  Tobacco dependence  Assessment & Plan  Nicotine patch    Substance abuse ([facility] [address] )  Assessment & Plan  · Patient reports taking 8-2 Suboxone BID  · [place] PDMP confirmed 16 mg daily    Continue Suboxone    Liver metastasis (HCC)-resolved as of 2022  Assessment & Plan  CT abdomen:  Diffuse hepatic metastases concerning for hepatocellular carcinoma in this cirrhotic patient  Metastatic disease is not excluded given the normal AFP  AFP WNL  Liver biopsy on 2022 - Resulted on  - NEGATIVE FOR MALIGNANCY   MRI shows fibrosis and steatosis but negative for metastatic liver disease    REOLVED        VTE Pharmacologic Prophylaxis: VTE Score: 1 Low Risk (Score 0-2) - Encourage Ambulation  Patient Centered Rounds: I performed bedside rounds with nursing staff today  Discussions with Specialists or Other Care Team Provider: cm    Education and Discussions with Family / Patient: will call family later if the patient agrees   Current Length of Stay: 12 day(s)  Current Patient Status: Inpatient   Discharge Plan: to be determined    Code Status: Level 1 - Full Code    Subjective:   Patient seen at beside, was eating breakfast  Mentioned that his appetite is good, had about 2-3 BM yesterday; denied any pain, nausea or vomiting  Mentioned that he worked with pt yesterday( walking and steps) and is walking by himself with the walker to the bathroom and around the room       Objective:     Vitals:   Temp (24hrs), Av 5 °F (36 9 °C), Min:98 4 °F (36 9 °C), Max:98 6 °F (37 °C)    Temp:  [98 4 °F (36 9 °C)-98 6 °F (37 °C)] 98 5 °F (36 9 °C)  HR:  [67-69] 67  Resp:  [17-18] 18  BP: (100-120)/(55-64) 100/57  SpO2:  [95 %-97 %] 97 %  Body mass index is 24 52 kg/m²  Input and Output Summary (last 24 hours): Intake/Output Summary (Last 24 hours) at 7/30/2022 0935  Last data filed at 7/30/2022 0837  Gross per 24 hour   Intake 240 ml   Output 2500 ml   Net -2260 ml       Physical Exam:   Physical Exam  Vitals reviewed  Constitutional:       General: He is not in acute distress  Appearance: He is not diaphoretic  Eyes:      General: Scleral icterus present  Cardiovascular:      Rate and Rhythm: Normal rate and regular rhythm  Pulmonary:      Effort: Pulmonary effort is normal  No respiratory distress  Breath sounds: No wheezing, rhonchi or rales  Abdominal:      General: There is no distension  Palpations: Abdomen is soft  Tenderness: There is no abdominal tenderness  There is no guarding or rebound  Musculoskeletal:      Right lower leg: No edema  Left lower leg: No edema  Skin:     General: Skin is warm  Comments: Brownish discoloration of the lower extremity bilateral     Neurological:      Mental Status: He is alert and oriented to person, place, and time  Psychiatric:         Mood and Affect: Mood normal          Behavior: Behavior normal           Additional Data:     Labs:  Results from last 7 days   Lab Units 07/28/22  0554 07/25/22  0645 07/24/22  0446   WBC Thousand/uL 4 17*   < > 3 85*   HEMOGLOBIN g/dL 7 5*   < > 7 3*   HEMATOCRIT % 24 5*   < > 24 2*   PLATELETS Thousands/uL 157   < > 110*   BANDS PCT %  --   --  1   NEUTROS PCT % 58   < >  --    LYMPHS PCT % 21   < >  --    LYMPHO PCT   --    < > 31   MONOS PCT % 17*   < >  --    MONO PCT   --    < > 19*   EOS PCT % 2   < > 3    < > = values in this interval not displayed       Results from last 7 days   Lab Units 07/30/22  0544   SODIUM mmol/L 132*   POTASSIUM mmol/L 3 9 CHLORIDE mmol/L 97   CO2 mmol/L 27   BUN mg/dL 8   CREATININE mg/dL 0 46*   ANION GAP mmol/L 8   CALCIUM mg/dL 9 8   ALBUMIN g/dL 4 2   TOTAL BILIRUBIN mg/dL 7 78*   ALK PHOS U/L 161*   ALT U/L 40   AST U/L 116*   GLUCOSE RANDOM mg/dL 120     Results from last 7 days   Lab Units 07/30/22  0544   INR  1 88*                   Lines/Drains:  Invasive Devices  Report    Peripheral Intravenous Line  Duration           Peripheral IV 07/27/22 Left;Ventral (anterior) Forearm 2 days                      Imaging: No pertinent imaging reviewed      Recent Cultures (last 7 days):         Last 24 Hours Medication List:   Current Facility-Administered Medications   Medication Dose Route Frequency Provider Last Rate    albuterol  2 puff Inhalation Q6H PRN Yuly Shultz DO      buprenorphine-naloxone  8 mg Sublingual BID Yuly Shultz DO      cloNIDine  0 1 mg Oral HS Yuly Shultz DO      desvenlafaxine succinate  50 mg Oral Daily Yuly Shultz DO      diphenhydrAMINE  25 mg Intravenous Q6H PRN Yuly Shultz DO      ferrous sulfate  325 mg Oral Daily With Breakfast Yuly Shultz DO      folic acid  1 mg Oral Daily Yuly Shultz DO      furosemide  40 mg Oral Daily Seth Guzman MD      gabapentin  300 mg Oral BID PRN Yuly Shultz DO      glycerin-hypromellose-  1 drop Both Eyes Q3H PRN Yuly Shultz DO      lactulose  20 g Oral BID Yuly Shultz DO      LORazepam  1 mg Oral Q8H PRN Yuly Shultz DO      melatonin  6 mg Oral HS Yuly Shultz DO      multivitamin-minerals  1 tablet Oral Daily Yuly Shultz DO      nadolol  40 mg Oral Daily Yuly Shlutz DO      nicotine  1 patch Transdermal Daily Yuly Shultz DO      ondansetron  4 mg Intravenous Q6H PRN Yuly Shultz DO      pantoprazole  40 mg Oral Early Morning Seth Guzman MD      spironolactone  100 mg Oral Daily Seth Guzman MD      thiamine  100 mg Oral Daily Yuly Shultz DO Today, Patient Was Seen By: Juan R Odom MD    **Please Note: This note may have been constructed using a voice recognition system  **

## 2022-07-30 NOTE — ASSESSMENT & PLAN NOTE
Recent Labs     07/28/22  0554   HGB 7 5*       Iron panel showing low iron levels with normal TIBC  Heme occult blood negative in the ED  Patient denying melena and hematemesis  Prior note of febrile reaction with pRBC (was noted after receiving 6 units; no temperature spike after receiving 7th unit, although pt was pre-treated with tylenol)  EGD no active bleed; 4 bands placed for 2 varices during this admission  Received 7 units total pRBCs since admission    Hemoglobin currently stable

## 2022-07-30 NOTE — ASSESSMENT & PLAN NOTE
7/18/21 - CT findings; stable 54 mm  On chart review patient had a CTA on January 2021 with noted 44 mm AAA  Does not appear AAA has been under surveillance  Refer to incidental findings noted on 07/20    Plan:  Outpatient follow-up with primary care physician

## 2022-07-31 PROCEDURE — 99232 SBSQ HOSP IP/OBS MODERATE 35: CPT | Performed by: HOSPITALIST

## 2022-07-31 RX ADMIN — LACTULOSE 20 G: 20 SOLUTION ORAL at 16:30

## 2022-07-31 RX ADMIN — SPIRONOLACTONE 100 MG: 100 TABLET ORAL at 08:41

## 2022-07-31 RX ADMIN — BUPRENORPHINE AND NALOXONE 8 MG: 8; 2 FILM BUCCAL; SUBLINGUAL at 19:11

## 2022-07-31 RX ADMIN — LORAZEPAM 1 MG: 1 TABLET ORAL at 16:30

## 2022-07-31 RX ADMIN — PANTOPRAZOLE SODIUM 40 MG: 40 TABLET, DELAYED RELEASE ORAL at 05:18

## 2022-07-31 RX ADMIN — NICOTINE 1 PATCH: 21 PATCH, EXTENDED RELEASE TRANSDERMAL at 08:42

## 2022-07-31 RX ADMIN — LACTULOSE 20 G: 20 SOLUTION ORAL at 08:41

## 2022-07-31 RX ADMIN — BUPRENORPHINE AND NALOXONE 8 MG: 8; 2 FILM BUCCAL; SUBLINGUAL at 08:41

## 2022-07-31 RX ADMIN — THIAMINE HCL TAB 100 MG 100 MG: 100 TAB at 08:41

## 2022-07-31 RX ADMIN — FERROUS SULFATE TAB 325 MG (65 MG ELEMENTAL FE) 325 MG: 325 (65 FE) TAB at 08:43

## 2022-07-31 RX ADMIN — NADOLOL 40 MG: 40 TABLET ORAL at 08:42

## 2022-07-31 RX ADMIN — FUROSEMIDE 40 MG: 40 TABLET ORAL at 08:41

## 2022-07-31 RX ADMIN — MULTIPLE VITAMINS W/ MINERALS TAB 1 TABLET: TAB ORAL at 08:42

## 2022-07-31 RX ADMIN — LORAZEPAM 1 MG: 1 TABLET ORAL at 03:39

## 2022-07-31 RX ADMIN — FOLIC ACID 1 MG: 1 TABLET ORAL at 08:41

## 2022-07-31 RX ADMIN — DESVENLAFAXINE 50 MG: 50 TABLET, FILM COATED, EXTENDED RELEASE ORAL at 08:42

## 2022-07-31 NOTE — PROGRESS NOTES
[facility]  Progress Note - Brenton Garcia 1985, 40 y o  male MRN: [id number]  Unit/Bed#: S -01 Encounter: [id number]  Primary Care Provider: Lisa Atkinson PA-C   Date and time admitted to hospital: 7/18/2022  8:99 PM    * Alcoholic cirrhosis of liver without ascites ([facility] [address] )  Assessment & Plan  Hx Decompensated cirrhosis with anasarca, hepatic encephalopathy, thrombocytopenia splenomegaly, hyperammonemia, and  Hepatomegaly most likely secondary to alcohol abuse but could also be due to underlying alcoholic hepatitis  -Previous AFP and right upper quadrant ultrasounds were normal  -SBP: Spiking fevers on 7/17  CXR negative for acute findings, ua negative for uti  Blood cultures drawn  Started on ceftriaxone  He will complete a course of antibiotics for SBP prophylaxis  Paracentesis fluid shows 135 wbc's; 9% neutrophils  Fevers have resolved  Patient currently hemodynamically stable  -AFP 3 5     - s/p EGD; 4 bands for 2 varices; no active bleed  Started on nadolol 40 mg qd      - 2 FFP given prior to EGD       Plan:   Trend MELD score: MELD score = 24 today   Protonix PO BID  lasix PO 40 q 12  aldactone  Hold AP/AC  On nadolol  Cleared by GI for discharge  Follow up as outpatient  Depression, concern for PTSD  Assessment & Plan  Per previous resident "Per discussion with patient's sister: Patient served in the [employer] and spent 18 months in [place], there is concern for PTSD  Sister has reached out to CM inquiring about [facility] programs to help with PTSD  She tells me that he had drug addiction prior to serving and developed alcohol addiction after his [employer]  Also shares that he went through a divorce 2-3 years ago and a girlfriend that recently passed away last November  Has 1 son and 1 step-daughter that he has not seen in years "    Accounts from sister concerning for uncontrolled depression     Patient was seen by Psychiatry who recommended desvenlafaxine(Pristiq) 50 mg q24h  Continue Pristiq and clonidine  1 mg ativan TID PRN  Also psychiatry recommended partial psychiatric hospitalization, this cannot be down while in rehab only as outpatient  Anemia  Assessment & Plan  No results for input(s): HGB in the last 72 hours  Iron panel showing low iron levels with normal TIBC  Heme occult blood negative in the ED  Patient denying melena and hematemesis  Prior note of febrile reaction with pRBC (was noted after receiving 6 units; no temperature spike after receiving 7th unit, although pt was pre-treated with tylenol)  EGD no active bleed; 4 bands placed for 2 varices during this admission  Received 7 units total pRBCs since admission    Hemoglobin currently stable  Alcohol withdrawal ([facility] [address] )  Assessment & Plan  Alcohol level 448 on admission  Patient says last drink was on Friday 7/15  [facility] used serax 10 mg tid for symptom control  Patient advised to stop drinking and is in agreement  However on leaving the room, patient appears to be pouring hand  into his drinks  Denies withdrawal symptoms    Plan:  Can dc [facility], pt is being admitted for 12 days   Continue thiamine folic acid        Ambulatory dysfunction  Assessment & Plan  Secondary to illness/hospitalization and physical deconditioning   PT/ OT recommended postacute rehab   Patient is a [facility] [place] [facility] patient and referrals made so far were declined 2/2 to suboxone, EtOH hx and PTSD  Patient mentioned if he can't get rehab he can go home with PT  Per CM, he will need to have PCP set with [facility] and be seen by PCP and he can place referral for home PT, if indicated  Continue PT while inpatient  Encouraged the patient to ambulate with the walker during the day, if no risk for fall       Chronic bronchitis ([facility] [address] )  Assessment & Plan  Continue home inhalers for asthma    Thoracoabdominal aortic aneurysm [facility])  Assessment & Plan  7/18/21 - CT findings; stable 54 mm  On chart review patient had a CTA on 2021 with noted 44 mm AAA  Does not appear AAA has been under surveillance  Refer to incidental findings noted on     Plan:  Outpatient follow-up with primary care physician      Thrombocytopenia [facility])  Assessment & Plan  Lab Results   Component Value Date     2022     2022     (L) 2022     (L) 2022     (L) 2022     Thrombocytopenia most likely 2/2 liver cirrhosis   Stable     Tobacco dependence  Assessment & Plan  Nicotine patch    Substance abuse ([facility] [address] )  Assessment & Plan  · Patient reports taking 8-2 Suboxone BID  · [place] PDMP confirmed 16 mg daily    Continue Suboxone          VTE Pharmacologic Prophylaxis: VTE Score: 1 Low Risk (Score 0-2) - Encourage Ambulation  Patient Centered Rounds: I performed bedside rounds with nursing staff today  Discussions with Specialists or Other Care Team Provider:     Education and Discussions with Family / Patient: Patient declined call to   Current Length of Stay: 13 day(s)  Current Patient Status: Inpatient   Discharge Plan: Discharge pending placement into rehabilitation    Code Status: Level 1 - Full Code    Subjective:   Patient seen this morning sitting upright about to eat breakfast   He appears to be doing well and says he was able to get up out of bed on his own using the walker  No changes overnight  Patient continues to have bowel movements and make urine  Denies n/v/d/c  Denies fever or chills  He says that today he is feeling somewhat cooped up and is ready to leave the hospital   Patient is agreeable to doing rehab at home if placement into a rehab facility is not possible        Objective:     Vitals:   Temp (24hrs), Av 4 °F (36 9 °C), Min:98 2 °F (36 8 °C), Max:98 5 °F (36 9 °C)    Temp:  [98 2 °F (36 8 °C)-98 5 °F (36 9 °C)] 98 5 °F (36 9 °C)  HR:  [70-72] 72  Resp:  [16-18] 16  BP: (101-117)/(53-61) 111/61  SpO2:  [95 %-98 %] 97 %  Body mass index is 24 52 kg/m²  Input and Output Summary (last 24 hours): Intake/Output Summary (Last 24 hours) at 7/31/2022 1150  Last data filed at 7/31/2022 0847  Gross per 24 hour   Intake --   Output 1200 ml   Net -1200 ml       Physical Exam:   Physical Exam  Constitutional:       General: He is not in acute distress  Appearance: Normal appearance  He is normal weight  He is not ill-appearing or toxic-appearing  HENT:      Head: Normocephalic and atraumatic  Eyes:      General: Scleral icterus present  Extraocular Movements: Extraocular movements intact  Pupils: Pupils are equal, round, and reactive to light  Cardiovascular:      Rate and Rhythm: Normal rate and regular rhythm  Pulses: Normal pulses  Pulmonary:      Effort: Pulmonary effort is normal  No respiratory distress  Breath sounds: Normal breath sounds  No stridor  No wheezing, rhonchi or rales  Chest:      Chest wall: No tenderness  Abdominal:      General: Abdomen is flat  Bowel sounds are normal       Palpations: Abdomen is soft  Musculoskeletal:         General: Normal range of motion  Skin:     General: Skin is warm  Coloration: Skin is not jaundiced or pale  Findings: No rash  Comments: Brownish discoloration in feet bilaterally   Neurological:      General: No focal deficit present  Mental Status: He is alert and oriented to person, place, and time  Mental status is at baseline  Cranial Nerves: No cranial nerve deficit  Sensory: No sensory deficit  Motor: No weakness     Psychiatric:         Mood and Affect: Mood normal          Behavior: Behavior normal           Additional Data:     Labs:  Results from last 7 days   Lab Units 07/28/22  0554   WBC Thousand/uL 4 17*   HEMOGLOBIN g/dL 7 5*   HEMATOCRIT % 24 5*   PLATELETS Thousands/uL 157   NEUTROS PCT % 58   LYMPHS PCT % 21   MONOS PCT % 17*   EOS PCT % 2     Results from last 7 days   Lab Units 07/30/22  0544   SODIUM mmol/L 132* POTASSIUM mmol/L 3 9   CHLORIDE mmol/L 97   CO2 mmol/L 27   BUN mg/dL 8   CREATININE mg/dL 0 46*   ANION GAP mmol/L 8   CALCIUM mg/dL 9 8   ALBUMIN g/dL 4 2   TOTAL BILIRUBIN mg/dL 7 78*   ALK PHOS U/L 161*   ALT U/L 40   AST U/L 116*   GLUCOSE RANDOM mg/dL 120     Results from last 7 days   Lab Units 07/30/22  0544   INR  1 88*                   Lines/Drains:  Invasive Devices  Report    Peripheral Intravenous Line  Duration           Peripheral IV 07/27/22 Left;Ventral (anterior) Forearm 3 days                      Imaging: No pertinent imaging reviewed      Recent Cultures (last 7 days):         Last 24 Hours Medication List:   Current Facility-Administered Medications   Medication Dose Route Frequency Provider Last Rate    albuterol  2 puff Inhalation Q6H PRN Deidra Cui DO      buprenorphine-naloxone  8 mg Sublingual BID Deidra Cui DO      cloNIDine  0 1 mg Oral HS Deidra Cui DO      desvenlafaxine succinate  50 mg Oral Daily Deidra Cui DO      diphenhydrAMINE  25 mg Intravenous Q6H PRN Deidra Cui DO      ferrous sulfate  325 mg Oral Daily With Breakfast Deidra Cui DO      folic acid  1 mg Oral Daily Deidra Cui DO      furosemide  40 mg Oral Daily Seth Palacios MD      gabapentin  300 mg Oral BID PRN Deidra Cui DO      glycerin-hypromellose-  1 drop Both Eyes Q3H PRN Deidra Cui DO      lactulose  20 g Oral BID Deidra Cui, DO      LORazepam  1 mg Oral Q8H PRN Deidra Cui, DO      melatonin  6 mg Oral HS Diedra Cui DO      multivitamin-minerals  1 tablet Oral Daily Deidra Cui DO      nadolol  40 mg Oral Daily Deidra Cui DO      nicotine  1 patch Transdermal Daily Deidra Cui DO      ondansetron  4 mg Intravenous Q6H PRN Deidra Cui, DO      pantoprazole  40 mg Oral Early Morning Seth Palacios MD      spironolactone  100 mg Oral Daily Seth Palacios MD      thiamine  100 mg Oral Daily [place] , DO          Today, Patient Was Seen By: Raiza Landrum MD    **Please Note: This note may have been constructed using a voice recognition system  **

## 2022-07-31 NOTE — ASSESSMENT & PLAN NOTE
No results for input(s): HGB in the last 72 hours  Iron panel showing low iron levels with normal TIBC  Heme occult blood negative in the ED  Patient denying melena and hematemesis  Prior note of febrile reaction with pRBC (was noted after receiving 6 units; no temperature spike after receiving 7th unit, although pt was pre-treated with tylenol)  EGD no active bleed; 4 bands placed for 2 varices during this admission  Received 7 units total pRBCs since admission    Hemoglobin currently stable

## 2022-07-31 NOTE — PLAN OF CARE
Problem: Potential for Falls  Goal: Patient will remain free of falls  Description: INTERVENTIONS:  - Educate patient/family on patient safety including physical limitations  - Instruct patient to call for assistance with activity   - Consult OT/PT to assist with strengthening/mobility   - Keep Call bell within reach  - Keep bed low and locked with side rails adjusted as appropriate  - Keep care items and personal belongings within reach  - Initiate and maintain comfort rounds  - Make Fall Risk Sign visible to staff  - Offer Toileting every 2 Hours, in advance of need  - Initiate/Maintain bed alarm  - Obtain necessary fall risk management equipment: bed alarm  - Apply yellow socks and bracelet for high fall risk patients  - Consider moving patient to room near nurses station  Outcome: Progressing     Problem: Prexisting or High Potential for Compromised Skin Integrity  Goal: Skin integrity is maintained or improved  Description: INTERVENTIONS:  - Identify patients at risk for skin breakdown  - Assess and monitor skin integrity  - Assess and monitor nutrition and hydration status  - Monitor labs   - Assess for incontinence   - Turn and reposition patient  - Assist with mobility/ambulation  - Relieve pressure over bony prominences  - Avoid friction and shearing  - Provide appropriate hygiene as needed including keeping skin clean and dry  - Evaluate need for skin moisturizer/barrier cream  - Collaborate with interdisciplinary team   - Patient/family teaching  - Consider wound care consult   Outcome: Progressing     Problem: MOBILITY - ADULT  Goal: Maintain or return to baseline ADL function  Description: INTERVENTIONS:  -  Assess patient's ability to carry out ADLs; assess patient's baseline for ADL function and identify physical deficits which impact ability to perform ADLs (bathing, care of mouth/teeth, toileting, grooming, dressing, etc )  - Assess/evaluate cause of self-care deficits   - Assess range of motion  - Assess patient's mobility; develop plan if impaired  - Assess patient's need for assistive devices and provide as appropriate  - Encourage maximum independence but intervene and supervise when necessary  - Involve family in performance of ADLs  - Assess for home care needs following discharge   - Consider OT consult to assist with ADL evaluation and planning for discharge  - Provide patient education as appropriate  Outcome: Progressing  Goal: Maintains/Returns to pre admission functional level  Description: INTERVENTIONS:  - Perform BMAT or MOVE assessment daily    - Set and communicate daily mobility goal to care team and patient/family/caregiver  - Collaborate with rehabilitation services on mobility goals if consulted  - Perform Range of Motion 2 times a day  - Reposition patient every 2 hours    - Dangle patient 2 times a day  - Stand patient 2 times a day  - Ambulate patient 3 times a day  - Out of bed to chair 3 times a day   - Out of bed for meals 3 times a day  - Out of bed for toileting  - Record patient progress and toleration of activity level   Outcome: Progressing     Problem: PAIN - ADULT  Goal: Verbalizes/displays adequate comfort level or baseline comfort level  Description: Interventions:  - Encourage patient to monitor pain and request assistance  - Assess pain using appropriate pain scale  - Administer analgesics based on type and severity of pain and evaluate response  - Implement non-pharmacological measures as appropriate and evaluate response  - Consider cultural and social influences on pain and pain management  - Notify physician/advanced practitioner if interventions unsuccessful or patient reports new pain  Outcome: Progressing     Problem: SAFETY ADULT  Goal: Patient will remain free of falls  Description: INTERVENTIONS:  - Educate patient/family on patient safety including physical limitations  - Instruct patient to call for assistance with activity   - Consult OT/PT to assist with strengthening/mobility   - Keep Call bell within reach  - Keep bed low and locked with side rails adjusted as appropriate  - Keep care items and personal belongings within reach  - Initiate and maintain comfort rounds  - Make Fall Risk Sign visible to staff  - Offer Toileting every 2 Hours, in advance of need  - Initiate/Maintain bed alarm  - Obtain necessary fall risk management equipment: bed alarm  - Apply yellow socks and bracelet for high fall risk patients  - Consider moving patient to room near nurses station  Outcome: Progressing  Goal: Maintain or return to baseline ADL function  Description: INTERVENTIONS:  -  Assess patient's ability to carry out ADLs; assess patient's baseline for ADL function and identify physical deficits which impact ability to perform ADLs (bathing, care of mouth/teeth, toileting, grooming, dressing, etc )  - Assess/evaluate cause of self-care deficits   - Assess range of motion  - Assess patient's mobility; develop plan if impaired  - Assess patient's need for assistive devices and provide as appropriate  - Encourage maximum independence but intervene and supervise when necessary  - Involve family in performance of ADLs  - Assess for home care needs following discharge   - Consider OT consult to assist with ADL evaluation and planning for discharge  - Provide patient education as appropriate  Outcome: Progressing  Goal: Maintains/Returns to pre admission functional level  Description: INTERVENTIONS:  - Perform BMAT or MOVE assessment daily    - Set and communicate daily mobility goal to care team and patient/family/caregiver  - Collaborate with rehabilitation services on mobility goals if consulted  - Perform Range of Motion 2 times a day  - Reposition patient every 2 hours    - Dangle patient 2 times a day  - Stand patient 2 times a day  - Ambulate patient 3 times a day  - Out of bed to chair 3 times a day   - Out of bed for meals 3 times a day  - Out of bed for toileting  - Record patient progress and toleration of activity level   Outcome: Progressing     Problem: DISCHARGE PLANNING  Goal: Discharge to home or other facility with appropriate resources  Description: INTERVENTIONS:  - Identify barriers to discharge w/patient and caregiver  - Arrange for needed discharge resources and transportation as appropriate  - Identify discharge learning needs (meds, wound care, etc )  - Arrange for interpretive services to assist at discharge as needed  - Refer to Case Management Department for coordinating discharge planning if the patient needs post-hospital services based on physician/advanced practitioner order or complex needs related to functional status, cognitive ability, or social support system  Outcome: Progressing     Problem: Knowledge Deficit  Goal: Patient/family/caregiver demonstrates understanding of disease process, treatment plan, medications, and discharge instructions  Description: Complete learning assessment and assess knowledge base  Interventions:  - Provide teaching at level of understanding  - Provide teaching via preferred learning methods  Outcome: Progressing     Problem: NEUROSENSORY - ADULT  Goal: Achieves maximal functionality and self care  Description: INTERVENTIONS  - Monitor swallowing and airway patency with patient fatigue and changes in neurological status  - Encourage and assist patient to increase activity and self care     - Encourage visually impaired, hearing impaired and aphasic patients to use assistive/communication devices  Outcome: Progressing     Problem: GASTROINTESTINAL - ADULT  Goal: Maintains or returns to baseline bowel function  Description: INTERVENTIONS:  - Assess bowel function  - Encourage oral fluids to ensure adequate hydration  - Administer IV fluids if ordered to ensure adequate hydration  - Administer ordered medications as needed  - Encourage mobilization and activity  - Consider nutritional services referral to assist patient with adequate nutrition and appropriate food choices  Outcome: Progressing  Goal: Maintains adequate nutritional intake  Description: INTERVENTIONS:  - Monitor percentage of each meal consumed  - Identify factors contributing to decreased intake, treat as appropriate  - Assist with meals as needed  - Monitor I&O, weight, and lab values if indicated  - Obtain nutrition services referral as needed  Outcome: Progressing     Problem: HEMATOLOGIC - ADULT  Goal: Maintains hematologic stability  Description: INTERVENTIONS  - Assess for signs and symptoms of bleeding or hemorrhage  - Monitor labs  - Administer supportive blood products/factors as ordered and appropriate  Outcome: Progressing     Problem: Nutrition/Hydration-ADULT  Goal: Nutrient/Hydration intake appropriate for improving, restoring or maintaining nutritional needs  Description: Monitor and assess patient's nutrition/hydration status for malnutrition  Collaborate with interdisciplinary team and initiate plan and interventions as ordered  Monitor patient's weight and dietary intake as ordered or per policy  Utilize nutrition screening tool and intervene as necessary  Determine patient's food preferences and provide high-protein, high-caloric foods as appropriate       INTERVENTIONS:  - Monitor oral intake, urinary output, labs, and treatment plans  - Assess nutrition and hydration status and recommend course of action  - Evaluate amount of meals eaten  - Assist patient with eating if necessary   - Allow adequate time for meals  - Recommend/ encourage appropriate diets, oral nutritional supplements, and vitamin/mineral supplements  - Order, calculate, and assess calorie counts as needed  - Recommend, monitor, and adjust tube feedings and TPN/PPN based on assessed needs  - Assess need for intravenous fluids  - Provide specific nutrition/hydration education as appropriate  - Include patient/family/caregiver in decisions related to nutrition  Outcome: Progressing

## 2022-07-31 NOTE — PLAN OF CARE
Problem: Potential for Falls  Goal: Patient will remain free of falls  Description: INTERVENTIONS:  - Educate patient/family on patient safety including physical limitations  - Instruct patient to call for assistance with activity   - Consult OT/PT to assist with strengthening/mobility   - Keep Call bell within reach  - Keep bed low and locked with side rails adjusted as appropriate  - Keep care items and personal belongings within reach  - Initiate and maintain comfort rounds  - Make Fall Risk Sign visible to staff  - Apply yellow socks and bracelet for high fall risk patients  - Consider moving patient to room near nurses station  Outcome: Progressing     Problem: Prexisting or High Potential for Compromised Skin Integrity  Goal: Skin integrity is maintained or improved  Description: INTERVENTIONS:  - Identify patients at risk for skin breakdown  - Assess and monitor skin integrity  - Assess and monitor nutrition and hydration status  - Monitor labs   - Assess for incontinence   - Turn and reposition patient  - Assist with mobility/ambulation  - Relieve pressure over bony prominences  - Avoid friction and shearing  - Provide appropriate hygiene as needed including keeping skin clean and dry  - Evaluate need for skin moisturizer/barrier cream  - Collaborate with interdisciplinary team   - Patient/family teaching  - Consider wound care consult   Outcome: Progressing     Problem: MOBILITY - ADULT  Goal: Maintain or return to baseline ADL function  Description: INTERVENTIONS:  -  Assess patient's ability to carry out ADLs; assess patient's baseline for ADL function and identify physical deficits which impact ability to perform ADLs (bathing, care of mouth/teeth, toileting, grooming, dressing, etc )  - Assess/evaluate cause of self-care deficits   - Assess range of motion  - Assess patient's mobility; develop plan if impaired  - Assess patient's need for assistive devices and provide as appropriate  - Encourage maximum independence but intervene and supervise when necessary  - Involve family in performance of ADLs  - Assess for home care needs following discharge   - Consider OT consult to assist with ADL evaluation and planning for discharge  - Provide patient education as appropriate  Outcome: Progressing  Goal: Maintains/Returns to pre admission functional level  Description: INTERVENTIONS:  - Perform BMAT or MOVE assessment daily    - Set and communicate daily mobility goal to care team and patient/family/caregiver     - Collaborate with rehabilitation services on mobility goals if consulted  - Out of bed for toileting  - Record patient progress and toleration of activity level   Outcome: Progressing     Problem: PAIN - ADULT  Goal: Verbalizes/displays adequate comfort level or baseline comfort level  Description: Interventions:  - Encourage patient to monitor pain and request assistance  - Assess pain using appropriate pain scale  - Administer analgesics based on type and severity of pain and evaluate response  - Implement non-pharmacological measures as appropriate and evaluate response  - Consider cultural and social influences on pain and pain management  - Notify physician/advanced practitioner if interventions unsuccessful or patient reports new pain  Outcome: Progressing     Problem: SAFETY ADULT  Goal: Patient will remain free of falls  Description: INTERVENTIONS:  - Educate patient/family on patient safety including physical limitations  - Instruct patient to call for assistance with activity   - Consult OT/PT to assist with strengthening/mobility   - Keep Call bell within reach  - Keep bed low and locked with side rails adjusted as appropriate  - Keep care items and personal belongings within reach  - Initiate and maintain comfort rounds  - Make Fall Risk Sign visible to staff  - Apply yellow socks and bracelet for high fall risk patients  - Consider moving patient to room near nurses station  Outcome: Progressing  Goal: Maintain or return to baseline ADL function  Description: INTERVENTIONS:  -  Assess patient's ability to carry out ADLs; assess patient's baseline for ADL function and identify physical deficits which impact ability to perform ADLs (bathing, care of mouth/teeth, toileting, grooming, dressing, etc )  - Assess/evaluate cause of self-care deficits   - Assess range of motion  - Assess patient's mobility; develop plan if impaired  - Assess patient's need for assistive devices and provide as appropriate  - Encourage maximum independence but intervene and supervise when necessary  - Involve family in performance of ADLs  - Assess for home care needs following discharge   - Consider OT consult to assist with ADL evaluation and planning for discharge  - Provide patient education as appropriate  Outcome: Progressing  Goal: Maintains/Returns to pre admission functional level  Description: INTERVENTIONS:  - Perform BMAT or MOVE assessment daily    - Set and communicate daily mobility goal to care team and patient/family/caregiver     - Collaborate with rehabilitation services on mobility goals if consulted  - Out of bed for toileting  - Record patient progress and toleration of activity level   Outcome: Progressing     Problem: DISCHARGE PLANNING  Goal: Discharge to home or other facility with appropriate resources  Description: INTERVENTIONS:  - Identify barriers to discharge w/patient and caregiver  - Arrange for needed discharge resources and transportation as appropriate  - Identify discharge learning needs (meds, wound care, etc )  - Arrange for interpretive services to assist at discharge as needed  - Refer to Case Management Department for coordinating discharge planning if the patient needs post-hospital services based on physician/advanced practitioner order or complex needs related to functional status, cognitive ability, or social support system  Outcome: Progressing     Problem: Knowledge Deficit  Goal: Patient/family/caregiver demonstrates understanding of disease process, treatment plan, medications, and discharge instructions  Description: Complete learning assessment and assess knowledge base  Interventions:  - Provide teaching at level of understanding  - Provide teaching via preferred learning methods  Outcome: Progressing     Problem: NEUROSENSORY - ADULT  Goal: Achieves maximal functionality and self care  Description: INTERVENTIONS  - Monitor swallowing and airway patency with patient fatigue and changes in neurological status  - Encourage and assist patient to increase activity and self care     - Encourage visually impaired, hearing impaired and aphasic patients to use assistive/communication devices  Outcome: Progressing     Problem: GASTROINTESTINAL - ADULT  Goal: Maintains or returns to baseline bowel function  Description: INTERVENTIONS:  - Assess bowel function  - Encourage oral fluids to ensure adequate hydration  - Administer IV fluids if ordered to ensure adequate hydration  - Administer ordered medications as needed  - Encourage mobilization and activity  - Consider nutritional services referral to assist patient with adequate nutrition and appropriate food choices  Outcome: Progressing  Goal: Maintains adequate nutritional intake  Description: INTERVENTIONS:  - Monitor percentage of each meal consumed  - Identify factors contributing to decreased intake, treat as appropriate  - Assist with meals as needed  - Monitor I&O, weight, and lab values if indicated  - Obtain nutrition services referral as needed  Outcome: Progressing     Problem: HEMATOLOGIC - ADULT  Goal: Maintains hematologic stability  Description: INTERVENTIONS  - Assess for signs and symptoms of bleeding or hemorrhage  - Monitor labs  - Administer supportive blood products/factors as ordered and appropriate  Outcome: Progressing     Problem: Nutrition/Hydration-ADULT  Goal: Nutrient/Hydration intake appropriate for improving, restoring or maintaining nutritional needs  Description: Monitor and assess patient's nutrition/hydration status for malnutrition  Collaborate with interdisciplinary team and initiate plan and interventions as ordered  Monitor patient's weight and dietary intake as ordered or per policy  Utilize nutrition screening tool and intervene as necessary  Determine patient's food preferences and provide high-protein, high-caloric foods as appropriate       INTERVENTIONS:  - Monitor oral intake, urinary output, labs, and treatment plans  - Assess nutrition and hydration status and recommend course of action  - Evaluate amount of meals eaten  - Assist patient with eating if necessary   - Allow adequate time for meals  - Recommend/ encourage appropriate diets, oral nutritional supplements, and vitamin/mineral supplements  - Order, calculate, and assess calorie counts as needed  - Recommend, monitor, and adjust tube feedings and TPN/PPN based on assessed needs  - Assess need for intravenous fluids  - Provide specific nutrition/hydration education as appropriate  - Include patient/family/caregiver in decisions related to nutrition  Outcome: Progressing

## 2022-07-31 NOTE — ASSESSMENT & PLAN NOTE
Alcohol level 448 on admission  Patient says last drink was on Friday 7/15  [facility] used serax 10 mg tid for symptom control  Patient advised to stop drinking and is in agreement  However on leaving the room, patient appears to be pouring hand  into his drinks      Denies withdrawal symptoms    Plan:  Can howard SCHNEIDER, pt is being admitted for 12 days   Continue thiamine folic acid

## 2022-08-01 PROCEDURE — 97535 SELF CARE MNGMENT TRAINING: CPT

## 2022-08-01 PROCEDURE — 99232 SBSQ HOSP IP/OBS MODERATE 35: CPT | Performed by: HOSPITALIST

## 2022-08-01 RX ADMIN — MULTIPLE VITAMINS W/ MINERALS TAB 1 TABLET: TAB ORAL at 09:05

## 2022-08-01 RX ADMIN — LORAZEPAM 1 MG: 1 TABLET ORAL at 01:31

## 2022-08-01 RX ADMIN — NICOTINE 1 PATCH: 21 PATCH, EXTENDED RELEASE TRANSDERMAL at 09:06

## 2022-08-01 RX ADMIN — BUPRENORPHINE AND NALOXONE 8 MG: 8; 2 FILM BUCCAL; SUBLINGUAL at 16:52

## 2022-08-01 RX ADMIN — LACTULOSE 20 G: 20 SOLUTION ORAL at 09:05

## 2022-08-01 RX ADMIN — PANTOPRAZOLE SODIUM 40 MG: 40 TABLET, DELAYED RELEASE ORAL at 06:14

## 2022-08-01 RX ADMIN — FERROUS SULFATE TAB 325 MG (65 MG ELEMENTAL FE) 325 MG: 325 (65 FE) TAB at 09:05

## 2022-08-01 RX ADMIN — THIAMINE HCL TAB 100 MG 100 MG: 100 TAB at 09:05

## 2022-08-01 RX ADMIN — SPIRONOLACTONE 100 MG: 100 TABLET ORAL at 09:05

## 2022-08-01 RX ADMIN — NADOLOL 40 MG: 40 TABLET ORAL at 09:11

## 2022-08-01 RX ADMIN — LORAZEPAM 1 MG: 1 TABLET ORAL at 18:43

## 2022-08-01 RX ADMIN — FUROSEMIDE 40 MG: 40 TABLET ORAL at 09:05

## 2022-08-01 RX ADMIN — DESVENLAFAXINE 50 MG: 50 TABLET, FILM COATED, EXTENDED RELEASE ORAL at 09:11

## 2022-08-01 RX ADMIN — BUPRENORPHINE AND NALOXONE 8 MG: 8; 2 FILM BUCCAL; SUBLINGUAL at 09:18

## 2022-08-01 RX ADMIN — FOLIC ACID 1 MG: 1 TABLET ORAL at 09:07

## 2022-08-01 RX ADMIN — LACTULOSE 20 G: 20 SOLUTION ORAL at 16:52

## 2022-08-01 RX ADMIN — LORAZEPAM 1 MG: 1 TABLET ORAL at 09:09

## 2022-08-01 NOTE — CASE MANAGEMENT
Case Management Progress Note    Patient name Leslie Weston S /S -01 MRN [id number]  : 1985 Date 2022       LOS (days): 14  Geometric Mean LOS (GMLOS) (days): 4 70  Days to GMLOS:-9 3        OBJECTIVE:        Current admission status: Inpatient  Preferred Pharmacy:   [id number] [facility] [address] [place] [address]  Phone: [phone] Fax: [phone] Dominique Friend, [address] [place] [address]  Phone: [phone] Fax: [phone]    Primary Care Provider: Mely Garcia PA-C    Primary Insurance:   Secondary Insurance:     PROGRESS NOTE:    Patient is eager to return home  CM placed blanket referrals to several [facility] providers to inquire if they can accept as a lonnie case  CM will keep following up in the morning

## 2022-08-01 NOTE — CASE MANAGEMENT
Case Management Progress Note    Patient name Brenton Garcia  Location S /S -01 MRN [id number]  : 1985 Date 2022       LOS (days): 14  Geometric Mean LOS (GMLOS) (days): 4 70  Days to GMLOS:-9 3        OBJECTIVE:        Current admission status: Inpatient  Preferred Pharmacy:   [facility]/pharmacy #[id number] [address]  [address] [facility]  Phone: [phone] Fax: [phone] Tia Drake, [facility]  Phone: [phone] Fax: [phone]    Primary Care Provider: Lisa Atkinson PA-C    Primary Insurance:   Secondary Insurance:     PROGRESS NOTE:    Cm inquired from Quin Cowden, Liaison for [address] if she can re-evaluate [place] for any of their [address] facilities that have a [employer]  As Per Quin Cowden, their facilities cannot accept patients on Suboxone  CM spoke to [facility], from admissions at [facility] and she confirmed they don't have a [address] contract, therefore cannot accept patient  CM spoke to Mauricio Schaffer, from admissions at [facility] who stated they cannot accept patients on Suboxone  CM left a message at [facility] at [address] [place] [address] in [facility] inquiring if they have a [address] contract for [facility] and if they can accept patients on Suboxone  CM to follow up

## 2022-08-01 NOTE — ASSESSMENT & PLAN NOTE
s/p EGD; 4 bands for 2 varices; no active bleed  Started on nadolol 40 mg qd  2 FFP given prior to EGD  Continue nadalol 40 mg        Plan:   Protonix PO BID  lasix PO 40 q 12  Aldactone 100mg    Follow up as outpatient with PCP

## 2022-08-01 NOTE — ASSESSMENT & PLAN NOTE
No results for input(s): HGB in the last 72 hours  Iron panel showing low iron levels with normal TIBC  Heme occult blood negative in the ED  Patient denying melena and hematemesis  Prior note of febrile reaction with pRBC (was noted after receiving 6 units; no temperature spike after receiving 7th unit, although pt was pre-treated with tylenol)  EGD no active bleed; 4 bands placed for 2 varices during this admission  Received 7 units total pRBCs since admission    Hemoglobin currently stable   7 5

## 2022-08-01 NOTE — ASSESSMENT & PLAN NOTE
Lab Results   Component Value Date     07/28/2022     07/27/2022     (L) 07/26/2022     (L) 07/25/2022     (L) 07/24/2022     Thrombocytopenia most likely 2/2 liver cirrhosis   Platelet count is normal and stable

## 2022-08-01 NOTE — ASSESSMENT & PLAN NOTE
Alcohol level 448 on admission  Patient says last drink was on Friday 7/15  [facility] used serax 10 mg tid for symptom control  Patient advised to stop drinking and is in agreement  However on leaving the room, patient appears to be pouring hand  into his drinks      Denies withdrawal symptoms    Plan:  Can dc CIWA, pt is being admitted for 12 days   Continue thiamine folic acid

## 2022-08-01 NOTE — ASSESSMENT & PLAN NOTE
Secondary to illness/hospitalization and physical deconditioning   PT/ OT recommended postacute rehab   Patient is a [address] [facility] patient and referrals made so far were declined 2/2 to suboxone, EtOH hx and PTSD  Patient mentioned if he can't get rehab he can go home with PT  Per CM, [facility] and [facility] by [facility] and he can place referral for home PT, if indicated  Continue PT while inpatient  Encouraged the patient to ambulate with the walker during the day, if no risk for fall

## 2022-08-01 NOTE — OCCUPATIONAL THERAPY NOTE
Occupational Therapy Progress Note     Patient Name: Zakia Duran  NZMAD'G Date: 8/1/2022  Problem List  Principal Problem:    Alcoholic cirrhosis of liver without ascites ([facility] [address] )  Active Problems:    Substance abuse ([facility] )    Tobacco dependence    Anemia    Thrombocytopenia (HCC)    Alcohol withdrawal (HCC)    Thoracoabdominal aortic aneurysm (HCC)    Depression, concern for PTSD    Chronic bronchitis ([facility] )    Ambulatory dysfunction            08/01/22 1056   OT Last Visit   OT Visit Date 08/01/22   Note Type   Note Type Treatment for insurance authorization   Restrictions/Precautions   Weight Bearing Precautions Per Order No   Other Precautions Chair Alarm; Bed Alarm; Fall Risk   Pain Assessment   Pain Assessment Tool 0-10   Pain Score No Pain   ADL   Grooming Assistance 5  Supervision/Setup   Grooming Deficit Increased time to complete;Supervision/safety;Verbal cueing   LB Dressing Assistance 5  Supervision/Setup   LB Dressing Deficit Increased time to complete;Supervision/safety;Verbal cueing; Don/doff R sock; Don/doff L sock   LB Dressing Comments donning socks long sitting in bed with mod I, doffing sitting EOB with no LOB   Toileting Assistance  5  Supervision/Setup   Toileting Deficit Increased time to complete;Clothing management down;Perineal hygiene;Clothing management up   Bed Mobility   Supine to Sit 6  Modified independent   Sit to Supine 6  Modified independent   Transfers   Sit to Stand 5  Supervision   Additional items Increased time required;Verbal cues   Stand to Sit 5  Supervision   Additional items Increased time required;Verbal cues   Toilet transfer 4  Minimal assistance   Additional items Assist x 1; Increased time required;Verbal cues;Standard toilet  (use of grab bar)   Additional Comments use of RW   Functional Mobility   Functional Mobility 4  Minimal assistance   Additional Comments Ax1, household distances, tremoring and 2 LOB during functional mobility   Additional items Rolling walker Cognition   Overall Cognitive Status WFL   Arousal/Participation Alert; Cooperative   Attention Within functional limits   Orientation Level Oriented X4   Memory Within functional limits   Following Commands Follows one step commands without difficulty   Comments pt reports that his "fogginess" has improved and he feels back to "himself"   Activity Tolerance   Activity Tolerance Patient tolerated treatment well   Medical Staff Made Aware BARBARA Love, DYAN Vaughn   Assessment   Assessment Pt seen on this date for skilled OT treatment session  At start of session pt supine in bed  Pt agreeable and motivated to participate in session stating " I guess I'll try"  Pt required increased time for bed mobility, able to complete with mod I  Pt lethargic upon waking up and increased time to sit upright prior to standing in order to "wake up"  Pt able to don socks long sitting in bed with mod I  Pt with improved activity tolerance and endurance from previous session  Pt requiring increased assist with toilet transfer due to low surface  Pt continued to be limited by functional mobility and tremors which cause small LOB  Pt reports improved cognition  Pt educated on POC and pt reports that he feels like he could take care of himself at home  PT provided edu on safety precautions at home and educated on d/c recs  Will continue to follow and continue to address deficits  Pt would benefit from skilled OT treatment sessions in order to address remaining deficits and continue to recommend d/c to rehab vs home with [facility] pending progress and increased support at home when medically stable     Plan   Goal Expiration Date 08/05/22   OT Treatment Day 1   OT Frequency 3-5x/wk   Recommendation   OT Discharge Recommendation Post acute rehabilitation services  (vs home with [facility] pending continued progress and increased support at home)   AM-PAC Daily Activity Inpatient   Lower Body Dressing 3   Bathing 3   Toileting 3   Upper Body Dressing 3 Grooming 4   Eating 4   Daily Activity Raw Score 20   Daily Activity Standardized Score (Calc for Raw Score >=11) 42 03   AM-[facility] Applied Cognition Inpatient   Following a Speech/Presentation 3   Understanding Ordinary Conversation 4   Taking Medications 3   Remembering Where Things Are Placed or Put Away 3   Remembering List of 4-5 Errands 3   Taking Care of Complicated Tasks 3   Applied Cognition Raw Score 19   Applied Cognition Standardized Score 39 77       The patient's raw score on the AM-PAC Daily Activity inpatient short form is 20, standardized score is 42 03, greater than 39 4  Patients at this level are likely to benefit from discharge to home  Please refer to the recommendation of the Occupational Therapist for safe discharge planning      At the end of the session, all needs met and pt supine in bed, HOB elevated, and call bell within reach    [facility], OTR/L

## 2022-08-01 NOTE — PROGRESS NOTES
[facility]  Progress Note - Dk Cheek 1985, 40 y o  male MRN: [id number]  Unit/Bed#: S -01 Encounter: [id number]  Primary Care Provider: Melchor Larson PA-C   Date and time admitted to hospital: 7/18/2022  5:15 PM    * Alcoholic cirrhosis of liver without ascites ([facility] [address] )  Assessment & Plan  Hx Decompensated cirrhosis with anasarca, hepatic encephalopathy, thrombocytopenia splenomegaly, hyperammonemia, and  Hepatomegaly most likely secondary to alcohol abuse but could also be due to underlying alcoholic hepatitis  -Previous AFP and right upper quadrant ultrasounds were normal  -SBP: Spiking fevers on 7/17  CXR negative for acute findings, ua negative for uti  Blood cultures drawn  Started on ceftriaxone  He completed a course of antibiotics for SBP prophylaxis  Paracentesis fluid shows 135 wbc's; 9% neutrophils  Fevers have resolved  Patient currently hemodynamically stable  -AFP 3 5     - s/p EGD; 4 bands for 2 varices; no active bleed  Started on nadolol 40 mg qd      - 2 FFP given prior to EGD       Plan:   Trend MELD score: MELD score = 24 yesterday  Protonix PO BID  lasix PO 40 q 12  aldactone  Hold AP/AC  On nadolol  Cleared by GI for discharge  Follow up as outpatient  Thrombocytopenia [facility])  Assessment & Plan  Lab Results   Component Value Date     07/28/2022     07/27/2022     (L) 07/26/2022     (L) 07/25/2022     (L) 07/24/2022     Thrombocytopenia most likely 2/2 liver cirrhosis   Platelet count is normal and stable now  Anemia  Assessment & Plan  No results for input(s): HGB in the last 72 hours  Iron panel showing low iron levels with normal TIBC     Heme occult blood negative in the ED  Patient denying melena and hematemesis  Prior note of febrile reaction with pRBC (was noted after receiving 6 units; no temperature spike after receiving 7th unit, although pt was pre-treated with tylenol)  EGD no active bleed; 4 bands placed for 2 varices during this admission  Received 7 units total pRBCs since admission    Hemoglobin currently stable  7 5    Tobacco dependence  Assessment & Plan  Nicotine patch    Substance abuse ([facility] [address] )  Assessment & Plan  · Patient reports taking 8-2 Suboxone BID  · [place] confirmed 16 mg daily    Continue Suboxone    Chronic bronchitis ([facility] [address] )  Assessment & Plan  Continue home inhalers for asthma    Ambulatory dysfunction  Assessment & Plan  Secondary to illness/hospitalization and physical deconditioning   PT/ OT recommended postacute rehab   [facility] and referrals made so far were declined 2/2 to suboxone, EtOH hx and PTSD  Patient mentioned if he can't get rehab he can go home with PT  Per CM, he will need to have PCP set with [facility] and be seen by PCP and he can place referral for home PT, if indicated  Continue PT while inpatient  Encouraged the patient to ambulate with the walker during the day, if no risk for fall  Depression, concern for PTSD  Assessment & Plan  Per previous resident "Per discussion with patient's sister: Patient served in the [email or web address] and spent 18 months in [place], there is concern for PTSD  Sister has reached out to CM inquiring about [facility] programs to help with PTSD  She tells me that he had drug addiction prior to serving and developed alcohol addiction after his [employer]  Also shares that he went through a divorce 2-3 years ago and a girlfriend that recently passed away last November  Has 1 son and 1 step-daughter that he has not seen in years "    Accounts from sister concerning for uncontrolled depression  Patient was seen by Psychiatry who recommended desvenlafaxine(Pristiq) 50 mg q24h  Continue Pristiq and clonidine  1 mg ativan TID PRN  Also psychiatry recommended partial psychiatric hospitalization, this cannot be down while in rehab only as outpatient       Thoracoabdominal aortic aneurysm [facility])  Assessment & Plan  7/18/21 - CT findings; stable 54 mm  On chart review patient had a CTA on 2021 with noted 44 mm AAA  Does not appear AAA has been under surveillance  Refer to incidental findings noted on     Plan:  Outpatient follow-up with primary care physician      Alcohol withdrawal [facility])  Assessment & Plan  Alcohol level 448 on admission  Patient says last drink was on Friday 7/15  [facility] used serax 10 mg tid for symptom control  Patient advised to stop drinking and is in agreement  However on leaving the room, patient appears to be pouring hand  into his drinks  Denies withdrawal symptoms    Plan:  Can dc [facility], pt is being admitted for 12 days   Continue thiamine folic acid              VTE Pharmacologic Prophylaxis: VTE Score: 1 Low Risk (Score 0-2) - Encourage Ambulation  Patient Centered Rounds: I performed bedside rounds with nursing staff today  Discussions with Specialists or Other Care Team Provider:     Education and Discussions with Family / Patient: Patient declined call to   Current Length of Stay: 14 day(s)  Current Patient Status: Inpatient   Discharge Plan: To be determined    Code Status: Level 1 - Full Code    Subjective:   Patient is feeling better  Feeling energetic compared to before  Patient is sitting upright, able to get down from the bed  No fever  Appetite has improved  Bowel opening 2-3 per day  UOP normal  No N/V/D/C  Objective:     Vitals:   Temp (24hrs), Av 5 °F (36 9 °C), Min:98 4 °F (36 9 °C), Max:98 7 °F (37 1 °C)    Temp:  [98 4 °F (36 9 °C)-98 7 °F (37 1 °C)] 98 5 °F (36 9 °C)  HR:  [73] 73  Resp:  [16] 16  BP: (103-106)/(58-62) 103/58  SpO2:  [98 %] 98 %  Body mass index is 24 52 kg/m²  Input and Output Summary (last 24 hours):      Intake/Output Summary (Last 24 hours) at 2022 1501  Last data filed at 2022 2213  Gross per 24 hour   Intake --   Output 1250 ml   Net -1250 ml       Physical Exam:   Physical Exam  Constitutional: Appearance: Normal appearance  HENT:      Head: Normocephalic and atraumatic  Right Ear: Ear canal normal       Left Ear: Ear canal normal       Nose: Nose normal       Mouth/Throat:      Mouth: Mucous membranes are moist       Pharynx: Oropharynx is clear  Eyes:      Conjunctiva/sclera: Conjunctivae normal    Cardiovascular:      Rate and Rhythm: Normal rate and regular rhythm  Pulses: Normal pulses  Heart sounds: Normal heart sounds  Pulmonary:      Effort: Pulmonary effort is normal       Breath sounds: Normal breath sounds  Abdominal:      General: Abdomen is flat  Bowel sounds are normal       Palpations: Abdomen is soft  Lymphadenopathy:      Cervical: No cervical adenopathy  Skin:     General: Skin is warm and dry  Capillary Refill: Capillary refill takes less than 2 seconds  Neurological:      General: No focal deficit present  Mental Status: He is alert and oriented to person, place, and time     Psychiatric:         Mood and Affect: Mood normal          Behavior: Behavior normal         Additional Data:     Labs:  Results from last 7 days   Lab Units 07/28/22  0554   WBC Thousand/uL 4 17*   HEMOGLOBIN g/dL 7 5*   HEMATOCRIT % 24 5*   PLATELETS Thousands/uL 157   NEUTROS PCT % 58   LYMPHS PCT % 21   MONOS PCT % 17*   EOS PCT % 2     Results from last 7 days   Lab Units 07/30/22  0544   SODIUM mmol/L 132*   POTASSIUM mmol/L 3 9   CHLORIDE mmol/L 97   CO2 mmol/L 27   BUN mg/dL 8   CREATININE mg/dL 0 46*   ANION GAP mmol/L 8   CALCIUM mg/dL 9 8   ALBUMIN g/dL 4 2   TOTAL BILIRUBIN mg/dL 7 78*   ALK PHOS U/L 161*   ALT U/L 40   AST U/L 116*   GLUCOSE RANDOM mg/dL 120     Results from last 7 days   Lab Units 07/30/22  0544   INR  1 88*                   Lines/Drains:  Invasive Devices  Report    Peripheral Intravenous Line  Duration           Peripheral IV 07/27/22 Left;Ventral (anterior) Forearm 4 days                      Imaging: Reviewed radiology reports from this admission including: abdominal/pelvic CT and MRI abdomen/MRCP    Recent Cultures (last 7 days):         Last 24 Hours Medication List:   Current Facility-Administered Medications   Medication Dose Route Frequency Provider Last Rate    albuterol  2 puff Inhalation Q6H PRN Yomaira Glasgow,       buprenorphine-naloxone  8 mg Sublingual BID Yomaira Glasgow, DO      cloNIDine  0 1 mg Oral HS Yomaira Glasgow, DO      desvenlafaxine succinate  50 mg Oral Daily Yomaira Glasgow, DO      diphenhydrAMINE  25 mg Intravenous Q6H PRN Yomaira Glasgow, DO      ferrous sulfate  325 mg Oral Daily With Breakfast Yomaira Glasgow, DO      folic acid  1 mg Oral Daily Yomaira Glasgow, DO      furosemide  40 mg Oral Daily eSth Dunn MD      gabapentin  300 mg Oral BID PRN Yomaira Glasgow, DO      glycerin-hypromellose-  1 drop Both Eyes Q3H PRN Yomaira Glasgow, DO      lactulose  20 g Oral BID Yomaira Glasgow, DO      LORazepam  1 mg Oral Q8H PRN Yomaira Glasgow, DO      melatonin  6 mg Oral HS Yomaira Glasgow, DO      multivitamin-minerals  1 tablet Oral Daily Yomaira Glasgow, DO      nadolol  40 mg Oral Daily Yomaira Glasgow, DO      nicotine  1 patch Transdermal Daily Yomaira Glasgow, DO      ondansetron  4 mg Intravenous Q6H PRN Yomaira Glasgow, DO      pantoprazole  40 mg Oral Early Morning Seth Dunn MD      spironolactone  100 mg Oral Daily Seth Dunn MD      thiamine  100 mg Oral Daily Yomaira Glasgow, DO          Today, Patient Was Seen By: Gricel Augustine MD    **Please Note: This note may have been constructed using a voice recognition system  **

## 2022-08-01 NOTE — ASSESSMENT & PLAN NOTE
Iron panel showing low iron levels with normal TIBC  Heme occult blood negative in the ED  EGD no active bleed; 4 bands placed for 2 varices during this admission  Received 7 units total pRBCs since admission    Hemoglobin currently stable   7 5

## 2022-08-01 NOTE — ASSESSMENT & PLAN NOTE
Hx Decompensated cirrhosis with anasarca, hepatic encephalopathy, thrombocytopenia splenomegaly, hyperammonemia, and  Hepatomegaly most likely secondary to alcohol abuse but could also be due to underlying alcoholic hepatitis  -Previous AFP and right upper quadrant ultrasounds were normal  -SBP: Spiking fevers on 7/17  CXR negative for acute findings, ua negative for uti  Blood cultures drawn  Started on ceftriaxone  He completed a course of antibiotics for SBP prophylaxis  Paracentesis fluid shows 135 wbc's; 9% neutrophils  Fevers have resolved  Patient currently hemodynamically stable  -AFP 3 5     - s/p EGD; 4 bands for 2 varices; no active bleed  Started on nadolol 40 mg qd      - 2 FFP given prior to EGD       Plan:   Trend MELD score: MELD score = 24 today   Protonix PO BID  lasix PO 40 q 12  aldactone  Hold AP/AC  On nadolol  Cleared by GI for discharge  Follow up as outpatient

## 2022-08-01 NOTE — ASSESSMENT & PLAN NOTE
Continue desvenlafaxine(Pristiq) 50 mg q24h    Continue clonidine 0 1 mg TID PRN  Follow up with a Pschychistrist

## 2022-08-01 NOTE — PLAN OF CARE
Problem: OCCUPATIONAL THERAPY ADULT  Goal: Performs self-care activities at highest level of function for planned discharge setting  See evaluation for individualized goals  Description: Treatment Interventions: ADL retraining, Functional transfer training, UE strengthening/ROM, Endurance training, Patient/family training, Equipment evaluation/education, Compensatory technique education, Continued evaluation, Energy conservation, Activityengagement  Equipment Recommended: Bedside commode       See flowsheet documentation for full assessment, interventions and recommendations  Outcome: Progressing  Note: Limitation: Decreased ADL status, Decreased Safe judgement during ADL, Decreased endurance, Decreased self-care trans, Decreased high-level ADLs  Prognosis: Good  Assessment: Pt seen on this date for skilled OT treatment session  At start of session pt supine in bed  Pt agreeable and motivated to participate in session stating " I guess I'll try"  Pt required increased time for bed mobility, able to complete with mod I  Pt lethargic upon waking up and increased time to sit upright prior to standing in order to "wake up"  Pt able to don socks long sitting in bed with mod I  Pt with improved activity tolerance and endurance from previous session  Pt requiring increased assist with toilet transfer due to low surface  Pt continued to be limited by functional mobility and tremors which cause small LOB  Pt reports improved cognition  Pt educated on POC and pt reports that he feels like he could take care of himself at home  PT provided edu on safety precautions at home and educated on d/c recs  Will continue to follow and continue to address deficits  Pt would benefit from skilled OT treatment sessions in order to address remaining deficits and continue to recommend d/c to rehab vs home with [facility] pending progress and increased support at home when medically stable       OT Discharge Recommendation: Post acute rehabilitation services (vs home with [address] [place], [place] [address] [place] pending continued progress and increased support at home)

## 2022-08-01 NOTE — ASSESSMENT & PLAN NOTE
Secondary to illness/hospitalization and physical deconditioning   PT/ OT recommended postacute rehab  Continued PT while inpatient

## 2022-08-01 NOTE — ASSESSMENT & PLAN NOTE
7/18/21 - CT findings; stable 54 mm  On chart review patient had a CTA on January 2021 with noted 44 mm AAA    Plan:  Outpatient follow-up with primary care physician

## 2022-08-02 VITALS
HEART RATE: 70 BPM | TEMPERATURE: 97.9 F | SYSTOLIC BLOOD PRESSURE: 109 MMHG | HEIGHT: 73 IN | OXYGEN SATURATION: 97 % | BODY MASS INDEX: 25.04 KG/M2 | DIASTOLIC BLOOD PRESSURE: 61 MMHG | WEIGHT: 188.93 LBS | RESPIRATION RATE: 16 BRPM

## 2022-08-02 LAB
ALBUMIN SERPL BCP-MCNC: 4.1 G/DL (ref 3.5–5)
ALP SERPL-CCNC: 163 U/L (ref 34–104)
ALT SERPL W P-5'-P-CCNC: 44 U/L (ref 7–52)
ANION GAP SERPL CALCULATED.3IONS-SCNC: 7 MMOL/L (ref 4–13)
AST SERPL W P-5'-P-CCNC: 117 U/L (ref 13–39)
BASOPHILS # BLD AUTO: 0.1 THOUSANDS/ΜL (ref 0–0.1)
BASOPHILS NFR BLD AUTO: 2 % (ref 0–1)
BILIRUB SERPL-MCNC: 6.66 MG/DL (ref 0.2–1)
BUN SERPL-MCNC: 10 MG/DL (ref 5–25)
CALCIUM SERPL-MCNC: 9.8 MG/DL (ref 8.4–10.2)
CHLORIDE SERPL-SCNC: 95 MMOL/L (ref 96–108)
CO2 SERPL-SCNC: 28 MMOL/L (ref 21–32)
CREAT SERPL-MCNC: 0.55 MG/DL (ref 0.6–1.3)
EOSINOPHIL # BLD AUTO: 0.12 THOUSAND/ΜL (ref 0–0.61)
EOSINOPHIL NFR BLD AUTO: 2 % (ref 0–6)
ERYTHROCYTE [DISTWIDTH] IN BLOOD BY AUTOMATED COUNT: 32.9 % (ref 11.6–15.1)
GFR SERPL CREATININE-BSD FRML MDRD: 133 ML/MIN/1.73SQ M
GLUCOSE SERPL-MCNC: 118 MG/DL (ref 65–140)
HCT VFR BLD AUTO: 26.5 % (ref 36.5–49.3)
HGB BLD-MCNC: 8.4 G/DL (ref 12–17)
IMM GRANULOCYTES # BLD AUTO: 0.02 THOUSAND/UL (ref 0–0.2)
IMM GRANULOCYTES NFR BLD AUTO: 0 % (ref 0–2)
INR PPP: 1.76 (ref 0.84–1.19)
LYMPHOCYTES # BLD AUTO: 1.15 THOUSANDS/ΜL (ref 0.6–4.47)
LYMPHOCYTES NFR BLD AUTO: 19 % (ref 14–44)
MCH RBC QN AUTO: 26.9 PG (ref 26.8–34.3)
MCHC RBC AUTO-ENTMCNC: 31.7 G/DL (ref 31.4–37.4)
MCV RBC AUTO: 85 FL (ref 82–98)
MONOCYTES # BLD AUTO: 0.72 THOUSAND/ΜL (ref 0.17–1.22)
MONOCYTES NFR BLD AUTO: 12 % (ref 4–12)
NEUTROPHILS # BLD AUTO: 4.04 THOUSANDS/ΜL (ref 1.85–7.62)
NEUTS SEG NFR BLD AUTO: 65 % (ref 43–75)
NRBC BLD AUTO-RTO: 0 /100 WBCS
PLATELET # BLD AUTO: 229 THOUSANDS/UL (ref 149–390)
POTASSIUM SERPL-SCNC: 3.9 MMOL/L (ref 3.5–5.3)
PROT SERPL-MCNC: 7.9 G/DL (ref 6.4–8.4)
PROTHROMBIN TIME: 20.7 SECONDS (ref 11.6–14.5)
RBC # BLD AUTO: 3.12 MILLION/UL (ref 3.88–5.62)
SODIUM SERPL-SCNC: 130 MMOL/L (ref 135–147)
WBC # BLD AUTO: 6.15 THOUSAND/UL (ref 4.31–10.16)

## 2022-08-02 PROCEDURE — 80053 COMPREHEN METABOLIC PANEL: CPT | Performed by: INTERNAL MEDICINE

## 2022-08-02 PROCEDURE — 85025 COMPLETE CBC W/AUTO DIFF WBC: CPT | Performed by: INTERNAL MEDICINE

## 2022-08-02 PROCEDURE — 99238 HOSP IP/OBS DSCHRG MGMT 30/<: CPT | Performed by: HOSPITALIST

## 2022-08-02 PROCEDURE — 85610 PROTHROMBIN TIME: CPT | Performed by: INTERNAL MEDICINE

## 2022-08-02 PROCEDURE — 97530 THERAPEUTIC ACTIVITIES: CPT

## 2022-08-02 PROCEDURE — 97110 THERAPEUTIC EXERCISES: CPT

## 2022-08-02 PROCEDURE — 97116 GAIT TRAINING THERAPY: CPT

## 2022-08-02 RX ORDER — SPIRONOLACTONE 100 MG/1
100 TABLET, FILM COATED ORAL DAILY
Qty: 30 TABLET | Refills: 0 | Status: ON HOLD | OUTPATIENT
Start: 2022-08-03 | End: 2022-08-17 | Stop reason: SDUPTHER

## 2022-08-02 RX ORDER — FERROUS SULFATE TAB EC 324 MG (65 MG FE EQUIVALENT) 324 (65 FE) MG
324 TABLET DELAYED RESPONSE ORAL
Qty: 60 TABLET | Refills: 0 | Status: SHIPPED | OUTPATIENT
Start: 2022-08-02 | End: 2022-09-01

## 2022-08-02 RX ORDER — DESVENLAFAXINE 50 MG/1
50 TABLET, EXTENDED RELEASE ORAL DAILY
Qty: 30 TABLET | Refills: 0 | Status: SHIPPED | OUTPATIENT
Start: 2022-08-03 | End: 2022-08-17

## 2022-08-02 RX ORDER — ONDANSETRON 4 MG/1
4 TABLET, ORALLY DISINTEGRATING ORAL EVERY 6 HOURS PRN
Qty: 20 TABLET | Refills: 0 | Status: ON HOLD | OUTPATIENT
Start: 2022-08-02 | End: 2022-08-17 | Stop reason: SDUPTHER

## 2022-08-02 RX ORDER — NADOLOL 40 MG/1
40 TABLET ORAL DAILY
Qty: 30 TABLET | Refills: 0 | Status: ON HOLD | OUTPATIENT
Start: 2022-08-03 | End: 2022-08-17 | Stop reason: SDUPTHER

## 2022-08-02 RX ORDER — CLONIDINE HYDROCHLORIDE 0.1 MG/1
0.1 TABLET ORAL
Qty: 30 TABLET | Refills: 0 | Status: SHIPPED | OUTPATIENT
Start: 2022-08-02 | End: 2022-08-17

## 2022-08-02 RX ORDER — ONDANSETRON 4 MG/1
4 TABLET, ORALLY DISINTEGRATING ORAL EVERY 6 HOURS PRN
Status: DISCONTINUED | OUTPATIENT
Start: 2022-08-02 | End: 2022-08-02 | Stop reason: HOSPADM

## 2022-08-02 RX ORDER — PANTOPRAZOLE SODIUM 40 MG/1
40 TABLET, DELAYED RELEASE ORAL DAILY
Qty: 30 TABLET | Refills: 0 | Status: SHIPPED | OUTPATIENT
Start: 2022-08-02 | End: 2022-09-01

## 2022-08-02 RX ORDER — LANOLIN ALCOHOL/MO/W.PET/CERES
100 CREAM (GRAM) TOPICAL DAILY
Qty: 30 TABLET | Refills: 0 | Status: SHIPPED | OUTPATIENT
Start: 2022-08-03 | End: 2022-09-02

## 2022-08-02 RX ORDER — LACTULOSE 20 G/30ML
20 SOLUTION ORAL 2 TIMES DAILY
Qty: 60 ML | Refills: 0 | Status: ON HOLD | OUTPATIENT
Start: 2022-08-02 | End: 2022-08-12 | Stop reason: SDUPTHER

## 2022-08-02 RX ADMIN — FERROUS SULFATE TAB 325 MG (65 MG ELEMENTAL FE) 325 MG: 325 (65 FE) TAB at 09:00

## 2022-08-02 RX ADMIN — FUROSEMIDE 40 MG: 40 TABLET ORAL at 09:00

## 2022-08-02 RX ADMIN — NICOTINE 1 PATCH: 21 PATCH, EXTENDED RELEASE TRANSDERMAL at 09:00

## 2022-08-02 RX ADMIN — LACTULOSE 20 G: 20 SOLUTION ORAL at 09:01

## 2022-08-02 RX ADMIN — BUPRENORPHINE AND NALOXONE 8 MG: 8; 2 FILM BUCCAL; SUBLINGUAL at 08:58

## 2022-08-02 RX ADMIN — MULTIPLE VITAMINS W/ MINERALS TAB 1 TABLET: TAB ORAL at 09:00

## 2022-08-02 RX ADMIN — LORAZEPAM 1 MG: 1 TABLET ORAL at 03:20

## 2022-08-02 RX ADMIN — ONDANSETRON 4 MG: 4 TABLET, ORALLY DISINTEGRATING ORAL at 11:34

## 2022-08-02 RX ADMIN — DESVENLAFAXINE 50 MG: 50 TABLET, FILM COATED, EXTENDED RELEASE ORAL at 09:54

## 2022-08-02 RX ADMIN — SPIRONOLACTONE 100 MG: 100 TABLET ORAL at 09:00

## 2022-08-02 RX ADMIN — FOLIC ACID 1 MG: 1 TABLET ORAL at 09:00

## 2022-08-02 RX ADMIN — PANTOPRAZOLE SODIUM 40 MG: 40 TABLET, DELAYED RELEASE ORAL at 05:08

## 2022-08-02 RX ADMIN — THIAMINE HCL TAB 100 MG 100 MG: 100 TAB at 09:00

## 2022-08-02 RX ADMIN — NADOLOL 40 MG: 40 TABLET ORAL at 09:54

## 2022-08-02 NOTE — PLAN OF CARE
Problem: Potential for Falls  Goal: Patient will remain free of falls  Description: INTERVENTIONS:  - Educate patient/family on patient safety including physical limitations  - Instruct patient to call for assistance with activity   - Consult OT/PT to assist with strengthening/mobility   - Keep Call bell within reach  - Keep bed low and locked with side rails adjusted as appropriate  - Keep care items and personal belongings within reach  - Initiate and maintain comfort rounds  - Make Fall Risk Sign visible to staff  - Offer Toileting every 2 Hours, in advance of need  - Initiate/Maintain bed alarm  - Obtain necessary fall risk management equipment: bed alarm  - Apply yellow socks and bracelet for high fall risk patients  - Consider moving patient to room near nurses station  Outcome: Progressing     Problem: Prexisting or High Potential for Compromised Skin Integrity  Goal: Skin integrity is maintained or improved  Description: INTERVENTIONS:  - Identify patients at risk for skin breakdown  - Assess and monitor skin integrity  - Assess and monitor nutrition and hydration status  - Monitor labs   - Assess for incontinence   - Turn and reposition patient  - Assist with mobility/ambulation  - Relieve pressure over bony prominences  - Avoid friction and shearing  - Provide appropriate hygiene as needed including keeping skin clean and dry  - Evaluate need for skin moisturizer/barrier cream  - Collaborate with interdisciplinary team   - Patient/family teaching  - Consider wound care consult   Outcome: Progressing     Problem: MOBILITY - ADULT  Goal: Maintain or return to baseline ADL function  Description: INTERVENTIONS:  -  Assess patient's ability to carry out ADLs; assess patient's baseline for ADL function and identify physical deficits which impact ability to perform ADLs (bathing, care of mouth/teeth, toileting, grooming, dressing, etc )  - Assess/evaluate cause of self-care deficits   - Assess range of motion  - Assess patient's mobility; develop plan if impaired  - Assess patient's need for assistive devices and provide as appropriate  - Encourage maximum independence but intervene and supervise when necessary  - Involve family in performance of ADLs  - Assess for home care needs following discharge   - Consider OT consult to assist with ADL evaluation and planning for discharge  - Provide patient education as appropriate  Outcome: Progressing  Goal: Maintains/Returns to pre admission functional level  Description: INTERVENTIONS:  - Perform BMAT or MOVE assessment daily    - Set and communicate daily mobility goal to care team and patient/family/caregiver  - Collaborate with rehabilitation services on mobility goals if consulted  - Perform Range of Motion 2 times a day  - Reposition patient every 2 hours    - Dangle patient 2 times a day  - Stand patient 2 times a day  - Ambulate patient 3 times a day  - Out of bed to chair 3 times a day   - Out of bed for meals 3 times a day  - Out of bed for toileting  - Record patient progress and toleration of activity level   Outcome: Progressing     Problem: PAIN - ADULT  Goal: Verbalizes/displays adequate comfort level or baseline comfort level  Description: Interventions:  - Encourage patient to monitor pain and request assistance  - Assess pain using appropriate pain scale  - Administer analgesics based on type and severity of pain and evaluate response  - Implement non-pharmacological measures as appropriate and evaluate response  - Consider cultural and social influences on pain and pain management  - Notify physician/advanced practitioner if interventions unsuccessful or patient reports new pain  Outcome: Progressing     Problem: SAFETY ADULT  Goal: Patient will remain free of falls  Description: INTERVENTIONS:  - Educate patient/family on patient safety including physical limitations  - Instruct patient to call for assistance with activity   - Consult OT/PT to assist with strengthening/mobility   - Keep Call bell within reach  - Keep bed low and locked with side rails adjusted as appropriate  - Keep care items and personal belongings within reach  - Initiate and maintain comfort rounds  - Make Fall Risk Sign visible to staff  - Offer Toileting every 2 Hours, in advance of need  - Initiate/Maintain bed alarm  - Obtain necessary fall risk management equipment: bed alarm  - Apply yellow socks and bracelet for high fall risk patients  - Consider moving patient to room near nurses station  Outcome: Progressing  Goal: Maintain or return to baseline ADL function  Description: INTERVENTIONS:  -  Assess patient's ability to carry out ADLs; assess patient's baseline for ADL function and identify physical deficits which impact ability to perform ADLs (bathing, care of mouth/teeth, toileting, grooming, dressing, etc )  - Assess/evaluate cause of self-care deficits   - Assess range of motion  - Assess patient's mobility; develop plan if impaired  - Assess patient's need for assistive devices and provide as appropriate  - Encourage maximum independence but intervene and supervise when necessary  - Involve family in performance of ADLs  - Assess for home care needs following discharge   - Consider OT consult to assist with ADL evaluation and planning for discharge  - Provide patient education as appropriate  Outcome: Progressing  Goal: Maintains/Returns to pre admission functional level  Description: INTERVENTIONS:  - Perform BMAT or MOVE assessment daily    - Set and communicate daily mobility goal to care team and patient/family/caregiver  - Collaborate with rehabilitation services on mobility goals if consulted  - Perform Range of Motion 2 times a day  - Reposition patient every 2 hours    - Dangle patient 2 times a day  - Stand patient 2 times a day  - Ambulate patient 3 times a day  - Out of bed to chair 3 times a day   - Out of bed for meals 3 times a day  - Out of bed for toileting  - Record patient progress and toleration of activity level   Outcome: Progressing     Problem: DISCHARGE PLANNING  Goal: Discharge to home or other facility with appropriate resources  Description: INTERVENTIONS:  - Identify barriers to discharge w/patient and caregiver  - Arrange for needed discharge resources and transportation as appropriate  - Identify discharge learning needs (meds, wound care, etc )  - Arrange for interpretive services to assist at discharge as needed  - Refer to Case Management Department for coordinating discharge planning if the patient needs post-hospital services based on physician/advanced practitioner order or complex needs related to functional status, cognitive ability, or social support system  Outcome: Progressing     Problem: Knowledge Deficit  Goal: Patient/family/caregiver demonstrates understanding of disease process, treatment plan, medications, and discharge instructions  Description: Complete learning assessment and assess knowledge base    Interventions:  - Provide teaching at level of understanding  - Provide teaching via preferred learning methods  Outcome: Progressing     Problem: GASTROINTESTINAL - ADULT  Goal: Maintains or returns to baseline bowel function  Description: INTERVENTIONS:  - Assess bowel function  - Encourage oral fluids to ensure adequate hydration  - Administer IV fluids if ordered to ensure adequate hydration  - Administer ordered medications as needed  - Encourage mobilization and activity  - Consider nutritional services referral to assist patient with adequate nutrition and appropriate food choices  Outcome: Progressing  Goal: Maintains adequate nutritional intake  Description: INTERVENTIONS:  - Monitor percentage of each meal consumed  - Identify factors contributing to decreased intake, treat as appropriate  - Assist with meals as needed  - Monitor I&O, weight, and lab values if indicated  - Obtain nutrition services referral as needed  Outcome: Progressing     Problem: NEUROSENSORY - ADULT  Goal: Achieves maximal functionality and self care  Description: INTERVENTIONS  - Monitor swallowing and airway patency with patient fatigue and changes in neurological status  - Encourage and assist patient to increase activity and self care  - Encourage visually impaired, hearing impaired and aphasic patients to use assistive/communication devices  Outcome: Progressing     Problem: HEMATOLOGIC - ADULT  Goal: Maintains hematologic stability  Description: INTERVENTIONS  - Assess for signs and symptoms of bleeding or hemorrhage  - Monitor labs  - Administer supportive blood products/factors as ordered and appropriate  Outcome: Progressing     Problem: Nutrition/Hydration-ADULT  Goal: Nutrient/Hydration intake appropriate for improving, restoring or maintaining nutritional needs  Description: Monitor and assess patient's nutrition/hydration status for malnutrition  Collaborate with interdisciplinary team and initiate plan and interventions as ordered  Monitor patient's weight and dietary intake as ordered or per policy  Utilize nutrition screening tool and intervene as necessary  Determine patient's food preferences and provide high-protein, high-caloric foods as appropriate       INTERVENTIONS:  - Monitor oral intake, urinary output, labs, and treatment plans  - Assess nutrition and hydration status and recommend course of action  - Evaluate amount of meals eaten  - Assist patient with eating if necessary   - Allow adequate time for meals  - Recommend/ encourage appropriate diets, oral nutritional supplements, and vitamin/mineral supplements  - Order, calculate, and assess calorie counts as needed  - Recommend, monitor, and adjust tube feedings and TPN/PPN based on assessed needs  - Assess need for intravenous fluids  - Provide specific nutrition/hydration education as appropriate  - Include patient/family/caregiver in decisions related to nutrition  Outcome: Progressing

## 2022-08-02 NOTE — PLAN OF CARE
Problem: PHYSICAL THERAPY ADULT  Goal: Performs mobility at highest level of function for planned discharge setting  See evaluation for individualized goals  Description: Treatment/Interventions: Functional transfer training, LE strengthening/ROM, Elevations, Therapeutic exercise, Endurance training, Patient/family training, Equipment eval/education, Bed mobility, Gait training  Equipment Recommended: Dolores Carter       See flowsheet documentation for full assessment, interventions and recommendations  Outcome: Progressing  Note: Prognosis: Fair  Problem List: Decreased strength, Decreased endurance, Decreased mobility, Decreased coordination  Assessment: pt began tx session lying supin ein the bed and was agreeable to participate in pt intervention  pt continues to remain consistant with mod I for all bed mobility including completing a supine<>sit EOB transfer with use of bed rail to pull up into a seated EOB position  pt was able to increase static/dynamic sitting balance by completing TE activities at EOB w/o LOB  pt was able to perform multiple STS from EOB to RW with /s in order to strengthen LE's and increase endurance and safety with all functional transfers  progress was noted this tx session as pt was able to increase functional mobility, activity tolerance and ambulation distance as pt ambulated 250'x1 RW w/o LOB while ambulating in the hallway  pt was able to scan environment and hold a conversation w/o LOB  pt was able to increase dynamic standing tolerance and balance as pt completed 25 standing mini squats w/ bed rail UE support and no LOB  post tx pt in bed iwth call bell and all pt needs met        PT Discharge Recommendation: Post acute rehabilitation services    See flowsheet documentation for full assessment

## 2022-08-02 NOTE — PHYSICAL THERAPY NOTE
PHYSICAL THERAPY NOTE          Patient Name: Barrera Austin  IGJIZ'[id number] Date: 8/2/2022 08/02/22 1010   PT Last Visit   PT Visit Date 08/02/22   Note Type   Note Type Treatment   Pain Assessment   Pain Assessment Tool 0-10   Pain Score No Pain   Pain Location/Orientation Orientation: Bilateral;Location: Leg   Patient's Stated Pain Goal No pain   Hospital Pain Intervention(s) Repositioned; Rest   Multiple Pain Sites No   Restrictions/Precautions   Weight Bearing Precautions Per Order No   Other Precautions Chair Alarm; Bed Alarm; Fall Risk   General   Chart Reviewed Yes   Response to Previous Treatment Patient with no complaints from previous session  Family/Caregiver Present No   Cognition   Overall Cognitive Status WFL   Arousal/Participation Alert; Cooperative   Attention Within functional limits   Orientation Level Oriented X4   Memory Within functional limits   Following Commands Follows one step commands without difficulty   Comments pt was leasant, cooperative and motivated throughout tx session   Subjective   Subjective pt was agreeable to participate in PT intervention   Bed Mobility   Supine to Sit 6  Modified independent   Additional items Assist x 1; Increased time required;HOB elevated; Bedrails   Sit to Supine 6  Modified independent   Additional items Assist x 1;HOB elevated; Bedrails; Increased time required   Additional Comments pt was able to sit EOB w/o lob while perfrorming TE activities in order to strengthen Le's and increase endurance and safety with all functional transfers   Transfers   Sit to Stand 5  Supervision   Additional items Assist x 1; Increased time required;Verbal cues  (w/ rW)   Stand to Sit 5  Supervision   Additional items Assist x 1; Increased time required;Verbal cues  (w/ RW)   Stand pivot 5  Supervision   Additional items Assist x 1; Increased time required;Verbal cues  (w/ RW)   Additional Comments pt continues to trequire rW to complete all functional transfers safely in todays tx session   Ambulation/Elevation   Gait pattern Decreased foot clearance; Improper Weight shift; Short stride; Excessively slow;Decreased toe off;Decreased heel strike   Gait Assistance 5  Supervision   Additional items Assist x 1;Verbal cues   Assistive Device Rolling walker   Distance 250'x1 RW   Stair Management Assistance 4  Minimal assist   Additional items Assist x 1;Verbal cues; Increased time required   Stair Management Technique Two rails; Step to pattern; Foreward;Backward   Number of Stairs 10   Balance   Static Sitting Fair +   Dynamic Sitting Fair   Static Standing Fair -   Dynamic Standing Poor +   Ambulatory Poor +   Endurance Deficit   Endurance Deficit Yes   Endurance Deficit Description limited activity tolerance as pt was fatigued post tx session   Activity Tolerance   Activity Tolerance Patient limited by fatigue   Nurse Made Aware Spoke to RN   Exercises   Knee AROM Long Arc Quad Sitting;20 reps;AROM; Bilateral   Ankle Pumps Sitting;20 reps;AROM   Squat Standing;25 reps;Bilateral  (mini)   Marching Sitting;20 reps;AROM; Bilateral   Assessment   Prognosis Fair   Problem List Decreased strength;Decreased endurance;Decreased mobility; Decreased coordination   Assessment pt began tx session lying supin ein the bed and was agreeable to participate in pt intervention  pt continues to remain consistant with mod I for all bed mobility including completing a supine<>sit EOB transfer with use of bed rail to pull up into a seated EOB position  pt was able to increase static/dynamic sitting balance by completing TE activities at EOB w/o LOB  pt was able to perform multiple STS from EOB to RW with /s in order to strengthen LE's and increase endurance and safety with all functional transfers   progress was noted this tx session as pt was able to increase functional mobility, activity tolerance and ambulation distance as pt ambulated 250'x1 RW w/o LOB while ambulating in the hallway  pt was able to scan environment and hold a conversation w/o LOB  pt was able to increase dynamic standing tolerance and balance as pt completed 25 standing mini squats w/ bed rail UE support and no LOB  post tx pt in bed iwth call bell and all pt needs met   Goals   Patient Goals to get better   STG Expiration Date 08/03/22   PT Treatment Day 3   Plan   Treatment/Interventions Functional transfer training;Elevations;LE strengthening/ROM; Therapeutic exercise; Endurance training;Patient/family training;Equipment eval/education; Bed mobility;Gait training;Spoke to nursing   Progress Progressing toward goals   PT Frequency 3-5x/wk   Recommendation   PT Discharge Recommendation Post acute rehabilitation services   Equipment Recommended [facility] Recommended Wheeled walker   AM-PAC Basic Mobility Inpatient   Turning in Bed Without Bedrails 3   Lying on Back to Sitting on Edge of Flat Bed 4   Moving Bed to Chair 3   Standing Up From Chair 3   Walk in Room 3   Climb 3-5 Stairs 3   Basic Mobility Inpatient Raw Score 19   Basic Mobility Standardized Score 42 48   Highest Level Of Mobility   JH-HLM Goal 6: Walk 10 steps or more   JH-HLM Achieved 8: Walk 250 feet ot more   Education   Education Provided Mobility training;Assistive device; Other  (stair trials)   Patient Demonstrates acceptance/verbal understanding   End of Consult   Patient Position at End of Consult Supine; All needs within reach;Bed/Chair alarm activated   The patient's AM-PAC Basic Mobility Inpatient Short Form Raw Score is 19  A Raw score of greater than 16 suggests the patient may benefit from discharge to home  Please also refer to the recommendation of the Physical Therapist for safe discharge planning       Delores West, PTA

## 2022-08-02 NOTE — CASE MANAGEMENT
Case Management Progress Note    Patient name Addie Joshua  Location S /S -01 MRN [id number]  : 1985 Date 2022       LOS (days): 15  Geometric Mean LOS (GMLOS) (days): 4 70  Days to GMLOS:-10 1        OBJECTIVE:        Current admission status: Inpatient  Preferred Pharmacy:   [id number] [facility], [facility] access [facility] [address]  Phone: [phone] Fax: [phone]    [address]  Phone: [phone] Fax: [phone]    Primary Care Provider: Janelle Sy PA-C    Primary Insurance:   Secondary Insurance:     PROGRESS NOTE:    CM left a voice message for sister to return call to discuss patient's progress and updates since he's doing so much better with therapy and to inquire if he can return home with parents as that is patient's wish

## 2022-08-02 NOTE — DISCHARGE SUMMARY
[facility]  Discharge- Douglas Lesches 1985, 40 y o  male MRN: [id number]  Unit/Bed#: S -01 Encounter: [id number]  Primary Care Provider: Alexandra Gregg PA-C   Date and time admitted to hospital: 7/18/2022  3:22 PM    * Alcoholic cirrhosis of liver without ascites ([address] )  Assessment & Plan  s/p EGD; 4 bands for 2 varices; no active bleed  Started on nadolol 40 mg qd  2 FFP given prior to EGD  Continue nadalol 40 mg        Plan:   Protonix PO BID  lasix PO 40 q 12  Aldactone 100mg    Follow up as outpatient with PCP  Thrombocytopenia [facility])  Assessment & Plan  Lab Results   Component Value Date     07/28/2022     07/27/2022     (L) 07/26/2022     (L) 07/25/2022     (L) 07/24/2022     Thrombocytopenia most likely 2/2 liver cirrhosis   Platelet count is normal and stable  Anemia  Assessment & Plan  Iron panel showing low iron levels with normal TIBC  Heme occult blood negative in the ED  EGD no active bleed; 4 bands placed for 2 varices during this admission  Received 7 units total pRBCs since admission    Hemoglobin currently stable  7 5    Tobacco dependence  Assessment & Plan  Nicotine patch    Substance abuse (HCC)  Assessment & Plan  Continue 8mg Suboxone BID    Chronic bronchitis (HCC)  Assessment & Plan  Continue home inhalers for asthma    Ambulatory dysfunction  Assessment & Plan  Secondary to illness/hospitalization and physical deconditioning   PT/ OT recommended postacute rehab  Continued PT while inpatient  Depression, concern for PTSD  Assessment & Plan    Continue desvenlafaxine(Pristiq) 50 mg q24h    Continue clonidine 0 1 mg TID PRN  Follow up with a Pschychistrist      Thoracoabdominal aortic aneurysm [facility])  Assessment & Plan  7/18/21 - CT findings; stable 54 mm  On chart review patient had a CTA on January 2021 with noted 44 mm AAA    Plan:  Outpatient follow-up with primary care physician      Alcohol withdrawal [facility])  Assessment & Plan  Continue thiamine and folic acid          VTE Pharmacologic Prophylaxis: VTE Score: 1 Low Risk (Score 0-2) - Encourage Ambulation  Patient Centered Rounds: I performed bedside rounds with nursing staff today  Discussions with Specialists or Other Care Team Provider:       Current Length of Stay: 15 day(s)  Current Patient Status: Inpatient       Code Status: Level 1 - Full Code    Subjective:       Objective:     Vitals:   Temp (24hrs), Av 2 °F (36 8 °C), Min:97 9 °F (36 6 °C), Max:98 5 °F (36 9 °C)    Temp:  [97 9 °F (36 6 °C)-98 5 °F (36 9 °C)] 97 9 °F (36 6 °C)  HR:  [69-70] 70  Resp:  [16] 16  BP: (109-113)/(57-61) 109/61  SpO2:  [97 %-98 %] 97 %  Body mass index is 24 93 kg/m²  Input and Output Summary (last 24 hours): Intake/Output Summary (Last 24 hours) at 2022 1818  Last data filed at 2022 1230  Gross per 24 hour   Intake 880 ml   Output 1325 ml   Net -445 ml       Physical Exam:   Physical Exam  Constitutional:       Appearance: Normal appearance  HENT:      Head: Normocephalic and atraumatic  Nose: Nose normal       Mouth/Throat:      Mouth: Mucous membranes are moist       Pharynx: Oropharynx is clear  Eyes:      Conjunctiva/sclera: Conjunctivae normal    Cardiovascular:      Rate and Rhythm: Normal rate and regular rhythm  Pulses: Normal pulses  Heart sounds: Normal heart sounds  Pulmonary:      Effort: Pulmonary effort is normal       Breath sounds: Normal breath sounds  Abdominal:      General: Abdomen is flat  Bowel sounds are normal       Palpations: Abdomen is soft  Skin:     General: Skin is warm and dry  Capillary Refill: Capillary refill takes less than 2 seconds  Neurological:      General: No focal deficit present  Mental Status: He is alert and oriented to person, place, and time     Psychiatric:         Mood and Affect: Mood normal          Behavior: Behavior normal           Additional Data: Labs:  Results from last 7 days   Lab Units 08/02/22  0524   WBC Thousand/uL 6 15   HEMOGLOBIN g/dL 8 4*   HEMATOCRIT % 26 5*   PLATELETS Thousands/uL 229   NEUTROS PCT % 65   LYMPHS PCT % 19   MONOS PCT % 12   EOS PCT % 2     Results from last 7 days   Lab Units 08/02/22  0524   SODIUM mmol/L 130*   POTASSIUM mmol/L 3 9   CHLORIDE mmol/L 95*   CO2 mmol/L 28   BUN mg/dL 10   CREATININE mg/dL 0 55*   ANION GAP mmol/L 7   CALCIUM mg/dL 9 8   ALBUMIN g/dL 4 1   TOTAL BILIRUBIN mg/dL 6 66*   ALK PHOS U/L 163*   ALT U/L 44   AST U/L 117*   GLUCOSE RANDOM mg/dL 118     Results from last 7 days   Lab Units 08/02/22  0524   INR  1 76*                   Lines/Drains:  Invasive Devices  Report    None                       Imaging: Reviewed radiology reports from this admission including: abdominal/pelvic CT    Recent Cultures (last 7 days):         Last 24 Hours Medication List:   Current Facility-Administered Medications   Medication Dose Route Frequency Provider Last Rate    albuterol  2 puff Inhalation Q6H PRN Mariano Grooms, DO      buprenorphine-naloxone  8 mg Sublingual BID Mariano Grooms, DO      cloNIDine  0 1 mg Oral HS Mariano Grooms, DO      desvenlafaxine succinate  50 mg Oral Daily Mariano Grooms, DO      diphenhydrAMINE  25 mg Intravenous Q6H PRN Mariano Grooms, DO      ferrous sulfate  325 mg Oral Daily With Breakfast Mariano Grooms, DO      folic acid  1 mg Oral Daily Mariano Grooms, DO      furosemide  40 mg Oral Daily Seth Green MD      gabapentin  300 mg Oral BID PRN Mariano Grooms, DO      glycerin-hypromellose-  1 drop Both Eyes Q3H PRN Mariano Grooms, DO      lactulose  20 g Oral BID Mariano Grooms, DO      LORazepam  1 mg Oral Q8H PRN Mariano Grooms, DO      melatonin  6 mg Oral HS Mariano Grooms, DO      multivitamin-minerals  1 tablet Oral Daily Mariano Grooms, DO      nadolol  40 mg Oral Daily Mariano Grooms, DO      nicotine  1 patch Transdermal Daily Mariano Grooms, DO  ondansetron  4 mg Oral Q6H PRN Evelin Bhakta MD      pantoprazole  40 mg Oral Early Morning Seth Kim MD      spironolactone  100 mg Oral Daily Seth Kim MD      thiamine  100 mg Oral Daily Angie Rogel DO          Today, Patient Was Seen By: Jan Loving MD

## 2022-08-02 NOTE — CASE MANAGEMENT
Case Management Discharge Planning Note    Patient name Nick Rockwell  Location S /S -35 MRN [id number]  : 1985 Date 2022       Current Admission Date: 2022  Current Admission Diagnosis:Alcoholic cirrhosis of liver without ascites [facility])   Patient Active Problem List    Diagnosis Date Noted    Ambulatory dysfunction 2022    Encephalopathy 2022    Chronic bronchitis ([facility] ) 2021    Cardiac murmur 2021    Depression, concern for PTSD 2021    Anasarca 2021    Alcohol withdrawal ([facility] ) 2021    Thoracoabdominal aortic aneurysm ([facility] ) 2021    Abnormal CT scan 2021    Substance abuse ([facility] ) 2021    Tobacco dependence     Alcoholic cirrhosis of liver without ascites ([facility] ) 2021    Anemia 2021    Thrombocytopenia ([facility] ) 2021    Mass of upper lobe of left lung 2019    Essential hypertension 2019    Obesity, Class I, BMI 30-34 9 2019      LOS (days): 15  Geometric Mean LOS (GMLOS) (days): 4 70  Days to GMLOS:-10 2     OBJECTIVE:  Risk of Unplanned Readmission Score: 24 74         Current admission status: Inpatient   Preferred Pharmacy:   [id number]- [id number] [facility], [address] [place] [address]  Phone: [phone] Fax: [address] [address]  Phone: [phone] Fax: [address] ([place]) [place] [address]  Phone: [phone] Fax: [phone]    Primary Care Provider: Jeny Murray PA-C    Primary Insurance:   Secondary Insurance:     DISCHARGE DETAILS:    Discharge planning discussed with[de-identified] Patient, sister Damián Nix and Sharlotte Meme, SW with ACT Team  Freedom of Choice: Yes  Comments - Freedom of Choice: CM informed all parties noted above that patient is medically stable for discharge and as per discharge recommendations from the care team, patient is stable to return home  CM informed patient that he'll need to contact the [place] to request a PCP so that he can visit the PCP who will in then determine if he meets criteria for [facility]  CM offered to [facility] with [facility], but patient refused saying that he'll make the call once he returns home  CM provided the patient and sister with the application for PTSD treatment if he's interested as out-patient within the [place]  CM spoke to patient to inquire if he needs a walker but he reported he has a walker at home  Patient requested CM to set up transport as he does not have anyone who can pick him up  CM to work on transport via [address]  CM contacted family/caregiver?: Yes  Were Treatment Team discharge recommendations reviewed with patient/caregiver?: Yes  Did patient/caregiver verbalize understanding of patient care needs?: Yes  Were patient/caregiver advised of the risks associated with not following Treatment Team discharge recommendations?: Yes    Contacts  Patient Contacts: Nancy Coats (sister)  Relationship to Patient[de-identified] Family  Contact Method: Phone  Phone Number: [phone]  Reason/Outcome: Discharge [address]         Is the patient interested in [facility] at discharge?: No    DME Referral Provided  Referral made for DME?: No    Other Referral/Resources/Interventions Provided:  Referral Comments: CM informed all parties noted above that patient is medically stable for discharge and as per discharge recommendations from the care team, patient is stable to return home  CM informed patient that he'll need to contact the [place] to request a PCP so that he can visit the PCP who will in then determine if he meets criteria for [facility]  CM offered to assist patient with calling the [facility], but patient refused saying that he'll make the call once he returns home   CM provided the patient and sister with the application for PTSD treatment if he's interested as out-patient within the [place]  CM spoke to patient to inquire if he needs a walker but he reported he has a walker at home  Patient requested CM to set up transport as he does not have anyone who can pick him up  CM to work on transport via [address]  Treatment Team Recommendation: Home  Discharge Destination Plan[de-identified] Home  Transport at Discharge : Other (Comment)  Dispatcher Contacted: Yes  Number/Name of Dispatcher: [facility] via [employer]     ETA of Transport (Date): 08/02/22  ETA of Transport (Time): 1600     Transfer Mode: Wheelchair  Accompanied by: Other (Comment) (/taxi)        CM called [address] [place] [address] to inquire on cost of meds as patient will pay via debit card  As per [address], his medication total cost is $103 23 and patient in agreement with cost      CM called [facility] to arrange for transport via [facility] for 4:30   As per SIL[facility], LYFT should arrive between 4:30 to 4:45  Nurse will arrange w/c transport to [address] and take patient to front entrance of hospital for LYFT

## 2022-08-03 ENCOUNTER — TELEPHONE (OUTPATIENT)
Dept: FAMILY MEDICINE CLINIC | Facility: CLINIC | Age: 37
End: 2022-08-03

## 2022-08-03 ENCOUNTER — TRANSITIONAL CARE MANAGEMENT (OUTPATIENT)
Dept: FAMILY MEDICINE CLINIC | Facility: CLINIC | Age: 37
End: 2022-08-03

## 2022-08-03 RX ORDER — ESCITALOPRAM OXALATE 10 MG/1
TABLET ORAL
Status: ON HOLD | COMMUNITY
Start: 2022-06-19 | End: 2022-08-17 | Stop reason: SDUPTHER

## 2022-08-03 NOTE — TELEPHONE ENCOUNTER
Left message for patient to call and schedule  Benzoyl Peroxide Pregnancy And Lactation Text: This medication is Pregnancy Category C. It is unknown if benzoyl peroxide is excreted in breast milk.

## 2022-08-04 ENCOUNTER — TELEPHONE (OUTPATIENT)
Dept: PSYCHIATRY | Facility: CLINIC | Age: 37
End: 2022-08-04

## 2022-08-04 NOTE — TELEPHONE ENCOUNTER
contacted patient in regards to referral and in attempts to add patient to [facility] waitlist  spoke w  patient whom disclosed they would  like to waitlisted for both tlk therapy and med mgmt

## 2022-08-08 ENCOUNTER — APPOINTMENT (EMERGENCY)
Dept: RADIOLOGY | Facility: HOSPITAL | Age: 37
DRG: 816 | End: 2022-08-08
Payer: COMMERCIAL

## 2022-08-08 ENCOUNTER — HOSPITAL ENCOUNTER (INPATIENT)
Facility: HOSPITAL | Age: 37
LOS: 9 days | Discharge: HOME | DRG: 816 | End: 2022-08-17
Attending: EMERGENCY MEDICINE | Admitting: INTERNAL MEDICINE
Payer: COMMERCIAL

## 2022-08-08 DIAGNOSIS — K70.30 ALCOHOLIC CIRRHOSIS OF LIVER WITHOUT ASCITES (HCC): ICD-10-CM

## 2022-08-08 DIAGNOSIS — G93.40 ENCEPHALOPATHY: ICD-10-CM

## 2022-08-08 DIAGNOSIS — F17.200 TOBACCO DEPENDENCE: ICD-10-CM

## 2022-08-08 DIAGNOSIS — R60.1 ANASARCA: ICD-10-CM

## 2022-08-08 DIAGNOSIS — D64.9 ANEMIA, UNSPECIFIED TYPE: ICD-10-CM

## 2022-08-08 DIAGNOSIS — F10.921 ALCOHOL INTOXICATION WITH DELIRIUM (HCC): ICD-10-CM

## 2022-08-08 DIAGNOSIS — R45.851 SUICIDAL IDEATION: ICD-10-CM

## 2022-08-08 DIAGNOSIS — E87.8 ELECTROLYTE ABNORMALITY: ICD-10-CM

## 2022-08-08 DIAGNOSIS — F32.A DEPRESSION: Chronic | ICD-10-CM

## 2022-08-08 DIAGNOSIS — F10.929 ACUTE ALCOHOL INTOXICATION (HCC): Primary | ICD-10-CM

## 2022-08-08 DIAGNOSIS — J96.90 RESPIRATORY FAILURE REQUIRING INTUBATION (HCC): ICD-10-CM

## 2022-08-08 DIAGNOSIS — F41.9 ANXIETY: ICD-10-CM

## 2022-08-08 PROBLEM — E72.20 HYPERAMMONEMIA (HCC): Status: ACTIVE | Noted: 2022-08-08

## 2022-08-08 PROBLEM — J96.00 ACUTE RESPIRATORY FAILURE (HCC): Status: ACTIVE | Noted: 2021-01-19

## 2022-08-08 PROBLEM — G92.8 TOXIC METABOLIC ENCEPHALOPATHY: Status: ACTIVE | Noted: 2022-07-25

## 2022-08-08 LAB
2HR DELTA HS TROPONIN: 0 NG/L
4HR DELTA HS TROPONIN: 0 NG/L
ALBUMIN SERPL BCP-MCNC: 3.9 G/DL (ref 3.5–5)
ALP SERPL-CCNC: 182 U/L (ref 46–116)
ALT SERPL W P-5'-P-CCNC: 67 U/L (ref 12–78)
AMMONIA PLAS-SCNC: 71 UMOL/L (ref 11–35)
AMPHETAMINES SERPL QL SCN: NEGATIVE
ANION GAP SERPL CALCULATED.3IONS-SCNC: 15 MMOL/L (ref 4–13)
APAP SERPL-MCNC: <2 UG/ML (ref 10–20)
APTT PPP: 46 SECONDS (ref 23–37)
AST SERPL W P-5'-P-CCNC: 161 U/L (ref 5–45)
BACTERIA UR QL AUTO: ABNORMAL /HPF
BARBITURATES UR QL: NEGATIVE
BASE EXCESS BLDA CALC-SCNC: -2.5 MMOL/L
BASOPHILS # BLD MANUAL: 0.11 THOUSAND/UL (ref 0–0.1)
BASOPHILS NFR MAR MANUAL: 1 % (ref 0–1)
BENZODIAZ UR QL: NEGATIVE
BILIRUB SERPL-MCNC: 5.33 MG/DL (ref 0.2–1)
BILIRUB UR QL STRIP: ABNORMAL
BODY TEMPERATURE: 97.1 DEGREES FEHRENHEIT
BUN SERPL-MCNC: 6 MG/DL (ref 5–25)
CALCIUM SERPL-MCNC: 8.4 MG/DL (ref 8.3–10.1)
CARDIAC TROPONIN I PNL SERPL HS: 5 NG/L
CHLORIDE SERPL-SCNC: 94 MMOL/L (ref 96–108)
CK SERPL-CCNC: 90 U/L (ref 39–308)
CLARITY UR: CLEAR
CO2 SERPL-SCNC: 25 MMOL/L (ref 21–32)
COCAINE UR QL: NEGATIVE
COLOR UR: ABNORMAL
CREAT SERPL-MCNC: 0.63 MG/DL (ref 0.6–1.3)
EOSINOPHIL # BLD MANUAL: 0 THOUSAND/UL (ref 0–0.4)
EOSINOPHIL NFR BLD MANUAL: 0 % (ref 0–6)
ERYTHROCYTE [DISTWIDTH] IN BLOOD BY AUTOMATED COUNT: 30 % (ref 11.6–15.1)
ETHANOL SERPL-MCNC: 469 MG/DL (ref 0–3)
GFR SERPL CREATININE-BSD FRML MDRD: 125 ML/MIN/1.73SQ M
GLUCOSE SERPL-MCNC: 144 MG/DL (ref 65–140)
GLUCOSE SERPL-MCNC: 148 MG/DL (ref 65–140)
GLUCOSE UR STRIP-MCNC: NEGATIVE MG/DL
HCO3 BLDA-SCNC: 22.7 MMOL/L (ref 22–28)
HCT VFR BLD AUTO: 30.3 % (ref 36.5–49.3)
HGB BLD-MCNC: 9.6 G/DL (ref 12–17)
HGB UR QL STRIP.AUTO: ABNORMAL
HYALINE CASTS #/AREA URNS LPF: ABNORMAL /LPF
HYPERCHROMIA BLD QL SMEAR: PRESENT
INR PPP: 1.57 (ref 0.84–1.19)
KETONES UR STRIP-MCNC: NEGATIVE MG/DL
LACTATE SERPL-SCNC: 3.4 MMOL/L (ref 0.5–2)
LACTATE SERPL-SCNC: 4.7 MMOL/L (ref 0.5–2)
LACTATE SERPL-SCNC: 5.1 MMOL/L (ref 0.5–2)
LEUKOCYTE ESTERASE UR QL STRIP: NEGATIVE
LYMPHOCYTES # BLD AUTO: 1.07 THOUSAND/UL (ref 0.6–4.47)
LYMPHOCYTES # BLD AUTO: 10 % (ref 14–44)
MAGNESIUM SERPL-MCNC: 1.5 MG/DL (ref 1.6–2.6)
MCH RBC QN AUTO: 26.5 PG (ref 26.8–34.3)
MCHC RBC AUTO-ENTMCNC: 31.4 G/DL (ref 31.4–37.4)
MCV RBC AUTO: 84 FL (ref 82–98)
METHADONE UR QL: NEGATIVE
MONOCYTES # BLD AUTO: 0.86 THOUSAND/UL (ref 0–1.22)
MONOCYTES NFR BLD: 8 % (ref 4–12)
NEUTROPHILS # BLD MANUAL: 8.68 THOUSAND/UL (ref 1.85–7.62)
NEUTS SEG NFR BLD AUTO: 81 % (ref 43–75)
NITRITE UR QL STRIP: NEGATIVE
NON-SQ EPI CELLS URNS QL MICRO: ABNORMAL /HPF
O2 CT BLDA-SCNC: 13.7 ML/DL (ref 16–23)
OPIATES UR QL SCN: NEGATIVE
OXYCODONE+OXYMORPHONE UR QL SCN: NEGATIVE
OXYHGB MFR BLDA: 94.9 % (ref 94–97)
PCO2 BLDA: 40.7 MM HG (ref 36–44)
PCO2 TEMP ADJ BLDA: 39.3 MM HG (ref 36–44)
PCP UR QL: NEGATIVE
PH BLD: 7.38 [PH] (ref 7.35–7.45)
PH BLDA: 7.36 [PH] (ref 7.35–7.45)
PH UR STRIP.AUTO: 6 [PH]
PLATELET # BLD AUTO: 181 THOUSANDS/UL (ref 149–390)
PLATELET # BLD AUTO: 251 THOUSANDS/UL (ref 149–390)
PLATELET BLD QL SMEAR: ADEQUATE
PMV BLD AUTO: 8.4 FL (ref 8.9–12.7)
PMV BLD AUTO: 8.5 FL (ref 8.9–12.7)
PO2 BLD: 89.3 MM HG (ref 75–129)
PO2 BLDA: 93.8 MM HG (ref 75–129)
POIKILOCYTOSIS BLD QL SMEAR: PRESENT
POLYCHROMASIA BLD QL SMEAR: PRESENT
POTASSIUM SERPL-SCNC: 3.5 MMOL/L (ref 3.5–5.3)
PROT SERPL-MCNC: 8.7 G/DL (ref 6.4–8.4)
PROT UR STRIP-MCNC: ABNORMAL MG/DL
PROTHROMBIN TIME: 18.8 SECONDS (ref 11.6–14.5)
RBC # BLD AUTO: 3.59 MILLION/UL (ref 3.88–5.62)
RBC #/AREA URNS AUTO: ABNORMAL /HPF
RBC MORPH BLD: PRESENT
ROULEAUX BLD QL SMEAR: PRESENT
SALICYLATES SERPL-MCNC: <3 MG/DL (ref 3–20)
SARS-COV-2 RNA RESP QL NAA+PROBE: NEGATIVE
SCHISTOCYTES BLD QL SMEAR: PRESENT
SODIUM SERPL-SCNC: 134 MMOL/L (ref 135–147)
SP GR UR STRIP.AUTO: >=1.03 (ref 1–1.03)
TARGETS BLD QL SMEAR: PRESENT
THC UR QL: NEGATIVE
UROBILINOGEN UR QL STRIP.AUTO: 4 E.U./DL
WBC # BLD AUTO: 10.71 THOUSAND/UL (ref 4.31–10.16)
WBC #/AREA URNS AUTO: ABNORMAL /HPF

## 2022-08-08 PROCEDURE — 84484 ASSAY OF TROPONIN QUANT: CPT | Performed by: PHYSICIAN ASSISTANT

## 2022-08-08 PROCEDURE — 94760 N-INVAS EAR/PLS OXIMETRY 1: CPT

## 2022-08-08 PROCEDURE — 80053 COMPREHEN METABOLIC PANEL: CPT | Performed by: EMERGENCY MEDICINE

## 2022-08-08 PROCEDURE — 80179 DRUG ASSAY SALICYLATE: CPT | Performed by: EMERGENCY MEDICINE

## 2022-08-08 PROCEDURE — U0003 INFECTIOUS AGENT DETECTION BY NUCLEIC ACID (DNA OR RNA); SEVERE ACUTE RESPIRATORY SYNDROME CORONAVIRUS 2 (SARS-COV-2) (CORONAVIRUS DISEASE [COVID-19]), AMPLIFIED PROBE TECHNIQUE, MAKING USE OF HIGH THROUGHPUT TECHNOLOGIES AS DESCRIBED BY CMS-2020-01-R: HCPCS | Performed by: EMERGENCY MEDICINE

## 2022-08-08 PROCEDURE — U0005 INFEC AGEN DETEC AMPLI PROBE: HCPCS | Performed by: EMERGENCY MEDICINE

## 2022-08-08 PROCEDURE — 82550 ASSAY OF CK (CPK): CPT | Performed by: EMERGENCY MEDICINE

## 2022-08-08 PROCEDURE — 96360 HYDRATION IV INFUSION INIT: CPT

## 2022-08-08 PROCEDURE — 99291 CRITICAL CARE FIRST HOUR: CPT | Performed by: EMERGENCY MEDICINE

## 2022-08-08 PROCEDURE — 82077 ASSAY SPEC XCP UR&BREATH IA: CPT | Performed by: EMERGENCY MEDICINE

## 2022-08-08 PROCEDURE — 94002 VENT MGMT INPAT INIT DAY: CPT

## 2022-08-08 PROCEDURE — 0BH17EZ INSERTION OF ENDOTRACHEAL AIRWAY INTO TRACHEA, VIA NATURAL OR ARTIFICIAL OPENING: ICD-10-PCS | Performed by: EMERGENCY MEDICINE

## 2022-08-08 PROCEDURE — 99285 EMERGENCY DEPT VISIT HI MDM: CPT

## 2022-08-08 PROCEDURE — 83605 ASSAY OF LACTIC ACID: CPT | Performed by: PHYSICIAN ASSISTANT

## 2022-08-08 PROCEDURE — 83605 ASSAY OF LACTIC ACID: CPT | Performed by: EMERGENCY MEDICINE

## 2022-08-08 PROCEDURE — 31500 INSERT EMERGENCY AIRWAY: CPT | Performed by: EMERGENCY MEDICINE

## 2022-08-08 PROCEDURE — 85730 THROMBOPLASTIN TIME PARTIAL: CPT | Performed by: EMERGENCY MEDICINE

## 2022-08-08 PROCEDURE — 36415 COLL VENOUS BLD VENIPUNCTURE: CPT | Performed by: EMERGENCY MEDICINE

## 2022-08-08 PROCEDURE — 82140 ASSAY OF AMMONIA: CPT | Performed by: EMERGENCY MEDICINE

## 2022-08-08 PROCEDURE — 99291 CRITICAL CARE FIRST HOUR: CPT | Performed by: PHYSICIAN ASSISTANT

## 2022-08-08 PROCEDURE — 80143 DRUG ASSAY ACETAMINOPHEN: CPT | Performed by: EMERGENCY MEDICINE

## 2022-08-08 PROCEDURE — 87040 BLOOD CULTURE FOR BACTERIA: CPT | Performed by: EMERGENCY MEDICINE

## 2022-08-08 PROCEDURE — 80307 DRUG TEST PRSMV CHEM ANLYZR: CPT | Performed by: EMERGENCY MEDICINE

## 2022-08-08 PROCEDURE — 83735 ASSAY OF MAGNESIUM: CPT | Performed by: EMERGENCY MEDICINE

## 2022-08-08 PROCEDURE — 82948 REAGENT STRIP/BLOOD GLUCOSE: CPT

## 2022-08-08 PROCEDURE — 70450 CT HEAD/BRAIN W/O DYE: CPT

## 2022-08-08 PROCEDURE — 85007 BL SMEAR W/DIFF WBC COUNT: CPT | Performed by: EMERGENCY MEDICINE

## 2022-08-08 PROCEDURE — 31500 INSERT EMERGENCY AIRWAY: CPT

## 2022-08-08 PROCEDURE — 85610 PROTHROMBIN TIME: CPT | Performed by: EMERGENCY MEDICINE

## 2022-08-08 PROCEDURE — 83930 ASSAY OF BLOOD OSMOLALITY: CPT | Performed by: EMERGENCY MEDICINE

## 2022-08-08 PROCEDURE — 94150 VITAL CAPACITY TEST: CPT

## 2022-08-08 PROCEDURE — 85049 AUTOMATED PLATELET COUNT: CPT | Performed by: PHYSICIAN ASSISTANT

## 2022-08-08 PROCEDURE — 84484 ASSAY OF TROPONIN QUANT: CPT | Performed by: EMERGENCY MEDICINE

## 2022-08-08 PROCEDURE — 71045 X-RAY EXAM CHEST 1 VIEW: CPT

## 2022-08-08 PROCEDURE — 93005 ELECTROCARDIOGRAM TRACING: CPT

## 2022-08-08 PROCEDURE — 85027 COMPLETE CBC AUTOMATED: CPT | Performed by: EMERGENCY MEDICINE

## 2022-08-08 PROCEDURE — 87081 CULTURE SCREEN ONLY: CPT | Performed by: ANESTHESIOLOGY

## 2022-08-08 PROCEDURE — 5A1935Z RESPIRATORY VENTILATION, LESS THAN 24 CONSECUTIVE HOURS: ICD-10-PCS | Performed by: EMERGENCY MEDICINE

## 2022-08-08 PROCEDURE — 81001 URINALYSIS AUTO W/SCOPE: CPT | Performed by: EMERGENCY MEDICINE

## 2022-08-08 PROCEDURE — 82805 BLOOD GASES W/O2 SATURATION: CPT | Performed by: EMERGENCY MEDICINE

## 2022-08-08 RX ORDER — PROPOFOL 10 MG/ML
5-50 INJECTION, EMULSION INTRAVENOUS
Status: DISCONTINUED | OUTPATIENT
Start: 2022-08-08 | End: 2022-08-09

## 2022-08-08 RX ORDER — SODIUM CHLORIDE, SODIUM GLUCONATE, SODIUM ACETATE, POTASSIUM CHLORIDE, MAGNESIUM CHLORIDE, SODIUM PHOSPHATE, DIBASIC, AND POTASSIUM PHOSPHATE .53; .5; .37; .037; .03; .012; .00082 G/100ML; G/100ML; G/100ML; G/100ML; G/100ML; G/100ML; G/100ML
75 INJECTION, SOLUTION INTRAVENOUS CONTINUOUS
Status: DISCONTINUED | OUTPATIENT
Start: 2022-08-08 | End: 2022-08-09

## 2022-08-08 RX ORDER — SODIUM CHLORIDE, SODIUM GLUCONATE, SODIUM ACETATE, POTASSIUM CHLORIDE, MAGNESIUM CHLORIDE, SODIUM PHOSPHATE, DIBASIC, AND POTASSIUM PHOSPHATE .53; .5; .37; .037; .03; .012; .00082 G/100ML; G/100ML; G/100ML; G/100ML; G/100ML; G/100ML; G/100ML
1000 INJECTION, SOLUTION INTRAVENOUS ONCE
Status: COMPLETED | OUTPATIENT
Start: 2022-08-08 | End: 2022-08-08

## 2022-08-08 RX ORDER — PANTOPRAZOLE SODIUM 40 MG/10ML
40 INJECTION, POWDER, LYOPHILIZED, FOR SOLUTION INTRAVENOUS
Status: DISCONTINUED | OUTPATIENT
Start: 2022-08-09 | End: 2022-08-09

## 2022-08-08 RX ORDER — CHLORHEXIDINE GLUCONATE 0.12 MG/ML
15 RINSE ORAL EVERY 12 HOURS SCHEDULED
Status: DISCONTINUED | OUTPATIENT
Start: 2022-08-08 | End: 2022-08-09

## 2022-08-08 RX ORDER — NICOTINE 21 MG/24HR
1 PATCH, TRANSDERMAL 24 HOURS TRANSDERMAL DAILY
Status: DISCONTINUED | OUTPATIENT
Start: 2022-08-09 | End: 2022-08-09

## 2022-08-08 RX ORDER — MAGNESIUM SULFATE HEPTAHYDRATE 40 MG/ML
2 INJECTION, SOLUTION INTRAVENOUS ONCE
Status: COMPLETED | OUTPATIENT
Start: 2022-08-08 | End: 2022-08-08

## 2022-08-08 RX ORDER — LACTULOSE 20 G/30ML
20 SOLUTION ORAL 2 TIMES DAILY
Status: DISCONTINUED | OUTPATIENT
Start: 2022-08-08 | End: 2022-08-11

## 2022-08-08 RX ORDER — HEPARIN SODIUM 5000 [USP'U]/ML
5000 INJECTION, SOLUTION INTRAVENOUS; SUBCUTANEOUS EVERY 8 HOURS SCHEDULED
Status: DISCONTINUED | OUTPATIENT
Start: 2022-08-08 | End: 2022-08-17 | Stop reason: HOSPADM

## 2022-08-08 RX ORDER — NADOLOL 40 MG/1
40 TABLET ORAL DAILY
Status: DISCONTINUED | OUTPATIENT
Start: 2022-08-09 | End: 2022-08-17 | Stop reason: HOSPADM

## 2022-08-08 RX ADMIN — SODIUM CHLORIDE, SODIUM GLUCONATE, SODIUM ACETATE, POTASSIUM CHLORIDE, MAGNESIUM CHLORIDE, SODIUM PHOSPHATE, DIBASIC, AND POTASSIUM PHOSPHATE 75 ML/HR: .53; .5; .37; .037; .03; .012; .00082 INJECTION, SOLUTION INTRAVENOUS at 22:18

## 2022-08-08 RX ADMIN — CHLORHEXIDINE GLUCONATE 15 ML: 1.2 RINSE ORAL at 23:29

## 2022-08-08 RX ADMIN — SODIUM CHLORIDE 1000 ML: 0.9 INJECTION, SOLUTION INTRAVENOUS at 18:12

## 2022-08-08 RX ADMIN — HEPARIN SODIUM 5000 UNITS: 5000 INJECTION INTRAVENOUS; SUBCUTANEOUS at 21:20

## 2022-08-08 RX ADMIN — THIAMINE HYDROCHLORIDE 500 MG: 100 INJECTION, SOLUTION INTRAMUSCULAR; INTRAVENOUS at 23:29

## 2022-08-08 RX ADMIN — SODIUM CHLORIDE, SODIUM GLUCONATE, SODIUM ACETATE, POTASSIUM CHLORIDE, MAGNESIUM CHLORIDE, SODIUM PHOSPHATE, DIBASIC, AND POTASSIUM PHOSPHATE 1000 ML: .53; .5; .37; .037; .03; .012; .00082 INJECTION, SOLUTION INTRAVENOUS at 20:11

## 2022-08-08 RX ADMIN — FOLIC ACID 1 MG: 5 INJECTION, SOLUTION INTRAMUSCULAR; INTRAVENOUS; SUBCUTANEOUS at 22:21

## 2022-08-08 RX ADMIN — PROPOFOL 10 MCG/KG/MIN: 10 INJECTION, EMULSION INTRAVENOUS at 21:11

## 2022-08-08 RX ADMIN — MAGNESIUM SULFATE HEPTAHYDRATE 2 G: 40 INJECTION, SOLUTION INTRAVENOUS at 20:14

## 2022-08-08 RX ADMIN — LACTULOSE 20 G: 20 SOLUTION ORAL at 21:20

## 2022-08-08 RX ADMIN — SODIUM CHLORIDE, SODIUM GLUCONATE, SODIUM ACETATE, POTASSIUM CHLORIDE, MAGNESIUM CHLORIDE, SODIUM PHOSPHATE, DIBASIC, AND POTASSIUM PHOSPHATE 1000 ML: .53; .5; .37; .037; .03; .012; .00082 INJECTION, SOLUTION INTRAVENOUS at 21:12

## 2022-08-08 NOTE — LETTER
To: [address]  From: Kristel Saldivar, [place] [phone]    Clinical for Travis Amberon as requested  Please let me know if any additional information is needed      Thank you

## 2022-08-08 NOTE — RESPIRATORY THERAPY NOTE
Patient intubated size 7 0 ETT secured 23 at the lip  Colorimeter positive for color change, placed on full support vent settings per Dr Mckinnon Hubert

## 2022-08-09 ENCOUNTER — TELEPHONE (OUTPATIENT)
Dept: PSYCHIATRY | Facility: CLINIC | Age: 37
End: 2022-08-09

## 2022-08-09 LAB
ALBUMIN SERPL BCP-MCNC: 3.1 G/DL (ref 3.5–5)
ALP SERPL-CCNC: 152 U/L (ref 46–116)
ALT SERPL W P-5'-P-CCNC: 51 U/L (ref 12–78)
AMMONIA PLAS-SCNC: 42 UMOL/L (ref 11–35)
ANION GAP SERPL CALCULATED.3IONS-SCNC: 11 MMOL/L (ref 4–13)
AST SERPL W P-5'-P-CCNC: 122 U/L (ref 5–45)
BASOPHILS # BLD AUTO: 0.04 THOUSANDS/ΜL (ref 0–0.1)
BASOPHILS NFR BLD AUTO: 0 % (ref 0–1)
BILIRUB SERPL-MCNC: 4.72 MG/DL (ref 0.2–1)
BUN SERPL-MCNC: 4 MG/DL (ref 5–25)
CALCIUM ALBUM COR SERPL-MCNC: 8.4 MG/DL (ref 8.3–10.1)
CALCIUM SERPL-MCNC: 7.7 MG/DL (ref 8.3–10.1)
CHLORIDE SERPL-SCNC: 97 MMOL/L (ref 96–108)
CO2 SERPL-SCNC: 27 MMOL/L (ref 21–32)
CREAT SERPL-MCNC: 0.47 MG/DL (ref 0.6–1.3)
EOSINOPHIL # BLD AUTO: 0.01 THOUSAND/ΜL (ref 0–0.61)
EOSINOPHIL NFR BLD AUTO: 0 % (ref 0–6)
ERYTHROCYTE [DISTWIDTH] IN BLOOD BY AUTOMATED COUNT: 30.1 % (ref 11.6–15.1)
GFR SERPL CREATININE-BSD FRML MDRD: 142 ML/MIN/1.73SQ M
GLUCOSE SERPL-MCNC: 120 MG/DL (ref 65–140)
HCT VFR BLD AUTO: 26.1 % (ref 36.5–49.3)
HGB BLD-MCNC: 8.3 G/DL (ref 12–17)
IMM GRANULOCYTES # BLD AUTO: 0.02 THOUSAND/UL (ref 0–0.2)
IMM GRANULOCYTES NFR BLD AUTO: 0 % (ref 0–2)
INR PPP: 1.69 (ref 0.84–1.19)
LACTATE SERPL-SCNC: 2.6 MMOL/L (ref 0.5–2)
LYMPHOCYTES # BLD AUTO: 1.58 THOUSANDS/ΜL (ref 0.6–4.47)
LYMPHOCYTES NFR BLD AUTO: 15 % (ref 14–44)
MAGNESIUM SERPL-MCNC: 2 MG/DL (ref 1.6–2.6)
MCH RBC QN AUTO: 26.8 PG (ref 26.8–34.3)
MCHC RBC AUTO-ENTMCNC: 31.8 G/DL (ref 31.4–37.4)
MCV RBC AUTO: 84 FL (ref 82–98)
MONOCYTES # BLD AUTO: 0.89 THOUSAND/ΜL (ref 0.17–1.22)
MONOCYTES NFR BLD AUTO: 8 % (ref 4–12)
NEUTROPHILS # BLD AUTO: 8.25 THOUSANDS/ΜL (ref 1.85–7.62)
NEUTS SEG NFR BLD AUTO: 77 % (ref 43–75)
NRBC BLD AUTO-RTO: 0 /100 WBCS
OSMOLALITY UR/SERPL-RTO: 400 MMOL/KG (ref 282–298)
PHOSPHATE SERPL-MCNC: 4.4 MG/DL (ref 2.7–4.5)
PLATELET # BLD AUTO: 165 THOUSANDS/UL (ref 149–390)
PMV BLD AUTO: 8.3 FL (ref 8.9–12.7)
POTASSIUM SERPL-SCNC: 3 MMOL/L (ref 3.5–5.3)
PROT SERPL-MCNC: 7.4 G/DL (ref 6.4–8.4)
PROTHROMBIN TIME: 20 SECONDS (ref 11.6–14.5)
RBC # BLD AUTO: 3.1 MILLION/UL (ref 3.88–5.62)
SODIUM SERPL-SCNC: 135 MMOL/L (ref 135–147)
WBC # BLD AUTO: 10.79 THOUSAND/UL (ref 4.31–10.16)

## 2022-08-09 PROCEDURE — 80053 COMPREHEN METABOLIC PANEL: CPT | Performed by: PHYSICIAN ASSISTANT

## 2022-08-09 PROCEDURE — 83930 ASSAY OF BLOOD OSMOLALITY: CPT | Performed by: NURSE PRACTITIONER

## 2022-08-09 PROCEDURE — 99291 CRITICAL CARE FIRST HOUR: CPT | Performed by: PHYSICIAN ASSISTANT

## 2022-08-09 PROCEDURE — 94760 N-INVAS EAR/PLS OXIMETRY 1: CPT

## 2022-08-09 PROCEDURE — C9113 INJ PANTOPRAZOLE SODIUM, VIA: HCPCS | Performed by: PHYSICIAN ASSISTANT

## 2022-08-09 PROCEDURE — 83605 ASSAY OF LACTIC ACID: CPT | Performed by: PHYSICIAN ASSISTANT

## 2022-08-09 PROCEDURE — 83735 ASSAY OF MAGNESIUM: CPT | Performed by: PHYSICIAN ASSISTANT

## 2022-08-09 PROCEDURE — 85025 COMPLETE CBC W/AUTO DIFF WBC: CPT | Performed by: PHYSICIAN ASSISTANT

## 2022-08-09 PROCEDURE — 94003 VENT MGMT INPAT SUBQ DAY: CPT

## 2022-08-09 PROCEDURE — 84100 ASSAY OF PHOSPHORUS: CPT | Performed by: PHYSICIAN ASSISTANT

## 2022-08-09 PROCEDURE — 85610 PROTHROMBIN TIME: CPT | Performed by: PHYSICIAN ASSISTANT

## 2022-08-09 PROCEDURE — 82140 ASSAY OF AMMONIA: CPT | Performed by: PHYSICIAN ASSISTANT

## 2022-08-09 RX ORDER — FOLIC ACID 1 MG/1
1 TABLET ORAL DAILY
Status: DISCONTINUED | OUTPATIENT
Start: 2022-08-09 | End: 2022-08-17 | Stop reason: HOSPADM

## 2022-08-09 RX ORDER — POTASSIUM CHLORIDE 14.9 MG/ML
20 INJECTION INTRAVENOUS ONCE
Status: COMPLETED | OUTPATIENT
Start: 2022-08-09 | End: 2022-08-09

## 2022-08-09 RX ORDER — ONDANSETRON 2 MG/ML
4 INJECTION INTRAMUSCULAR; INTRAVENOUS EVERY 4 HOURS PRN
Status: DISCONTINUED | OUTPATIENT
Start: 2022-08-09 | End: 2022-08-17 | Stop reason: HOSPADM

## 2022-08-09 RX ORDER — PANTOPRAZOLE SODIUM 40 MG/1
40 TABLET, DELAYED RELEASE ORAL
Status: DISCONTINUED | OUTPATIENT
Start: 2022-08-10 | End: 2022-08-17 | Stop reason: HOSPADM

## 2022-08-09 RX ORDER — FENTANYL CITRATE 50 UG/ML
50 INJECTION, SOLUTION INTRAMUSCULAR; INTRAVENOUS EVERY 2 HOUR PRN
Status: DISCONTINUED | OUTPATIENT
Start: 2022-08-09 | End: 2022-08-09

## 2022-08-09 RX ORDER — LANOLIN ALCOHOL/MO/W.PET/CERES
100 CREAM (GRAM) TOPICAL DAILY
Status: DISCONTINUED | OUTPATIENT
Start: 2022-08-09 | End: 2022-08-17 | Stop reason: HOSPADM

## 2022-08-09 RX ORDER — BUPRENORPHINE AND NALOXONE 8; 2 MG/1; MG/1
8 FILM, SOLUBLE BUCCAL; SUBLINGUAL DAILY
Status: DISCONTINUED | OUTPATIENT
Start: 2022-08-09 | End: 2022-08-09

## 2022-08-09 RX ORDER — NICOTINE 21 MG/24HR
21 PATCH, TRANSDERMAL 24 HOURS TRANSDERMAL DAILY
Status: DISCONTINUED | OUTPATIENT
Start: 2022-08-09 | End: 2022-08-17 | Stop reason: HOSPADM

## 2022-08-09 RX ORDER — LORAZEPAM 1 MG/1
2 TABLET ORAL ONCE
Status: COMPLETED | OUTPATIENT
Start: 2022-08-09 | End: 2022-08-09

## 2022-08-09 RX ORDER — ESCITALOPRAM OXALATE 10 MG/1
10 TABLET ORAL DAILY
Status: DISCONTINUED | OUTPATIENT
Start: 2022-08-09 | End: 2022-08-17 | Stop reason: HOSPADM

## 2022-08-09 RX ORDER — BUPRENORPHINE AND NALOXONE 8; 2 MG/1; MG/1
16 FILM, SOLUBLE BUCCAL; SUBLINGUAL DAILY
Status: DISCONTINUED | OUTPATIENT
Start: 2022-08-09 | End: 2022-08-17 | Stop reason: HOSPADM

## 2022-08-09 RX ORDER — SPIRONOLACTONE 25 MG/1
100 TABLET ORAL DAILY
Status: DISCONTINUED | OUTPATIENT
Start: 2022-08-09 | End: 2022-08-17 | Stop reason: HOSPADM

## 2022-08-09 RX ADMIN — HEPARIN SODIUM 5000 UNITS: 5000 INJECTION INTRAVENOUS; SUBCUTANEOUS at 22:51

## 2022-08-09 RX ADMIN — HEPARIN SODIUM 5000 UNITS: 5000 INJECTION INTRAVENOUS; SUBCUTANEOUS at 05:59

## 2022-08-09 RX ADMIN — NADOLOL 40 MG: 40 TABLET ORAL at 10:11

## 2022-08-09 RX ADMIN — POTASSIUM CHLORIDE 20 MEQ: 14.9 INJECTION, SOLUTION INTRAVENOUS at 10:12

## 2022-08-09 RX ADMIN — LORAZEPAM 2 MG: 1 TABLET ORAL at 20:35

## 2022-08-09 RX ADMIN — POTASSIUM CHLORIDE 20 MEQ: 14.9 INJECTION, SOLUTION INTRAVENOUS at 12:47

## 2022-08-09 RX ADMIN — FOLIC ACID 1 MG: 1 TABLET ORAL at 10:12

## 2022-08-09 RX ADMIN — PROPOFOL 30 MCG/KG/MIN: 10 INJECTION, EMULSION INTRAVENOUS at 02:45

## 2022-08-09 RX ADMIN — Medication 1 TABLET: at 10:12

## 2022-08-09 RX ADMIN — PANTOPRAZOLE SODIUM 40 MG: 40 INJECTION, POWDER, FOR SOLUTION INTRAVENOUS at 09:56

## 2022-08-09 RX ADMIN — LACTULOSE 20 G: 20 SOLUTION ORAL at 18:16

## 2022-08-09 RX ADMIN — ONDANSETRON 4 MG: 2 INJECTION INTRAMUSCULAR; INTRAVENOUS at 20:01

## 2022-08-09 RX ADMIN — SPIRONOLACTONE 100 MG: 25 TABLET ORAL at 12:47

## 2022-08-09 RX ADMIN — PROPOFOL 50 MCG/KG/MIN: 10 INJECTION, EMULSION INTRAVENOUS at 07:07

## 2022-08-09 RX ADMIN — THIAMINE HCL TAB 100 MG 100 MG: 100 TAB at 10:12

## 2022-08-09 RX ADMIN — FENTANYL CITRATE 50 MCG: 50 INJECTION INTRAMUSCULAR; INTRAVENOUS at 05:57

## 2022-08-09 RX ADMIN — ESCITALOPRAM OXALATE 10 MG: 10 TABLET ORAL at 09:56

## 2022-08-09 RX ADMIN — HEPARIN SODIUM 5000 UNITS: 5000 INJECTION INTRAVENOUS; SUBCUTANEOUS at 14:05

## 2022-08-09 RX ADMIN — NICOTINE 21 MG: 21 PATCH, EXTENDED RELEASE TRANSDERMAL at 09:57

## 2022-08-09 RX ADMIN — BUPRENORPHINE AND NALOXONE 16 MG: 8; 2 FILM BUCCAL; SUBLINGUAL at 12:28

## 2022-08-09 RX ADMIN — LACTULOSE 20 G: 20 SOLUTION ORAL at 09:57

## 2022-08-09 RX ADMIN — POTASSIUM CHLORIDE 20 MEQ: 14.9 INJECTION, SOLUTION INTRAVENOUS at 14:05

## 2022-08-09 NOTE — UTILIZATION REVIEW
Initial Clinical Review    Admission: Date/Time/Statement:   Admission Orders (From admission, onward)     Ordered        08/08/22 1949  INPATIENT ADMISSION  Once                      Orders Placed This Encounter   Procedures    INPATIENT ADMISSION     Standing Status:   Standing     Number of Occurrences:   1     Order Specific Question:   Level of Care     Answer:   Critical Care [15]     Order Specific Question:   Estimated length of stay     Answer:   More than 2 Midnights     Order Specific Question:   Certification     Answer:   I certify that inpatient services are medically necessary for this patient for a duration of greater than two midnights  See H&P and MD Progress Notes for additional information about the patient's course of treatment  ED Arrival Information     Expected   -    Arrival   8/8/2022 17:36    Acuity   Emergent            Means of arrival   Ambulance    Escorted by   [address] [place] [address]/ICU    Admission type   Emergency            Arrival complaint   -           Chief Complaint   Patient presents with    Alcohol Intoxication     Pt drank hand        Initial Presentation: 40 y o  male , presented to the ED @ [facility], from home via EMS  Admitted as Inpatient due to  Possible Suicidal Attempt / Toxic Metabolic Encephalopathy  Date:  08/08/2022    Deliberately ingesting 4 - 20oz bottles of ethyl alcohol hand   Patient has a known history of alcohol dependence with cirrhosis and was recently discharged from rehab  Patient also has a history of AAA, asthma, opiate dependence  Patient is obtunded at the time of my initial evaluation and barely responsive to painful stimuli  Patient with no gag reflex  Decision made to intubate patient after initial evaluation  Trend MELD  MELD score 20  Continue on nadolol, lactulose  Continue IV flds  Alcohol level on admission was greater than 400  Patient was unresponsive at that time      Day 2: 08/09/2022   Intubated / vented  Wean as able  Continue lactulose        ED Triage Vitals   Temperature Pulse Respirations Blood Pressure SpO2   08/08/22 1745 08/08/22 1745 08/08/22 1745 08/08/22 1745 08/08/22 1745   (!) 97 1 °F (36 2 °C) (!) 109 16 111/58 (!) 88 %  Room Air      Temp Source Heart Rate Source Patient Position - Orthostatic VS BP Location FiO2 (%)   08/09/22 0800 08/08/22 1846 08/08/22 1830 08/08/22 1830 08/08/22 2057   Temporal Monitor Lying Left arm 60      Pain Score       08/09/22 0557       Med Not Given for Pain - for MAR use only          Wt Readings from Last 1 Encounters:   08/09/22 88 3 kg (194 lb 10 7 oz)     Additional Vital Signs:   Date/Time Temp Pulse Resp BP MAP (mmHg) SpO2 FiO2 (%) O2 Device Patient Position - Orthostatic VS   08/09/22 1000 -- 82 57 Abnormal  122/61 85 95 % -- -- --   08/09/22 0900 -- 92 17 117/56 80 90 % -- -- --   08/09/22 0800 97 4 °F (36 3 °C) Abnormal  96 20 116/55 79 94 % -- -- --   08/09/22 0735 -- -- -- -- -- 93 % -- -- --   08/09/22 0600 -- 93 22 118/58 83 94 % -- -- --   08/09/22 0500 -- 89 20 112/55 79 94 % -- -- --   08/09/22 0400 97 3 °F (36 3 °C) Abnormal  92 20 111/55 79 94 % -- -- --   08/09/22 0320 -- -- -- -- -- 92 % -- -- --   08/09/22 0301 -- -- -- -- -- 93 % 40 Ventilator --   08/09/22 0300 -- 101 16 119/58 83 93 % -- -- --   08/09/22 0200 -- 87 22 116/61 82 96 % -- -- --   08/09/22 0100 -- 87 22 113/58 79 97 % -- -- --   08/09/22 0050 -- -- -- -- -- 97 % 50 Ventilator --   08/09/22 0000 97 7 °F (36 5 °C) 87 16 111/57 77 98 % -- -- --   08/08/22 2300 -- 86 16 114/59 81 98 % -- -- --   08/08/22 2245 -- 85 16 116/61 82 98 % -- -- --   08/08/22 2230 -- 84 16 121/63 86 98 % -- -- --   08/08/22 2215 -- 83 16 121/63 86 99 % -- -- --   08/08/22 2200 -- 84 16 121/62 85 99 % -- -- --   08/08/22 2103 97 4 °F (36 3 °C) Abnormal  88 22 123/67 88 100 % -- Ventilator --   08/08/22 2057 -- -- -- -- -- 98 % 60 Ventilator --   08/08/22 2030 -- 86 37 Abnormal  124/63 88 97 % -- -- --   08/08/22 2015 -- 87 16 122/57 82 96 % -- -- Lying   08/08/22 1915 -- 100 17 122/62 84 92 % -- -- --   08/08/22 1900 -- 104 18 121/59 84 92 % -- -- --   08/08/22 1846 -- 91 20 116/71 -- 93 % -- Ventilator Lying   08/08/22 1833 -- -- -- -- -- 93 % -- -- --   08/08/22 1830 -- 100 24 Abnormal  110/55 79 92 % -- -- Lying   08/08/22 1800 -- 104 16 118/62 83 96 % -- -- --           Pertinent Labs/Diagnostic Test Results:   CT head without contrast   Final Result by Lisa Chiang MD (08/08 1941)      No acute intracranial abnormality  XR chest 1 view portable   ED Interpretation by Pamela Rod DO (08/08 1910)   ETT above usama  OG tube in stomach      Final Result by Barbara Clements MD (08/09 7364)      Tubes in place  Right basilar atelectasis  Left basilar opacity due to consolidation and/or effusion          Results from last 7 days   Lab Units 08/08/22  1749   SARS-COV-2  Negative     Results from last 7 days   Lab Units 08/09/22  0609 08/08/22  2148 08/08/22  1749   WBC Thousand/uL 10 79*  --  10 71*   HEMOGLOBIN g/dL 8 3*  --  9 6*   HEMATOCRIT % 26 1*  --  30 3*   PLATELETS Thousands/uL 165 181 251   NEUTROS ABS Thousands/µL 8 25*  --   --      Results from last 7 days   Lab Units 08/09/22  0609 08/08/22  1749   SODIUM mmol/L 135 134*   POTASSIUM mmol/L 3 0* 3 5   CHLORIDE mmol/L 97 94*   CO2 mmol/L 27 25   ANION GAP mmol/L 11 15*   BUN mg/dL 4* 6   CREATININE mg/dL 0 47* 0 63   EGFR ml/min/1 73sq m 142 125   CALCIUM mg/dL 7 7* 8 4   MAGNESIUM mg/dL 2 0 1 5*   PHOSPHORUS mg/dL 4 4  --      Results from last 7 days   Lab Units 08/09/22  0609 08/08/22  1749   AST U/L 122* 161*   ALT U/L 51 67   ALK PHOS U/L 152* 182*   TOTAL PROTEIN g/dL 7 4 8 7*   ALBUMIN g/dL 3 1* 3 9   TOTAL BILIRUBIN mg/dL 4 72* 5 33*   AMMONIA umol/L 42* 71*     Results from last 7 days   Lab Units 08/08/22  1747   POC GLUCOSE mg/dl 148*     Results from last 7 days   Lab Units 08/09/22  0609 08/08/22  1749   GLUCOSE RANDOM mg/dL 120 144*     Results from last 7 days   Lab Units 08/08/22  1945   OSMOLALITY, SERUM mmol/*      Results from last 7 days   Lab Units 08/08/22 2000   [address] ART  7 364   PCO2 ART mm Hg 40 7   PO2 ART mm Hg 93 8   HCO3 ART mmol/L 22 7   BASE EXC ART mmol/L -2 5   O2 CONTENT ART mL/dL 13 7*   O2 HGB, ARTERIAL % 94 9     Results from last 7 days   Lab Units 08/08/22  1749   CK TOTAL U/L 90     Results from last 7 days   Lab Units 08/08/22  2148 08/08/22  1945 08/08/22  1749   HS TNI 0HR ng/L  --   --  5   HS TNI 2HR ng/L  --  5  --    HSTNI D2 ng/L  --  0  --    HS TNI 4HR ng/L 5  --   --    HSTNI D4 ng/L 0  --   --      Results from last 7 days   Lab Units 08/09/22  0609 08/08/22  1749   PROTIME seconds 20 0* 18 8*   INR  1 69* 1 57*   PTT seconds  --  46*     Results from last 7 days   Lab Units 08/09/22  0609 08/08/22  2302 08/08/22 2035 08/08/22  1749   LACTIC ACID mmol/L 2 6* 3 4* 4 7* 5 1*     Results from last 7 days   Lab Units 08/08/22  1945   OSMOLALITY, SERUM mmol/*     Results from last 7 days   Lab Units 08/08/22  2043   CLARITY UA  Clear   COLOR UA  Orange   SPEC GRAV UA  >=1 030   PH UA  6 0   GLUCOSE UA mg/dl Negative   KETONES UA mg/dl Negative   BLOOD UA  Small*   PROTEIN UA mg/dl Trace*   NITRITE UA  Negative   BILIRUBIN UA  Interference- unable to analyze*   UROBILINOGEN UA E U /dl 4 0*   LEUKOCYTES UA  Negative   WBC UA /hpf 1-2   RBC UA /hpf 2-4   BACTERIA UA /hpf Occasional   EPITHELIAL CELLS WET PREP /hpf Occasional     Results from last 7 days   Lab Units 08/08/22  1833   AMPH/METH  Negative   BARBITURATE UR  Negative   BENZODIAZEPINE UR  Negative   COCAINE UR  Negative   METHADONE URINE  Negative   OPIATE UR  Negative   PCP UR  Negative   THC UR  Negative     Results from last 7 days   Lab Units 08/08/22  1749   ETHANOL LVL mg/dL 469*   ACETAMINOPHEN LVL ug/mL <2*   SALICYLATE LVL mg/dL <3*     Results from last 7 days   Lab Units 08/08/22  5335 BLOOD CULTURE  Received in Microbiology Lab  Culture in Progress  Received in Microbiology Lab  Culture in Progress       ED Treatment:   Medication Administration from 08/08/2022 1736 to 08/08/2022 2108       Date/Time Order Dose Route Action     08/08/2022 1812 sodium chloride 0 9 % bolus 1,000 mL 1,000 mL Intravenous New Bag     08/08/2022 2011 multi-electrolyte (ISOLYTE-S PH 7 4) bolus 1,000 mL 1,000 mL Intravenous New Bag     08/08/2022 2014 magnesium sulfate 2 g/50 mL IVPB (premix) 2 g 2 g Intravenous New Bag        Past Medical History:   Diagnosis Date    AAA [facility]abdominal aortic aneurysm) ([facility]     Allergic     Anemia     Asthma     Depression 05/18/2021    Essential hypertension 05/06/2019    Liver metastasis ([facility] ) 7/18/2022    Liver metastasis ([facility] ) 7/18/2022    Obesity     Opiate dependence ([facility] )     Substance abuse ([facility] )      Present on Admission:   Alcoholic cirrhosis of liver without ascites ([facility] )   Substance abuse ([facility] )   Depression, concern for PTSD   Essential hypertension   Toxic metabolic encephalopathy      Admitting Diagnosis: Suicidal ideation [R45 851]  Alcohol intoxication ([facility] ) [F10 929]  Acute alcohol intoxication ([facility] ) [F10 929]  Res[facility] failure [facility] [J96 90]  Alcohol intoxication with delirium ([facility] ) [F10 921]  Age/Sex: 40 y o  male  Admission Orders:  CV diet  Fall precautions  Early mobilization QID / Up with assistance  Intubated / Vented  Suction deep laryngeal BID / Oral Care  Turn patient q2h  Continuous pulse Ox  Daily weight / I&P  Neuro check q8h  Dysphagia assessment when able  Enio SCDs  Elevate HOB    Scheduled Medications:  buprenorphine-naloxone, 8 mg, Sublingual, Daily  escitalopram, 10 mg, Oral, Daily  folic acid, 1 mg, Oral, Daily  heparin (porcine), 5,000 Units, Subcutaneous, Q8H [address]  lactulose, 20 g, Oral, BID  multivitamin-minerals, 1 tablet, Oral, Daily  nadolol, 40 mg, Oral, Daily  nicotine, 21 mg, Transdermal, Daily  [START ON 8/10/2022] pantoprazole, 40 mg, Oral, Early Morning  potassium chloride, 20 mEq, Intravenous, Once   Followed by  potassium chloride, 20 mEq, Intravenous, Once  thiamine, 100 mg, Oral, Daily      Continuous IV Infusions:     PRN Meds:       IP CONSULT TO CASE MANAGEMENT    Network Utilization Review Department  ATTENTION: Please call with any questions or concerns to [phone] and carefully listen to the prompts so that you are directed to the right person  All voicemails are confidential   Sona Fuller all requests for admission clinical reviews, approved or denied determinations and any other requests to dedicated fax number below belonging to the campus where the patient is receiving treatment   List of dedicated fax numbers for the Facilities:  [address] DENIALS (Administrative/Medical Necessity) [phone]   [address] (Maternity/NICU/Pediatrics) [phone]   [address]  [address] [facility] [address] [address] [place] [address] [address] [facility] [place] [address] [phone]

## 2022-08-09 NOTE — ASSESSMENT & PLAN NOTE
· Patient intubated for altered mental status and inability to protect airway  · ABG within normal limits  · Continue supportive care with mechanical ventilation and wean to extubate as tolerated

## 2022-08-09 NOTE — ASSESSMENT & PLAN NOTE
· History of alcohol and substance abuse  Patient currently maintained on Suboxone  Hold Suboxone for now  · Patient recently discharged and unable to go to inpatient rehab secondary to insurance issues  Discharged to a 2 bedroom apartment shared with elderly parents  Sister states he was getting hand  delivered and ingesting it  She removed several bottles on Saturday, but her mother called her and stated he was acting unusual, incontinence, and several more hand  bottles were found in his room  He did not have access to other alcohol    · Case management and psychiatry involvement when able to participate

## 2022-08-09 NOTE — H&P
Mauricio 45  H&P- Merissa Escalona 1985, 40 y o  male MRN: [id number]  Unit/Bed#: ICU 02 Encounter: [id number]  Primary Care Provider: Carrie Ruiz PA-C   Date and time admitted to hospital: 8/8/2022  8:61 PM    Alcoholic cirrhosis of liver without ascites ([facility] )  Assessment & Plan  · History of significant alcohol use with alcoholic cirrhosis  · History of hepatic lesions status post IR biopsy which was negative for malignancy  MRI also did not show evidence of malignancy  GI to follow as outpatient  · Currently maintained on nadolol, lactulose  Continue  Trend MELD  Patient's MELD Score is: 20  MELD Score 90-day mortality   ?9 1 9%   10-19 6 0%   20-29 19 6%   30-39 52 6%   ? 40 71 3%     MELD Score Component Values:  Component Value Date   Creatinine: 0 63 mg/dL 8/8/2022   Dialysis at least twice   in the past week  Or CVVHD for ? 24 hours   in the past week:     No    Bilirubin, total: 5 33 mg/dL 8/8/2022   INR: 1 57 8/8/2022   Sodium: 134 mmol/L 8/8/2022            Acute respiratory failure ([facility] )  Assessment & Plan  · Patient intubated for altered mental status and inability to protect airway  · ABG within normal limits  · Continue supportive care with mechanical ventilation and wean to extubate as tolerated    Toxic metabolic encephalopathy  Assessment & Plan  · Secondary to hand  (ethyl alcohol) ingestion  · Supportive measures  · Ammonia elevated as well  Continue lactulose    Alcohol intoxication ([facility] )  Assessment & Plan  · Patient with history of alcohol abuse  Patient was recently discharged and family did their best to restrict any alcohol  Patient then had hand  delivered and was ingesting it  He had previously done this while hospitalized  · Alcohol level on admission was greater than 400  Patient was unresponsive at that time    · Supportive measures  · Alcohol cessation counseling  · Consideration for CIWA and withdrawal medications when mental status improved      Hyperammonemia (HCC)  Assessment & Plan  · Elevated at 71  Unclear compliance with lactulose  · Restart lactulose    Lactic acidosis  Assessment & Plan  · No evidence of infection  · Secondary to alcohol intoxication and hypovolemia  · Continue fluid resuscitation and trend    Substance abuse ([facility] )  Assessment & Plan  · History of alcohol and substance abuse  Patient currently maintained on Suboxone  Hold Suboxone for now  · Patient recently discharged and unable to go to inpatient rehab secondary to insurance issues  Discharged to a 2 bedroom apartment shared with elderly parents  Sister states he was getting hand  delivered and ingesting it  She removed several bottles on Saturday, but her mother called her and stated he was acting unusual, incontinence, and several more hand  bottles were found in his room  He did not have access to other alcohol  · Case management and psychiatry involvement when able to participate    Depression, concern for PTSD  Assessment & Plan  · Seen by psychiatry during previous admission with adjustment of medications  · Hold for now, restart when mental status improved  · Consideration for Psychiatry consult    Essential hypertension  Assessment & Plan  · Hold antihypertensive medications for now      -------------------------------------------------------------------------------------------------------------  Chief Complaint:  Obtunded, unresponsive    History of Present Illness   HX and PE limited by:  Jeb Beltran is a 40 y o  male with past medical history of alcohol abuse, hepatic encephalopathy, alcoholic cirrhosis, history of esophageal varices who presents with altered mental status  Patient was recently discharged and was living in a 2 bedroom apartment with his elderly parents for the last several days  His sister periodically was checking in on him over the course of the last several days since his discharge  During his admission, he was found to be putting hand  into his drinks  When he was discharged to the apartment, there was no access to alcohol, however he was having hand  delivered  His sister removed it on Saturday, however today his mother called his sister stating that he was confused and incontinent  Upon arrival to the apartment, she found a total of 4 ethyl alcohol hand  bottles, 2 of which were empty  Patient was unresponsive and brought to the emergency department  He remained unresponsive, was intubated for airway protection  Patient will be admitted to the intensive care unit for further monitoring  History obtained from chart review  -------------------------------------------------------------------------------------------------------------  Dispo: Admit to Critical Care     Code Status: Level 1 - Full Code  --------------------------------------------------------------------------------------------------------------  Review of Systems    Review of systems was unable to be performed secondary to Intubated    Physical Exam  Vitals and nursing note reviewed  Constitutional:       General: He is not in acute distress  Appearance: Normal appearance  He is not ill-appearing  HENT:      Head: Normocephalic and atraumatic  Mouth/Throat:      Mouth: Mucous membranes are moist    Eyes:      Pupils: Pupils are equal, round, and reactive to light  Cardiovascular:      Rate and Rhythm: Normal rate and regular rhythm  Pulses: Normal pulses  Heart sounds: No murmur heard  Pulmonary:      Effort: Pulmonary effort is normal  No respiratory distress  Breath sounds: Normal breath sounds  Abdominal:      General: Abdomen is flat  There is no distension  Musculoskeletal:      Comments: Chronic venous stasis and purplish discoloration of legs bilaterally   Skin:     General: Skin is warm     Neurological:      Comments: Unresponsive to verbal or painful stimuli       --------------------------------------------------------------------------------------------------------------  Vitals:   Vitals:    08/08/22 1900 08/08/22 1915 08/08/22 2015 08/08/22 2057   BP: 121/59 122/62 122/57    BP Location:   Left arm    Pulse: 104 100 87    Resp: 18 17 16    Temp:       SpO2: 92% 92% 96% 98%   Weight:         Temp  Min: 97 1 °F (36 2 °C)  Max: 97 1 °F (36 2 °C)        Body mass index is 24 93 kg/m²  Laboratory and Diagnostics:  Results from last 7 days   Lab Units 08/08/22 1749 08/02/22  0524   WBC Thousand/uL 10 71* 6 15   HEMOGLOBIN g/dL 9 6* 8 4*   HEMATOCRIT % 30 3* 26 5*   PLATELETS Thousands/uL 251 229   NEUTROS PCT %  --  65   MONOS PCT %  --  12   MONO PCT % 8  --      Results from last 7 days   Lab Units 08/08/22 1749 08/02/22  0524   SODIUM mmol/L 134* 130*   POTASSIUM mmol/L 3 5 3 9   CHLORIDE mmol/L 94* 95*   CO2 mmol/L 25 28   ANION GAP mmol/L 15* 7   BUN mg/dL 6 10   CREATININE mg/dL 0 63 0 55*   CALCIUM mg/dL 8 4 9 8   GLUCOSE RANDOM mg/dL 144* 118   ALT U/L 67 44   AST U/L 161* 117*   ALK PHOS U/L 182* 163*   ALBUMIN g/dL 3 9 4 1   TOTAL BILIRUBIN mg/dL 5 33* 6 66*     Results from last 7 days   Lab Units 08/08/22  1749   MAGNESIUM mg/dL 1 5*      Results from last 7 days   Lab Units 08/08/22 1749 08/02/22  0524   INR  1 57* 1 76*   PTT seconds 46*  --           Results from last 7 days   Lab Units 08/08/22  2035 08/08/22  1749   LACTIC ACID mmol/L 4 7* 5 1*     ABG:  Results from last 7 days   Lab Units 08/08/22 2000   PH ART  7 364   PCO2 ART mm Hg 40 7   PO2 ART mm Hg 93 8   HCO3 ART mmol/L 22 7   BASE EXC ART mmol/L -2 5     VBG:          Micro:        EKG:   Imaging: I have personally reviewed pertinent reports          Historical Information   Past Medical History:   Diagnosis Date    AAA (abdominal aortic aneurysm) ([facility] )     Allergic     Anemia     Asthma     Depression 05/18/2021    Essential hypertension 05/06/2019    Liver metastasis ([facility] ) 7/18/2022    [facility] ) 7/18/2022    Obesity     Opiate dependence ([place] [address] )     Substance abuse ([place] [address] )      Past Surgical History:   Procedure Laterality Date    IR BIOPSY LIVER MASS  7/22/2022    IR PARACENTESIS  7/20/2022    LYMPH NODE BIOPSY       Social History   Social History     Substance and Sexual Activity   Alcohol Use Yes    Alcohol/week: 12 0 standard drinks    Types: 12 Cans of beer per week     Social History     Substance and Sexual Activity   Drug Use Not Currently    Types: Heroin    Comment: 7/16/22 recovery     Social History     Tobacco Use   Smoking Status Current Every Day Smoker    Packs/day: 1 00   Smokeless Tobacco Never Used   Tobacco Comment    2/2019; PATIENT VAPES     Exercise History:   Family History:   Family History   Problem Relation Age of Onset    No Known Problems Mother     Diabetes Father     Prostate cancer Father     Hypertension Father      I have reviewed this patient's family history and commented on sigificant items within the HPI      Medications:  Current Facility-Administered Medications   Medication Dose Route Frequency    folic acid 1 mg in sodium chloride 0 9 % 50 mL IVPB  1 mg Intravenous Daily    heparin (porcine) subcutaneous injection 5,000 Units  5,000 Units Subcutaneous Q8H [facility]    lactulose oral solution 20 g  20 g Oral BID    magnesium sulfate 2 g/50 mL IVPB (premix) 2 g  2 g Intravenous Once    multi-electrolyte (ISOLYTE-S PH 7 4) bolus 1,000 mL  1,000 mL Intravenous Once    Followed by    multi-electrolyte (ISOLYTE-S PH 7 4) bolus 1,000 mL  1,000 mL Intravenous Once    multi-electrolyte (PLASMALYTE-A/ISOLYTE-S PH 7 4) IV solution  75 mL/hr Intravenous Continuous    [START ON 8/9/2022] nadolol (CORGARD) tablet 40 mg  40 mg Oral Daily    [START ON 8/9/2022] nicotine (NICODERM CQ) 14 mg/24hr TD 24 hr patch 1 patch  1 patch Transdermal Daily    [START ON 8/9/2022] pantoprazole (PROTONIX) injection 40 mg  40 mg Intravenous Q24H [address]    propofol (DIPRIVAN) 1000 mg in 100 mL infusion (premix)  5-50 mcg/kg/min Intravenous Titrated    thiamine (VITAMIN B1) 500 mg in sodium chloride 0 9 % 50 mL IVPB  500 mg Intravenous TID     Home medications:  Prior to Admission Medications   Prescriptions Last Dose Informant Patient Reported? Taking? albuterol (Ventolin HFA) 90 mcg/act inhaler  Self No No   Sig: Inhale 2 puffs every 6 (six) hours as needed for wheezing   buprenorphine-naloxone (SUBOXONE) 8-2 mg per SL tablet  Self Yes No   Sig: Place 2 tablets under the tongue daily Verified from [facility], [phone]  Precribed by Dr Ariel Alcantar, Last Prescirbed 5/17/2021   cloNIDine (CATAPRES) 0 1 mg tablet   No No   Sig: Take 1 tablet (0 1 mg total) by mouth daily at bedtime   desvenlafaxine succinate (PRISTIQ) 50 mg 24 hr tablet   No No   Sig: Take 1 tablet (50 mg total) by mouth daily   escitalopram (LEXAPRO) 10 mg tablet   Yes No   Sig: TAKE 1 TABLET BY MOUTH EVERY DAY UNTIL DIRECTED TO STOP   ferrous sulfate 324 (65 Fe) mg   No No   Sig: Take 1 tablet (324 mg total) by mouth 2 (two) times a day before meals   lactulose 20 g/30 mL   No No   Sig: Take 30 mL (20 g total) by mouth 2 (two) times a day   nadolol (CORGARD) 40 mg tablet   No No   Sig: Take 1 tablet (40 mg total) by mouth daily   nicotine (NICODERM CQ) 14 mg/24hr TD 24 hr patch  Self No No   Sig: Place 1 patch on the skin daily   Patient not taking: No sig reported   ondansetron (ZOFRAN-ODT) 4 mg disintegrating tablet   No No   Sig: Take 1 tablet (4 mg total) by mouth every 6 (six) hours as needed for nausea or vomiting   pantoprazole (PROTONIX) 40 mg tablet   No No   Sig: Take 1 tablet (40 mg total) by mouth daily   spironolactone (ALDACTONE) 100 mg tablet   No No   Sig: Take 1 tablet (100 mg total) by mouth daily   thiamine 100 MG tablet   No No   Sig: Take 1 tablet (100 mg total) by mouth daily      Facility-Administered Medications: None     Allergies:   Allergies Allergen Reactions    Levofloxacin Other (See Comments)     BLACKED OUT       ------------------------------------------------------------------------------------------------------------  Advance Directive and Living Will:      Power of :    POLST:    ------------------------------------------------------------------------------------------------------------  Anticipated Length of Stay is > 2 midnights    Care Time Delivered:   Upon my evaluation, this patient had a high probability of imminent or life-threatening deterioration due to Alcohol intoxication, hypoxic respiratory failure, which required my direct attention, intervention, and personal management  I have personally provided 50 minutes (1930 to 2020) of critical care time, exclusive of procedures, teaching, family meetings, and any prior time recorded by providers other than myself  Stanley Dakins, PA-C        Portions of the record may have been created with voice recognition software  Occasional wrong word or "sound a like" substitutions may have occurred due to the inherent limitations of voice recognition software    Read the chart carefully and recognize, using context, where substitutions have occurred

## 2022-08-09 NOTE — CASE MANAGEMENT
Case Management Assessment & Discharge Planning Note    Patient name Zakia Duran  Location ICU /ICU 02 MRN [id number]  : 1985 Date 2022       Current Admission Date: 2022  Current Admission Diagnosis:Alcohol intoxication [facility])   Patient Active Problem List    Diagnosis Date Noted    Hyperammonemia ([facility] ) 2022    Ambulatory dysfunction 2022    Toxic metabolic encephalopathy     Chronic bronchitis ([facility] ) 2021    Lactic acidosis 2021    Cardiac murmur 2021    Depression, concern for PTSD 2021    Anasarca 2021    Alcohol withdrawal ([facility] ) 2021    Thoracoabdominal aortic aneurysm ([facility] ) 2021    Abnormal CT scan 2021    Alcohol intoxication ([facility] ) 2021    Substance abuse ([facility] ) 2021    Tobacco dependence 2386    Alcoholic cirrhosis of liver without ascites ([facility] ) 2021    Anemia 2021    Thrombocytopenia ([facility] ) 2021    Acute respiratory failure ([facility] ) 2021    Mass of upper lobe of left lung 2019    Essential hypertension 2019    Obesity, Class I, BMI 30-34 9 2019      LOS (days): 1  Geometric Mean LOS (GMLOS) (days):   Days to GMLOS:     OBJECTIVE:  PATIENT READMITTED TO HOSPITAL  Risk of Unplanned Readmission Score: 35 22      Current admission status: Inpatient  Referral Reason: Other (Discharge Planning)    Preferred Pharmacy:   [id number] [address]  [address] [facility]  Phone: [phone] Fax: [phone] [address] [place], [place] [address] [place] [address]  Phone: [phone] Fax: [address] (OS) [facility] [address]  [address] [place] [address]  Phone: [phone] Fax: [phone]    Primary Care Provider: Cathy Landrum PA-C    Primary Insurance:   Secondary Insurance: ASSESSMENT:  Active Health Care Proxies    There are no active Health Care Proxies on file  Readmission Root Cause  30 Day Readmission: Yes  Who directed you to return to the hospital?: Family  Did you understand whom to contact if you had questions or problems?: Yes  Did you get your prescriptions before you left the hospital?: No  Reason[de-identified] Not preferred pharmacy  Were you able to get your prescriptions filled when you left the hospital?: Yes  Did you take your medications as prescribed?: No  Were you able to get to your follow-up appointments?: No  Reason[de-identified] Compliance ([facility])  During previous admission, was a post-acute recommendation made?: No  Patient was readmitted due to: Resp Failure 2' ETOH intoxication  Action Plan: medical mgmt, detox program if agreeable    Patient Information  Admitted from[de-identified] Home  Mental Status: Alert  During Assessment patient was accompanied by: Not accompanied during assessment  Assessment information provided by[de-identified] Patient  Primary Caregiver: Self  Support Systems: Parent, Family members  [place] of Residence: [address] [place] [address] do you live in?: [place], [place]  Type of Current Residence: Apartment  In the last 12 months, was there a time when you were not able to pay the mortgage or rent on time?: No  In the last 12 months, how many places have you lived?: 1  In the last 12 months, was there a time when you did not have a steady place to sleep or slept in a shelter (including now)?: No  Homeless/housing insecurity resource given?: N/A  Living Arrangements: Lives w/ Parent(s)  Is patient active with [facility])?: No    Activities of Daily Living Prior to Admission  Functional Status: Independent  Completes ADLs independently?: Yes  Ambulates independently?: Yes  Does patient use assisted devices?: No  Does patient currently own DME?: Yes  What DME does the patient currently own?: Maribell Isidro  Does patient have a history of Outpatient Therapy (PT/OT)?: No  Does the patient have a history of Short-Term Rehab?: No  Does patient have a history of HHC?: No  Does patient currently have Anjana Keith?: No     Patient Information Continued  Income Source: Unemployed (Laid off last year-receives some SSD from [place])  Does patient have prescription coverage?: No  Within the past 12 months, you worried that your food would run out before you got the money to buy more : Never true  Within the past 12 months, the food you bought just didn't last and you didn't have money to get more : Never true  Food insecurity resource given?: N/A  Does patient receive dialysis treatments?: No  Does patient have a history of substance abuse?: Yes  Historical substance use preference: Alcohol/ETOH  Is patient currently in treatment for substance abuse?: No  Treatment options provided  Does patient have a history of Mental Health Diagnosis?: Yes (PTSD/Generalized Anxiety D/O)  Is patient receiving treatment for mental health?: No  Treatment options were provided    Has patient received inpatient treatment related to mental health in the last 2 years?: No     Means of Transportation  Means of Transport to Appts[de-identified] Drives Self  In the past 12 months, has lack of transportation kept you from medical appointments or from getting medications?: No  In the past 12 months, has lack of transportation kept you from meetings, work, or from getting things needed for daily living?: No  Was application for public transport provided?: N/A    DISCHARGE DETAILS:    Discharge planning discussed with[de-identified] Patient, Sister  Freedom of Choice: Yes     CM contacted family/caregiver?: Yes  Were Treatment Team discharge recommendations reviewed with patient/caregiver?: Yes  Did patient/caregiver verbalize understanding of patient care needs?: Yes  Were patient/caregiver advised of the risks associated with not following Treatment Team discharge recommendations?: Yes    Contacts  Patient Contacts: Deepak Manuel (sister)  Relationship to Patient[de-identified] Family  Contact Method: Phone  Phone Number: [phone]  Reason/Outcome: Discharge Planning, Continuity of Care, Emergency [address]         Is the patient interested in Anjana Keith at discharge?: No    DME Referral Provided  Referral made for DME?: No    Other Referral/Resources/Interventions Provided:  Interventions: Substance Abuse Treatment    Would you like to participate in our [address] service program?  : No - Declined    Treatment Team Recommendation: Substance Abuse Treatment    SW met with patient to introduce role, complete assessment, and discuss discharge planning needs  Patient was last [facility] to [facility] 7/18-8/2/22 for tx of ETOH liver cirrhosis and ETOH withdrawal  Patient returned to [facility] ED on 8/8 with complaint of ETOH intoxication  Patient reportedly drank (4) 20ox bottles of hand   Patient was intubated for airway protection and admitted to ICU for further treatment  Patient has since been extubated  He is alert and oriented x3  Patient is still living with his elderly mother  He is independent with ADLs at baseline, but has had increased number of falls at home due to intoxication  Attending came in to room to speak with patient re ETOH tx  Patient initially stated that he wanted to discharge home w/ OP follow-up at the [place]  SW inquired on pt's reasons for why this plan would be effective, as patient discharged home from [facility] with plan to call the [place] for new PCP appt  and then get into a counseling program; however, pt never called  Attending then provided further information re his health, including his MELD score  Patient seemed to respond to hearing his MELD score with 20% risk of mortality within the next 3 months  Patient agreed to speak with someone regarding his options for IP detox   Patient requested the contact number for his sister Kimberly Matthews and verbalized that SW/Attending could call his sister to discuss plan of care  SW contacted sister Fawad Everett to advise of above  She informed SW that she will be very supportive of pt going to an IP program if he was agreeable  She did warn that if patient refused to go IP he would not be able to return back to his mother's apartment  Supposedly the police were contacted and a report was made after pt had grabbed the bottle of  aggressively from his mother while intoxicated  Fawad Everett states that their mother is [de-identified] and that it is too unsafe for him to remain there  Fawad Everett is aware that only other option for patient is to go to a homeless shelter  She will be coming to visit patient later this afternoon to discuss this with patient as he likely does not remember  Fawad Everett was provided with SW's direct contact information if she were to have any questions/concerns  BASIM spoke with Kandace Patel in Crisis  He will made referral to [facility] [place] program  A rep will be coming to hospital to interview patient and discuss treatment options  Attending aware of above

## 2022-08-09 NOTE — ASSESSMENT & PLAN NOTE
· No evidence of infection  · Secondary to alcohol intoxication and hypovolemia  · Continue fluid resuscitation and trend  · Improving

## 2022-08-09 NOTE — ASSESSMENT & PLAN NOTE
· Seen by psychiatry during previous admission with adjustment of medications  · Hold for now, restart when mental status improved    · Consideration for Psychiatry consult

## 2022-08-09 NOTE — ASSESSMENT & PLAN NOTE
· Patient with history of alcohol abuse  Patient was recently discharged and family did their best to restrict any alcohol  Patient then had hand  delivered and was ingesting it  He had previously done this while hospitalized  · Alcohol level on admission was greater than 400  Patient was unresponsive at that time    · Supportive measures  · Alcohol cessation counseling  · Consideration for CIWA and withdrawal medications when mental status improved

## 2022-08-09 NOTE — ASSESSMENT & PLAN NOTE
· No evidence of infection  · Secondary to alcohol intoxication and hypovolemia  · Continue fluid resuscitation and trend

## 2022-08-09 NOTE — ASSESSMENT & PLAN NOTE
· History of significant alcohol use with alcoholic cirrhosis  · History of hepatic lesions status post IR biopsy which was negative for malignancy  MRI also did not show evidence of malignancy  GI to follow as outpatient  · Currently maintained on nadolol, lactulose  Continue  · History of esophageal varices  Continue Protonix  Trend MELD  Patient's MELD Score is: 20  MELD Score 90-day mortality   ?9 1 9%   10-19 6 0%   20-29 19 6%   30-39 52 6%   ? 40 71 3%     MELD Score Component Values:  Component Value Date   Creatinine: 0 63 mg/dL 8/8/2022   Dialysis at least twice   in the past week  Or CVVHD for ? 24 hours   in the past week:     No    Bilirubin, total: 5 33 mg/dL 8/8/2022   INR: 1 57 8/8/2022   Sodium: 134 mmol/L 8/8/2022

## 2022-08-09 NOTE — RESPIRATORY THERAPY NOTE
RT Ventilator Management Note  Radha Weems 40 y o  male MRN: [id number]  Unit/Bed#: ICU 02 Encounter: [id number]      Daily Screen    No data found in the last 10 encounters  Physical Exam:   Assessment Type: Assess only  General Appearance: Sedated  Respiratory Pattern: Assisted  Chest Assessment: Chest expansion symmetrical  Bilateral Breath Sounds: Diminished  Cough: Non-productive  Suction: ET Tube  O2 Device: Vent      Resp Comments: NARD; Tolerating vent  Patient was wean and safely extubated to nasal cannula  Will continue to monitor

## 2022-08-09 NOTE — ASSESSMENT & PLAN NOTE
· Secondary to hand  (ethyl alcohol) ingestion  · Supportive measures  · Ammonia elevated as well    Continue lactulose

## 2022-08-09 NOTE — ASSESSMENT & PLAN NOTE
· Patient with history of alcohol abuse  Patient was recently discharged and family did their best to restrict any alcohol  Patient then had hand  delivered and was ingesting it  He had previously done this while hospitalized  · Alcohol level on admission was greater than 400  Patient was unresponsive at that time    · Supportive measures  · Alcohol cessation counseling  · Consideration for CIWA and withdrawal medications when mental status improved    · Awake and follows commands this AM

## 2022-08-09 NOTE — ASSESSMENT & PLAN NOTE
· Secondary to hand  (ethyl alcohol) ingestion  · Supportive measures  · Ammonia elevated as well    Continue lactulose  · Improving this AM, awake and following commands

## 2022-08-09 NOTE — ASSESSMENT & PLAN NOTE
· Patient intubated for altered mental status and inability to protect airway  · ABG within normal limits  · Continue supportive care with mechanical ventilation and wean to extubate as tolerated  · SBT today

## 2022-08-09 NOTE — PROGRESS NOTES
Nayely [facility]  66   Progress Note - Crow Mccarty 1985, 40 y o  male MRN: [id number]  Unit/Bed#: ICU 02 Encounter: [id number]  Primary Care Provider: Mello Elaine PA-C   Date and time admitted to hospital: 8/8/2022  5:45 PM    * Alcohol intoxication ([facility] [address] )  Assessment & Plan  · Patient with history of alcohol abuse  Patient was recently discharged and family did their best to restrict any alcohol  Patient then had hand  delivered and was ingesting it  He had previously done this while hospitalized  · Alcohol level on admission was greater than 400  Patient was unresponsive at that time  · Supportive measures  · Alcohol cessation counseling  · Consideration for CIWA and withdrawal medications when mental status improved    · Awake and follows commands this AM    Acute respiratory failure ([facility] [address] )  Assessment & Plan  · Patient intubated for altered mental status and inability to protect airway  · ABG within normal limits  · Continue supportive care with mechanical ventilation and wean to extubate as tolerated  · SBT today    Toxic metabolic encephalopathy  Assessment & Plan  · Secondary to hand  (ethyl alcohol) ingestion  · Supportive measures  · Ammonia elevated as well  Continue lactulose  · Improving this AM, awake and following commands    Alcoholic cirrhosis of liver without ascites (HCC)  Assessment & Plan  · History of significant alcohol use with alcoholic cirrhosis  · History of hepatic lesions status post IR biopsy which was negative for malignancy  MRI also did not show evidence of malignancy  GI to follow as outpatient  · Currently maintained on nadolol, lactulose  Continue  · History of esophageal varices  Continue Protonix  Trend MELD  Patient's MELD Score is: 20  MELD Score 90-day mortality   ?9 1 9%   10-19 6 0%   20-29 19 6%   30-39 52 6%   ? 40 71 3%     MELD Score Component Values:  Component Value Date   Creatinine: 0 63 mg/dL 8/8/2022   Dialysis at least twice   in the past week  Or CVVHD for ? 24 hours   in the past week:     No    Bilirubin, total: 5 33 mg/dL 8/8/2022   INR: 1 57 8/8/2022   Sodium: 134 mmol/L 8/8/2022            Hyperammonemia (HCC)  Assessment & Plan  · Elevated at 71  Unclear compliance with lactulose  · Continue lactulose    Lactic acidosis  Assessment & Plan  · No evidence of infection  · Secondary to alcohol intoxication and hypovolemia  · Continue fluid resuscitation and trend  · Improving    Substance abuse ([address] )  Assessment & Plan  · History of alcohol and substance abuse  Patient currently maintained on Suboxone  Hold Suboxone for now  · Patient recently discharged and unable to go to inpatient rehab secondary to insurance issues  Discharged to a 2 bedroom apartment shared with elderly parents  Sister states he was getting hand  delivered and ingesting it  She removed several bottles on Saturday, but her mother called her and stated he was acting unusual, incontinence, and several more hand  bottles were found in his room  He did not have access to other alcohol  · Case management and psychiatry involvement when able to participate    Depression, concern for PTSD  Assessment & Plan  · Seen by psychiatry during previous admission with adjustment of medications  · Hold for now, restart when mental status improved  · Consideration for Psychiatry consult    Essential hypertension  Assessment & Plan  · Hold antihypertensive medications for now    ----------------------------------------------------------------------------------------  HPI/24hr events: no acute events overnight    Patient appropriate for transfer out of the ICU today?: No  Disposition: Continue Critical Care   Code Status: Level 1 - Full Code  ---------------------------------------------------------------------------------------  SUBJECTIVE  Intubated   Wakes up and follows commands    Review of Systems  Review of systems was reviewed and negative unless stated above in HPI/24-hour events   ---------------------------------------------------------------------------------------  OBJECTIVE    Vitals   Vitals:    22 0300 22 0301 22 0320 22 0400   BP: 119/58   111/55   Pulse: 101   92   Resp: 16   20   Temp:       SpO2: 93% 93% 92% 94%   Weight:       Height:         Temp (24hrs), Av 4 °F (36 3 °C), Min:97 1 °F (36 2 °C), Max:97 7 °F (36 5 °C)  Current: Temperature: 97 7 °F (36 5 °C)          Respiratory:  SpO2: SpO2: 94 %, SpO2 Activity: SpO2 Activity: At Rest, SpO2 Device: O2 Device: Ventilator, Capnography:         Invasive/non-invasive ventilation settings   Respiratory  Report   Lab Data (Last 4 hours)    None         O2/Vent Data (Last 4 hours)    None                Physical Exam  Vitals reviewed  Constitutional:       General: He is not in acute distress  Appearance: He is not ill-appearing  HENT:      Head: Normocephalic  Mouth/Throat:      Mouth: Mucous membranes are moist    Eyes:      Pupils: Pupils are equal, round, and reactive to light  Cardiovascular:      Rate and Rhythm: Normal rate and regular rhythm  Pulses: Normal pulses  Heart sounds: No murmur heard  Pulmonary:      Effort: Pulmonary effort is normal  No respiratory distress  Breath sounds: Normal breath sounds  Abdominal:      General: Abdomen is flat  Skin:     General: Skin is warm  Neurological:      General: No focal deficit present  Comments:  Follows commands and moves all extremities             Laboratory and Diagnostics:  Results from last 7 days   Lab Units 22  2148 22  1749   WBC Thousand/uL  --  10 71*   HEMOGLOBIN g/dL  --  9 6*   HEMATOCRIT %  --  30 3*   PLATELETS Thousands/uL 181 251   MONO PCT %  --  8     Results from last 7 days   Lab Units 22  1749   SODIUM mmol/L 134*   POTASSIUM mmol/L 3 5   CHLORIDE mmol/L 94*   CO2 mmol/L 25   ANION GAP mmol/L 15*   BUN mg/dL 6 CREATININE mg/dL 0 63   CALCIUM mg/dL 8 4   GLUCOSE RANDOM mg/dL 144*   ALT U/L 67   AST U/L 161*   ALK PHOS U/L 182*   ALBUMIN g/dL 3 9   TOTAL BILIRUBIN mg/dL 5 33*     Results from last 7 days   Lab Units 08/08/22  1749   MAGNESIUM mg/dL 1 5*      Results from last 7 days   Lab Units 08/08/22  1749   INR  1 57*   PTT seconds 46*          Results from last 7 days   Lab Units 08/08/22  2302 08/08/22  2035 08/08/22  1749   LACTIC ACID mmol/L 3 4* 4 7* 5 1*     ABG:  Results from last 7 days   Lab Units 08/08/22  2000   PH ART  7 364   PCO2 ART mm Hg 40 7   PO2 ART mm Hg 93 8   HCO3 ART mmol/L 22 7   BASE EXC ART mmol/L -2 5     VBG:          Micro  Results from last 7 days   Lab Units 08/08/22  1803   BLOOD CULTURE  Received in Microbiology Lab  Culture in Progress  Received in Microbiology Lab  Culture in Progress  EKG:   Imaging: I have personally reviewed pertinent reports  Intake and Output  I/O       08/07 0701 08/08 0700 08/08 0701 08/09 0700    I V  (mL/kg)  2244 9 (25 5)    IV Piggyback  150    Total Intake(mL/kg)  2394 9 (27 2)    Urine (mL/kg/hr)  350    Total Output  350    Net  +2044 9                Height and Weights   Height: 6' 1" (185 4 cm)  IBW (Ideal Body Weight): 79 9 kg  Body mass index is 25 57 kg/m²  Weight (last 2 days)     Date/Time Weight    08/08/22 2056 87 9 (193 78)    08/08/22 1745 85 7 (188 93)            Nutrition       Diet Orders   (From admission, onward)             Start     Ordered    08/08/22 2056  Diet NPO  Diet effective now        References:    Nutrtion Support Algorithm Enteral vs  Parenteral   Question Answer Comment   Diet Type NPO    RD to adjust diet per protocol?  Yes        08/08/22 2055                  Active Medications  Scheduled Meds:  Current Facility-Administered Medications   Medication Dose Route Frequency Provider Last Rate    chlorhexidine  15 mL Mouth/Throat A31N [address] Jennie Carpenter PA-C      fentanyl citrate (PF)  50 mcg Intravenous D1W PRN Yazmin Carpenter PA-C      folic acid IVPB  1 mg Intravenous Daily Hilario Montgomery PA-C Stopped (08/08/22 2251)    heparin (porcine)  5,000 Units Subcutaneous [facility] [place]      lactulose  20 g Oral BID [place], [place]      multi-electrolyte  75 mL/hr Intravenous Continuous [place], [place] 75 mL/hr (08/08/22 2218)    nadolol  40 mg Oral Daily [place], [place]      nicotine  1 patch Transdermal Daily [place], [place]      pantoprazole  40 mg Intravenous C88G [address] [place], [place]      propofol  5-50 mcg/kg/min Intravenous Titrated [facility] 45 mcg/kg/min (08/09/22 0300)    thiamine  500 mg Intravenous TID Yazmin Carpenter PA-C Stopped (08/09/22 0000)     Continuous Infusions:  multi-electrolyte, 75 mL/hr, Last Rate: 75 mL/hr (08/08/22 2218)  propofol, 5-50 mcg/kg/min, Last Rate: 45 mcg/kg/min (08/09/22 0300)      PRN Meds:   fentanyl citrate (PF), 50 mcg, Q2H PRN        Invasive Devices Review  Invasive Devices  Report    Peripheral Intravenous Line  Duration           Peripheral IV 08/08/22 Right Antecubital <1 day    Peripheral IV 08/08/22 Right Forearm <1 day          Drain  Duration           NG/OG/Enteral Tube Orogastric 16 Fr Left mouth <1 day    Rectal Tube With balloon <1 day    Urethral Catheter 16 Fr  <1 day          Airway  Duration           ETT  Oral 7 mm <1 day                Rationale for remaining devices:   ---------------------------------------------------------------------------------------  Advance Directive and Living Will:      Power of :    POLST:    ---------------------------------------------------------------------------------------  Care Time Delivered:   Upon my evaluation, this patient had a high probability of imminent or life-threatening deterioration due to hypoxic respiratory failure, which required my direct attention, intervention, and personal management    I have personally provided 45 minutes (1195 to 8065) of critical care time, exclusive of procedures, teaching, family meetings, and any prior time recorded by providers other than myself  Amie Dela Cruz PA-C      Portions of the record may have been created with voice recognition software  Occasional wrong word or "sound a like" substitutions may have occurred due to the inherent limitations of voice recognition software    Read the chart carefully and recognize, using context, where substitutions have occurred

## 2022-08-09 NOTE — ED NOTES
8/9/22 @ 0484 31 29 02:  PES notified by Tadeo Styles, , that patient is "strongly considering going to IP detox/rehab "  PES requested note of medical clearance, as patient is in the ICU, and according to notes, still intubated  Lisandra Glez responds, "doc tells me he's ok to go to detox "  Memo, [address]: PES contacted [facility] and spoke to Romaine Flores, who notes that patient was extubated at 03 17 [phone]; how can he be cleared for discharge?"  PES reports above and [facility] will be dispatched  PES notifies Lisnadra Glez that [facility] will come to meet with patient and offer treatment options  MS Memo    1440: Adolfo from [facility] arrived and will meet with patient to discuss options  [address] [place], [place] [address]ulevarneel [address] [place] [address] Way: Adolfo from [facility] met with and offered inpatient potentially for tomorrow at [facility] treatment  Patient informed Adolfo that he's in the process of linking to services through the [place] in [address] will connect with patient tomorrow to see if patient is still interested in [facility] treatment, or if patient has made any progress with the [address] [place]

## 2022-08-09 NOTE — ED PROVIDER NOTES
History  Chief Complaint   Patient presents with    Alcohol Intoxication     Pt drank hand      Pt in the ED with EMS after deliberately ingesting 4 - 20oz bottles of ethyl alcohol hand   Per EMS, patient's family was concerned that he may have been suicidal   Patient has a known history of alcohol dependence with cirrhosis and was recently discharged from rehab  Patient also has a history of AAA, asthma, opiate dependence  Patient is obtunded at the time of my initial evaluation and barely responsive to painful stimuli  Patient with no gag reflex  Decision made to intubate patient after initial evaluation  History provided by:  EMS personnel  History limited by:  Acuity of condition, mental status change and patient unresponsive   used: No    Alcohol Intoxication  Onset quality:  Unable to specify  Timing:  Unable to specify  Suspected agents:  Alcohol      Prior to Admission Medications   Prescriptions Last Dose Informant Patient Reported? Taking? albuterol (Ventolin HFA) 90 mcg/act inhaler  Self No No   Sig: Inhale 2 puffs every 6 (six) hours as needed for wheezing   buprenorphine-naloxone (SUBOXONE) 8-2 mg per SL tablet  Self Yes No   Sig: Place 2 tablets under the tongue daily Verified from [facility], [phone]   Precribed by Dr Elma Lozano, Last Prescirbed 5/17/2021   cloNIDine (CATAPRES) 0 1 mg tablet   No No   Sig: Take 1 tablet (0 1 mg total) by mouth daily at bedtime   desvenlafaxine succinate (PRISTIQ) 50 mg 24 hr tablet   No No   Sig: Take 1 tablet (50 mg total) by mouth daily   escitalopram (LEXAPRO) 10 mg tablet   Yes No   Sig: TAKE 1 TABLET BY MOUTH EVERY DAY UNTIL DIRECTED TO STOP   ferrous sulfate 324 (65 Fe) mg   No No   Sig: Take 1 tablet (324 mg total) by mouth 2 (two) times a day before meals   lactulose 20 g/30 mL   No No   Sig: Take 30 mL (20 g total) by mouth 2 (two) times a day   nadolol (CORGARD) 40 mg tablet   No No   Sig: Take 1 tablet (40 mg total) by mouth daily   nicotine (NICODERM CQ) 14 mg/24hr TD 24 hr patch  Self No No   Sig: Place 1 patch on the skin daily   Patient not taking: No sig reported   ondansetron (ZOFRAN-ODT) 4 mg disintegrating tablet   No No   Sig: Take 1 tablet (4 mg total) by mouth every 6 (six) hours as needed for nausea or vomiting   pantoprazole (PROTONIX) 40 mg tablet   No No   Sig: Take 1 tablet (40 mg total) by mouth daily   spironolactone (ALDACTONE) 100 mg tablet   No No   Sig: Take 1 tablet (100 mg total) by mouth daily   thiamine 100 MG tablet   No No   Sig: Take 1 tablet (100 mg total) by mouth daily      Facility-Administered Medications: None       Past Medical History:   Diagnosis Date    AAA (abdominal aortic aneurysm) (HCC)     Allergic     Anemia     Asthma     Depression 05/18/2021    Essential hypertension 05/06/2019    [facility] ) 7/18/2022    Liver metastasis ([facility] ) 7/18/2022    Obesity     [facility]     Substance abuse ([facility] )        Past Surgical History:   Procedure Laterality Date    IR BIOPSY LIVER MASS  7/22/2022    IR PARACENTESIS  7/20/2022    LYMPH NODE BIOPSY         Family History   Problem Relation Age of Onset    No Known Problems Mother     Diabetes Father     Prostate cancer Father     Hypertension Father      I have reviewed and agree with the history as documented  E-Cigarette/Vaping    E-Cigarette Use Current Some Day User      E-Cigarette/Vaping Substances     Social History     Tobacco Use    Smoking status: Current Every Day Smoker     Packs/day: 1 00    Smokeless tobacco: Never Used    Tobacco comment: 2/2019; PATIENT VAPES   Vaping Use    Vaping Use: Some days   Substance Use Topics    Alcohol use:  Yes     Alcohol/week: 12 0 standard drinks     Types: 12 Cans of beer per week    Drug use: Not Currently     Types: Heroin     Comment: 7/16/22 recovery       Review of Systems   Unable to perform ROS: Mental status change Physical Exam  Physical Exam  Vitals and nursing note reviewed  Constitutional:       Appearance: He is well-developed  HENT:      Head: Normocephalic and atraumatic  Eyes:      Conjunctiva/sclera: Conjunctivae normal       Pupils: Pupils are equal, round, and reactive to light  Cardiovascular:      Rate and Rhythm: Normal rate and regular rhythm  Heart sounds: Normal heart sounds  Pulmonary:      Effort: Pulmonary effort is normal    Abdominal:      General: Bowel sounds are normal       Palpations: Abdomen is soft  Musculoskeletal:      Cervical back: Normal range of motion and neck supple  Skin:     General: Skin is warm and dry  Capillary Refill: Capillary refill takes less than 2 seconds  Psychiatric:         Speech: He is noncommunicative           Vital Signs  ED Triage Vitals   Temperature Pulse Respirations Blood Pressure SpO2   08/08/22 1745 08/08/22 1745 08/08/22 1745 08/08/22 1745 08/08/22 1745   (!) 97 1 °F (36 2 °C) (!) 109 16 111/58 (!) 88 %      Temp src Heart Rate Source Patient Position - Orthostatic VS BP Location FiO2 (%)   -- 08/08/22 1846 08/08/22 1830 08/08/22 1830 08/08/22 2057    Monitor Lying Left arm 60      Pain Score       --                  Vitals:    08/08/22 2215 08/08/22 2230 08/08/22 2245 08/08/22 2300   BP: 121/63 121/63 116/61 114/59   Pulse: 83 84 85 86   Patient Position - Orthostatic VS:             Visual Acuity  Visual Acuity    Flowsheet Row Most Recent Value   L Pupil Size (mm) 3   R Pupil Size (mm) 3   L Pupil Shape Round   R Pupil Shape Round          ED Medications  Medications   lactulose oral solution 20 g (20 g Oral Given 8/8/22 2120)   nadolol (CORGARD) tablet 40 mg (has no administration in time range)   nicotine (NICODERM CQ) 14 mg/24hr TD 24 hr patch 1 patch (has no administration in time range)   thiamine (VITAMIN B1) 500 mg in sodium chloride 0 9 % 50 mL IVPB (has no administration in time range)   folic acid 1 mg in sodium chloride 0 9 % 50 mL IVPB (0 mg Intravenous Stopped 8/8/22 2251)   pantoprazole (PROTONIX) injection 40 mg (has no administration in time range)   propofol (DIPRIVAN) 1000 mg in 100 mL infusion (premix) (20 mcg/kg/min × 85 7 kg Intravenous Rate/Dose Change 8/8/22 2140)   multi-electrolyte (PLASMALYTE-A/ISOLYTE-S PH 7 4) IV solution (75 mL/hr Intravenous New Bag 8/8/22 2218)   heparin (porcine) subcutaneous injection 5,000 Units (5,000 Units Subcutaneous Given 8/8/22 2120)   sodium chloride 0 9 % bolus 1,000 mL (0 mL Intravenous Stopped 8/8/22 1855)   multi-electrolyte (ISOLYTE-S PH 7 4) bolus 1,000 mL (0 mL Intravenous Stopped 8/8/22 2112)     Followed by   multi-electrolyte (ISOLYTE-S PH 7 4) bolus 1,000 mL (0 mL Intravenous Stopped 8/8/22 2212)   magnesium sulfate 2 g/50 mL IVPB (premix) 2 g (0 g Intravenous Stopped 8/8/22 2214)       Diagnostic Studies  Results Reviewed     Procedure Component Value Units Date/Time    HS Troponin I 4hr [[id number]]  (Normal) Collected: 08/08/22 2148    Lab Status: Final result Specimen: Blood from Arm, Right Updated: 08/08/22 2224     hs TnI 4hr 5 ng/L      Delta 4hr hsTnI 0 ng/L     Platelet count [[id number]]  (Abnormal) Collected: 08/08/22 2148    Lab Status: Final result Specimen: Blood from Arm, Right Updated: 08/08/22 2218     Platelets 091 Thousands/uL      MPV 8 5 fL     Lactic acid 2 Hours [[id number]]  (Abnormal) Collected: 08/08/22 2035    Lab Status: Final result Specimen: Blood from Line, Venous Updated: 08/08/22 2106     LACTIC ACID 4 7 mmol/L     Narrative:      Result may be elevated if tourniquet was used during collection      Urine Microscopic [[id number]]  (Abnormal) Collected: 08/08/22 2043    Lab Status: Final result Specimen: Urine, Indwelling Rizo Catheter Updated: 08/08/22 2105     RBC, UA 2-4 /hpf      WBC, UA 1-2 /hpf      Epithelial Cells Occasional /hpf      Bacteria, UA Occasional /hpf      Hyaline Casts, UA 1-2 /lpf     UA (URINE) with reflex to Scope [[id number]]  (Abnormal) Collected: 08/08/22 2043    Lab Status: Final result Specimen: Urine, Indwelling Rizo Catheter Updated: 08/08/22 2053     Color, UA Orange     Clarity, UA Clear     Specific Gravity, UA >=1 030     pH, UA 6 0     Leukocytes, UA Negative     Nitrite, UA Negative     Protein, UA Trace mg/dl      Glucose, UA Negative mg/dl      Ketones, UA Negative mg/dl      Urobilinogen, UA 4 0 E U /dl      Bilirubin, UA Interference- unable to analyze     Occult Blood, UA Small    HS Troponin I 2hr [[id number]]  (Normal) Collected: 08/08/22 1945    Lab Status: Final result Specimen: Blood from Line, Venous Updated: 08/08/22 2031     hs TnI 2hr 5 ng/L      Delta 2hr hsTnI 0 ng/L     Blood gas, arterial [[id number]]  (Abnormal) Collected: 08/08/22 2000    Lab Status: Final result Specimen: Blood, Arterial Updated: 08/08/22 2006     pH, Arterial 7 364     PH ART TC 7 376     pCO2, Arterial 40 7 mm Hg      PCO2 (TC) Arterial 39 3 mm Hg      pO2, Arterial 93 8 mm Hg      PO2 (TC) Arterial 89 3 mm Hg      HCO3, Arterial 22 7 mmol/L      Base Excess, Arterial -2 5 mmol/L      O2 Content, Arterial 13 7 mL/dL      O2 HGB,Arterial  94 9 %      Temperature 97 1 Degrees Fehrenheit     Osmolality-"If this is regarding a toxic alcohol, please STOP and consult medical  for further guidance " [[id number]] Collected: 08/08/22 1945    Lab Status: In process Specimen: Blood from Arm, Right Updated: 08/08/22 1949    Rapid drug screen, urine [[id number]]  (Normal) Collected: 08/08/22 1833    Lab Status: Final result Specimen: Urine, Catheter Updated: 08/08/22 1946     Amph/Meth UR Negative     Barbiturate Ur Negative     Benzodiazepine Urine Negative     Cocaine Urine Negative     Methadone Urine Negative     Opiate Urine Negative     PCP Ur Negative     THC Urine Negative     Oxycodone Urine Negative    Narrative:      FOR MEDICAL PURPOSES ONLY  IF CONFIRMATION NEEDED PLEASE CONTACT THE LAB WITHIN 5 DAYS      Drug Screen Cutoff Levels:  AMPHETAMINE/METHAMPHETAMINES  1000 ng/mL  BARBITURATES     200 ng/mL  BENZODIAZEPINES     200 ng/mL  COCAINE      300 ng/mL  METHADONE      300 ng/mL  OPIATES      300 ng/mL  PHENCYCLIDINE     25 ng/mL  THC       50 ng/mL  OXYCODONE      100 ng/mL    COVID only [[id number]]  (Normal) Collected: 08/08/22 1749    Lab Status: Final result Specimen: Nares from Nose Updated: 08/08/22 1929     SARS-CoV-2 Negative    Narrative:      FOR PEDIATRIC PATIENTS - copy/paste COVID Guidelines URL to browser: [email or web address]  [employer]    SARS-CoV-2 assay is a Nucleic Acid Amplification assay intended for the  qualitative detection of nucleic acid from SARS-CoV-2 in nasopharyngeal  swabs  Results are for the presumptive identification of SARS-CoV-2 RNA  Positive results are indicative of infection with SARS-CoV-2, the virus  causing COVID-19, but do not rule out bacterial infection or co-infection  with other viruses  Laboratories within the [place] and its  territories are required to report all positive results to the appropriate  public health authorities  Negative results do not preclude SARS-CoV-2  infection and should not be used as the sole basis for treatment or other  patient management decisions  Negative results must be combined with  clinical observations, patient history, and epidemiological information  This test has not been FDA cleared or approved  This test has been authorized by FDA under an Emergency Use Authorization  (EUA)  This test is only authorized for the duration of time the  declaration that circumstances exist justifying the authorization of the  emergency use of an in vitro diagnostic tests for detection of SARS-CoV-2  virus and/or diagnosis of COVID-19 infection under section 564(b)(1) of  the Act, 21 U  S C  449JKA-7(E)(9), unless the authorization is terminated  or revoked sooner   The test has been validated but independent review by FDA  and CLIA is pending  Test performed using [employer] GeneXpert: This RT-PCR assay targets N2,  a region unique to SARS-CoV-2  A conserved region in the E-gene was chosen  for pan-Sarbecovirus detection which includes SARS-CoV-2  Lactic acid [[id number]]  (Abnormal) Collected: 08/08/22 1749    Lab Status: Final result Specimen: Blood from Arm, Right Updated: 08/08/22 1849     LACTIC ACID 5 1 mmol/L     Narrative:      Result may be elevated if tourniquet was used during collection  Blood culture #2 [[id number]] Collected: 08/08/22 1803    Lab Status: In process Specimen: Blood from Arm, Right Updated: 08/08/22 1829    Blood culture #1 [[id number]] Collected: 08/08/22 1803    Lab Status:  In process Specimen: Blood from Arm, Left Updated: 08/08/22 1829    Ethanol [[id number]]  (Abnormal) Collected: 08/08/22 1749    Lab Status: Final result Specimen: Blood from Arm, Right Updated: 08/08/22 1827     Ethanol Lvl 469 mg/dL     CBC and differential [[id number]]  (Abnormal) Collected: 08/08/22 1749    Lab Status: Final result Specimen: Blood from Arm, Right Updated: 08/08/22 1827     WBC 10 71 Thousand/uL      RBC 3 59 Million/uL      Hemoglobin 9 6 g/dL      Hematocrit 30 3 %      MCV 84 fL      MCH 26 5 pg      MCHC 31 4 g/dL      RDW 30 0 %      MPV 8 4 fL      Platelets 061 Thousands/uL     Manual Differential(PHLEBS Do Not Order) [[id number]]  (Abnormal) Collected: 08/08/22 1749    Lab Status: Final result Specimen: Blood from Arm, Right Updated: 08/08/22 1827     Segmented % 81 %      Lymphocytes % 10 %      Monocytes % 8 %      Eosinophils, % 0 %      Basophils % 1 %      Absolute Neutrophils 8 68 Thousand/uL      Lymphocytes Absolute 1 07 Thousand/uL      Monocytes Absolute 0 86 Thousand/uL      Eosinophils Absolute 0 00 Thousand/uL      Basophils Absolute 0 11 Thousand/uL      Total Counted --     RBC Morphology Present     Hypochromia Present     Poikilocytes Present     Polychromasia Present     Rouleaux Present     Schistocytes Present     Target Cells Present     Platelet Estimate Adequate    Comprehensive metabolic panel [[id number]]  (Abnormal) Collected: 08/08/22 1749    Lab Status: Final result Specimen: Blood from Arm, Right Updated: 08/08/22 1826     Sodium 134 mmol/L      Potassium 3 5 mmol/L      Chloride 94 mmol/L      CO2 25 mmol/L      ANION GAP 15 mmol/L      BUN 6 mg/dL      Creatinine 0 63 mg/dL      Glucose 144 mg/dL      Calcium 8 4 mg/dL       U/L      ALT 67 U/L      Alkaline Phosphatase 182 U/L      Total Protein 8 7 g/dL      Albumin 3 9 g/dL      Total Bilirubin 5 33 mg/dL      eGFR 125 ml/min/1 73sq m     Narrative:      [place] guidelines for Chronic Kidney Disease (CKD):     Stage 1 with normal or high GFR (GFR > 90 mL/min/1 73 square meters)    Stage 2 Mild CKD (GFR = 60-89 mL/min/1 73 square meters)    Stage 3A Moderate CKD (GFR = 45-59 mL/min/1 73 square meters)    Stage 3B Moderate CKD (GFR = 30-44 mL/min/1 73 square meters)    Stage 4 Severe CKD (GFR = 15-29 mL/min/1 73 square meters)    Stage 5 End Stage CKD (GFR <15 mL/min/1 73 square meters)  Note: GFR calculation is accurate only with a steady state creatinine    Magnesium [[id number]]  (Abnormal) Collected: 08/08/22 1749    Lab Status: Final result Specimen: Blood from Arm, Right Updated: 08/08/22 1826     Magnesium 1 5 mg/dL     CK Total with Reflex CKMB [[id number]]  (Normal) Collected: 08/08/22 1749    Lab Status: Final result Specimen: Blood from Arm, Right Updated: 08/08/22 1826     Total CK 90 U/L     Salicylate level [[id number]]  (Abnormal) Collected: 08/08/22 1749    Lab Status: Final result Specimen: Blood from Arm, Right Updated: 03/65/69 6688     Salicylate Lvl <3 mg/dL     Acetaminophen level-If concentration is detectable, please discuss with medical  on call   [[id number]]  (Abnormal) Collected: 08/08/22 1749    Lab Status: Final result Specimen: Blood from Arm, Right Updated: 08/08/22 1826     Acetaminophen Level <2 ug/mL     HS Troponin 0hr (reflex protocol) [[id number]]  (Normal) Collected: 08/08/22 1749    Lab Status: Final result Specimen: Blood from Arm, Right Updated: 08/08/22 1824     hs TnI 0hr 5 ng/L     Ammonia [[id number]]  (Abnormal) Collected: 08/08/22 1749    Lab Status: Final result Specimen: Blood from Arm, Right Updated: 08/08/22 1813     Ammonia 71 umol/L     Protime-INR [[id number]]  (Abnormal) Collected: 08/08/22 1749    Lab Status: Final result Specimen: Blood from Arm, Right Updated: 08/08/22 1813     Protime 18 8 seconds      INR 1 57    APTT [[id number]]  (Abnormal) Collected: 08/08/22 1749    Lab Status: Final result Specimen: Blood from Arm, Right Updated: 08/08/22 1813     PTT 46 seconds     Fingerstick Glucose (POCT) [[id number]]  (Abnormal) Collected: 08/08/22 1747    Lab Status: Final result Updated: 08/08/22 1748     POC Glucose 148 mg/dl                  CT head without contrast   Final Result by Lorraine Renteria MD (08/08 1941)      No acute intracranial abnormality  Workstation performed: [id number]         XR chest 1 view portable   ED Interpretation by Ainsley Mancilla DO (08/08 1910)   ETT above usama   OG tube in stomach                 Procedures  ECG 12 Lead Documentation Only    Date/Time: 8/8/2022 5:41 PM  Performed by: Ainsley Mancilla DO  Authorized by: Ainsley Mancilla DO     Indications / Diagnosis:  Overdose  ECG reviewed by me, the ED Provider: yes    Patient location:  ED  Previous ECG:     Previous ECG:  Unavailable    Comparison to cardiac monitor: Yes    Interpretation:     Interpretation: abnormal    Rate:     ECG rate:  106bpm    ECG rate assessment: tachycardic    Rhythm:     Rhythm: sinus tachycardia    Ectopy:     Ectopy: none    QRS:     QRS axis:  Normal  Conduction:     Conduction: normal    ST segments:     ST segments:  Normal  T waves:     T waves: normal Intubation    Date/Time: 8/8/2022 6:15 PM  Performed by: Claude Daring, DO  Authorized by: Claude Daring, DO     Patient location:  ED  Other Assisting Provider: No    Consent:     Consent obtained:  Emergent situation    Alternatives discussed:  No treatment  Universal protocol:     Site/side marked: yes      Patient identity confirmed:  Verbally with patient  Pre-procedure details:     Patient status:  Unresponsive    Mallampati score:  1    Paralytics:  Vecuronium  Indications:     Indications for intubation: respiratory failure and airway protection    Procedure details:     Preoxygenation:  Bag valve mask    CPR in progress: no      Intubation method:  Oral    Oral intubation technique:  Glidescope    Laryngoscope blade: Mac 3    Tube size (mm):  7 0    Tube type:  Cuffed    Number of attempts:  1    Cricoid pressure: no      Tube visualized through cords: yes    Placement assessment:     ETT to lip:  23    ETT to teeth:  22    Tube secured with:  ETT cespedes    Breath sounds:  Equal    Placement verification: chest rise, condensation, CXR verification, direct visualization, equal breath sounds and ETCO2 detector      CXR findings:  ETT in proper place    Ventilator settings:  450, 40%, 6 PEEP, 18  Post-procedure details:     Patient tolerance of procedure:   Tolerated well, no immediate complications  CriticalCare Time  Performed by: Claude Daring, DO  Authorized by: Claude Daring, DO     Critical care provider statement:     Critical care time (minutes):  65    Critical care time was exclusive of:  Separately billable procedures and treating other patients and teaching time    Critical care was necessary to treat or prevent imminent or life-threatening deterioration of the following conditions:  Respiratory failure and toxidrome    Critical care was time spent personally by me on the following activities:  Blood draw for specimens, obtaining history from patient or surrogate, discussions with consultants, evaluation of patient's response to treatment, examination of patient, interpretation of cardiac output measurements, ordering and performing treatments and interventions, ordering and review of laboratory studies, ordering and review of radiographic studies, re-evaluation of patient's condition and ventilator management    I assumed direction of critical care for this patient from another provider in my specialty: no               ED Course                                             MDM  Number of Diagnoses or Management Options  Acute alcohol intoxication ([facility] ): new and requires workup  Respiratory failure requiring intubation [facility]): new and requires workup  Suicidal ideation: new and requires workup     Amount and/or Complexity of Data Reviewed  Clinical lab tests: ordered and reviewed  Tests in the radiology section of CPT®: ordered and reviewed    Risk of Complications, Morbidity, and/or Mortality  Presenting problems: high  Diagnostic procedures: high  Management options: high    Patient Progress  Patient progress: stable      Disposition  Final diagnoses:   Acute alcohol intoxication ([facility] )   Respiratory failure requiring int[facility] )   Suicidal ideation     Time reflects when diagnosis was documented in both MDM as applicable and the Disposition within this note     Time User Action Codes Description Comment    8/8/2022  7:48 PM Fely Bump O Add [F10 929] Acute alcohol intoxication ([facility] )     8/8/2022  7:48 PM Fely Bump Add [J96 90] Respiratory failure requiring intubation ([facility] )     8/8/2022  7:49 PM Fely Bump Add [R45 851] Suicidal ideation     8/8/2022  8:55 PM Benwood Seller Add [F10 921] Alcohol intoxication with delirium [facility])       ED Disposition     ED Disposition   Admit    Condition   Stable    Date/Time   Mon Aug 8, 2022  7:48 PM    Comment   Case was discussed with Dr Jessi Catalan and the patient's admission status was agreed to be Admission Status: inpatient status to the service of Dr Jessi Bender  Follow-up Information    None             No discharge procedures on file      PDMP Review       Value Time User    PDMP Reviewed  Yes 7/16/2022  8:09 PM Radha Rucker          ED Provider  Electronically Signed by           Alina Guerrier DO  08/08/22 8660

## 2022-08-09 NOTE — PLAN OF CARE
Problem: MOBILITY - ADULT  Goal: Maintain or return to baseline ADL function  Description: INTERVENTIONS:  -  Assess patient's ability to carry out ADLs; assess patient's baseline for ADL function and identify physical deficits which impact ability to perform ADLs (bathing, care of mouth/teeth, toileting, grooming, dressing, etc )  - Assess/evaluate cause of self-care deficits   - Assess range of motion  - Assess patient's mobility; develop plan if impaired  - Assess patient's need for assistive devices and provide as appropriate  - Encourage maximum independence but intervene and supervise when necessary  - Involve family in performance of ADLs  - Assess for home care needs following discharge   - Consider OT consult to assist with ADL evaluation and planning for discharge  - Provide patient education as appropriate  Outcome: Progressing  Goal: Maintains/Returns to pre admission functional level  Description: INTERVENTIONS:  - Perform BMAT or MOVE assessment daily    - Set and communicate daily mobility goal to care team and patient/family/caregiver  - Collaborate with rehabilitation services on mobility goals if consulted  - Perform Range of Motion 3 times a day  - Reposition patient every 2 hours    - Dangle patient 2 times a day  - Stand patient 2 times a day  - Ambulate patient 2 times a day  - Out of bed to chair 2 times a day   - Out of bed for meals 2 times a day  - Out of bed for toileting  - Record patient progress and toleration of activity level   Outcome: Progressing     Problem: Potential for Falls  Goal: Patient will remain free of falls  Description: INTERVENTIONS:  - Educate patient/family on patient safety including physical limitations  - Instruct patient to call for assistance with activity   - Consult OT/PT to assist with strengthening/mobility   - Keep Call bell within reach  - Keep bed low and locked with side rails adjusted as appropriate  - Keep care items and personal belongings within reach  - Initiate and maintain comfort rounds  - Make Fall Risk Sign visible to staff  - Offer Toileting every 2 Hours, in advance of need  - Initiate/Maintain bed alarm  - Apply yellow socks and bracelet for high fall risk patients  - Consider moving patient to room near nurses station  Outcome: Progressing     Problem: Prexisting or High Potential for Compromised Skin Integrity  Goal: Skin integrity is maintained or improved  Description: INTERVENTIONS:  - Identify patients at risk for skin breakdown  - Assess and monitor skin integrity  - Assess and monitor nutrition and hydration status  - Monitor labs   - Assess for incontinence   - Turn and reposition patient  - Assist with mobility/ambulation  - Relieve pressure over bony prominences  - Avoid friction and shearing  - Provide appropriate hygiene as needed including keeping skin clean and dry  - Evaluate need for skin moisturizer/barrier cream  - Collaborate with interdisciplinary team   - Patient/family teaching  - Consider wound care consult   Outcome: Progressing     Problem: PAIN - ADULT  Goal: Verbalizes/displays adequate comfort level or baseline comfort level  Description: Interventions:  - Encourage patient to monitor pain and request assistance  - Assess pain using appropriate pain scale  - Administer analgesics based on type and severity of pain and evaluate response  - Implement non-pharmacological measures as appropriate and evaluate response  - Consider cultural and social influences on pain and pain management  - Notify physician/advanced practitioner if interventions unsuccessful or patient reports new pain  Outcome: Progressing     Problem: INFECTION - ADULT  Goal: Absence or prevention of progression during hospitalization  Description: INTERVENTIONS:  - Assess and monitor for signs and symptoms of infection  - Monitor lab/diagnostic results  - Monitor all insertion sites, i e  indwelling lines, tubes, and drains  - Monitor endotracheal if appropriate and nasal secretions for changes in amount and color  - [place] appropriate cooling/warming therapies per order  - Administer medications as ordered  - Instruct and encourage patient and family to use good hand hygiene technique  - Identify and instruct in appropriate isolation precautions for identified infection/condition  Outcome: Progressing     Problem: DISCHARGE PLANNING  Goal: Discharge to home or other facility with appropriate resources  Description: INTERVENTIONS:  - Identify barriers to discharge w/patient and caregiver  - Arrange for needed discharge resources and transportation as appropriate  - Identify discharge learning needs (meds, wound care, etc )  - Arrange for interpretive services to assist at discharge as needed  - Refer to Case Management Department for coordinating discharge planning if the patient needs post-hospital services based on physician/advanced practitioner order or complex needs related to functional status, cognitive ability, or social support system  Outcome: Progressing     Problem: Knowledge Deficit  Goal: Patient/family/caregiver demonstrates understanding of disease process, treatment plan, medications, and discharge instructions  Description: Complete learning assessment and assess knowledge base    Interventions:  - Provide teaching at level of understanding  - Provide teaching via preferred learning methods  Outcome: Progressing     Problem: RESPIRATORY - ADULT  Goal: Achieves optimal ventilation and oxygenation  Description: INTERVENTIONS:  - Assess for changes in respiratory status  - Assess for changes in mentation and behavior  - Position to facilitate oxygenation and minimize respiratory effort  - Oxygen administered by appropriate delivery if ordered  - Initiate smoking cessation education as indicated  - Encourage broncho-pulmonary hygiene including cough, deep breathe, Incentive Spirometry  - Assess the need for suctioning and aspirate as needed  - Assess and instruct to report SOB or any respiratory difficulty  - Respiratory Therapy support as indicated  Outcome: Progressing     Problem: GASTROINTESTINAL - ADULT  Goal: Maintains or returns to baseline bowel function  Description: INTERVENTIONS:  - Assess bowel function  - Encourage oral fluids to ensure adequate hydration  - Administer IV fluids if ordered to ensure adequate hydration  - Administer ordered medications as needed  - Encourage mobilization and activity  - Consider nutritional services referral to assist patient with adequate nutrition and appropriate food choices  Outcome: Progressing     Problem: GENITOURINARY - ADULT  Goal: Maintains or returns to baseline urinary function  Description: INTERVENTIONS:  - Assess urinary function  - Encourage oral fluids to ensure adequate hydration if ordered  - Administer IV fluids as ordered to ensure adequate hydration  - Administer ordered medications as needed  - Offer frequent toileting  - Follow urinary retention protocol if ordered  Outcome: Progressing  Goal: Urinary catheter remains patent  Description: INTERVENTIONS:  - Assess patency of urinary catheter  - If patient has a chronic corona, consider changing catheter if non-functioning  - Follow guidelines for intermittent irrigation of non-functioning urinary catheter  Outcome: Progressing     Problem: METABOLIC, FLUID AND ELECTROLYTES - ADULT  Goal: Electrolytes maintained within normal limits  Description: INTERVENTIONS:  - Monitor labs and assess patient for signs and symptoms of electrolyte imbalances  - Administer electrolyte replacement as ordered  - Monitor response to electrolyte replacements, including repeat lab results as appropriate  - Instruct patient on fluid and nutrition as appropriate  Outcome: Progressing     Problem: SKIN/TISSUE INTEGRITY - ADULT  Goal: Incision(s), wounds(s) or drain site(s) healing without S/S of infection  Description: INTERVENTIONS  - Assess and document dressing, incision, wound bed, drain sites and surrounding tissue  - Provide patient and family education  - Perform skin care/dressing changes every shift and as needed  Outcome: Progressing     Problem: SUBSTANCE USE/ABUSE  Goal: Will have no detox symptoms and will verbalize plan for changing substance-related behavior  Description: INTERVENTIONS:  - Monitor physical status and assess for symptoms of withdrawal  - Administer medication as ordered  - Provide emotional support with 1 on 1 interaction with staff  - Encourage recovery focused program/ addiction education  - Assess for verbalization of changing behaviors related to substance abuse  - Initiate consults and referrals as appropriate (Case Management, Spiritual Care, etc )  Outcome: Progressing     Problem: SAFETY,RESTRAINT: NV/NON-SELF DESTRUCTIVE BEHAVIOR  Goal: Remains free of harm/injury (restraint for non violent/non self-detsructive behavior)  Description: INTERVENTIONS:  - Instruct patient/family regarding restraint use   - Assess and monitor physiologic and psychological status   - Provide interventions and comfort measures to meet assessed patient needs   - Identify and implement measures to help patient regain control  - Assess readiness for release of restraint   Outcome: Completed  Goal: Returns to optimal restraint-free functioning  Description: INTERVENTIONS:  - Assess the patient's behavior and symptoms that indicate continued need for restraint  - Identify and implement measures to help patient regain control  - Assess readiness for release of restraint   Outcome: Progressing

## 2022-08-10 ENCOUNTER — APPOINTMENT (INPATIENT)
Dept: RADIOLOGY | Facility: HOSPITAL | Age: 37
DRG: 816 | End: 2022-08-10
Payer: COMMERCIAL

## 2022-08-10 PROBLEM — E87.6 HYPOKALEMIA: Status: ACTIVE | Noted: 2022-08-10

## 2022-08-10 PROBLEM — R09.02 HYPOXIA: Status: ACTIVE | Noted: 2022-08-10

## 2022-08-10 PROBLEM — E87.1 HYPONATREMIA: Status: ACTIVE | Noted: 2022-08-10

## 2022-08-10 LAB
ALBUMIN SERPL BCP-MCNC: 2.8 G/DL (ref 3.5–5)
ALP SERPL-CCNC: 141 U/L (ref 46–116)
ALT SERPL W P-5'-P-CCNC: 38 U/L (ref 12–78)
ANION GAP SERPL CALCULATED.3IONS-SCNC: 5 MMOL/L (ref 4–13)
ANISOCYTOSIS BLD QL SMEAR: PRESENT
AST SERPL W P-5'-P-CCNC: 85 U/L (ref 5–45)
BASO STIPL BLD QL SMEAR: PRESENT
BASOPHILS # BLD MANUAL: 0.06 THOUSAND/UL (ref 0–0.1)
BASOPHILS NFR MAR MANUAL: 1 % (ref 0–1)
BILIRUB SERPL-MCNC: 6.09 MG/DL (ref 0.2–1)
BUN SERPL-MCNC: 4 MG/DL (ref 5–25)
CALCIUM ALBUM COR SERPL-MCNC: 9.1 MG/DL (ref 8.3–10.1)
CALCIUM SERPL-MCNC: 8.1 MG/DL (ref 8.3–10.1)
CHLORIDE SERPL-SCNC: 96 MMOL/L (ref 96–108)
CO2 SERPL-SCNC: 30 MMOL/L (ref 21–32)
CREAT SERPL-MCNC: 0.61 MG/DL (ref 0.6–1.3)
DACRYOCYTES BLD QL SMEAR: PRESENT
EOSINOPHIL # BLD MANUAL: 0.13 THOUSAND/UL (ref 0–0.4)
EOSINOPHIL NFR BLD MANUAL: 2 % (ref 0–6)
ERYTHROCYTE [DISTWIDTH] IN BLOOD BY AUTOMATED COUNT: 28.3 % (ref 11.6–15.1)
GFR SERPL CREATININE-BSD FRML MDRD: 127 ML/MIN/1.73SQ M
GLUCOSE SERPL-MCNC: 106 MG/DL (ref 65–140)
HCT VFR BLD AUTO: 24.4 % (ref 36.5–49.3)
HGB BLD-MCNC: 7.7 G/DL (ref 12–17)
HYPERCHROMIA BLD QL SMEAR: PRESENT
INR PPP: 1.83 (ref 0.84–1.19)
LYMPHOCYTES # BLD AUTO: 1.38 THOUSAND/UL (ref 0.6–4.47)
LYMPHOCYTES # BLD AUTO: 22 % (ref 14–44)
MAGNESIUM SERPL-MCNC: 1.6 MG/DL (ref 1.6–2.6)
MCH RBC QN AUTO: 26.9 PG (ref 26.8–34.3)
MCHC RBC AUTO-ENTMCNC: 31.6 G/DL (ref 31.4–37.4)
MCV RBC AUTO: 85 FL (ref 82–98)
MONOCYTES # BLD AUTO: 0.31 THOUSAND/UL (ref 0–1.22)
MONOCYTES NFR BLD: 5 % (ref 4–12)
MRSA NOSE QL CULT: NORMAL
NEUTROPHILS # BLD MANUAL: 4.31 THOUSAND/UL (ref 1.85–7.62)
NEUTS SEG NFR BLD AUTO: 69 % (ref 43–75)
OVALOCYTES BLD QL SMEAR: PRESENT
PLATELET # BLD AUTO: 97 THOUSANDS/UL (ref 149–390)
PLATELET BLD QL SMEAR: ABNORMAL
PMV BLD AUTO: 8.4 FL (ref 8.9–12.7)
POLYCHROMASIA BLD QL SMEAR: PRESENT
POTASSIUM SERPL-SCNC: 3.2 MMOL/L (ref 3.5–5.3)
PROCALCITONIN SERPL-MCNC: 0.09 NG/ML
PROT SERPL-MCNC: 6.9 G/DL (ref 6.4–8.4)
PROTHROMBIN TIME: 21.3 SECONDS (ref 11.6–14.5)
RBC # BLD AUTO: 2.86 MILLION/UL (ref 3.88–5.62)
RBC MORPH BLD: PRESENT
ROULEAUX BLD QL SMEAR: PRESENT
SODIUM SERPL-SCNC: 131 MMOL/L (ref 135–147)
VARIANT LYMPHS # BLD AUTO: 1 %
WBC # BLD AUTO: 6.25 THOUSAND/UL (ref 4.31–10.16)

## 2022-08-10 PROCEDURE — 84145 PROCALCITONIN (PCT): CPT | Performed by: NURSE PRACTITIONER

## 2022-08-10 PROCEDURE — 85007 BL SMEAR W/DIFF WBC COUNT: CPT

## 2022-08-10 PROCEDURE — 94640 AIRWAY INHALATION TREATMENT: CPT

## 2022-08-10 PROCEDURE — 80053 COMPREHEN METABOLIC PANEL: CPT

## 2022-08-10 PROCEDURE — 85610 PROTHROMBIN TIME: CPT

## 2022-08-10 PROCEDURE — 71045 X-RAY EXAM CHEST 1 VIEW: CPT

## 2022-08-10 PROCEDURE — 83735 ASSAY OF MAGNESIUM: CPT

## 2022-08-10 PROCEDURE — 94760 N-INVAS EAR/PLS OXIMETRY 1: CPT

## 2022-08-10 PROCEDURE — 85027 COMPLETE CBC AUTOMATED: CPT

## 2022-08-10 RX ORDER — POTASSIUM CHLORIDE 20 MEQ/1
40 TABLET, EXTENDED RELEASE ORAL EVERY 4 HOURS
Status: DISCONTINUED | OUTPATIENT
Start: 2022-08-10 | End: 2022-08-10

## 2022-08-10 RX ORDER — AMMONIUM LACTATE 12 G/100G
1 LOTION TOPICAL 2 TIMES DAILY PRN
Status: DISCONTINUED | OUTPATIENT
Start: 2022-08-10 | End: 2022-08-12

## 2022-08-10 RX ORDER — LORAZEPAM 0.5 MG/1
0.5 TABLET ORAL EVERY 8 HOURS PRN
Status: DISCONTINUED | OUTPATIENT
Start: 2022-08-10 | End: 2022-08-17 | Stop reason: HOSPADM

## 2022-08-10 RX ORDER — ALBUTEROL SULFATE 90 UG/1
2 AEROSOL, METERED RESPIRATORY (INHALATION) EVERY 4 HOURS PRN
Status: DISCONTINUED | OUTPATIENT
Start: 2022-08-10 | End: 2022-08-17 | Stop reason: HOSPADM

## 2022-08-10 RX ORDER — DOCUSATE SODIUM 100 MG/1
100 CAPSULE, LIQUID FILLED ORAL DAILY
Status: DISCONTINUED | OUTPATIENT
Start: 2022-08-10 | End: 2022-08-11

## 2022-08-10 RX ORDER — IPRATROPIUM BROMIDE AND ALBUTEROL SULFATE 2.5; .5 MG/3ML; MG/3ML
3 SOLUTION RESPIRATORY (INHALATION)
Status: DISCONTINUED | OUTPATIENT
Start: 2022-08-10 | End: 2022-08-17 | Stop reason: HOSPADM

## 2022-08-10 RX ORDER — LORAZEPAM 1 MG/1
2 TABLET ORAL ONCE
Status: COMPLETED | OUTPATIENT
Start: 2022-08-10 | End: 2022-08-10

## 2022-08-10 RX ORDER — FERROUS SULFATE 325(65) MG
325 TABLET ORAL EVERY OTHER DAY
Status: DISCONTINUED | OUTPATIENT
Start: 2022-08-10 | End: 2022-08-17 | Stop reason: HOSPADM

## 2022-08-10 RX ORDER — POTASSIUM CHLORIDE 20 MEQ/1
40 TABLET, EXTENDED RELEASE ORAL ONCE
Status: COMPLETED | OUTPATIENT
Start: 2022-08-10 | End: 2022-08-10

## 2022-08-10 RX ADMIN — NICOTINE 21 MG: 21 PATCH, EXTENDED RELEASE TRANSDERMAL at 09:00

## 2022-08-10 RX ADMIN — NADOLOL 40 MG: 40 TABLET ORAL at 09:01

## 2022-08-10 RX ADMIN — FERROUS SULFATE TAB 325 MG (65 MG ELEMENTAL FE) 325 MG: 325 (65 FE) TAB at 12:00

## 2022-08-10 RX ADMIN — FOLIC ACID 1 MG: 1 TABLET ORAL at 09:01

## 2022-08-10 RX ADMIN — IPRATROPIUM BROMIDE AND ALBUTEROL SULFATE 3 ML: 2.5; .5 SOLUTION RESPIRATORY (INHALATION) at 13:31

## 2022-08-10 RX ADMIN — LACTULOSE 20 G: 20 SOLUTION ORAL at 09:00

## 2022-08-10 RX ADMIN — LACTULOSE 20 G: 20 SOLUTION ORAL at 18:07

## 2022-08-10 RX ADMIN — MAGNESIUM OXIDE TAB 400 MG (241.3 MG ELEMENTAL MG) 400 MG: 400 (241.3 MG) TAB at 12:00

## 2022-08-10 RX ADMIN — ESCITALOPRAM OXALATE 10 MG: 10 TABLET ORAL at 09:01

## 2022-08-10 RX ADMIN — SPIRONOLACTONE 100 MG: 25 TABLET ORAL at 09:01

## 2022-08-10 RX ADMIN — Medication 1 TABLET: at 09:01

## 2022-08-10 RX ADMIN — LORAZEPAM 2 MG: 1 TABLET ORAL at 09:22

## 2022-08-10 RX ADMIN — HEPARIN SODIUM 5000 UNITS: 5000 INJECTION INTRAVENOUS; SUBCUTANEOUS at 22:32

## 2022-08-10 RX ADMIN — HEPARIN SODIUM 5000 UNITS: 5000 INJECTION INTRAVENOUS; SUBCUTANEOUS at 05:00

## 2022-08-10 RX ADMIN — BUPRENORPHINE AND NALOXONE 16 MG: 8; 2 FILM BUCCAL; SUBLINGUAL at 08:59

## 2022-08-10 RX ADMIN — DOCUSATE SODIUM 100 MG: 100 CAPSULE, LIQUID FILLED ORAL at 12:00

## 2022-08-10 RX ADMIN — THIAMINE HCL TAB 100 MG 100 MG: 100 TAB at 09:01

## 2022-08-10 RX ADMIN — POTASSIUM CHLORIDE 40 MEQ: 1500 TABLET, EXTENDED RELEASE ORAL at 12:00

## 2022-08-10 RX ADMIN — LORAZEPAM 0.5 MG: 0.5 TABLET ORAL at 16:50

## 2022-08-10 RX ADMIN — PANTOPRAZOLE SODIUM 40 MG: 40 TABLET, DELAYED RELEASE ORAL at 05:00

## 2022-08-10 RX ADMIN — LORAZEPAM 2 MG: 1 TABLET ORAL at 02:40

## 2022-08-10 RX ADMIN — POTASSIUM CHLORIDE 40 MEQ: 1500 TABLET, EXTENDED RELEASE ORAL at 16:50

## 2022-08-10 RX ADMIN — IPRATROPIUM BROMIDE AND ALBUTEROL SULFATE 3 ML: 2.5; .5 SOLUTION RESPIRATORY (INHALATION) at 20:16

## 2022-08-10 NOTE — ASSESSMENT & PLAN NOTE
· Patient was intubated for altered mental status and inability to protect his airway  · ABG within normal limits  · CXR showed right basilar atelectasis and left basilar opacity due to consolidation and/or effusion   · Patient was weaned from mechanical ventilation and extubated  · Currently on 3 L NC sating 88%  · Obtain CXR  · Encourage deep breathing and IS  · Trial Duoneb   · Consider diuresis   · Trend procal   · Monitor weight, I+O   · Will need home O2 eval if still on oxygen at discharge

## 2022-08-10 NOTE — WOUND OSTOMY CARE
Progress Note - Wound   Callie Del Rio 40 y o  male MRN: [id number]  Unit/Bed#: [address] Encounter: [id number]      Assessment: This is a 40year old male patient admitted with alcohol intoxication  He has a history of ETOH abuse, alcoholic cirrhosis, esophageal varices, HTN and depression  He was received in ICU bed awake, alert & oriented x 3 with family at bedside  He is continent of urine and has a rectal tube in place  He is totally dependent upon staff for all of his care and can reposition himself in bed  Assessment Findings:  1-The patient has severe pruritis to bilateral lower extremities and he states that he has been scratching them  This is most likely related to alcohol withdrawal  Both lower extremities have clusters of dry black/brown eschar with no drainage  Orders in place for skin care  2-Blanchable erythema to right buttock - orders in place for skin care and for prevention  The patient understands the importance of repositioning himself in bed  He refused to be placed on his side after wound consult was completed but stated that he would do so later  Skin care plans:  1-Hydraguard to bilateral sacrum, buttock and heels BID and PRN  2-Cleanse wounds to bilateral lower legs with foaming & dry wipes - pat dry well  Apply Lac-Hydrin as directed  3-Float heels on 2 pillows so heels are not in contact with pillows or mattress to offload pressure  4-Ehob cushion in chair when out of bed  5-Moisturize skin daily with skin nourishing cream   6-Encourage patient to turn/reposition himself q2h or when medically stable for pressure re-distribution on skin  Wound 08/08/22 Traumatic Pretibial Distal;Right (Active)   Wound Image   08/10/22 1339   Wound Description Eschar;black;brown 08/10/22 1339   Taylor-wound Assessment Edema; Erythema; Excoriated 08/10/22 1339   Wound Length (cm) 7 cm 08/10/22 1339   Wound Width (cm) 6 cm 08/10/22 1339   Wound Depth (cm) 0 cm 08/10/22 1339   Wound Surface Area (cm^2) 42 cm^2 08/10/22 1339   Wound Volume (cm^3) 0 cm^3 08/10/22 1339   Calculated Wound Volume (cm^3) 0 cm^3 08/10/22 1339   Drainage Amount None 08/10/22 1339   Non-staged Wound Description Full thickness 08/10/22 1339   Treatments Cleansed 08/10/22 1339   Dressing Protective barrier 08/10/22 1339   Wound packed? No 08/10/22 1339   Dressing Changed New 08/10/22 1339   Dressing Status Intact 08/10/22 1339       Wound 08/08/22 Traumatic Pretibial Distal;Left (Active)   Wound Image   08/10/22 1340   Wound Description Eschar;black;brown 08/10/22 1340   Taylor-wound Assessment Edema; Excoriated; Hyperpigmented 08/10/22 1340   Wound Length (cm) 27 cm 08/10/22 1340   Wound Width (cm) 9 cm 08/10/22 1340   Wound Surface Area (cm^2) 243 cm^2 08/10/22 1340   Tunneling 0 cm 08/10/22 1340   Drainage Amount None 08/10/22 1340   Non-staged Wound Description Full thickness 08/10/22 1340   Treatments Cleansed 08/10/22 1340   Dressing Protective barrier 08/10/22 1340   Dressing Changed New 08/10/22 1340   Dressing Status Intact 08/10/22 1340       Wound 08/10/22 Buttocks Right (Active)   Wound Image   08/10/22 1348   Wound Description Blanchable erythema 08/10/22 1348   Taylor-wound Assessment Intact 08/10/22 1348   Wound Length (cm) 3 cm 08/10/22 1348   Wound Width (cm) 3 cm 08/10/22 1348   Wound Depth (cm) 0 cm 08/10/22 1348   Wound Surface Area (cm^2) 9 cm^2 08/10/22 1348   Wound Volume (cm^3) 0 cm^3 08/10/22 1348   Calculated Wound Volume (cm^3) 0 cm^3 08/10/22 1348   Dressing Protective barrier 08/10/22 1348   Dressing Status Intact 08/10/22 1348     Discussed assessment findings, and plan of care/recommendations with Carmen Houston RN  Wound care will follow along with patient throughout admission, please call or tiger text with questions and concerns  Recommendations written as orders    Cynthia Barrow RN, BSN, [employer]

## 2022-08-10 NOTE — PROGRESS NOTES
[address]  Progress Note - Radha Weems 1985, 40 y o  male MRN: [id number]  Unit/Bed#: [address] [facility], [facility]  Encounter: [id number]  Primary Care Provider: Timothy Randhawa PA-C   Date and time admitted to hospital: 8/8/2022  5:45 PM    * Alcohol intoxication ([facility] [address] )  Assessment & Plan  · Patient with history of alcohol abuse  Recently discharged  Family did their best to restrict any alcohol  Patient had hand  delivered and was ingesting it  · Alcohol level on admission greater than 400  Patient was unresponsive at that time    · Continue supportive care  · Continue alcohol cessation counseling  · CIWA protocol  · Crisis following, will need inpatient alcohol rehab on discharge    Acute respiratory failure ([facility] [address] )  Assessment & Plan  · Patient was intubated for altered mental status and inability to protect his airway  · ABG within normal limits  · CXR showed right basilar atelectasis and left basilar opacity due to consolidation and/or effusion   · Patient was weaned from mechanical ventilation and extubated  · Currently on 3 L NC sating 88%  · Obtain CXR  · Encourage deep breathing and IS  · Trial Duoneb   · Consider diuresis   · Monitor weight, I+O   · Will need home O2 eval if still on oxygen at discharge    Hypokalemia  Assessment & Plan  · Potassium 3 2  · Replete with K-Dur 40 mEq for 2 doses  · Monitor in the morning    Hyponatremia  Assessment & Plan  · Sodium 131  · Liberalize diet as patient is currently on low-sodium diet  · Monitor in the morning    Toxic metabolic encephalopathy  Assessment & Plan  · Secondary to hand  ingestion  · Continue supportive measures  · Continue lactulose    Anemia  Assessment & Plan  · Hemoglobin 7 7, no signs of active bleeding  · Iron panel reviewed, iron level 13 with a saturation 4%  · Resume iron supplement and continue folic acid  · Trend counts daily    Alcoholic cirrhosis of liver without ascites (HCC)  Assessment & Plan  · History of significant alcohol use with alcoholic cirrhosis  History of hepatic lesion status post IR biopsy which was negative for malignancy  MRI did not show evidence of malignancy  INR 1 83  · Patient to follow-up with GI     · Currently on nadolol and lactulose  · History of esophageal varices, continue Protonix    Substance abuse ([address] )  Assessment & Plan  · History of alcohol and substance abuse  · Continue Suboxone  · Appreciate Case Management and crisis involvement  · Patient will discharge to inpatient detox unit as he is agreeable at this time    Hyperammonemia [facility])  Assessment & Plan  · Continue lactulose    Lactic acidosis  Assessment & Plan  · Secondary to alcohol intoxication and hypovolemia, no evidence of infection  · Received fluid resuscitation  · Lactic acid improved    Depression, concern for PTSD  Assessment & Plan  · Seen by psychiatry during previous admission  · Continue Lexapro    Essential hypertension  Assessment & Plan  · Continue nadolol  · Resumed Aldactone  · Holding Catapres        VTE Pharmacologic Prophylaxis: VTE Score: 3 High Risk (Score >/= 5) - Pharmacological DVT Prophylaxis Ordered: heparin  Sequential Compression Devices Ordered  Patient Centered Rounds: I performed bedside rounds with nursing staff today  Discussions with Specialists or Other Care Team Provider: nursing, CM, Crisis     Education and Discussions with Family / Patient: Patient declined call to   Time Spent for Care: 30 minutes  More than 50% of total time spent on counseling and coordination of care as described above      Current Length of Stay: 2 day(s)  Current Patient Status: Inpatient   Certification Statement: The patient will continue to require additional inpatient hospital stay due to electrolyte abnormality, weakness, hypoxia  Discharge Plan: Anticipate discharge in 48 hrs to inpatient detox     Code Status: Level 1 - Full Code    Subjective:   Patient seen and examined at bedside  He is resting comfortably  Reports anxiety and would like Ativan  Wants to stop drinking but states he needs help  He is amenable to discharge to inpatient detox  Reports chronic lower extremity skin discoloration and dryness  Reports shortness of breath with exertion  Denies CP  Objective:     Vitals:   Temp (24hrs), Av 8 °F (37 1 °C), Min:98 °F (36 7 °C), Max:99 5 °F (37 5 °C)    Temp:  [98 °F (36 7 °C)-99 5 °F (37 5 °C)] 99 5 °F (37 5 °C)  HR:  [] 72  Resp:  [13-31] 17  BP: (105-136)/(51-64) 110/58  SpO2:  [87 %-99 %] 98 %  Body mass index is 26 12 kg/m²  Input and Output Summary (last 24 hours): Intake/Output Summary (Last 24 hours) at 8/10/2022 1634  Last data filed at 8/10/2022 1200  Gross per 24 hour   Intake 450 ml   Output 1725 ml   Net -1275 ml       Physical Exam:   Physical Exam  Vitals and nursing note reviewed  Constitutional:       General: He is not in acute distress  Appearance: He is obese  He is ill-appearing (deconditioned )  He is not toxic-appearing or diaphoretic  HENT:      Head: Normocephalic  Mouth/Throat:      Mouth: Mucous membranes are moist    Eyes:      Conjunctiva/sclera: Conjunctivae normal    Cardiovascular:      Rate and Rhythm: Normal rate  Pulmonary:      Effort: Pulmonary effort is normal  No tachypnea  Breath sounds: Examination of the right-lower field reveals decreased breath sounds  Examination of the left-lower field reveals decreased breath sounds  Decreased breath sounds present  No wheezing, rhonchi or rales  Abdominal:      General: Bowel sounds are normal  There is no distension  Palpations: Abdomen is soft  Tenderness: There is no abdominal tenderness  Musculoskeletal:         General: Normal range of motion  Cervical back: Normal range of motion  Right lower leg: No edema  Left lower leg: No edema  Skin:     General: Skin is warm and dry        Capillary Refill: Capillary refill takes less than 2 seconds  Comments: Bilateral lower extremities with dry, flaky, thick skin    Neurological:      Mental Status: He is alert and oriented to person, place, and time  Mental status is at baseline  Motor: Weakness (generalized weakness ) present  Psychiatric:         Mood and Affect: Mood normal          Speech: Speech normal          Behavior: Behavior is withdrawn  Thought Content:  Thought content normal          Additional Data:     Labs:  Results from last 7 days   Lab Units 08/10/22  0500 08/09/22  0609   WBC Thousand/uL 6 25 10 79*   HEMOGLOBIN g/dL 7 7* 8 3*   HEMATOCRIT % 24 4* 26 1*   PLATELETS Thousands/uL 97* 165   NEUTROS PCT %  --  77*   LYMPHS PCT %  --  15   LYMPHO PCT % 22  --    MONOS PCT %  --  8   MONO PCT % 5  --    EOS PCT % 2 0     Results from last 7 days   Lab Units 08/10/22  0500   SODIUM mmol/L 131*   POTASSIUM mmol/L 3 2*   CHLORIDE mmol/L 96   CO2 mmol/L 30   BUN mg/dL 4*   CREATININE mg/dL 0 61   ANION GAP mmol/L 5   CALCIUM mg/dL 8 1*   ALBUMIN g/dL 2 8*   TOTAL BILIRUBIN mg/dL 6 09*   ALK PHOS U/L 141*   ALT U/L 38   AST U/L 85*   GLUCOSE RANDOM mg/dL 106     Results from last 7 days   Lab Units 08/10/22  0500   INR  1 83*     Results from last 7 days   Lab Units 08/08/22  1747   POC GLUCOSE mg/dl 148*         Results from last 7 days   Lab Units 08/10/22  0500 08/09/22  0609 08/08/22  2302 08/08/22  2035 08/08/22  1749   LACTIC ACID mmol/L  --  2 6* 3 4* 4 7* 5 1*   PROCALCITONIN ng/ml 0 09  --   --   --   --        Lines/Drains:  Invasive Devices  Report    Peripheral Intravenous Line  Duration           Peripheral IV 08/08/22 Right Antecubital 1 day    Peripheral IV 08/08/22 Right Forearm 1 day          Drain  Duration           Rectal Tube With balloon 1 day                      Imaging: Reviewed radiology reports from this admission including: chest xray and CT head    Recent Cultures (last 7 days):   Results from last 7 days   Lab Units 08/08/22  1803   BLOOD CULTURE  No Growth at 24 hrs  No Growth at 24 hrs  Last 24 Hours Medication List:   Current Facility-Administered Medications   Medication Dose Route Frequency Provider Last Rate    albuterol  2 puff Inhalation Q4H PRN MARILEE Reddy      ammonium lactate  1 application Topical BID PRN MARILEE Reddy      buprenorphine-naloxone  16 mg Sublingual Daily MARILEE Reddy      docusate sodium  100 mg Oral Daily MARILEE Reddy      escitalopram  10 mg Oral Daily MARILEE Reddy      ferrous sulfate  325 mg Oral Every Other Day MARILEE Reddy      folic acid  1 mg Oral Daily MARILEE Reddy      heparin (porcine)  5,000 Units Subcutaneous [facility]      ipratropium-albuterol  3 mL Nebulization Q6H MARILEE Reddy      lactulose  20 g Oral BID MARILEE Reddy      LORazepam  0 5 mg Oral Q8H PRN MARILEE Reddy      magnesium oxide  400 mg Oral Daily MARILEE Reddy      multivitamin-minerals  1 tablet Oral Daily Charlene Reddy      nadolol  40 mg Oral Daily Charlene Reddy      nicotine  21 mg Transdermal Daily MARILEE Reddy      ondansetron  4 mg Intravenous Q4H PRN MARILEE Reddy      pantoprazole  40 mg Oral Early Morning MARILEE Reddy      potassium chloride  40 mEq Oral Once MARILEE Reddy      spironolactone  100 mg Oral Daily MARILEE Reddy      thiamine  100 mg Oral Daily MARILEE Reddy          Today, Patient Was Seen By: MARILEE Reddy    **Please Note: This note may have been constructed using a voice recognition system  **

## 2022-08-10 NOTE — ASSESSMENT & PLAN NOTE
· History of significant alcohol use with alcoholic cirrhosis  History of hepatic lesion status post IR biopsy which was negative for malignancy  MRI did not show evidence of malignancy    INR 1 83  · Patient to follow-up with GI     · Currently on nadolol and lactulose  · History of esophageal varices, continue Protonix

## 2022-08-10 NOTE — ASSESSMENT & PLAN NOTE
· History of alcohol and substance abuse  · Continue Suboxone  · Appreciate Case Management and crisis involvement  · Patient will discharge to inpatient detox unit as he is agreeable at this time

## 2022-08-10 NOTE — ASSESSMENT & PLAN NOTE
· Sodium 131  · Liberalize diet as patient is currently on low-sodium diet  · Monitor in the morning

## 2022-08-10 NOTE — DISCHARGE INSTR - OTHER ORDERS
Skin care plans:    1-Hydraguard to bilateral sacrum, buttock and heels 2x/day and as needed  2-Cleanse wounds to bilateral lower legs with foaming cleanser & dry wipes - pat dry well  Apply Lac-Hydrin as directed  3-Float heels on 2 pillows so heels are not in contact with pillows or mattress to offload pressure  4-Use pressure redistribution cushion in chair when out of bed or in wheelchair  5-Moisturize skin daily with skin nourishing cream   6-Turn/reposition every 2 hrs in bed & every 1 hr in chair for pressure re-distribution on skin

## 2022-08-10 NOTE — PROGRESS NOTES
08/10/22 1000   Clinical Encounter Type   Visited With Patient   Routine Visit Introduction   Referral From Nurse    Pastoral Care Progress Note    8/10/2022  Patient: Cris Cortés : 1985  Admission Date & Time: 2022 174  MRN: [id number] CSN: [id number]          Patient declined  visit, but if any requests arise, please contact spiritual care

## 2022-08-10 NOTE — CASE MANAGEMENT
Case Management Discharge Planning Note    Patient name Jannette Ludwig  [facility] MRN [id number]  : 1985 Date 8/10/2022       Current Admission Date: 2022  Current Admission Diagnosis:Alcohol intoxication [facility])   Patient Active Problem List    Diagnosis Date Noted    Hyponatremia 08/10/2022    Hypokalemia 08/10/2022    Hyperammonemia ([facility] [address] ) 2022    Ambulatory dysfunction 2022    Toxic metabolic encephalopathy     Chronic bronchitis ([facility] [address] ) 2021    Lactic acidosis 2021    Cardiac murmur 2021    Depression, concern for PTSD 2021    Anasarca 2021    Alcohol withdrawal ([facility] [address] ) 2021    Thoracoabdominal aortic aneurysm ([facility] [address] ) 2021    Abnormal CT scan 2021    Alcohol intoxication ([facility] [address] ) 2021    Substance abuse ([facility] [address] ) 2021    Tobacco dependence     Alcoholic cirrhosis of liver without ascites ([facility] [address] ) 2021    Anemia 2021    Thrombocytopenia ([facility] [address] ) 2021    Acute respiratory failure ([facility] [address] ) 2021    Mass of upper lobe of left lung 2019    Essential hypertension 2019    Obesity, Class I, BMI 30-34 9 2019      LOS (days): 2  Geometric Mean LOS (GMLOS) (days):   Days to GMLOS:     OBJECTIVE:  Risk of Unplanned Readmission Score: 37 36      Current admission status: Inpatient   Preferred Pharmacy:   [id number]- [address]  [place] [address]  Phone: [phone] Fax: [phone] [address] [place] [address]  Phone: [phone] Fax: [address] ([address]) [place] [address]  Phone: [phone] Fax: [phone]    Primary Care Provider: Delia Peck PA-C    Primary Insurance: HEALTHCARE ASSISTANCE PENDING  Secondary Insurance:     DISCHARGE DETAILS:    Discharge planning discussed with[de-identified] Patient, Sister     CM contacted family/caregiver?: Yes  Were Treatment Team discharge recommendations reviewed with patient/caregiver?: Yes  Did patient/caregiver verbalize understanding of patient care needs?: Yes  Were patient/caregiver advised of the risks associated with not following Treatment Team discharge recommendations?: Yes    Contacts  Patient Contacts: Madelyn Stewart (sister)  Relationship to Patient[de-identified] Family  Contact Method: In Person  Reason/Outcome: Discharge Planning, Continuity of Care, Emergency Contact    Treatment Team Recommendation: Substance Abuse Treatment  Discharge Destination Plan[de-identified] Substance Abuse Treatment    SW met bedside with patient, his sister Justyn Pandey, and his father to continue discussions on discharge plan  At this time, patient remains motivated towards entering IP detox unit  His sister provided strong emotional support of his decision and encouragement towards recovery  Patient confirmed that he was supposed to get a callback today from the [facility] (possibly re bed availability)  Patient also expressed interest in going to a program with the [place]; however, they likely do not have the clinical capabilities pt currently needs  Marielena confirmed that she brought in a phone  so patient can now use his cellphone  Unit nurse also advised that pt will be moving to [facility]  SW called [facility] and spoke with rep Mata  She confirmed that she has been having difficulty getting in touch with someone at the hospital  BASIM provided Adolfo with patient's cellphone number, 2S room extension, and 2S SW desk  She was made aware of pt's intentions to still admit to IP detox  She will contact the [facility] to find out status of bed availability  She likely will be stopping in tomorrow to see patient

## 2022-08-10 NOTE — PROGRESS NOTES
Report given to [facility]  Pt transported via wheelchair to room 205  Family with pt upon transfer  Belongings documented upon transfer  Pt with no complaints

## 2022-08-10 NOTE — ED NOTES
8/10/22 @ 1315:  Channing Guillen from [facility] called to follow up with patient's plans  Channing Guillen was provided number directly to ICU, as she was having difficulties connecting with patient  PES will follow up with Channing Guillen on patient's decision for treatment    Mauricio Em, MS

## 2022-08-10 NOTE — ASSESSMENT & PLAN NOTE
· Hemoglobin 7 7, no signs of active bleeding  · Iron panel reviewed, iron level 13 with a saturation 4%  · Resume iron supplement and continue folic acid  · Trend counts daily

## 2022-08-10 NOTE — ASSESSMENT & PLAN NOTE
· Secondary to alcohol intoxication and hypovolemia, no evidence of infection  · Received fluid resuscitation  · Lactic acid improved

## 2022-08-10 NOTE — ASSESSMENT & PLAN NOTE
POA, due to right basilar atelectasis and left basilar opacity due to consolidation and/or effusion + obesity hypoventilation syndrome   · Currently on 3 L NC sating 88%  · Obtain CXR  · Encourage deep breathing and IS  · Trial Duoneb   · Consider diuresis   · Monitor weight, I+O

## 2022-08-10 NOTE — ASSESSMENT & PLAN NOTE
· Patient with history of alcohol abuse  Recently discharged  Family did their best to restrict any alcohol  Patient had hand  delivered and was ingesting it  · Alcohol level on admission greater than 400  Patient was unresponsive at that time    · Continue supportive care  · Continue alcohol cessation counseling  · WA protocol  · Crisis following, will need inpatient alcohol rehab on discharge

## 2022-08-10 NOTE — RESPIRATORY THERAPY NOTE
COPD education completed and booklet given to PT  Discussed COPD severity zones, infection prevention, energy conservation and pursed lip breathing

## 2022-08-11 PROBLEM — J96.00 ACUTE RESPIRATORY FAILURE (HCC): Status: RESOLVED | Noted: 2021-01-19 | Resolved: 2022-08-11

## 2022-08-11 LAB
ALBUMIN SERPL BCP-MCNC: 2.8 G/DL (ref 3.5–5)
ALP SERPL-CCNC: 146 U/L (ref 46–116)
ALT SERPL W P-5'-P-CCNC: 37 U/L (ref 12–78)
ANION GAP SERPL CALCULATED.3IONS-SCNC: 3 MMOL/L (ref 4–13)
AST SERPL W P-5'-P-CCNC: 71 U/L (ref 5–45)
BASOPHILS # BLD AUTO: 0.04 THOUSANDS/ΜL (ref 0–0.1)
BASOPHILS NFR BLD AUTO: 1 % (ref 0–1)
BILIRUB SERPL-MCNC: 5.3 MG/DL (ref 0.2–1)
BUN SERPL-MCNC: 5 MG/DL (ref 5–25)
CALCIUM ALBUM COR SERPL-MCNC: 9.2 MG/DL (ref 8.3–10.1)
CALCIUM SERPL-MCNC: 8.2 MG/DL (ref 8.3–10.1)
CHLORIDE SERPL-SCNC: 98 MMOL/L (ref 96–108)
CO2 SERPL-SCNC: 29 MMOL/L (ref 21–32)
CREAT SERPL-MCNC: 0.64 MG/DL (ref 0.6–1.3)
EOSINOPHIL # BLD AUTO: 0.1 THOUSAND/ΜL (ref 0–0.61)
EOSINOPHIL NFR BLD AUTO: 2 % (ref 0–6)
ERYTHROCYTE [DISTWIDTH] IN BLOOD BY AUTOMATED COUNT: 27.7 % (ref 11.6–15.1)
GFR SERPL CREATININE-BSD FRML MDRD: 125 ML/MIN/1.73SQ M
GLUCOSE SERPL-MCNC: 109 MG/DL (ref 65–140)
HCT VFR BLD AUTO: 24.5 % (ref 36.5–49.3)
HGB BLD-MCNC: 7.8 G/DL (ref 12–17)
IMM GRANULOCYTES # BLD AUTO: 0.02 THOUSAND/UL (ref 0–0.2)
IMM GRANULOCYTES NFR BLD AUTO: 0 % (ref 0–2)
LYMPHOCYTES # BLD AUTO: 1.24 THOUSANDS/ΜL (ref 0.6–4.47)
LYMPHOCYTES NFR BLD AUTO: 23 % (ref 14–44)
MAGNESIUM SERPL-MCNC: 1.4 MG/DL (ref 1.6–2.6)
MCH RBC QN AUTO: 27.6 PG (ref 26.8–34.3)
MCHC RBC AUTO-ENTMCNC: 31.8 G/DL (ref 31.4–37.4)
MCV RBC AUTO: 87 FL (ref 82–98)
MONOCYTES # BLD AUTO: 0.35 THOUSAND/ΜL (ref 0.17–1.22)
MONOCYTES NFR BLD AUTO: 7 % (ref 4–12)
NEUTROPHILS # BLD AUTO: 3.58 THOUSANDS/ΜL (ref 1.85–7.62)
NEUTS SEG NFR BLD AUTO: 67 % (ref 43–75)
NRBC BLD AUTO-RTO: 0 /100 WBCS
OSMOLALITY UR/SERPL-RTO: 347 MMOL/KG (ref 282–298)
OSMOLALITY UR: 179 MMOL/KG
PHOSPHATE SERPL-MCNC: 2.7 MG/DL (ref 2.7–4.5)
PLATELET # BLD AUTO: 92 THOUSANDS/UL (ref 149–390)
POTASSIUM SERPL-SCNC: 3.8 MMOL/L (ref 3.5–5.3)
PROCALCITONIN SERPL-MCNC: 0.12 NG/ML
PROT SERPL-MCNC: 6.9 G/DL (ref 6.4–8.4)
RBC # BLD AUTO: 2.83 MILLION/UL (ref 3.88–5.62)
SODIUM 24H UR-SCNC: 45 MOL/L
SODIUM SERPL-SCNC: 130 MMOL/L (ref 135–147)
WBC # BLD AUTO: 5.33 THOUSAND/UL (ref 4.31–10.16)

## 2022-08-11 PROCEDURE — 84145 PROCALCITONIN (PCT): CPT | Performed by: NURSE PRACTITIONER

## 2022-08-11 PROCEDURE — 97163 PT EVAL HIGH COMPLEX 45 MIN: CPT

## 2022-08-11 PROCEDURE — 84300 ASSAY OF URINE SODIUM: CPT | Performed by: NURSE PRACTITIONER

## 2022-08-11 PROCEDURE — 85025 COMPLETE CBC W/AUTO DIFF WBC: CPT | Performed by: NURSE PRACTITIONER

## 2022-08-11 PROCEDURE — 83735 ASSAY OF MAGNESIUM: CPT | Performed by: NURSE PRACTITIONER

## 2022-08-11 PROCEDURE — 94640 AIRWAY INHALATION TREATMENT: CPT

## 2022-08-11 PROCEDURE — 83935 ASSAY OF URINE OSMOLALITY: CPT | Performed by: NURSE PRACTITIONER

## 2022-08-11 PROCEDURE — 94760 N-INVAS EAR/PLS OXIMETRY 1: CPT

## 2022-08-11 PROCEDURE — 84100 ASSAY OF PHOSPHORUS: CPT | Performed by: NURSE PRACTITIONER

## 2022-08-11 PROCEDURE — 80053 COMPREHEN METABOLIC PANEL: CPT | Performed by: NURSE PRACTITIONER

## 2022-08-11 PROCEDURE — 97116 GAIT TRAINING THERAPY: CPT

## 2022-08-11 PROCEDURE — 99232 SBSQ HOSP IP/OBS MODERATE 35: CPT | Performed by: NURSE PRACTITIONER

## 2022-08-11 RX ORDER — MAGNESIUM SULFATE HEPTAHYDRATE 40 MG/ML
2 INJECTION, SOLUTION INTRAVENOUS ONCE
Status: COMPLETED | OUTPATIENT
Start: 2022-08-11 | End: 2022-08-11

## 2022-08-11 RX ORDER — LACTULOSE 20 G/30ML
10 SOLUTION ORAL DAILY
Status: DISCONTINUED | OUTPATIENT
Start: 2022-08-12 | End: 2022-08-14

## 2022-08-11 RX ORDER — HYDROXYZINE HYDROCHLORIDE 25 MG/1
25 TABLET, FILM COATED ORAL EVERY 6 HOURS PRN
Status: DISCONTINUED | OUTPATIENT
Start: 2022-08-11 | End: 2022-08-17 | Stop reason: HOSPADM

## 2022-08-11 RX ORDER — POTASSIUM CHLORIDE 20 MEQ/1
20 TABLET, EXTENDED RELEASE ORAL ONCE
Status: COMPLETED | OUTPATIENT
Start: 2022-08-11 | End: 2022-08-11

## 2022-08-11 RX ADMIN — Medication 1 TABLET: at 09:17

## 2022-08-11 RX ADMIN — LORAZEPAM 0.5 MG: 0.5 TABLET ORAL at 09:18

## 2022-08-11 RX ADMIN — HEPARIN SODIUM 5000 UNITS: 5000 INJECTION INTRAVENOUS; SUBCUTANEOUS at 14:06

## 2022-08-11 RX ADMIN — MAGNESIUM OXIDE TAB 400 MG (241.3 MG ELEMENTAL MG) 400 MG: 400 (241.3 MG) TAB at 09:18

## 2022-08-11 RX ADMIN — IPRATROPIUM BROMIDE AND ALBUTEROL SULFATE 3 ML: 2.5; .5 SOLUTION RESPIRATORY (INHALATION) at 01:58

## 2022-08-11 RX ADMIN — IPRATROPIUM BROMIDE AND ALBUTEROL SULFATE 3 ML: 2.5; .5 SOLUTION RESPIRATORY (INHALATION) at 13:50

## 2022-08-11 RX ADMIN — IPRATROPIUM BROMIDE AND ALBUTEROL SULFATE 3 ML: 2.5; .5 SOLUTION RESPIRATORY (INHALATION) at 21:31

## 2022-08-11 RX ADMIN — NADOLOL 40 MG: 40 TABLET ORAL at 09:23

## 2022-08-11 RX ADMIN — HEPARIN SODIUM 5000 UNITS: 5000 INJECTION INTRAVENOUS; SUBCUTANEOUS at 21:12

## 2022-08-11 RX ADMIN — NICOTINE 21 MG: 21 PATCH, EXTENDED RELEASE TRANSDERMAL at 09:19

## 2022-08-11 RX ADMIN — IPRATROPIUM BROMIDE AND ALBUTEROL SULFATE 3 ML: 2.5; .5 SOLUTION RESPIRATORY (INHALATION) at 07:27

## 2022-08-11 RX ADMIN — BUPRENORPHINE AND NALOXONE 16 MG: 8; 2 FILM BUCCAL; SUBLINGUAL at 09:18

## 2022-08-11 RX ADMIN — SPIRONOLACTONE 100 MG: 25 TABLET ORAL at 10:07

## 2022-08-11 RX ADMIN — HYDROXYZINE HYDROCHLORIDE 25 MG: 25 TABLET ORAL at 21:12

## 2022-08-11 RX ADMIN — POTASSIUM CHLORIDE 20 MEQ: 1500 TABLET, EXTENDED RELEASE ORAL at 09:18

## 2022-08-11 RX ADMIN — HYDROXYZINE HYDROCHLORIDE 25 MG: 25 TABLET ORAL at 14:06

## 2022-08-11 RX ADMIN — MAGNESIUM SULFATE HEPTAHYDRATE 2 G: 40 INJECTION, SOLUTION INTRAVENOUS at 09:19

## 2022-08-11 RX ADMIN — PANTOPRAZOLE SODIUM 40 MG: 40 TABLET, DELAYED RELEASE ORAL at 05:52

## 2022-08-11 RX ADMIN — LACTULOSE 20 G: 20 SOLUTION ORAL at 09:18

## 2022-08-11 RX ADMIN — LORAZEPAM 0.5 MG: 0.5 TABLET ORAL at 01:08

## 2022-08-11 RX ADMIN — HEPARIN SODIUM 5000 UNITS: 5000 INJECTION INTRAVENOUS; SUBCUTANEOUS at 05:51

## 2022-08-11 RX ADMIN — ESCITALOPRAM OXALATE 10 MG: 10 TABLET ORAL at 09:18

## 2022-08-11 RX ADMIN — FOLIC ACID 1 MG: 1 TABLET ORAL at 09:17

## 2022-08-11 RX ADMIN — LORAZEPAM 0.5 MG: 0.5 TABLET ORAL at 17:53

## 2022-08-11 RX ADMIN — DOCUSATE SODIUM 100 MG: 100 CAPSULE, LIQUID FILLED ORAL at 09:18

## 2022-08-11 RX ADMIN — THIAMINE HCL TAB 100 MG 100 MG: 100 TAB at 09:18

## 2022-08-11 NOTE — ASSESSMENT & PLAN NOTE
· Patient with history of alcohol abuse  Recently discharged  Family did their best to restrict any alcohol  Patient had hand  delivered and was ingesting it  · Alcohol level on admission greater than 400  Patient was unresponsive at that time  · Continue supportive care  · Continue alcohol cessation counseling  · CIWA protocol  · Crisis following, will need inpatient alcohol rehab on discharge  Patient amenable

## 2022-08-11 NOTE — ASSESSMENT & PLAN NOTE
· Patient was intubated for altered mental status and inability to protect his airway  · ABG within normal limits  · CXR showed right basilar atelectasis and left basilar opacity due to consolidation and/or effusion   · Patient was weaned from mechanical ventilation and extubated  · Repeat CXR 8/10 shows resolution of right lower lobe subsegmental atelectasis   · Now on room air  · Will cancel home O2 eval  · Encourage deep breathing and IS  · Duoneb   · Monitor weight, I+O

## 2022-08-11 NOTE — ASSESSMENT & PLAN NOTE
· Hemoglobin 7 7 -> 7 8, no signs of active bleeding  · Iron panel reviewed, iron level 13 with a saturation 4%  · Resume iron supplement and continue folic acid  · Trend counts daily

## 2022-08-11 NOTE — ASSESSMENT & PLAN NOTE
· History of significant alcohol use with alcoholic cirrhosis  History of esophageal varices  History of hepatic lesion status post IR biopsy which was negative for malignancy  MRI did not show evidence of malignancy    INR 1 83  · Currently on nadolol, lactulose, Protonix   · Follow-up with GI

## 2022-08-11 NOTE — ASSESSMENT & PLAN NOTE
· History of alcohol and substance abuse  · Continue Suboxone  · Appreciate Case Management and crisis involvement  · Patient amenable to discharge to inpatient detox unit

## 2022-08-11 NOTE — ASSESSMENT & PLAN NOTE
· Sodium 131 -> 130  · Check urine Na, urine osmo, and serum osmo   · Monitur I+O, daily weights  · Did have some weight gain since admission but did not receive significant amount of IVF  · Hold off on diuretics until workup results    · Liberalize diet   · Monitor Na in the morning

## 2022-08-11 NOTE — PROGRESS NOTES
[address]  Progress Note - Lucien Brewer 1985, 40 y o  male MRN: [id number]  Unit/Bed#: [address] Encounter: [id number]  Primary Care Provider: Eliceo Gray PA-C   Date and time admitted to hospital: 8/8/2022  5:45 PM    * Alcohol intoxication ([facility] [address] )  Assessment & Plan  · Patient with history of alcohol abuse  Recently discharged  Family did their best to restrict any alcohol  Patient had hand  delivered and was ingesting it  · Alcohol level on admission greater than 400  Patient was unresponsive at that time  · Continue supportive care  · Continue alcohol cessation counseling  · CIWA protocol  · Crisis following, will need inpatient alcohol rehab on discharge  Patient amenable      Hyponatremia  Assessment & Plan  · Sodium 131 -> 130  · Check urine Na, urine osmo, and serum osmo   · Monitur I+O, daily weights  · Did have some weight gain since admission but did not receive significant amount of IVF  · Hold off on diuretics until workup results    · Liberalize diet   · Monitor Na in the morning    Substance abuse (HCC)  Assessment & Plan  · History of alcohol and substance abuse  · Continue Suboxone  · Appreciate Case Management and crisis involvement  · Patient amenable to discharge to inpatient detox unit     Acute respiratory failure (HCC)-resolved as of 8/11/2022  Assessment & Plan  · Patient was intubated for altered mental status and inability to protect his airway  · ABG within normal limits  · CXR showed right basilar atelectasis and left basilar opacity due to consolidation and/or effusion   · Patient was weaned from mechanical ventilation and extubated  · Repeat CXR 8/10 shows resolution of right lower lobe subsegmental atelectasis   · Now on room air  · Will cancel home O2 eval  · Encourage deep breathing and IS  · Duoneb   · Monitor weight, I+O     Hypokalemia  Assessment & Plan  · Potassium 3 2 -> 3 8  · Replete and monitor     Toxic metabolic encephalopathy  Assessment & Plan  · Secondary to hand  ingestion  · Continue supportive measures  · Continue lactulose  · Mentation improving     Anemia  Assessment & Plan  · Hemoglobin 7 7 -> 7 8, no signs of active bleeding  · Iron panel reviewed, iron level 13 with a saturation 4%  · Resume iron supplement and continue folic acid  · Trend counts daily    Alcoholic cirrhosis of liver without ascites (HCC)  Assessment & Plan  · History of significant alcohol use with alcoholic cirrhosis  History of esophageal varices  History of hepatic lesion status post IR biopsy which was negative for malignancy  MRI did not show evidence of malignancy  INR 1 83  · Currently on nadolol, lactulose, Protonix   · Follow-up with GI    Hyperammonemia (HCC)  Assessment & Plan  · Continue lactulose    Lactic acidosis  Assessment & Plan  · Secondary to alcohol intoxication and hypovolemia, no evidence of infection  · Received fluid resuscitation  · Lactic acid improved  No need for further monitoring     Depression, concern for PTSD  Assessment & Plan  · Seen by psychiatry during previous admission  · Continue Lexapro    Essential hypertension  Assessment & Plan  · Continue Nadolol  · Resumed Aldactone  · Holding Catapres      VTE Pharmacologic Prophylaxis: VTE Score: 3 High Risk (Score >/= 5) - Pharmacological DVT Prophylaxis Ordered: heparin  Sequential Compression Devices Ordered  Patient Centered Rounds: I performed bedside rounds with nursing staff today  Discussions with Specialists or Other Care Team Provider: nursing, CM, PT     Education and Discussions with Family / Patient: Updated  (sister and father's phone number is not in service ) via phone  Time Spent for Care: 30 minutes  More than 50% of total time spent on counseling and coordination of care as described above      Current Length of Stay: 3 day(s)  Current Patient Status: Inpatient   Certification Statement: The patient will continue to require additional inpatient hospital stay due to hyponatremia   Discharge Plan: Anticipate discharge tomorrow to inpatient detox at [address] [facility] Status: Level 1 - Full Code    Subjective:   Patient seen and examined at bedside  He just finished working with physical therapy  Ambulated the hallways with assistance of a rolling walker  Has been on room air most of today  Eating and drinking better but appetite remains poor  Feels like his strength is improving  Reports ongoing anxiety that is relieved with Ativan but feels like he needs more frequent dosing  Would like to trial Atarax in addition to Ativan  Desires discharge to inpatient detox  Denies any chest pain or trouble breathing  Objective:     Vitals:   Temp (24hrs), Av °F (37 2 °C), Min:97 8 °F (36 6 °C), Max:99 5 °F (37 5 °C)    Temp:  [97 8 °F (36 6 °C)-99 5 °F (37 5 °C)] 98 7 °F (37 1 °C)  HR:  [72-87] 79  Resp:  [16-20] 16  BP: (103-133)/(55-64) 117/61  SpO2:  [93 %-98 %] 94 %  Body mass index is 26 06 kg/m²  Input and Output Summary (last 24 hours): Intake/Output Summary (Last 24 hours) at 2022 1453  Last data filed at 2022 0350  Gross per 24 hour   Intake --   Output 1000 ml   Net -1000 ml       Physical Exam:   Physical Exam  Vitals and nursing note reviewed  Constitutional:       General: He is not in acute distress  Appearance: He is ill-appearing (appears deconditioned )  He is not toxic-appearing or diaphoretic  Comments: Pleasant gentleman, more alert and talkative today  Seen ambulating around room with rolling walker  HENT:      Head: Normocephalic  Mouth/Throat:      Mouth: Mucous membranes are moist    Eyes:      Conjunctiva/sclera: Conjunctivae normal    Cardiovascular:      Rate and Rhythm: Normal rate  Pulmonary:      Effort: Pulmonary effort is normal       Breath sounds: Normal breath sounds  No wheezing, rhonchi or rales     Abdominal:      General: Bowel sounds are normal  There is no distension  Palpations: Abdomen is soft  Tenderness: There is no abdominal tenderness  Musculoskeletal:         General: Normal range of motion  Cervical back: Normal range of motion  Right lower leg: No edema  Left lower leg: No edema  Skin:     General: Skin is warm and dry  Capillary Refill: Capillary refill takes less than 2 seconds  Comments: Lower extremities with dry, flaky, discolored skin    Neurological:      Mental Status: He is alert and oriented to person, place, and time  Mental status is at baseline  Motor: Weakness present  Psychiatric:         Mood and Affect: Mood normal          Behavior: Behavior normal          Thought Content:  Thought content normal           Additional Data:     Labs:  Results from last 7 days   Lab Units 08/11/22  0605   WBC Thousand/uL 5 33   HEMOGLOBIN g/dL 7 8*   HEMATOCRIT % 24 5*   PLATELETS Thousands/uL 92*   NEUTROS PCT % 67   LYMPHS PCT % 23   MONOS PCT % 7   EOS PCT % 2     Results from last 7 days   Lab Units 08/11/22  0507   SODIUM mmol/L 130*   POTASSIUM mmol/L 3 8   CHLORIDE mmol/L 98   CO2 mmol/L 29   BUN mg/dL 5   CREATININE mg/dL 0 64   ANION GAP mmol/L 3*   CALCIUM mg/dL 8 2*   ALBUMIN g/dL 2 8*   TOTAL BILIRUBIN mg/dL 5 30*   ALK PHOS U/L 146*   ALT U/L 37   AST U/L 71*   GLUCOSE RANDOM mg/dL 109     Results from last 7 days   Lab Units 08/10/22  0500   INR  1 83*     Results from last 7 days   Lab Units 08/08/22  1747   POC GLUCOSE mg/dl 148*         Results from last 7 days   Lab Units 08/11/22  0507 08/10/22  0500 08/09/22  0609 08/08/22  2302 08/08/22  2035 08/08/22  1749   LACTIC ACID mmol/L  --   --  2 6* 3 4* 4 7* 5 1*   PROCALCITONIN ng/ml 0 12 0 09  --   --   --   --        Lines/Drains:  Invasive Devices  Report    Peripheral Intravenous Line  Duration           Peripheral IV 08/08/22 Right Antecubital 2 days    Peripheral IV 08/08/22 Right Forearm 2 days          Drain  Duration Rectal Tube With balloon 2 days                      Imaging: Reviewed radiology reports from this admission including: chest xray    Recent Cultures (last 7 days):   Results from last 7 days   Lab Units 08/08/22  1803   BLOOD CULTURE  No Growth at 48 hrs  No Growth at 48 hrs         Last 24 Hours Medication List:   Current Facility-Administered Medications   Medication Dose Route Frequency Provider Last Rate    albuterol  2 puff Inhalation Q4H PRN MARILEE Calvo      ammonium lactate  1 application Topical BID PRN ShirleneMARILEE Frias      buprenorphine-naloxone  16 mg Sublingual Daily MARILEE Calvo      escitalopram  10 mg Oral Daily Shirlene Amber, MARILEE      ferrous sulfate  325 mg Oral Every Other Day Shirlene Amber, MARILEE      folic acid  1 mg Oral Daily MARILEE Calvo      heparin (porcine)  5,000 Units Subcutaneous [facility] MARILEE Calvo      hydrOXYzine HCL  25 mg Oral Q6H PRN MARILEE Calvo      ipratropium-albuterol  3 mL Nebulization Q6H MARILEE Calvo      [START ON 8/12/2022] lactulose  10 g Oral Daily MARILEE Calvo      LORazepam  0 5 mg Oral Q8H PRN MARILEE Calvo      magnesium oxide  400 mg Oral Daily Shirlene Amber, MARILEE      multivitamin-minerals  1 tablet Oral Daily Shirlene Li, [address]      nadolol  40 mg Oral Daily Shirlene Li, [address]      nicotine  21 mg Transdermal Daily Shirlene MARILEE lCark      ondansetron  4 mg Intravenous Q4H PRN ShirleneMARILEE Frias      pantoprazole  40 mg Oral Early Morning MARILEE Calvo      spironolactone  100 mg Oral Daily Shirlene Clark, MARILEE      thiamine  100 mg Oral Daily MARILEE Calvo          Today, Patient Was Seen By: MARILEE Calvo    **Please Note: This note may have been constructed using a voice recognition system  **

## 2022-08-11 NOTE — ASSESSMENT & PLAN NOTE
· Secondary to hand  ingestion  · Continue supportive measures  · Continue lactulose  · Mentation improving

## 2022-08-11 NOTE — PHYSICAL THERAPY NOTE
PHYSICAL THERAPY EVALUATION/TREATMENT     08/11/22 1320   Note Type   Note type Evaluation   Pain Assessment   Pain Assessment Tool 0-10   Pain Score No Pain   Restrictions/Precautions   Other Precautions Fall Risk;Bed Alarm; Chair Alarm   Home Living   Type of [address] One level;Stairs to enter with rails  ([facility])   Home Equipment   (RW)   Prior Function   Level of [place] Independent with ADLs and functional mobility   Lives With Family  (Parents)   Receives Help From Family   ADL Assistance Independent   Comments Pt ambulates with a RW prior to admission   General   Additional Pertinent History Pt is admitted with ETOH abuse, obtunded, AMS, and unresponsive   Cognition   Overall Cognitive Status WFL   Arousal/Participation Cooperative   Orientation Level Oriented X4   Following Commands Follows all commands and directions without difficulty   Subjective   Subjective Lets see how I do   RLE Assessment   RLE Assessment WFL  (3+/5)   LLE Assessment   LLE Assessment WFL  (3+/5)   Bed Mobility   Supine to Sit 5  Supervision   Additional items Verbal cues; Increased time required   Transfers   Sit to Stand 4  Minimal assistance   Additional items Assist x 1;Verbal cues  (Bed raised)   Stand to Sit 4  Minimal assistance   Additional items Assist x 1;Verbal cues  (For safety; pt falls back on to bed)   Ambulation/Elevation   Gait pattern Short stride  (Minimally unsteady)   Gait Assistance 4  Minimal assist   Additional items Assist x 1;Verbal cues; Tactile cues   Assistive Device Rolling walker   Distance 30 ft with change in direction   Balance   Static Sitting Fair +   Static Standing Fair   Dynamic Standing Fair -   Ambulatory Fair -   Activity Tolerance   Activity Tolerance Patient limited by fatigue   Assessment   Problem List Decreased strength;Decreased range of motion;Decreased endurance; Impaired balance;Decreased mobility; Decreased safety awareness   Assessment Patient seen for Physical Therapy evaluation  Patient admitted with Alcohol intoxication ([address] )  Comorbidities affecting patient's physical performance include:  Substance abuse, essential hypertension, alcoholic cirrhosis of liver, anemia, acute respiratory failure, toxic metabolic encephalopathy, hyponatremia, hypokalemia, alcohol withdrawal, ambulatory dysfunction  Personal factors affecting patient at time of initial evaluation include: lives in one story house, ambulating with assistive device, stairs to enter home, inability to navigate community distances, inability to navigate level surfaces without external assistance and inability to perform dynamic tasks in community  Prior to admission, patient was independent with functional mobility with RW, independent with ADLS, living with parents in a one level home with 5 steps to enter and ambulating household distance  Please find objective findings from Physical Therapy assessment regarding body systems outlined above with impairments and limitations including weakness, decreased ROM, impaired balance, decreased endurance, impaired coordination, gait deviations, decreased activity tolerance, decreased functional mobility tolerance, decreased safety awareness and fall risk  The Barthel Index was used as a functional outcome tool presenting with a score of Barthel Index Score: 45 today indicating marked limitations of functional mobility and ADLS  Patient's clinical presentation is currently unstable/unpredictable as seen in patient's presentation of vital sign response, increased fall risk, new onset of impairment of functional mobility, decreased endurance and new onset of weakness  Pt would benefit from continued Physical Therapy treatment to address deficits as defined above and maximize level of functional mobility  As demonstrated by objective findings, the assigned level of complexity for this evaluation is high      The patient's AM-PAC Basic Mobility Inpatient Short Form Raw Score is 17  A Raw score of greater than 16 suggests the patient may benefit from discharge to home  Please also refer to the recommendation of the Physical Therapist for safe discharge planning  Despite ampac score, pt has DCP-is for inpatient alcohol rehab  Goals   Patient Goals To walk better   STG Expiration Date 08/18/22   Short Term Goal #1 Bed mobility-I; transfers-S   Short Term Goal #2 Pt will ambulate with a RW functional household distances-S; balance with a RW will be F/F+ for safe gait mobility and to decrease fall risk; up/down 5 steps with a railing and S   LTG Expiration Date 08/25/22   Long Term Goal #1 Transfers-I; pt will ambulate with a RW functional household and community distances-I   Long Term Goal #2 Balance with a RW will be F+/good for safe gait and mobility and to decrease fall risk; strength lower extremities 3+ to 4-/5; up/down 5 steps with a railing-I so pt can enter/exit his home   Plan   Treatment/Interventions ADL retraining;Functional transfer training;LE strengthening/ROM; Elevations; Therapeutic exercise; Endurance training;Patient/family training;Equipment eval/education; Bed mobility;Gait training; Compensatory technique education   PT Frequency Other (Comment)  (5x/wk)   Recommendation   PT Discharge Recommendation   (Per MDR, pt is for inpatient alcohol rehab)   [address] [place] [address] Mobility Inpatient   Turning in Bed Without Bedrails 3   Lying on Back to Sitting on Edge of Flat Bed 3   Moving Bed to Chair 3   Standing Up From Chair 3   Walk in Room 3   Climb 3-5 Stairs 2   Basic Mobility Inpatient Raw Score 17   Basic Mobility Standardized Score 39 67   Highest Level Of Mobility   -HLM Goal 5: Stand one or more mins   -HLM Achieved 7: Walk 25 feet or more   Barthel Index   Feeding 5   Bathing 0   Grooming Score 0   Dressing Score 5   Bladder Score 10   Bowels Score 5   Toilet Use Score 5   Transfers (Bed/Chair) Score 10   Mobility (Level Surface) Score 0   Stairs Score 5 Barthel Index Score 45   Additional Treatment Session   Start Time 1320   End Time 1330   Treatment Assessment S:  And like to try and walk further O:  Pt transfers sit to stand with minimal assistance/close S and ambulates with a  ft with multiple change in direction  Upon return to room, pt entered the bathroom with use of the RW and stood at the toilet to urinate with close S  Pt then ambulated with a RW approximately 10-15 feet back to his bed with close S  Pt transfers stand to sit with minimal assistance for safety as pt as he is walking backwards/turning toward his bed he loses his balance posteriorly and falls onto the bed  A:  Pt with good tolerance to bed mobility, transfers, ambulation with a RW and functional mobility/toileting  Pt needs verbal/tactile cuing when backing up to sit down and cues for hand placement for safety as pt falls onto the bed as he attempts to sit  Pt will continue to benefit from skilled physical therapy services to increase patient's mobility to I with a RW as previous  Patient's DCP-per multi disciplinary rounds today is for inpatient alcohol rehab  End of Consult   Patient Position at End of Consult Supine;Bed/Chair alarm activated; All needs within reach   [facility] Insurance Number  Jonathanaimee Wen  [id number]     Portions of the documentation may have been created using voice recognition software  Occasional wrong word or sound alike substitutions may have occurred due to the inherent limitation of the voice recognition software  Read the chart carefully and recognize, using context, where substitutions have occurred

## 2022-08-11 NOTE — CASE MANAGEMENT
Case Management Discharge Planning Note    Patient name Amauri Mcdaniel  [facility] [place] [address] [facility] [id number]  : 1985 Date 2022       Current Admission Date: 2022  Current Admission Diagnosis:Alcohol intoxication [facility])   Patient Active Problem List    Diagnosis Date Noted    Hyponatremia 08/10/2022    Hypokalemia 08/10/2022    [facility]ammonemia ([facility] ) 2022    Ambulatory dysfunction 2022    Toxic metabolic encephalopathy     Chronic bronchitis ([facility] ) 2021    Lactic acidosis 2021    Cardiac murmur 2021    Depression, concern for PTSD 2021    Anasarca 2021    Alcohol withdrawal ([facility] ) 2021    Thoracoabdominal aortic aneurysm ([facility] ) 2021    Abnormal CT scan 2021    Alcohol intoxication ([facility] ) 2021    Substance abuse ([facility] ) 2021    Tobacco dependence     Alcoholic cirrhosis of liver without ascites ([facility] ) 2021    Anemia 2021    Thrombocytopenia ([facility] ) 2021    Mass of upper lobe of left lung 2019    Essential hypertension 2019    Obesity, Class I, BMI 30-34 9 2019      LOS (days): 3  Geometric Mean LOS (GMLOS) (days):   Days to GMLOS:     OBJECTIVE:  Risk of Unplanned Readmission Score: 37 77     Current admission status: Inpatient   Preferred Pharmacy:   [facility]/pharmacy #[id number]- [address] [place] [address]  Phone: [phone] Fax: [phone] [place] [address] [address] [address]  Phone: [phone] Fax: [address] (OS) [place] [place] [facility]  [address] [place] [address]  Phone: [phone] Fax: [phone]    Primary Care Provider: Cynthia Weinberg PA-C    Primary Insurance: HEALTHCARE ASSISTANCE PENDING  Secondary Insurance:     DISCHARGE DETAILS:    Discharge planning discussed with[de-identified] Patient  Freedom of Choice: Yes  Comments - Freedom of Choice: SW met with pt after he spoke with Lonny Goodman from [facility] on phone  Pt is requesting clinical information requested by Lonny Goodman be faxed to her so admission process can continue  Pt's plan is to transfer to [facility] for inpatient alcohol treatment  SW placed call to Lonny Goodman to confirm requested documentation and fax number  Clinical faxed to treatment center ([phone]) as requested      Other Referral/Resources/Interventions Provided:  Interventions: Substance Abuse Treatment  Referral Comments: See above    Treatment Team Recommendation: Substance Abuse Treatment  Discharge Destination Plan[de-identified] Substance Abuse Treatment

## 2022-08-11 NOTE — ASSESSMENT & PLAN NOTE
· Secondary to alcohol intoxication and hypovolemia, no evidence of infection  · Received fluid resuscitation  · Lactic acid improved   No need for further monitoring

## 2022-08-11 NOTE — PLAN OF CARE
Problem: MOBILITY - ADULT  Goal: Maintain or return to baseline ADL function  Description: INTERVENTIONS:  -  Assess patient's ability to carry out ADLs; assess patient's baseline for ADL function and identify physical deficits which impact ability to perform ADLs (bathing, care of mouth/teeth, toileting, grooming, dressing, etc )  - Assess/evaluate cause of self-care deficits   - Assess range of motion  - Assess patient's mobility; develop plan if impaired  - Assess patient's need for assistive devices and provide as appropriate  - Encourage maximum independence but intervene and supervise when necessary  - Involve family in performance of ADLs  - Assess for home care needs following discharge   - Consider OT consult to assist with ADL evaluation and planning for discharge  - Provide patient education as appropriate  Outcome: Progressing  Goal: Maintains/Returns to pre admission functional level  Description: INTERVENTIONS:  - Perform BMAT or MOVE assessment daily    - Set and communicate daily mobility goal to care team and patient/family/caregiver  - Collaborate with rehabilitation services on mobility goals if consulted  - Perform Range of Motion 3 times a day  - Reposition patient every 2 hours    - Dangle patient 2 times a day  - Stand patient 2 times a day  - Ambulate patient 2 times a day  - Out of bed to chair 2 times a day   - Out of bed for meals 2 times a day  - Out of bed for toileting  - Record patient progress and toleration of activity level   Outcome: Progressing     Problem: Potential for Falls  Goal: Patient will remain free of falls  Description: INTERVENTIONS:  - Educate patient/family on patient safety including physical limitations  - Instruct patient to call for assistance with activity   - Consult OT/PT to assist with strengthening/mobility   - Keep Call bell within reach  - Keep bed low and locked with side rails adjusted as appropriate  - Keep care items and personal belongings within reach  - Initiate and maintain comfort rounds  - Make Fall Risk Sign visible to staff  - Offer Toileting every 2 Hours, in advance of need  - Initiate/Maintain bed alarm  - Apply yellow socks and bracelet for high fall risk patients  - Consider moving patient to room near nurses station  Outcome: Progressing     Problem: Prexisting or High Potential for Compromised Skin Integrity  Goal: Skin integrity is maintained or improved  Description: INTERVENTIONS:  - Identify patients at risk for skin breakdown  - Assess and monitor skin integrity  - Assess and monitor nutrition and hydration status  - Monitor labs   - Assess for incontinence   - Turn and reposition patient  - Assist with mobility/ambulation  - Relieve pressure over bony prominences  - Avoid friction and shearing  - Provide appropriate hygiene as needed including keeping skin clean and dry  - Evaluate need for skin moisturizer/barrier cream  - Collaborate with interdisciplinary team   - Patient/family teaching  - Consider wound care consult   Outcome: Progressing     Problem: PAIN - ADULT  Goal: Verbalizes/displays adequate comfort level or baseline comfort level  Description: Interventions:  - Encourage patient to monitor pain and request assistance  - Assess pain using appropriate pain scale  - Administer analgesics based on type and severity of pain and evaluate response  - Implement non-pharmacological measures as appropriate and evaluate response  - Consider cultural and social influences on pain and pain management  - Notify physician/advanced practitioner if interventions unsuccessful or patient reports new pain  Outcome: Progressing     Problem: INFECTION - ADULT  Goal: Absence or prevention of progression during hospitalization  Description: INTERVENTIONS:  - Assess and monitor for signs and symptoms of infection  - Monitor lab/diagnostic results  - Monitor all insertion sites, i e  indwelling lines, tubes, and drains  - Monitor endotracheal if appropriate and nasal secretions for changes in amount and color  - [place] appropriate cooling/warming therapies per order  - Administer medications as ordered  - Instruct and encourage patient and family to use good hand hygiene technique  - Identify and instruct in appropriate isolation precautions for identified infection/condition  Outcome: Progressing

## 2022-08-12 PROBLEM — E87.2 LACTIC ACIDOSIS: Status: RESOLVED | Noted: 2021-05-19 | Resolved: 2022-08-12

## 2022-08-12 PROBLEM — G92.8 TOXIC METABOLIC ENCEPHALOPATHY: Status: RESOLVED | Noted: 2022-07-25 | Resolved: 2022-08-12

## 2022-08-12 PROBLEM — E87.20 LACTIC ACIDOSIS: Status: RESOLVED | Noted: 2021-05-19 | Resolved: 2022-08-12

## 2022-08-12 PROBLEM — E87.6 HYPOKALEMIA: Status: RESOLVED | Noted: 2022-08-10 | Resolved: 2022-08-12

## 2022-08-12 LAB
ALBUMIN SERPL BCP-MCNC: 3.1 G/DL (ref 3.5–5)
ALP SERPL-CCNC: 152 U/L (ref 46–116)
ALT SERPL W P-5'-P-CCNC: 36 U/L (ref 12–78)
AMMONIA PLAS-SCNC: 87 UMOL/L (ref 11–35)
ANION GAP SERPL CALCULATED.3IONS-SCNC: 8 MMOL/L (ref 4–13)
AST SERPL W P-5'-P-CCNC: 76 U/L (ref 5–45)
ATRIAL RATE: 106 BPM
BILIRUB SERPL-MCNC: 5.28 MG/DL (ref 0.2–1)
BUN SERPL-MCNC: 6 MG/DL (ref 5–25)
CALCIUM ALBUM COR SERPL-MCNC: 9.5 MG/DL (ref 8.3–10.1)
CALCIUM SERPL-MCNC: 8.8 MG/DL (ref 8.3–10.1)
CHLORIDE SERPL-SCNC: 101 MMOL/L (ref 96–108)
CO2 SERPL-SCNC: 26 MMOL/L (ref 21–32)
CREAT SERPL-MCNC: 0.62 MG/DL (ref 0.6–1.3)
ERYTHROCYTE [DISTWIDTH] IN BLOOD BY AUTOMATED COUNT: 28 % (ref 11.6–15.1)
GFR SERPL CREATININE-BSD FRML MDRD: 126 ML/MIN/1.73SQ M
GLUCOSE SERPL-MCNC: 91 MG/DL (ref 65–140)
HCT VFR BLD AUTO: 25.7 % (ref 36.5–49.3)
HGB BLD-MCNC: 8 G/DL (ref 12–17)
MAGNESIUM SERPL-MCNC: 1.9 MG/DL (ref 1.6–2.6)
MCH RBC QN AUTO: 27.7 PG (ref 26.8–34.3)
MCHC RBC AUTO-ENTMCNC: 31.1 G/DL (ref 31.4–37.4)
MCV RBC AUTO: 89 FL (ref 82–98)
P AXIS: 36 DEGREES
PLATELET # BLD AUTO: 93 THOUSANDS/UL (ref 149–390)
POTASSIUM SERPL-SCNC: 4.3 MMOL/L (ref 3.5–5.3)
PR INTERVAL: 170 MS
PROT SERPL-MCNC: 7.5 G/DL (ref 6.4–8.4)
QRS AXIS: 4 DEGREES
QRSD INTERVAL: 98 MS
QT INTERVAL: 392 MS
QTC INTERVAL: 520 MS
RBC # BLD AUTO: 2.89 MILLION/UL (ref 3.88–5.62)
SODIUM SERPL-SCNC: 135 MMOL/L (ref 135–147)
T WAVE AXIS: 59 DEGREES
VENTRICULAR RATE: 106 BPM
WBC # BLD AUTO: 5.27 THOUSAND/UL (ref 4.31–10.16)

## 2022-08-12 PROCEDURE — 94669 MECHANICAL CHEST WALL OSCILL: CPT

## 2022-08-12 PROCEDURE — 82140 ASSAY OF AMMONIA: CPT | Performed by: NURSE PRACTITIONER

## 2022-08-12 PROCEDURE — 94760 N-INVAS EAR/PLS OXIMETRY 1: CPT

## 2022-08-12 PROCEDURE — 85027 COMPLETE CBC AUTOMATED: CPT | Performed by: NURSE PRACTITIONER

## 2022-08-12 PROCEDURE — 80053 COMPREHEN METABOLIC PANEL: CPT | Performed by: NURSE PRACTITIONER

## 2022-08-12 PROCEDURE — 94640 AIRWAY INHALATION TREATMENT: CPT

## 2022-08-12 PROCEDURE — 94668 MNPJ CHEST WALL SBSQ: CPT

## 2022-08-12 PROCEDURE — 93010 ELECTROCARDIOGRAM REPORT: CPT | Performed by: INTERNAL MEDICINE

## 2022-08-12 PROCEDURE — 83735 ASSAY OF MAGNESIUM: CPT | Performed by: NURSE PRACTITIONER

## 2022-08-12 PROCEDURE — 99232 SBSQ HOSP IP/OBS MODERATE 35: CPT | Performed by: PHYSICIAN ASSISTANT

## 2022-08-12 RX ORDER — FOLIC ACID 1 MG/1
1 TABLET ORAL DAILY
Qty: 30 TABLET | Refills: 0 | Status: SHIPPED | OUTPATIENT
Start: 2022-08-13 | End: 2022-08-17 | Stop reason: SDUPTHER

## 2022-08-12 RX ORDER — ACETAMINOPHEN 325 MG/1
325 TABLET ORAL EVERY 6 HOURS PRN
Status: DISCONTINUED | OUTPATIENT
Start: 2022-08-12 | End: 2022-08-17 | Stop reason: HOSPADM

## 2022-08-12 RX ORDER — IPRATROPIUM BROMIDE AND ALBUTEROL SULFATE 2.5; .5 MG/3ML; MG/3ML
3 SOLUTION RESPIRATORY (INHALATION) EVERY 6 HOURS PRN
Qty: 300 ML | Refills: 0 | Status: SHIPPED | OUTPATIENT
Start: 2022-08-12 | End: 2022-08-17

## 2022-08-12 RX ORDER — AMMONIUM LACTATE 12 G/100G
1 CREAM TOPICAL 2 TIMES DAILY PRN
Qty: 385 G | Refills: 0 | Status: SHIPPED | OUTPATIENT
Start: 2022-08-12 | End: 2022-08-17 | Stop reason: SDUPTHER

## 2022-08-12 RX ORDER — AMMONIUM LACTATE 12 G/100G
1 CREAM TOPICAL 2 TIMES DAILY PRN
Status: DISCONTINUED | OUTPATIENT
Start: 2022-08-12 | End: 2022-08-17 | Stop reason: HOSPADM

## 2022-08-12 RX ORDER — LACTULOSE 20 G/30ML
10 SOLUTION ORAL 2 TIMES DAILY
Qty: 60 ML | Refills: 0 | Status: SHIPPED | OUTPATIENT
Start: 2022-08-12 | End: 2022-08-17 | Stop reason: SDUPTHER

## 2022-08-12 RX ADMIN — HYDROXYZINE HYDROCHLORIDE 25 MG: 25 TABLET ORAL at 14:36

## 2022-08-12 RX ADMIN — IPRATROPIUM BROMIDE AND ALBUTEROL SULFATE 3 ML: 2.5; .5 SOLUTION RESPIRATORY (INHALATION) at 20:32

## 2022-08-12 RX ADMIN — HYDROXYZINE HYDROCHLORIDE 25 MG: 25 TABLET ORAL at 04:09

## 2022-08-12 RX ADMIN — LORAZEPAM 0.5 MG: 0.5 TABLET ORAL at 21:24

## 2022-08-12 RX ADMIN — SPIRONOLACTONE 100 MG: 25 TABLET ORAL at 09:35

## 2022-08-12 RX ADMIN — ESCITALOPRAM OXALATE 10 MG: 10 TABLET ORAL at 09:36

## 2022-08-12 RX ADMIN — THIAMINE HCL TAB 100 MG 100 MG: 100 TAB at 09:36

## 2022-08-12 RX ADMIN — NICOTINE 21 MG: 21 PATCH, EXTENDED RELEASE TRANSDERMAL at 09:43

## 2022-08-12 RX ADMIN — IPRATROPIUM BROMIDE AND ALBUTEROL SULFATE 3 ML: 2.5; .5 SOLUTION RESPIRATORY (INHALATION) at 07:22

## 2022-08-12 RX ADMIN — IPRATROPIUM BROMIDE AND ALBUTEROL SULFATE 3 ML: 2.5; .5 SOLUTION RESPIRATORY (INHALATION) at 02:22

## 2022-08-12 RX ADMIN — HEPARIN SODIUM 5000 UNITS: 5000 INJECTION INTRAVENOUS; SUBCUTANEOUS at 05:35

## 2022-08-12 RX ADMIN — FOLIC ACID 1 MG: 1 TABLET ORAL at 09:36

## 2022-08-12 RX ADMIN — FERROUS SULFATE TAB 325 MG (65 MG ELEMENTAL FE) 325 MG: 325 (65 FE) TAB at 09:36

## 2022-08-12 RX ADMIN — LORAZEPAM 0.5 MG: 0.5 TABLET ORAL at 10:15

## 2022-08-12 RX ADMIN — Medication 1 TABLET: at 09:36

## 2022-08-12 RX ADMIN — LORAZEPAM 0.5 MG: 0.5 TABLET ORAL at 01:58

## 2022-08-12 RX ADMIN — NADOLOL 40 MG: 40 TABLET ORAL at 10:15

## 2022-08-12 RX ADMIN — LACTULOSE 10 G: 20 SOLUTION ORAL at 09:35

## 2022-08-12 RX ADMIN — PANTOPRAZOLE SODIUM 40 MG: 40 TABLET, DELAYED RELEASE ORAL at 05:35

## 2022-08-12 RX ADMIN — MAGNESIUM OXIDE TAB 400 MG (241.3 MG ELEMENTAL MG) 400 MG: 400 (241.3 MG) TAB at 09:36

## 2022-08-12 RX ADMIN — BUPRENORPHINE AND NALOXONE 16 MG: 8; 2 FILM BUCCAL; SUBLINGUAL at 09:36

## 2022-08-12 RX ADMIN — ACETAMINOPHEN 325 MG: 325 TABLET ORAL at 14:34

## 2022-08-12 RX ADMIN — IPRATROPIUM BROMIDE AND ALBUTEROL SULFATE 3 ML: 2.5; .5 SOLUTION RESPIRATORY (INHALATION) at 14:25

## 2022-08-12 RX ADMIN — HEPARIN SODIUM 5000 UNITS: 5000 INJECTION INTRAVENOUS; SUBCUTANEOUS at 13:30

## 2022-08-12 NOTE — ASSESSMENT & PLAN NOTE
· Sodium 131- 130 - 135, improved  · Check urine Na, urine osmo, and serum osmo:  · Osmolality: 347  · Osmolality urine: 179  · Urine sodium: 45  · Consistent with beer potomania  · Monitur I+O, daily weights  · Did have some weight gain since admission but did not receive significant amount of IVF  · Liberalize diet   · Monitor Na in the morning  · Recommend alcohol cessation and 2L fluids daily

## 2022-08-12 NOTE — UTILIZATION REVIEW
Continued Stay Review    Date: 08/12/2022                          Current Patient Class: Inpatient  Current Level of Care: Critical Care    HPI:37 y o  male initially admitted on 08/08/2022    Assessment/Plan: Alcohol Intoxication  CIWAMonitor & replete as needed - electrolytes  Continue lactulose  Vital Signs: /58   Pulse 86   Temp 98 7 °F (37 1 °C) (Oral)   Resp 18   Ht 6' 1" (1 854 m)   Wt 87 7 kg (193 lb 4 8 oz)   SpO2 96%   BMI 25 50 kg/m²     Pertinent Labs/Diagnostic Results:   Results from last 7 days   Lab Units 08/08/22  1749   SARS-COV-2  Negative     Results from last 7 days   Lab Units 08/12/22  0541 08/11/22  0605 08/10/22  0500 08/09/22  0609 08/08/22  2148 08/08/22  1749   WBC Thousand/uL 5 27 5 33 6 25 10 79*  --  10 71*   HEMOGLOBIN g/dL 8 0* 7 8* 7 7* 8 3*  --  9 6*   HEMATOCRIT % 25 7* 24 5* 24 4* 26 1*  --  30 3*   PLATELETS Thousands/uL 93* 92* 97* 165   < > 251   NEUTROS ABS Thousands/µL  --  3 58  --  8 25*  --   --     < > = values in this interval not displayed       Results from last 7 days   Lab Units 08/12/22  0541 08/11/22  0507 08/10/22  0500 08/09/22  0609 08/08/22  1749   SODIUM mmol/L 135 130* 131* 135 134*   POTASSIUM mmol/L 4 3 3 8 3 2* 3 0* 3 5   CHLORIDE mmol/L 101 98 96 97 94*   CO2 mmol/L 26 29 30 27 25   ANION GAP mmol/L 8 3* 5 11 15*   BUN mg/dL 6 5 4* 4* 6   CREATININE mg/dL 0 62 0 64 0 61 0 47* 0 63   EGFR ml/min/1 73sq m 126 125 127 142 125   CALCIUM mg/dL 8 8 8 2* 8 1* 7 7* 8 4   MAGNESIUM mg/dL 1 9 1 4* 1 6 2 0 1 5*   PHOSPHORUS mg/dL  --  2 7  --  4 4  --      Results from last 7 days   Lab Units 08/12/22  0541 08/11/22  0507 08/10/22  0500 08/09/22  0609 08/08/22  1749   AST U/L 76* 71* 85* 122* 161*   ALT U/L 36 37 38 51 67   ALK PHOS U/L 152* 146* 141* 152* 182*   TOTAL PROTEIN g/dL 7 5 6 9 6 9 7 4 8 7*   ALBUMIN g/dL 3 1* 2 8* 2 8* 3 1* 3 9   TOTAL BILIRUBIN mg/dL 5 28* 5 30* 6 09* 4 72* 5 33*   AMMONIA umol/L 87*  --   --  42* 71*     Results from last 7 days   Lab Units 08/08/22  1747   POC GLUCOSE mg/dl 148*     Results from last 7 days   Lab Units 08/12/22  0541 08/11/22  0507 08/10/22  0500 08/09/22  0609 08/08/22  1749   GLUCOSE RANDOM mg/dL 91 109 106 120 144*     Results from last 7 days   Lab Units 08/09/22  0609 08/08/22  1945   OSMOLALITY, SERUM mmol/* 400*      Results from last 7 days   Lab Units 08/08/22 2000   Ge 30 ART  7 364   PCO2 ART mm Hg 40 7   PO2 ART mm Hg 93 8   HCO3 ART mmol/L 22 7   BASE EXC ART mmol/L -2 5   O2 CONTENT ART mL/dL 13 7*   O2 HGB, ARTERIAL % 94 9     Results from last 7 days   Lab Units 08/08/22  1749   CK TOTAL U/L 90     Results from last 7 days   Lab Units 08/08/22  2148 08/08/22  1945 08/08/22  1749   HS TNI 0HR ng/L  --   --  5   HS TNI 2HR ng/L  --  5  --    HSTNI D2 ng/L  --  0  --    HS TNI 4HR ng/L 5  --   --    HSTNI D4 ng/L 0  --   --      Results from last 7 days   Lab Units 08/10/22  0500 08/09/22  0609 08/08/22  1749   PROTIME seconds 21 3* 20 0* 18 8*   INR  1 83* 1 69* 1 57*   PTT seconds  --   --  46*     Results from last 7 days   Lab Units 08/11/22  0507 08/10/22  0500   PROCALCITONIN ng/ml 0 12 0 09     Results from last 7 days   Lab Units 08/09/22  0609 08/08/22  2302 08/08/22  2035 08/08/22  1749   LACTIC ACID mmol/L 2 6* 3 4* 4 7* 5 1*     Results from last 7 days   Lab Units 08/11/22  1006 08/09/22  0609 08/08/22  1945   OSMOLALITY, SERUM mmol/KG  --  347* 400*   OSMO UR mmol/*  --   --      Results from last 7 days   Lab Units 08/11/22  1006 08/08/22  2043   CLARITY UA   --  Clear   COLOR UA   --  Cloud   SPEC GRAV UA   --  >=1 030   PH UA   --  6 0   GLUCOSE UA mg/dl  --  Negative   KETONES UA mg/dl  --  Negative   BLOOD UA   --  Small*   PROTEIN UA mg/dl  --  Trace*   NITRITE UA   --  Negative   BILIRUBIN UA   --  Interference- unable to analyze*   UROBILINOGEN UA E U /dl  --  4 0*   LEUKOCYTES UA   --  Negative   WBC UA /hpf  --  1-2   RBC UA /hpf  --  2-4   BACTERIA UA /hpf --  Occasional   EPITHELIAL CELLS WET PREP /hpf  --  Occasional   SODIUM UR  45  --      Results from last 7 days   Lab Units 08/08/22  1833   AMPH/METH  Negative   BARBITURATE UR  Negative   BENZODIAZEPINE UR  Negative   COCAINE UR  Negative   METHADONE URINE  Negative   OPIATE UR  Negative   PCP UR  Negative   THC UR  Negative     Results from last 7 days   Lab Units 08/08/22  1749   ETHANOL LVL mg/dL 469*   ACETAMINOPHEN LVL ug/mL <2*   SALICYLATE LVL mg/dL <3*     Results from last 7 days   Lab Units 08/08/22  1803   BLOOD CULTURE  No Growth at 72 hrs  No Growth at 72 hrs  Medications:   Scheduled Medications:  buprenorphine-naloxone, 16 mg, Sublingual, Daily  escitalopram, 10 mg, Oral, Daily  ferrous sulfate, 325 mg, Oral, Every Other Day  folic acid, 1 mg, Oral, Daily  heparin (porcine), 5,000 Units, Subcutaneous, Q8H [address]  ipratropium-albuterol, 3 mL, Nebulization, Q6H  lactulose, 10 g, Oral, Daily  magnesium oxide, 400 mg, Oral, Daily  multivitamin-minerals, 1 tablet, Oral, Daily  nadolol, 40 mg, Oral, Daily  nicotine, 21 mg, Transdermal, Daily  pantoprazole, 40 mg, Oral, Early Morning  spironolactone, 100 mg, Oral, Daily  thiamine, 100 mg, Oral, Daily      Continuous IV Infusions:     PRN Meds:  albuterol, 2 puff, Inhalation, Q4H PRN  ammonium lactate, 1 application, Topical, BID PRN  hydrOXYzine HCL, 25 mg, Oral, Q6H PRN  LORazepam, 0 5 mg, Oral, Q8H PRN  ondansetron, 4 mg, Intravenous, Q4H PRN        Discharge Plan: TBD - needs INPATIENT Alcohol Rehab  Network Utilization Review Department  ATTENTION: Please call with any questions or concerns to [phone] and carefully listen to the prompts so that you are directed to the right person  All voicemails are confidential   Janny Catalan all requests for admission clinical reviews, approved or denied determinations and any other requests to dedicated fax number below belonging to the campus where the patient is receiving treatment   List of dedicated fax numbers for the Facilities:  [address] [place] [address] DENIALS (Administrative/Medical Necessity) [phone]   [address] (Maternity/NICU/Pediatrics) [address] [facility],[address]  [address] [place] [address] [address] [place] [address] [address] [facility] [address] [phone]

## 2022-08-12 NOTE — PLAN OF CARE
Problem: MOBILITY - ADULT  Goal: Maintain or return to baseline ADL function  Description: INTERVENTIONS:  -  Assess patient's ability to carry out ADLs; assess patient's baseline for ADL function and identify physical deficits which impact ability to perform ADLs (bathing, care of mouth/teeth, toileting, grooming, dressing, etc )  - Assess/evaluate cause of self-care deficits   - Assess range of motion  - Assess patient's mobility; develop plan if impaired  - Assess patient's need for assistive devices and provide as appropriate  - Encourage maximum independence but intervene and supervise when necessary  - Involve family in performance of ADLs  - Assess for home care needs following discharge   - Consider OT consult to assist with ADL evaluation and planning for discharge  - Provide patient education as appropriate  Outcome: Progressing     Problem: Potential for Falls  Goal: Patient will remain free of falls  Description: INTERVENTIONS:  - Educate patient/family on patient safety including physical limitations  - Instruct patient to call for assistance with activity   - Consult OT/PT to assist with strengthening/mobility   - Keep Call bell within reach  - Keep bed low and locked with side rails adjusted as appropriate  - Keep care items and personal belongings within reach  - Initiate and maintain comfort rounds  - Make Fall Risk Sign visible to staff  - Offer Toileting every 2 Hours, in advance of need  - Initiate/Maintain bed/chair alarm  - Obtain necessary fall risk management equipment: fall risk sign  - Apply yellow socks and bracelet for high fall risk patients  - Consider moving patient to room near nurses station  Outcome: Progressing     Problem: Prexisting or High Potential for Compromised Skin Integrity  Goal: Skin integrity is maintained or improved  Description: INTERVENTIONS:  - Identify patients at risk for skin breakdown  - Assess and monitor skin integrity  - Assess and monitor nutrition and hydration status  - Monitor labs   - Assess for incontinence   - Turn and reposition patient  - Assist with mobility/ambulation  - Relieve pressure over bony prominences  - Avoid friction and shearing  - Provide appropriate hygiene as needed including keeping skin clean and dry  - Evaluate need for skin moisturizer/barrier cream  - Collaborate with interdisciplinary team   - Patient/family teaching  - Consider wound care consult   Outcome: Progressing     Problem: RESPIRATORY - ADULT  Goal: Achieves optimal ventilation and oxygenation  Description: INTERVENTIONS:  - Assess for changes in respiratory status  - Assess for changes in mentation and behavior  - Position to facilitate oxygenation and minimize respiratory effort  - Oxygen administered by appropriate delivery if ordered  - Initiate smoking cessation education as indicated  - Encourage broncho-pulmonary hygiene including cough, deep breathe, Incentive Spirometry  - Assess the need for suctioning and aspirate as needed  - Assess and instruct to report SOB or any respiratory difficulty  - Respiratory Therapy support as indicated  Outcome: Progressing

## 2022-08-12 NOTE — ASSESSMENT & PLAN NOTE
· Patient with history of alcohol abuse  Recently discharged  Family did their best to restrict any alcohol  Patient had hand  delivered and was ingesting it  · Alcohol level on admission greater than 400  Patient was unresponsive at that time  · Continue supportive care  · Continue alcohol cessation counseling  · [facility] protocol  · Crisis following, needs inpatient alcohol rehab on discharge  Patient amenable    · Await bed availability at [facility]

## 2022-08-12 NOTE — CASE MANAGEMENT
Case Management Progress Note    Patient name Merissa Escalona  Location 2 [place]  [address] MRN [id number]  : 1985 Date 2022       LOS (days): 4  Geometric Mean LOS (GMLOS) (days):   Days to GMLOS:        OBJECTIVE:    Current admission status: Inpatient  Preferred Pharmacy:   [facility]/pharmacy #[id number]- [address]  [address] [place] [address]  Phone: [phone] Fax: [phone] - Michelle Rendon [address]  Phone: [phone] Fax: [address] ([place]) - [place], [place] - [facility]  [address] [place] [address]  Phone: [phone] Fax: [phone]    Primary Care Provider: Carrie Ruiz PA-C    Primary Insurance: HEALTHCARE ASSISTANCE PENDING  Secondary Insurance:     PROGRESS NOTE:    Call placed to [facility] [place] [facility] to confirm fax receipt  Per representative fax was received and is currently being reviewed  Female

## 2022-08-12 NOTE — PLAN OF CARE
Problem: MOBILITY - ADULT  Goal: Maintain or return to baseline ADL function  Description: INTERVENTIONS:  -  Assess patient's ability to carry out ADLs; assess patient's baseline for ADL function and identify physical deficits which impact ability to perform ADLs (bathing, care of mouth/teeth, toileting, grooming, dressing, etc )  - Assess/evaluate cause of self-care deficits   - Assess range of motion  - Assess patient's mobility; develop plan if impaired  - Assess patient's need for assistive devices and provide as appropriate  - Encourage maximum independence but intervene and supervise when necessary  - Involve family in performance of ADLs  - Assess for home care needs following discharge   - Consider OT consult to assist with ADL evaluation and planning for discharge  - Provide patient education as appropriate  Outcome: Progressing  Goal: Maintains/Returns to pre admission functional level  Description: INTERVENTIONS:  - Perform BMAT or MOVE assessment daily    - Set and communicate daily mobility goal to care team and patient/family/caregiver  - Collaborate with rehabilitation services on mobility goals if consulted  - Perform Range of Motion 2 times a day  - Reposition patient every 2 hours    - Dangle patient 2 times a day  - Stand patient 2 times a day  - Ambulate patient 2 times a day  - Out of bed to chair 2 times a day   - Out of bed for meals 2 times a day  - Out of bed for toileting  - Record patient progress and toleration of activity level   Outcome: Progressing     Problem: Potential for Falls  Goal: Patient will remain free of falls  Description: INTERVENTIONS:  - Educate patient/family on patient safety including physical limitations  - Instruct patient to call for assistance with activity   - Consult OT/PT to assist with strengthening/mobility   - Keep Call bell within reach  - Keep bed low and locked with side rails adjusted as appropriate  - Keep care items and personal belongings within reach  - Initiate and maintain comfort rounds  - Make Fall Risk Sign visible to staff  - Offer Toileting every 2 Hours, in advance of need  - Initiate/Maintain bed alarm  - Obtain necessary fall risk management equipment: walker  - Apply yellow socks and bracelet for high fall risk patients  - Consider moving patient to room near nurses station  Outcome: Progressing     Problem: Prexisting or High Potential for Compromised Skin Integrity  Goal: Skin integrity is maintained or improved  Description: INTERVENTIONS:  - Identify patients at risk for skin breakdown  - Assess and monitor skin integrity  - Assess and monitor nutrition and hydration status  - Monitor labs   - Assess for incontinence   - Turn and reposition patient  - Assist with mobility/ambulation  - Relieve pressure over bony prominences  - Avoid friction and shearing  - Provide appropriate hygiene as needed including keeping skin clean and dry  - Evaluate need for skin moisturizer/barrier cream  - Collaborate with interdisciplinary team   - Patient/family teaching  - Consider wound care consult   Outcome: Progressing     Problem: PAIN - ADULT  Goal: Verbalizes/displays adequate comfort level or baseline comfort level  Description: Interventions:  - Encourage patient to monitor pain and request assistance  - Assess pain using appropriate pain scale  - Administer analgesics based on type and severity of pain and evaluate response  - Implement non-pharmacological measures as appropriate and evaluate response  - Consider cultural and social influences on pain and pain management  - Notify physician/advanced practitioner if interventions unsuccessful or patient reports new pain  Outcome: Progressing     Problem: INFECTION - ADULT  Goal: Absence or prevention of progression during hospitalization  Description: INTERVENTIONS:  - Assess and monitor for signs and symptoms of infection  - Monitor lab/diagnostic results  - Monitor all insertion sites, i e  indwelling lines, tubes, and drains  - Monitor endotracheal if appropriate and nasal secretions for changes in amount and color  - [place] appropriate cooling/warming therapies per order  - Administer medications as ordered  - Instruct and encourage patient and family to use good hand hygiene technique  - Identify and instruct in appropriate isolation precautions for identified infection/condition  Outcome: Progressing     Problem: RESPIRATORY - ADULT  Goal: Achieves optimal ventilation and oxygenation  Description: INTERVENTIONS:  - Assess for changes in respiratory status  - Assess for changes in mentation and behavior  - Position to facilitate oxygenation and minimize respiratory effort  - Oxygen administered by appropriate delivery if ordered  - Initiate smoking cessation education as indicated  - Encourage broncho-pulmonary hygiene including cough, deep breathe, Incentive Spirometry  - Assess the need for suctioning and aspirate as needed  - Assess and instruct to report SOB or any respiratory difficulty  - Respiratory Therapy support as indicated  Outcome: Progressing     Problem: GASTROINTESTINAL - ADULT  Goal: Maintains or returns to baseline bowel function  Description: INTERVENTIONS:  - Assess bowel function  - Encourage oral fluids to ensure adequate hydration  - Administer IV fluids if ordered to ensure adequate hydration  - Administer ordered medications as needed  - Encourage mobilization and activity  - Consider nutritional services referral to assist patient with adequate nutrition and appropriate food choices  Outcome: Progressing     Problem: METABOLIC, FLUID AND ELECTROLYTES - ADULT  Goal: Electrolytes maintained within normal limits  Description: INTERVENTIONS:  - Monitor labs and assess patient for signs and symptoms of electrolyte imbalances  - Administer electrolyte replacement as ordered  - Monitor response to electrolyte replacements, including repeat lab results as appropriate  - Instruct patient on fluid and nutrition as appropriate  Outcome: Progressing     Problem: SUBSTANCE USE/ABUSE  Goal: Will have no detox symptoms and will verbalize plan for changing substance-related behavior  Description: INTERVENTIONS:  - Monitor physical status and assess for symptoms of withdrawal  - Administer medication as ordered  - Provide emotional support with 1 on 1 interaction with staff  - Encourage recovery focused program/ addiction education  - Assess for verbalization of changing behaviors related to substance abuse  - Initiate consults and referrals as appropriate (Case Management, Spiritual Care, etc )  Outcome: Progressing     Problem: Nutrition/Hydration-ADULT  Goal: Nutrient/Hydration intake appropriate for improving, restoring or maintaining nutritional needs  Description: Monitor and assess patient's nutrition/hydration status for malnutrition  Collaborate with interdisciplinary team and initiate plan and interventions as ordered  Monitor patient's weight and dietary intake as ordered or per policy  Utilize nutrition screening tool and intervene as necessary  Determine patient's food preferences and provide high-protein, high-caloric foods as appropriate       INTERVENTIONS:  - Monitor oral intake, urinary output, labs, and treatment plans  - Assess nutrition and hydration status and recommend course of action  - Evaluate amount of meals eaten  - Assist patient with eating if necessary   - Allow adequate time for meals  - Recommend/ encourage appropriate diets, oral nutritional supplements, and vitamin/mineral supplements  - Order, calculate, and assess calorie counts as needed  - Assess need for intravenous fluids  - Provide specific nutrition/hydration education as appropriate  - Include patient/family/caregiver in decisions related to nutrition  Outcome: Progressing

## 2022-08-12 NOTE — DISCHARGE SUMMARY
[address]  Discharge- Balaji Seen 1985, 40 y o  male MRN: [id number]  Unit/Bed#: [address] Encounter: [id number]  Primary Care Provider: Romel Shankar PA-C   Date and time admitted to hospital: 8/8/2022  5:45 PM    * Alcohol intoxication ([address] )  Assessment & Plan  · Patient with history of alcohol abuse  Recently discharged  Family did their best to restrict any alcohol  Patient had hand  delivered and was ingesting it  · Alcohol level on admission greater than 400  Patient was unresponsive at that time  · Continue supportive care  · Continue alcohol cessation counseling  · CIWA protocol  · Crisis following, needs inpatient alcohol rehab on discharge  Patient amenable  · Await bed availability at [facility]    Hyponatremia  Assessment & Plan  · Sodium 131- 130 - 135, improved  · Check urine Na, urine osmo, and serum osmo:  · Osmolality: 347  · Osmolality urine: 179  · Urine sodium: 45  · Consistent with beer potomania  · Monitur I+O, daily weights  · Did have some weight gain since admission but did not receive significant amount of IVF  · Liberalize diet   · Monitor Na in the morning  · Recommend alcohol cessation and 2L fluids daily    Hyperammonemia (HCC)  Assessment & Plan  · Continue lactulose    Depression, concern for PTSD  Assessment & Plan  · Seen by psychiatry during previous admission  · Continue Lexapro    Anemia  Assessment & Plan  · Hemoglobin 7 7 -> 7 8, no signs of active bleeding  · Iron panel reviewed, iron level 13 with a saturation 4%  · Resume iron supplement and continue folic acid  · Trend counts daily    Alcoholic cirrhosis of liver without ascites (HCC)  Assessment & Plan  · History of significant alcohol use with alcoholic cirrhosis  History of esophageal varices  History of hepatic lesion status post IR biopsy which was negative for malignancy  MRI did not show evidence of malignancy    INR 1 83  · Currently on nadolol, lactulose, Protonix   · Follow-up with GI    Substance abuse ([address] )  Assessment & Plan  · History of alcohol and substance abuse  · Continue Suboxone  · Appreciate Case Management and crisis involvement  · Patient amenable to discharge to inpatient detox unit     Essential hypertension  Assessment & Plan  · Continue Nadolol  · Resumed Aldactone  · Holding Catapres    Hypokalemia-resolved as of 8/12/2022  Assessment & Plan  · Potassium 3 2 -> 3 8  · Replete and monitor     Medical Problems             Resolved Problems  Date Reviewed: 8/12/2022          Resolved    Acute respiratory failure ([address] ) 8/11/2022     Resolved by  MARILEE Alan    Lactic acidosis 8/12/2022     Resolved by  Katharine SANTACRUZ PA-C    Toxic metabolic encephalopathy 3/71/0880     Resolved by  Micaela Tomas PA-C    Hypokalemia 8/12/2022     Resolved by  Micaela Tomas PA-C              Discharging Physician / Practitioner: Lion Raza PA-C  PCP: Cathy Landrum PA-C  Admission Date:   Admission Orders (From admission, onward)     Ordered        08/08/22 1949  INPATIENT ADMISSION  Once                      Discharge Date: 08/12/22    Consultations During Hospital Stay:  · None    Procedures Performed:   · None    Significant Findings / Test Results:   · CXR 8/8: Tubes in place  Right basilar atelectasis  Left basilar opacity due to consolidation and/or effusion  ·  CT head 8/8: No acute intracranial abnormality  · CXR 8/10: No acute disease in the chest   Resolution of right lower lobe subsegmental atelectasis since 8/8/2022  Incidental Findings:   · None    Test Results Pending at Discharge (will require follow up): · None     Outpatient Tests Requested:  · None    Complications:  None    Reason for Admission:  Unresponsive, requiring intubation    Hospital Course:    Zakia Duran is a 40 y o  male patient with past medical history of alcohol abuse, hepatic encephalopathy, alcoholic cirrhosis, history of esophageal varices who originally presented to the hospital on 8/8/2022 due to altered mental status  Patient had been recently admitted for alcohol abuse, discharged to home with his elderly parents  He had no access to alcohol but was having hand  deliver daily  He was found to be putting hand  into drinks during his admission as well  On day of admission patient's mother called patient's sister claiming he was confused and incontinent  Four ethyl alcohol hand  bottles were found around the patient, 2 of which were empty  Patient was intubated and admitted to ICU  Alcohol level on admission was greater than 400  Patient monitored on CIWA protocol, electrolyte abnormalities were corrected  Case Management in crisis involved with patient  Discharged to detox Unit  Medically stable for transition to Detox Unit at this time  Please see above list of diagnoses and related plan for additional information  Condition at Discharge: stable    Discharge Day Visit / Exam:   * Please refer to separate progress note for these details *    Discussion with Family: Patient declined call to   Discharge instructions/Information to patient and family:   See after visit summary for information provided to patient and family  Provisions for Follow-Up Care:  See after visit summary for information related to follow-up care and any pertinent home health orders  Disposition:   Acute Rehab at [facility] ([facility]) Readmission: None     Discharge Statement:  I spent 65 minutes discharging the patient  This time was spent on the day of discharge  I had direct contact with the patient on the day of discharge  Greater than 50% of the total time was spent examining patient, answering all patient questions, arranging and discussing plan of care with patient as well as directly providing post-discharge instructions  Additional time then spent on discharge activities      Discharge Medications:  See after visit summary for reconciled discharge medications provided to patient and/or family        **Please Note: This note may have been constructed using a voice recognition system**

## 2022-08-12 NOTE — CASE MANAGEMENT
Case Management Progress Note    Patient name Mayra [place]mers  Location 2 [place] [address] MRN [id number]  : 1985 Date 2022       LOS (days): 4  Geometric Mean LOS (GMLOS) (days):   Days to GMLOS:        OBJECTIVE:    Current admission status: Inpatient  Preferred Pharmacy:   [facility]/pharm[facility] #[id number]- [address] - [address] [facility]  Phone: [phone] Fax: [phone] - [address] [address]  Phone: [phone] Fax: [address] ([place]) - [address] [facility]  [address] [address]  Phone: [phone] Fax: [phone]    Primary Care Provider: Chito Li PA-C    Primary Insurance: HEALTHCARE ASSISTANCE PENDING  Secondary Insurance:     PROGRESS NOTE:    Failed fax notification received on fax machine  SW call [facility] to inquire about alternate fax number  Representative provided a different fax number - [phone]  Records refaxed to new number

## 2022-08-12 NOTE — PROGRESS NOTES
[address]  Progress Note - Balaji Seen 1985, 40 y o  male MRN: [id number]  Unit/Bed#: [facility] Encounter: [id number]  Primary Care Provider: Romel Shankar PA-C   Date and time admitted to hospital: 8/8/2022  5:45 PM    * Alcohol intoxication ([address] )  Assessment & Plan  · Patient with history of alcohol abuse  Recently discharged  Family did their best to restrict any alcohol  Patient had hand  delivered and was ingesting it  · Alcohol level on admission greater than 400  Patient was unresponsive at that time  · Continue supportive care  · Continue alcohol cessation counseling  · CIWA protocol  · Crisis following, needs inpatient alcohol rehab on discharge  Patient amenable  · Await bed availability at [facility]    Hypokalemia  Assessment & Plan  · Potassium 3 2 -> 3 8  · Replete and monitor     Hyponatremia  Assessment & Plan  · Sodium 131- 130 - 135, improved  · Check urine Na, urine osmo, and serum osmo   · Osmolality: 347  · Osmolality urine: 179  · Urine sodium: 45  · Monitur I+O, daily weights  · Did have some weight gain since admission but did not receive significant amount of IVF  · Liberalize diet   · Monitor Na in the morning  · Recommend alcohol cessation and 2L fluids daily    Hyperammonemia (HCC)  Assessment & Plan  · Continue lactulose    Depression, concern for PTSD  Assessment & Plan  · Seen by psychiatry during previous admission  · Continue Lexapro    Anemia  Assessment & Plan  · Hemoglobin 7 7 -> 7 8, no signs of active bleeding  · Iron panel reviewed, iron level 13 with a saturation 4%  · Resume iron supplement and continue folic acid  · Trend counts daily    Alcoholic cirrhosis of liver without ascites (HCC)  Assessment & Plan  · History of significant alcohol use with alcoholic cirrhosis  History of esophageal varices  History of hepatic lesion status post IR biopsy which was negative for malignancy  MRI did not show evidence of malignancy  INR 1 83  · Currently on nadolol, lactulose, Protonix   · Follow-up with GI    Substance abuse ([address] )  Assessment & Plan  · History of alcohol and substance abuse  · Continue Suboxone  · Appreciate Case Management and crisis involvement  · Patient amenable to discharge to inpatient detox unit     Essential hypertension  Assessment & Plan  · Continue Nadolol  · Resumed Aldactone  · Holding Catapres    Toxic metabolic encephalopathy-resolved as of 2022  Assessment & Plan  · Secondary to hand  ingestion  · Continue supportive measures  · Continue lactulose  · Mentation improving     Lactic acidosis-resolved as of 2022  Assessment & Plan  · Secondary to alcohol intoxication and hypovolemia, no evidence of infection  · Received fluid resuscitation  · Lactic acid improved  No need for further monitoring       VTE Pharmacologic Prophylaxis: VTE Score: 3 Moderate Risk (Score 3-4) - Pharmacological DVT Prophylaxis Ordered: heparin  Patient Centered Rounds: I performed bedside rounds with nursing staff today  Discussions with Specialists or Other Care Team Provider: Discussed discharge plan with case management  Await bed availability  Education and Discussions with Family / Patient: Patient declined call to   Time Spent for Care: 30 minutes  More than 50% of total time spent on counseling and coordination of care as described above  Current Length of Stay: 4 day(s)  Current Patient Status: Inpatient   Certification Statement: The patient will continue to require additional inpatient hospital stay due to Discharge planning  Discharge Plan: Anticipate discharge later today or tomorrow to rehab facility  Code Status: Level 1 - Full Code    Subjective:   Patient feels well today, we discuss dry skin on his feet  Remains agreeable to rehab  Questions if he will be able to smoke there and how he will be transported       Objective:     Vitals:   Temp (24hrs), Av 5 °F (36 9 °C), Min:98 1 °F (36 7 °C), Max:98 7 °F (37 1 °C)    Temp:  [98 1 °F (36 7 °C)-98 7 °F (37 1 °C)] 98 7 °F (37 1 °C)  HR:  [71-87] 86  Resp:  [16-19] 18  BP: (113-129)/(57-69) 118/57  SpO2:  [92 %-97 %] 96 %  Body mass index is 25 5 kg/m²  Input and Output Summary (last 24 hours): Intake/Output Summary (Last 24 hours) at 8/12/2022 0848  Last data filed at 8/12/2022 0001  Gross per 24 hour   Intake --   Output 2100 ml   Net -2100 ml       Physical Exam:   Physical Exam  Vitals and nursing note reviewed  Constitutional:       General: He is not in acute distress  Appearance: Normal appearance  He is well-developed  HENT:      Head: Normocephalic and atraumatic  Eyes:      General: Scleral icterus present  Conjunctiva/sclera: Conjunctivae normal    Cardiovascular:      Rate and Rhythm: Normal rate and regular rhythm  Heart sounds: No murmur heard  Pulmonary:      Effort: Pulmonary effort is normal       Breath sounds: No wheezing, rhonchi or rales  Abdominal:      General: There is no distension  Palpations: Abdomen is soft  Skin:     General: Skin is warm and dry  Coloration: Skin is jaundiced  Comments: Dry, crusted LE b/l   Neurological:      General: No focal deficit present  Mental Status: He is alert     Psychiatric:         Mood and Affect: Mood normal         Additional Data:     Labs:  Results from last 7 days   Lab Units 08/12/22  0541 08/11/22  0605   WBC Thousand/uL 5 27 5 33   HEMOGLOBIN g/dL 8 0* 7 8*   HEMATOCRIT % 25 7* 24 5*   PLATELETS Thousands/uL 93* 92*   NEUTROS PCT %  --  67   LYMPHS PCT %  --  23   MONOS PCT %  --  7   EOS PCT %  --  2     Results from last 7 days   Lab Units 08/12/22  0541   SODIUM mmol/L 135   POTASSIUM mmol/L 4 3   CHLORIDE mmol/L 101   CO2 mmol/L 26   BUN mg/dL 6   CREATININE mg/dL 0 62   ANION GAP mmol/L 8   CALCIUM mg/dL 8 8   ALBUMIN g/dL 3 1*   TOTAL BILIRUBIN mg/dL 5 28*   ALK PHOS U/L 152*   ALT U/L 36   AST U/L 76* GLUCOSE RANDOM mg/dL 91     Results from last 7 days   Lab Units 08/10/22  0500   INR  1 83*     Results from last 7 days   Lab Units 08/08/22  1747   POC GLUCOSE mg/dl 148*         Results from last 7 days   Lab Units 08/11/22  0507 08/10/22  0500 08/09/22  0609 08/08/22  2302 08/08/22  2035 08/08/22  1749   LACTIC ACID mmol/L  --   --  2 6* 3 4* 4 7* 5 1*   PROCALCITONIN ng/ml 0 12 0 09  --   --   --   --        Lines/Drains:  Invasive Devices  Report    Peripheral Intravenous Line  Duration           Peripheral IV 08/08/22 Right Antecubital 3 days    Peripheral IV 08/08/22 Right Forearm 3 days          Drain  Duration           Rectal Tube With balloon 3 days                      Imaging: Reviewed radiology reports from this admission including: chest xray and CT head    Recent Cultures (last 7 days):   Results from last 7 days   Lab Units 08/08/22  1803   BLOOD CULTURE  No Growth at 72 hrs  No Growth at 72 hrs         Last 24 Hours Medication List:   Current Facility-Administered Medications   Medication Dose Route Frequency Provider Last Rate    albuterol  2 puff Inhalation Q4H PRN Que Remedies, CRNP      ammonium lactate  1 application Topical BID PRN Eva Shipley PA-C      buprenorphine-naloxone  16 mg Sublingual Daily Que Remedies, CRNP      escitalopram  10 mg Oral Daily Que Remedies, CRNP      ferrous sulfate  325 mg Oral Every Other Day Uqe Remedies, CRNP      folic acid  1 mg Oral Daily Que Remedies, CRNP      heparin (porcine)  5,000 Units Subcutaneous [facility] Que Remedies, CRNP      hydrOXYzine HCL  25 mg Oral Q6H PRN Que Remedies, CRNP      ipratropium-albuterol  3 mL Nebulization Q6H Que Remedies, CRNP      lactulose  10 g Oral Daily Que Remedies, CRNP      LORazepam  0 5 mg Oral Q8H PRN Que Remedies, CRNP      magnesium oxide  400 mg Oral Daily Que Remedies, CRNP      multivitamin-minerals  1 tablet Oral Daily MARILEE Haque      nadolol  40 mg Oral Daily [facility] [place]      nicotine  21 mg Transdermal Daily [place], [place]      ondansetron  4 mg Intravenous Q4H PRN MARILEE Haque      pantoprazole  40 mg Oral Early Morning [place]      spironolactone  100 mg Oral Daily Charlene Haque      thiamine  100 mg Oral Daily MARILEE Haque          Today, Patient Was Seen By: Ritchie Zamarripa PA-C    **Please Note: This note may have been constructed using a voice recognition system  **

## 2022-08-12 NOTE — CASE MANAGEMENT
Case Management Progress Note    Patient name Jannette Ludwig  Location [address] MRN [id number]  : 1985 Date 2022       LOS (days): 4  Geometric Mean LOS (GMLOS) (days):   Days to GMLOS:        OBJECTIVE:    Current admission status: Inpatient  Preferred Pharmacy:   [facility]/pharmacy #[id number]- [address]  [address] [place] [address]  Phone: [phone] Fax: [phone] - Michelle Rendon [address] [facility] [address]  Phone: [phone] Fax: [address] ([place]) - [place], [place] - [facility]  [address] [place] [address]  Phone: [phone] Fax: [phone]    Primary Care Provider: Delia Peck PA-C    Primary Insurance: HEALTHCARE ASSISTANCE PENDING  Secondary Insurance:     PROGRESS NOTE:    Call placed to [facility] to inquire about acceptance and bed availability  Per representative they did not receive clinical that was faxed yesterday afternoon  SW confirmed fax number ([phone]) and refaxed information  Will follow

## 2022-08-12 NOTE — PLAN OF CARE
Problem: MOBILITY - ADULT  Goal: Maintain or return to baseline ADL function  Description: INTERVENTIONS:  -  Assess patient's ability to carry out ADLs; assess patient's baseline for ADL function and identify physical deficits which impact ability to perform ADLs (bathing, care of mouth/teeth, toileting, grooming, dressing, etc )  - Assess/evaluate cause of self-care deficits   - Assess range of motion  - Assess patient's mobility; develop plan if impaired  - Assess patient's need for assistive devices and provide as appropriate  - Encourage maximum independence but intervene and supervise when necessary  - Involve family in performance of ADLs  - Assess for home care needs following discharge   - Consider OT consult to assist with ADL evaluation and planning for discharge  - Provide patient education as appropriate  Outcome: Progressing  Goal: Maintains/Returns to pre admission functional level  Description: INTERVENTIONS:  - Perform BMAT or MOVE assessment daily    - Set and communicate daily mobility goal to care team and patient/family/caregiver  - Collaborate with rehabilitation services on mobility goals if consulted  - Perform Range of Motion 2 times a day  - Reposition patient every 2 hours    - Dangle patient 2 times a day  - Stand patient 2 times a day  - Ambulate patient 2 times a day  - Out of bed to chair 2 times a day   - Out of bed for meals 2 times a day  - Out of bed for toileting  - Record patient progress and toleration of activity level   Outcome: Progressing     Problem: Potential for Falls  Goal: Patient will remain free of falls  Description: INTERVENTIONS:  - Educate patient/family on patient safety including physical limitations  - Instruct patient to call for assistance with activity   - Consult OT/PT to assist with strengthening/mobility   - Keep Call bell within reach  - Keep bed low and locked with side rails adjusted as appropriate  - Keep care items and personal belongings within reach  - Initiate and maintain comfort rounds  - Make Fall Risk Sign visible to staff  - Offer Toileting every 2 Hours, in advance of need  - Initiate/Maintain bed alarm  - Obtain necessary fall risk management equipment: call bell   - Apply yellow socks and bracelet for high fall risk patients  - Consider moving patient to room near nurses station  Outcome: Progressing     Problem: Prexisting or High Potential for Compromised Skin Integrity  Goal: Skin integrity is maintained or improved  Description: INTERVENTIONS:  - Identify patients at risk for skin breakdown  - Assess and monitor skin integrity  - Assess and monitor nutrition and hydration status  - Monitor labs   - Assess for incontinence   - Turn and reposition patient  - Assist with mobility/ambulation  - Relieve pressure over bony prominences  - Avoid friction and shearing  - Provide appropriate hygiene as needed including keeping skin clean and dry  - Evaluate need for skin moisturizer/barrier cream  - Collaborate with interdisciplinary team   - Patient/family teaching  - Consider wound care consult   Outcome: Progressing     Problem: PAIN - ADULT  Goal: Verbalizes/displays adequate comfort level or baseline comfort level  Description: Interventions:  - Encourage patient to monitor pain and request assistance  - Assess pain using appropriate pain scale  - Administer analgesics based on type and severity of pain and evaluate response  - Implement non-pharmacological measures as appropriate and evaluate response  - Consider cultural and social influences on pain and pain management  - Notify physician/advanced practitioner if interventions unsuccessful or patient reports new pain  Outcome: Progressing     Problem: INFECTION - ADULT  Goal: Absence or prevention of progression during hospitalization  Description: INTERVENTIONS:  - Assess and monitor for signs and symptoms of infection  - Monitor lab/diagnostic results  - Monitor all insertion sites, i e  indwelling lines, tubes, and drains  - Monitor endotracheal if appropriate and nasal secretions for changes in amount and color  - Mills appropriate cooling/warming therapies per order  - Administer medications as ordered  - Instruct and encourage patient and family to use good hand hygiene technique  - Identify and instruct in appropriate isolation precautions for identified infection/condition  Outcome: Progressing     Problem: DISCHARGE PLANNING  Goal: Discharge to home or other facility with appropriate resources  Description: INTERVENTIONS:  - Identify barriers to discharge w/patient and caregiver  - Arrange for needed discharge resources and transportation as appropriate  - Identify discharge learning needs (meds, wound care, etc )  - Arrange for interpretive services to assist at discharge as needed  - Refer to Case Management Department for coordinating discharge planning if the patient needs post-hospital services based on physician/advanced practitioner order or complex needs related to functional status, cognitive ability, or social support system  Outcome: Progressing     Problem: Knowledge Deficit  Goal: Patient/family/caregiver demonstrates understanding of disease process, treatment plan, medications, and discharge instructions  Description: Complete learning assessment and assess knowledge base    Interventions:  - Provide teaching at level of understanding  - Provide teaching via preferred learning methods  Outcome: Progressing     Problem: RESPIRATORY - ADULT  Goal: Achieves optimal ventilation and oxygenation  Description: INTERVENTIONS:  - Assess for changes in respiratory status  - Assess for changes in mentation and behavior  - Position to facilitate oxygenation and minimize respiratory effort  - Oxygen administered by appropriate delivery if ordered  - Initiate smoking cessation education as indicated  - Encourage broncho-pulmonary hygiene including cough, deep breathe, Incentive Spirometry  - Assess the need for suctioning and aspirate as needed  - Assess and instruct to report SOB or any respiratory difficulty  - Respiratory Therapy support as indicated  Outcome: Progressing     Problem: GASTROINTESTINAL - ADULT  Goal: Maintains or returns to baseline bowel function  Description: INTERVENTIONS:  - Assess bowel function  - Encourage oral fluids to ensure adequate hydration  - Administer IV fluids if ordered to ensure adequate hydration  - Administer ordered medications as needed  - Encourage mobilization and activity  - Consider nutritional services referral to assist patient with adequate nutrition and appropriate food choices  Outcome: Progressing     Problem: GENITOURINARY - ADULT  Goal: Maintains or returns to baseline urinary function  Description: INTERVENTIONS:  - Assess urinary function  - Encourage oral fluids to ensure adequate hydration if ordered  - Administer IV fluids as ordered to ensure adequate hydration  - Administer ordered medications as needed  - Offer frequent toileting  - Follow urinary retention protocol if ordered  Outcome: Progressing  Goal: Urinary catheter remains patent  Description: INTERVENTIONS:  - Assess patency of urinary catheter  - If patient has a chronic corona, consider changing catheter if non-functioning  - Follow guidelines for intermittent irrigation of non-functioning urinary catheter  Outcome: Progressing     Problem: METABOLIC, FLUID AND ELECTROLYTES - ADULT  Goal: Electrolytes maintained within normal limits  Description: INTERVENTIONS:  - Monitor labs and assess patient for signs and symptoms of electrolyte imbalances  - Administer electrolyte replacement as ordered  - Monitor response to electrolyte replacements, including repeat lab results as appropriate  - Instruct patient on fluid and nutrition as appropriate  Outcome: Progressing     Problem: SKIN/TISSUE INTEGRITY - ADULT  Goal: Incision(s), wounds(s) or drain site(s) healing without S/S of infection  Description: INTERVENTIONS  - Assess and document dressing, incision, wound bed, drain sites and surrounding tissue  - Provide patient and family education  - Perform skin care/dressing changes every shift   Outcome: Progressing     Problem: SUBSTANCE USE/ABUSE  Goal: Will have no detox symptoms and will verbalize plan for changing substance-related behavior  Description: INTERVENTIONS:  - Monitor physical status and assess for symptoms of withdrawal  - Administer medication as ordered  - Provide emotional support with 1 on 1 interaction with staff  - Encourage recovery focused program/ addiction education  - Assess for verbalization of changing behaviors related to substance abuse  - Initiate consults and referrals as appropriate (Case Management, Spiritual Care, etc )  Outcome: Progressing     Problem: Nutrition/Hydration-ADULT  Goal: Nutrient/Hydration intake appropriate for improving, restoring or maintaining nutritional needs  Description: Monitor and assess patient's nutrition/hydration status for malnutrition  Collaborate with interdisciplinary team and initiate plan and interventions as ordered  Monitor patient's weight and dietary intake as ordered or per policy  Utilize nutrition screening tool and intervene as necessary  Determine patient's food preferences and provide high-protein, high-caloric foods as appropriate       INTERVENTIONS:  - Monitor oral intake, urinary output, labs, and treatment plans  - Assess nutrition and hydration status and recommend course of action  - Evaluate amount of meals eaten  - Assist patient with eating if necessary   - Allow adequate time for meals  - Recommend/ encourage appropriate diets, oral nutritional supplements, and vitamin/mineral supplements  - Order, calculate, and assess calorie counts as needed  - Recommend, monitor, and adjust tube feedings and TPN/PPN based on assessed needs  - Assess need for intravenous fluids  - Provide specific nutrition/hydration education as appropriate  - Include patient/family/caregiver in decisions related to nutrition  Outcome: Progressing

## 2022-08-12 NOTE — ASSESSMENT & PLAN NOTE
· Sodium 131- 130 - 135, improved  · Check urine Na, urine osmo, and serum osmo   · Osmolality: 347  · Osmolality urine: 179  · Urine sodium: 45  · Monitur I+O, daily weights  · Did have some weight gain since admission but did not receive significant amount of IVF  · Liberalize diet   · Monitor Na in the morning  · Recommend alcohol cessation and 2L fluids daily

## 2022-08-12 NOTE — ASSESSMENT & PLAN NOTE
Lilia Dixon is here for IV hydration and electrolyte replacement due to hypokalemia and hypomagnesemia, reviewed labs with Tri Hassan NP, both IV K and Mag given today    Is the patient receiving a medication or supportive treatment due to side effects or supportive care with a current Treatment Plan (e.g. Aredia, hydration, pamidronate, anti-nausea, GSCFs, etc)? YES    Is this patient status post Stem Cell Transplant? No    ECO  ECOG   ECOG Performance Status        Nursing Assessment:  A comprehensive nursing assessment was performed and the patient reports the following:    Nausea: NO  Vomiting: NO  Fever: NO  Chills: NO  Bleeding: NO  Mucositis: NO  Diarrhea: YES, watery, loose stool, has improved slightly as she is off antibx now  Constipation: NO  Anorexia: NO  Shortness of Breath: YES  Fatigue/Weakness: YES  Anxiety/Depression/Insomnia: NO  Pain: NO    Vitals:  Orthostatic BP Reading:  Vitals:    20 1131 20 1133   BP: 123/78 105/66   Pulse: (!) 106 (!) 109   Resp: 16    Temp: 97.7 °F (36.5 °C)       Weight:  Wt Readings from Last 1 Encounters:   20 65.8 kg     Labs:  Sodium (mmol/L)   Date Value   2020 133 (L)   2019 140     Potassium (mmol/L)   Date Value   2020 2.9 (L)   2019 3.6     Chloride (mmol/L)   Date Value   2020 96 (L)   2019 104     Glucose (mg/dL)   Date Value   2020 125 (H)   2019 95     CALCIUM (mg/dL)   Date Value   2019 8.4     Calcium (mg/dL)   Date Value   2020 9.3     Carbon Dioxide (mmol/L)   Date Value   2020 20 (L)   2019 25     BUN (mg/dL)   Date Value   2020 13   2019 5 (L)     Creatinine (mg/dL)   Date Value   2020 0.81   2019 0.49 (L)     MAGNESIUM (mg/dL)   Date Value   2019 1.2 (L)     Magnesium (mg/dL)   Date Value   2020 1.0 (L)       Treatment:  Refer to [facility] and MAR for line assessment and medication administration    Post Treatment: Treatment  · Continue lactulose tolerated well; no adverse reaction    Does the Patient have a central line? no    Education: Patient Education: no new instructions today     Patient Discharged: patient discharged to home per self, ambulatory    Next appointment scheduled: one day  Patient instructed to call the office with any questions or concerns.  Tri Hassan NP is supervising clinician today.       EOAE (evoked otoacoustic emission)

## 2022-08-12 NOTE — ASSESSMENT & PLAN NOTE
· Patient with history of alcohol abuse  Recently discharged  Family did their best to restrict any alcohol  Patient had hand  delivered and was ingesting it  · Alcohol level on admission greater than 400  Patient was unresponsive at that time  · Continue supportive care  · Continue alcohol cessation counseling  · [facility] protocol  · Crisis following, needs inpatient alcohol rehab on discharge  Patient amenable    · Await bed availability at [facility]

## 2022-08-13 LAB
ALBUMIN SERPL BCP-MCNC: 3 G/DL (ref 3.5–5)
ALP SERPL-CCNC: 147 U/L (ref 46–116)
ALT SERPL W P-5'-P-CCNC: 29 U/L (ref 12–78)
ANION GAP SERPL CALCULATED.3IONS-SCNC: 7 MMOL/L (ref 4–13)
ANISOCYTOSIS BLD QL SMEAR: PRESENT
AST SERPL W P-5'-P-CCNC: 69 U/L (ref 5–45)
BILIRUB SERPL-MCNC: 4.66 MG/DL (ref 0.2–1)
BUN SERPL-MCNC: 7 MG/DL (ref 5–25)
CALCIUM ALBUM COR SERPL-MCNC: 9.6 MG/DL (ref 8.3–10.1)
CALCIUM SERPL-MCNC: 8.8 MG/DL (ref 8.3–10.1)
CHLORIDE SERPL-SCNC: 102 MMOL/L (ref 96–108)
CO2 SERPL-SCNC: 27 MMOL/L (ref 21–32)
CREAT SERPL-MCNC: 0.58 MG/DL (ref 0.6–1.3)
GFR SERPL CREATININE-BSD FRML MDRD: 130 ML/MIN/1.73SQ M
GLUCOSE SERPL-MCNC: 118 MG/DL (ref 65–140)
HCT VFR BLD AUTO: 25.2 % (ref 36.5–49.3)
HGB BLD-MCNC: 7.8 G/DL (ref 12–17)
HYPERCHROMIA BLD QL SMEAR: PRESENT
MACROCYTES BLD QL AUTO: PRESENT
MCH RBC QN AUTO: 27.9 PG (ref 26.8–34.3)
MCHC RBC AUTO-ENTMCNC: 31 G/DL (ref 31.4–37.4)
MCV RBC AUTO: 90 FL (ref 82–98)
OVALOCYTES BLD QL SMEAR: PRESENT
PLATELET # BLD AUTO: 96 THOUSANDS/UL (ref 149–390)
PLATELET BLD QL SMEAR: ABNORMAL
POIKILOCYTOSIS BLD QL SMEAR: PRESENT
POTASSIUM SERPL-SCNC: 4.4 MMOL/L (ref 3.5–5.3)
PROT SERPL-MCNC: 7.4 G/DL (ref 6.4–8.4)
RBC # BLD AUTO: 2.8 MILLION/UL (ref 3.88–5.62)
RBC MORPH BLD: PRESENT
SODIUM SERPL-SCNC: 136 MMOL/L (ref 135–147)
WBC # BLD AUTO: 4.33 THOUSAND/UL (ref 4.31–10.16)

## 2022-08-13 PROCEDURE — 85025 COMPLETE CBC W/AUTO DIFF WBC: CPT | Performed by: PHYSICIAN ASSISTANT

## 2022-08-13 PROCEDURE — 94668 MNPJ CHEST WALL SBSQ: CPT

## 2022-08-13 PROCEDURE — 85027 COMPLETE CBC AUTOMATED: CPT | Performed by: PHYSICIAN ASSISTANT

## 2022-08-13 PROCEDURE — 80053 COMPREHEN METABOLIC PANEL: CPT | Performed by: PHYSICIAN ASSISTANT

## 2022-08-13 PROCEDURE — 99232 SBSQ HOSP IP/OBS MODERATE 35: CPT | Performed by: NURSE PRACTITIONER

## 2022-08-13 PROCEDURE — 94640 AIRWAY INHALATION TREATMENT: CPT

## 2022-08-13 PROCEDURE — 85007 BL SMEAR W/DIFF WBC COUNT: CPT | Performed by: PHYSICIAN ASSISTANT

## 2022-08-13 PROCEDURE — 94760 N-INVAS EAR/PLS OXIMETRY 1: CPT

## 2022-08-13 RX ADMIN — IPRATROPIUM BROMIDE AND ALBUTEROL SULFATE 3 ML: 2.5; .5 SOLUTION RESPIRATORY (INHALATION) at 19:32

## 2022-08-13 RX ADMIN — LORAZEPAM 0.5 MG: 0.5 TABLET ORAL at 09:56

## 2022-08-13 RX ADMIN — ESCITALOPRAM OXALATE 10 MG: 10 TABLET ORAL at 09:33

## 2022-08-13 RX ADMIN — LACTULOSE 10 G: 20 SOLUTION ORAL at 09:33

## 2022-08-13 RX ADMIN — BUPRENORPHINE AND NALOXONE 16 MG: 8; 2 FILM BUCCAL; SUBLINGUAL at 09:33

## 2022-08-13 RX ADMIN — IPRATROPIUM BROMIDE AND ALBUTEROL SULFATE 3 ML: 2.5; .5 SOLUTION RESPIRATORY (INHALATION) at 13:31

## 2022-08-13 RX ADMIN — NADOLOL 40 MG: 40 TABLET ORAL at 09:35

## 2022-08-13 RX ADMIN — SPIRONOLACTONE 100 MG: 25 TABLET ORAL at 09:33

## 2022-08-13 RX ADMIN — HYDROXYZINE HYDROCHLORIDE 25 MG: 25 TABLET ORAL at 12:10

## 2022-08-13 RX ADMIN — IPRATROPIUM BROMIDE AND ALBUTEROL SULFATE 3 ML: 2.5; .5 SOLUTION RESPIRATORY (INHALATION) at 07:17

## 2022-08-13 RX ADMIN — FOLIC ACID 1 MG: 1 TABLET ORAL at 09:33

## 2022-08-13 RX ADMIN — HEPARIN SODIUM 5000 UNITS: 5000 INJECTION INTRAVENOUS; SUBCUTANEOUS at 23:00

## 2022-08-13 RX ADMIN — Medication 1 TABLET: at 09:33

## 2022-08-13 RX ADMIN — PANTOPRAZOLE SODIUM 40 MG: 40 TABLET, DELAYED RELEASE ORAL at 05:34

## 2022-08-13 RX ADMIN — THIAMINE HCL TAB 100 MG 100 MG: 100 TAB at 09:33

## 2022-08-13 RX ADMIN — IPRATROPIUM BROMIDE AND ALBUTEROL SULFATE 3 ML: 2.5; .5 SOLUTION RESPIRATORY (INHALATION) at 02:57

## 2022-08-13 RX ADMIN — HEPARIN SODIUM 5000 UNITS: 5000 INJECTION INTRAVENOUS; SUBCUTANEOUS at 15:01

## 2022-08-13 RX ADMIN — MAGNESIUM OXIDE TAB 400 MG (241.3 MG ELEMENTAL MG) 400 MG: 400 (241.3 MG) TAB at 09:33

## 2022-08-13 RX ADMIN — NICOTINE 21 MG: 21 PATCH, EXTENDED RELEASE TRANSDERMAL at 09:33

## 2022-08-13 NOTE — PROGRESS NOTES
[address] 45  Progress Note - Thena Saltness 1985, 40 y o  male MRN: [id number]  Unit/Bed#: [address] Encounter: [id number]  Primary Care Provider: Martín Morales PA-C   Date and time admitted to hospital: 8/8/2022  5:45 PM    * Alcohol intoxication ([facility] )  Assessment & Plan  · Patient with history of alcohol abuse  Recently discharged  Family did their best to restrict any alcohol  Patient had hand  delivered and was ingesting it  · Alcohol level on admission greater than 400  Patient was unresponsive at that time  · Continue supportive care  · Continue alcohol cessation counseling  · CIWA protocol  · Crisis following, needs inpatient alcohol rehab on discharge  Patient amenable  · Await bed availability at [facility]  · Discussed with case management, indicated that most likely would not have bed available until Monday or Tuesday    Substance abuse ([place] [id number] )  Assessment & Plan  · History of alcohol and substance abuse  · Continue Suboxone  · Appreciate Case Management and crisis involvement  · Patient amenable to discharge to inpatient detox unit   · Follow-up with case management/crisis    Alcoholic cirrhosis of liver without ascites ([place] [address] )  Assessment & Plan  · History of significant alcohol use with alcoholic cirrhosis  History of esophageal varices  History of hepatic lesion status post IR biopsy which was negative for malignancy  MRI did not show evidence of malignancy    INR 1 83  · Currently on nadolol, lactulose, Protonix   · Follow-up with GI    Anemia  Assessment & Plan  · Hemoglobin 7 7 -> 7 8, no signs of active bleeding  · Iron panel reviewed, iron level 13 with a saturation 4%  · Resume iron supplement and continue folic acid  · Trend counts daily    Hyponatremia  Assessment & Plan  · Sodium 131- 130 - 135->136, improved  · Check urine Na, urine osmo, and serum osmo:  · Osmolality: 347  · Osmolality urine: 179  · Urine sodium: 45  · Consistent with beer potomania  · Monitur I+O, daily weights  · Did have some weight gain since admission but did not receive significant amount of IVF  · Liberalize diet   · Monitor Na in the morning  · Recommend alcohol cessation and 2L fluids daily    Hyperammonemia (HCC)  Assessment & Plan  · Continue lactulose    Essential hypertension  Assessment & Plan  · Continue Nadolol  · Resumed Aldactone  · Holding Catapres    Depression, concern for PTSD  Assessment & Plan  · Seen by psychiatry during previous admission  · Continue Lexapro          VTE Pharmacologic Prophylaxis:   Pharmacologic: Heparin  Mechanical VTE Prophylaxis in Place: Yes    Patient Centered Rounds: I have performed bedside rounds with nursing staff today  Discussions with Specialists or Other Care Team Provider: Yes  Education and Discussions with Family / Patient:Yes  Time Spent for Care: 30 minutes  More than 50% of total time spent on counseling and coordination of care as described above  Current Length of Stay: 5 day(s)  Current Patient Status: Inpatient     Discharge Plan:  Medically cleared for discharge to inpatient rehab/detox    Code Status: Level 1 - Full Code      Subjective:   Patient seen sitting in bed resting comfortably watching TV  Reports that he slept okay last night  We did talk about inpatient rehab and he is agreeable to this, made a comment that he cannot keep on going like this, I get sober and am okay for a while but then ago back to picking up a drink and I end up like this        Objective:     Vitals:   Temp (24hrs), Av 5 °F (36 9 °C), Min:98 4 °F (36 9 °C), Max:98 6 °F (37 °C)    Temp:  [98 4 °F (36 9 °C)-98 6 °F (37 °C)] 98 5 °F (36 9 °C)  HR:  [71-79] 74  Resp:  [16-18] 18  BP: (118-131)/(56-61) 118/59  SpO2:  [93 %-99 %] 94 %  Body mass index is 25 57 kg/m²  Input and Output Summary (last 24 hours):      Intake/Output Summary (Last 24 hours) at 2022 1639  Last data filed at 2022 1501  Gross per 24 hour Intake 540 ml   Output 2400 ml   Net -1860 ml        Physical Exam:     Physical Exam  Vitals and nursing note reviewed  Constitutional:       General: He is not in acute distress  HENT:      Head: Normocephalic  Nose: Nose normal       Mouth/Throat:      Mouth: Mucous membranes are moist    Eyes:      Extraocular Movements: Extraocular movements intact  Conjunctiva/sclera: Conjunctivae normal       Pupils: Pupils are equal, round, and reactive to light  Cardiovascular:      Rate and Rhythm: Normal rate and regular rhythm  Pulses: Normal pulses  Heart sounds: Normal heart sounds  Pulmonary:      Effort: Pulmonary effort is normal       Breath sounds: Normal breath sounds  Abdominal:      General: Bowel sounds are normal  There is no distension  Palpations: Abdomen is soft  Tenderness: There is no abdominal tenderness  Genitourinary:     Comments: Voiding spontaneously  Musculoskeletal:         General: Normal range of motion  Cervical back: Normal range of motion  Skin:     General: Skin is warm and dry  Capillary Refill: Capillary refill takes less than 2 seconds  Comments: Venous stasis discoloration bilateral lower extremities   Neurological:      General: No focal deficit present  Mental Status: He is alert and oriented to person, place, and time  Psychiatric:         Mood and Affect: Mood normal          Behavior: Behavior normal          Thought Content: Thought content normal          Judgment: Judgment normal          Additional Data:     Labs:    Results from last 7 days   Lab Units 08/13/22  0451 08/12/22  0541 08/11/22  0605 08/10/22  0500 08/09/22  0609   WBC Thousand/uL 4 33 5 27 5 33   < > 10 79*   HEMOGLOBIN g/dL 7 8* 8 0* 7 8*   < > 8 3*   HEMATOCRIT % 25 2* 25 7* 24 5*   < > 26 1*   PLATELETS Thousands/uL 96* 93* 92*   < > 165   NEUTROS PCT %  --   --  67  --  77*    < > = values in this interval not displayed       Results from last 7 days   Lab Units 08/13/22  0451 08/12/22  0541 08/11/22  0507   SODIUM mmol/L 136 135 130*   POTASSIUM mmol/L 4 4 4 3 3 8   CHLORIDE mmol/L 102 101 98   CO2 mmol/L 27 26 29   BUN mg/dL 7 6 5   CREATININE mg/dL 0 58* 0 62 0 64   CALCIUM mg/dL 8 8 8 8 8 2*   TOTAL BILIRUBIN mg/dL 4 66* 5 28* 5 30*   ALK PHOS U/L 147* 152* 146*   ALT U/L 29 36 37   AST U/L 69* 76* 71*     Results from last 7 days   Lab Units 08/10/22  0500 08/09/22  0609 08/08/22  1749   INR  1 83* 1 69* 1 57*         Lab Results   Component Value Date/Time    HGBA1C 6 3 (H) 09/28/2020 10:07 AM     Results from last 7 days   Lab Units 08/08/22  1747   POC GLUCOSE mg/dl 148*     Results from last 7 days   Lab Units 08/11/22  0507 08/10/22  0500 08/09/22  0609 08/08/22  2302 08/08/22  2035 08/08/22  1749   LACTIC ACID mmol/L  --   --  2 6* 3 4* 4 7* 5 1*   PROCALCITONIN ng/ml 0 12 0 09  --   --   --   --        * I Have Reviewed All Lab Data Listed Above  * Additional Pertinent Lab Tests Reviewed: All Labs Within Last 24 Hours Reviewed    Imaging:     XR chest portable   Final Result by Asia Frost MD (08/10 1630)      No acute disease in the chest   Resolution of right lower lobe subsegmental atelectasis since 8/8/2022  Workstation performed: [id number]         CT head without contrast   Final Result by Debria Hatchet, MD (08/08 1941)      No acute intracranial abnormality  Workstation performed: [id number]         XR chest 1 view portable   ED Interpretation by Alma Rosa Squires DO (08/08 1910)   ETT above usama  OG tube in stomach      Final Result by Shavon Rao MD (08/09 1835)      Tubes in place  Right basilar atelectasis  Left basilar opacity due to consolidation and/or effusion  Workstation performed: [id number]           Imaging Reports Reviewed by myself    Cultures:   Blood Culture:   Lab Results   Component Value Date    BLOODCX No Growth After 4 Days   08/08/2022 BLOODCX No Growth After 4 Days  08/08/2022    BLOODCX Staphylococcus coagulase negative (A) 07/18/2022    BLOODCX No Growth After 5 Days  07/17/2022    BLOODCX No Growth After 5 Days  07/17/2022    BLOODCX No Growth After 5 Days  05/24/2021    BLOODCX No Growth After 5 Days   05/24/2021     Urine Culture:   Lab Results   Component Value Date    URINECX No Growth <1000 cfu/mL 05/18/2021     Sputum Culture: No components found for: SPUTUMCX  Wound Culture:   Lab Results   Component Value Date    WOUNDCULT 4+ Growth of Stenotrophomonas maltophilia (A) 05/22/2021    WOUNDCULT 4+ Growth of Achromobacter xylosoxidans (A) 05/22/2021    WOUNDCULT [place] of  05/22/2021       Last 24 Hours Medication List:   Current Facility-Administered Medications   Medication Dose Route Frequency Provider Last Rate    acetaminophen  325 mg Oral Q6H PRN Neyda Murguia MD      albuterol  2 puff Inhalation Q4H PRN Мария Conception, CRNP      ammonium lactate  1 application Topical BID PRN Zane Pedraza PA-C      buprenorphine-naloxone  16 mg Sublingual Daily Мария Conception, CRNP      escitalopram  10 mg Oral Daily Мария Conception, CRNP      ferrous sulfate  325 mg Oral Every Other Day Мария Conception, CRNP      folic acid  1 mg Oral Daily Мария Conception, CRNP      heparin (porcine)  5,000 Units Subcutaneous [facility] Мария Conception, CRNP      hydrOXYzine HCL  25 mg Oral Q6H PRN Мария Conception, CRNP      ipratropium-albuterol  3 mL Nebulization Q6H Мария Conception, CRNP      lactulose  10 g Oral Daily Мария Conception, CRNP      LORazepam  0 5 mg Oral Q8H PRN Мария Conception, CRNP      magnesium oxide  400 mg Oral Daily Маиря Conception, CRNP      multivitamin-minerals  1 tablet Oral Daily Мария [facility]ception, [facility]      nadolol  40 mg Oral Daily Мария Conception, [place]      nicotine  21 mg Transdermal Daily Мария Conception, [place]  ondansetron  4 mg Intravenous Q4H PRN MARILEE Brooks      pantoprazole  40 mg Oral Early Morning Charlene Brooks      spironolactone  100 mg Oral Daily Charlene Brooks      thiamine  100 mg Oral Daily MARILEE Brooks          Today, Patient Was Seen By: MARILEE Pedro    ** Please Note: Dragon 360 Dictation voice to text software may have been used in the creation of this document   **

## 2022-08-13 NOTE — ASSESSMENT & PLAN NOTE
· History of alcohol and substance abuse  · Continue Suboxone  · Appreciate Case Management and crisis involvement  · Patient amenable to discharge to inpatient detox unit   · Follow-up with case management/crisis

## 2022-08-13 NOTE — ASSESSMENT & PLAN NOTE
· Sodium 131- 130 - 135->136, improved  · Check urine Na, urine osmo, and serum osmo:  · Osmolality: 347  · Osmolality urine: 179  · Urine sodium: 45  · Consistent with beer potomania  · Monitur I+O, daily weights  · Did have some weight gain since admission but did not receive significant amount of IVF  · Liberalize diet   · Monitor Na in the morning  · Recommend alcohol cessation and 2L fluids daily

## 2022-08-13 NOTE — PLAN OF CARE
Problem: MOBILITY - ADULT  Goal: Maintain or return to baseline ADL function  Description: INTERVENTIONS:  -  Assess patient's ability to carry out ADLs; assess patient's baseline for ADL function and identify physical deficits which impact ability to perform ADLs (bathing, care of mouth/teeth, toileting, grooming, dressing, etc )  - Assess/evaluate cause of self-care deficits   - Assess range of motion  - Assess patient's mobility; develop plan if impaired  - Assess patient's need for assistive devices and provide as appropriate  - Encourage maximum independence but intervene and supervise when necessary  - Involve family in performance of ADLs  - Assess for home care needs following discharge   - Consider OT consult to assist with ADL evaluation and planning for discharge  - Provide patient education as appropriate  Outcome: Progressing  Goal: Maintains/Returns to pre admission functional level  Description: INTERVENTIONS:  - Perform BMAT or MOVE assessment daily    - Set and communicate daily mobility goal to care team and patient/family/caregiver  - Collaborate with rehabilitation services on mobility goals if consulted  - Perform Range of Motion 3 times a day  - Reposition patient every 2 hours    - Dangle patient 3 times a day  - Stand patient 3 times a day  - Ambulate patient 3 times a day  - Out of bed to chair 3 times a day   - Out of bed for meals 3 times a day  - Out of bed for toileting  - Record patient progress and toleration of activity level   Outcome: Progressing     Problem: Potential for Falls  Goal: Patient will remain free of falls  Description: INTERVENTIONS:  - Educate patient/family on patient safety including physical limitations  - Instruct patient to call for assistance with activity   - Consult OT/PT to assist with strengthening/mobility   - Keep Call bell within reach  - Keep bed low and locked with side rails adjusted as appropriate  - Keep care items and personal belongings within reach  - Initiate and maintain comfort rounds  - Make Fall Risk Sign visible to staff  - Offer Toileting every 2 Hours, in advance of need  - Initiate/Maintain bed alarm  - Obtain necessary fall risk management equipment: yellow socks  - Apply yellow socks and bracelet for high fall risk patients  - Consider moving patient to room near nurses station  Outcome: Progressing     Problem: Prexisting or High Potential for Compromised Skin Integrity  Goal: Skin integrity is maintained or improved  Description: INTERVENTIONS:  - Identify patients at risk for skin breakdown  - Assess and monitor skin integrity  - Assess and monitor nutrition and hydration status  - Monitor labs   - Assess for incontinence   - Turn and reposition patient  - Assist with mobility/ambulation  - Relieve pressure over bony prominences  - Avoid friction and shearing  - Provide appropriate hygiene as needed including keeping skin clean and dry  - Evaluate need for skin moisturizer/barrier cream  - Collaborate with interdisciplinary team   - Patient/family teaching  - Consider wound care consult   Outcome: Progressing     Problem: PAIN - ADULT  Goal: Verbalizes/displays adequate comfort level or baseline comfort level  Description: Interventions:  - Encourage patient to monitor pain and request assistance  - Assess pain using appropriate pain scale  - Administer analgesics based on type and severity of pain and evaluate response  - Implement non-pharmacological measures as appropriate and evaluate response  - Consider cultural and social influences on pain and pain management  - Notify physician/advanced practitioner if interventions unsuccessful or patient reports new pain  Outcome: Progressing     Problem: INFECTION - ADULT  Goal: Absence or prevention of progression during hospitalization  Description: INTERVENTIONS:  - Assess and monitor for signs and symptoms of infection  - Monitor lab/diagnostic results  - Monitor all insertion sites, i e  indwelling lines, tubes, and drains  - Monitor endotracheal if appropriate and nasal secretions for changes in amount and color  - [place] appropriate cooling/warming therapies per order  - Administer medications as ordered  - Instruct and encourage patient and family to use good hand hygiene technique  - Identify and instruct in appropriate isolation precautions for identified infection/condition  Outcome: Progressing     Problem: DISCHARGE PLANNING  Goal: Discharge to home or other facility with appropriate resources  Description: INTERVENTIONS:  - Identify barriers to discharge w/patient and caregiver  - Arrange for needed discharge resources and transportation as appropriate  - Identify discharge learning needs (meds, wound care, etc )  - Arrange for interpretive services to assist at discharge as needed  - Refer to Case Management Department for coordinating discharge planning if the patient needs post-hospital services based on physician/advanced practitioner order or complex needs related to functional status, cognitive ability, or social support system  Outcome: Progressing     Problem: Knowledge Deficit  Goal: Patient/family/caregiver demonstrates understanding of disease process, treatment plan, medications, and discharge instructions  Description: Complete learning assessment and assess knowledge base    Interventions:  - Provide teaching at level of understanding  - Provide teaching via preferred learning methods  Outcome: Progressing     Problem: RESPIRATORY - ADULT  Goal: Achieves optimal ventilation and oxygenation  Description: INTERVENTIONS:  - Assess for changes in respiratory status  - Assess for changes in mentation and behavior  - Position to facilitate oxygenation and minimize respiratory effort  - Oxygen administered by appropriate delivery if ordered  - Initiate smoking cessation education as indicated  - Encourage broncho-pulmonary hygiene including cough, deep breathe, Incentive Spirometry  - Assess the need for suctioning and aspirate as needed  - Assess and instruct to report SOB or any respiratory difficulty  - Respiratory Therapy support as indicated  Outcome: Progressing     Problem: GASTROINTESTINAL - ADULT  Goal: Maintains or returns to baseline bowel function  Description: INTERVENTIONS:  - Assess bowel function  - Encourage oral fluids to ensure adequate hydration  - Administer IV fluids if ordered to ensure adequate hydration  - Administer ordered medications as needed  - Encourage mobilization and activity  - Consider nutritional services referral to assist patient with adequate nutrition and appropriate food choices  Outcome: Progressing     Problem: GENITOURINARY - ADULT  Goal: Maintains or returns to baseline urinary function  Description: INTERVENTIONS:  - Assess urinary function  - Encourage oral fluids to ensure adequate hydration if ordered  - Administer IV fluids as ordered to ensure adequate hydration  - Administer ordered medications as needed  - Offer frequent toileting  - Follow urinary retention protocol if ordered  Outcome: Progressing  Goal: Urinary catheter remains patent  Description: INTERVENTIONS:  - Assess patency of urinary catheter  - If patient has a chronic corona, consider changing catheter if non-functioning  - Follow guidelines for intermittent irrigation of non-functioning urinary catheter  Outcome: Progressing     Problem: METABOLIC, FLUID AND ELECTROLYTES - ADULT  Goal: Electrolytes maintained within normal limits  Description: INTERVENTIONS:  - Monitor labs and assess patient for signs and symptoms of electrolyte imbalances  - Administer electrolyte replacement as ordered  - Monitor response to electrolyte replacements, including repeat lab results as appropriate  - Instruct patient on fluid and nutrition as appropriate  Outcome: Progressing     Problem: SKIN/TISSUE INTEGRITY - ADULT  Goal: Incision(s), wounds(s) or drain site(s) healing without S/S of infection  Description: INTERVENTIONS  - Assess and document dressing, incision, wound bed, drain sites and surrounding tissue  - Provide patient and family education  - Perform skin care/dressing changes every shift  Outcome: Progressing     Problem: SUBSTANCE USE/ABUSE  Goal: Will have no detox symptoms and will verbalize plan for changing substance-related behavior  Description: INTERVENTIONS:  - Monitor physical status and assess for symptoms of withdrawal  - Administer medication as ordered  - Provide emotional support with 1 on 1 interaction with staff  - Encourage recovery focused program/ addiction education  - Assess for verbalization of changing behaviors related to substance abuse  - Initiate consults and referrals as appropriate (Case Management, Spiritual Care, etc )  Outcome: Progressing

## 2022-08-13 NOTE — PLAN OF CARE
Problem: MOBILITY - ADULT  Goal: Maintain or return to baseline ADL function  Description: INTERVENTIONS:  -  Assess patient's ability to carry out ADLs; assess patient's baseline for ADL function and identify physical deficits which impact ability to perform ADLs (bathing, care of mouth/teeth, toileting, grooming, dressing, etc )  - Assess/evaluate cause of self-care deficits   - Assess range of motion  - Assess patient's mobility; develop plan if impaired  - Assess patient's need for assistive devices and provide as appropriate  - Encourage maximum independence but intervene and supervise when necessary  - Involve family in performance of ADLs  - Assess for home care needs following discharge   - Consider OT consult to assist with ADL evaluation and planning for discharge  - Provide patient education as appropriate  Outcome: Progressing  Goal: Maintains/Returns to pre admission functional level  Description: INTERVENTIONS:  - Perform BMAT or MOVE assessment daily    - Set and communicate daily mobility goal to care team and patient/family/caregiver  - Collaborate with rehabilitation services on mobility goals if consulted  - Perform Range of Motion  times a day  - Reposition patient every  hours    - Dangle patient  times a day  - Stand patient  times a day  - Ambulate patient  times a day  - Out of bed to chair  times a day   - Out of bed for meals  times a day  - Out of bed for toileting  - Record patient progress and toleration of activity level   Outcome: Progressing     Problem: Potential for Falls  Goal: Patient will remain free of falls  Description: INTERVENTIONS:  - Educate patient/family on patient safety including physical limitations  - Instruct patient to call for assistance with activity   - Consult OT/PT to assist with strengthening/mobility   - Keep Call bell within reach  - Keep bed low and locked with side rails adjusted as appropriate  - Keep care items and personal belongings within reach  - Initiate and maintain comfort rounds  - Make Fall Risk Sign visible to staff  - Offer Toileting every  Hours, in advance of need  - Initiate/Maintain alarm  - Obtain necessary fall risk management equipment:   - Apply yellow socks and bracelet for high fall risk patients  - Consider moving patient to room near nurses station  Outcome: Progressing     Problem: Prexisting or High Potential for Compromised Skin Integrity  Goal: Skin integrity is maintained or improved  Description: INTERVENTIONS:  - Identify patients at risk for skin breakdown  - Assess and monitor skin integrity  - Assess and monitor nutrition and hydration status  - Monitor labs   - Assess for incontinence   - Turn and reposition patient  - Assist with mobility/ambulation  - Relieve pressure over bony prominences  - Avoid friction and shearing  - Provide appropriate hygiene as needed including keeping skin clean and dry  - Evaluate need for skin moisturizer/barrier cream  - Collaborate with interdisciplinary team   - Patient/family teaching  - Consider wound care consult   Outcome: Progressing     Problem: PAIN - ADULT  Goal: Verbalizes/displays adequate comfort level or baseline comfort level  Description: Interventions:  - Encourage patient to monitor pain and request assistance  - Assess pain using appropriate pain scale  - Administer analgesics based on type and severity of pain and evaluate response  - Implement non-pharmacological measures as appropriate and evaluate response  - Consider cultural and social influences on pain and pain management  - Notify physician/advanced practitioner if interventions unsuccessful or patient reports new pain  Outcome: Progressing     Problem: INFECTION - ADULT  Goal: Absence or prevention of progression during hospitalization  Description: INTERVENTIONS:  - Assess and monitor for signs and symptoms of infection  - Monitor lab/diagnostic results  - Monitor all insertion sites, i e  indwelling lines, tubes, and drains  - Monitor endotracheal if appropriate and nasal secretions for changes in amount and color  - [place] appropriate cooling/warming therapies per order  - Administer medications as ordered  - Instruct and encourage patient and family to use good hand hygiene technique  - Identify and instruct in appropriate isolation precautions for identified infection/condition  Outcome: Progressing     Problem: DISCHARGE PLANNING  Goal: Discharge to home or other facility with appropriate resources  Description: INTERVENTIONS:  - Identify barriers to discharge w/patient and caregiver  - Arrange for needed discharge resources and transportation as appropriate  - Identify discharge learning needs (meds, wound care, etc )  - Arrange for interpretive services to assist at discharge as needed  - Refer to Case Management Department for coordinating discharge planning if the patient needs post-hospital services based on physician/advanced practitioner order or complex needs related to functional status, cognitive ability, or social support system  Outcome: Progressing     Problem: Knowledge Deficit  Goal: Patient/family/caregiver demonstrates understanding of disease process, treatment plan, medications, and discharge instructions  Description: Complete learning assessment and assess knowledge base    Interventions:  - Provide teaching at level of understanding  - Provide teaching via preferred learning methods  Outcome: Progressing     Problem: RESPIRATORY - ADULT  Goal: Achieves optimal ventilation and oxygenation  Description: INTERVENTIONS:  - Assess for changes in respiratory status  - Assess for changes in mentation and behavior  - Position to facilitate oxygenation and minimize respiratory effort  - Oxygen administered by appropriate delivery if ordered  - Initiate smoking cessation education as indicated  - Encourage broncho-pulmonary hygiene including cough, deep breathe, Incentive Spirometry  - Assess the need for suctioning and aspirate as needed  - Assess and instruct to report SOB or any respiratory difficulty  - Respiratory Therapy support as indicated  Outcome: Progressing     Problem: GASTROINTESTINAL - ADULT  Goal: Maintains or returns to baseline bowel function  Description: INTERVENTIONS:  - Assess bowel function  - Encourage oral fluids to ensure adequate hydration  - Administer IV fluids if ordered to ensure adequate hydration  - Administer ordered medications as needed  - Encourage mobilization and activity  - Consider nutritional services referral to assist patient with adequate nutrition and appropriate food choices  Outcome: Progressing     Problem: GENITOURINARY - ADULT  Goal: Maintains or returns to baseline urinary function  Description: INTERVENTIONS:  - Assess urinary function  - Encourage oral fluids to ensure adequate hydration if ordered  - Administer IV fluids as ordered to ensure adequate hydration  - Administer ordered medications as needed  - Offer frequent toileting  - Follow urinary retention protocol if ordered  Outcome: Progressing  Goal: Urinary catheter remains patent  Description: INTERVENTIONS:  - Assess patency of urinary catheter  - If patient has a chronic corona, consider changing catheter if non-functioning  - Follow guidelines for intermittent irrigation of non-functioning urinary catheter  Outcome: Progressing     Problem: METABOLIC, FLUID AND ELECTROLYTES - ADULT  Goal: Electrolytes maintained within normal limits  Description: INTERVENTIONS:  - Monitor labs and assess patient for signs and symptoms of electrolyte imbalances  - Administer electrolyte replacement as ordered  - Monitor response to electrolyte replacements, including repeat lab results as appropriate  - Instruct patient on fluid and nutrition as appropriate  Outcome: Progressing     Problem: SKIN/TISSUE INTEGRITY - ADULT  Goal: Incision(s), wounds(s) or drain site(s) healing without S/S of infection  Description: INTERVENTIONS  - Assess and document dressing, incision, wound bed, drain sites and surrounding tissue  - Provide patient and family education  - Perform skin care/dressing changes every  Outcome: Progressing     Problem: SUBSTANCE USE/ABUSE  Goal: Will have no detox symptoms and will verbalize plan for changing substance-related behavior  Description: INTERVENTIONS:  - Monitor physical status and assess for symptoms of withdrawal  - Administer medication as ordered  - Provide emotional support with 1 on 1 interaction with staff  - Encourage recovery focused program/ addiction education  - Assess for verbalization of changing behaviors related to substance abuse  - Initiate consults and referrals as appropriate (Case Management, Spiritual Care, etc )  Outcome: Progressing     Problem: Nutrition/Hydration-ADULT  Goal: Nutrient/Hydration intake appropriate for improving, restoring or maintaining nutritional needs  Description: Monitor and assess patient's nutrition/hydration status for malnutrition  Collaborate with interdisciplinary team and initiate plan and interventions as ordered  Monitor patient's weight and dietary intake as ordered or per policy  Utilize nutrition screening tool and intervene as necessary  Determine patient's food preferences and provide high-protein, high-caloric foods as appropriate       INTERVENTIONS:  - Monitor oral intake, urinary output, labs, and treatment plans  - Assess nutrition and hydration status and recommend course of action  - Evaluate amount of meals eaten  - Assist patient with eating if necessary   - Allow adequate time for meals  - Recommend/ encourage appropriate diets, oral nutritional supplements, and vitamin/mineral supplements  - Order, calculate, and assess calorie counts as needed  - Recommend, monitor, and adjust tube feedings and TPN/PPN based on assessed needs  - Assess need for intravenous fluids  - Provide specific nutrition/hydration education as appropriate  - Include patient/family/caregiver in decisions related to nutrition  Outcome: Progressing

## 2022-08-13 NOTE — ASSESSMENT & PLAN NOTE
· Patient with history of alcohol abuse  Recently discharged  Family did their best to restrict any alcohol  Patient had hand  delivered and was ingesting it  · Alcohol level on admission greater than 400  Patient was unresponsive at that time  · Continue supportive care  · Continue alcohol cessation counseling  · CIWA protocol  · Crisis following, needs inpatient alcohol rehab on discharge  Patient amenable    · Await bed availability at [facility]  · Discussed with case management, indicated that most likely would not have bed available until Monday or Tuesday

## 2022-08-14 LAB
ALBUMIN SERPL BCP-MCNC: 3.2 G/DL (ref 3.5–5)
ALP SERPL-CCNC: 143 U/L (ref 46–116)
ALT SERPL W P-5'-P-CCNC: 31 U/L (ref 12–78)
AMMONIA PLAS-SCNC: 81 UMOL/L (ref 11–35)
ANION GAP SERPL CALCULATED.3IONS-SCNC: 8 MMOL/L (ref 4–13)
AST SERPL W P-5'-P-CCNC: 74 U/L (ref 5–45)
BACTERIA BLD CULT: NORMAL
BACTERIA BLD CULT: NORMAL
BILIRUB SERPL-MCNC: 4.67 MG/DL (ref 0.2–1)
BUN SERPL-MCNC: 8 MG/DL (ref 5–25)
CALCIUM ALBUM COR SERPL-MCNC: 9.7 MG/DL (ref 8.3–10.1)
CALCIUM SERPL-MCNC: 9.1 MG/DL (ref 8.3–10.1)
CHLORIDE SERPL-SCNC: 100 MMOL/L (ref 96–108)
CO2 SERPL-SCNC: 26 MMOL/L (ref 21–32)
CREAT SERPL-MCNC: 0.57 MG/DL (ref 0.6–1.3)
ERYTHROCYTE [DISTWIDTH] IN BLOOD BY AUTOMATED COUNT: 28.7 % (ref 11.6–15.1)
GFR SERPL CREATININE-BSD FRML MDRD: 131 ML/MIN/1.73SQ M
GLUCOSE SERPL-MCNC: 109 MG/DL (ref 65–140)
HCT VFR BLD AUTO: 26.5 % (ref 36.5–49.3)
HGB BLD-MCNC: 8.2 G/DL (ref 12–17)
MCH RBC QN AUTO: 27.6 PG (ref 26.8–34.3)
MCHC RBC AUTO-ENTMCNC: 30.9 G/DL (ref 31.4–37.4)
MCV RBC AUTO: 89 FL (ref 82–98)
PLATELET # BLD AUTO: 104 THOUSANDS/UL (ref 149–390)
POTASSIUM SERPL-SCNC: 4.2 MMOL/L (ref 3.5–5.3)
PROT SERPL-MCNC: 7.9 G/DL (ref 6.4–8.4)
RBC # BLD AUTO: 2.97 MILLION/UL (ref 3.88–5.62)
SODIUM SERPL-SCNC: 134 MMOL/L (ref 135–147)
WBC # BLD AUTO: 4.31 THOUSAND/UL (ref 4.31–10.16)

## 2022-08-14 PROCEDURE — 82140 ASSAY OF AMMONIA: CPT | Performed by: NURSE PRACTITIONER

## 2022-08-14 PROCEDURE — 99232 SBSQ HOSP IP/OBS MODERATE 35: CPT | Performed by: NURSE PRACTITIONER

## 2022-08-14 PROCEDURE — 94760 N-INVAS EAR/PLS OXIMETRY 1: CPT

## 2022-08-14 PROCEDURE — 94664 DEMO&/EVAL PT USE INHALER: CPT

## 2022-08-14 PROCEDURE — 80053 COMPREHEN METABOLIC PANEL: CPT | Performed by: NURSE PRACTITIONER

## 2022-08-14 PROCEDURE — 85027 COMPLETE CBC AUTOMATED: CPT | Performed by: NURSE PRACTITIONER

## 2022-08-14 PROCEDURE — 94640 AIRWAY INHALATION TREATMENT: CPT

## 2022-08-14 PROCEDURE — 94668 MNPJ CHEST WALL SBSQ: CPT

## 2022-08-14 RX ORDER — LACTULOSE 20 G/30ML
20 SOLUTION ORAL 2 TIMES DAILY
Status: DISCONTINUED | OUTPATIENT
Start: 2022-08-14 | End: 2022-08-16

## 2022-08-14 RX ADMIN — ESCITALOPRAM OXALATE 10 MG: 10 TABLET ORAL at 09:30

## 2022-08-14 RX ADMIN — PANTOPRAZOLE SODIUM 40 MG: 40 TABLET, DELAYED RELEASE ORAL at 06:23

## 2022-08-14 RX ADMIN — IPRATROPIUM BROMIDE AND ALBUTEROL SULFATE 3 ML: 2.5; .5 SOLUTION RESPIRATORY (INHALATION) at 07:23

## 2022-08-14 RX ADMIN — THIAMINE HCL TAB 100 MG 100 MG: 100 TAB at 09:30

## 2022-08-14 RX ADMIN — IPRATROPIUM BROMIDE AND ALBUTEROL SULFATE 3 ML: 2.5; .5 SOLUTION RESPIRATORY (INHALATION) at 13:04

## 2022-08-14 RX ADMIN — IPRATROPIUM BROMIDE AND ALBUTEROL SULFATE 3 ML: 2.5; .5 SOLUTION RESPIRATORY (INHALATION) at 02:30

## 2022-08-14 RX ADMIN — FOLIC ACID 1 MG: 1 TABLET ORAL at 09:30

## 2022-08-14 RX ADMIN — BUPRENORPHINE AND NALOXONE 16 MG: 8; 2 FILM BUCCAL; SUBLINGUAL at 09:30

## 2022-08-14 RX ADMIN — MAGNESIUM OXIDE TAB 400 MG (241.3 MG ELEMENTAL MG) 400 MG: 400 (241.3 MG) TAB at 09:30

## 2022-08-14 RX ADMIN — HEPARIN SODIUM 5000 UNITS: 5000 INJECTION INTRAVENOUS; SUBCUTANEOUS at 22:02

## 2022-08-14 RX ADMIN — FERROUS SULFATE TAB 325 MG (65 MG ELEMENTAL FE) 325 MG: 325 (65 FE) TAB at 09:30

## 2022-08-14 RX ADMIN — LORAZEPAM 0.5 MG: 0.5 TABLET ORAL at 01:37

## 2022-08-14 RX ADMIN — HEPARIN SODIUM 5000 UNITS: 5000 INJECTION INTRAVENOUS; SUBCUTANEOUS at 06:23

## 2022-08-14 RX ADMIN — NADOLOL 40 MG: 40 TABLET ORAL at 09:30

## 2022-08-14 RX ADMIN — LORAZEPAM 0.5 MG: 0.5 TABLET ORAL at 22:02

## 2022-08-14 RX ADMIN — SPIRONOLACTONE 100 MG: 25 TABLET ORAL at 09:30

## 2022-08-14 RX ADMIN — Medication 1 TABLET: at 09:30

## 2022-08-14 RX ADMIN — IPRATROPIUM BROMIDE AND ALBUTEROL SULFATE 3 ML: 2.5; .5 SOLUTION RESPIRATORY (INHALATION) at 20:01

## 2022-08-14 RX ADMIN — LACTULOSE 20 G: 20 SOLUTION ORAL at 17:28

## 2022-08-14 RX ADMIN — LACTULOSE 20 G: 20 SOLUTION ORAL at 09:30

## 2022-08-14 RX ADMIN — NICOTINE 21 MG: 21 PATCH, EXTENDED RELEASE TRANSDERMAL at 09:30

## 2022-08-14 NOTE — PROGRESS NOTES
[address]  66   Progress Note - Cris Cortés 1985, 40 y o  male MRN: [id number]  Unit/Bed#: [address]  Encounter: [id number]  Primary Care Provider: Timo Wills PA-C   Date and time admitted to hospital: 8/8/2022  5:45 PM    * Alcohol intoxication ([facility] )  Assessment & Plan  · Patient with history of alcohol abuse  Recently discharged  Family did their best to restrict any alcohol  Patient had hand  delivered and was ingesting it  · Alcohol level on admission greater than 400  Patient was unresponsive at that time  · Continue supportive care  · Continue alcohol cessation counseling  · CIWA protocol  · Crisis following, needs inpatient alcohol rehab on discharge  Patient amenable  · Await bed availability at [facility]  · Discussed with case management, indicated that most likely would not have bed available until Monday or Tuesday  · Patient remains agreeable    Substance abuse ([facility] )  Assessment & Plan  · History of alcohol and substance abuse  · Continue Suboxone  · Appreciate Case Management and crisis involvement  · Patient amenable to discharge to inpatient detox unit   · Follow-up with case management/crisis    Alcoholic cirrhosis of liver without ascites ([facility] )  Assessment & Plan  · History of significant alcohol use with alcoholic cirrhosis  History of esophageal varices  History of hepatic lesion status post IR biopsy which was negative for malignancy  MRI did not show evidence of malignancy    INR 1 83  · Currently on nadolol, lactulose, Protonix   · Follow-up with GI    Anemia  Assessment & Plan  · Hemoglobin 7 7 -> 7 8->8 2; no signs of active bleeding  · Iron panel reviewed, iron level 13 with a saturation 4%  · Resume iron supplement and continue folic acid  · Trend counts daily    Hyponatremia  Assessment & Plan  · Sodium 131- 130 - 135->136->134, improved  · Check urine Na, urine osmo, and serum osmo:  · Osmolality: 347  · Osmolality urine: 179  · Urine sodium: 45  · Consistent with beer potomania  · Monitur I+O, daily weights  · Did have some weight gain since admission but did not receive significant amount of IVF  · Liberalize diet   · Monitor Na in the morning  · Recommend alcohol cessation and 2L fluids daily    Hyperammonemia ([facility]CC)  Assessment & Plan  · Ammonia level on 08/14 noted to be 81; mentation within normal limits  · Noted that patient's lactulose has been decreased to 10 g daily, increased to 20 g daily as patient has not been having bowel movements  · Discussed with patient, goal is to titrate lactulose to for 3-4 bowel movements daily  · Repeat ammonia level in a m  Essential hypertension  Assessment & Plan  · Continue Nadolol  · Resumed Aldactone  · Holding Catapres    Depression, concern for PTSD  Assessment & Plan  · Seen by psychiatry during previous admission  · Continue Lexapro  · Supportive care          VTE Pharmacologic Prophylaxis:   Pharmacologic: Heparin  Mechanical VTE Prophylaxis in Place: Yes    Patient Centered Rounds: I have performed bedside rounds with nursing staff today  Discussions with Specialists or Other Care Team Provider: Yes  Education and Discussions with Family / Patient:Yes  Time Spent for Care: 30 minutes  More than 50% of total time spent on counseling and coordination of care as described above  Current Length of Stay: 6 day(s)  Current Patient Status: Inpatient     Discharge Plan:  Follow up with case management/crisis on availability of inpatient rehab bed for patient  Code Status: Level 1 - Full Code      Subjective:   Patient seen sitting up in bed watching TV  Reports that he had good appetite no nausea or vomiting  States that last night he slept the best he has in 2 months  We did discuss his elevated ammonia level, patient reports that he has not been having bowel movements  We did discuss goal of 3-4 bowel movements daily in order to keep ammonia level down    He is agreeable  Objective:     Vitals:   Temp (24hrs), Av 7 °F (37 1 °C), Min:98 5 °F (36 9 °C), Max:99 3 °F (37 4 °C)    Temp:  [98 5 °F (36 9 °C)-99 3 °F (37 4 °C)] 99 3 °F (37 4 °C)  HR:  [74-85] 84  Resp:  [18] 18  BP: (113-118)/(53-59) 113/53  SpO2:  [93 %-99 %] 94 %  Body mass index is 25 57 kg/m²  Input and Output Summary (last 24 hours): Intake/Output Summary (Last 24 hours) at 2022 1239  Last data filed at 2022 1501  Gross per 24 hour   Intake --   Output 300 ml   Net -300 ml        Physical Exam:     Physical Exam  Vitals and nursing note reviewed  Constitutional:       General: He is not in acute distress  HENT:      Head: Normocephalic  Nose: Nose normal       Mouth/Throat:      Mouth: Mucous membranes are moist    Eyes:      Extraocular Movements: Extraocular movements intact  Conjunctiva/sclera: Conjunctivae normal       Pupils: Pupils are equal, round, and reactive to light  Cardiovascular:      Rate and Rhythm: Normal rate and regular rhythm  Pulses: Normal pulses  Heart sounds: Normal heart sounds  Pulmonary:      Effort: Pulmonary effort is normal       Breath sounds: Normal breath sounds  Abdominal:      General: Bowel sounds are normal  There is no distension  Palpations: Abdomen is soft  Tenderness: There is no abdominal tenderness  Genitourinary:     Comments: Voiding spontaneously  Musculoskeletal:         General: Normal range of motion  Cervical back: Normal range of motion  Right lower leg: No edema  Left lower leg: No edema  Skin:     Capillary Refill: Capillary refill takes less than 2 seconds  Comments: Bilateral lower extremities demonstrate venous stasis discoloration and dry skin   Neurological:      General: No focal deficit present  Mental Status: He is alert and oriented to person, place, and time     Psychiatric:         Mood and Affect: Mood normal          Behavior: Behavior normal  Thought Content: Thought content normal          Judgment: Judgment normal          Additional Data:     Labs:    Results from last 7 days   Lab Units 08/14/22  0630 08/13/22  0451 08/12/22  0541 08/11/22  0605 08/10/22  0500 08/09/22  0609   WBC Thousand/uL 4 31 4 33 5 27 5 33   < > 10 79*   HEMOGLOBIN g/dL 8 2* 7 8* 8 0* 7 8*   < > 8 3*   HEMATOCRIT % 26 5* 25 2* 25 7* 24 5*   < > 26 1*   PLATELETS Thousands/uL 104* 96* 93* 92*   < > 165   NEUTROS PCT %  --   --   --  67  --  77*    < > = values in this interval not displayed  Results from last 7 days   Lab Units 08/14/22  0630 08/13/22  0451 08/12/22  0541   SODIUM mmol/L 134* 136 135   POTASSIUM mmol/L 4 2 4 4 4 3   CHLORIDE mmol/L 100 102 101   CO2 mmol/L 26 27 26   BUN mg/dL 8 7 6   CREATININE mg/dL 0 57* 0 58* 0 62   CALCIUM mg/dL 9 1 8 8 8 8   TOTAL BILIRUBIN mg/dL 4 67* 4 66* 5 28*   ALK PHOS U/L 143* 147* 152*   ALT U/L 31 29 36   AST U/L 74* 69* 76*     Results from last 7 days   Lab Units 08/10/22  0500 08/09/22  0609 08/08/22  1749   INR  1 83* 1 69* 1 57*         Lab Results   Component Value Date/Time    HGBA1C 6 3 (H) 09/28/2020 10:07 AM     Results from last 7 days   Lab Units 08/08/22  1747   POC GLUCOSE mg/dl 148*     Results from last 7 days   Lab Units 08/11/22  0507 08/10/22  0500 08/09/22  0609 08/08/22  2302 08/08/22  2035 08/08/22  1749   LACTIC ACID mmol/L  --   --  2 6* 3 4* 4 7* 5 1*   PROCALCITONIN ng/ml 0 12 0 09  --   --   --   --        * I Have Reviewed All Lab Data Listed Above  * Additional Pertinent Lab Tests Reviewed: All Labs Within Last 24 Hours Reviewed    Imaging:     XR chest portable   Final Result by Kate Karimi MD (08/10 1630)      No acute disease in the chest   Resolution of right lower lobe subsegmental atelectasis since 8/8/2022  Workstation performed: [id number]         CT head without contrast   Final Result by Osman Cotton MD (08/08 1941)      No acute intracranial abnormality  Workstation performed: [id number]         XR chest 1 view portable   ED Interpretation by Pamela Rod DO (08/08 1910)   ETT above usama  OG tube in stomach      Final Result by Barbara Clements MD (08/09 7197)      Tubes in place  Right basilar atelectasis  Left basilar opacity due to consolidation and/or effusion  Workstation performed: [id number]           Imaging Reports Reviewed by myself    Cultures:   Blood Culture:   Lab Results   Component Value Date    BLOODCX No Growth After 5 Days  08/08/2022    BLOODCX No Growth After 5 Days  08/08/2022    BLOODCX Staphylococcus coagulase negative (A) 07/18/2022    BLOODCX No Growth After 5 Days  07/17/2022    BLOODCX No Growth After 5 Days  07/17/2022    BLOODCX No Growth After 5 Days  05/24/2021    BLOODCX No Growth After 5 Days   05/24/2021     Urine Culture:   Lab Results   Component Value Date    URINECX No Growth <1000 cfu/mL 05/18/2021     Sputum Culture: No components found for: SPUTUMCX  Wound Culture:   Lab Results   Component Value Date    WOUNDCULT 4+ Growth of Stenotrophomonas maltophilia (A) 05/22/2021    WOUNDCULT 4+ Growth of Achromobacter xylosoxidans (A) 05/22/2021    WOUNDCULT [place] of  05/22/2021       Last 24 Hours Medication List:   Current Facility-Administered Medications   Medication Dose Route Frequency Provider Last Rate    acetaminophen  325 mg Oral Q6H PRN Polo Hu MD      albuterol  2 puff Inhalation Q4H PRN MARILEE Stein      ammonium lactate  1 application Topical BID PRN Starlett Goldberg, PA-C      buprenorphine-naloxone  16 mg Sublingual Daily MaximTYRONE FigueroaNP      escitalopram  10 mg Oral Daily Maximiano Dima, TYRONENP      ferrous sulfate  325 mg Oral Every Other Day MARILEE Stein      folic acid  1 mg Oral Daily Maximiano AlexisMARILEE daugherty      heparin (porcine)  5,000 Units Subcutaneous [facility] Phani Ch, CRNP      hydrOXYzine HCL  25 mg Oral Q6H PRN Rollen Smiles, CRNP      ipratropium-albuterol  3 mL Nebulization Q6H Rollen Smiles, CRNP      lactulose  20 g Oral BID Angely Sahni, MARILEE      LORazepam  0 5 mg Oral Q8H PRN Rollen Smiles, CRNP      magnesium oxide  400 mg Oral Daily Rollen Smiles, [address]      multivitamin-minerals  1 tablet Oral Daily Mirna Pall [place], [address]      nadolol  40 mg Oral Daily Rollen Smiles, [address]      nicotine  21 mg Transdermal Daily Rollen Smiles, [address]      ondansetron  4 mg Intravenous Q4H PRN Rollen Smiles, CRNP      pantoprazole  40 mg Oral Early Morning Rollen Smiles, [address]      spironolactone  100 mg Oral Daily Rollen Smiles, CRNP      thiamine  100 mg Oral Daily Rollen Smiles, CRNP          Today, Patient Was Seen By: MARILEE Mccauley    ** Please Note: Dragon 360 Dictation voice to text software may have been used in the creation of this document   **

## 2022-08-14 NOTE — ASSESSMENT & PLAN NOTE
· Sodium 131- 130 - 135->136->134, improved  · Check urine Na, urine osmo, and serum osmo:  · Osmolality: 347  · Osmolality urine: 179  · Urine sodium: 45  · Consistent with beer potomania  · Monitur I+O, daily weights  · Did have some weight gain since admission but did not receive significant amount of IVF  · Liberalize diet   · Monitor Na in the morning  · Recommend alcohol cessation and 2L fluids daily

## 2022-08-14 NOTE — PLAN OF CARE
Problem: MOBILITY - ADULT  Goal: Maintain or return to baseline ADL function  Description: INTERVENTIONS:  -  Assess patient's ability to carry out ADLs; assess patient's baseline for ADL function and identify physical deficits which impact ability to perform ADLs (bathing, care of mouth/teeth, toileting, grooming, dressing, etc )  - Assess/evaluate cause of self-care deficits   - Assess range of motion  - Assess patient's mobility; develop plan if impaired  - Assess patient's need for assistive devices and provide as appropriate  - Encourage maximum independence but intervene and supervise when necessary  - Involve family in performance of ADLs  - Assess for home care needs following discharge   - Consider OT consult to assist with ADL evaluation and planning for discharge  - Provide patient education as appropriate  Outcome: Progressing  Goal: Maintains/Returns to pre admission functional level  Description: INTERVENTIONS:  - Perform BMAT or MOVE assessment daily    - Set and communicate daily mobility goal to care team and patient/family/caregiver     - Collaborate with rehabilitation services on mobility goals if consulted  -   Problem: Potential for Falls  Goal: Patient will remain free of falls  Description: INTERVENTIONS:  - Educate patient/family on patient safety including physical limitations  - Instruct patient to call for assistance with activity   - Consult OT/PT to assist with strengthening/mobility   - Keep Call bell within reach  - Keep bed low and locked with side rails adjusted as appropriate  - Keep care items and personal belongings within reach  - Initiate and maintain comfort rounds  - Make Fall Risk Sign visible to staff  -   Problem: PAIN - ADULT  Goal: Verbalizes/displays adequate comfort level or baseline comfort level  Description: Interventions:  - Encourage patient to monitor pain and request assistance  - Assess pain using appropriate pain scale  - Administer analgesics based on type and severity of pain and evaluate response  - Implement non-pharmacological measures as appropriate and evaluate response  - Consider cultural and social influences on pain and pain management  - Notify physician/advanced practitioner if interventions unsuccessful or patient reports new pain  Outcome: Progressing   - Apply yellow socks and bracelet for high fall risk patients  - Consider moving patient to room near nurses station  Outcome: Progressing   - Out of bed for toileting  - Record patient progress and toleration of activity level   Outcome: Progressing

## 2022-08-14 NOTE — PLAN OF CARE
Problem: MOBILITY - ADULT  Goal: Maintain or return to baseline ADL function  Description: INTERVENTIONS:  -  Assess patient's ability to carry out ADLs; assess patient's baseline for ADL function and identify physical deficits which impact ability to perform ADLs (bathing, care of mouth/teeth, toileting, grooming, dressing, etc )  - Assess/evaluate cause of self-care deficits   - Assess range of motion  - Assess patient's mobility; develop plan if impaired  - Assess patient's need for assistive devices and provide as appropriate  - Encourage maximum independence but intervene and supervise when necessary  - Involve family in performance of ADLs  - Assess for home care needs following discharge   - Consider OT consult to assist with ADL evaluation and planning for discharge  - Provide patient education as appropriate  Outcome: Progressing  Goal: Maintains/Returns to pre admission functional level  Description: INTERVENTIONS:  - Perform BMAT or MOVE assessment daily    - Set and communicate daily mobility goal to care team and patient/family/caregiver  - Collaborate with rehabilitation services on mobility goals if consulted  - Perform Range of Motion 2 times a day  - Reposition patient every 2 hours    - Dangle patient 2 times a day  - Stand patient 2 times a day  - Ambulate patient 2 times a day  - Out of bed to chair 2 times a day   - Out of bed for meals 2 times a day  - Out of bed for toileting  - Record patient progress and toleration of activity level   Outcome: Progressing     Problem: Potential for Falls  Goal: Patient will remain free of falls  Description: INTERVENTIONS:  - Educate patient/family on patient safety including physical limitations  - Instruct patient to call for assistance with activity   - Consult OT/PT to assist with strengthening/mobility   - Keep Call bell within reach  - Keep bed low and locked with side rails adjusted as appropriate  - Keep care items and personal belongings within reach  - Initiate and maintain comfort rounds  - Make Fall Risk Sign visible to staff  - Offer Toileting every 2 Hours, in advance of need  - Initiate/Maintain bed alarm  - Obtain necessary fall risk management equipment: call bell   - Apply yellow socks and bracelet for high fall risk patients  - Consider moving patient to room near nurses station  Outcome: Progressing     Problem: Prexisting or High Potential for Compromised Skin Integrity  Goal: Skin integrity is maintained or improved  Description: INTERVENTIONS:  - Identify patients at risk for skin breakdown  - Assess and monitor skin integrity  - Assess and monitor nutrition and hydration status  - Monitor labs   - Assess for incontinence   - Turn and reposition patient  - Assist with mobility/ambulation  - Relieve pressure over bony prominences  - Avoid friction and shearing  - Provide appropriate hygiene as needed including keeping skin clean and dry  - Evaluate need for skin moisturizer/barrier cream  - Collaborate with interdisciplinary team   - Patient/family teaching  - Consider wound care consult   Outcome: Progressing     Problem: PAIN - ADULT  Goal: Verbalizes/displays adequate comfort level or baseline comfort level  Description: Interventions:  - Encourage patient to monitor pain and request assistance  - Assess pain using appropriate pain scale  - Administer analgesics based on type and severity of pain and evaluate response  - Implement non-pharmacological measures as appropriate and evaluate response  - Consider cultural and social influences on pain and pain management  - Notify physician/advanced practitioner if interventions unsuccessful or patient reports new pain  Outcome: Progressing     Problem: INFECTION - ADULT  Goal: Absence or prevention of progression during hospitalization  Description: INTERVENTIONS:  - Assess and monitor for signs and symptoms of infection  - Monitor lab/diagnostic results  - Monitor all insertion sites, i e  indwelling lines, tubes, and drains  - Monitor endotracheal if appropriate and nasal secretions for changes in amount and color  - [place] appropriate cooling/warming therapies per order  - Administer medications as ordered  - Instruct and encourage patient and family to use good hand hygiene technique  - Identify and instruct in appropriate isolation precautions for identified infection/condition  Outcome: Progressing     Problem: DISCHARGE PLANNING  Goal: Discharge to home or other facility with appropriate resources  Description: INTERVENTIONS:  - Identify barriers to discharge w/patient and caregiver  - Arrange for needed discharge resources and transportation as appropriate  - Identify discharge learning needs (meds, wound care, etc )  - Arrange for interpretive services to assist at discharge as needed  - Refer to Case Management Department for coordinating discharge planning if the patient needs post-hospital services based on physician/advanced practitioner order or complex needs related to functional status, cognitive ability, or social support system  Outcome: Progressing     Problem: Knowledge Deficit  Goal: Patient/family/caregiver demonstrates understanding of disease process, treatment plan, medications, and discharge instructions  Description: Complete learning assessment and assess knowledge base    Interventions:  - Provide teaching at level of understanding  - Provide teaching via preferred learning methods  Outcome: Progressing     Problem: RESPIRATORY - ADULT  Goal: Achieves optimal ventilation and oxygenation  Description: INTERVENTIONS:  - Assess for changes in respiratory status  - Assess for changes in mentation and behavior  - Position to facilitate oxygenation and minimize respiratory effort  - Oxygen administered by appropriate delivery if ordered  - Initiate smoking cessation education as indicated  - Encourage broncho-pulmonary hygiene including cough, deep breathe, Incentive Spirometry  - Assess the need for suctioning and aspirate as needed  - Assess and instruct to report SOB or any respiratory difficulty  - Respiratory Therapy support as indicated  Outcome: Progressing     Problem: GASTROINTESTINAL - ADULT  Goal: Maintains or returns to baseline bowel function  Description: INTERVENTIONS:  - Assess bowel function  - Encourage oral fluids to ensure adequate hydration  - Administer IV fluids if ordered to ensure adequate hydration  - Administer ordered medications as needed  - Encourage mobilization and activity  - Consider nutritional services referral to assist patient with adequate nutrition and appropriate food choices  Outcome: Progressing     Problem: GENITOURINARY - ADULT  Goal: Maintains or returns to baseline urinary function  Description: INTERVENTIONS:  - Assess urinary function  - Encourage oral fluids to ensure adequate hydration if ordered  - Administer IV fluids as ordered to ensure adequate hydration  - Administer ordered medications as needed  - Offer frequent toileting  - Follow urinary retention protocol if ordered  Outcome: Progressing  Goal: Urinary catheter remains patent  Description: INTERVENTIONS:  - Assess patency of urinary catheter  - If patient has a chronic corona, consider changing catheter if non-functioning  - Follow guidelines for intermittent irrigation of non-functioning urinary catheter  Outcome: Progressing     Problem: METABOLIC, FLUID AND ELECTROLYTES - ADULT  Goal: Electrolytes maintained within normal limits  Description: INTERVENTIONS:  - Monitor labs and assess patient for signs and symptoms of electrolyte imbalances  - Administer electrolyte replacement as ordered  - Monitor response to electrolyte replacements, including repeat lab results as appropriate  - Instruct patient on fluid and nutrition as appropriate  Outcome: Progressing     Problem: SKIN/TISSUE INTEGRITY - ADULT  Goal: Incision(s), wounds(s) or drain site(s) healing without S/S of infection  Description: INTERVENTIONS  - Assess and document dressing, incision, wound bed, drain sites and surrounding tissue  - Provide patient and family education  - Perform skin care/dressing changes every shift   Outcome: Progressing     Problem: SUBSTANCE USE/ABUSE  Goal: Will have no detox symptoms and will verbalize plan for changing substance-related behavior  Description: INTERVENTIONS:  - Monitor physical status and assess for symptoms of withdrawal  - Administer medication as ordered  - Provide emotional support with 1 on 1 interaction with staff  - Encourage recovery focused program/ addiction education  - Assess for verbalization of changing behaviors related to substance abuse  - Initiate consults and referrals as appropriate (Case Management, Spiritual Care, etc )  Outcome: Progressing     Problem: Nutrition/Hydration-ADULT  Goal: Nutrient/Hydration intake appropriate for improving, restoring or maintaining nutritional needs  Description: Monitor and assess patient's nutrition/hydration status for malnutrition  Collaborate with interdisciplinary team and initiate plan and interventions as ordered  Monitor patient's weight and dietary intake as ordered or per policy  Utilize nutrition screening tool and intervene as necessary  Determine patient's food preferences and provide high-protein, high-caloric foods as appropriate       INTERVENTIONS:  - Monitor oral intake, urinary output, labs, and treatment plans  - Assess nutrition and hydration status and recommend course of action  - Evaluate amount of meals eaten  - Assist patient with eating if necessary   - Allow adequate time for meals  - Recommend/ encourage appropriate diets, oral nutritional supplements, and vitamin/mineral supplements  - Order, calculate, and assess calorie counts as needed  - Recommend, monitor, and adjust tube feedings and TPN/PPN based on assessed needs  - Assess need for intravenous fluids  - Provide specific nutrition/hydration education as appropriate  - Include patient/family/caregiver in decisions related to nutrition  Outcome: Progressing

## 2022-08-14 NOTE — ASSESSMENT & PLAN NOTE
· Hemoglobin 7 7 -> 7 8->8 2; no signs of active bleeding  · Iron panel reviewed, iron level 13 with a saturation 4%  · Resume iron supplement and continue folic acid  · Trend counts daily

## 2022-08-14 NOTE — ASSESSMENT & PLAN NOTE
· Patient with history of alcohol abuse  Recently discharged  Family did their best to restrict any alcohol  Patient had hand  delivered and was ingesting it  · Alcohol level on admission greater than 400  Patient was unresponsive at that time  · Continue supportive care  · Continue alcohol cessation counseling  · CIWA protocol  · Crisis following, needs inpatient alcohol rehab on discharge  Patient amenable    · Await bed availability at [facility]  · Discussed with case management, indicated that most likely would not have bed available until Monday or Tuesday  · Patient remains agreeable

## 2022-08-14 NOTE — ASSESSMENT & PLAN NOTE
· Ammonia level on 08/14 noted to be 81; mentation within normal limits  · Noted that patient's lactulose has been decreased to 10 g daily, increased to 20 g daily as patient has not been having bowel movements  · Discussed with patient, goal is to titrate lactulose to for 3-4 bowel movements daily  · Repeat ammonia level in a m

## 2022-08-15 PROBLEM — D50.9 IDA (IRON DEFICIENCY ANEMIA): Status: ACTIVE | Noted: 2021-01-19

## 2022-08-15 PROBLEM — R53.1 GENERALIZED WEAKNESS: Status: ACTIVE | Noted: 2022-08-15

## 2022-08-15 PROBLEM — F10.229 ALCOHOL DEPENDENCE WITH INTOXICATION (HCC): Status: ACTIVE | Noted: 2021-01-19

## 2022-08-15 LAB
ALBUMIN SERPL BCP-MCNC: 3.1 G/DL (ref 3.5–5)
ALP SERPL-CCNC: 138 U/L (ref 46–116)
ALT SERPL W P-5'-P-CCNC: 36 U/L (ref 12–78)
AMMONIA PLAS-SCNC: 75 UMOL/L (ref 11–35)
ANION GAP SERPL CALCULATED.3IONS-SCNC: 10 MMOL/L (ref 4–13)
AST SERPL W P-5'-P-CCNC: 77 U/L (ref 5–45)
BILIRUB SERPL-MCNC: 4.11 MG/DL (ref 0.2–1)
BUN SERPL-MCNC: 8 MG/DL (ref 5–25)
CALCIUM ALBUM COR SERPL-MCNC: 9.6 MG/DL (ref 8.3–10.1)
CALCIUM SERPL-MCNC: 8.9 MG/DL (ref 8.3–10.1)
CHLORIDE SERPL-SCNC: 99 MMOL/L (ref 96–108)
CO2 SERPL-SCNC: 26 MMOL/L (ref 21–32)
CREAT SERPL-MCNC: 0.67 MG/DL (ref 0.6–1.3)
ERYTHROCYTE [DISTWIDTH] IN BLOOD BY AUTOMATED COUNT: 28.1 % (ref 11.6–15.1)
GFR SERPL CREATININE-BSD FRML MDRD: 122 ML/MIN/1.73SQ M
GLUCOSE SERPL-MCNC: 103 MG/DL (ref 65–140)
HCT VFR BLD AUTO: 25.9 % (ref 36.5–49.3)
HGB BLD-MCNC: 8.3 G/DL (ref 12–17)
INR PPP: 1.53 (ref 0.84–1.19)
MCH RBC QN AUTO: 28.7 PG (ref 26.8–34.3)
MCHC RBC AUTO-ENTMCNC: 32 G/DL (ref 31.4–37.4)
MCV RBC AUTO: 90 FL (ref 82–98)
PLATELET # BLD AUTO: 110 THOUSANDS/UL (ref 149–390)
PMV BLD AUTO: 8.6 FL (ref 8.9–12.7)
POTASSIUM SERPL-SCNC: 4 MMOL/L (ref 3.5–5.3)
PROT SERPL-MCNC: 7.9 G/DL (ref 6.4–8.4)
PROTHROMBIN TIME: 18.5 SECONDS (ref 11.6–14.5)
RBC # BLD AUTO: 2.89 MILLION/UL (ref 3.88–5.62)
SODIUM SERPL-SCNC: 135 MMOL/L (ref 135–147)
WBC # BLD AUTO: 4.62 THOUSAND/UL (ref 4.31–10.16)

## 2022-08-15 PROCEDURE — 94760 N-INVAS EAR/PLS OXIMETRY 1: CPT

## 2022-08-15 PROCEDURE — 97530 THERAPEUTIC ACTIVITIES: CPT

## 2022-08-15 PROCEDURE — 94669 MECHANICAL CHEST WALL OSCILL: CPT

## 2022-08-15 PROCEDURE — 94640 AIRWAY INHALATION TREATMENT: CPT

## 2022-08-15 PROCEDURE — 99232 SBSQ HOSP IP/OBS MODERATE 35: CPT | Performed by: NURSE PRACTITIONER

## 2022-08-15 PROCEDURE — 82140 ASSAY OF AMMONIA: CPT | Performed by: NURSE PRACTITIONER

## 2022-08-15 PROCEDURE — 85610 PROTHROMBIN TIME: CPT | Performed by: NURSE PRACTITIONER

## 2022-08-15 PROCEDURE — 80053 COMPREHEN METABOLIC PANEL: CPT | Performed by: NURSE PRACTITIONER

## 2022-08-15 PROCEDURE — 85027 COMPLETE CBC AUTOMATED: CPT | Performed by: NURSE PRACTITIONER

## 2022-08-15 PROCEDURE — 94668 MNPJ CHEST WALL SBSQ: CPT

## 2022-08-15 PROCEDURE — 94664 DEMO&/EVAL PT USE INHALER: CPT

## 2022-08-15 PROCEDURE — 97116 GAIT TRAINING THERAPY: CPT

## 2022-08-15 RX ADMIN — FOLIC ACID 1 MG: 1 TABLET ORAL at 08:10

## 2022-08-15 RX ADMIN — HYDROXYZINE HYDROCHLORIDE 25 MG: 25 TABLET ORAL at 06:41

## 2022-08-15 RX ADMIN — LACTULOSE 20 G: 20 SOLUTION ORAL at 08:10

## 2022-08-15 RX ADMIN — NADOLOL 40 MG: 40 TABLET ORAL at 08:10

## 2022-08-15 RX ADMIN — LACTULOSE 20 G: 20 SOLUTION ORAL at 17:20

## 2022-08-15 RX ADMIN — IPRATROPIUM BROMIDE AND ALBUTEROL SULFATE 3 ML: 2.5; .5 SOLUTION RESPIRATORY (INHALATION) at 07:24

## 2022-08-15 RX ADMIN — IPRATROPIUM BROMIDE AND ALBUTEROL SULFATE 3 ML: 2.5; .5 SOLUTION RESPIRATORY (INHALATION) at 20:30

## 2022-08-15 RX ADMIN — NICOTINE 21 MG: 21 PATCH, EXTENDED RELEASE TRANSDERMAL at 08:10

## 2022-08-15 RX ADMIN — THIAMINE HCL TAB 100 MG 100 MG: 100 TAB at 08:10

## 2022-08-15 RX ADMIN — ESCITALOPRAM OXALATE 10 MG: 10 TABLET ORAL at 08:10

## 2022-08-15 RX ADMIN — PANTOPRAZOLE SODIUM 40 MG: 40 TABLET, DELAYED RELEASE ORAL at 06:33

## 2022-08-15 RX ADMIN — Medication 1 TABLET: at 08:10

## 2022-08-15 RX ADMIN — IPRATROPIUM BROMIDE AND ALBUTEROL SULFATE 3 ML: 2.5; .5 SOLUTION RESPIRATORY (INHALATION) at 13:42

## 2022-08-15 RX ADMIN — LORAZEPAM 0.5 MG: 0.5 TABLET ORAL at 15:33

## 2022-08-15 RX ADMIN — HEPARIN SODIUM 5000 UNITS: 5000 INJECTION INTRAVENOUS; SUBCUTANEOUS at 06:33

## 2022-08-15 RX ADMIN — HEPARIN SODIUM 5000 UNITS: 5000 INJECTION INTRAVENOUS; SUBCUTANEOUS at 22:59

## 2022-08-15 RX ADMIN — SPIRONOLACTONE 100 MG: 25 TABLET ORAL at 08:10

## 2022-08-15 RX ADMIN — MAGNESIUM OXIDE TAB 400 MG (241.3 MG ELEMENTAL MG) 400 MG: 400 (241.3 MG) TAB at 08:10

## 2022-08-15 RX ADMIN — HEPARIN SODIUM 5000 UNITS: 5000 INJECTION INTRAVENOUS; SUBCUTANEOUS at 13:00

## 2022-08-15 RX ADMIN — BUPRENORPHINE AND NALOXONE 16 MG: 8; 2 FILM BUCCAL; SUBLINGUAL at 08:10

## 2022-08-15 NOTE — PROGRESS NOTES
[address]  Progress Note - Callie Del Rio 1985, 40 y o  male MRN: [id number]  Unit/Bed#: [address] [place] [address] Encounter: [id number]  Primary Care Provider: Shawn Noyola PA-C   Date and time admitted to hospital: 8/8/2022  5:45 PM    * Alcohol dependence with intoxication ([facility] [address] )  Assessment & Plan  · Hiistory of alcohol abuse  Recently discharged  Family did their best to restrict any alcohol  Patient had hand  delivered and was ingesting it  · Alcohol level on admission > 400  Patient was unresponsive at that time  Required intubation and ICU care  · Now extubated and on room air   · Alcohol cessation education and counseling   · Thiamine, Folic acid, and MVI   · Needs inpatient alcohol rehab  Patient amenable  · Await bed availability   · Appreciate CM assistance     Hyperammonemia (HCC)  Assessment & Plan  · Increased Lactulose to BID dosing   · Discussed with patient, goal is to titrate lactulose to for 3-4 bowel movements daily  · Repeat ammonia level in am     MAYLIN (iron deficiency anemia)  Assessment & Plan  · Hemoglobin 7 7 -> 7 8->8 2 ->8  3; no signs of active bleeding  · Iron panel: iron level 13 with a saturation 4%  · Resume iron supplement and continue folic acid  · Monitor     Alcoholic cirrhosis of liver without ascites (HCC)  Assessment & Plan  · History of significant alcohol use with alcoholic cirrhosis  History of esophageal varices  History of hepatic lesion status post IR biopsy which was negative for malignancy  MRI did not show evidence of malignancy    INR 1 83 -> 1 53  · Currently on Nadolol, Lactulose, Protonix   · Increased Lactulose to BID dosing due to rising ammonia level; slowly improving   · Follow-up with GI    Generalized weakness  Assessment & Plan  · Appreciate PT evaluation  · Fall precautions     Hyponatremia  Assessment & Plan  · Sodium 131- 130 - 135->136->134 -> 135  · Osmolality: 347  · Osmolality urine: 179  · Urine sodium: 45  · Consistent with beer potomania  · Monitor I+O, daily weights  · Did have some weight gain since admission but did not receive significant amount of IVF  · Liberalized diet   · Recommend alcohol cessation and 2L fluids daily  · Monitor     Depression, concern for PTSD  Assessment & Plan  · Seen by psychiatry during previous admission  · Continue Lexapro  · Supportive care    Substance abuse ([facility] [address] )  Assessment & Plan  · History of alcohol and substance abuse  · Continue Suboxone  · Appreciate Case Management and crisis involvement  · Patient amenable to discharge to inpatient detox unit   · Follow-up with case management/crisis    Essential hypertension  Assessment & Plan  · Continue Nadolol and Aldactone  · Holding Catapres    Toxic metabolic encephalopathy-resolved as of 8/12/2022  Assessment & Plan  · Secondary to hand  ingestion  · Continue supportive measures  · Continue lactulose  · Mentation improving     Acute respiratory failure (HCC)-resolved as of 8/11/2022  Assessment & Plan  · Patient was intubated for altered mental status and inability to protect his airway  · ABG within normal limits  · CXR showed right basilar atelectasis and left basilar opacity due to consolidation and/or effusion   · Patient was weaned from mechanical ventilation and extubated  · Repeat CXR 8/10 shows resolution of right lower lobe subsegmental atelectasis   · Now on room air  · Will cancel home O2 eval  · Encourage deep breathing and IS  · Duoneb   · Monitor weight, I+O       VTE Pharmacologic Prophylaxis: VTE Score: 3 High Risk (Score >/= 5) - Pharmacological DVT Prophylaxis Ordered: heparin  Sequential Compression Devices Ordered  Patient Centered Rounds: I performed bedside rounds with nursing staff today  Discussions with Specialists or Other Care Team Provider: nursing, CM    Education and Discussions with Family / Patient: Patient declined call to   Time Spent for Care: 30 minutes   More than 50% of total time spent on counseling and coordination of care as described above  Current Length of Stay: 7 day(s)  Current Patient Status: Inpatient   Certification Statement: The patient will continue to require additional inpatient hospital stay due to awaiting inpatient alcohol rehab   Discharge Plan: Anticipate discharge in 24-48 hrs to inpatient rehab     Code Status: Level 1 - Full Code    Subjective:   Patient seen and examined at bedside  States he is doing well  Denies CP or SOB  Reports 2 BMs this morning  Feels like his appetite is improving  Asking when he will be discharged to inpatient rehab  Objective:     Vitals:   Temp (24hrs), Av 7 °F (37 1 °C), Min:98 4 °F (36 9 °C), Max:99 °F (37 2 °C)    Temp:  [98 4 °F (36 9 °C)-99 °F (37 2 °C)] 98 4 °F (36 9 °C)  HR:  [80-84] 80  Resp:  [18-20] 18  BP: (109-133)/(55-68) 133/62  SpO2:  [92 %-97 %] 97 %  Body mass index is 25 16 kg/m²  Input and Output Summary (last 24 hours): Intake/Output Summary (Last 24 hours) at 8/15/2022 1105  Last data filed at 8/15/2022 0735  Gross per 24 hour   Intake 1415 ml   Output 1800 ml   Net -385 ml       Physical Exam:   Physical Exam  Vitals and nursing note reviewed  Constitutional:       General: He is not in acute distress  Appearance: He is ill-appearing (appears frail and deconditioned)  He is not toxic-appearing or diaphoretic  Comments: Pleasant gentleman resting in bed on room air    HENT:      Head: Normocephalic  Mouth/Throat:      Mouth: Mucous membranes are moist    Eyes:      Conjunctiva/sclera: Conjunctivae normal    Cardiovascular:      Rate and Rhythm: Normal rate  Pulmonary:      Effort: Pulmonary effort is normal       Breath sounds: Normal breath sounds  No wheezing, rhonchi or rales  Abdominal:      General: Bowel sounds are normal  There is no distension  Palpations: Abdomen is soft  Tenderness: There is no abdominal tenderness     Musculoskeletal: General: Normal range of motion  Cervical back: Normal range of motion  Right lower leg: No edema  Left lower leg: No edema  Skin:     General: Skin is warm and dry  Capillary Refill: Capillary refill takes less than 2 seconds  Comments: Dry, flaky skin to BLE    Neurological:      Mental Status: He is alert and oriented to person, place, and time  Mental status is at baseline  Psychiatric:         Mood and Affect: Mood normal  Affect is flat  Speech: Speech normal          Behavior: Behavior is withdrawn  Behavior is cooperative  Thought Content: Thought content normal          Cognition and Memory: Cognition normal           Additional Data:     Labs:  Results from last 7 days   Lab Units 08/15/22  0632 08/12/22  0541 08/11/22  0605   WBC Thousand/uL 4 62   < > 5 33   HEMOGLOBIN g/dL 8 3*   < > 7 8*   HEMATOCRIT % 25 9*   < > 24 5*   PLATELETS Thousands/uL 110*   < > 92*   NEUTROS PCT %  --   --  67   LYMPHS PCT %  --   --  23   MONOS PCT %  --   --  7   EOS PCT %  --   --  2    < > = values in this interval not displayed       Results from last 7 days   Lab Units 08/15/22  0632   SODIUM mmol/L 135   POTASSIUM mmol/L 4 0   CHLORIDE mmol/L 99   CO2 mmol/L 26   BUN mg/dL 8   CREATININE mg/dL 0 67   ANION GAP mmol/L 10   CALCIUM mg/dL 8 9   ALBUMIN g/dL 3 1*   TOTAL BILIRUBIN mg/dL 4 11*   ALK PHOS U/L 138*   ALT U/L 36   AST U/L 77*   GLUCOSE RANDOM mg/dL 103     Results from last 7 days   Lab Units 08/15/22  0632   INR  1 53*     Results from last 7 days   Lab Units 08/08/22  1747   POC GLUCOSE mg/dl 148*         Results from last 7 days   Lab Units 08/11/22  0507 08/10/22  0500 08/09/22  0609 08/08/22  2302 08/08/22  2035 08/08/22  1749   LACTIC ACID mmol/L  --   --  2 6* 3 4* 4 7* 5 1*   PROCALCITONIN ng/ml 0 12 0 09  --   --   --   --        Lines/Drains:  Invasive Devices  Report    Peripheral Intravenous Line  Duration           Peripheral IV 08/14/22 Distal;Dorsal (posterior); Right Forearm <1 day          Drain  Duration           Rectal Tube With balloon 6 days                      Imaging: Reviewed radiology reports from this admission including: chest xray    Recent Cultures (last 7 days):   Results from last 7 days   Lab Units 08/08/22  1803   BLOOD CULTURE  No Growth After 5 Days  No Growth After 5 Days         Last 24 Hours Medication List:   Current Facility-Administered Medications   Medication Dose Route Frequency Provider Last Rate    acetaminophen  325 mg Oral Q6H PRN Ceasar Rubinstein, MD      albuterol  2 puff Inhalation Q4H PRN Mikki Severs, CRNP      ammonium lactate  1 application Topical BID PRN Margi Mendosa PA-C      buprenorphine-naloxone  16 mg Sublingual Daily Mikki Severs, CRNP      escitalopram  10 mg Oral Daily Mikki Severs, CRNP      ferrous sulfate  325 mg Oral Every Other Day Mikki Severs, CRNP      folic acid  1 mg Oral Daily Mikki Severs, CRNP      heparin (porcine)  5,000 Units Subcutaneous [facility] Mikki Severs, CRNP      hydrOXYzine HCL  25 mg Oral Q6H PRN Mikki Severs, CRNP      ipratropium-albuterol  3 mL Nebulization Q6H Mikki Severs, CRNP      lactulose  20 g Oral BID Deliagi Peck, CRNP      LORazepam  0 5 mg Oral Q8H PRN Mikki Severs, CRNP      magnesium oxide  400 mg Oral Daily Mikki Severs, CRNP      multivitamin-minerals  1 tablet Oral Daily Mikki Severs, [address]      nadolol  40 mg Oral Daily Mikki Severs, [address]      nicotine  21 mg Transdermal Daily Mikki Severs, TYRONENP      ondansetron  4 mg Intravenous Q4H PRN Mikki Severs, CRNP      pantoprazole  40 mg Oral Early Morning Mikki Severs, CRNP      spironolactone  100 mg Oral Daily Mikki Severs, CRNP      thiamine  100 mg Oral Daily Mikki Severs, CRNP          Today, Patient Was Seen By: MARILEE Mayorga    **Please Note: This note may have been constructed using a voice recognition system  **

## 2022-08-15 NOTE — PLAN OF CARE
Problem: PHYSICAL THERAPY ADULT  Goal: Performs mobility at highest level of function for planned discharge setting  See evaluation for individualized goals  Description: Treatment/Interventions: ADL retraining, LE strengthening/ROM, Therapeutic exercise, Endurance training, Bed mobility, Gait training, Spoke to nursing       See flowsheet documentation for full assessment, interventions and recommendations  Outcome: Progressing  Note: Prognosis: Good  Problem List: Decreased strength, Decreased endurance, Impaired balance, Decreased mobility, Decreased safety awareness  Assessment: Pt agreeable to PT session this afternoon  Pt able to progress ambulation with RW with Superivision + increased distance  Pt presents with mild fatigue  Pt able to progress to negotiate stairs as mentioned above with mild unsteadiness, no loss of balance  When returning to his room patient reports need to use bathroom - requires assist on/off toilet but able to perform selfcare tasks with Supervision  PT Discharge Recommendation:  (Per MDR patient for behavioral health admission)    See flowsheet documentation for full assessment  CHIEF COMPLAINTimproved symptoms  	        PAST MEDICAL & SURGICAL HISTORY:  Atrial fibrillation, unspecified type  Stroke  H/O left knee surgery          REVIEW OF SYSTEMS:  CONSTITUTIONAL: No fever, weight loss, or fatigue  EYES: No eye pain, visual disturbances, or discharge  NECK: No pain or stiffness  RESPIRATORY: No cough, wheezing, chills or hemoptysis; No Shortness of Breath  CARDIOVASCULAR: No chest pain, palpitations, passing out, dizziness, or leg swelling  GASTROINTESTINAL: No abdominal or epigastric pain. No nausea, vomiting, or hematemesis; No diarrhea or constipation. No melena or hematochezia.  GENITOURINARY: No dysuria, frequency, hematuria, or incontinence  NEUROLOGICAL: No headaches, memory loss, loss of strength, numbness, or tremors  SKIN: No itching, burning, rashes, or lesions   LYMPH Nodes: No enlarged glands  ENDOCRINE: No heat or cold intolerance; No hair loss  MUSCULOSKELETAL: dec redness and pain     Medications:  MEDICATIONS  (STANDING):  ceFAZolin   IVPB   IV Intermittent   ceFAZolin   IVPB 2000 milliGRAM(s) IV Intermittent every 8 hours  rivaroxaban 20 milliGRAM(s) Oral every 24 hours  metoprolol 25 milliGRAM(s) Oral two times a day  atorvastatin 10 milliGRAM(s) Oral daily  BACItracin   Ointment 1 Application(s) Topical two times a day  clotrimazole 1% Cream 1 Application(s) Topical two times a day    MEDICATIONS  (PRN):    	    PHYSICAL EXAM:  T(C): 36.7 (07-13-17 @ 10:12), Max: 36.7 (07-12-17 @ 11:15)  HR: 71 (07-13-17 @ 10:12) (55 - 73)  BP: 105/78 (07-13-17 @ 10:12) (105/78 - 133/79)  RR: 18 (07-13-17 @ 10:12) (18 - 18)  SpO2: 98% (07-13-17 @ 10:12) (95% - 98%)  Wt(kg): --  I&O's Summary    12 Jul 2017 07:01  -  13 Jul 2017 07:00  --------------------------------------------------------  IN: 2030 mL / OUT: 0 mL / NET: 2030 mL        Appearance: Normal	  HEENT:   Normal oral mucosa, PERRL, EOMI	  Lymphatic: No lymphadenopathy  Cardiovascular: Normal S1 S2, No JVD, No murmurs, No edema  Respiratory: Lungs clear to auscultation	  Psychiatry: A & O x 3, Mood & affect appropriate  Gastrointestinal:  Soft, Non-tender, + BS	  Skin: No rashes, No ecchymoses, No cyanosis	  Neurologic: Non-focal  Extremities: dec redness tenderness and swelling  Vascular: Peripheral pulses palpable 2+ bilaterally    TELEMETRY: 	    ECG:  	  RADIOLOGY:  OTHER: 	  	  LABS:	 	    CARDIAC MARKERS:                                14.6   6.48  )-----------( 192      ( 13 Jul 2017 08:04 )             42.8     07-13    140  |  101  |  14  ----------------------------<  101<H>  4.1   |  23  |  0.80    Ca    9.2      13 Jul 2017 07:59      proBNP:   Lipid Profile:   HgA1c:   TSH:

## 2022-08-15 NOTE — ASSESSMENT & PLAN NOTE
· Sodium 131- 130 - 135->136->134 -> 135  · Osmolality: 347  · Osmolality urine: 179  · Urine sodium: 45  · Consistent with beer potomania  · Monitor I+O, daily weights  · Did have some weight gain since admission but did not receive significant amount of IVF  · Liberalized diet   · Recommend alcohol cessation and 2L fluids daily  · Monitor

## 2022-08-15 NOTE — PLAN OF CARE
Problem: MOBILITY - ADULT  Goal: Maintain or return to baseline ADL function  Description: INTERVENTIONS:  -  Assess patient's ability to carry out ADLs; assess patient's baseline for ADL function and identify physical deficits which impact ability to perform ADLs (bathing, care of mouth/teeth, toileting, grooming, dressing, etc )  - Assess/evaluate cause of self-care deficits   - Assess range of motion  - Assess patient's mobility; develop plan if impaired  - Assess patient's need for assistive devices and provide as appropriate  - Encourage maximum independence but intervene and supervise when necessary  - Involve family in performance of ADLs  - Assess for home care needs following discharge   - Consider OT consult to assist with ADL evaluation and planning for discharge  - Provide patient education as appropriate  Outcome: Progressing  Goal: Maintains/Returns to pre admission functional level  Description: INTERVENTIONS:  - Perform BMAT or MOVE assessment daily    - Set and communicate daily mobility goal to care team and patient/family/caregiver  - Collaborate with rehabilitation services on mobility goals if consulted  - Perform Range of Motion x times a day  - Reposition patient every x hours    - Dangle patient x times a day  - Stand patient x times a day  - Ambulate patient x times a day  - Out of bed to chair x times a day   - Out of bed for meals x times a day  - Out of bed for toileting  - Record patient progress and toleration of activity level   Outcome: Progressing     Problem: Potential for Falls  Goal: Patient will remain free of falls  Description: INTERVENTIONS:  - Educate patient/family on patient safety including physical limitations  - Instruct patient to call for assistance with activity   - Consult OT/PT to assist with strengthening/mobility   - Keep Call bell within reach  - Keep bed low and locked with side rails adjusted as appropriate  - Keep care items and personal belongings within reach  - Initiate and maintain comfort rounds  - Make Fall Risk Sign visible to staff  - Offer Toileting every x Hours, in advance of need  - Initiate/Maintain x alarm  - Obtain necessary fall risk management equipment: x  - Apply yellow socks and bracelet for high fall risk patients  - Consider moving patient to room near nurses station  Outcome: Progressing     Problem: Prexisting or High Potential for Compromised Skin Integrity  Goal: Skin integrity is maintained or improved  Description: INTERVENTIONS:  - Identify patients at risk for skin breakdown  - Assess and monitor skin integrity  - Assess and monitor nutrition and hydration status  - Monitor labs   - Assess for incontinence   - Turn and reposition patient  - Assist with mobility/ambulation  - Relieve pressure over bony prominences  - Avoid friction and shearing  - Provide appropriate hygiene as needed including keeping skin clean and dry  - Evaluate need for skin moisturizer/barrier cream  - Collaborate with interdisciplinary team   - Patient/family teaching  - Consider wound care consult   Outcome: Progressing     Problem: PAIN - ADULT  Goal: Verbalizes/displays adequate comfort level or baseline comfort level  Description: Interventions:  - Encourage patient to monitor pain and request assistance  - Assess pain using appropriate pain scale  - Administer analgesics based on type and severity of pain and evaluate response  - Implement non-pharmacological measures as appropriate and evaluate response  - Consider cultural and social influences on pain and pain management  - Notify physician/advanced practitioner if interventions unsuccessful or patient reports new pain  Outcome: Progressing     Problem: INFECTION - ADULT  Goal: Absence or prevention of progression during hospitalization  Description: INTERVENTIONS:  - Assess and monitor for signs and symptoms of infection  - Monitor lab/diagnostic results  - Monitor all insertion sites, i e  indwelling lines, tubes, and drains  - Monitor endotracheal if appropriate and nasal secretions for changes in amount and color  - [place] appropriate cooling/warming therapies per order  - Administer medications as ordered  - Instruct and encourage patient and family to use good hand hygiene technique  - Identify and instruct in appropriate isolation precautions for identified infection/condition  Outcome: Progressing     Problem: DISCHARGE PLANNING  Goal: Discharge to home or other facility with appropriate resources  Description: INTERVENTIONS:  - Identify barriers to discharge w/patient and caregiver  - Arrange for needed discharge resources and transportation as appropriate  - Identify discharge learning needs (meds, wound care, etc )  - Arrange for interpretive services to assist at discharge as needed  - Refer to Case Management Department for coordinating discharge planning if the patient needs post-hospital services based on physician/advanced practitioner order or complex needs related to functional status, cognitive ability, or social support system  Outcome: Progressing     Problem: Knowledge Deficit  Goal: Patient/family/caregiver demonstrates understanding of disease process, treatment plan, medications, and discharge instructions  Description: Complete learning assessment and assess knowledge base    Interventions:  - Provide teaching at level of understanding  - Provide teaching via preferred learning methods  Outcome: Progressing     Problem: RESPIRATORY - ADULT  Goal: Achieves optimal ventilation and oxygenation  Description: INTERVENTIONS:  - Assess for changes in respiratory status  - Assess for changes in mentation and behavior  - Position to facilitate oxygenation and minimize respiratory effort  - Oxygen administered by appropriate delivery if ordered  - Initiate smoking cessation education as indicated  - Encourage broncho-pulmonary hygiene including cough, deep breathe, Incentive Spirometry  - Assess the need for suctioning and aspirate as needed  - Assess and instruct to report SOB or any respiratory difficulty  - Respiratory Therapy support as indicated  Outcome: Progressing     Problem: GASTROINTESTINAL - ADULT  Goal: Maintains or returns to baseline bowel function  Description: INTERVENTIONS:  - Assess bowel function  - Encourage oral fluids to ensure adequate hydration  - Administer IV fluids if ordered to ensure adequate hydration  - Administer ordered medications as needed  - Encourage mobilization and activity  - Consider nutritional services referral to assist patient with adequate nutrition and appropriate food choices  Outcome: Progressing     Problem: GENITOURINARY - ADULT  Goal: Maintains or returns to baseline urinary function  Description: INTERVENTIONS:  - Assess urinary function  - Encourage oral fluids to ensure adequate hydration if ordered  - Administer IV fluids as ordered to ensure adequate hydration  - Administer ordered medications as needed  - Offer frequent toileting  - Follow urinary retention protocol if ordered  Outcome: Progressing  Goal: Urinary catheter remains patent  Description: INTERVENTIONS:  - Assess patency of urinary catheter  - If patient has a chronic corona, consider changing catheter if non-functioning  - Follow guidelines for intermittent irrigation of non-functioning urinary catheter  Outcome: Progressing     Problem: METABOLIC, FLUID AND ELECTROLYTES - ADULT  Goal: Electrolytes maintained within normal limits  Description: INTERVENTIONS:  - Monitor labs and assess patient for signs and symptoms of electrolyte imbalances  - Administer electrolyte replacement as ordered  - Monitor response to electrolyte replacements, including repeat lab results as appropriate  - Instruct patient on fluid and nutrition as appropriate  Outcome: Progressing     Problem: SKIN/TISSUE INTEGRITY - ADULT  Goal: Incision(s), wounds(s) or drain site(s) healing without S/S of infection  Description: INTERVENTIONS  - Assess and document dressing, incision, wound bed, drain sites and surrounding tissue  - Provide patient and family education  - Perform skin care/dressing changes every x  Outcome: Progressing     Problem: SUBSTANCE USE/ABUSE  Goal: Will have no detox symptoms and will verbalize plan for changing substance-related behavior  Description: INTERVENTIONS:  - Monitor physical status and assess for symptoms of withdrawal  - Administer medication as ordered  - Provide emotional support with 1 on 1 interaction with staff  - Encourage recovery focused program/ addiction education  - Assess for verbalization of changing behaviors related to substance abuse  - Initiate consults and referrals as appropriate (Case Management, Spiritual Care, etc )  Outcome: Progressing     Problem: Nutrition/Hydration-ADULT  Goal: Nutrient/Hydration intake appropriate for improving, restoring or maintaining nutritional needs  Description: Monitor and assess patient's nutrition/hydration status for malnutrition  Collaborate with interdisciplinary team and initiate plan and interventions as ordered  Monitor patient's weight and dietary intake as ordered or per policy  Utilize nutrition screening tool and intervene as necessary  Determine patient's food preferences and provide high-protein, high-caloric foods as appropriate       INTERVENTIONS:  - Monitor oral intake, urinary output, labs, and treatment plans  - Assess nutrition and hydration status and recommend course of action  - Evaluate amount of meals eaten  - Assist patient with eating if necessary   - Allow adequate time for meals  - Recommend/ encourage appropriate diets, oral nutritional supplements, and vitamin/mineral supplements  - Order, calculate, and assess calorie counts as needed  - Recommend, monitor, and adjust tube feedings and TPN/PPN based on assessed needs  - Assess need for intravenous fluids  - Provide specific nutrition/hydration education as appropriate  - Include patient/family/caregiver in decisions related to nutrition  Outcome: Progressing

## 2022-08-15 NOTE — CASE MANAGEMENT
Case Management Progress Note    Patient name Lucien Brewer  [facility]  MRN [id number]  : 1985 Date 8/15/2022       LOS (days): 7  Geometric Mean LOS (GMLOS) (days):   Days to GMLOS:        OBJECTIVE:     Current admission status: Inpatient  Preferred Pharmacy:   [facility]/pharmacy #[id number]- [address] [address]  Phone: [phone] Fax: [phone] - [place], [address] [place] [address] Drive [address]  Phone: [phone] Fax: [address] ([place]) - [place] [address]  Phone: [phone] Fax: [phone]    Primary Care Provider: Eliceo Gray PA-C    Primary Insurance: [facility] PENDING  Secondary Insurance:     PROGRESS NOTE:    BASIM contacted [facility] [phone] option [phone]) and spoke with rep Schneider Sayres  She confirmed their MD is still reviewing clinical  She could not expedite nor provide SW with an estimated timeframe when determination will be made   Per Maryjane: "It depends on how long it takes the MD to go through reading the referral "

## 2022-08-15 NOTE — PROGRESS NOTES
08/15/22 0900   Clinical Encounter Type   Visited With Patient   Routine Visit Follow-up    Pastoral Care Progress Note    8/15/2022  Patient: Jannette Ludwig : 1985  Admission Date & Time: 2022 1745  MRN: [id number] CSN: [id number]        Patient declined follow up  visit  If any requests arise, please contact spiritual care

## 2022-08-15 NOTE — ASSESSMENT & PLAN NOTE
· Hemoglobin 7 7 -> 7 8->8 2 ->8  3; no signs of active bleeding  · Iron panel: iron level 13 with a saturation 4%  · Resume iron supplement and continue folic acid  · Monitor

## 2022-08-15 NOTE — ASSESSMENT & PLAN NOTE
· Hiistory of alcohol abuse  Recently discharged  Family did their best to restrict any alcohol  Patient had hand  delivered and was ingesting it  · Alcohol level on admission > 400  Patient was unresponsive at that time  Required intubation and ICU care  · Now extubated and on room air   · Alcohol cessation education and counseling   · Thiamine, Folic acid, and MVI   · Needs inpatient alcohol rehab  Patient amenable    · Await bed availability   · Appreciate CM assistance

## 2022-08-15 NOTE — ASSESSMENT & PLAN NOTE
· Increased Lactulose to BID dosing   · Discussed with patient, goal is to titrate lactulose to for 3-4 bowel movements daily  · Repeat ammonia level in am

## 2022-08-15 NOTE — ASSESSMENT & PLAN NOTE
· History of significant alcohol use with alcoholic cirrhosis  History of esophageal varices  History of hepatic lesion status post IR biopsy which was negative for malignancy  MRI did not show evidence of malignancy    INR 1 83 -> 1 53  · Currently on Nadolol, Lactulose, Protonix   · Increased Lactulose to BID dosing due to rising ammonia level; slowly improving   · Follow-up with GI

## 2022-08-15 NOTE — PLAN OF CARE
Problem: MOBILITY - ADULT  Goal: Maintain or return to baseline ADL function  Description: INTERVENTIONS:  -  Assess patient's ability to carry out ADLs; assess patient's baseline for ADL function and identify physical deficits which impact ability to perform ADLs (bathing, care of mouth/teeth, toileting, grooming, dressing, etc )  - Assess/evaluate cause of self-care deficits   - Assess range of motion  - Assess patient's mobility; develop plan if impaired  - Assess patient's need for assistive devices and provide as appropriate  - Encourage maximum independence but intervene and supervise when necessary  - Involve family in performance of ADLs  - Assess for home care needs following discharge   - Consider OT consult to assist with ADL evaluation and planning for discharge  - Provide patient education as appropriate  Outcome: Progressing  Goal: Maintains/Returns to pre admission functional level  Description: INTERVENTIONS:  - Perform BMAT or MOVE assessment daily    - Set and communicate daily mobility goal to care team and patient/family/caregiver  - Collaborate with rehabilitation services on mobility goals if consulted  - Perform Range of Motion 2 times a day  - Reposition patient every 2 hours    - Dangle patient 2 times a day  - Stand patient 2 times a day  - Ambulate patient 2 times a day  - Out of bed to chair 2 times a day   - Out of bed for meals 2 times a day  - Out of bed for toileting  - Record patient progress and toleration of activity level   Outcome: Progressing     Problem: Potential for Falls  Goal: Patient will remain free of falls  Description: INTERVENTIONS:  - Educate patient/family on patient safety including physical limitations  - Instruct patient to call for assistance with activity   - Consult OT/PT to assist with strengthening/mobility   - Keep Call bell within reach  - Keep bed low and locked with side rails adjusted as appropriate  - Keep care items and personal belongings within reach  - Initiate and maintain comfort rounds  - Make Fall Risk Sign visible to staff  - Offer Toileting every 2 Hours, in advance of need  - Initiate/Maintain bed alarm  - Apply yellow socks and bracelet for high fall risk patients  - Consider moving patient to room near nurses station  Outcome: Progressing     Problem: Prexisting or High Potential for Compromised Skin Integrity  Goal: Skin integrity is maintained or improved  Description: INTERVENTIONS:  - Identify patients at risk for skin breakdown  - Assess and monitor skin integrity  - Assess and monitor nutrition and hydration status  - Monitor labs   - Assess for incontinence   - Turn and reposition patient  - Assist with mobility/ambulation  - Relieve pressure over bony prominences  - Avoid friction and shearing  - Provide appropriate hygiene as needed including keeping skin clean and dry  - Evaluate need for skin moisturizer/barrier cream  - Collaborate with interdisciplinary team   - Patient/family teaching  - Consider wound care consult   Outcome: Progressing     Problem: PAIN - ADULT  Goal: Verbalizes/displays adequate comfort level or baseline comfort level  Description: Interventions:  - Encourage patient to monitor pain and request assistance  - Assess pain using appropriate pain scale  - Administer analgesics based on type and severity of pain and evaluate response  - Implement non-pharmacological measures as appropriate and evaluate response  - Consider cultural and social influences on pain and pain management  - Notify physician/advanced practitioner if interventions unsuccessful or patient reports new pain  Outcome: Progressing     Problem: DISCHARGE PLANNING  Goal: Discharge to home or other facility with appropriate resources  Description: INTERVENTIONS:  - Identify barriers to discharge w/patient and caregiver  - Arrange for needed discharge resources and transportation as appropriate  - Identify discharge learning needs (meds, wound care, etc )  - Arrange for interpretive services to assist at discharge as needed  - Refer to Case Management Department for coordinating discharge planning if the patient needs post-hospital services based on physician/advanced practitioner order or complex needs related to functional status, cognitive ability, or social support system  Outcome: Progressing

## 2022-08-15 NOTE — PHYSICAL THERAPY NOTE
PT TREATMENT       08/15/22 1501   PT Last Visit   PT Visit Date 08/15/22   Note Type   Note Type Treatment   Pain Assessment   Pain Assessment Tool 0-10   Pain Score No Pain   Patient's Stated Pain Goal No pain   Hospital Pain Intervention(s) Repositioned   Restrictions/Precautions   Weight Bearing Precautions Per Order No   Other Precautions Fall Risk;Pain;Bed Alarm; Chair Alarm   General   Chart Reviewed Yes   Family/Caregiver Present No   Cognition   Overall Cognitive Status WFL   Arousal/Participation Alert   Orientation Level Oriented X4   Following Commands Follows all commands and directions without difficulty   Bed Mobility   Supine to Sit 5  Supervision   Additional items Verbal cues   Sit to Supine 5  Supervision   Additional items Verbal cues   Transfers   Sit to Stand 5  Supervision   Additional items Verbal cues   Stand to Sit 5  Supervision   Additional items Verbal cues   Ambulation/Elevation   Gait pattern Step through pattern  (mild unsteadiness persists)   Gait Assistance 5  Supervision   Additional items Assist x 1;Verbal cues   Assistive Device Rolling walker   Distance 300 feet   Stair Management Assistance 4  Minimal assist   Additional items Assist x 1;Verbal cues   Stair Management Technique Two rails   Number of Stairs 4   Balance   Static Sitting Good   Dynamic Sitting Fair +   Static Standing Fair   Dynamic Standing Fair   Ambulatory Fair -   Activity Tolerance   Activity Tolerance Patient tolerated treatment well   Assessment   Prognosis Good   Problem List Decreased strength;Decreased endurance; Impaired balance;Decreased mobility; Decreased safety awareness   Assessment Pt agreeable to PT session this afternoon  Pt able to progress ambulation with RW with Superivision + increased distance  Pt presents with mild fatigue  Pt able to progress to negotiate stairs as mentioned above with mild unsteadiness, no loss of balance   When returning to his room patient reports need to use bathroom - requires assist on/off toilet but able to perform selfcare tasks with Supervision  The patient's AM-PAC Basic Mobility Inpatient Short Form Raw Score is 18  A Raw score of greater than 16 suggests the patient may benefit from discharge to home  Per chart review patient for behavioral health admission - during discussion with patient he reports he is close to baseline functional level  Goals   Patient Goals to get better   Plan   Treatment/Interventions ADL retraining;LE strengthening/ROM; Therapeutic exercise; Endurance training;Bed mobility;Gait training;Spoke to nursing   Progress Progressing toward goals   PT Frequency Other (Comment)  (5x/week)   Recommendation   PT Discharge Recommendation   (Per MDR patient for behavioral health admission)   AM-PAC Basic Mobility Inpatient   Turning in Bed Without Bedrails 3   Lying on Back to Sitting on Edge of Flat Bed 3   Moving Bed to Chair 3   Standing Up From Chair 3   Walk in Room 3   Climb 3-5 Stairs 3   Basic Mobility Inpatient Raw Score 18   Basic Mobility Standardized Score 41 05   Highest Level Of Mobility   -[facility] Goal 6: Walk 10 steps or more   Education   Education Provided Mobility training;Assistive device   Patient Explanation/teachback used   End of Consult   Patient Position at End of Consult Supine; All needs within reach;Bed/Chair alarm activated   [employer] License Number  [employer] [id number]

## 2022-08-16 LAB
ALBUMIN SERPL BCP-MCNC: 3 G/DL (ref 3.5–5)
ALP SERPL-CCNC: 124 U/L (ref 46–116)
ALT SERPL W P-5'-P-CCNC: 33 U/L (ref 12–78)
AMMONIA PLAS-SCNC: 76 UMOL/L (ref 11–35)
ANION GAP SERPL CALCULATED.3IONS-SCNC: 10 MMOL/L (ref 4–13)
AST SERPL W P-5'-P-CCNC: 76 U/L (ref 5–45)
BASOPHILS # BLD AUTO: 0.03 THOUSANDS/ΜL (ref 0–0.1)
BASOPHILS NFR BLD AUTO: 1 % (ref 0–1)
BILIRUB SERPL-MCNC: 3.88 MG/DL (ref 0.2–1)
BUN SERPL-MCNC: 9 MG/DL (ref 5–25)
CALCIUM ALBUM COR SERPL-MCNC: 9.6 MG/DL (ref 8.3–10.1)
CALCIUM SERPL-MCNC: 8.8 MG/DL (ref 8.3–10.1)
CHLORIDE SERPL-SCNC: 98 MMOL/L (ref 96–108)
CO2 SERPL-SCNC: 26 MMOL/L (ref 21–32)
CREAT SERPL-MCNC: 0.72 MG/DL (ref 0.6–1.3)
EOSINOPHIL # BLD AUTO: 0.12 THOUSAND/ΜL (ref 0–0.61)
EOSINOPHIL NFR BLD AUTO: 3 % (ref 0–6)
ERYTHROCYTE [DISTWIDTH] IN BLOOD BY AUTOMATED COUNT: 27.6 % (ref 11.6–15.1)
GFR SERPL CREATININE-BSD FRML MDRD: 119 ML/MIN/1.73SQ M
GLUCOSE SERPL-MCNC: 111 MG/DL (ref 65–140)
HCT VFR BLD AUTO: 25.8 % (ref 36.5–49.3)
HGB BLD-MCNC: 8.1 G/DL (ref 12–17)
IMM GRANULOCYTES # BLD AUTO: 0.01 THOUSAND/UL (ref 0–0.2)
IMM GRANULOCYTES NFR BLD AUTO: 0 % (ref 0–2)
LYMPHOCYTES # BLD AUTO: 1.06 THOUSANDS/ΜL (ref 0.6–4.47)
LYMPHOCYTES NFR BLD AUTO: 27 % (ref 14–44)
MAGNESIUM SERPL-MCNC: 1.7 MG/DL (ref 1.6–2.6)
MCH RBC QN AUTO: 28.3 PG (ref 26.8–34.3)
MCHC RBC AUTO-ENTMCNC: 31.4 G/DL (ref 31.4–37.4)
MCV RBC AUTO: 90 FL (ref 82–98)
MONOCYTES # BLD AUTO: 0.75 THOUSAND/ΜL (ref 0.17–1.22)
MONOCYTES NFR BLD AUTO: 19 % (ref 4–12)
NEUTROPHILS # BLD AUTO: 2.01 THOUSANDS/ΜL (ref 1.85–7.62)
NEUTS SEG NFR BLD AUTO: 50 % (ref 43–75)
NRBC BLD AUTO-RTO: 0 /100 WBCS
PHOSPHATE SERPL-MCNC: 4.3 MG/DL (ref 2.7–4.5)
PLATELET # BLD AUTO: 99 THOUSANDS/UL (ref 149–390)
POTASSIUM SERPL-SCNC: 4 MMOL/L (ref 3.5–5.3)
PROT SERPL-MCNC: 7.8 G/DL (ref 6.4–8.4)
RBC # BLD AUTO: 2.86 MILLION/UL (ref 3.88–5.62)
SODIUM SERPL-SCNC: 134 MMOL/L (ref 135–147)
WBC # BLD AUTO: 3.98 THOUSAND/UL (ref 4.31–10.16)

## 2022-08-16 PROCEDURE — 83735 ASSAY OF MAGNESIUM: CPT | Performed by: NURSE PRACTITIONER

## 2022-08-16 PROCEDURE — 99232 SBSQ HOSP IP/OBS MODERATE 35: CPT | Performed by: NURSE PRACTITIONER

## 2022-08-16 PROCEDURE — 94640 AIRWAY INHALATION TREATMENT: CPT

## 2022-08-16 PROCEDURE — 94760 N-INVAS EAR/PLS OXIMETRY 1: CPT

## 2022-08-16 PROCEDURE — 94668 MNPJ CHEST WALL SBSQ: CPT

## 2022-08-16 PROCEDURE — 82140 ASSAY OF AMMONIA: CPT | Performed by: NURSE PRACTITIONER

## 2022-08-16 PROCEDURE — 85025 COMPLETE CBC W/AUTO DIFF WBC: CPT | Performed by: NURSE PRACTITIONER

## 2022-08-16 PROCEDURE — 80053 COMPREHEN METABOLIC PANEL: CPT | Performed by: NURSE PRACTITIONER

## 2022-08-16 PROCEDURE — 84100 ASSAY OF PHOSPHORUS: CPT | Performed by: NURSE PRACTITIONER

## 2022-08-16 RX ORDER — LACTULOSE 20 G/30ML
20 SOLUTION ORAL 3 TIMES DAILY
Status: DISCONTINUED | OUTPATIENT
Start: 2022-08-16 | End: 2022-08-17 | Stop reason: HOSPADM

## 2022-08-16 RX ADMIN — ESCITALOPRAM OXALATE 10 MG: 10 TABLET ORAL at 08:24

## 2022-08-16 RX ADMIN — LACTULOSE 20 G: 20 SOLUTION ORAL at 08:24

## 2022-08-16 RX ADMIN — IPRATROPIUM BROMIDE AND ALBUTEROL SULFATE 3 ML: 2.5; .5 SOLUTION RESPIRATORY (INHALATION) at 07:56

## 2022-08-16 RX ADMIN — FOLIC ACID 1 MG: 1 TABLET ORAL at 08:24

## 2022-08-16 RX ADMIN — IPRATROPIUM BROMIDE AND ALBUTEROL SULFATE 3 ML: 2.5; .5 SOLUTION RESPIRATORY (INHALATION) at 13:40

## 2022-08-16 RX ADMIN — IPRATROPIUM BROMIDE AND ALBUTEROL SULFATE 3 ML: 2.5; .5 SOLUTION RESPIRATORY (INHALATION) at 02:32

## 2022-08-16 RX ADMIN — Medication 1 TABLET: at 08:23

## 2022-08-16 RX ADMIN — IPRATROPIUM BROMIDE AND ALBUTEROL SULFATE 3 ML: 2.5; .5 SOLUTION RESPIRATORY (INHALATION) at 20:49

## 2022-08-16 RX ADMIN — LORAZEPAM 0.5 MG: 0.5 TABLET ORAL at 20:02

## 2022-08-16 RX ADMIN — HEPARIN SODIUM 5000 UNITS: 5000 INJECTION INTRAVENOUS; SUBCUTANEOUS at 06:12

## 2022-08-16 RX ADMIN — PANTOPRAZOLE SODIUM 40 MG: 40 TABLET, DELAYED RELEASE ORAL at 06:13

## 2022-08-16 RX ADMIN — BUPRENORPHINE AND NALOXONE 16 MG: 8; 2 FILM BUCCAL; SUBLINGUAL at 08:24

## 2022-08-16 RX ADMIN — FERROUS SULFATE TAB 325 MG (65 MG ELEMENTAL FE) 325 MG: 325 (65 FE) TAB at 08:24

## 2022-08-16 RX ADMIN — NICOTINE 21 MG: 21 PATCH, EXTENDED RELEASE TRANSDERMAL at 08:24

## 2022-08-16 RX ADMIN — MAGNESIUM OXIDE TAB 400 MG (241.3 MG ELEMENTAL MG) 400 MG: 400 (241.3 MG) TAB at 08:24

## 2022-08-16 RX ADMIN — SPIRONOLACTONE 100 MG: 25 TABLET ORAL at 08:24

## 2022-08-16 RX ADMIN — HEPARIN SODIUM 5000 UNITS: 5000 INJECTION INTRAVENOUS; SUBCUTANEOUS at 22:04

## 2022-08-16 RX ADMIN — LACTULOSE 20 G: 20 SOLUTION ORAL at 20:02

## 2022-08-16 RX ADMIN — HEPARIN SODIUM 5000 UNITS: 5000 INJECTION INTRAVENOUS; SUBCUTANEOUS at 13:25

## 2022-08-16 RX ADMIN — LACTULOSE 20 G: 20 SOLUTION ORAL at 16:09

## 2022-08-16 RX ADMIN — THIAMINE HCL TAB 100 MG 100 MG: 100 TAB at 08:24

## 2022-08-16 NOTE — CASE MANAGEMENT
Case Management Discharge Planning Note    Patient name Karen Phillips  [place] [address] [place] [address] MRN [id number]  : 1985 Date 2022       Current Admission Date: 2022  Current Admission Diagnosis:Alcohol dependence with intoxication [facility])   Patient Active Problem List    Diagnosis Date Noted    Generalized weakness 08/15/2022    Hyponatremia 08/10/2022    Hyperammonemia ([facility] [address] ) 2022    Ambulatory dysfunction 2022    Chronic bronchitis ([facility] [address] ) 2021    Cardiac murmur 2021    Depression, concern for PTSD 2021    Anasarca 2021    Alcohol withdrawal ([facility] [address] ) 2021    Thoracoabdominal aortic aneurysm ([facility] [address] ) 2021    Abnormal CT scan 2021    Alcohol dependence with intoxication ([facility] [address] ) 2021    Substance abuse ([facility] [address] ) 2021    Tobacco dependence     Alcoholic cirrhosis of liver without ascites ([facility] [address] ) 2021    MAYLIN (iron deficiency anemia) 2021    Thrombocytopenia ([facility] [address] ) 2021    Mass of upper lobe of left lung 2019    Essential hypertension 2019    Obesity, Class I, BMI 30-34 9 2019      LOS (days): 8  Geometric Mean LOS (GMLOS) (days):   Days to GMLOS:     OBJECTIVE:  Risk of Unplanned Readmission Score: 37 99      Current admission status: Inpatient   Preferred Pharmacy:   [facility]/pharmacy #[id number]- [address] [place] [address]  Phone: [phone] Fax: [facility]  [address] [place] [address] 6  Phone: [phone] Fax: [address] ([place]) [facility]  [address] [place] [address]  Phone: [phone] Fax: [phone]    Primary Care Provider: Alyssa Nj PA-C    Primary Insurance: HEALTHCARE ASSISTANCE PENDING  Secondary Insurance:     DISCHARGE DETAILS:    Discharge planning discussed with[de-identified] Patient, Sister Uvaldo Renee     CM contacted family/caregiver?: Yes  Were Treatment Team discharge recommendations reviewed with patient/caregiver?: Yes  Did patient/caregiver verbalize understanding of patient care needs?: Yes  Were patient/caregiver advised of the risks associated with not following Treatment Team discharge recommendations?: Yes    Contacts  Patient Contacts: Jack Lala (sister)  Relationship to Patient[de-identified] Family  Contact Method: Phone  Phone Number: [phone]  Reason/Outcome: Discharge Planning, Continuity of Care, Emergency Contact    Treatment Team Recommendation: Substance Abuse Treatment  Discharge Destination Plan[de-identified] Home  Transport at Discharge : Family    SW spoke with patient and sister Uvaldo Renee regarding current plans  [facility] program application that pt forwarded SW was for the [facility], a transitional living program  Patient was provided paper application which he completed and will be faxed back to admissions tomorrow (once MD completes medical evaluation form)  Patient stated he received a call from [facility] hospital regarding an intake; however, this cannot be completed until Friday  SW advised that per SLIM AP, patient is no longer detoxing from ETOH and is medically cleared for d/c  SW advised that there is no definitive timeframe from the [place] on when patient can be admitted to a program; therefore, patient will need to return home while application is ongoing  Patient and sister gave verbal understanding  SW will attempt to confirm if patient can be assigned a CM/SW to continue following him as an out-patient and continue to hold him accountable and guide him through the plan of getting into substance use tx/transitional living program  Uvaldo Renee confirmed that she has spoken with her parents and they are willing to take patient back home as long as he continues to work towards this plan       SW will f/u with [facility] tomorrow AM regarding case mgmt services and with sister Orly Merida to confirm discharge  SLIM AP notified

## 2022-08-16 NOTE — PHYSICAL THERAPY NOTE
PT Cancellation Note        08/16/22 1320   PT Last Visit   PT Visit Date 08/16/22   Note Type   Note Type Cancelled Session   Cancel Reasons Other  (Refusal - patient reports he is busy filling out paperwork  Second attempt in the afternoon patient reports he is busy figuring out where he will go once discharged   Will follow-up as tolerated/appropriate )   Licensure   [place] License Number  Cecile [place] [id number]

## 2022-08-16 NOTE — ASSESSMENT & PLAN NOTE
· Patient was intubated for altered mental status and inability to protect his airway  · ABG within normal limits  · CXR showed right basilar atelectasis and left basilar opacity due to consolidation and/or effusion   · Patient was weaned from mechanical ventilation and extubated  · Repeat CXR 8/10 shows resolution of right lower lobe subsegmental atelectasis   · Remains on room air

## 2022-08-16 NOTE — ASSESSMENT & PLAN NOTE
Hiistory of alcohol abuse  Recently discharged  Family did their best to restrict any alcohol  Patient had hand  delivered and was ingesting it  · Alcohol level on admission > 400  Patient was unresponsive at that time  Required intubation and ICU care     · Currently stable on room air  · Continue Thiamine, Folic acid, and MVI   · Complete alcohol cessation discussed

## 2022-08-16 NOTE — ASSESSMENT & PLAN NOTE
History of significant alcohol use with alcoholic cirrhosis  History of esophageal varices  History of hepatic lesion status post IR biopsy which was negative for malignancy  MRI did not show evidence of malignancy     · Continue Nadolol, Lactulose, Protonix   · Continue lactulose and titrate to have 3-4 bowel movements a day  · Ammonia level 55 however patient is oriented x3 and not encephalopathic  · Follow-up with GI

## 2022-08-16 NOTE — PLAN OF CARE
Problem: MOBILITY - ADULT  Goal: Maintain or return to baseline ADL function  Description: INTERVENTIONS:  -  Assess patient's ability to carry out ADLs; assess patient's baseline for ADL function and identify physical deficits which impact ability to perform ADLs (bathing, care of mouth/teeth, toileting, grooming, dressing, etc )  - Assess/evaluate cause of self-care deficits   - Assess range of motion  - Assess patient's mobility; develop plan if impaired  - Assess patient's need for assistive devices and provide as appropriate  - Encourage maximum independence but intervene and supervise when necessary  - Involve family in performance of ADLs  - Assess for home care needs following discharge   - Consider OT consult to assist with ADL evaluation and planning for discharge  - Provide patient education as appropriate  Outcome: Progressing  Goal: Maintains/Returns to pre admission functional level  Description: INTERVENTIONS:  - Perform BMAT or MOVE assessment daily    - Set and communicate daily mobility goal to care team and patient/family/caregiver  - Collaborate with rehabilitation services on mobility goals if consulted  - Perform Range of Motion 3 times a day  - Reposition patient every 3 hours    - Dangle patient 3 times a day  - Stand patient 3 times a day  - Ambulate patient 3 times a day  - Out of bed to chair 2 times a day   - Out of bed for meals 2 times a day  - Out of bed for toileting  - Record patient progress and toleration of activity level   Outcome: Progressing     Problem: Potential for Falls  Goal: Patient will remain free of falls  Description: INTERVENTIONS:  - Educate patient/family on patient safety including physical limitations  - Instruct patient to call for assistance with activity   - Consult OT/PT to assist with strengthening/mobility   - Keep Call bell within reach  - Keep bed low and locked with side rails adjusted as appropriate  - Keep care items and personal belongings within reach  - Initiate and maintain comfort rounds  - Make Fall Risk Sign visible to staff  - Offer Toileting every 2 Hours, in advance of need  - Initiate/Maintain  BED alarm  - Apply yellow socks and bracelet for high fall risk patients  - Consider moving patient to room near nurses station  Outcome: Progressing     Problem: Prexisting or High Potential for Compromised Skin Integrity  Goal: Skin integrity is maintained or improved  Description: INTERVENTIONS:  - Identify patients at risk for skin breakdown  - Assess and monitor skin integrity  - Assess and monitor nutrition and hydration status  - Monitor labs   - Assess for incontinence   - Turn and reposition patient  - Assist with mobility/ambulation  - Relieve pressure over bony prominences  - Avoid friction and shearing  - Provide appropriate hygiene as needed including keeping skin clean and dry  - Evaluate need for skin moisturizer/barrier cream  - Collaborate with interdisciplinary team   - Patient/family teaching  - Consider wound care consult   Outcome: Progressing     Problem: PAIN - ADULT  Goal: Verbalizes/displays adequate comfort level or baseline comfort level  Description: Interventions:  - Encourage patient to monitor pain and request assistance  - Assess pain using appropriate pain scale  - Administer analgesics based on type and severity of pain and evaluate response  - Implement non-pharmacological measures as appropriate and evaluate response  - Consider cultural and social influences on pain and pain management  - Notify physician/advanced practitioner if interventions unsuccessful or patient reports new pain  Outcome: Progressing     Problem: INFECTION - ADULT  Goal: Absence or prevention of progression during hospitalization  Description: INTERVENTIONS:  - Assess and monitor for signs and symptoms of infection  - Monitor lab/diagnostic results  - Monitor all insertion sites, i e  indwelling lines, tubes, and drains  - Monitor endotracheal if appropriate and nasal secretions for changes in amount and color  - Donovan appropriate cooling/warming therapies per order  - Administer medications as ordered  - Instruct and encourage patient and family to use good hand hygiene technique  - Identify and instruct in appropriate isolation precautions for identified infection/condition  Outcome: Progressing     Problem: Knowledge Deficit  Goal: Patient/family/caregiver demonstrates understanding of disease process, treatment plan, medications, and discharge instructions  Description: Complete learning assessment and assess knowledge base    Interventions:  - Provide teaching at level of understanding  - Provide teaching via preferred learning methods  Outcome: Progressing     Problem: DISCHARGE PLANNING  Goal: Discharge to home or other facility with appropriate resources  Description: INTERVENTIONS:  - Identify barriers to discharge w/patient and caregiver  - Arrange for needed discharge resources and transportation as appropriate  - Identify discharge learning needs (meds, wound care, etc )  - Arrange for interpretive services to assist at discharge as needed  - Refer to Case Management Department for coordinating discharge planning if the patient needs post-hospital services based on physician/advanced practitioner order or complex needs related to functional status, cognitive ability, or social support system  Outcome: Progressing     Problem: RESPIRATORY - ADULT  Goal: Achieves optimal ventilation and oxygenation  Description: INTERVENTIONS:  - Assess for changes in respiratory status  - Assess for changes in mentation and behavior  - Position to facilitate oxygenation and minimize respiratory effort  - Oxygen administered by appropriate delivery if ordered  - Initiate smoking cessation education as indicated  - Encourage broncho-pulmonary hygiene including cough, deep breathe, Incentive Spirometry  - Assess the need for suctioning and aspirate as needed  - Assess and instruct to report SOB or any respiratory difficulty  - Respiratory Therapy support as indicated  Outcome: Progressing     Problem: GASTROINTESTINAL - ADULT  Goal: Maintains or returns to baseline bowel function  Description: INTERVENTIONS:  - Assess bowel function  - Encourage oral fluids to ensure adequate hydration  - Administer IV fluids if ordered to ensure adequate hydration  - Administer ordered medications as needed  - Encourage mobilization and activity  - Consider nutritional services referral to assist patient with adequate nutrition and appropriate food choices  Outcome: Progressing

## 2022-08-16 NOTE — WOUND OSTOMY CARE
Progress Note - Wound   Radha Weems 40 y o  male MRN: [id number]  Unit/Bed#: [address] [place] [place] Encounter: [id number]        Assessment: This is a follow up visit for this 40year old male patient admitted on 8/8/22 with alcohol intoxication  He has a history of ETOH abuse, alcoholic cirrhosis, esophageal varices, HTN and depression  He was awake, alert & oriented x 3  He is continent of urine and stool - rectal tube was discontinued  He is partially independent with his ADL's and can reposition himself in bed  He has been ambulating in the gabriel with assist      Assessment Findings:  1-The severe pruritis to bilateral lower extremities has greatly improved  He stated that the Lac-Hydrin lotion has been helping  Orders in place for skin care  2-Blanchable erythema to right buttock - orders in place for skin care and for prevention  The patient understands the importance of repositioning himself in bed and has been doing so on his own      Skin care plans:  1-Hydraguard to bilateral sacrum, buttock and heels BID and PRN  2-Cleanse wounds to bilateral lower legs with foaming & dry wipes - pat dry well  Apply Lac-Hydrin as directed  3-Float heels on 2 pillows so heels are not in contact with pillows or mattress to offload pressure  4-Ehob cushion in chair when out of bed    5-Moisturize skin daily with skin nourishing cream   6-Encourage patient to turn/reposition himself q2h or when medically stable for pressure re-distribution on skin         Wound 08/08/22 Traumatic Pretibial Distal;Right (Active)   Wound Image   08/16/22 1316   Wound Description Epithelialization 08/16/22 1316   Taylor-wound Assessment Hyperpigmentation;intact 08/16/22 1316   Wound Length (cm) 0 cm 08/16/22 1316   Wound Width (cm) 0 cm 08/16/22 1316   Wound Depth (cm) 0 cm 08/16/22 1316   Wound Surface Area (cm^2) 0 cm^2 08/16/22 1316   Wound Volume (cm^3) 0 cm^3 08/16/22 1316   Calculated Wound Volume (cm^3) 0 cm^3 08/16/22 1316   Drainage Amount None 08/16/22 1316   Non-staged Wound Description Full thickness 08/16/22 1316   Treatments Cleansed 08/16/22 1316   Dressing Moisture barrier 08/16/22 1316   Dressing Changed New 08/16/22 1316   Dressing Status Intact 08/16/22 1316       Wound 08/08/22 Traumatic Pretibial Distal;Left (Active)   Wound Image    08/16/22 1314   Wound Description Epithelialization (healed) 08/16/22 1314   Taylor-wound Assessment Hyperpigmentation;  Lichenification; Intact 08/16/22 1314   Wound Length (cm) 0 cm 08/16/22 1314   Wound Width (cm) 0 cm 08/16/22 1314   Wound Depth (cm) 0 cm 08/16/22 1314   Wound Surface Area (cm^2) 0 cm^2 08/16/22 1314   Wound Volume (cm^3) 0 cm^3 08/16/22 1314   Calculated Wound Volume (cm^3) 0 cm^3 08/16/22 1314   Drainage Amount None 08/16/22 1314   Non-staged Wound Description Full thickness (healed)_ 08/16/22 1314   Treatments Cleansed 08/16/22 1314   Dressing Moisture barrier 08/16/22 1314   Dressing Changed New 08/16/22 1314   Dressing Status Intact 08/16/22 1314       Wound 08/10/22 Buttocks Right (Active)   Wound Image   08/16/22 1317   Wound Description Blanchable erythema 08/16/22 1317   Taylor-wound Assessment Intact 08/16/22 1317   Wound Length (cm) 3 cm 08/16/22 1317   Wound Width (cm) 3 cm 08/16/22 1317   Wound Depth (cm) 0 cm 08/16/22 1317   Wound Surface Area (cm^2) 9 cm^2 08/16/22 1317   Wound Volume (cm^3) 0 cm^3 08/16/22 1317   Calculated Wound Volume (cm^3) 0 cm^3 08/16/22 1317   Treatments Cleansed 08/16/22 1317   Dressing Protective barrier 08/16/22 1317   Dressing Status Intact 08/16/22 1317     Discussed assessment findings, and plan of care/recommendations with Vani Glaser  Wound care will follow along with patient throughout admission, please call or tiger text with questions and concerns  Recommendations written as orders    Deuce Campbell RN, BSN, [employer]

## 2022-08-16 NOTE — PLAN OF CARE
Problem: MOBILITY - ADULT  Goal: Maintain or return to baseline ADL function  Description: INTERVENTIONS:  -  Assess patient's ability to carry out ADLs; assess patient's baseline for ADL function and identify physical deficits which impact ability to perform ADLs (bathing, care of mouth/teeth, toileting, grooming, dressing, etc )  - Assess/evaluate cause of self-care deficits   - Assess range of motion  - Assess patient's mobility; develop plan if impaired  - Assess patient's need for assistive devices and provide as appropriate  - Encourage maximum independence but intervene and supervise when necessary  - Involve family in performance of ADLs  - Assess for home care needs following discharge   - Consider OT consult to assist with ADL evaluation and planning for discharge  - Provide patient education as appropriate  Outcome: Progressing  Goal: Maintains/Returns to pre admission functional level  Description: INTERVENTIONS:  - Perform BMAT or MOVE assessment daily    - Set and communicate daily mobility goal to care team and patient/family/caregiver     - Collaborate with rehabilitation services on mobility goals if consulted  - Record patient progress and toleration of activity level   Outcome: Progressing     Problem: Potential for Falls  Goal: Patient will remain free of falls  Description: INTERVENTIONS:  - Educate patient/family on patient safety including physical limitations  - Instruct patient to call for assistance with activity   - Consult OT/PT to assist with strengthening/mobility   - Keep Call bell within reach  - Keep bed low and locked with side rails adjusted as appropriate  - Keep care items and personal belongings within reach  - Initiate and maintain comfort rounds  - Make Fall Risk Sign visible to staff  - Apply yellow socks and bracelet for high fall risk patients  - Consider moving patient to room near nurses station  Outcome: Progressing     Problem: Prexisting or High Potential for Compromised Skin Integrity  Goal: Skin integrity is maintained or improved  Description: INTERVENTIONS:  - Identify patients at risk for skin breakdown  - Assess and monitor skin integrity  - Assess and monitor nutrition and hydration status  - Monitor labs   - Assess for incontinence   - Turn and reposition patient  - Assist with mobility/ambulation  - Relieve pressure over bony prominences  - Avoid friction and shearing  - Provide appropriate hygiene as needed including keeping skin clean and dry  - Evaluate need for skin moisturizer/barrier cream  - Collaborate with interdisciplinary team   - Patient/family teaching  - Consider wound care consult   Outcome: Progressing     Problem: PAIN - ADULT  Goal: Verbalizes/displays adequate comfort level or baseline comfort level  Description: Interventions:  - Encourage patient to monitor pain and request assistance  - Assess pain using appropriate pain scale  - Administer analgesics based on type and severity of pain and evaluate response  - Implement non-pharmacological measures as appropriate and evaluate response  - Consider cultural and social influences on pain and pain management  - Notify physician/advanced practitioner if interventions unsuccessful or patient reports new pain  Outcome: Progressing     Problem: INFECTION - ADULT  Goal: Absence or prevention of progression during hospitalization  Description: INTERVENTIONS:  - Assess and monitor for signs and symptoms of infection  - Monitor lab/diagnostic results  - Monitor all insertion sites, i e  indwelling lines, tubes, and drains  - Monitor endotracheal if appropriate and nasal secretions for changes in amount and color  - [place] appropriate cooling/warming therapies per order  - Administer medications as ordered  - Instruct and encourage patient and family to use good hand hygiene technique  - Identify and instruct in appropriate isolation precautions for identified infection/condition  Outcome: Progressing     Problem: DISCHARGE PLANNING  Goal: Discharge to home or other facility with appropriate resources  Description: INTERVENTIONS:  - Identify barriers to discharge w/patient and caregiver  - Arrange for needed discharge resources and transportation as appropriate  - Identify discharge learning needs (meds, wound care, etc )  - Arrange for interpretive services to assist at discharge as needed  - Refer to Case Management Department for coordinating discharge planning if the patient needs post-hospital services based on physician/advanced practitioner order or complex needs related to functional status, cognitive ability, or social support system  Outcome: Progressing     Problem: Knowledge Deficit  Goal: Patient/family/caregiver demonstrates understanding of disease process, treatment plan, medications, and discharge instructions  Description: Complete learning assessment and assess knowledge base    Interventions:  - Provide teaching at level of understanding  - Provide teaching via preferred learning methods  Outcome: Progressing     Problem: RESPIRATORY - ADULT  Goal: Achieves optimal ventilation and oxygenation  Description: INTERVENTIONS:  - Assess for changes in respiratory status  - Assess for changes in mentation and behavior  - Position to facilitate oxygenation and minimize respiratory effort  - Oxygen administered by appropriate delivery if ordered  - Initiate smoking cessation education as indicated  - Encourage broncho-pulmonary hygiene including cough, deep breathe, Incentive Spirometry  - Assess the need for suctioning and aspirate as needed  - Assess and instruct to report SOB or any respiratory difficulty  - Respiratory Therapy support as indicated  Outcome: Progressing     Problem: GASTROINTESTINAL - ADULT  Goal: Maintains or returns to baseline bowel function  Description: INTERVENTIONS:  - Assess bowel function  - Encourage oral fluids to ensure adequate hydration  - Administer IV fluids if ordered to ensure adequate hydration  - Administer ordered medications as needed  - Encourage mobilization and activity  - Consider nutritional services referral to assist patient with adequate nutrition and appropriate food choices  Outcome: Progressing     Problem: GENITOURINARY - ADULT  Goal: Maintains or returns to baseline urinary function  Description: INTERVENTIONS:  - Assess urinary function  - Encourage oral fluids to ensure adequate hydration if ordered  - Administer IV fluids as ordered to ensure adequate hydration  - Administer ordered medications as needed  - Offer frequent toileting  - Follow urinary retention protocol if ordered  Outcome: Progressing     Problem: METABOLIC, FLUID AND ELECTROLYTES - ADULT  Goal: Electrolytes maintained within normal limits  Description: INTERVENTIONS:  - Monitor labs and assess patient for signs and symptoms of electrolyte imbalances  - Administer electrolyte replacement as ordered  - Monitor response to electrolyte replacements, including repeat lab results as appropriate  - Instruct patient on fluid and nutrition as appropriate  Outcome: Progressing     Problem: SKIN/TISSUE INTEGRITY - ADULT  Goal: Incision(s), wounds(s) or drain site(s) healing without S/S of infection  Description: INTERVENTIONS  - Assess and document dressing, incision, wound bed, drain sites and surrounding tissue  - Provide patient and family education  Outcome: Progressing     Problem: SUBSTANCE USE/ABUSE  Goal: Will have no detox symptoms and will verbalize plan for changing substance-related behavior  Description: INTERVENTIONS:  - Monitor physical status and assess for symptoms of withdrawal  - Administer medication as ordered  - Provide emotional support with 1 on 1 interaction with staff  - Encourage recovery focused program/ addiction education  - Assess for verbalization of changing behaviors related to substance abuse  - Initiate consults and referrals as appropriate (Case Management, Spiritual Care, etc )  Outcome: Progressing     Problem: Nutrition/Hydration-ADULT  Goal: Nutrient/Hydration intake appropriate for improving, restoring or maintaining nutritional needs  Description: Monitor and assess patient's nutrition/hydration status for malnutrition  Collaborate with interdisciplinary team and initiate plan and interventions as ordered  Monitor patient's weight and dietary intake as ordered or per policy  Utilize nutrition screening tool and intervene as necessary  Determine patient's food preferences and provide high-protein, high-caloric foods as appropriate       INTERVENTIONS:  - Monitor oral intake, urinary output, labs, and treatment plans  - Assess nutrition and hydration status and recommend course of action  - Evaluate amount of meals eaten  - Assist patient with eating if necessary   - Allow adequate time for meals  - Recommend/ encourage appropriate diets, oral nutritional supplements, and vitamin/mineral supplements  - Order, calculate, and assess calorie counts as needed  - Recommend, monitor, and adjust tube feedings and TPN/PPN based on assessed needs  - Assess need for intravenous fluids  - Provide specific nutrition/hydration education as appropriate  - Include patient/family/caregiver in decisions related to nutrition  Outcome: Progressing

## 2022-08-16 NOTE — ASSESSMENT & PLAN NOTE
· No signs of active bleeding  · Iron panel: iron level 13 with a saturation 4%  · Continue iron supplement and folic acid  · Hemoglobin continues to remain stable    Results from last 7 days   Lab Units 08/16/22  0613 08/15/22  8478 08/14/22  0630 08/13/22  0451 08/12/22  0541 08/11/22  0605   HEMOGLOBIN g/dL 8 1* 8 3* 8 2* 7 8* 8 0* 7 8*   HEMATOCRIT % 25 8* 25 9* 26 5* 25 2* 25 7* 24 5*   MCV fL 90 90 89 90 89 87

## 2022-08-16 NOTE — PROGRESS NOTES
[address]  Progress Note - Brett Mcburney 1985, 40 y o  male MRN: [id number]  Unit/Bed#: [facility] Encounter: [id number]  Primary Care Provider: Moreno Talley PA-C   Date and time admitted to hospital: 8/8/2022  5:45 PM    * Alcohol dependence with intoxication ([address] )  Assessment & Plan  · Hiistory of alcohol abuse  Recently discharged  Family did their best to restrict any alcohol  Patient had hand  delivered and was ingesting it  · Alcohol level on admission > 400  Patient was unresponsive at that time  Required intubation and ICU care  · Now extubated and on room air   · Alcohol cessation education and counseling   · Thiamine, Folic acid, and MVI   · Needs inpatient alcohol rehab  Patient amenable  · Await bed availability   · Appreciate CM assistance     Hyperammonemia (HCC)  Assessment & Plan  · Increased Lactulose to TID dosing   · Discussed with patient, goal is to titrate lactulose to for 3-4 bowel movements daily  · Repeat ammonia level in am     MAYLIN (iron deficiency anemia)  Assessment & Plan  · Hemoglobin 7 7 -> 7 8->8 2 ->8  3; no signs of active bleeding  · Iron panel: iron level 13 with a saturation 4%  · Resume iron supplement and continue folic acid  · Monitor     Alcoholic cirrhosis of liver without ascites (HCC)  Assessment & Plan  · History of significant alcohol use with alcoholic cirrhosis  History of esophageal varices  History of hepatic lesion status post IR biopsy which was negative for malignancy  MRI did not show evidence of malignancy    INR 1 83 -> 1 53  · Currently on Nadolol, Lactulose, Protonix   · Increased Lactulose to BID dosing due to rising ammonia level; monitor   · Follow-up with GI    Generalized weakness  Assessment & Plan  · Appreciate PT evaluation  · Fall precautions     Hyponatremia  Assessment & Plan  · Sodium 131- 130 - 135->136->134 -> 135 -> 134  · Osmolality: 347  · Osmolality urine: 179  · Urine sodium: 45  · Consistent with beer potomania  · Monitor I+O, daily weights  · Did have some weight gain since admission but did not receive significant amount of IVF  · Liberalized diet   · Recommend alcohol cessation and 2L fluids daily  · Monitor     Acute respiratory failure (HCC)-resolved as of 2022  Assessment & Plan  · Patient was intubated for altered mental status and inability to protect his airway  · ABG within normal limits  · CXR showed right basilar atelectasis and left basilar opacity due to consolidation and/or effusion   · Patient was weaned from mechanical ventilation and extubated  · Repeat CXR 8/10 shows resolution of right lower lobe subsegmental atelectasis   · Now on room air  · Will cancel home O2 eval  · Encourage deep breathing and IS  · Duoneb   · Monitor weight, I+O       VTE Pharmacologic Prophylaxis: VTE Score: 3 High Risk (Score >/= 5) - Pharmacological DVT Prophylaxis Ordered: heparin  Sequential Compression Devices Ordered  Patient Centered Rounds: I performed bedside rounds with nursing staff today  Discussions with Specialists or Other Care Team Provider: nursing, CM    Education and Discussions with Family / Patient: Patient declined call to   Time Spent for Care: 30 minutes  More than 50% of total time spent on counseling and coordination of care as described above  Current Length of Stay: 8 day(s)  Current Patient Status: Inpatient   Certification Statement: The patient will continue to require additional inpatient hospital stay due to awaiting inpatient alcohol rehab   Discharge Plan: Anticipate discharge in 24-48 hrs to inpatient rehab     Code Status: Level 1 - Full Code    Subjective:   Patient seen and examined at bedside  Reports 2 BMs yesterday  Reports improvement in appetite  Denies HA, dizziness, CP, or SOB  Wondering if he will be discharged to a detox unit today       Objective:     Vitals:   Temp (24hrs), Av 8 °F (37 1 °C), Min:98 2 °F (36 8 °C), Max:99 3 °F (37 4 °C)    Temp:  [98 2 °F (36 8 °C)-99 3 °F (37 4 °C)] 99 3 °F (37 4 °C)  HR:  [73-85] 85  Resp:  [16-20] 16  BP: (107-108)/(55-56) 108/55  SpO2:  [94 %-99 %] 95 %  Body mass index is 25 19 kg/m²  Input and Output Summary (last 24 hours): Intake/Output Summary (Last 24 hours) at 8/16/2022 1522  Last data filed at 8/16/2022 0801  Gross per 24 hour   Intake 970 ml   Output 1100 ml   Net -130 ml       Physical Exam:   Physical Exam  Vitals and nursing note reviewed  Constitutional:       General: He is not in acute distress  Appearance: He is ill-appearing  He is not toxic-appearing or diaphoretic  HENT:      Head: Normocephalic  Mouth/Throat:      Mouth: Mucous membranes are moist    Eyes:      Conjunctiva/sclera: Conjunctivae normal    Cardiovascular:      Rate and Rhythm: Normal rate  Pulmonary:      Effort: Pulmonary effort is normal       Breath sounds: Normal breath sounds  No wheezing, rhonchi or rales  Abdominal:      General: Bowel sounds are normal  There is no distension  Palpations: Abdomen is soft  Tenderness: There is no abdominal tenderness  Musculoskeletal:         General: Normal range of motion  Cervical back: Normal range of motion  Right lower leg: No edema  Left lower leg: No edema  Skin:     General: Skin is warm and dry  Capillary Refill: Capillary refill takes less than 2 seconds  Comments: BLE with dry, flaky skin    Neurological:      Mental Status: He is alert and oriented to person, place, and time  Mental status is at baseline  Motor: Weakness present  Psychiatric:         Mood and Affect: Mood normal          Behavior: Behavior normal          Thought Content:  Thought content normal           Additional Data:     Labs:  Results from last 7 days   Lab Units 08/16/22  0613   WBC Thousand/uL 3 98*   HEMOGLOBIN g/dL 8 1*   HEMATOCRIT % 25 8*   PLATELETS Thousands/uL 99*   NEUTROS PCT % 50   LYMPHS PCT % 27   MONOS PCT % 19*   EOS PCT % 3     Results from last 7 days   Lab Units 08/16/22  0613   SODIUM mmol/L 134*   POTASSIUM mmol/L 4 0   CHLORIDE mmol/L 98   CO2 mmol/L 26   BUN mg/dL 9   CREATININE mg/dL 0 72   ANION GAP mmol/L 10   CALCIUM mg/dL 8 8   ALBUMIN g/dL 3 0*   TOTAL BILIRUBIN mg/dL 3 88*   ALK PHOS U/L 124*   ALT U/L 33   AST U/L 76*   GLUCOSE RANDOM mg/dL 111     Results from last 7 days   Lab Units 08/15/22  0632   INR  1 53*             Results from last 7 days   Lab Units 08/11/22  0507 08/10/22  0500   PROCALCITONIN ng/ml 0 12 0 09       Lines/Drains:  Invasive Devices  Report    Peripheral Intravenous Line  Duration           Peripheral IV 08/14/22 Distal;Dorsal (posterior); Right Forearm 1 day          Drain  Duration           Rectal Tube With balloon 7 days                      Imaging: Reviewed radiology reports from this admission including: chest xray    Recent Cultures (last 7 days):         Last 24 Hours Medication List:   Current Facility-Administered Medications   Medication Dose Route Frequency Provider Last Rate    acetaminophen  325 mg Oral Q6H PRN Polo Hu MD      albuterol  2 puff Inhalation Q4H PRN Maximiano Dima, CRNP      ammonium lactate  1 application Topical BID PRN Starlett Goldberg, PA-C      buprenorphine-naloxone  16 mg Sublingual Daily Maximiano [place], CRNP      escitalopram  10 mg Oral Daily Maximiano [place], CRNP      ferrous sulfate  325 mg Oral Every Other Day Maximiano [place], CRNP      folic acid  1 mg Oral Daily Maximiano Dima, CRNP      heparin (porcine)  5,000 Units Subcutaneous [facility] Maximiano Dima, CRNP      hydrOXYzine HCL  25 mg Oral Q6H PRN Maximiano Dima, CRNP      ipratropium-albuterol  3 mL Nebulization Q6H Maximiano Dima, CRNP      lactulose  20 g Oral TID Maximiano Dima, CRNP      LORazepam  0 5 mg Oral Q8H PRN Maximiano Dima, CRNP      magnesium oxide  400 mg Oral Daily MARILEE Ferris      multivitamin-minerals  1 tablet Oral Daily Kindra Inoue Will, [address]      nadolol  40 mg Oral Daily Porfirio Wan, [address]      nicotine  21 mg Transdermal Daily Kindra Inoue Karen, [address]      ondansetron  4 mg Intravenous Q4H PRN MARILEE Ferris      pantoprazole  40 mg Oral Early Morning Porfirio Wan, [address]      spironolactone  100 mg Oral Daily Porfirio Wan, [address]      thiamine  100 mg Oral Daily MARILEE Ferris          Today, Patient Was Seen By: MARILEE Ferris    **Please Note: This note may have been constructed using a voice recognition system  **

## 2022-08-16 NOTE — ASSESSMENT & PLAN NOTE
· Sodium level low but stable  · Osmolality: 347  · Osmolality urine: 179  · Urine sodium: 45  · 1800 mL fluid restriction

## 2022-08-16 NOTE — ASSESSMENT & PLAN NOTE
· Continue lactulose    Ammonia level mildly elevated however patient is not encephalopathic, answering questions appropriately and is oriented x3

## 2022-08-17 ENCOUNTER — TRANSITIONAL CARE MANAGEMENT (OUTPATIENT)
Dept: FAMILY MEDICINE CLINIC | Facility: CLINIC | Age: 37
End: 2022-08-17

## 2022-08-17 VITALS
HEART RATE: 92 BPM | TEMPERATURE: 97.8 F | SYSTOLIC BLOOD PRESSURE: 112 MMHG | HEIGHT: 73 IN | RESPIRATION RATE: 18 BRPM | BODY MASS INDEX: 25.27 KG/M2 | DIASTOLIC BLOOD PRESSURE: 64 MMHG | WEIGHT: 190.7 LBS | OXYGEN SATURATION: 95 %

## 2022-08-17 LAB
AMMONIA PLAS-SCNC: 55 UMOL/L (ref 11–35)
ANION GAP SERPL CALCULATED.3IONS-SCNC: 9 MMOL/L (ref 4–13)
BUN SERPL-MCNC: 7 MG/DL (ref 5–25)
CALCIUM SERPL-MCNC: 8.7 MG/DL (ref 8.3–10.1)
CHLORIDE SERPL-SCNC: 97 MMOL/L (ref 96–108)
CO2 SERPL-SCNC: 26 MMOL/L (ref 21–32)
CREAT SERPL-MCNC: 0.67 MG/DL (ref 0.6–1.3)
GFR SERPL CREATININE-BSD FRML MDRD: 122 ML/MIN/1.73SQ M
GLUCOSE SERPL-MCNC: 111 MG/DL (ref 65–140)
MAGNESIUM SERPL-MCNC: 1.6 MG/DL (ref 1.6–2.6)
PHOSPHATE SERPL-MCNC: 4.4 MG/DL (ref 2.7–4.5)
POTASSIUM SERPL-SCNC: 4.1 MMOL/L (ref 3.5–5.3)
SODIUM SERPL-SCNC: 132 MMOL/L (ref 135–147)

## 2022-08-17 PROCEDURE — 94760 N-INVAS EAR/PLS OXIMETRY 1: CPT

## 2022-08-17 PROCEDURE — 94668 MNPJ CHEST WALL SBSQ: CPT

## 2022-08-17 PROCEDURE — 97530 THERAPEUTIC ACTIVITIES: CPT

## 2022-08-17 PROCEDURE — 94640 AIRWAY INHALATION TREATMENT: CPT

## 2022-08-17 PROCEDURE — 97116 GAIT TRAINING THERAPY: CPT

## 2022-08-17 PROCEDURE — 80048 BASIC METABOLIC PNL TOTAL CA: CPT | Performed by: NURSE PRACTITIONER

## 2022-08-17 PROCEDURE — 99239 HOSP IP/OBS DSCHRG MGMT >30: CPT | Performed by: FAMILY MEDICINE

## 2022-08-17 PROCEDURE — 82140 ASSAY OF AMMONIA: CPT | Performed by: NURSE PRACTITIONER

## 2022-08-17 PROCEDURE — 83735 ASSAY OF MAGNESIUM: CPT | Performed by: NURSE PRACTITIONER

## 2022-08-17 PROCEDURE — 84100 ASSAY OF PHOSPHORUS: CPT | Performed by: NURSE PRACTITIONER

## 2022-08-17 RX ORDER — MULTIVITAMIN
1 TABLET ORAL DAILY
Qty: 30 TABLET | Refills: 0 | Status: SHIPPED | OUTPATIENT
Start: 2022-08-17 | End: 2022-08-17 | Stop reason: SDUPTHER

## 2022-08-17 RX ORDER — LACTULOSE 20 G/30ML
20 SOLUTION ORAL 2 TIMES DAILY
Qty: 1800 ML | Refills: 0 | Status: SHIPPED | OUTPATIENT
Start: 2022-08-17 | End: 2022-08-17 | Stop reason: SDUPTHER

## 2022-08-17 RX ORDER — NADOLOL 40 MG/1
40 TABLET ORAL DAILY
Qty: 30 TABLET | Refills: 0 | Status: SHIPPED | OUTPATIENT
Start: 2022-08-17 | End: 2022-09-16

## 2022-08-17 RX ORDER — ONDANSETRON 4 MG/1
4 TABLET, ORALLY DISINTEGRATING ORAL EVERY 6 HOURS PRN
Qty: 20 TABLET | Refills: 0 | Status: SHIPPED | OUTPATIENT
Start: 2022-08-17

## 2022-08-17 RX ORDER — ESCITALOPRAM OXALATE 10 MG/1
10 TABLET ORAL DAILY
Qty: 30 TABLET | Refills: 0 | Status: SHIPPED | OUTPATIENT
Start: 2022-08-17 | End: 2022-08-17 | Stop reason: SDUPTHER

## 2022-08-17 RX ORDER — ONDANSETRON 4 MG/1
4 TABLET, ORALLY DISINTEGRATING ORAL EVERY 6 HOURS PRN
Qty: 20 TABLET | Refills: 0 | Status: SHIPPED | OUTPATIENT
Start: 2022-08-17 | End: 2022-08-17 | Stop reason: SDUPTHER

## 2022-08-17 RX ORDER — LACTULOSE 20 G/30ML
20 SOLUTION ORAL 2 TIMES DAILY
Qty: 1800 ML | Refills: 0 | Status: SHIPPED | OUTPATIENT
Start: 2022-08-17 | End: 2022-09-16

## 2022-08-17 RX ORDER — NICOTINE 21 MG/24HR
1 PATCH, TRANSDERMAL 24 HOURS TRANSDERMAL DAILY
Qty: 28 PATCH | Refills: 0 | Status: SHIPPED | OUTPATIENT
Start: 2022-08-17

## 2022-08-17 RX ORDER — HYDROXYZINE HYDROCHLORIDE 25 MG/1
25 TABLET, FILM COATED ORAL EVERY 8 HOURS PRN
Qty: 20 TABLET | Refills: 0 | Status: SHIPPED | OUTPATIENT
Start: 2022-08-17

## 2022-08-17 RX ORDER — HYDROXYZINE HYDROCHLORIDE 25 MG/1
25 TABLET, FILM COATED ORAL EVERY 8 HOURS PRN
Qty: 30 TABLET | Refills: 0 | Status: SHIPPED | OUTPATIENT
Start: 2022-08-17 | End: 2022-08-17 | Stop reason: SDUPTHER

## 2022-08-17 RX ORDER — ESCITALOPRAM OXALATE 10 MG/1
10 TABLET ORAL DAILY
Qty: 30 TABLET | Refills: 0 | Status: SHIPPED | OUTPATIENT
Start: 2022-08-17

## 2022-08-17 RX ORDER — SPIRONOLACTONE 100 MG/1
100 TABLET, FILM COATED ORAL DAILY
Qty: 30 TABLET | Refills: 0 | Status: SHIPPED | OUTPATIENT
Start: 2022-08-17 | End: 2022-08-17 | Stop reason: SDUPTHER

## 2022-08-17 RX ORDER — MULTIVITAMIN
1 TABLET ORAL DAILY
Qty: 30 TABLET | Refills: 0 | Status: SHIPPED | OUTPATIENT
Start: 2022-08-17

## 2022-08-17 RX ORDER — FOLIC ACID 1 MG/1
1 TABLET ORAL DAILY
Qty: 30 TABLET | Refills: 0 | Status: SHIPPED | OUTPATIENT
Start: 2022-08-17

## 2022-08-17 RX ORDER — NICOTINE 21 MG/24HR
1 PATCH, TRANSDERMAL 24 HOURS TRANSDERMAL DAILY
Qty: 28 PATCH | Refills: 0 | Status: SHIPPED | OUTPATIENT
Start: 2022-08-17 | End: 2022-08-17 | Stop reason: SDUPTHER

## 2022-08-17 RX ORDER — NADOLOL 40 MG/1
40 TABLET ORAL DAILY
Qty: 30 TABLET | Refills: 0 | Status: SHIPPED | OUTPATIENT
Start: 2022-08-17 | End: 2022-08-17 | Stop reason: SDUPTHER

## 2022-08-17 RX ORDER — SPIRONOLACTONE 100 MG/1
100 TABLET, FILM COATED ORAL DAILY
Qty: 30 TABLET | Refills: 0 | Status: SHIPPED | OUTPATIENT
Start: 2022-08-17 | End: 2022-09-16

## 2022-08-17 RX ORDER — AMMONIUM LACTATE 12 G/100G
1 CREAM TOPICAL 2 TIMES DAILY PRN
Qty: 385 G | Refills: 0 | Status: SHIPPED | OUTPATIENT
Start: 2022-08-17

## 2022-08-17 RX ADMIN — HEPARIN SODIUM 5000 UNITS: 5000 INJECTION INTRAVENOUS; SUBCUTANEOUS at 14:00

## 2022-08-17 RX ADMIN — THIAMINE HCL TAB 100 MG 100 MG: 100 TAB at 08:14

## 2022-08-17 RX ADMIN — HEPARIN SODIUM 5000 UNITS: 5000 INJECTION INTRAVENOUS; SUBCUTANEOUS at 05:02

## 2022-08-17 RX ADMIN — PANTOPRAZOLE SODIUM 40 MG: 40 TABLET, DELAYED RELEASE ORAL at 05:02

## 2022-08-17 RX ADMIN — FOLIC ACID 1 MG: 1 TABLET ORAL at 08:14

## 2022-08-17 RX ADMIN — HYDROXYZINE HYDROCHLORIDE 25 MG: 25 TABLET ORAL at 14:43

## 2022-08-17 RX ADMIN — LACTULOSE 20 G: 20 SOLUTION ORAL at 08:14

## 2022-08-17 RX ADMIN — IPRATROPIUM BROMIDE AND ALBUTEROL SULFATE 3 ML: 2.5; .5 SOLUTION RESPIRATORY (INHALATION) at 13:28

## 2022-08-17 RX ADMIN — ESCITALOPRAM OXALATE 10 MG: 10 TABLET ORAL at 08:13

## 2022-08-17 RX ADMIN — IPRATROPIUM BROMIDE AND ALBUTEROL SULFATE 3 ML: 2.5; .5 SOLUTION RESPIRATORY (INHALATION) at 07:06

## 2022-08-17 RX ADMIN — Medication 1 APPLICATION: at 08:17

## 2022-08-17 RX ADMIN — BUPRENORPHINE AND NALOXONE 16 MG: 8; 2 FILM BUCCAL; SUBLINGUAL at 08:14

## 2022-08-17 RX ADMIN — NICOTINE 21 MG: 21 PATCH, EXTENDED RELEASE TRANSDERMAL at 08:14

## 2022-08-17 RX ADMIN — HYDROXYZINE HYDROCHLORIDE 25 MG: 25 TABLET ORAL at 04:50

## 2022-08-17 RX ADMIN — SPIRONOLACTONE 100 MG: 25 TABLET ORAL at 08:14

## 2022-08-17 RX ADMIN — Medication 1 TABLET: at 08:13

## 2022-08-17 RX ADMIN — MAGNESIUM OXIDE TAB 400 MG (241.3 MG ELEMENTAL MG) 400 MG: 400 (241.3 MG) TAB at 08:13

## 2022-08-17 RX ADMIN — IPRATROPIUM BROMIDE AND ALBUTEROL SULFATE 3 ML: 2.5; .5 SOLUTION RESPIRATORY (INHALATION) at 01:29

## 2022-08-17 NOTE — CASE MANAGEMENT
Case Management Progress Note    Patient name Callie Del Rio  Location 2 [facility] / [address] MRN [id number]  : 1985 Date 2022       LOS (days): 9  Geometric Mean LOS (GMLOS) (days):   Days to GMLOS:        OBJECTIVE:     Current admission status: Inpatient  Preferred Pharmacy:   [facility]/pharmacy #[id number]- [place] - [address] [facility]  Phone: [phone] Fax: [phone] [address] [place] [address], [address] [place] [address]  Phone: [phone] Fax: [address] ([place]) - [facility]  [address] [address]  Phone: [phone] Fax: [phone]    Primary Care Provider: Shawn Noyola PA-C    Primary Insurance: HEALTHCARE ASSISTANCE PENDING  Secondary Insurance:     PROGRESS NOTE:    Carlos Harrison at the [facility] notified of patient's discharge home today with plan to f/u OP with the  [facility]

## 2022-08-17 NOTE — PLAN OF CARE
Problem: MOBILITY - ADULT  Goal: Maintain or return to baseline ADL function  Description: INTERVENTIONS:  -  Assess patient's ability to carry out ADLs; assess patient's baseline for ADL function and identify physical deficits which impact ability to perform ADLs (bathing, care of mouth/teeth, toileting, grooming, dressing, etc )  - Assess/evaluate cause of self-care deficits   - Assess range of motion  - Assess patient's mobility; develop plan if impaired  - Assess patient's need for assistive devices and provide as appropriate  - Encourage maximum independence but intervene and supervise when necessary  - Involve family in performance of ADLs  - Assess for home care needs following discharge   - Consider OT consult to assist with ADL evaluation and planning for discharge  - Provide patient education as appropriate  Outcome: Progressing  Goal: Maintains/Returns to pre admission functional level  Description: INTERVENTIONS:  - Perform BMAT or MOVE assessment daily    - Set and communicate daily mobility goal to care team and patient/family/caregiver  - Collaborate with rehabilitation services on mobility goals if consulted  - Perform Range of Motion 3 times a day  - Reposition patient every 2 hours    - Dangle patient 3 times a day  - Stand patient 3 times a day  - Ambulate patient 3 times a day  - Out of bed to chair 3 times a day   - Out of bed for meals3  Problem: Potential for Falls  Goal: Patient will remain free of falls  Description: INTERVENTIONS:  - Educate patient/family on patient safety including physical limitations  - Instruct patient to call for assistance with activity   - Consult OT/PT to assist with strengthening/mobility   - Keep Call bell within reach  - Keep bed low and locked with side rails adjusted as appropriate  - Keep care items and personal belongings within reach  - Initiate and maintain comfort rounds  - Make Fall Risk Sign visible to staff  - Offer Toileting every 2 Hours, in advance of need  - Initiate/Maintain bed alarm  - Obtain necessary fall risk management equipment:   Problem: Prexisting or High Potential for Compromised Skin Integrity  Goal: Skin integrity is maintained or improved  Description: INTERVENTIONS:  - Identify patients at risk for skin breakdown  - Assess and monitor skin integrity  - Assess and monitor nutrition and hydration status  - Monitor labs   - Assess for incontinence   - Turn and reposition patient  - Assist with mobility/ambulation  - Relieve pressure over bony prominences  - Avoid friction and shearing  - Provide appropriate hygiene as needed including keeping skin clean and dry  - Evaluate need for skin moisturizer/barrier cream  - Collaborate with interdisciplinary team   - Patient/family teaching  - Consider wound care consult   Outcome: Progressing     Problem: PAIN - ADULT  Goal: Verbalizes/displays adequate comfort level or baseline comfort level  Description: Interventions:  - Encourage patient to monitor pain and request assistance  - Assess pain using appropriate pain scale  - Administer analgesics based on type and severity of pain and evaluate response  - Implement non-pharmacological measures as appropriate and evaluate response  - Consider cultural and social influences on pain and pain management  - Notify physician/advanced practitioner if interventions unsuccessful or patient reports new pain  Outcome: Progressing     - Apply yellow socks and bracelet for high fall risk patients  - Consider moving patient to room near nurses station  Outcome: Progressing    times a day  - Out of bed for toileting  - Record patient progress and toleration of activity level   Outcome: Progressing

## 2022-08-17 NOTE — DISCHARGE SUMMARY
Discharge Summary - [facility] Internal Medicine    Patient Information: Bravo Johnson 40 y o  male MRN: [id number]  Unit/Bed#: [address] Encounter: [id number]    Discharging Physician / Practitioner: Elmo Baez DO  PCP: Angel Boyd PA-C  Admission Date: 8/8/2022  Discharge Date: 08/17/22    Reason for Admission: Alcohol Intoxication (Pt drank hand )      Discharge Diagnoses:     Principal Problem:    Alcohol dependence with intoxication ([facility] )  Active Problems:    Alcoholic cirrhosis of liver without ascites (HCC)    MAYLIN (iron deficiency anemia)    Essential hypertension    Substance abuse ([facility] )    Depression, concern for PTSD    Hyperammonemia ([facility] )    Hyponatremia    Generalized weakness  Resolved Problems:    Acute respiratory failure (HCC)    Lactic acidosis    Toxic metabolic encephalopathy    Hypokalemia        * Alcohol dependence with intoxication ([facility] )  Assessment & Plan  Hiistory of alcohol abuse  Recently discharged  Family did their best to restrict any alcohol  Patient had hand  delivered and was ingesting it  · Alcohol level on admission > 400  Patient was unresponsive at that time  Required intubation and ICU care  · Currently stable on room air  · Continue Thiamine, Folic acid, and MVI   · Complete alcohol cessation discussed    Alcoholic cirrhosis of liver without ascites ([facility] )  Assessment & Plan  History of significant alcohol use with alcoholic cirrhosis  History of esophageal varices  History of hepatic lesion status post IR biopsy which was negative for malignancy  MRI did not show evidence of malignancy     · Continue Nadolol, Lactulose, Protonix   · Continue lactulose and titrate to have 3-4 bowel movements a day  · Ammonia level 55 however patient is oriented x3 and not encephalopathic  · Follow-up with GI    MAYLIN (iron deficiency anemia)  Assessment & Plan  · No signs of active bleeding  · Iron panel: iron level 13 with a saturation 4%  · Continue iron supplement and folic acid  · Hemoglobin continues to remain stable    Results from last 7 days   Lab Units 08/16/22  0613 08/15/22  0632 08/14/22  0630 08/13/22  0451 08/12/22  0541 08/11/22  0605   HEMOGLOBIN g/dL 8 1* 8 3* 8 2* 7 8* 8 0* 7 8*   HEMATOCRIT % 25 8* 25 9* 26 5* 25 2* 25 7* 24 5*   MCV fL 90 90 89 90 89 87       Generalized weakness  Assessment & Plan  · Received PT while here    Hyponatremia  Assessment & Plan  · Sodium level low but stable  · Osmolality: 347  · Osmolality urine: 179  · Urine sodium: 45  · 1800 mL fluid restriction    Hyperammonemia (HCC)  Assessment & Plan  · Continue lactulose  Ammonia level mildly elevated however patient is not encephalopathic, answering questions appropriately and is oriented x3    Depression, concern for PTSD  Assessment & Plan  · Seen by psychiatry during previous admission  · Continue Lexapro and Atarax p r n  On discharge    Substance abuse ([facility] )  Assessment & Plan  · History of alcohol and substance abuse  · Continue Suboxone  · Complete alcohol/use of illicit drugs cessation discussed    Essential hypertension  Assessment & Plan  · Continue Nadolol and Aldactone on discharge    Lactic acidosis-resolved as of 8/12/2022  Assessment & Plan  · Secondary to alcohol intoxication and hypovolemia, no evidence of infection  · Received fluid resuscitation  · Lactic acid improved   No need for further monitoring     Acute respiratory failure (HCC)-resolved as of 8/11/2022  Assessment & Plan  · Patient was intubated for altered mental status and inability to protect his airway  · ABG within normal limits  · CXR showed right basilar atelectasis and left basilar opacity due to consolidation and/or effusion   · Patient was weaned from mechanical ventilation and extubated  · Repeat CXR 8/10 shows resolution of right lower lobe subsegmental atelectasis   · Remains on room air      Consultations During Hospital Stay:  Rebecca 10 ED CRISIS WORKER    Procedures Performed:     · none    Significant Findings:     · Refer to hospital course and above listed diagnosis related plan for details    Imaging while in hospital:    XR chest portable    Result Date: 8/10/2022  Narrative: CHEST INDICATION:   hypoxia  COMPARISON:  August 8, 2022  EXAM PERFORMED/VIEWS:  XR CHEST PORTABLE FINDINGS:  The patient has been extubated since the last exam  The heart is top normal in size  The aorta is uncoiled with mural calcifications  The lungs are clear  Right lower lobe subsegmental atelectasis has resolved  There is no evidence of pleural effusion or pneumothorax  Osseous structures appear within normal limits for patient age  Impression: No acute disease in the chest   Resolution of right lower lobe subsegmental atelectasis since 8/8/2022  Workstation performed: [id number]     XR chest 1 view portable    Result Date: 8/9/2022  Narrative: CHEST INDICATION:   ms changes  COMPARISON:  7/17/2022  EXAM PERFORMED/VIEWS:  XR CHEST PORTABLE FINDINGS:  Endotracheal tube in place  OG is in the stomach    Cardiomediastinal silhouette appears unremarkable  Right basilar atelectasis  Left basilar opacity due to consolidation and/or effusion  No pneumothorax or pleural effusion  Osseous structures appear within normal limits for patient age  Impression: Tubes in place  Right basilar atelectasis  Left basilar opacity due to consolidation and/or effusion  Workstation performed: [id number]     CT head without contrast    Result Date: 8/8/2022  Narrative: CT BRAIN - WITHOUT CONTRAST INDICATION:   ms changes  COMPARISON:  None  TECHNIQUE:  CT examination of the brain was performed  In addition to axial images, sagittal and coronal 2D reformatted images were created and submitted for interpretation  Radiation dose length product (DLP) for this visit:  952 88 mGy-cm     This examination, like all CT scans performed in the [facility], was performed utilizing techniques to minimize radiation dose exposure, including the use of iterative  reconstruction and automated exposure control  IMAGE QUALITY:  Diagnostic  FINDINGS: PARENCHYMA:  No intracranial mass, mass effect or midline shift  No CT signs of acute infarction  No acute parenchymal hemorrhage  VENTRICLES AND EXTRA-AXIAL SPACES:  Normal for the patient's age  VISUALIZED ORBITS AND PARANASAL SINUSES:  No acute abnormality involving the orbits  Mild scattered sinus mucosal thickening is noted  No fluid levels are seen  CALVARIUM AND EXTRACRANIAL SOFT TISSUES:  Normal      Impression: No acute intracranial abnormality  Workstation performed: [id number]       Incidental Findings:   · none    Test Results Pending at Discharge (will require follow up):   · As per After Visit Summary     Outpatient Tests Requested:  · none    Complications:  Refer to hospital course and above listed diagnosis related plan, if any    Hospital Course: Gurjit Fleming is a 40 y o  male patient who originally presented to the hospital on 8/8/2022 due to Altered mental status  Patient was recently discharged and living with his parents  During his prior admission he was found to be putting hand  into his drinks  He did not have access to alcohol at his parents place but was having hand  delivered  Mother found patient to be confused  Sister found 4 ethyl alcohol hand  bottles 2 of which were empty per ICU nodes  He was intubated for airway protection in initially admitted to the ICU  Alcohol level greater than 400 on admission  He was later extubated and continues to remain on room air  Patient was noted to have hyponatremia which has improved  Patient was monitored here with hopes of getting him to inpatient alcohol rehab however unfortunately this was not successful  Patient going home with his family but will follow-up outpatient resources  Discharge plan discussed with patient    Offered to call family but patient declined    Please see above list of diagnoses and related plan for additional information  Condition at Discharge: stable     Discharge Day Visit / Exam:     Subjective:  Reports weakness but states he is ambulating with walker  Vitals: Blood Pressure: 112/64 (08/17/22 0732)  Pulse: 92 (08/17/22 0732)  Temperature: 97 8 °F (36 6 °C) (08/17/22 0732)  Temp Source: Axillary (08/17/22 0732)  Respirations: 18 (08/17/22 0732)  Height: 6' 1" (185 4 cm) (08/08/22 2056)  Weight - Scale: 86 5 kg (190 lb 11 2 oz) (08/17/22 0600)  SpO2: 95 % (08/17/22 1328)  Exam:   Physical Exam  Constitutional:       General: He is not in acute distress  Appearance: He is ill-appearing (Chronically)  He is not diaphoretic  HENT:      Head: Normocephalic and atraumatic  Eyes:      General:         Right eye: No discharge  Left eye: No discharge  Cardiovascular:      Rate and Rhythm: Normal rate and regular rhythm  Pulmonary:      Effort: Pulmonary effort is normal  No respiratory distress  Breath sounds: Normal breath sounds  No wheezing or rales  Abdominal:      General: Bowel sounds are normal  There is no distension  Palpations: Abdomen is soft  Tenderness: There is no abdominal tenderness  Musculoskeletal:      Right lower leg: No edema  Left lower leg: No edema  Comments: Bilateral lower extremity with dry skin   Neurological:      Mental Status: He is alert and oriented to person, place, and time  Discharge instructions/Information to patient and family:(Discharge Medications and Follow up):   See after visit summary for information provided to patient and family  Provisions for Follow-Up Care:  See after visit summary for information related to follow-up care and any pertinent home health orders  Disposition: Home    Planned Readmission:  No     Discharge Statement:  I spent > 30 minutes discharging the patient   This time was spent on the day of discharge  I had direct contact with the patient on the day of discharge  Greater than 50% of the total time was spent examining patient, answering all patient questions, arranging and discussing plan of care with patient as well as directly providing post-discharge instructions  Additional time then spent on discharge activities  Discharge Medications:  See after visit summary for reconciled discharge medications provided to patient and family  ** Please Note: "This note has been constructed using a voice recognition system  Therefore there may be syntax, spelling, and/or grammatical errors   Please call if you have any questions  "**

## 2022-08-17 NOTE — CASE MANAGEMENT
Case Management Progress Note    Patient name Krissy Herrera  Location [address] MRN [id number]  : 1985 Date 2022       LOS (days): 9  Geometric Mean LOS (GMLOS) (days):   Days to GMLOS:        OBJECTIVE:        Current admission status: Inpatient  Preferred Pharmacy:   [facility]/pharmacy #[id number]- [place] - [address]  Phone: [phone] Fax: [phone] [place] [address]  Phone: [phone] Fax: [address] ([place]) [place] [address]  [address] [address]  Phone: [phone] Fax: [phone]    Primary Care Provider: Sury Pereira PA-C    Primary Insurance: HEALTHCARE ASSISTANCE PENDING  Secondary Insurance:     PROGRESS NOTE:    's Home application completed, physician signature signed  Application faxed to intake  Copy in patient's chart for records, original provided to patient  Primary CM aware

## 2022-08-17 NOTE — NURSING NOTE
Pt discharged home with sister  CHELLE reviewed with patient and educated on new medication usage as well as alcohol cessation  Questions answered  IV removed with catheter clean dry intact  All belongings; phone  андрей $60 cash clothing shoes glasses sent with patient  No further complaints at this time

## 2022-08-17 NOTE — PLAN OF CARE
Problem: MOBILITY - ADULT  Goal: Maintain or return to baseline ADL function  Description: INTERVENTIONS:  -  Assess patient's ability to carry out ADLs; assess patient's baseline for ADL function and identify physical deficits which impact ability to perform ADLs (bathing, care of mouth/teeth, toileting, grooming, dressing, etc )  - Assess/evaluate cause of self-care deficits   - Assess range of motion  - Assess patient's mobility; develop plan if impaired  - Assess patient's need for assistive devices and provide as appropriate  - Encourage maximum independence but intervene and supervise when necessary  - Involve family in performance of ADLs  - Assess for home care needs following discharge   - Consider OT consult to assist with ADL evaluation and planning for discharge  - Provide patient education as appropriate  Outcome: Progressing  Goal: Maintains/Returns to pre admission functional level  Description: INTERVENTIONS:  - Perform BMAT or MOVE assessment daily    - Set and communicate daily mobility goal to care team and patient/family/caregiver     - Collaborate with rehabilitation services on mobility goals if consulted  - Out of bed for toileting  - Record patient progress and toleration of activity level   Outcome: Progressing     Problem: Potential for Falls  Goal: Patient will remain free of falls  Description: INTERVENTIONS:  - Educate patient/family on patient safety including physical limitations  - Instruct patient to call for assistance with activity   - Consult OT/PT to assist with strengthening/mobility   - Keep Call bell within reach  - Keep bed low and locked with side rails adjusted as appropriate  - Keep care items and personal belongings within reach  - Initiate and maintain comfort rounds  - Make Fall Risk Sign visible to staff  Problem: PAIN - ADULT  Goal: Verbalizes/displays adequate comfort level or baseline comfort level  Description: Interventions:  - Encourage patient to monitor pain and request assistance  - Assess pain using appropriate pain scale  - Administer analgesics based on type and severity of pain and evaluate response  - Implement non-pharmacological measures as appropriate and evaluate response  - Consider cultural and social influences on pain and pain management  - Notify physician/advanced practitioner if interventions unsuccessful or patient reports new pain  Outcome: Progressing     - Apply yellow socks and bracelet for high fall risk patients  - Consider moving patient to room near nurses station  Outcome: Progressing

## 2022-08-17 NOTE — PHYSICAL THERAPY NOTE
PT TREATMENT       08/17/22 1215   PT Last Visit   PT Visit Date 08/17/22   Note Type   Note Type Treatment   Pain Assessment   Pain Assessment Tool 0-10   Pain Score No Pain   Hospital Pain Intervention(s) Repositioned   Multiple Pain Sites No   Restrictions/Precautions   Weight Bearing Precautions Per Order No   Other Precautions Fall Risk;Pain;Bed Alarm   General   Chart Reviewed Yes   Family/Caregiver Present No   Cognition   Overall Cognitive Status WFL   Arousal/Participation Alert   Orientation Level Oriented X4   Following Commands Follows all commands and directions without difficulty   Subjective   Subjective "Sure I can get up "   Bed Mobility   Rolling R 7  Independent   Rolling L 7  Independent   Supine to Sit 5  Supervision   Additional items Verbal cues   Sit to Supine 5  Supervision   Additional items Verbal cues   Transfers   Sit to Stand 5  Supervision   Additional items Verbal cues   Stand to Sit 5  Supervision   Additional items Verbal cues   Ambulation/Elevation   Gait pattern Step through pattern   Gait Assistance 5  Supervision   Additional items Assist x 1;Verbal cues   Assistive Device Rolling walker   Distance 500 feet   Stair Management Assistance 5  Supervision   Additional items Assist x 1;Verbal cues   Stair Management Technique Two rails   Number of Stairs 12   Balance   Static Sitting Good   Dynamic Sitting Fair +   Static Standing Fair   Dynamic Standing Fair   Ambulatory Fair -   Activity Tolerance   Activity Tolerance Patient tolerated treatment well   Assessment   Prognosis Good   Problem List Decreased strength;Decreased endurance; Impaired balance;Decreased mobility; Decreased safety awareness;Pain   Assessment Pt agreeable to participate in PT session this AM  Pt able to progress ambulation with increased distance with superivision using RW with nil loss of balance  Able to progress to negotiate a full flight of steps with intermittent rest break due to fatigue      The patient's AM-PAC Basic Mobility Inpatient Short Form Raw Score is 20  A Raw score of greater than 16 suggests the patient may benefit from discharge to home  Please also refer to the recommendation of the Physical Therapist for safe discharge planning  Goals   Patient Goals to get better   Plan   Treatment/Interventions ADL retraining;Functional transfer training;LE strengthening/ROM; Therapeutic exercise; Endurance training;Bed mobility;Gait training;Spoke to nursing   Progress Progressing toward goals   PT Frequency Other (Comment)  (5x/week)   Recommendation   PT Discharge Recommendation No rehabilitation needs  (per chart review patient plans to go for inpatient behavioral health rehab)   Alvaro 8 in Bed Without Bedrails 4   Lying on Back to Sitting on Edge of Flat Bed 4   Moving Bed to Chair 3   Standing Up From Chair 3   Walk in Room 3   Climb 3-5 Stairs 3   Basic Mobility Inpatient Raw Score 20   Basic Mobility Standardized Score 43 99   Highest Level Of Mobility   JH-HLM Goal 6: Walk 10 steps or more   JH-HLM Achieved 8: Walk 250 feet ot more   Education   Education Provided Mobility training;Assistive device   Patient Explanation/teachback used   End of Consult   Patient Position at End of Consult Supine; All needs within reach;Bed/Chair alarm activated   [employer] License Number  Cecile Junior [id number]

## 2022-08-18 ENCOUNTER — PATIENT OUTREACH (OUTPATIENT)
Dept: FAMILY MEDICINE CLINIC | Facility: CLINIC | Age: 37
End: 2022-08-18

## 2022-08-18 DIAGNOSIS — Z78.9 NEED FOR FOLLOW-UP BY SOCIAL WORKER: Primary | ICD-10-CM

## 2022-08-18 NOTE — ASSESSMENT & PLAN NOTE
· History of alcohol and substance abuse  · Continue Suboxone  · Complete alcohol/use of illicit drugs cessation discussed

## 2022-08-18 NOTE — PROGRESS NOTES
Received message to reach out to this pt who is a [place] resident and has daniel for social work follow-up  Reviewed chart  Pt recently in ED due to alcohol intoxication  Pt discharged home with outpatient resources  '    Per IP CM, [address] was notified of pt's d/c home with plan to f/u with [facility], a transitional living program     Pt is MA pending which may be a barrier to admission to programs  IP SW Barbar Cockayne will follow up with [facility] , where application for intake was submitted        BASIM added self to care team

## 2022-08-19 ENCOUNTER — PATIENT OUTREACH (OUTPATIENT)
Dept: FAMILY MEDICINE CLINIC | Facility: CLINIC | Age: 37
End: 2022-08-19

## 2022-08-19 NOTE — PROGRESS NOTES
SW called pt  SW reached pt's voicemail  SW left message introducing self and reason for call, and requested call back

## 2022-08-23 ENCOUNTER — PATIENT OUTREACH (OUTPATIENT)
Dept: FAMILY MEDICINE CLINIC | Facility: CLINIC | Age: 37
End: 2022-08-23

## 2022-08-23 NOTE — PROGRESS NOTES
2nd outreach    SW called pt  Call went straight to vm  SW left message with reason for call and requested call back  Unable to Reach letter will be mailed

## 2022-08-23 NOTE — LETTER
[address]  [place] [address]  [phone]    Re: post discharge follow up   8/23/2022        Dear Mitzi Todd,  /  We tried to reach you by phone on 8/18 and 8/19 and was unfortunately unable to reach you  If you have any needs that social work can assist with for follow up after hospital discharge, please contact  at [phone]       Sincerely,       Delio Best LCSW

## 2022-12-04 ENCOUNTER — TELEPHONE (OUTPATIENT)
Dept: GASTROENTEROLOGY | Facility: AMBULARY SURGERY CENTER | Age: 37
End: 2022-12-04

## 2022-12-05 NOTE — TELEPHONE ENCOUNTER
LVM for pt to return call re: scheduling repeat EGD w/ Dr Raj Wesley direct phone # in scheduling on pt's phone message
